# Patient Record
Sex: FEMALE | Race: BLACK OR AFRICAN AMERICAN | NOT HISPANIC OR LATINO | Employment: FULL TIME | URBAN - METROPOLITAN AREA
[De-identification: names, ages, dates, MRNs, and addresses within clinical notes are randomized per-mention and may not be internally consistent; named-entity substitution may affect disease eponyms.]

---

## 2020-01-27 ENCOUNTER — OFFICE VISIT (OUTPATIENT)
Dept: OBSTETRICS AND GYNECOLOGY | Facility: CLINIC | Age: 50
End: 2020-01-27
Payer: COMMERCIAL

## 2020-01-27 VITALS
DIASTOLIC BLOOD PRESSURE: 72 MMHG | HEIGHT: 63 IN | WEIGHT: 293 LBS | SYSTOLIC BLOOD PRESSURE: 124 MMHG | BODY MASS INDEX: 51.91 KG/M2

## 2020-01-27 DIAGNOSIS — Z12.4 SCREENING FOR CERVICAL CANCER: ICD-10-CM

## 2020-01-27 DIAGNOSIS — N95.1 PERIMENOPAUSE: ICD-10-CM

## 2020-01-27 DIAGNOSIS — Z01.419 ENCOUNTER FOR GYNECOLOGICAL EXAMINATION (GENERAL) (ROUTINE) WITHOUT ABNORMAL FINDINGS: Primary | ICD-10-CM

## 2020-01-27 DIAGNOSIS — Z12.31 SCREENING MAMMOGRAM, ENCOUNTER FOR: ICD-10-CM

## 2020-01-27 PROCEDURE — 87624 HPV HI-RISK TYP POOLED RSLT: CPT

## 2020-01-27 PROCEDURE — 99386 PREV VISIT NEW AGE 40-64: CPT | Mod: S$GLB,,, | Performed by: OBSTETRICS & GYNECOLOGY

## 2020-01-27 PROCEDURE — 88175 CYTOPATH C/V AUTO FLUID REDO: CPT

## 2020-01-27 PROCEDURE — 99386 PR PREVENTIVE VISIT,NEW,40-64: ICD-10-PCS | Mod: S$GLB,,, | Performed by: OBSTETRICS & GYNECOLOGY

## 2020-01-27 PROCEDURE — 99999 PR PBB SHADOW E&M-EST. PATIENT-LVL II: CPT | Mod: PBBFAC,,, | Performed by: OBSTETRICS & GYNECOLOGY

## 2020-01-27 PROCEDURE — 99999 PR PBB SHADOW E&M-EST. PATIENT-LVL II: ICD-10-PCS | Mod: PBBFAC,,, | Performed by: OBSTETRICS & GYNECOLOGY

## 2020-01-27 RX ORDER — CETIRIZINE HYDROCHLORIDE 10 MG/1
TABLET ORAL
COMMUNITY
End: 2021-04-09

## 2020-01-27 RX ORDER — ALBUTEROL SULFATE 90 UG/1
AEROSOL, METERED RESPIRATORY (INHALATION)
COMMUNITY
End: 2021-11-05

## 2020-01-27 RX ORDER — LOSARTAN POTASSIUM 100 MG/1
TABLET ORAL
COMMUNITY
Start: 2019-11-22 | End: 2021-04-09

## 2020-01-27 RX ORDER — FUROSEMIDE 20 MG/1
TABLET ORAL
COMMUNITY
Start: 2019-12-26 | End: 2022-10-18

## 2020-01-27 RX ORDER — ATENOLOL 50 MG/1
TABLET ORAL
COMMUNITY
End: 2022-04-08 | Stop reason: SDUPTHER

## 2020-01-27 RX ORDER — HYDROCHLOROTHIAZIDE 25 MG/1
TABLET ORAL
COMMUNITY
End: 2021-04-09

## 2020-01-27 RX ORDER — METFORMIN HYDROCHLORIDE 500 MG/1
TABLET ORAL
COMMUNITY
End: 2022-04-08 | Stop reason: SINTOL

## 2020-01-27 RX ORDER — AMLODIPINE BESYLATE 10 MG/1
TABLET ORAL
COMMUNITY
Start: 2020-01-13 | End: 2022-04-08 | Stop reason: SDUPTHER

## 2020-01-27 RX ORDER — MOMETASONE FUROATE 50 UG/1
SPRAY, METERED NASAL
COMMUNITY
End: 2020-10-08

## 2020-01-27 NOTE — PROGRESS NOTES
Subjective:       Patient ID: Shaneka Ahmadi is a 50 y.o. female.    Chief Complaint:  Well Woman      History of Present Illness  HPI  Annual Exam-Premenopausal  Patient presents for annual exam. The patient has no complaints today. The patient is sexually active--denies pelvic pain; . GYN screening history: last pap: was normal and patient does not recall when last pap was and last mammogram: approximate date  and was normal. The patient wears seatbelts: yes. The patient participates in regular exercise: yes. --walks 3x/wk--1 hr; Has the patient ever been transfused or tattooed?: no. The patient reports that there is not domestic violence in her life.    Menses q 3 months for past year; flow 7 days; tampon-reg/overnight pad; change q 3-4 hrs; no soak through pad--but can soak through tampon; denies dysmenorrherea--reports occas headache and lower back paisn;       Tolerable hot flushes, no problems sleeping      GYN & OB History  Patient's last menstrual period was 10/27/2019 (approximate).   Date of Last Pap: No result found    OB History    Para Term  AB Living   4 4 4     4   SAB TAB Ectopic Multiple Live Births           4      # Outcome Date GA Lbr Philippe/2nd Weight Sex Delivery Anes PTL Lv   4 Term      Vag-Spont   JARROD   3 Term      Vag-Spont   JARROD   2 Term      Vag-Spont   JARROD   1 Term      Vag-Spont   JARROD       Review of Systems  Review of Systems   Genitourinary: Positive for menstrual problem.   All other systems reviewed and are negative.          Objective:      Physical Exam:   Constitutional: She appears well-developed.     Eyes: Pupils are equal, round, and reactive to light. Conjunctivae and EOM are normal.    Neck: Normal range of motion. Neck supple.     Pulmonary/Chest: Effort normal. Right breast exhibits no mass, no nipple discharge, no skin change and no tenderness. Left breast exhibits no mass, no nipple discharge, no skin change and no tenderness. Breasts are symmetrical.         Abdominal: Soft.     Genitourinary: Rectum normal, vagina normal and uterus normal. Pelvic exam was performed with patient supine. Cervix is normal. Right adnexum displays no mass and no tenderness. Left adnexum displays no mass and no tenderness. No erythema, bleeding, rectocele, cystocele or unspecified prolapse of vaginal walls in the vagina. No vaginal discharge found. Labial bartholins normal.Additional cervical findings: pap smear done  Genitourinary Comments: Difficult to assess due to obese abdomen        Uterus Size: 6 cm   Musculoskeletal: Normal range of motion.       Neurological: She is alert.    Skin: Skin is warm.    Psychiatric: She has a normal mood and affect.           Assessment:     Encounter Diagnoses   Name Primary?    Encounter for gynecological examination (general) (routine) without abnormal findings Yes    Screening for cervical cancer     Perimenopause     Screening mammogram, encounter for                Plan:      Continue annual well woman exam.  Pap today; Reviewed updated recommendations for pap smears (every 3 years) in low risk patients.   Recommend annual pelvic exams.  Reviewed recommendations for annual CBE.  mammo ordered, continue yearly until age 74, plans to do at [facility]  Continue menstrual calendar, aware in menopause if no menses for 12 months  Accepts referral for colonoscopy

## 2020-01-31 LAB
HPV HR 12 DNA SPEC QL NAA+PROBE: POSITIVE
HPV16 AG SPEC QL: NEGATIVE
HPV18 DNA SPEC QL NAA+PROBE: NEGATIVE

## 2020-02-18 LAB
FINAL PATHOLOGIC DIAGNOSIS: NORMAL
Lab: NORMAL

## 2020-08-14 ENCOUNTER — TELEPHONE (OUTPATIENT)
Dept: OBSTETRICS AND GYNECOLOGY | Facility: CLINIC | Age: 50
End: 2020-08-14

## 2020-08-14 NOTE — TELEPHONE ENCOUNTER
----- Message from Edi Atkins sent at 8/14/2020  7:31 AM CDT -----  ..Type:  Patient Returning Call    Who Called: pt  Who Left Message for Patient:  Does the patient know what this is regarding?: results   Would the patient rather a call back or a response via [facility]?  Call back   Best Call Back Number [phone]  Additional Information:  pt is requesting a call from nurse to get test results

## 2020-09-23 ENCOUNTER — TELEPHONE (OUTPATIENT)
Dept: OBSTETRICS AND GYNECOLOGY | Facility: CLINIC | Age: 50
End: 2020-09-23

## 2020-09-23 NOTE — TELEPHONE ENCOUNTER
----- Message from Mayela Mcgill sent at 9/23/2020 11:58 AM CDT -----  Regarding: Schedule Appt  Contact: Patient  Patient had an appt for a procedure scheduled on 09/03/2020   Patient stated she was not aware of appt and would like a call back to reschedule     Patient an be reached at [phone]

## 2020-10-04 ENCOUNTER — PATIENT MESSAGE (OUTPATIENT)
Dept: OBSTETRICS AND GYNECOLOGY | Facility: CLINIC | Age: 50
End: 2020-10-04

## 2020-10-08 ENCOUNTER — PROCEDURE VISIT (OUTPATIENT)
Dept: OBSTETRICS AND GYNECOLOGY | Facility: CLINIC | Age: 50
End: 2020-10-08
Payer: MEDICAID

## 2020-10-08 VITALS
HEIGHT: 63 IN | WEIGHT: 293 LBS | BODY MASS INDEX: 51.91 KG/M2 | SYSTOLIC BLOOD PRESSURE: 130 MMHG | DIASTOLIC BLOOD PRESSURE: 84 MMHG

## 2020-10-08 DIAGNOSIS — B97.7 HPV IN FEMALE: Primary | ICD-10-CM

## 2020-10-08 LAB
B-HCG UR QL: NEGATIVE
CTP QC/QA: YES

## 2020-10-08 PROCEDURE — 57456 COLPOSCOPY: ICD-10-PCS | Mod: S$PBB,,, | Performed by: OBSTETRICS & GYNECOLOGY

## 2020-10-08 PROCEDURE — 88305 TISSUE EXAM BY PATHOLOGIST: CPT | Mod: 26,,, | Performed by: PATHOLOGY

## 2020-10-08 PROCEDURE — 57456 ENDOCERV CURETTAGE W/SCOPE: CPT | Mod: PBBFAC,PN | Performed by: OBSTETRICS & GYNECOLOGY

## 2020-10-08 PROCEDURE — 88305 TISSUE EXAM BY PATHOLOGIST: ICD-10-PCS | Mod: 26,,, | Performed by: PATHOLOGY

## 2020-10-08 PROCEDURE — 88305 TISSUE EXAM BY PATHOLOGIST: CPT | Performed by: PATHOLOGY

## 2020-10-08 RX ORDER — ATORVASTATIN CALCIUM 20 MG/1
TABLET, FILM COATED ORAL
COMMUNITY
Start: 2020-09-02 | End: 2021-11-05

## 2020-10-08 RX ORDER — LOSARTAN POTASSIUM AND HYDROCHLOROTHIAZIDE 25; 100 MG/1; MG/1
TABLET ORAL
COMMUNITY
Start: 2020-07-30 | End: 2022-04-08

## 2020-10-08 NOTE — PROCEDURES
Colposcopy    Date/Time: 10/8/2020 2:15 PM  Performed by: Catalina Arriaga MD  Authorized by: Catalina Arriaga MD     Consent Done?:  Yes (Written)  Timeout:Immediately prior to procedure a time out was called to verify the correct patient, procedure, equipment, support staff and site/side marked as required  Prep:Patient was prepped and draped in the usual sterile fashion    Colposcopy Site:  Cervix   Patient was prepped and draped in the normal sterile fashion.  Acrowhite Lesion: No    Atypical Vessels: No    Transformation Zone Adequate?: Yes    Biopsy?: No    ECC Performed?: Yes    LEEP Performed?: No     Patient tolerated the procedure well with no immediate complications.   Post-operative instructions were provided for the patient.   Patient was discharged and will follow up if any complications occur

## 2020-10-13 LAB
FINAL PATHOLOGIC DIAGNOSIS: NORMAL
GROSS: NORMAL

## 2021-03-08 ENCOUNTER — PATIENT MESSAGE (OUTPATIENT)
Dept: OBSTETRICS AND GYNECOLOGY | Facility: CLINIC | Age: 51
End: 2021-03-08

## 2021-04-09 ENCOUNTER — OFFICE VISIT (OUTPATIENT)
Dept: OBSTETRICS AND GYNECOLOGY | Facility: CLINIC | Age: 51
End: 2021-04-09
Payer: COMMERCIAL

## 2021-04-09 VITALS
WEIGHT: 293 LBS | DIASTOLIC BLOOD PRESSURE: 84 MMHG | SYSTOLIC BLOOD PRESSURE: 140 MMHG | HEIGHT: 63 IN | BODY MASS INDEX: 51.91 KG/M2

## 2021-04-09 DIAGNOSIS — Z12.4 SCREENING FOR CERVICAL CANCER: ICD-10-CM

## 2021-04-09 DIAGNOSIS — Z12.31 SCREENING MAMMOGRAM, ENCOUNTER FOR: ICD-10-CM

## 2021-04-09 DIAGNOSIS — Z01.419 ENCOUNTER FOR GYNECOLOGICAL EXAMINATION (GENERAL) (ROUTINE) WITHOUT ABNORMAL FINDINGS: Primary | ICD-10-CM

## 2021-04-09 PROCEDURE — 1126F PR PAIN SEVERITY QUANTIFIED, NO PAIN PRESENT: ICD-10-PCS | Mod: S$GLB,,, | Performed by: OBSTETRICS & GYNECOLOGY

## 2021-04-09 PROCEDURE — 88175 CYTOPATH C/V AUTO FLUID REDO: CPT | Performed by: OBSTETRICS & GYNECOLOGY

## 2021-04-09 PROCEDURE — 87624 HPV HI-RISK TYP POOLED RSLT: CPT | Performed by: OBSTETRICS & GYNECOLOGY

## 2021-04-09 PROCEDURE — 87625 HPV TYPES 16 & 18 ONLY: CPT | Mod: 59 | Performed by: OBSTETRICS & GYNECOLOGY

## 2021-04-09 PROCEDURE — 99999 PR PBB SHADOW E&M-EST. PATIENT-LVL III: ICD-10-PCS | Mod: PBBFAC,,, | Performed by: OBSTETRICS & GYNECOLOGY

## 2021-04-09 PROCEDURE — 1126F AMNT PAIN NOTED NONE PRSNT: CPT | Mod: S$GLB,,, | Performed by: OBSTETRICS & GYNECOLOGY

## 2021-04-09 PROCEDURE — 99396 PREV VISIT EST AGE 40-64: CPT | Mod: S$GLB,,, | Performed by: OBSTETRICS & GYNECOLOGY

## 2021-04-09 PROCEDURE — 99396 PR PREVENTIVE VISIT,EST,40-64: ICD-10-PCS | Mod: S$GLB,,, | Performed by: OBSTETRICS & GYNECOLOGY

## 2021-04-09 PROCEDURE — 3008F PR BODY MASS INDEX (BMI) DOCUMENTED: ICD-10-PCS | Mod: CPTII,S$GLB,, | Performed by: OBSTETRICS & GYNECOLOGY

## 2021-04-09 PROCEDURE — 3008F BODY MASS INDEX DOCD: CPT | Mod: CPTII,S$GLB,, | Performed by: OBSTETRICS & GYNECOLOGY

## 2021-04-09 PROCEDURE — 99999 PR PBB SHADOW E&M-EST. PATIENT-LVL III: CPT | Mod: PBBFAC,,, | Performed by: OBSTETRICS & GYNECOLOGY

## 2021-04-16 ENCOUNTER — PATIENT MESSAGE (OUTPATIENT)
Dept: OBSTETRICS AND GYNECOLOGY | Facility: CLINIC | Age: 51
End: 2021-04-16

## 2021-04-16 LAB
CLINICAL INFO: NORMAL
CYTO CVX: NORMAL
CYTOLOGIST CVX/VAG CYTO: NORMAL
CYTOLOGIST CVX/VAG CYTO: NORMAL
CYTOLOGY CMNT CVX/VAG CYTO-IMP: NORMAL
CYTOLOGY PAP THIN PREP EXPLANATION: NORMAL
DATE OF PREVIOUS PAP: NORMAL
DATE PREVIOUS BX: NO
HPV I/H RISK 4 DNA CVX QL NAA+PROBE: DETECTED
LMP START DATE: NORMAL
SPECIMEN SOURCE CVX/VAG CYTO: NORMAL
STAT OF ADQ CVX/VAG CYTO-IMP: NORMAL

## 2021-04-19 LAB
HPV16 DNA CVX QL PROBE+SIG AMP: NOT DETECTED
HPV18 DNA CVX QL PROBE+SIG AMP: DETECTED

## 2021-04-29 ENCOUNTER — PATIENT MESSAGE (OUTPATIENT)
Dept: RESEARCH | Facility: HOSPITAL | Age: 51
End: 2021-04-29

## 2021-11-05 ENCOUNTER — OFFICE VISIT (OUTPATIENT)
Dept: PODIATRY | Facility: CLINIC | Age: 51
End: 2021-11-05
Payer: COMMERCIAL

## 2021-11-05 VITALS — HEIGHT: 63 IN | BODY MASS INDEX: 51.91 KG/M2 | WEIGHT: 293 LBS

## 2021-11-05 DIAGNOSIS — B35.3 TINEA PEDIS OF BOTH FEET: Primary | ICD-10-CM

## 2021-11-05 PROCEDURE — 1160F PR REVIEW ALL MEDS BY PRESCRIBER/CLIN PHARMACIST DOCUMENTED: ICD-10-PCS | Mod: CPTII,S$GLB,, | Performed by: PODIATRIST

## 2021-11-05 PROCEDURE — 1159F PR MEDICATION LIST DOCUMENTED IN MEDICAL RECORD: ICD-10-PCS | Mod: CPTII,S$GLB,, | Performed by: PODIATRIST

## 2021-11-05 PROCEDURE — 99204 OFFICE O/P NEW MOD 45 MIN: CPT | Mod: S$GLB,,, | Performed by: PODIATRIST

## 2021-11-05 PROCEDURE — 3008F BODY MASS INDEX DOCD: CPT | Mod: CPTII,S$GLB,, | Performed by: PODIATRIST

## 2021-11-05 PROCEDURE — 1159F MED LIST DOCD IN RCRD: CPT | Mod: CPTII,S$GLB,, | Performed by: PODIATRIST

## 2021-11-05 PROCEDURE — 4010F ACE/ARB THERAPY RXD/TAKEN: CPT | Mod: CPTII,S$GLB,, | Performed by: PODIATRIST

## 2021-11-05 PROCEDURE — 99204 PR OFFICE/OUTPT VISIT, NEW, LEVL IV, 45-59 MIN: ICD-10-PCS | Mod: S$GLB,,, | Performed by: PODIATRIST

## 2021-11-05 PROCEDURE — 4010F PR ACE/ARB THEARPY RXD/TAKEN: ICD-10-PCS | Mod: CPTII,S$GLB,, | Performed by: PODIATRIST

## 2021-11-05 PROCEDURE — 99999 PR PBB SHADOW E&M-EST. PATIENT-LVL III: CPT | Mod: PBBFAC,,, | Performed by: PODIATRIST

## 2021-11-05 PROCEDURE — 99999 PR PBB SHADOW E&M-EST. PATIENT-LVL III: ICD-10-PCS | Mod: PBBFAC,,, | Performed by: PODIATRIST

## 2021-11-05 PROCEDURE — 3008F PR BODY MASS INDEX (BMI) DOCUMENTED: ICD-10-PCS | Mod: CPTII,S$GLB,, | Performed by: PODIATRIST

## 2021-11-05 PROCEDURE — 1160F RVW MEDS BY RX/DR IN RCRD: CPT | Mod: CPTII,S$GLB,, | Performed by: PODIATRIST

## 2021-11-05 RX ORDER — TERBINAFINE HYDROCHLORIDE 250 MG/1
250 TABLET ORAL DAILY
Qty: 30 TABLET | Refills: 0 | Status: SHIPPED | OUTPATIENT
Start: 2021-11-05 | End: 2021-12-05

## 2021-12-02 ENCOUNTER — OFFICE VISIT (OUTPATIENT)
Dept: OPHTHALMOLOGY | Facility: CLINIC | Age: 51
End: 2021-12-02
Payer: COMMERCIAL

## 2021-12-02 DIAGNOSIS — E11.9 DIABETES MELLITUS TYPE 2 WITHOUT RETINOPATHY: Primary | ICD-10-CM

## 2021-12-02 DIAGNOSIS — H52.7 REFRACTIVE ERRORS: ICD-10-CM

## 2021-12-02 DIAGNOSIS — E11.36 DIABETIC CATARACT: ICD-10-CM

## 2021-12-02 PROCEDURE — 92015 PR REFRACTION: ICD-10-PCS | Mod: S$GLB,,, | Performed by: OPTOMETRIST

## 2021-12-02 PROCEDURE — 99999 PR PBB SHADOW E&M-EST. PATIENT-LVL I: ICD-10-PCS | Mod: PBBFAC,,, | Performed by: OPTOMETRIST

## 2021-12-02 PROCEDURE — 4010F PR ACE/ARB THEARPY RXD/TAKEN: ICD-10-PCS | Mod: CPTII,S$GLB,, | Performed by: OPTOMETRIST

## 2021-12-02 PROCEDURE — 99999 PR PBB SHADOW E&M-EST. PATIENT-LVL I: CPT | Mod: PBBFAC,,, | Performed by: OPTOMETRIST

## 2021-12-02 PROCEDURE — 92004 COMPRE OPH EXAM NEW PT 1/>: CPT | Mod: S$GLB,,, | Performed by: OPTOMETRIST

## 2021-12-02 PROCEDURE — 92015 DETERMINE REFRACTIVE STATE: CPT | Mod: S$GLB,,, | Performed by: OPTOMETRIST

## 2021-12-02 PROCEDURE — 4010F ACE/ARB THERAPY RXD/TAKEN: CPT | Mod: CPTII,S$GLB,, | Performed by: OPTOMETRIST

## 2021-12-02 PROCEDURE — 92004 PR EYE EXAM, NEW PATIENT,COMPREHESV: ICD-10-PCS | Mod: S$GLB,,, | Performed by: OPTOMETRIST

## 2022-01-05 ENCOUNTER — LAB VISIT (OUTPATIENT)
Dept: LAB | Facility: HOSPITAL | Age: 52
End: 2022-01-05
Attending: STUDENT IN AN ORGANIZED HEALTH CARE EDUCATION/TRAINING PROGRAM
Payer: COMMERCIAL

## 2022-01-05 ENCOUNTER — OFFICE VISIT (OUTPATIENT)
Dept: OPHTHALMOLOGY | Facility: CLINIC | Age: 52
End: 2022-01-05
Payer: COMMERCIAL

## 2022-01-05 ENCOUNTER — DOCUMENTATION ONLY (OUTPATIENT)
Dept: OPHTHALMOLOGY | Facility: CLINIC | Age: 52
End: 2022-01-05

## 2022-01-05 DIAGNOSIS — H25.12 NUCLEAR SCLEROSIS OF LEFT EYE: ICD-10-CM

## 2022-01-05 DIAGNOSIS — H25.11 NUCLEAR SCLEROSIS OF RIGHT EYE: ICD-10-CM

## 2022-01-05 DIAGNOSIS — I10 ESSENTIAL HYPERTENSION: ICD-10-CM

## 2022-01-05 DIAGNOSIS — H25.11 NUCLEAR SCLEROSIS OF RIGHT EYE: Primary | ICD-10-CM

## 2022-01-05 PROCEDURE — 99999 PR PBB SHADOW E&M-EST. PATIENT-LVL II: ICD-10-PCS | Mod: PBBFAC,,, | Performed by: STUDENT IN AN ORGANIZED HEALTH CARE EDUCATION/TRAINING PROGRAM

## 2022-01-05 PROCEDURE — 1159F PR MEDICATION LIST DOCUMENTED IN MEDICAL RECORD: ICD-10-PCS | Mod: CPTII,S$GLB,, | Performed by: STUDENT IN AN ORGANIZED HEALTH CARE EDUCATION/TRAINING PROGRAM

## 2022-01-05 PROCEDURE — 92136 IOL MASTER - OD - RIGHT EYE: ICD-10-PCS | Mod: RT,S$GLB,, | Performed by: STUDENT IN AN ORGANIZED HEALTH CARE EDUCATION/TRAINING PROGRAM

## 2022-01-05 PROCEDURE — 92004 COMPRE OPH EXAM NEW PT 1/>: CPT | Mod: S$GLB,,, | Performed by: STUDENT IN AN ORGANIZED HEALTH CARE EDUCATION/TRAINING PROGRAM

## 2022-01-05 PROCEDURE — 1160F PR REVIEW ALL MEDS BY PRESCRIBER/CLIN PHARMACIST DOCUMENTED: ICD-10-PCS | Mod: CPTII,S$GLB,, | Performed by: STUDENT IN AN ORGANIZED HEALTH CARE EDUCATION/TRAINING PROGRAM

## 2022-01-05 PROCEDURE — 92025 CPTRIZED CORNEAL TOPOGRAPHY: CPT | Mod: S$GLB,,, | Performed by: STUDENT IN AN ORGANIZED HEALTH CARE EDUCATION/TRAINING PROGRAM

## 2022-01-05 PROCEDURE — 36415 COLL VENOUS BLD VENIPUNCTURE: CPT | Performed by: STUDENT IN AN ORGANIZED HEALTH CARE EDUCATION/TRAINING PROGRAM

## 2022-01-05 PROCEDURE — 1159F MED LIST DOCD IN RCRD: CPT | Mod: CPTII,S$GLB,, | Performed by: STUDENT IN AN ORGANIZED HEALTH CARE EDUCATION/TRAINING PROGRAM

## 2022-01-05 PROCEDURE — 92025 CORNEAL TOPOGRAPHY - OU - BOTH EYES: ICD-10-PCS | Mod: S$GLB,,, | Performed by: STUDENT IN AN ORGANIZED HEALTH CARE EDUCATION/TRAINING PROGRAM

## 2022-01-05 PROCEDURE — 92136 OPHTHALMIC BIOMETRY: CPT | Mod: RT,S$GLB,, | Performed by: STUDENT IN AN ORGANIZED HEALTH CARE EDUCATION/TRAINING PROGRAM

## 2022-01-05 PROCEDURE — 83036 HEMOGLOBIN GLYCOSYLATED A1C: CPT | Performed by: STUDENT IN AN ORGANIZED HEALTH CARE EDUCATION/TRAINING PROGRAM

## 2022-01-05 PROCEDURE — 1160F RVW MEDS BY RX/DR IN RCRD: CPT | Mod: CPTII,S$GLB,, | Performed by: STUDENT IN AN ORGANIZED HEALTH CARE EDUCATION/TRAINING PROGRAM

## 2022-01-05 PROCEDURE — 92004 PR EYE EXAM, NEW PATIENT,COMPREHESV: ICD-10-PCS | Mod: S$GLB,,, | Performed by: STUDENT IN AN ORGANIZED HEALTH CARE EDUCATION/TRAINING PROGRAM

## 2022-01-05 PROCEDURE — 99999 PR PBB SHADOW E&M-EST. PATIENT-LVL II: CPT | Mod: PBBFAC,,, | Performed by: STUDENT IN AN ORGANIZED HEALTH CARE EDUCATION/TRAINING PROGRAM

## 2022-01-05 RX ORDER — PREDNISOLONE ACETATE 10 MG/ML
1 SUSPENSION/ DROPS OPHTHALMIC 4 TIMES DAILY
Qty: 5 ML | Refills: 1 | Status: SHIPPED | OUTPATIENT
Start: 2022-01-05 | End: 2022-08-12

## 2022-01-05 NOTE — PROGRESS NOTES
Short Stay Record    Diagnosis: Nuclear Sclerotic Cataract right    CC: Blurry Vision     HPI:  Shaneka Ahmadi is a 52 y.o. female who presents for evaluation prior to ophthalmic surgery. No current complaints.     Past Medical History:   Diagnosis Date    Diabetes mellitus     Hypertension      Past Surgical History:   Procedure Laterality Date    TUBAL LIGATION      2007--postpartum tubal ligation     Social History     Tobacco Use    Smoking status: Never Smoker    Smokeless tobacco: Never Used   Substance Use Topics    Alcohol use: Never     Family History   Problem Relation Age of Onset    Heart failure Father      Review of patient's allergies indicates:  No Known Allergies      Current Outpatient Medications:     amLODIPine (NORVASC) 10 MG tablet, , Disp: , Rfl:     atenolol (TENORMIN) 50 MG tablet, atenolol 50 mg tablet, Disp: , Rfl:     furosemide (LASIX) 20 MG tablet, , Disp: , Rfl:     losartan-hydrochlorothiazide 100-25 mg (HYZAAR) 100-25 mg per tablet, , Disp: , Rfl:     metFORMIN (GLUCOPHAGE) 500 MG tablet, metformin 500 mg tablet, Disp: , Rfl:     prednisoLONE acetate (PRED FORTE) 1 % DrpS, Place 1 drop into the right eye 4 (four) times daily., Disp: 5 mL, Rfl: 1    Review of Systems:  10 Pt ROS negative except as stated in HPI    Physical Exam:  General Appearance:    A&Ox3, no distress, appears stated age   Head:    Normocephalic, without obvious abnormality, atraumatic   Eyes:    PERRL, EOM's intact   Back:     Symmetric, no curvature   Lungs:     respirations unlabored   Chest Wall:    No tenderness or deformity    Heart:  Abdomen:  Extremities:  Skin:    S1 and S2 present    Soft, non-tender    Extremities normal, atraumatic    Skin color, texture, turgor normal     Patient is stable for ophthalmic surgery under local and MAC.

## 2022-01-05 NOTE — PROGRESS NOTES
HPI     Patient in for cataract evaluation today.    C/o blurred vision , glare, trouble driving at night , difficulty reading,   and seeing the television over the past several MONTHS    Which eye is most affected? OD    Activities of daily living impacted: Computer     Do you have difficulty, even with glasses, with the following activities?   No    Reading small print such as labels on medicine bottles, a telephone book,   or food labels.   YES  Reading a newspaper or book?  YES  Seeing steps, stairs, or curbs  NO  Reading traffic signs, street signs, or store signs  NO  Doing fine handwork like sewing, knitting, crocheting or carpentry?  NO  Writing checks or filling out forms  NO  Playing games such as binJumpPost, Bday, card games or Conversation Media?  NO  Watching television?  NO  Driving at night?  NO       Last edited by Elizabeth Pza MD on 1/5/2022  3:19 PM. (History)            Assessment /Plan     For exam results, see Encounter Report.    Nuclear sclerosis of right eye  Visually Significant Cataract OD  Patient reports decreased vision consistent with the clinical amount of lenticular opacity, which reaches the level of visual significance and affects activities of daily living including reading and glare. Risks, benefits, and alternatives to cataract surgery were discussed.  Discussion of risks included possibility of infection as well as permanent vision loss.The pt expressed a desire to proceed with surgery with the potential for some reasonable degree of visual improvement. Recommended regular use of artificial tears and good lid hygiene to optimize surgical outcome.     Discussed IOL options and refractive outcomes for this patient.    Phaco right eye,   Topical  monofocal IOL. *Pt. is still considering Multifocal  Will aim for distance  Referral to [facility] for [facility]hthalmic [facility]  Prescriptions sent for preoperative medications  Durezol or Prednisolone Acetate  Explained that patient  may need glasses after surgery.    Dilation: good  Alpha Blockers: none      Nuclear sclerosis of left eye  -   Follow    Essential hypertension    *Patient has ? Of diabetes, on metformin, ordering A1C today before planning cataract surgery      Return to clinic for PCIOL OD

## 2022-01-06 LAB
ESTIMATED AVG GLUCOSE: 171 MG/DL (ref 68–131)
HBA1C MFR BLD: 7.6 % (ref 4–5.6)

## 2022-04-07 ENCOUNTER — OFFICE VISIT (OUTPATIENT)
Dept: OBSTETRICS AND GYNECOLOGY | Facility: CLINIC | Age: 52
End: 2022-04-07
Payer: COMMERCIAL

## 2022-04-07 ENCOUNTER — TELEPHONE (OUTPATIENT)
Dept: OBSTETRICS AND GYNECOLOGY | Facility: CLINIC | Age: 52
End: 2022-04-07
Payer: COMMERCIAL

## 2022-04-07 VITALS — BODY MASS INDEX: 51.91 KG/M2 | HEIGHT: 63 IN | WEIGHT: 293 LBS

## 2022-04-07 DIAGNOSIS — N89.8 VAGINAL ODOR: Primary | ICD-10-CM

## 2022-04-07 DIAGNOSIS — Z12.39 ENCOUNTER FOR SCREENING FOR MALIGNANT NEOPLASM OF BREAST, UNSPECIFIED SCREENING MODALITY: ICD-10-CM

## 2022-04-07 DIAGNOSIS — R30.0 DYSURIA: ICD-10-CM

## 2022-04-07 PROCEDURE — 4010F ACE/ARB THERAPY RXD/TAKEN: CPT | Mod: CPTII,S$GLB,, | Performed by: OBSTETRICS & GYNECOLOGY

## 2022-04-07 PROCEDURE — 87481 CANDIDA DNA AMP PROBE: CPT | Mod: 59 | Performed by: OBSTETRICS & GYNECOLOGY

## 2022-04-07 PROCEDURE — 3051F PR MOST RECENT HEMOGLOBIN A1C LEVEL 7.0 - < 8.0%: ICD-10-PCS | Mod: CPTII,S$GLB,, | Performed by: OBSTETRICS & GYNECOLOGY

## 2022-04-07 PROCEDURE — 99999 PR PBB SHADOW E&M-EST. PATIENT-LVL III: CPT | Mod: PBBFAC,,, | Performed by: OBSTETRICS & GYNECOLOGY

## 2022-04-07 PROCEDURE — 3008F BODY MASS INDEX DOCD: CPT | Mod: CPTII,S$GLB,, | Performed by: OBSTETRICS & GYNECOLOGY

## 2022-04-07 PROCEDURE — 99213 PR OFFICE/OUTPT VISIT, EST, LEVL III, 20-29 MIN: ICD-10-PCS | Mod: S$GLB,,, | Performed by: OBSTETRICS & GYNECOLOGY

## 2022-04-07 PROCEDURE — 99213 OFFICE O/P EST LOW 20 MIN: CPT | Mod: S$GLB,,, | Performed by: OBSTETRICS & GYNECOLOGY

## 2022-04-07 PROCEDURE — 1159F PR MEDICATION LIST DOCUMENTED IN MEDICAL RECORD: ICD-10-PCS | Mod: CPTII,S$GLB,, | Performed by: OBSTETRICS & GYNECOLOGY

## 2022-04-07 PROCEDURE — 87801 DETECT AGNT MULT DNA AMPLI: CPT | Performed by: OBSTETRICS & GYNECOLOGY

## 2022-04-07 PROCEDURE — 3051F HG A1C>EQUAL 7.0%<8.0%: CPT | Mod: CPTII,S$GLB,, | Performed by: OBSTETRICS & GYNECOLOGY

## 2022-04-07 PROCEDURE — 4010F PR ACE/ARB THEARPY RXD/TAKEN: ICD-10-PCS | Mod: CPTII,S$GLB,, | Performed by: OBSTETRICS & GYNECOLOGY

## 2022-04-07 PROCEDURE — 87086 URINE CULTURE/COLONY COUNT: CPT | Performed by: OBSTETRICS & GYNECOLOGY

## 2022-04-07 PROCEDURE — 99999 PR PBB SHADOW E&M-EST. PATIENT-LVL III: ICD-10-PCS | Mod: PBBFAC,,, | Performed by: OBSTETRICS & GYNECOLOGY

## 2022-04-07 PROCEDURE — 3008F PR BODY MASS INDEX (BMI) DOCUMENTED: ICD-10-PCS | Mod: CPTII,S$GLB,, | Performed by: OBSTETRICS & GYNECOLOGY

## 2022-04-07 PROCEDURE — 1159F MED LIST DOCD IN RCRD: CPT | Mod: CPTII,S$GLB,, | Performed by: OBSTETRICS & GYNECOLOGY

## 2022-04-07 RX ORDER — HYDROCHLOROTHIAZIDE 25 MG/1
25 TABLET ORAL DAILY
COMMUNITY
Start: 2022-01-17 | End: 2022-04-08

## 2022-04-07 RX ORDER — HYDROXYCHLOROQUINE SULFATE 200 MG/1
200 TABLET, FILM COATED ORAL 2 TIMES DAILY
COMMUNITY
Start: 2021-12-30 | End: 2022-04-08

## 2022-04-07 RX ORDER — METRONIDAZOLE 500 MG/1
500 TABLET ORAL EVERY 12 HOURS
Qty: 14 TABLET | Refills: 0 | Status: SHIPPED | OUTPATIENT
Start: 2022-04-07 | End: 2022-04-14

## 2022-04-07 RX ORDER — NAFTIFINE HYDROCHLORIDE 20 MG/G
CREAM TOPICAL
COMMUNITY
Start: 2022-03-31 | End: 2022-10-18

## 2022-04-07 RX ORDER — ACETAMINOPHEN 160 MG
TABLET,CHEWABLE ORAL
COMMUNITY
End: 2022-04-08

## 2022-04-07 RX ORDER — VALSARTAN AND HYDROCHLOROTHIAZIDE 80; 12.5 MG/1; MG/1
TABLET, FILM COATED ORAL
COMMUNITY
End: 2022-04-07

## 2022-04-07 NOTE — TELEPHONE ENCOUNTER
----- Message from Marilin Astorga sent at 4/7/2022  9:22 AM CDT -----  Regarding: rx  Contact: pt  Pt wants to know if rx was called in.[phone]

## 2022-04-07 NOTE — PROGRESS NOTES
Subjective:       Patient ID: Shaneka Ahmadi is a 52 y.o. female.    Chief Complaint:  Urinary Tract Infection      History of Present Illness  HPI  here for problem   Reports pain with urination and fishy vaginal odor for 1 month  Reports minimal discharge  Denies fever/chills  Has not used any otc products    GYN & OB History  Patient's last menstrual period was 2021 (approximate).   Date of Last Pap: 2020    OB History    Para Term  AB Living   4 3 3   1 3   SAB IAB Ectopic Multiple Live Births   1       3      # Outcome Date GA Lbr Philippe/2nd Weight Sex Delivery Anes PTL Lv   4 Term      Vag-Spont   JARROD   3 SAB            2 Term      Vag-Spont   JARROD   1 Term      Vag-Spont   JARROD       Review of Systems  Review of Systems   Genitourinary: Positive for dysuria and vaginal odor.   All other systems reviewed and are negative.          Objective:      Physical Exam:   Constitutional: She appears well-developed.     Eyes: Pupils are equal, round, and reactive to light. Conjunctivae and EOM are normal.      Pulmonary/Chest: Effort normal. Right breast exhibits no mass, no nipple discharge, no skin change, no tenderness and presence. Left breast exhibits no mass, no nipple discharge, no skin change, no tenderness and presence. Breasts are symmetrical.        Abdominal: Soft.     Genitourinary:    Vagina and uterus normal.      Pelvic exam was performed with patient supine.             Musculoskeletal: Normal range of motion.       Neurological: She is alert.    Skin: Skin is warm.    Psychiatric: She has a normal mood and affect.           Assessment:        1. Vaginal odor    2. Encounter for screening for malignant neoplasm of breast, unspecified screening modality    3. Dysuria               Plan:      Affirm today  ucx today;  rx sent for flagyl  mammo ordered, continue yearly until age 74  Return for ww exam

## 2022-04-08 ENCOUNTER — LAB VISIT (OUTPATIENT)
Dept: LAB | Facility: HOSPITAL | Age: 52
End: 2022-04-08
Attending: NURSE PRACTITIONER
Payer: COMMERCIAL

## 2022-04-08 ENCOUNTER — OFFICE VISIT (OUTPATIENT)
Dept: INTERNAL MEDICINE | Facility: CLINIC | Age: 52
End: 2022-04-08
Payer: COMMERCIAL

## 2022-04-08 VITALS
OXYGEN SATURATION: 97 % | WEIGHT: 293 LBS | HEART RATE: 61 BPM | TEMPERATURE: 98 F | SYSTOLIC BLOOD PRESSURE: 134 MMHG | RESPIRATION RATE: 18 BRPM | DIASTOLIC BLOOD PRESSURE: 80 MMHG | HEIGHT: 63 IN | BODY MASS INDEX: 51.91 KG/M2

## 2022-04-08 DIAGNOSIS — Z12.11 COLON CANCER SCREENING: ICD-10-CM

## 2022-04-08 DIAGNOSIS — I10 ESSENTIAL HYPERTENSION: ICD-10-CM

## 2022-04-08 DIAGNOSIS — E11.9 TYPE 2 DIABETES MELLITUS WITHOUT COMPLICATION, WITHOUT LONG-TERM CURRENT USE OF INSULIN: ICD-10-CM

## 2022-04-08 DIAGNOSIS — Z00.00 ROUTINE MEDICAL EXAM: Primary | ICD-10-CM

## 2022-04-08 LAB
ALBUMIN SERPL BCP-MCNC: 3.7 G/DL (ref 3.5–5.2)
ALP SERPL-CCNC: 102 U/L (ref 55–135)
ALT SERPL W/O P-5'-P-CCNC: 19 U/L (ref 10–44)
ANION GAP SERPL CALC-SCNC: 11 MMOL/L (ref 8–16)
AST SERPL-CCNC: 12 U/L (ref 10–40)
BACTERIAL VAGINOSIS DNA: NEGATIVE
BASOPHILS # BLD AUTO: 0.06 K/UL (ref 0–0.2)
BASOPHILS NFR BLD: 0.7 % (ref 0–1.9)
BILIRUB SERPL-MCNC: 0.4 MG/DL (ref 0.1–1)
BUN SERPL-MCNC: 13 MG/DL (ref 6–20)
CALCIUM SERPL-MCNC: 9.3 MG/DL (ref 8.7–10.5)
CANDIDA GLABRATA DNA: NEGATIVE
CANDIDA KRUSEI DNA: NEGATIVE
CANDIDA RRNA VAG QL PROBE: NEGATIVE
CHLORIDE SERPL-SCNC: 100 MMOL/L (ref 95–110)
CHOLEST SERPL-MCNC: 203 MG/DL (ref 120–199)
CHOLEST/HDLC SERPL: 4.1 {RATIO} (ref 2–5)
CO2 SERPL-SCNC: 30 MMOL/L (ref 23–29)
CREAT SERPL-MCNC: 0.8 MG/DL (ref 0.5–1.4)
DIFFERENTIAL METHOD: ABNORMAL
EOSINOPHIL # BLD AUTO: 0.4 K/UL (ref 0–0.5)
EOSINOPHIL NFR BLD: 4.5 % (ref 0–8)
ERYTHROCYTE [DISTWIDTH] IN BLOOD BY AUTOMATED COUNT: 13.2 % (ref 11.5–14.5)
EST. GFR  (AFRICAN AMERICAN): >60 ML/MIN/1.73 M^2
EST. GFR  (NON AFRICAN AMERICAN): >60 ML/MIN/1.73 M^2
ESTIMATED AVG GLUCOSE: 189 MG/DL (ref 68–131)
GLUCOSE SERPL-MCNC: 193 MG/DL (ref 70–110)
HBA1C MFR BLD: 8.2 % (ref 4–5.6)
HCT VFR BLD AUTO: 40.8 % (ref 37–48.5)
HDLC SERPL-MCNC: 49 MG/DL (ref 40–75)
HDLC SERPL: 24.1 % (ref 20–50)
HGB BLD-MCNC: 12.7 G/DL (ref 12–16)
IMM GRANULOCYTES # BLD AUTO: 0.03 K/UL (ref 0–0.04)
IMM GRANULOCYTES NFR BLD AUTO: 0.3 % (ref 0–0.5)
LDLC SERPL CALC-MCNC: 136.6 MG/DL (ref 63–159)
LYMPHOCYTES # BLD AUTO: 2 K/UL (ref 1–4.8)
LYMPHOCYTES NFR BLD: 23.4 % (ref 18–48)
MCH RBC QN AUTO: 25.8 PG (ref 27–31)
MCHC RBC AUTO-ENTMCNC: 31.1 G/DL (ref 32–36)
MCV RBC AUTO: 83 FL (ref 82–98)
MONOCYTES # BLD AUTO: 0.6 K/UL (ref 0.3–1)
MONOCYTES NFR BLD: 7.3 % (ref 4–15)
NEUTROPHILS # BLD AUTO: 5.5 K/UL (ref 1.8–7.7)
NEUTROPHILS NFR BLD: 63.8 % (ref 38–73)
NONHDLC SERPL-MCNC: 154 MG/DL
NRBC BLD-RTO: 0 /100 WBC
PLATELET # BLD AUTO: 198 K/UL (ref 150–450)
PMV BLD AUTO: 13.6 FL (ref 9.2–12.9)
POTASSIUM SERPL-SCNC: 3.7 MMOL/L (ref 3.5–5.1)
PROT SERPL-MCNC: 6.7 G/DL (ref 6–8.4)
RBC # BLD AUTO: 4.92 M/UL (ref 4–5.4)
SODIUM SERPL-SCNC: 141 MMOL/L (ref 136–145)
T VAGINALIS RRNA GENITAL QL PROBE: NEGATIVE
TRIGL SERPL-MCNC: 87 MG/DL (ref 30–150)
TSH SERPL DL<=0.005 MIU/L-ACNC: 1.74 UIU/ML (ref 0.4–4)
WBC # BLD AUTO: 8.63 K/UL (ref 3.9–12.7)

## 2022-04-08 PROCEDURE — 1159F PR MEDICATION LIST DOCUMENTED IN MEDICAL RECORD: ICD-10-PCS | Mod: CPTII,S$GLB,, | Performed by: NURSE PRACTITIONER

## 2022-04-08 PROCEDURE — 99203 OFFICE O/P NEW LOW 30 MIN: CPT | Mod: S$GLB,,, | Performed by: NURSE PRACTITIONER

## 2022-04-08 PROCEDURE — 3008F BODY MASS INDEX DOCD: CPT | Mod: CPTII,S$GLB,, | Performed by: NURSE PRACTITIONER

## 2022-04-08 PROCEDURE — 85025 COMPLETE CBC W/AUTO DIFF WBC: CPT | Performed by: NURSE PRACTITIONER

## 2022-04-08 PROCEDURE — 3079F PR MOST RECENT DIASTOLIC BLOOD PRESSURE 80-89 MM HG: ICD-10-PCS | Mod: CPTII,S$GLB,, | Performed by: NURSE PRACTITIONER

## 2022-04-08 PROCEDURE — 3008F PR BODY MASS INDEX (BMI) DOCUMENTED: ICD-10-PCS | Mod: CPTII,S$GLB,, | Performed by: NURSE PRACTITIONER

## 2022-04-08 PROCEDURE — 1160F PR REVIEW ALL MEDS BY PRESCRIBER/CLIN PHARMACIST DOCUMENTED: ICD-10-PCS | Mod: CPTII,S$GLB,, | Performed by: NURSE PRACTITIONER

## 2022-04-08 PROCEDURE — 4010F ACE/ARB THERAPY RXD/TAKEN: CPT | Mod: CPTII,S$GLB,, | Performed by: NURSE PRACTITIONER

## 2022-04-08 PROCEDURE — 3052F PR MOST RECENT HEMOGLOBIN A1C LEVEL 8.0 - < 9.0%: ICD-10-PCS | Mod: CPTII,S$GLB,, | Performed by: NURSE PRACTITIONER

## 2022-04-08 PROCEDURE — 99203 PR OFFICE/OUTPT VISIT, NEW, LEVL III, 30-44 MIN: ICD-10-PCS | Mod: S$GLB,,, | Performed by: NURSE PRACTITIONER

## 2022-04-08 PROCEDURE — 99999 PR PBB SHADOW E&M-EST. PATIENT-LVL V: CPT | Mod: PBBFAC,,, | Performed by: NURSE PRACTITIONER

## 2022-04-08 PROCEDURE — 3075F SYST BP GE 130 - 139MM HG: CPT | Mod: CPTII,S$GLB,, | Performed by: NURSE PRACTITIONER

## 2022-04-08 PROCEDURE — 3052F HG A1C>EQUAL 8.0%<EQUAL 9.0%: CPT | Mod: CPTII,S$GLB,, | Performed by: NURSE PRACTITIONER

## 2022-04-08 PROCEDURE — 99999 PR PBB SHADOW E&M-EST. PATIENT-LVL V: ICD-10-PCS | Mod: PBBFAC,,, | Performed by: NURSE PRACTITIONER

## 2022-04-08 PROCEDURE — 83036 HEMOGLOBIN GLYCOSYLATED A1C: CPT | Performed by: NURSE PRACTITIONER

## 2022-04-08 PROCEDURE — 36415 COLL VENOUS BLD VENIPUNCTURE: CPT | Mod: PO | Performed by: NURSE PRACTITIONER

## 2022-04-08 PROCEDURE — 1159F MED LIST DOCD IN RCRD: CPT | Mod: CPTII,S$GLB,, | Performed by: NURSE PRACTITIONER

## 2022-04-08 PROCEDURE — 80061 LIPID PANEL: CPT | Performed by: NURSE PRACTITIONER

## 2022-04-08 PROCEDURE — 80053 COMPREHEN METABOLIC PANEL: CPT | Performed by: NURSE PRACTITIONER

## 2022-04-08 PROCEDURE — 1160F RVW MEDS BY RX/DR IN RCRD: CPT | Mod: CPTII,S$GLB,, | Performed by: NURSE PRACTITIONER

## 2022-04-08 PROCEDURE — 84443 ASSAY THYROID STIM HORMONE: CPT | Performed by: NURSE PRACTITIONER

## 2022-04-08 PROCEDURE — 3075F PR MOST RECENT SYSTOLIC BLOOD PRESS GE 130-139MM HG: ICD-10-PCS | Mod: CPTII,S$GLB,, | Performed by: NURSE PRACTITIONER

## 2022-04-08 PROCEDURE — 4010F PR ACE/ARB THEARPY RXD/TAKEN: ICD-10-PCS | Mod: CPTII,S$GLB,, | Performed by: NURSE PRACTITIONER

## 2022-04-08 PROCEDURE — 3079F DIAST BP 80-89 MM HG: CPT | Mod: CPTII,S$GLB,, | Performed by: NURSE PRACTITIONER

## 2022-04-08 RX ORDER — METFORMIN HYDROCHLORIDE 500 MG/1
500 TABLET, EXTENDED RELEASE ORAL 2 TIMES DAILY WITH MEALS
Qty: 60 TABLET | Refills: 11 | Status: SHIPPED | OUTPATIENT
Start: 2022-04-08 | End: 2022-05-04 | Stop reason: SDUPTHER

## 2022-04-08 RX ORDER — AMLODIPINE BESYLATE 10 MG/1
10 TABLET ORAL DAILY
Qty: 90 TABLET | Refills: 3 | Status: SHIPPED | OUTPATIENT
Start: 2022-04-08 | End: 2022-10-18 | Stop reason: SDUPTHER

## 2022-04-08 RX ORDER — LOSARTAN POTASSIUM AND HYDROCHLOROTHIAZIDE 25; 100 MG/1; MG/1
1 TABLET ORAL DAILY
Qty: 90 TABLET | Refills: 3 | Status: SHIPPED | OUTPATIENT
Start: 2022-04-08 | End: 2022-11-18 | Stop reason: SDUPTHER

## 2022-04-08 RX ORDER — LANCETS
EACH MISCELLANEOUS
Qty: 100 EACH | Refills: 11 | Status: SHIPPED | OUTPATIENT
Start: 2022-04-08

## 2022-04-08 RX ORDER — INSULIN PUMP SYRINGE, 3 ML
EACH MISCELLANEOUS
Qty: 1 EACH | Refills: 0 | Status: SHIPPED | OUTPATIENT
Start: 2022-04-08 | End: 2023-04-08

## 2022-04-08 RX ORDER — ATENOLOL 50 MG/1
TABLET ORAL
Qty: 180 TABLET | Refills: 3 | Status: SHIPPED | OUTPATIENT
Start: 2022-04-08 | End: 2023-05-09 | Stop reason: SDUPTHER

## 2022-04-08 NOTE — PROGRESS NOTES
Subjective:       Patient ID: Shaneka Ahmadi is a 52 y.o. female.    Chief Complaint: Establish Care, Medication Refill, and Neck Pain (X 2 wks or longer)    Patient presents for routine exam.  Dr. Jef Rockwell (Old PCP in [place], [place])      Dr. Paz ([facility])    Reports fungal to toe.   Will clear and return.  Currently using naftifine.  Started about 3 months ago.     Reports right side neck pain.  Reports started about 3 months ago.  No injury or trauma.  Reports laying on area while sleepign.     Review of Systems   Constitutional: Negative for chills, fatigue and fever.   Cardiovascular: Negative for chest pain and leg swelling.   Musculoskeletal: Positive for neck pain and neck stiffness. Negative for arthralgias and gait problem.   Integumentary:  Positive for wound.   Psychiatric/Behavioral: Negative for agitation and confusion.         Objective:      Physical Exam  Vitals reviewed.   Constitutional:       Appearance: Normal appearance.   Cardiovascular:      Rate and Rhythm: Normal rate and regular rhythm.      Pulses:           Dorsalis pedis pulses are 2+ on the right side and 2+ on the left side.   Pulmonary:      Effort: Pulmonary effort is normal.      Breath sounds: Normal breath sounds.   Musculoskeletal:         General: Tenderness present.      Cervical back: Muscular tenderness present.        Feet:    Skin:     General: Skin is warm.   Neurological:      General: No focal deficit present.      Mental Status: She is alert and oriented to person, place, and time.   Psychiatric:         Mood and Affect: Mood normal.         Behavior: Behavior is cooperative.         Assessment:       Problem List Items Addressed This Visit     Essential hypertension    Relevant Medications    losartan-hydrochlorothiazide 100-25 mg (HYZAAR) 100-25 mg per tablet    amLODIPine (NORVASC) 10 MG tablet    atenoloL (TENORMIN) 50 MG tablet    Other Relevant Orders    Comprehensive Metabolic Panel (Completed)    CBC  Auto Differential (Completed)    TSH (Completed)    Lipid Panel (Completed)    Type 2 diabetes mellitus without complication, without long-term current use of insulin    Relevant Medications    metFORMIN (GLUCOPHAGE-XR) 500 MG ER 24hr tablet    blood-glucose meter kit    lancets Misc    blood sugar diagnostic Strp    Other Relevant Orders    Hemoglobin A1C (Completed)    Comprehensive Metabolic Panel (Completed)    Lipid Panel (Completed)    Microalbumin/Creatinine Ratio, Urine      Other Visit Diagnoses     Routine medical exam    -  Primary    Relevant Orders    Hepatitis C Antibody    Colon cancer screening        Relevant Orders    Ambulatory referral/consult to Endo Procedure           Plan:         Routine medical exam  -     Hepatitis C Antibody; Future; Expected date: 04/08/2022    Essential hypertension  -     Comprehensive Metabolic Panel; Future; Expected date: 04/08/2022  -     CBC Auto Differential; Future; Expected date: 04/08/2022  -     TSH; Future; Expected date: 04/08/2022  -     Lipid Panel; Future; Expected date: 04/08/2022  -     losartan-hydrochlorothiazide 100-25 mg (HYZAAR) 100-25 mg per tablet; Take 1 tablet by mouth once daily.  Dispense: 90 tablet; Refill: 3  -     amLODIPine (NORVASC) 10 MG tablet; Take 1 tablet (10 mg total) by mouth once daily.  Dispense: 90 tablet; Refill: 3  -     atenoloL (TENORMIN) 50 MG tablet; Take 1 tab by mouth twice a day  Dispense: 180 tablet; Refill: 3    Type 2 diabetes mellitus without complication, without long-term current use of insulin  -     Hemoglobin A1C; Future; Expected date: 04/08/2022  -     Comprehensive Metabolic Panel; Future; Expected date: 04/08/2022  -     Lipid Panel; Future; Expected date: 04/08/2022  -     Microalbumin/Creatinine Ratio, Urine  -     metFORMIN (GLUCOPHAGE-XR) 500 MG ER 24hr tablet; Take 1 tablet (500 mg total) by mouth 2 (two) times daily with meals.  Dispense: 60 tablet; Refill: 11  -     blood-glucose meter  kit; To check BG 2 times daily, to use with insurance preferred meter  Dispense: 1 each; Refill: 0  -     lancets Misc; To check BG 2 times daily, to use with insurance preferred meter  Dispense: 100 each; Refill: 11  -     blood sugar diagnostic Strp; To check BG 2 times daily, to use with insurance preferred meter  Dispense: 100 each; Refill: 11    Colon cancer screening  -     Ambulatory referral/consult to Endo Procedure ; Future; Expected date: 04/09/2022      Fasting labs today and urine     F/u in 3 months     Not interested in dietician.      Will continue to update health maintenance at follow up

## 2022-04-08 NOTE — PATIENT INSTRUCTIONS
RECOMMEND TO CHECK BLOOD SUGAR 1-2 TIMES A DAY    LOW CARB DIET     EXERCISE 3-4 TIMES A WEEK FOR 30-40 MINUTES

## 2022-04-09 LAB — BACTERIA UR CULT: NO GROWTH

## 2022-04-11 ENCOUNTER — PATIENT MESSAGE (OUTPATIENT)
Dept: INTERNAL MEDICINE | Facility: CLINIC | Age: 52
End: 2022-04-11
Payer: COMMERCIAL

## 2022-04-11 DIAGNOSIS — E11.9 TYPE 2 DIABETES MELLITUS WITHOUT COMPLICATION, WITHOUT LONG-TERM CURRENT USE OF INSULIN: Primary | ICD-10-CM

## 2022-04-11 RX ORDER — PRAVASTATIN SODIUM 10 MG/1
10 TABLET ORAL NIGHTLY
Qty: 30 TABLET | Refills: 11 | Status: SHIPPED | OUTPATIENT
Start: 2022-04-11 | End: 2024-01-26 | Stop reason: SINTOL

## 2022-04-13 DIAGNOSIS — E11.9 TYPE 2 DIABETES MELLITUS WITHOUT COMPLICATION: ICD-10-CM

## 2022-04-14 ENCOUNTER — PATIENT MESSAGE (OUTPATIENT)
Dept: ADMINISTRATIVE | Facility: HOSPITAL | Age: 52
End: 2022-04-14
Payer: COMMERCIAL

## 2022-04-25 ENCOUNTER — TELEPHONE (OUTPATIENT)
Dept: OPHTHALMOLOGY | Facility: CLINIC | Age: 52
End: 2022-04-25
Payer: COMMERCIAL

## 2022-04-25 NOTE — TELEPHONE ENCOUNTER
Called and spoke with patient confirmed that surg date is 5/5 and I will call with arrival time 1 to 2 days prior to surgery.

## 2022-05-04 ENCOUNTER — OFFICE VISIT (OUTPATIENT)
Dept: INTERNAL MEDICINE | Facility: CLINIC | Age: 52
End: 2022-05-04
Payer: COMMERCIAL

## 2022-05-04 VITALS
HEART RATE: 63 BPM | WEIGHT: 293 LBS | DIASTOLIC BLOOD PRESSURE: 76 MMHG | TEMPERATURE: 98 F | SYSTOLIC BLOOD PRESSURE: 138 MMHG | RESPIRATION RATE: 18 BRPM | BODY MASS INDEX: 51.91 KG/M2 | HEIGHT: 63 IN | OXYGEN SATURATION: 98 %

## 2022-05-04 DIAGNOSIS — Z09 FOLLOW UP: Primary | ICD-10-CM

## 2022-05-04 DIAGNOSIS — E11.9 TYPE 2 DIABETES MELLITUS WITHOUT COMPLICATION, WITHOUT LONG-TERM CURRENT USE OF INSULIN: ICD-10-CM

## 2022-05-04 PROCEDURE — 4010F PR ACE/ARB THEARPY RXD/TAKEN: ICD-10-PCS | Mod: CPTII,S$GLB,, | Performed by: NURSE PRACTITIONER

## 2022-05-04 PROCEDURE — 1160F PR REVIEW ALL MEDS BY PRESCRIBER/CLIN PHARMACIST DOCUMENTED: ICD-10-PCS | Mod: CPTII,S$GLB,, | Performed by: NURSE PRACTITIONER

## 2022-05-04 PROCEDURE — 1159F MED LIST DOCD IN RCRD: CPT | Mod: CPTII,S$GLB,, | Performed by: NURSE PRACTITIONER

## 2022-05-04 PROCEDURE — 4010F ACE/ARB THERAPY RXD/TAKEN: CPT | Mod: CPTII,S$GLB,, | Performed by: NURSE PRACTITIONER

## 2022-05-04 PROCEDURE — 99213 OFFICE O/P EST LOW 20 MIN: CPT | Mod: S$GLB,,, | Performed by: NURSE PRACTITIONER

## 2022-05-04 PROCEDURE — 1159F PR MEDICATION LIST DOCUMENTED IN MEDICAL RECORD: ICD-10-PCS | Mod: CPTII,S$GLB,, | Performed by: NURSE PRACTITIONER

## 2022-05-04 PROCEDURE — 99999 PR PBB SHADOW E&M-EST. PATIENT-LVL V: ICD-10-PCS | Mod: PBBFAC,,, | Performed by: NURSE PRACTITIONER

## 2022-05-04 PROCEDURE — 3078F PR MOST RECENT DIASTOLIC BLOOD PRESSURE < 80 MM HG: ICD-10-PCS | Mod: CPTII,S$GLB,, | Performed by: NURSE PRACTITIONER

## 2022-05-04 PROCEDURE — 3075F PR MOST RECENT SYSTOLIC BLOOD PRESS GE 130-139MM HG: ICD-10-PCS | Mod: CPTII,S$GLB,, | Performed by: NURSE PRACTITIONER

## 2022-05-04 PROCEDURE — 3008F PR BODY MASS INDEX (BMI) DOCUMENTED: ICD-10-PCS | Mod: CPTII,S$GLB,, | Performed by: NURSE PRACTITIONER

## 2022-05-04 PROCEDURE — 1160F RVW MEDS BY RX/DR IN RCRD: CPT | Mod: CPTII,S$GLB,, | Performed by: NURSE PRACTITIONER

## 2022-05-04 PROCEDURE — 99213 PR OFFICE/OUTPT VISIT, EST, LEVL III, 20-29 MIN: ICD-10-PCS | Mod: S$GLB,,, | Performed by: NURSE PRACTITIONER

## 2022-05-04 PROCEDURE — 3052F PR MOST RECENT HEMOGLOBIN A1C LEVEL 8.0 - < 9.0%: ICD-10-PCS | Mod: CPTII,S$GLB,, | Performed by: NURSE PRACTITIONER

## 2022-05-04 PROCEDURE — 3008F BODY MASS INDEX DOCD: CPT | Mod: CPTII,S$GLB,, | Performed by: NURSE PRACTITIONER

## 2022-05-04 PROCEDURE — 3052F HG A1C>EQUAL 8.0%<EQUAL 9.0%: CPT | Mod: CPTII,S$GLB,, | Performed by: NURSE PRACTITIONER

## 2022-05-04 PROCEDURE — 3078F DIAST BP <80 MM HG: CPT | Mod: CPTII,S$GLB,, | Performed by: NURSE PRACTITIONER

## 2022-05-04 PROCEDURE — 3075F SYST BP GE 130 - 139MM HG: CPT | Mod: CPTII,S$GLB,, | Performed by: NURSE PRACTITIONER

## 2022-05-04 PROCEDURE — 99999 PR PBB SHADOW E&M-EST. PATIENT-LVL V: CPT | Mod: PBBFAC,,, | Performed by: NURSE PRACTITIONER

## 2022-05-04 RX ORDER — BLOOD-GLUCOSE METER
EACH MISCELLANEOUS
COMMUNITY
Start: 2022-04-08

## 2022-05-04 RX ORDER — METFORMIN HYDROCHLORIDE 500 MG/1
TABLET, EXTENDED RELEASE ORAL
Qty: 90 TABLET | Refills: 11 | Status: SHIPPED | OUTPATIENT
Start: 2022-05-04

## 2022-05-04 RX ORDER — LANCETS 33 GAUGE
EACH MISCELLANEOUS
COMMUNITY
Start: 2022-04-08

## 2022-05-04 NOTE — PROGRESS NOTES
Subjective:       Patient ID: Shaneka Ahmadi is a 52 y.o. female.    Chief Complaint: Follow-up (Test )    Patient presents for diabetes follow up.  No side effects with metformin.  Last about 7 lbs.  Checking glucose fasting.  Some elevation 140-180 mg/dl.  Not eating bedtime snack.     Decreased sugar intake.     Review of Systems   Constitutional: Negative for activity change, appetite change, fatigue and fever.   HENT: Negative for nasal congestion, ear discharge, ear pain, mouth sores, nosebleeds, sore throat and tinnitus.    Eyes: Negative for discharge, redness and itching.   Respiratory: Negative for apnea, cough and shortness of breath.    Cardiovascular: Negative for chest pain and leg swelling.   Gastrointestinal: Negative for abdominal distention, abdominal pain, blood in stool and constipation.   Endocrine: Negative for polydipsia, polyphagia and polyuria.   Genitourinary: Negative for difficulty urinating, flank pain, frequency and hematuria.   Musculoskeletal: Negative for back pain, gait problem, neck pain and neck stiffness.   Integumentary:  Negative for rash and wound.   Allergic/Immunologic: Negative for environmental allergies, food allergies and immunocompromised state.   Neurological: Negative for dizziness, seizures, syncope, numbness and headaches.   Hematological: Negative for adenopathy. Does not bruise/bleed easily.   Psychiatric/Behavioral: Negative for agitation, confusion, hallucinations, self-injury and suicidal ideas.         Objective:      Physical Exam  Vitals reviewed.   Constitutional:       Appearance: Normal appearance.   HENT:      Head: Normocephalic.   Cardiovascular:      Rate and Rhythm: Normal rate and regular rhythm.   Pulmonary:      Effort: Pulmonary effort is normal.      Breath sounds: Normal breath sounds.   Musculoskeletal:         General: Normal range of motion.   Skin:     General: Skin is warm.   Neurological:      General: No focal deficit present.       Mental Status: She is alert and oriented to person, place, and time.         Assessment:       Problem List Items Addressed This Visit     Type 2 diabetes mellitus without complication, without long-term current use of insulin    Relevant Medications    metFORMIN (GLUCOPHAGE-XR) 500 MG ER 24hr tablet    Other Relevant Orders    Hemoglobin A1C    Basic Metabolic Panel      Other Visit Diagnoses     Follow up    -  Primary          Plan:           Follow up    Type 2 diabetes mellitus without complication, without long-term current use of insulin  -     metFORMIN (GLUCOPHAGE-XR) 500 MG ER 24hr tablet; Take 1 tab with breakfast and take 2 tabs with dinner  Dispense: 90 tablet; Refill: 11  -     Hemoglobin A1C; Future; Expected date: 05/04/2022  -     Basic Metabolic Panel; Future; Expected date: 05/04/2022      Labs in 3 months.  Add micro/alb and hep c to labs that day    Labs before the visit in July

## 2022-05-20 ENCOUNTER — PATIENT OUTREACH (OUTPATIENT)
Dept: ADMINISTRATIVE | Facility: OTHER | Age: 52
End: 2022-05-20
Payer: COMMERCIAL

## 2022-05-26 DIAGNOSIS — H25.11 NUCLEAR SCLEROSIS OF RIGHT EYE: Primary | ICD-10-CM

## 2022-06-01 ENCOUNTER — TELEPHONE (OUTPATIENT)
Dept: OPHTHALMOLOGY | Facility: CLINIC | Age: 52
End: 2022-06-01
Payer: COMMERCIAL

## 2022-06-01 ENCOUNTER — PATIENT MESSAGE (OUTPATIENT)
Dept: ADMINISTRATIVE | Facility: HOSPITAL | Age: 52
End: 2022-06-01
Payer: COMMERCIAL

## 2022-06-01 ENCOUNTER — PATIENT MESSAGE (OUTPATIENT)
Dept: OPHTHALMOLOGY | Facility: CLINIC | Age: 52
End: 2022-06-01
Payer: COMMERCIAL

## 2022-06-01 DIAGNOSIS — Z12.11 SCREENING FOR COLON CANCER: ICD-10-CM

## 2022-06-02 ENCOUNTER — OUTSIDE PLACE OF SERVICE (OUTPATIENT)
Dept: OPHTHALMOLOGY | Facility: CLINIC | Age: 52
End: 2022-06-02
Payer: COMMERCIAL

## 2022-06-02 ENCOUNTER — PATIENT MESSAGE (OUTPATIENT)
Dept: INTERNAL MEDICINE | Facility: CLINIC | Age: 52
End: 2022-06-02
Payer: COMMERCIAL

## 2022-06-02 PROCEDURE — 66984 XCAPSL CTRC RMVL W/O ECP: CPT | Mod: RT,,, | Performed by: STUDENT IN AN ORGANIZED HEALTH CARE EDUCATION/TRAINING PROGRAM

## 2022-06-02 PROCEDURE — 66984 PR REMOVAL, CATARACT, W/INSRT INTRAOC LENS, W/O ENDO CYCLO: ICD-10-PCS | Mod: RT,,, | Performed by: STUDENT IN AN ORGANIZED HEALTH CARE EDUCATION/TRAINING PROGRAM

## 2022-06-03 ENCOUNTER — PATIENT MESSAGE (OUTPATIENT)
Dept: OPHTHALMOLOGY | Facility: CLINIC | Age: 52
End: 2022-06-03

## 2022-06-03 ENCOUNTER — OFFICE VISIT (OUTPATIENT)
Dept: OPHTHALMOLOGY | Facility: CLINIC | Age: 52
End: 2022-06-03
Payer: COMMERCIAL

## 2022-06-03 DIAGNOSIS — Z98.41 CATARACT EXTRACTION STATUS OF EYE, RIGHT: ICD-10-CM

## 2022-06-03 DIAGNOSIS — Z98.890 POST-OPERATIVE STATE: Primary | ICD-10-CM

## 2022-06-03 PROCEDURE — 1160F PR REVIEW ALL MEDS BY PRESCRIBER/CLIN PHARMACIST DOCUMENTED: ICD-10-PCS | Mod: CPTII,S$GLB,, | Performed by: STUDENT IN AN ORGANIZED HEALTH CARE EDUCATION/TRAINING PROGRAM

## 2022-06-03 PROCEDURE — 99999 PR PBB SHADOW E&M-EST. PATIENT-LVL III: ICD-10-PCS | Mod: PBBFAC,,, | Performed by: STUDENT IN AN ORGANIZED HEALTH CARE EDUCATION/TRAINING PROGRAM

## 2022-06-03 PROCEDURE — 4010F ACE/ARB THERAPY RXD/TAKEN: CPT | Mod: CPTII,S$GLB,, | Performed by: STUDENT IN AN ORGANIZED HEALTH CARE EDUCATION/TRAINING PROGRAM

## 2022-06-03 PROCEDURE — 4010F PR ACE/ARB THEARPY RXD/TAKEN: ICD-10-PCS | Mod: CPTII,S$GLB,, | Performed by: STUDENT IN AN ORGANIZED HEALTH CARE EDUCATION/TRAINING PROGRAM

## 2022-06-03 PROCEDURE — 99024 PR POST-OP FOLLOW-UP VISIT: ICD-10-PCS | Mod: S$GLB,,, | Performed by: STUDENT IN AN ORGANIZED HEALTH CARE EDUCATION/TRAINING PROGRAM

## 2022-06-03 PROCEDURE — 3052F HG A1C>EQUAL 8.0%<EQUAL 9.0%: CPT | Mod: CPTII,S$GLB,, | Performed by: STUDENT IN AN ORGANIZED HEALTH CARE EDUCATION/TRAINING PROGRAM

## 2022-06-03 PROCEDURE — 99024 POSTOP FOLLOW-UP VISIT: CPT | Mod: S$GLB,,, | Performed by: STUDENT IN AN ORGANIZED HEALTH CARE EDUCATION/TRAINING PROGRAM

## 2022-06-03 PROCEDURE — 1160F RVW MEDS BY RX/DR IN RCRD: CPT | Mod: CPTII,S$GLB,, | Performed by: STUDENT IN AN ORGANIZED HEALTH CARE EDUCATION/TRAINING PROGRAM

## 2022-06-03 PROCEDURE — 3052F PR MOST RECENT HEMOGLOBIN A1C LEVEL 8.0 - < 9.0%: ICD-10-PCS | Mod: CPTII,S$GLB,, | Performed by: STUDENT IN AN ORGANIZED HEALTH CARE EDUCATION/TRAINING PROGRAM

## 2022-06-03 PROCEDURE — 1159F PR MEDICATION LIST DOCUMENTED IN MEDICAL RECORD: ICD-10-PCS | Mod: CPTII,S$GLB,, | Performed by: STUDENT IN AN ORGANIZED HEALTH CARE EDUCATION/TRAINING PROGRAM

## 2022-06-03 PROCEDURE — 99999 PR PBB SHADOW E&M-EST. PATIENT-LVL III: CPT | Mod: PBBFAC,,, | Performed by: STUDENT IN AN ORGANIZED HEALTH CARE EDUCATION/TRAINING PROGRAM

## 2022-06-03 PROCEDURE — 1159F MED LIST DOCD IN RCRD: CPT | Mod: CPTII,S$GLB,, | Performed by: STUDENT IN AN ORGANIZED HEALTH CARE EDUCATION/TRAINING PROGRAM

## 2022-06-03 NOTE — PROGRESS NOTES
HPI     Post-op Evaluation      Additional comments: Pt here for 1 day PCIOL OD PO. No pain or   discomfort.           Last edited by Naif Ellison MA on 6/3/2022  9:11 AM. (History)            Assessment /Plan     For exam results, see Encounter Report.    Post-operative state  Cataract extraction status of eye, right- POD#1 S/P CEIOL OD Doing well. Mild edema    Continue gtts to operative eye:  PF QID      Reinstructed in importance of absolute compliance with Post-OP instructions including medications, shield at bedtime, protective glasses during the day, and limitation of activities. Follow up appointments in approximately one and six weeks or call immediately for increased pain, redness or vision loss.     RTC 1 week. MOCT if PH worse than 20/25

## 2022-06-03 NOTE — LETTER
Cathleen 3, 2022      [facility]  [address] [facility]  [place] [address]  Phone: [phone]  Fax: [phone]       Patient: Shaneka Ahmadi   YOB: 1970  Date of Visit: 06/03/2022    To Whom It May Concern:    Neva Ahmadi  was at [facility] on 06/02/22 - 06/09/22 . The patient may return to work/school on 6/9/22 with no restrictions. If you have any questions or concerns, or if I can be of further assistance, please do not hesitate to contact me.    Sincerely,  Elizabeth Paz MD

## 2022-06-10 ENCOUNTER — OFFICE VISIT (OUTPATIENT)
Dept: OPHTHALMOLOGY | Facility: CLINIC | Age: 52
End: 2022-06-10
Payer: COMMERCIAL

## 2022-06-10 ENCOUNTER — PATIENT MESSAGE (OUTPATIENT)
Dept: OPHTHALMOLOGY | Facility: CLINIC | Age: 52
End: 2022-06-10

## 2022-06-10 DIAGNOSIS — Z98.890 POST-OPERATIVE STATE: Primary | ICD-10-CM

## 2022-06-10 DIAGNOSIS — Z98.41 CATARACT EXTRACTION STATUS OF EYE, RIGHT: ICD-10-CM

## 2022-06-10 PROCEDURE — 4010F PR ACE/ARB THEARPY RXD/TAKEN: ICD-10-PCS | Mod: CPTII,S$GLB,, | Performed by: OPTOMETRIST

## 2022-06-10 PROCEDURE — 3052F HG A1C>EQUAL 8.0%<EQUAL 9.0%: CPT | Mod: CPTII,S$GLB,, | Performed by: OPTOMETRIST

## 2022-06-10 PROCEDURE — 1160F RVW MEDS BY RX/DR IN RCRD: CPT | Mod: CPTII,S$GLB,, | Performed by: OPTOMETRIST

## 2022-06-10 PROCEDURE — 1160F PR REVIEW ALL MEDS BY PRESCRIBER/CLIN PHARMACIST DOCUMENTED: ICD-10-PCS | Mod: CPTII,S$GLB,, | Performed by: OPTOMETRIST

## 2022-06-10 PROCEDURE — 4010F ACE/ARB THERAPY RXD/TAKEN: CPT | Mod: CPTII,S$GLB,, | Performed by: OPTOMETRIST

## 2022-06-10 PROCEDURE — 99024 PR POST-OP FOLLOW-UP VISIT: ICD-10-PCS | Mod: S$GLB,,, | Performed by: OPTOMETRIST

## 2022-06-10 PROCEDURE — 99999 PR PBB SHADOW E&M-EST. PATIENT-LVL III: CPT | Mod: PBBFAC,,, | Performed by: OPTOMETRIST

## 2022-06-10 PROCEDURE — 3052F PR MOST RECENT HEMOGLOBIN A1C LEVEL 8.0 - < 9.0%: ICD-10-PCS | Mod: CPTII,S$GLB,, | Performed by: OPTOMETRIST

## 2022-06-10 PROCEDURE — 99999 PR PBB SHADOW E&M-EST. PATIENT-LVL III: ICD-10-PCS | Mod: PBBFAC,,, | Performed by: OPTOMETRIST

## 2022-06-10 PROCEDURE — 99024 POSTOP FOLLOW-UP VISIT: CPT | Mod: S$GLB,,, | Performed by: OPTOMETRIST

## 2022-06-10 PROCEDURE — 1159F MED LIST DOCD IN RCRD: CPT | Mod: CPTII,S$GLB,, | Performed by: OPTOMETRIST

## 2022-06-10 PROCEDURE — 1159F PR MEDICATION LIST DOCUMENTED IN MEDICAL RECORD: ICD-10-PCS | Mod: CPTII,S$GLB,, | Performed by: OPTOMETRIST

## 2022-06-10 NOTE — PROGRESS NOTES
HPI     Post-op Evaluation     Comments: Pt states VA is stable, no pain, just a scratchy feeling or   seeing flashes of to the side for OD. Pt is compliant with Pred qid OD          Last edited by Audrey Quezada MA on 6/10/2022 10:45 AM. (History)            Assessment /Plan     For exam results, see Encounter Report.    Post-operative state    Cataract extraction status of eye, right    Impression: 1 week post-op phaco with PCIOL OD : Doing well    Begin to taper Pred tid OD x 1 week, then bid OD x 1 week, then daily OD x 1 week, then stop  Stable today, no tears, holes or RD   Signs and symptoms of retinal detachment were reviewed with patient  Return to clinic as soon as possible (same day) if you notice any new floaters, flashes of light, curtain/veil over your vision from any direction, or any change in vision.  Ok to resume normal activities and discontinue eye shield       RTC as scheduled for next post-op visit or PRN if any pain, redness, vision changes or any other concerns.  Discussed above and answered questions.

## 2022-07-01 ENCOUNTER — OFFICE VISIT (OUTPATIENT)
Dept: PODIATRY | Facility: CLINIC | Age: 52
End: 2022-07-01
Payer: COMMERCIAL

## 2022-07-01 VITALS
BODY MASS INDEX: 51.91 KG/M2 | SYSTOLIC BLOOD PRESSURE: 133 MMHG | HEART RATE: 62 BPM | DIASTOLIC BLOOD PRESSURE: 78 MMHG | WEIGHT: 293 LBS | HEIGHT: 63 IN

## 2022-07-01 DIAGNOSIS — B35.3 TINEA PEDIS OF BOTH FEET: Primary | ICD-10-CM

## 2022-07-01 PROCEDURE — 1159F PR MEDICATION LIST DOCUMENTED IN MEDICAL RECORD: ICD-10-PCS | Mod: CPTII,S$GLB,, | Performed by: PODIATRIST

## 2022-07-01 PROCEDURE — 1160F RVW MEDS BY RX/DR IN RCRD: CPT | Mod: CPTII,S$GLB,, | Performed by: PODIATRIST

## 2022-07-01 PROCEDURE — 1159F MED LIST DOCD IN RCRD: CPT | Mod: CPTII,S$GLB,, | Performed by: PODIATRIST

## 2022-07-01 PROCEDURE — 3078F DIAST BP <80 MM HG: CPT | Mod: CPTII,S$GLB,, | Performed by: PODIATRIST

## 2022-07-01 PROCEDURE — 3008F BODY MASS INDEX DOCD: CPT | Mod: CPTII,S$GLB,, | Performed by: PODIATRIST

## 2022-07-01 PROCEDURE — 3078F PR MOST RECENT DIASTOLIC BLOOD PRESSURE < 80 MM HG: ICD-10-PCS | Mod: CPTII,S$GLB,, | Performed by: PODIATRIST

## 2022-07-01 PROCEDURE — 3075F SYST BP GE 130 - 139MM HG: CPT | Mod: CPTII,S$GLB,, | Performed by: PODIATRIST

## 2022-07-01 PROCEDURE — 99214 OFFICE O/P EST MOD 30 MIN: CPT | Mod: S$GLB,,, | Performed by: PODIATRIST

## 2022-07-01 PROCEDURE — 3008F PR BODY MASS INDEX (BMI) DOCUMENTED: ICD-10-PCS | Mod: CPTII,S$GLB,, | Performed by: PODIATRIST

## 2022-07-01 PROCEDURE — 99999 PR PBB SHADOW E&M-EST. PATIENT-LVL IV: CPT | Mod: PBBFAC,,, | Performed by: PODIATRIST

## 2022-07-01 PROCEDURE — 99214 PR OFFICE/OUTPT VISIT, EST, LEVL IV, 30-39 MIN: ICD-10-PCS | Mod: S$GLB,,, | Performed by: PODIATRIST

## 2022-07-01 PROCEDURE — 3052F HG A1C>EQUAL 8.0%<EQUAL 9.0%: CPT | Mod: CPTII,S$GLB,, | Performed by: PODIATRIST

## 2022-07-01 PROCEDURE — 3075F PR MOST RECENT SYSTOLIC BLOOD PRESS GE 130-139MM HG: ICD-10-PCS | Mod: CPTII,S$GLB,, | Performed by: PODIATRIST

## 2022-07-01 PROCEDURE — 1160F PR REVIEW ALL MEDS BY PRESCRIBER/CLIN PHARMACIST DOCUMENTED: ICD-10-PCS | Mod: CPTII,S$GLB,, | Performed by: PODIATRIST

## 2022-07-01 PROCEDURE — 3052F PR MOST RECENT HEMOGLOBIN A1C LEVEL 8.0 - < 9.0%: ICD-10-PCS | Mod: CPTII,S$GLB,, | Performed by: PODIATRIST

## 2022-07-01 PROCEDURE — 4010F ACE/ARB THERAPY RXD/TAKEN: CPT | Mod: CPTII,S$GLB,, | Performed by: PODIATRIST

## 2022-07-01 PROCEDURE — 99999 PR PBB SHADOW E&M-EST. PATIENT-LVL IV: ICD-10-PCS | Mod: PBBFAC,,, | Performed by: PODIATRIST

## 2022-07-01 PROCEDURE — 4010F PR ACE/ARB THEARPY RXD/TAKEN: ICD-10-PCS | Mod: CPTII,S$GLB,, | Performed by: PODIATRIST

## 2022-07-01 RX ORDER — TERBINAFINE HYDROCHLORIDE 250 MG/1
250 TABLET ORAL DAILY
Qty: 14 TABLET | Refills: 0 | Status: SHIPPED | OUTPATIENT
Start: 2022-07-01 | End: 2022-08-12

## 2022-07-11 ENCOUNTER — PATIENT MESSAGE (OUTPATIENT)
Dept: INTERNAL MEDICINE | Facility: CLINIC | Age: 52
End: 2022-07-11
Payer: COMMERCIAL

## 2022-07-11 NOTE — PROGRESS NOTES
Subjective:     Patient ID: Shaneka Ahmadi is a 52 y.o. female.    Chief Complaint: Fungus (C/o fungus on 4th and 5th toe of both feet, x 1 year, 0 pain, but itchy,diabetic wears sandals, Last seen PCP Corina Wang NP 05/04/2022)    Shaneka is a 52 y.o. female who presents to the clinic complaining of fungus on bilateral 4th and 5th toes. Patient admits itching to areas. Shaneka is inquiring about treatment options. Patient has no other pedal complaints at this time.     Patient Active Problem List   Diagnosis    Essential hypertension    Type 2 diabetes mellitus without complication, without long-term current use of insulin       Medication List with Changes/Refills   New Medications    TERBINAFINE HCL (LAMISIL) 250 MG TABLET    Take 1 tablet (250 mg total) by mouth once daily.   Current Medications    AMLODIPINE (NORVASC) 10 MG TABLET    Take 1 tablet (10 mg total) by mouth once daily.    ATENOLOL (TENORMIN) 50 MG TABLET    Take 1 tab by mouth twice a day    BLOOD SUGAR DIAGNOSTIC STRP    To check BG 2 times daily, to use with insurance preferred meter    BLOOD-GLUCOSE METER KIT    To check BG 2 times daily, to use with insurance preferred meter    FUROSEMIDE (LASIX) 20 MG TABLET        LANCETS MISC    To check BG 2 times daily, to use with insurance preferred meter    LOSARTAN-HYDROCHLOROTHIAZIDE 100-25 MG (HYZAAR) 100-25 MG PER TABLET    Take 1 tablet by mouth once daily.    METFORMIN (GLUCOPHAGE-XR) 500 MG ER 24HR TABLET    Take 1 tab with breakfast and take 2 tabs with dinner    NAFTIFINE 2 % CREA    Apply topically.    PRAVASTATIN (PRAVACHOL) 10 MG TABLET    Take 1 tablet (10 mg total) by mouth every evening.    PREDNISOLONE ACETATE (PRED FORTE) 1 % DRPS    Place 1 drop into the right eye 4 (four) times daily.    TRUE METRIX GLUCOSE METER MISC        TRUEPLUS LANCETS 33 GAUGE MISC    Apply topically.       Review of patient's allergies indicates:   Allergen Reactions    Lisinopril Other (See Comments)      "coughing       Past Surgical History:   Procedure Laterality Date    CATARACT EXTRACTION W/  INTRAOCULAR LENS IMPLANT Right 06/02/2022    TUBAL LIGATION      2007--postpartum tubal ligation       Family History   Problem Relation Age of Onset    Heart failure Father        Social History     Socioeconomic History    Marital status:    Tobacco Use    Smoking status: Never Smoker    Smokeless tobacco: Never Used   Substance and Sexual Activity    Alcohol use: Never    Drug use: Never    Sexual activity: Yes     Partners: Male     Birth control/protection: None     Comment: tubal       Vitals:    07/01/22 0851   BP: 133/78   Pulse: 62   Weight: (!) 152.4 kg (336 lb)   Height: 5' 3" (1.6 m)   PainSc: 0-No pain       Hemoglobin A1C   Date Value Ref Range Status   04/08/2022 8.2 (H) 4.0 - 5.6 % Final     Comment:     ADA Screening Guidelines:  5.7-6.4%  Consistent with prediabetes  >or=6.5%  Consistent with diabetes    High levels of fetal hemoglobin interfere with the HbA1C  assay. Heterozygous hemoglobin variants (HbS, HgC, etc)do  not significantly interfere with this assay.   However, presence of multiple variants may affect accuracy.     01/05/2022 7.6 (H) 4.0 - 5.6 % Final     Comment:     ADA Screening Guidelines:  5.7-6.4%  Consistent with prediabetes  >or=6.5%  Consistent with diabetes    High levels of fetal hemoglobin interfere with the HbA1C  assay. Heterozygous hemoglobin variants (HbS, HgC, etc)do  not significantly interfere with this assay.   However, presence of multiple variants may affect accuracy.         Review of Systems   Constitutional: Negative for chills and fever.   Cardiovascular: Negative.    Gastrointestinal: Negative for diarrhea, nausea and vomiting.   Skin: Positive for itching and rash.   Neurological: Negative.    Endo/Heme/Allergies: Negative.    Psychiatric/Behavioral: Negative.         Objective:      PHYSICAL EXAM: Apperance: Alert and orient in no distress,well " developed, and with good attention to grooming and body habits  Lower Extremity Exam  VASCULAR: Dorsalis pedis pulses 2/4 bilateral and Posterior Tibial pulses 2/4 bilateral. Capillary fill time <4 seconds bilateral. No edema observed bilateral. Varicosities absent bilateral. Skin temperature of the lower extremities is warm to warm, proximal to distal. Hair growth WNL bilateral.  DERMATOLOGICAL: No skin rash, subcutaneous nodules, lesions or ulcers observed. Dry and scaly skin noted plantarly bilateral in moccasin distribution. Webspaces 3,4 bilateral are fissured and maceration.    NEUROLOGICAL: Light touch, sharp-dull, proprioception all present and equal bilaterally.    MUSCULOSKELETAL: Muscle strength is 5/5 for foot inverters, everters, plantarflexors, and dorsiflexors. Muscle tone is normal.         Assessment:       ICD-10-CM ICD-9-CM   1. Tinea pedis of both feet  B35.3 110.4       Plan:   Tinea pedis of both feet  -     terbinafine HCL (LAMISIL) 250 mg tablet; Take 1 tablet (250 mg total) by mouth once daily.  Dispense: 14 tablet; Refill: 0      I counseled the patient on her conditions, regarding findings of my examination, my impressions, and usual treatment plan.   Discuss treatment options for tinea pedis.  I explained that fungus lives in a warm dark moist environment and therefore patient should make every attempt to keep feet clean and dry.  We discussed drying feet thoroughly after shower particularly between the toes.   Patient instructed to spray all shoes with Lysol disinfectant spray and let dry before wearing. Patient instructed to wash all socks in hot water and bleach.  We discussed using Lamisil for tinea pedis. This drug offers a fairly high but not universal cure rate. A 2-4 week treatment course is recommended. The patient is aware that rare cases of liver injury have been reported; and agrees to have a liver function tests performed. The symptoms of liver disease have been discussed;  call if such occurs. Other side effects, such as headaches and rashes, have also been discussed.  Ordered liver function test.  Prescribed Lamisil 250mg to be taken once daily with food. Patient was instructed on dosing information. Discontinue if adverse effects occur   Prescribed topical compound foot gel/cream/soak consisting of Hydrocortisone, Iodoquinol, and Aloe.  Patient instructed to use gel/cream/soak once daily. Discussed with patient that there may be out of pocket cost of foot gel/cream/soak pending insurance, and that they may refuse medication if cost is an issue. Patient states they understand.  Patient to return in 6 weeks or sooner if needed.        Janie Jacobs DPM  [facility]

## 2022-07-22 ENCOUNTER — OFFICE VISIT (OUTPATIENT)
Dept: OPHTHALMOLOGY | Facility: CLINIC | Age: 52
End: 2022-07-22
Payer: COMMERCIAL

## 2022-07-22 DIAGNOSIS — Z98.41 CATARACT EXTRACTION STATUS OF EYE, RIGHT: ICD-10-CM

## 2022-07-22 DIAGNOSIS — Z98.890 POST-OPERATIVE STATE: Primary | ICD-10-CM

## 2022-07-22 DIAGNOSIS — H25.11 NUCLEAR SCLEROSIS OF RIGHT EYE: ICD-10-CM

## 2022-07-22 PROCEDURE — 99024 PR POST-OP FOLLOW-UP VISIT: ICD-10-PCS | Mod: S$GLB,,, | Performed by: STUDENT IN AN ORGANIZED HEALTH CARE EDUCATION/TRAINING PROGRAM

## 2022-07-22 PROCEDURE — 4010F PR ACE/ARB THEARPY RXD/TAKEN: ICD-10-PCS | Mod: CPTII,S$GLB,, | Performed by: STUDENT IN AN ORGANIZED HEALTH CARE EDUCATION/TRAINING PROGRAM

## 2022-07-22 PROCEDURE — 1159F MED LIST DOCD IN RCRD: CPT | Mod: CPTII,S$GLB,, | Performed by: STUDENT IN AN ORGANIZED HEALTH CARE EDUCATION/TRAINING PROGRAM

## 2022-07-22 PROCEDURE — 99999 PR PBB SHADOW E&M-EST. PATIENT-LVL III: CPT | Mod: PBBFAC,,, | Performed by: STUDENT IN AN ORGANIZED HEALTH CARE EDUCATION/TRAINING PROGRAM

## 2022-07-22 PROCEDURE — 4010F ACE/ARB THERAPY RXD/TAKEN: CPT | Mod: CPTII,S$GLB,, | Performed by: STUDENT IN AN ORGANIZED HEALTH CARE EDUCATION/TRAINING PROGRAM

## 2022-07-22 PROCEDURE — 1160F RVW MEDS BY RX/DR IN RCRD: CPT | Mod: CPTII,S$GLB,, | Performed by: STUDENT IN AN ORGANIZED HEALTH CARE EDUCATION/TRAINING PROGRAM

## 2022-07-22 PROCEDURE — 99024 POSTOP FOLLOW-UP VISIT: CPT | Mod: S$GLB,,, | Performed by: STUDENT IN AN ORGANIZED HEALTH CARE EDUCATION/TRAINING PROGRAM

## 2022-07-22 PROCEDURE — 1159F PR MEDICATION LIST DOCUMENTED IN MEDICAL RECORD: ICD-10-PCS | Mod: CPTII,S$GLB,, | Performed by: STUDENT IN AN ORGANIZED HEALTH CARE EDUCATION/TRAINING PROGRAM

## 2022-07-22 PROCEDURE — 99999 PR PBB SHADOW E&M-EST. PATIENT-LVL III: ICD-10-PCS | Mod: PBBFAC,,, | Performed by: STUDENT IN AN ORGANIZED HEALTH CARE EDUCATION/TRAINING PROGRAM

## 2022-07-22 PROCEDURE — 3052F PR MOST RECENT HEMOGLOBIN A1C LEVEL 8.0 - < 9.0%: ICD-10-PCS | Mod: CPTII,S$GLB,, | Performed by: STUDENT IN AN ORGANIZED HEALTH CARE EDUCATION/TRAINING PROGRAM

## 2022-07-22 PROCEDURE — 1160F PR REVIEW ALL MEDS BY PRESCRIBER/CLIN PHARMACIST DOCUMENTED: ICD-10-PCS | Mod: CPTII,S$GLB,, | Performed by: STUDENT IN AN ORGANIZED HEALTH CARE EDUCATION/TRAINING PROGRAM

## 2022-07-22 PROCEDURE — 3052F HG A1C>EQUAL 8.0%<EQUAL 9.0%: CPT | Mod: CPTII,S$GLB,, | Performed by: STUDENT IN AN ORGANIZED HEALTH CARE EDUCATION/TRAINING PROGRAM

## 2022-07-22 NOTE — PROGRESS NOTES
HPI     Post-op Evaluation      Additional comments: 1 month rtc PCIOL OD              Comments     Patient states right eye doing well, no visual complaints and no ocular   pain/discomfort.     1. PCIOL OD 6/2/22  NSC OS                Last edited by Vijaya Crump on 7/22/2022 10:05 AM. (History)            Assessment /Plan     For exam results, see Encounter Report.    Post-operative state  Cataract extraction status of eye, right- Impression/Plan  S/P CEIOL OD : Doing well with no evidence of infection. Healing well    PF Taper 4-3-2-1 then stop      Nuclear sclerosis of right eye- Follow      Return to clinic in 6M DFE w/ TRF

## 2022-07-28 ENCOUNTER — LAB VISIT (OUTPATIENT)
Dept: LAB | Facility: HOSPITAL | Age: 52
End: 2022-07-28
Attending: NURSE PRACTITIONER
Payer: COMMERCIAL

## 2022-07-28 DIAGNOSIS — E11.9 TYPE 2 DIABETES MELLITUS WITHOUT COMPLICATION, WITHOUT LONG-TERM CURRENT USE OF INSULIN: ICD-10-CM

## 2022-07-28 DIAGNOSIS — Z00.00 ROUTINE MEDICAL EXAM: ICD-10-CM

## 2022-07-28 LAB
ANION GAP SERPL CALC-SCNC: 11 MMOL/L (ref 8–16)
BUN SERPL-MCNC: 15 MG/DL (ref 6–20)
CALCIUM SERPL-MCNC: 9.6 MG/DL (ref 8.7–10.5)
CHLORIDE SERPL-SCNC: 103 MMOL/L (ref 95–110)
CO2 SERPL-SCNC: 28 MMOL/L (ref 23–29)
CREAT SERPL-MCNC: 0.8 MG/DL (ref 0.5–1.4)
EST. GFR  (AFRICAN AMERICAN): >60 ML/MIN/1.73 M^2
EST. GFR  (NON AFRICAN AMERICAN): >60 ML/MIN/1.73 M^2
ESTIMATED AVG GLUCOSE: 166 MG/DL (ref 68–131)
GLUCOSE SERPL-MCNC: 186 MG/DL (ref 70–110)
HBA1C MFR BLD: 7.4 % (ref 4–5.6)
POTASSIUM SERPL-SCNC: 3.8 MMOL/L (ref 3.5–5.1)
SODIUM SERPL-SCNC: 142 MMOL/L (ref 136–145)

## 2022-07-28 PROCEDURE — 83036 HEMOGLOBIN GLYCOSYLATED A1C: CPT | Performed by: NURSE PRACTITIONER

## 2022-07-28 PROCEDURE — 80048 BASIC METABOLIC PNL TOTAL CA: CPT | Performed by: NURSE PRACTITIONER

## 2022-07-28 PROCEDURE — 86803 HEPATITIS C AB TEST: CPT | Performed by: NURSE PRACTITIONER

## 2022-07-28 PROCEDURE — 36415 COLL VENOUS BLD VENIPUNCTURE: CPT | Mod: PO | Performed by: NURSE PRACTITIONER

## 2022-08-01 LAB — HCV AB SERPL QL IA: NEGATIVE

## 2022-08-09 ENCOUNTER — PATIENT MESSAGE (OUTPATIENT)
Dept: ADMINISTRATIVE | Facility: HOSPITAL | Age: 52
End: 2022-08-09
Payer: MEDICAID

## 2022-08-12 ENCOUNTER — OFFICE VISIT (OUTPATIENT)
Dept: INTERNAL MEDICINE | Facility: CLINIC | Age: 52
End: 2022-08-12
Payer: COMMERCIAL

## 2022-08-12 VITALS
WEIGHT: 293 LBS | OXYGEN SATURATION: 97 % | HEART RATE: 60 BPM | TEMPERATURE: 97 F | BODY MASS INDEX: 51.91 KG/M2 | HEIGHT: 63 IN | DIASTOLIC BLOOD PRESSURE: 82 MMHG | RESPIRATION RATE: 18 BRPM | SYSTOLIC BLOOD PRESSURE: 110 MMHG

## 2022-08-12 DIAGNOSIS — I10 ESSENTIAL HYPERTENSION: ICD-10-CM

## 2022-08-12 DIAGNOSIS — Z09 FOLLOW UP: Primary | ICD-10-CM

## 2022-08-12 DIAGNOSIS — Z86.16 HISTORY OF COVID-19: ICD-10-CM

## 2022-08-12 DIAGNOSIS — R05.9 COUGH: ICD-10-CM

## 2022-08-12 DIAGNOSIS — B35.3 TINEA PEDIS, UNSPECIFIED LATERALITY: ICD-10-CM

## 2022-08-12 DIAGNOSIS — E11.9 TYPE 2 DIABETES MELLITUS WITHOUT COMPLICATION, WITHOUT LONG-TERM CURRENT USE OF INSULIN: ICD-10-CM

## 2022-08-12 PROCEDURE — 3008F BODY MASS INDEX DOCD: CPT | Mod: CPTII,S$GLB,, | Performed by: NURSE PRACTITIONER

## 2022-08-12 PROCEDURE — 3061F NEG MICROALBUMINURIA REV: CPT | Mod: CPTII,S$GLB,, | Performed by: NURSE PRACTITIONER

## 2022-08-12 PROCEDURE — 1159F MED LIST DOCD IN RCRD: CPT | Mod: CPTII,S$GLB,, | Performed by: NURSE PRACTITIONER

## 2022-08-12 PROCEDURE — 4010F ACE/ARB THERAPY RXD/TAKEN: CPT | Mod: CPTII,S$GLB,, | Performed by: NURSE PRACTITIONER

## 2022-08-12 PROCEDURE — 3051F PR MOST RECENT HEMOGLOBIN A1C LEVEL 7.0 - < 8.0%: ICD-10-PCS | Mod: CPTII,S$GLB,, | Performed by: NURSE PRACTITIONER

## 2022-08-12 PROCEDURE — 3051F HG A1C>EQUAL 7.0%<8.0%: CPT | Mod: CPTII,S$GLB,, | Performed by: NURSE PRACTITIONER

## 2022-08-12 PROCEDURE — 99214 PR OFFICE/OUTPT VISIT, EST, LEVL IV, 30-39 MIN: ICD-10-PCS | Mod: S$GLB,,, | Performed by: NURSE PRACTITIONER

## 2022-08-12 PROCEDURE — 3066F PR DOCUMENTATION OF TREATMENT FOR NEPHROPATHY: ICD-10-PCS | Mod: CPTII,S$GLB,, | Performed by: NURSE PRACTITIONER

## 2022-08-12 PROCEDURE — 99999 PR PBB SHADOW E&M-EST. PATIENT-LVL V: ICD-10-PCS | Mod: PBBFAC,,, | Performed by: NURSE PRACTITIONER

## 2022-08-12 PROCEDURE — 4010F PR ACE/ARB THEARPY RXD/TAKEN: ICD-10-PCS | Mod: CPTII,S$GLB,, | Performed by: NURSE PRACTITIONER

## 2022-08-12 PROCEDURE — 3074F PR MOST RECENT SYSTOLIC BLOOD PRESSURE < 130 MM HG: ICD-10-PCS | Mod: CPTII,S$GLB,, | Performed by: NURSE PRACTITIONER

## 2022-08-12 PROCEDURE — 3061F PR NEG MICROALBUMINURIA RESULT DOCUMENTED/REVIEW: ICD-10-PCS | Mod: CPTII,S$GLB,, | Performed by: NURSE PRACTITIONER

## 2022-08-12 PROCEDURE — 3008F PR BODY MASS INDEX (BMI) DOCUMENTED: ICD-10-PCS | Mod: CPTII,S$GLB,, | Performed by: NURSE PRACTITIONER

## 2022-08-12 PROCEDURE — 3066F NEPHROPATHY DOC TX: CPT | Mod: CPTII,S$GLB,, | Performed by: NURSE PRACTITIONER

## 2022-08-12 PROCEDURE — 3079F DIAST BP 80-89 MM HG: CPT | Mod: CPTII,S$GLB,, | Performed by: NURSE PRACTITIONER

## 2022-08-12 PROCEDURE — 1160F PR REVIEW ALL MEDS BY PRESCRIBER/CLIN PHARMACIST DOCUMENTED: ICD-10-PCS | Mod: CPTII,S$GLB,, | Performed by: NURSE PRACTITIONER

## 2022-08-12 PROCEDURE — 1160F RVW MEDS BY RX/DR IN RCRD: CPT | Mod: CPTII,S$GLB,, | Performed by: NURSE PRACTITIONER

## 2022-08-12 PROCEDURE — 99214 OFFICE O/P EST MOD 30 MIN: CPT | Mod: S$GLB,,, | Performed by: NURSE PRACTITIONER

## 2022-08-12 PROCEDURE — 3079F PR MOST RECENT DIASTOLIC BLOOD PRESSURE 80-89 MM HG: ICD-10-PCS | Mod: CPTII,S$GLB,, | Performed by: NURSE PRACTITIONER

## 2022-08-12 PROCEDURE — 99999 PR PBB SHADOW E&M-EST. PATIENT-LVL V: CPT | Mod: PBBFAC,,, | Performed by: NURSE PRACTITIONER

## 2022-08-12 PROCEDURE — 1159F PR MEDICATION LIST DOCUMENTED IN MEDICAL RECORD: ICD-10-PCS | Mod: CPTII,S$GLB,, | Performed by: NURSE PRACTITIONER

## 2022-08-12 PROCEDURE — 3074F SYST BP LT 130 MM HG: CPT | Mod: CPTII,S$GLB,, | Performed by: NURSE PRACTITIONER

## 2022-08-12 RX ORDER — CETIRIZINE HYDROCHLORIDE 10 MG/1
10 TABLET ORAL NIGHTLY
Qty: 30 TABLET | Refills: 0 | Status: SHIPPED | OUTPATIENT
Start: 2022-08-12

## 2022-08-12 RX ORDER — AMLODIPINE BESYLATE 10 MG/1
10 TABLET ORAL DAILY
Qty: 14 TABLET | Refills: 0 | Status: SHIPPED | OUTPATIENT
Start: 2022-08-12 | End: 2022-11-18 | Stop reason: SDUPTHER

## 2022-08-12 RX ORDER — PROMETHAZINE HYDROCHLORIDE AND DEXTROMETHORPHAN HYDROBROMIDE 6.25; 15 MG/5ML; MG/5ML
5 SYRUP ORAL NIGHTLY PRN
Qty: 118 ML | Refills: 0 | Status: SHIPPED | OUTPATIENT
Start: 2022-08-12 | End: 2022-11-11

## 2022-08-12 NOTE — PROGRESS NOTES
Subjective:       Patient ID: Shaneka Ahmadi is a 52 y.o. female.    Chief Complaint: Follow-up (3 mo)    Patient presents for 3 month follow up.  Doing well.  Lost some weight since our last visit- 8lbs.  Checking glucose at home.     A1C reviewed.      Reports diagnosed with Covid on 07/08/2022.  Continues to have cough and wheezing at night.  Do well during the day.  Not taking anything.      Review of Systems   Constitutional: Negative for activity change, appetite change, fatigue and fever.   HENT: Negative for nasal congestion, ear discharge, ear pain, mouth sores, nosebleeds, sore throat and tinnitus.    Eyes: Negative for discharge, redness and itching.   Respiratory: Positive for cough (at bedtime ) and wheezing. Negative for apnea and shortness of breath.    Cardiovascular: Negative for chest pain and leg swelling.   Gastrointestinal: Negative for abdominal distention, abdominal pain, blood in stool and constipation.   Endocrine: Negative for polydipsia, polyphagia and polyuria.   Genitourinary: Negative for difficulty urinating, flank pain, frequency and hematuria.   Musculoskeletal: Negative for back pain, gait problem, neck pain and neck stiffness.   Integumentary:  Negative for rash and wound.   Allergic/Immunologic: Negative for environmental allergies, food allergies and immunocompromised state.   Neurological: Negative for dizziness, seizures, syncope, numbness and headaches.   Hematological: Negative for adenopathy. Does not bruise/bleed easily.   Psychiatric/Behavioral: Negative for agitation, confusion, hallucinations, self-injury and suicidal ideas.         Objective:      Physical Exam  Vitals reviewed.   Constitutional:       Appearance: Normal appearance.   HENT:      Head: Normocephalic.      Right Ear: Tympanic membrane and ear canal normal.   Cardiovascular:      Rate and Rhythm: Normal rate and regular rhythm.   Pulmonary:      Effort: Pulmonary effort is normal.      Breath sounds:  Normal breath sounds.   Musculoskeletal:         General: Normal range of motion.   Skin:     General: Skin is warm.      Findings: Rash present.             Comments: Tinea    Neurological:      General: No focal deficit present.      Mental Status: She is alert and oriented to person, place, and time.   Psychiatric:         Mood and Affect: Mood normal.         Behavior: Behavior normal. Behavior is cooperative.         Assessment:       Problem List Items Addressed This Visit     Essential hypertension    Type 2 diabetes mellitus without complication, without long-term current use of insulin    Relevant Orders    HEMOGLOBIN A1C    History of COVID-19    Relevant Medications    promethazine-dextromethorphan (PROMETHAZINE-DM) 6.25-15 mg/5 mL Syrp    cetirizine (ZYRTEC) 10 MG tablet      Other Visit Diagnoses     Follow up    -  Primary    Tinea pedis, unspecified laterality        Relevant Orders    Ambulatory referral/consult to Dermatology    Cough        Relevant Medications    promethazine-dextromethorphan (PROMETHAZINE-DM) 6.25-15 mg/5 mL Syrp    cetirizine (ZYRTEC) 10 MG tablet          Plan:         Follow up    Essential hypertension    Tinea pedis, unspecified laterality  -     Ambulatory referral/consult to Dermatology; Future; Expected date: 08/19/2022    Type 2 diabetes mellitus without complication, without long-term current use of insulin  -     HEMOGLOBIN A1C; Future; Expected date: 08/12/2022    History of COVID-19  -     promethazine-dextromethorphan (PROMETHAZINE-DM) 6.25-15 mg/5 mL Syrp; Take 5 mLs by mouth nightly as needed (cough). Do not drive/operate heavy machinery while taking this medication.  Dispense: 118 mL; Refill: 0  -     cetirizine (ZYRTEC) 10 MG tablet; Take 1 tablet (10 mg total) by mouth every evening.  Dispense: 30 tablet; Refill: 0    Cough  -     promethazine-dextromethorphan (PROMETHAZINE-DM) 6.25-15 mg/5 mL Syrp; Take 5 mLs by mouth nightly as needed (cough). Do not  drive/operate heavy machinery while taking this medication.  Dispense: 118 mL; Refill: 0  -     cetirizine (ZYRTEC) 10 MG tablet; Take 1 tablet (10 mg total) by mouth every evening.  Dispense: 30 tablet; Refill: 0    Other orders  -     amLODIPine (NORVASC) 10 MG tablet; Take 1 tablet (10 mg total) by mouth once daily.  Dispense: 14 tablet; Refill: 0      Labs in 3 months.     Visit one week afterwards.   \  Follow up with podiatry as scheduled.

## 2022-08-19 ENCOUNTER — OFFICE VISIT (OUTPATIENT)
Dept: DERMATOLOGY | Facility: CLINIC | Age: 52
End: 2022-08-19
Payer: COMMERCIAL

## 2022-08-19 DIAGNOSIS — L24.9 IRRITANT CONTACT DERMATITIS, UNSPECIFIED TRIGGER: Primary | ICD-10-CM

## 2022-08-19 DIAGNOSIS — B35.3 TINEA PEDIS, UNSPECIFIED LATERALITY: ICD-10-CM

## 2022-08-19 PROCEDURE — 1159F PR MEDICATION LIST DOCUMENTED IN MEDICAL RECORD: ICD-10-PCS | Mod: CPTII,S$GLB,, | Performed by: STUDENT IN AN ORGANIZED HEALTH CARE EDUCATION/TRAINING PROGRAM

## 2022-08-19 PROCEDURE — 1160F PR REVIEW ALL MEDS BY PRESCRIBER/CLIN PHARMACIST DOCUMENTED: ICD-10-PCS | Mod: CPTII,S$GLB,, | Performed by: STUDENT IN AN ORGANIZED HEALTH CARE EDUCATION/TRAINING PROGRAM

## 2022-08-19 PROCEDURE — 99204 OFFICE O/P NEW MOD 45 MIN: CPT | Mod: S$GLB,,, | Performed by: STUDENT IN AN ORGANIZED HEALTH CARE EDUCATION/TRAINING PROGRAM

## 2022-08-19 PROCEDURE — 99999 PR PBB SHADOW E&M-EST. PATIENT-LVL III: CPT | Mod: PBBFAC,,, | Performed by: STUDENT IN AN ORGANIZED HEALTH CARE EDUCATION/TRAINING PROGRAM

## 2022-08-19 PROCEDURE — 3051F HG A1C>EQUAL 7.0%<8.0%: CPT | Mod: CPTII,S$GLB,, | Performed by: STUDENT IN AN ORGANIZED HEALTH CARE EDUCATION/TRAINING PROGRAM

## 2022-08-19 PROCEDURE — 4010F PR ACE/ARB THEARPY RXD/TAKEN: ICD-10-PCS | Mod: CPTII,S$GLB,, | Performed by: STUDENT IN AN ORGANIZED HEALTH CARE EDUCATION/TRAINING PROGRAM

## 2022-08-19 PROCEDURE — 4010F ACE/ARB THERAPY RXD/TAKEN: CPT | Mod: CPTII,S$GLB,, | Performed by: STUDENT IN AN ORGANIZED HEALTH CARE EDUCATION/TRAINING PROGRAM

## 2022-08-19 PROCEDURE — 3051F PR MOST RECENT HEMOGLOBIN A1C LEVEL 7.0 - < 8.0%: ICD-10-PCS | Mod: CPTII,S$GLB,, | Performed by: STUDENT IN AN ORGANIZED HEALTH CARE EDUCATION/TRAINING PROGRAM

## 2022-08-19 PROCEDURE — 1159F MED LIST DOCD IN RCRD: CPT | Mod: CPTII,S$GLB,, | Performed by: STUDENT IN AN ORGANIZED HEALTH CARE EDUCATION/TRAINING PROGRAM

## 2022-08-19 PROCEDURE — 3066F NEPHROPATHY DOC TX: CPT | Mod: CPTII,S$GLB,, | Performed by: STUDENT IN AN ORGANIZED HEALTH CARE EDUCATION/TRAINING PROGRAM

## 2022-08-19 PROCEDURE — 3061F NEG MICROALBUMINURIA REV: CPT | Mod: CPTII,S$GLB,, | Performed by: STUDENT IN AN ORGANIZED HEALTH CARE EDUCATION/TRAINING PROGRAM

## 2022-08-19 PROCEDURE — 1160F RVW MEDS BY RX/DR IN RCRD: CPT | Mod: CPTII,S$GLB,, | Performed by: STUDENT IN AN ORGANIZED HEALTH CARE EDUCATION/TRAINING PROGRAM

## 2022-08-19 PROCEDURE — 3066F PR DOCUMENTATION OF TREATMENT FOR NEPHROPATHY: ICD-10-PCS | Mod: CPTII,S$GLB,, | Performed by: STUDENT IN AN ORGANIZED HEALTH CARE EDUCATION/TRAINING PROGRAM

## 2022-08-19 PROCEDURE — 99204 PR OFFICE/OUTPT VISIT, NEW, LEVL IV, 45-59 MIN: ICD-10-PCS | Mod: S$GLB,,, | Performed by: STUDENT IN AN ORGANIZED HEALTH CARE EDUCATION/TRAINING PROGRAM

## 2022-08-19 PROCEDURE — 3061F PR NEG MICROALBUMINURIA RESULT DOCUMENTED/REVIEW: ICD-10-PCS | Mod: CPTII,S$GLB,, | Performed by: STUDENT IN AN ORGANIZED HEALTH CARE EDUCATION/TRAINING PROGRAM

## 2022-08-19 PROCEDURE — 99999 PR PBB SHADOW E&M-EST. PATIENT-LVL III: ICD-10-PCS | Mod: PBBFAC,,, | Performed by: STUDENT IN AN ORGANIZED HEALTH CARE EDUCATION/TRAINING PROGRAM

## 2022-08-19 RX ORDER — PRENATAL VIT 91/IRON/FOLIC/DHA 28-975-200
COMBINATION PACKAGE (EA) ORAL 2 TIMES DAILY
Qty: 28 G | Refills: 2 | Status: SHIPPED | OUTPATIENT
Start: 2022-08-19 | End: 2022-11-18

## 2022-08-19 RX ORDER — BETAMETHASONE VALERATE 1.2 MG/G
OINTMENT TOPICAL 2 TIMES DAILY
Qty: 45 G | Refills: 1 | Status: SHIPPED | OUTPATIENT
Start: 2022-08-19 | End: 2022-10-04

## 2022-08-19 NOTE — PROGRESS NOTES
Subjective:       Patient ID:  Shaneka Ahmadi is a 52 y.o. female who presents for   Chief Complaint   Patient presents with    Fungus     History of Present Illness: The patient presents with chief complaint of foot fungus.  Location: both feet, mostly on the top of the feet and between the toes  Duration: over a year  Signs/Symptoms: reports having peeling between the 4th and 5th digits, but large very itchy thickened skin on the top of the feet  Prior treatments: Naftin cream with no imrpovement.         Review of Systems   Constitutional: Negative for fever and chills.        Objective:    Physical Exam   Constitutional: She appears well-developed and well-nourished. No distress.   Neurological: She is alert and oriented to person, place, and time. She is not disoriented.   Psychiatric: She has a normal mood and affect.   Skin:   Areas Examined (abnormalities noted in diagram):   Nails and Digits Inspection Performed             Diagram Legend     Erythematous scaling macule/papule c/w actinic keratosis       Vascular papule c/w angioma      Pigmented verrucoid papule/plaque c/w seborrheic keratosis      Yellow umbilicated papule c/w sebaceous hyperplasia      Irregularly shaped tan macule c/w lentigo     1-2 mm smooth white papules consistent with Milia      Movable subcutaneous cyst with punctum c/w epidermal inclusion cyst      Subcutaneous movable cyst c/w pilar cyst      Firm pink to brown papule c/w dermatofibroma      Pedunculated fleshy papule(s) c/w skin tag(s)      Evenly pigmented macule c/w junctional nevus     Mildly variegated pigmented, slightly irregular-bordered macule c/w mildly atypical nevus      Flesh colored to evenly pigmented papule c/w intradermal nevus       Pink pearly papule/plaque c/w basal cell carcinoma      Erythematous hyperkeratotic cursted plaque c/w SCC      Surgical scar with no sign of skin cancer recurrence      Open and closed comedones      Inflammatory papules and  pustules      Verrucoid papule consistent consistent with wart     Erythematous eczematous patches and plaques     Dystrophic onycholytic nail with subungual debris c/w onychomycosis     Umbilicated papule    Erythematous-base heme-crusted tan verrucoid plaque consistent with inflamed seborrheic keratosis     Erythematous Silvery Scaling Plaque c/w Psoriasis     See annotation      Assessment / Plan:      Tinea pedis  Irritant contact dermatitis - area between the 4th and 5th digits with some maceration and peeling, however, large area of eczematous lichenified plaque. Will treat with both topical antifungals for the small area of tinea, and potent topical steroid for the larger area of eczematous changes on the dorsal foot.   -     terbinafine HCL (LAMISIL) 1 % cream; Apply topically 2 (two) times daily. Apply to in between toes  Dispense: 28 g; Refill: 2  -     betamethasone valerate 0.1% (VALISONE) 0.1 % Oint; Apply topically 2 (two) times daily. Apply to the top of the feet  Dispense: 45 g; Refill: 1  -     If no improvement at follow-up visit, consider biopsy.              Follow up in about 5 weeks (around 9/23/2022).

## 2022-08-31 ENCOUNTER — PATIENT OUTREACH (OUTPATIENT)
Dept: ADMINISTRATIVE | Facility: HOSPITAL | Age: 52
End: 2022-08-31
Payer: MEDICAID

## 2022-08-31 NOTE — PROGRESS NOTES
Working the Non-Complaint Mammogram Report: Called patient to discuss scheduling mammogram appointment Patient states she does have the order she just has to call the diagnostic center in [place] to schedule

## 2022-10-03 ENCOUNTER — PATIENT MESSAGE (OUTPATIENT)
Dept: ADMINISTRATIVE | Facility: HOSPITAL | Age: 52
End: 2022-10-03
Payer: MEDICAID

## 2022-10-18 ENCOUNTER — OFFICE VISIT (OUTPATIENT)
Dept: INTERNAL MEDICINE | Facility: CLINIC | Age: 52
End: 2022-10-18
Payer: COMMERCIAL

## 2022-10-18 VITALS
RESPIRATION RATE: 18 BRPM | HEART RATE: 68 BPM | OXYGEN SATURATION: 96 % | BODY MASS INDEX: 44.41 KG/M2 | WEIGHT: 293 LBS | TEMPERATURE: 98 F | SYSTOLIC BLOOD PRESSURE: 134 MMHG | HEIGHT: 68 IN | DIASTOLIC BLOOD PRESSURE: 82 MMHG

## 2022-10-18 DIAGNOSIS — R05.9 COUGH, UNSPECIFIED TYPE: ICD-10-CM

## 2022-10-18 DIAGNOSIS — J01.90 ACUTE SINUSITIS, RECURRENCE NOT SPECIFIED, UNSPECIFIED LOCATION: Primary | ICD-10-CM

## 2022-10-18 LAB
CTP QC/QA: YES
CTP QC/QA: YES
FLUAV AG NPH QL: NEGATIVE
FLUBV AG NPH QL: NEGATIVE
SARS-COV-2 RDRP RESP QL NAA+PROBE: NEGATIVE

## 2022-10-18 PROCEDURE — 87804 INFLUENZA ASSAY W/OPTIC: CPT | Mod: PBBFAC,PN | Performed by: NURSE PRACTITIONER

## 2022-10-18 PROCEDURE — 99999 PR PBB SHADOW E&M-EST. PATIENT-LVL V: CPT | Mod: PBBFAC,,, | Performed by: NURSE PRACTITIONER

## 2022-10-18 PROCEDURE — 1159F PR MEDICATION LIST DOCUMENTED IN MEDICAL RECORD: ICD-10-PCS | Mod: CPTII,S$GLB,, | Performed by: NURSE PRACTITIONER

## 2022-10-18 PROCEDURE — 3075F SYST BP GE 130 - 139MM HG: CPT | Mod: CPTII,S$GLB,, | Performed by: NURSE PRACTITIONER

## 2022-10-18 PROCEDURE — 3008F PR BODY MASS INDEX (BMI) DOCUMENTED: ICD-10-PCS | Mod: CPTII,S$GLB,, | Performed by: NURSE PRACTITIONER

## 2022-10-18 PROCEDURE — 3079F DIAST BP 80-89 MM HG: CPT | Mod: CPTII,S$GLB,, | Performed by: NURSE PRACTITIONER

## 2022-10-18 PROCEDURE — 3061F PR NEG MICROALBUMINURIA RESULT DOCUMENTED/REVIEW: ICD-10-PCS | Mod: CPTII,S$GLB,, | Performed by: NURSE PRACTITIONER

## 2022-10-18 PROCEDURE — 1160F RVW MEDS BY RX/DR IN RCRD: CPT | Mod: CPTII,S$GLB,, | Performed by: NURSE PRACTITIONER

## 2022-10-18 PROCEDURE — 3066F PR DOCUMENTATION OF TREATMENT FOR NEPHROPATHY: ICD-10-PCS | Mod: CPTII,S$GLB,, | Performed by: NURSE PRACTITIONER

## 2022-10-18 PROCEDURE — 1160F PR REVIEW ALL MEDS BY PRESCRIBER/CLIN PHARMACIST DOCUMENTED: ICD-10-PCS | Mod: CPTII,S$GLB,, | Performed by: NURSE PRACTITIONER

## 2022-10-18 PROCEDURE — 3051F HG A1C>EQUAL 7.0%<8.0%: CPT | Mod: CPTII,S$GLB,, | Performed by: NURSE PRACTITIONER

## 2022-10-18 PROCEDURE — 3066F NEPHROPATHY DOC TX: CPT | Mod: CPTII,S$GLB,, | Performed by: NURSE PRACTITIONER

## 2022-10-18 PROCEDURE — 99213 OFFICE O/P EST LOW 20 MIN: CPT | Mod: S$GLB,,, | Performed by: NURSE PRACTITIONER

## 2022-10-18 PROCEDURE — 3079F PR MOST RECENT DIASTOLIC BLOOD PRESSURE 80-89 MM HG: ICD-10-PCS | Mod: CPTII,S$GLB,, | Performed by: NURSE PRACTITIONER

## 2022-10-18 PROCEDURE — 99215 OFFICE O/P EST HI 40 MIN: CPT | Mod: PBBFAC,PN | Performed by: NURSE PRACTITIONER

## 2022-10-18 PROCEDURE — 3061F NEG MICROALBUMINURIA REV: CPT | Mod: CPTII,S$GLB,, | Performed by: NURSE PRACTITIONER

## 2022-10-18 PROCEDURE — 99999 PR PBB SHADOW E&M-EST. PATIENT-LVL V: ICD-10-PCS | Mod: PBBFAC,,, | Performed by: NURSE PRACTITIONER

## 2022-10-18 PROCEDURE — 4010F ACE/ARB THERAPY RXD/TAKEN: CPT | Mod: CPTII,S$GLB,, | Performed by: NURSE PRACTITIONER

## 2022-10-18 PROCEDURE — 3075F PR MOST RECENT SYSTOLIC BLOOD PRESS GE 130-139MM HG: ICD-10-PCS | Mod: CPTII,S$GLB,, | Performed by: NURSE PRACTITIONER

## 2022-10-18 PROCEDURE — 3008F BODY MASS INDEX DOCD: CPT | Mod: CPTII,S$GLB,, | Performed by: NURSE PRACTITIONER

## 2022-10-18 PROCEDURE — 1159F MED LIST DOCD IN RCRD: CPT | Mod: CPTII,S$GLB,, | Performed by: NURSE PRACTITIONER

## 2022-10-18 PROCEDURE — 4010F PR ACE/ARB THEARPY RXD/TAKEN: ICD-10-PCS | Mod: CPTII,S$GLB,, | Performed by: NURSE PRACTITIONER

## 2022-10-18 PROCEDURE — 87635 SARS-COV-2 COVID-19 AMP PRB: CPT | Mod: PBBFAC,PN | Performed by: NURSE PRACTITIONER

## 2022-10-18 PROCEDURE — 3051F PR MOST RECENT HEMOGLOBIN A1C LEVEL 7.0 - < 8.0%: ICD-10-PCS | Mod: CPTII,S$GLB,, | Performed by: NURSE PRACTITIONER

## 2022-10-18 PROCEDURE — 99213 PR OFFICE/OUTPT VISIT, EST, LEVL III, 20-29 MIN: ICD-10-PCS | Mod: S$GLB,,, | Performed by: NURSE PRACTITIONER

## 2022-10-18 RX ORDER — ALBUTEROL SULFATE 90 UG/1
1-2 AEROSOL, METERED RESPIRATORY (INHALATION) EVERY 6 HOURS PRN
Qty: 6.7 G | Refills: 0 | Status: SHIPPED | OUTPATIENT
Start: 2022-10-18

## 2022-10-18 RX ORDER — GUAIFENESIN 600 MG/1
600 TABLET, EXTENDED RELEASE ORAL 2 TIMES DAILY
Qty: 20 TABLET | Refills: 0 | Status: SHIPPED | OUTPATIENT
Start: 2022-10-18 | End: 2022-10-28

## 2022-10-18 RX ORDER — AZITHROMYCIN 250 MG/1
250 TABLET, FILM COATED ORAL DAILY
Qty: 6 TABLET | Refills: 0 | Status: SHIPPED | OUTPATIENT
Start: 2022-10-18 | End: 2022-10-23

## 2022-10-18 RX ORDER — BROMPHENIRAMINE MALEATE, PSEUDOEPHEDRINE HYDROCHLORIDE, AND DEXTROMETHORPHAN HYDROBROMIDE 2; 30; 10 MG/5ML; MG/5ML; MG/5ML
SYRUP ORAL
COMMUNITY
Start: 2022-10-14 | End: 2022-11-18

## 2022-10-18 NOTE — PROGRESS NOTES
CHIEF COMPLAINT/REASON FOR VISIT: nasal congestion, post nasal drip, sore throat, facial pressure    HISTORY OF PRESENT ILLNESS:    Shaneka Ahmadi.  52 y.o.  female complains of a sinus issue with nasal congestion, post nasal drip, sore throat, facial pressure, right ear discomfort  and productive cough onset 10 days ago. Patient admits tried Amoxicillin and prescription cough syrup from urgent care provider.      Reports stopping amoxicillin because it dries her mouth.     PAST MEDICAL HISTORY:   Past Medical History:   Diagnosis Date    Diabetes mellitus     Hypertension          PAST SURGICAL HISTORY:.  Past Surgical History:   Procedure Laterality Date    CATARACT EXTRACTION W/  INTRAOCULAR LENS IMPLANT Right 06/02/2022    TUBAL LIGATION      2007--postpartum tubal ligation         SOCIAL HISTORY:.  Social History     Socioeconomic History    Marital status:    Tobacco Use    Smoking status: Never    Smokeless tobacco: Never   Substance and Sexual Activity    Alcohol use: Never    Drug use: Never    Sexual activity: Yes     Partners: Male     Birth control/protection: None     Comment: tubal       ROS:  GENERAL: Reports no fever, chills.  SKIN: No rashes, itching or changes in color or texture of skin.  HEENT: Reports sinus pressure, nasal congestion, postnasal drip, sore throat, ear discomfort, rhinorrhea.  CHEST: Reports cough and sputum production.  Reports wheezing.  Denies shortness of breath.  CARDIOVASCULAR: Denies chest pain, PND, orthopnea or reduced exercise tolerance.  ABDOMEN: Appetite fine. No weight loss. .  MUSCULOSKELETAL: No joint stiffness, pain or swelling. Denies back pain.  NEUROLOGIC: Report headaches. No history of seizures, paralysis, alteration of gait or coordination.  PSYCHIATRIC: Denies mood swings, depression or suicidal thoughts.    /82 (BP Location: Left arm, Patient Position: Sitting, BP Method: Large (Manual))   Pulse 68   Temp 97.7 °F (36.5 °C) (Temporal)    "Resp 18   Ht 5' 8" (1.727 m)   Wt (!) 146.3 kg (322 lb 8.5 oz)   LMP 05/01/2021 (Approximate)   SpO2 96%   BMI 49.04 kg/m² .    PE:   APPEARANCE: Well nourished, well developed, in mild distress.   SKIN: Normal skin turgor, no lesions.  HEENT: Turbinates red and enlarge, sinus tenderness on palpation (right), erythematous pharynx, TMs poor light reflex bilaterally.  Slight bulging.   CHEST: Lungs clear to auscultation. no wheezing  CARDIOVASCULAR: Regular rate and rhythm.  MUSCULOSKELETAL: Motor: 5/5 strength major flexors/extensors.    PLAN:   Advise Hydrate and rest.      Practice good handwashing.  Mucinex for cough and chest congestion.  See PCP or go to ER if symptoms worsen or fail to improve with treatment.  Given work excuse        DIAGNOSIS:  Acute sinusitis, recurrence not specified, unspecified location  -     POCT COVID-19 Rapid Screening  -     POCT Influenza A/B  -     azithromycin (Z-GLADYS) 250 MG tablet; Take 1 tablet (250 mg total) by mouth once daily. Take 2 tablets by mouth on day 1, then one tablet daily on days 2-5. for 5 days  Dispense: 6 tablet; Refill: 0  -     albuterol (PROVENTIL/VENTOLIN HFA) 90 mcg/actuation inhaler; Inhale 1-2 puffs into the lungs every 6 (six) hours as needed for Wheezing (cough).  Dispense: 6.7 g; Refill: 0  -     guaiFENesin (MUCINEX) 600 mg 12 hr tablet; Take 1 tablet (600 mg total) by mouth 2 (two) times daily. for 10 days  Dispense: 20 tablet; Refill: 0    Cough, unspecified type  -     albuterol (PROVENTIL/VENTOLIN HFA) 90 mcg/actuation inhaler; Inhale 1-2 puffs into the lungs every 6 (six) hours as needed for Wheezing (cough).  Dispense: 6.7 g; Refill: 0      Instructed to take all medications as ordered.  Informed if no improvement or symptoms worsens to follow up  Informed to hydrate and rest.  Printed and review after visit summary with patient.  "

## 2022-10-18 NOTE — LETTER
October 18, 2022    Shaneka Ahmadi  [address] [place] [id number]             [facility]  Internal Medicine  [address] [place] [address]  [place] [address]  Phone: [phone]  Fax: [phone]   October 18, 2022     Patient: Shaneka Ahmadi   YOB: 1970   Date of Visit: 10/18/2022       To Whom it May Concern:    Shaneka Ahmadi was seen in my clinic on 10/18/2022. She may return to work on 10/19/2022.    Please excuse her from any work missed.    If you have any questions or concerns, please don't hesitate to call.    Sincerely,         Corina Wang NP

## 2022-11-07 ENCOUNTER — TELEPHONE (OUTPATIENT)
Dept: DERMATOLOGY | Facility: CLINIC | Age: 52
End: 2022-11-07
Payer: COMMERCIAL

## 2022-11-11 ENCOUNTER — HOSPITAL ENCOUNTER (OUTPATIENT)
Dept: RADIOLOGY | Facility: HOSPITAL | Age: 52
Discharge: HOME | End: 2022-11-11
Attending: NURSE PRACTITIONER
Payer: COMMERCIAL

## 2022-11-11 ENCOUNTER — TELEPHONE (OUTPATIENT)
Dept: INTERNAL MEDICINE | Facility: CLINIC | Age: 52
End: 2022-11-11

## 2022-11-11 ENCOUNTER — OFFICE VISIT (OUTPATIENT)
Dept: INTERNAL MEDICINE | Facility: CLINIC | Age: 52
End: 2022-11-11
Payer: COMMERCIAL

## 2022-11-11 DIAGNOSIS — J40 BRONCHITIS: ICD-10-CM

## 2022-11-11 DIAGNOSIS — J40 BRONCHITIS: Primary | ICD-10-CM

## 2022-11-11 PROCEDURE — 71046 X-RAY EXAM CHEST 2 VIEWS: CPT | Mod: TC,PN

## 2022-11-11 PROCEDURE — 99212 PR OFFICE/OUTPT VISIT, EST, LEVL II, 10-19 MIN: ICD-10-PCS | Mod: 95,,, | Performed by: NURSE PRACTITIONER

## 2022-11-11 PROCEDURE — 71046 XR CHEST PA AND LATERAL: ICD-10-PCS | Mod: 26,,, | Performed by: STUDENT IN AN ORGANIZED HEALTH CARE EDUCATION/TRAINING PROGRAM

## 2022-11-11 PROCEDURE — 1159F PR MEDICATION LIST DOCUMENTED IN MEDICAL RECORD: ICD-10-PCS | Mod: CPTII,95,, | Performed by: NURSE PRACTITIONER

## 2022-11-11 PROCEDURE — 1159F MED LIST DOCD IN RCRD: CPT | Mod: CPTII,95,, | Performed by: NURSE PRACTITIONER

## 2022-11-11 PROCEDURE — 3066F PR DOCUMENTATION OF TREATMENT FOR NEPHROPATHY: ICD-10-PCS | Mod: CPTII,95,, | Performed by: NURSE PRACTITIONER

## 2022-11-11 PROCEDURE — 3061F NEG MICROALBUMINURIA REV: CPT | Mod: CPTII,95,, | Performed by: NURSE PRACTITIONER

## 2022-11-11 PROCEDURE — 3061F PR NEG MICROALBUMINURIA RESULT DOCUMENTED/REVIEW: ICD-10-PCS | Mod: CPTII,95,, | Performed by: NURSE PRACTITIONER

## 2022-11-11 PROCEDURE — 99212 OFFICE O/P EST SF 10 MIN: CPT | Mod: 95,,, | Performed by: NURSE PRACTITIONER

## 2022-11-11 PROCEDURE — 4010F ACE/ARB THERAPY RXD/TAKEN: CPT | Mod: CPTII,95,, | Performed by: NURSE PRACTITIONER

## 2022-11-11 PROCEDURE — 3051F PR MOST RECENT HEMOGLOBIN A1C LEVEL 7.0 - < 8.0%: ICD-10-PCS | Mod: CPTII,95,, | Performed by: NURSE PRACTITIONER

## 2022-11-11 PROCEDURE — 71046 X-RAY EXAM CHEST 2 VIEWS: CPT | Mod: 26,,, | Performed by: STUDENT IN AN ORGANIZED HEALTH CARE EDUCATION/TRAINING PROGRAM

## 2022-11-11 PROCEDURE — 4010F PR ACE/ARB THEARPY RXD/TAKEN: ICD-10-PCS | Mod: CPTII,95,, | Performed by: NURSE PRACTITIONER

## 2022-11-11 PROCEDURE — 3051F HG A1C>EQUAL 7.0%<8.0%: CPT | Mod: CPTII,95,, | Performed by: NURSE PRACTITIONER

## 2022-11-11 PROCEDURE — 1160F PR REVIEW ALL MEDS BY PRESCRIBER/CLIN PHARMACIST DOCUMENTED: ICD-10-PCS | Mod: CPTII,95,, | Performed by: NURSE PRACTITIONER

## 2022-11-11 PROCEDURE — 3066F NEPHROPATHY DOC TX: CPT | Mod: CPTII,95,, | Performed by: NURSE PRACTITIONER

## 2022-11-11 PROCEDURE — 1160F RVW MEDS BY RX/DR IN RCRD: CPT | Mod: CPTII,95,, | Performed by: NURSE PRACTITIONER

## 2022-11-11 RX ORDER — PROMETHAZINE HYDROCHLORIDE AND DEXTROMETHORPHAN HYDROBROMIDE 6.25; 15 MG/5ML; MG/5ML
5 SYRUP ORAL
Qty: 118 ML | Refills: 0 | Status: SHIPPED | OUTPATIENT
Start: 2022-11-11 | End: 2022-11-18 | Stop reason: SDUPTHER

## 2022-11-11 RX ORDER — PREDNISONE 10 MG/1
10 TABLET ORAL 2 TIMES DAILY
Qty: 10 TABLET | Refills: 0 | Status: SHIPPED | OUTPATIENT
Start: 2022-11-11 | End: 2022-11-18

## 2022-11-11 NOTE — TELEPHONE ENCOUNTER
----- Message from Nell Christensen sent at 11/11/2022 10:00 AM CST -----  Patient is requesting a call back in regards to virtual visit..Please call back at [phone]..Thanks

## 2022-11-11 NOTE — TELEPHONE ENCOUNTER
----- Message from Candice Ruiz sent at 11/11/2022 12:04 PM CST -----  Contact: [phone]  Patient had a chest xray done and is requesting a nurse to call back to explain the results and about some medications that may need to be refilled. Please call her back at [phone]. Emilia/ANTONIA

## 2022-11-11 NOTE — PROGRESS NOTES
Established Patient - Audio Only Telehealth Visit     The patient location is: [place]  The chief complaint leading to consultation is: persistent cough   Visit type: Virtual visit with audio only (telephone)  Total time spent with patient: 10     The reason for the audio only service rather than synchronous audio and video virtual visit was related to technical difficulties or patient preference/necessity.     Each patient to whom I provide medical services by telemedicine is:  (1) informed of the relationship between the physician and patient and the respective role of any other health care provider with respect to management of the patient; and (2) notified that they may decline to receive medical services by telemedicine and may withdraw from such care at any time. Patient verbally consented to receive this service via voice-only telephone call.       HPI:   Patient presents with productive cough and wheezing.  Continues from her treatment with sinusitis in October.  Completed medications.  Described sputum to be thick and white.  Reports inhaler causes chest tightness.       Assessment and plan:      Bronchitis  -     predniSONE (DELTASONE) 10 MG tablet; Take 1 tablet (10 mg total) by mouth 2 (two) times daily.  Dispense: 10 tablet; Refill: 0  -     promethazine-dextromethorphan (PROMETHAZINE-DM) 6.25-15 mg/5 mL Syrp; Take 5 mLs by mouth every 4 to 6 hours as needed (cough). Do not drive/operate heavy machinery while taking this medication.  Dispense: 118 mL; Refill: 0  -     X-Ray Chest PA And Lateral; Future; Expected date: 11/11/2022              Recommend CXR.             This service was not originating from a related E/M service provided within the previous 7 days nor will  to an E/M service or procedure within the next 24 hours or my soonest available appointment.  Prevailing standard of care was able to be met in this audio-only visit.

## 2022-11-11 NOTE — TELEPHONE ENCOUNTER
I called the pt and informed as soon as the provider reviews her xray I will call with report. She verbalized understanding. //kah

## 2022-11-14 ENCOUNTER — PATIENT MESSAGE (OUTPATIENT)
Dept: INTERNAL MEDICINE | Facility: CLINIC | Age: 52
End: 2022-11-14
Payer: COMMERCIAL

## 2022-11-14 DIAGNOSIS — R91.1 SOLITARY PULMONARY NODULE: Primary | ICD-10-CM

## 2022-11-14 DIAGNOSIS — R91.8 MULTIPLE LUNG NODULES: ICD-10-CM

## 2022-11-14 RX ORDER — LEVOFLOXACIN 750 MG/1
750 TABLET ORAL DAILY
Qty: 5 TABLET | Refills: 0 | Status: SHIPPED | OUTPATIENT
Start: 2022-11-14 | End: 2022-12-05

## 2022-11-15 ENCOUNTER — PATIENT OUTREACH (OUTPATIENT)
Dept: ADMINISTRATIVE | Facility: HOSPITAL | Age: 52
End: 2022-11-15
Payer: COMMERCIAL

## 2022-11-18 ENCOUNTER — PATIENT MESSAGE (OUTPATIENT)
Dept: INTERNAL MEDICINE | Facility: CLINIC | Age: 52
End: 2022-11-18

## 2022-11-18 ENCOUNTER — LAB VISIT (OUTPATIENT)
Dept: LAB | Facility: HOSPITAL | Age: 52
End: 2022-11-18
Attending: NURSE PRACTITIONER
Payer: COMMERCIAL

## 2022-11-18 ENCOUNTER — OFFICE VISIT (OUTPATIENT)
Dept: INTERNAL MEDICINE | Facility: CLINIC | Age: 52
End: 2022-11-18
Payer: COMMERCIAL

## 2022-11-18 VITALS
RESPIRATION RATE: 18 BRPM | HEART RATE: 62 BPM | DIASTOLIC BLOOD PRESSURE: 82 MMHG | TEMPERATURE: 98 F | WEIGHT: 293 LBS | OXYGEN SATURATION: 97 % | SYSTOLIC BLOOD PRESSURE: 126 MMHG | HEIGHT: 68 IN | BODY MASS INDEX: 44.41 KG/M2

## 2022-11-18 DIAGNOSIS — E11.9 TYPE 2 DIABETES MELLITUS WITHOUT COMPLICATION, WITHOUT LONG-TERM CURRENT USE OF INSULIN: ICD-10-CM

## 2022-11-18 DIAGNOSIS — R91.8 MULTIPLE LUNG NODULES: ICD-10-CM

## 2022-11-18 DIAGNOSIS — J18.9 PNEUMONIA OF LEFT LOWER LOBE DUE TO INFECTIOUS ORGANISM: ICD-10-CM

## 2022-11-18 DIAGNOSIS — Z12.11 COLON CANCER SCREENING: ICD-10-CM

## 2022-11-18 DIAGNOSIS — Z00.00 ROUTINE MEDICAL EXAM: Primary | ICD-10-CM

## 2022-11-18 DIAGNOSIS — I10 ESSENTIAL HYPERTENSION: ICD-10-CM

## 2022-11-18 DIAGNOSIS — R91.1 SOLITARY PULMONARY NODULE: ICD-10-CM

## 2022-11-18 LAB
CREAT SERPL-MCNC: 0.8 MG/DL (ref 0.5–1.4)
EST. GFR  (NO RACE VARIABLE): >60 ML/MIN/1.73 M^2

## 2022-11-18 PROCEDURE — 36415 COLL VENOUS BLD VENIPUNCTURE: CPT | Mod: PO | Performed by: NURSE PRACTITIONER

## 2022-11-18 PROCEDURE — 3066F NEPHROPATHY DOC TX: CPT | Mod: CPTII,S$GLB,, | Performed by: NURSE PRACTITIONER

## 2022-11-18 PROCEDURE — 99214 PR OFFICE/OUTPT VISIT, EST, LEVL IV, 30-39 MIN: ICD-10-PCS | Mod: S$GLB,,, | Performed by: NURSE PRACTITIONER

## 2022-11-18 PROCEDURE — 1160F PR REVIEW ALL MEDS BY PRESCRIBER/CLIN PHARMACIST DOCUMENTED: ICD-10-PCS | Mod: CPTII,S$GLB,, | Performed by: NURSE PRACTITIONER

## 2022-11-18 PROCEDURE — 3074F PR MOST RECENT SYSTOLIC BLOOD PRESSURE < 130 MM HG: ICD-10-PCS | Mod: CPTII,S$GLB,, | Performed by: NURSE PRACTITIONER

## 2022-11-18 PROCEDURE — 82565 ASSAY OF CREATININE: CPT | Performed by: NURSE PRACTITIONER

## 2022-11-18 PROCEDURE — 4010F ACE/ARB THERAPY RXD/TAKEN: CPT | Mod: CPTII,S$GLB,, | Performed by: NURSE PRACTITIONER

## 2022-11-18 PROCEDURE — 3008F BODY MASS INDEX DOCD: CPT | Mod: CPTII,S$GLB,, | Performed by: NURSE PRACTITIONER

## 2022-11-18 PROCEDURE — 3079F PR MOST RECENT DIASTOLIC BLOOD PRESSURE 80-89 MM HG: ICD-10-PCS | Mod: CPTII,S$GLB,, | Performed by: NURSE PRACTITIONER

## 2022-11-18 PROCEDURE — 99214 OFFICE O/P EST MOD 30 MIN: CPT | Mod: S$GLB,,, | Performed by: NURSE PRACTITIONER

## 2022-11-18 PROCEDURE — 1159F PR MEDICATION LIST DOCUMENTED IN MEDICAL RECORD: ICD-10-PCS | Mod: CPTII,S$GLB,, | Performed by: NURSE PRACTITIONER

## 2022-11-18 PROCEDURE — 1159F MED LIST DOCD IN RCRD: CPT | Mod: CPTII,S$GLB,, | Performed by: NURSE PRACTITIONER

## 2022-11-18 PROCEDURE — 1160F RVW MEDS BY RX/DR IN RCRD: CPT | Mod: CPTII,S$GLB,, | Performed by: NURSE PRACTITIONER

## 2022-11-18 PROCEDURE — 3051F HG A1C>EQUAL 7.0%<8.0%: CPT | Mod: CPTII,S$GLB,, | Performed by: NURSE PRACTITIONER

## 2022-11-18 PROCEDURE — 3079F DIAST BP 80-89 MM HG: CPT | Mod: CPTII,S$GLB,, | Performed by: NURSE PRACTITIONER

## 2022-11-18 PROCEDURE — 3051F PR MOST RECENT HEMOGLOBIN A1C LEVEL 7.0 - < 8.0%: ICD-10-PCS | Mod: CPTII,S$GLB,, | Performed by: NURSE PRACTITIONER

## 2022-11-18 PROCEDURE — 4010F PR ACE/ARB THEARPY RXD/TAKEN: ICD-10-PCS | Mod: CPTII,S$GLB,, | Performed by: NURSE PRACTITIONER

## 2022-11-18 PROCEDURE — 3074F SYST BP LT 130 MM HG: CPT | Mod: CPTII,S$GLB,, | Performed by: NURSE PRACTITIONER

## 2022-11-18 PROCEDURE — 99215 OFFICE O/P EST HI 40 MIN: CPT | Mod: PBBFAC,PN | Performed by: NURSE PRACTITIONER

## 2022-11-18 PROCEDURE — 99999 PR PBB SHADOW E&M-EST. PATIENT-LVL V: CPT | Mod: PBBFAC,,, | Performed by: NURSE PRACTITIONER

## 2022-11-18 PROCEDURE — 99999 PR PBB SHADOW E&M-EST. PATIENT-LVL V: ICD-10-PCS | Mod: PBBFAC,,, | Performed by: NURSE PRACTITIONER

## 2022-11-18 PROCEDURE — 3061F NEG MICROALBUMINURIA REV: CPT | Mod: CPTII,S$GLB,, | Performed by: NURSE PRACTITIONER

## 2022-11-18 PROCEDURE — 3061F PR NEG MICROALBUMINURIA RESULT DOCUMENTED/REVIEW: ICD-10-PCS | Mod: CPTII,S$GLB,, | Performed by: NURSE PRACTITIONER

## 2022-11-18 PROCEDURE — 3008F PR BODY MASS INDEX (BMI) DOCUMENTED: ICD-10-PCS | Mod: CPTII,S$GLB,, | Performed by: NURSE PRACTITIONER

## 2022-11-18 PROCEDURE — 3066F PR DOCUMENTATION OF TREATMENT FOR NEPHROPATHY: ICD-10-PCS | Mod: CPTII,S$GLB,, | Performed by: NURSE PRACTITIONER

## 2022-11-18 RX ORDER — PROMETHAZINE HYDROCHLORIDE AND DEXTROMETHORPHAN HYDROBROMIDE 6.25; 15 MG/5ML; MG/5ML
5 SYRUP ORAL
Qty: 118 ML | Refills: 0 | Status: SHIPPED | OUTPATIENT
Start: 2022-11-18 | End: 2022-11-28

## 2022-11-18 RX ORDER — PROMETHAZINE HYDROCHLORIDE AND DEXTROMETHORPHAN HYDROBROMIDE 6.25; 15 MG/5ML; MG/5ML
5 SYRUP ORAL
Qty: 118 ML | Refills: 0 | Status: SHIPPED | OUTPATIENT
Start: 2022-11-18 | End: 2022-11-18 | Stop reason: SDUPTHER

## 2022-11-18 RX ORDER — AMLODIPINE BESYLATE 10 MG/1
10 TABLET ORAL DAILY
Qty: 90 TABLET | Refills: 3 | Status: SHIPPED | OUTPATIENT
Start: 2022-11-18 | End: 2022-11-18 | Stop reason: SDUPTHER

## 2022-11-18 RX ORDER — LOSARTAN POTASSIUM AND HYDROCHLOROTHIAZIDE 25; 100 MG/1; MG/1
1 TABLET ORAL DAILY
Qty: 90 TABLET | Refills: 3 | Status: SHIPPED | OUTPATIENT
Start: 2022-11-18 | End: 2024-01-26 | Stop reason: SDUPTHER

## 2022-11-18 RX ORDER — AMLODIPINE BESYLATE 10 MG/1
10 TABLET ORAL DAILY
Qty: 90 TABLET | Refills: 3 | Status: SHIPPED | OUTPATIENT
Start: 2022-11-18 | End: 2024-01-26 | Stop reason: SDUPTHER

## 2022-11-18 RX ORDER — FLUTICASONE PROPIONATE 50 MCG
2 SPRAY, SUSPENSION (ML) NASAL DAILY
Qty: 9.9 ML | Refills: 2 | Status: SHIPPED | OUTPATIENT
Start: 2022-11-18

## 2022-11-18 NOTE — LETTER
November 18, 2022    Shaneka Ahmadi  [address] [place] [address]             [facility]  Internal Medicine  [address] [place] [address]  [place] [address]  Phone: [phone]  Fax: [phone]   November 18, 2022     Patient: Shaneka Ahmadi   YOB: 1970   Date of Visit: 11/18/2022       To Whom it May Concern:    Shaneka Ahmadi was seen in my clinic on 11/18/2022. She may return to work on 11/28/2022.    Please excuse her from any work missed during this time     If you have any questions or concerns, please don't hesitate to call.    Sincerely,         Corina Wang NP

## 2022-11-18 NOTE — PROGRESS NOTES
Subjective:       Patient ID: Shaneka Ahmadi is a 52 y.o. female.    Chief Complaint: Follow-up (3 mo)    Patient presents for 3 month follow up.   Med History: Type II DM, Lung Nodules. HTN    Continues to have cough and some shortness of breath.  CXR noted possible pneumonia and lung nodules.  Taking Levaquin 750 mg.   Has follow up CT chest scheduled for next week.      Has labs scheduled for today.    Follow-up  Associated symptoms include coughing. Pertinent negatives include no abdominal pain, arthralgias, chills, fatigue or fever.   Review of Systems   Constitutional:  Negative for chills, fatigue and fever.   HENT:  Positive for postnasal drip and rhinorrhea.    Respiratory:  Positive for cough and shortness of breath.    Gastrointestinal:  Negative for abdominal pain, constipation and diarrhea.   Genitourinary:  Negative for frequency.   Musculoskeletal:  Negative for arthralgias and gait problem.   Psychiatric/Behavioral:  Negative for agitation and confusion.        Objective:      Physical Exam  Vitals reviewed.   Constitutional:       Appearance: Normal appearance.   HENT:      Head: Normocephalic.      Right Ear: Tympanic membrane normal.      Left Ear: Tympanic membrane normal.      Nose: Nose normal.      Mouth/Throat:      Mouth: Mucous membranes are moist.   Cardiovascular:      Rate and Rhythm: Normal rate and regular rhythm.   Pulmonary:      Effort: Pulmonary effort is normal.      Breath sounds: Normal breath sounds.   Musculoskeletal:         General: Normal range of motion.   Skin:     General: Skin is warm.   Neurological:      General: No focal deficit present.      Mental Status: She is alert and oriented to person, place, and time.   Psychiatric:         Mood and Affect: Mood normal.         Behavior: Behavior normal.       Assessment:       Problem List Items Addressed This Visit       Essential hypertension    Relevant Medications    losartan-hydrochlorothiazide 100-25 mg (HYZAAR)  100-25 mg per tablet    amLODIPine (NORVASC) 10 MG tablet    Type 2 diabetes mellitus without complication, without long-term current use of insulin    Multiple lung nodules     Other Visit Diagnoses       Routine medical exam    -  Primary    Pneumonia of left lower lobe due to infectious organism        Continue Levaquin.     Relevant Medications    promethazine-dextromethorphan (PROMETHAZINE-DM) 6.25-15 mg/5 mL Syrp    Colon cancer screening        Relevant Orders    Cologuard Screening (Multitarget Stool DNA)            Plan:         Routine medical exam    Pneumonia of left lower lobe due to infectious organism  Comments:  Continue Levaquin.   Orders:  -     promethazine-dextromethorphan (PROMETHAZINE-DM) 6.25-15 mg/5 mL Syrp; Take 5 mLs by mouth every 4 to 6 hours as needed (cough). Do not drive/operate heavy machinery while taking this medication.  Dispense: 118 mL; Refill: 0    Essential hypertension  Comments:  Stable.  Continue current treatment plan.   Orders:  -     Discontinue: amLODIPine (NORVASC) 10 MG tablet; Take 1 tablet (10 mg total) by mouth once daily.  Dispense: 90 tablet; Refill: 3  -     Discontinue: amLODIPine (NORVASC) 10 MG tablet; Take 1 tablet (10 mg total) by mouth once daily.  Dispense: 90 tablet; Refill: 3  -     losartan-hydrochlorothiazide 100-25 mg (HYZAAR) 100-25 mg per tablet; Take 1 tablet by mouth once daily.  Dispense: 90 tablet; Refill: 3  -     amLODIPine (NORVASC) 10 MG tablet; Take 1 tablet (10 mg total) by mouth once daily.  Dispense: 90 tablet; Refill: 3    Multiple lung nodules  Comments:  Has CT scheduled next week     Type 2 diabetes mellitus without complication, without long-term current use of insulin    Colon cancer screening  -     Cologuard Screening (Multitarget Stool DNA); Future; Expected date: 11/18/2022    Other orders  -     fluticasone propionate (FLONASE) 50 mcg/actuation nasal spray; 2 sprays (100 mcg total) by Each Nostril route once daily.  Dispense:  9.9 mL; Refill: 2         Excuse is in     Labs today     Medications refilled.

## 2022-11-23 ENCOUNTER — HOSPITAL ENCOUNTER (OUTPATIENT)
Dept: RADIOLOGY | Facility: HOSPITAL | Age: 52
Discharge: HOME | End: 2022-11-23
Attending: NURSE PRACTITIONER
Payer: COMMERCIAL

## 2022-11-23 DIAGNOSIS — R91.1 SOLITARY PULMONARY NODULE: ICD-10-CM

## 2022-11-23 DIAGNOSIS — R91.8 MULTIPLE LUNG NODULES: ICD-10-CM

## 2022-11-23 PROCEDURE — 25500020 PHARM REV CODE 255: Performed by: NURSE PRACTITIONER

## 2022-11-23 PROCEDURE — 71260 CT CHEST WITH CONTRAST: ICD-10-PCS | Mod: 26,,, | Performed by: RADIOLOGY

## 2022-11-23 PROCEDURE — 71260 CT THORAX DX C+: CPT | Mod: 26,,, | Performed by: RADIOLOGY

## 2022-11-23 PROCEDURE — 71260 CT THORAX DX C+: CPT | Mod: TC

## 2022-11-23 RX ADMIN — IOHEXOL 75 ML: 350 INJECTION, SOLUTION INTRAVENOUS at 01:11

## 2022-11-25 ENCOUNTER — LAB VISIT (OUTPATIENT)
Dept: LAB | Facility: HOSPITAL | Age: 52
End: 2022-11-25
Attending: INTERNAL MEDICINE
Payer: COMMERCIAL

## 2022-11-25 ENCOUNTER — OFFICE VISIT (OUTPATIENT)
Dept: HEMATOLOGY/ONCOLOGY | Facility: CLINIC | Age: 52
End: 2022-11-25
Payer: COMMERCIAL

## 2022-11-25 ENCOUNTER — OFFICE VISIT (OUTPATIENT)
Dept: INTERNAL MEDICINE | Facility: CLINIC | Age: 52
End: 2022-11-25
Payer: COMMERCIAL

## 2022-11-25 VITALS
WEIGHT: 293 LBS | RESPIRATION RATE: 20 BRPM | TEMPERATURE: 98 F | BODY MASS INDEX: 50.02 KG/M2 | SYSTOLIC BLOOD PRESSURE: 155 MMHG | OXYGEN SATURATION: 94 % | DIASTOLIC BLOOD PRESSURE: 79 MMHG | HEART RATE: 72 BPM | HEIGHT: 64 IN

## 2022-11-25 VITALS
TEMPERATURE: 98 F | WEIGHT: 293 LBS | HEIGHT: 68 IN | RESPIRATION RATE: 18 BRPM | HEART RATE: 87 BPM | OXYGEN SATURATION: 96 % | DIASTOLIC BLOOD PRESSURE: 82 MMHG | SYSTOLIC BLOOD PRESSURE: 132 MMHG | BODY MASS INDEX: 44.41 KG/M2

## 2022-11-25 DIAGNOSIS — R91.8 MULTIPLE LUNG NODULES: ICD-10-CM

## 2022-11-25 DIAGNOSIS — R91.8 MASS OF LEFT LUNG: ICD-10-CM

## 2022-11-25 DIAGNOSIS — R91.8 ABNORMAL CT SCAN OF LUNG: Primary | ICD-10-CM

## 2022-11-25 DIAGNOSIS — E66.01 MORBID OBESITY: ICD-10-CM

## 2022-11-25 LAB
ALBUMIN SERPL BCP-MCNC: 3.9 G/DL (ref 3.5–5.2)
ALP SERPL-CCNC: 142 U/L (ref 55–135)
ALT SERPL W/O P-5'-P-CCNC: 28 U/L (ref 10–44)
ANION GAP SERPL CALC-SCNC: 15 MMOL/L (ref 8–16)
AST SERPL-CCNC: 18 U/L (ref 10–40)
BASOPHILS # BLD AUTO: 0.07 K/UL (ref 0–0.2)
BASOPHILS NFR BLD: 0.6 % (ref 0–1.9)
BILIRUB SERPL-MCNC: 0.4 MG/DL (ref 0.1–1)
BUN SERPL-MCNC: 12 MG/DL (ref 6–20)
CALCIUM SERPL-MCNC: 9.9 MG/DL (ref 8.7–10.5)
CHLORIDE SERPL-SCNC: 100 MMOL/L (ref 95–110)
CO2 SERPL-SCNC: 27 MMOL/L (ref 23–29)
CREAT SERPL-MCNC: 0.8 MG/DL (ref 0.5–1.4)
DIFFERENTIAL METHOD: ABNORMAL
EOSINOPHIL # BLD AUTO: 0.2 K/UL (ref 0–0.5)
EOSINOPHIL NFR BLD: 1.5 % (ref 0–8)
ERYTHROCYTE [DISTWIDTH] IN BLOOD BY AUTOMATED COUNT: 13.3 % (ref 11.5–14.5)
EST. GFR  (NO RACE VARIABLE): >60 ML/MIN/1.73 M^2
GLUCOSE SERPL-MCNC: 158 MG/DL (ref 70–110)
HCT VFR BLD AUTO: 41.4 % (ref 37–48.5)
HGB BLD-MCNC: 13.7 G/DL (ref 12–16)
IMM GRANULOCYTES # BLD AUTO: 0.03 K/UL (ref 0–0.04)
IMM GRANULOCYTES NFR BLD AUTO: 0.2 % (ref 0–0.5)
LDH SERPL L TO P-CCNC: 223 U/L (ref 110–260)
LYMPHOCYTES # BLD AUTO: 2.4 K/UL (ref 1–4.8)
LYMPHOCYTES NFR BLD: 19.8 % (ref 18–48)
MCH RBC QN AUTO: 26.8 PG (ref 27–31)
MCHC RBC AUTO-ENTMCNC: 33.1 G/DL (ref 32–36)
MCV RBC AUTO: 81 FL (ref 82–98)
MONOCYTES # BLD AUTO: 1 K/UL (ref 0.3–1)
MONOCYTES NFR BLD: 8.2 % (ref 4–15)
NEUTROPHILS # BLD AUTO: 8.5 K/UL (ref 1.8–7.7)
NEUTROPHILS NFR BLD: 69.7 % (ref 38–73)
NRBC BLD-RTO: 0 /100 WBC
PLATELET # BLD AUTO: 248 K/UL (ref 150–450)
PMV BLD AUTO: 12.7 FL (ref 9.2–12.9)
POTASSIUM SERPL-SCNC: 3.6 MMOL/L (ref 3.5–5.1)
PROT SERPL-MCNC: 7.6 G/DL (ref 6–8.4)
RBC # BLD AUTO: 5.12 M/UL (ref 4–5.4)
SODIUM SERPL-SCNC: 142 MMOL/L (ref 136–145)
WBC # BLD AUTO: 12.26 K/UL (ref 3.9–12.7)

## 2022-11-25 PROCEDURE — 99213 PR OFFICE/OUTPT VISIT, EST, LEVL III, 20-29 MIN: ICD-10-PCS | Mod: S$GLB,,, | Performed by: NURSE PRACTITIONER

## 2022-11-25 PROCEDURE — 99204 OFFICE O/P NEW MOD 45 MIN: CPT | Mod: S$GLB,,, | Performed by: INTERNAL MEDICINE

## 2022-11-25 PROCEDURE — 3075F SYST BP GE 130 - 139MM HG: CPT | Mod: CPTII,S$GLB,, | Performed by: NURSE PRACTITIONER

## 2022-11-25 PROCEDURE — 3051F HG A1C>EQUAL 7.0%<8.0%: CPT | Mod: CPTII,S$GLB,, | Performed by: INTERNAL MEDICINE

## 2022-11-25 PROCEDURE — 1159F MED LIST DOCD IN RCRD: CPT | Mod: CPTII,S$GLB,, | Performed by: INTERNAL MEDICINE

## 2022-11-25 PROCEDURE — 3066F PR DOCUMENTATION OF TREATMENT FOR NEPHROPATHY: ICD-10-PCS | Mod: CPTII,S$GLB,, | Performed by: NURSE PRACTITIONER

## 2022-11-25 PROCEDURE — 3051F HG A1C>EQUAL 7.0%<8.0%: CPT | Mod: CPTII,S$GLB,, | Performed by: NURSE PRACTITIONER

## 2022-11-25 PROCEDURE — 1160F RVW MEDS BY RX/DR IN RCRD: CPT | Mod: CPTII,S$GLB,, | Performed by: INTERNAL MEDICINE

## 2022-11-25 PROCEDURE — 4010F ACE/ARB THERAPY RXD/TAKEN: CPT | Mod: CPTII,S$GLB,, | Performed by: NURSE PRACTITIONER

## 2022-11-25 PROCEDURE — 1159F PR MEDICATION LIST DOCUMENTED IN MEDICAL RECORD: ICD-10-PCS | Mod: CPTII,S$GLB,, | Performed by: INTERNAL MEDICINE

## 2022-11-25 PROCEDURE — 1159F PR MEDICATION LIST DOCUMENTED IN MEDICAL RECORD: ICD-10-PCS | Mod: CPTII,S$GLB,, | Performed by: NURSE PRACTITIONER

## 2022-11-25 PROCEDURE — 99999 PR PBB SHADOW E&M-EST. PATIENT-LVL V: CPT | Mod: PBBFAC,,, | Performed by: INTERNAL MEDICINE

## 2022-11-25 PROCEDURE — 3051F PR MOST RECENT HEMOGLOBIN A1C LEVEL 7.0 - < 8.0%: ICD-10-PCS | Mod: CPTII,S$GLB,, | Performed by: INTERNAL MEDICINE

## 2022-11-25 PROCEDURE — 1160F PR REVIEW ALL MEDS BY PRESCRIBER/CLIN PHARMACIST DOCUMENTED: ICD-10-PCS | Mod: CPTII,S$GLB,, | Performed by: INTERNAL MEDICINE

## 2022-11-25 PROCEDURE — 3078F DIAST BP <80 MM HG: CPT | Mod: CPTII,S$GLB,, | Performed by: INTERNAL MEDICINE

## 2022-11-25 PROCEDURE — 3079F DIAST BP 80-89 MM HG: CPT | Mod: CPTII,S$GLB,, | Performed by: NURSE PRACTITIONER

## 2022-11-25 PROCEDURE — 36415 COLL VENOUS BLD VENIPUNCTURE: CPT | Performed by: INTERNAL MEDICINE

## 2022-11-25 PROCEDURE — 3008F PR BODY MASS INDEX (BMI) DOCUMENTED: ICD-10-PCS | Mod: CPTII,S$GLB,, | Performed by: INTERNAL MEDICINE

## 2022-11-25 PROCEDURE — 99204 PR OFFICE/OUTPT VISIT, NEW, LEVL IV, 45-59 MIN: ICD-10-PCS | Mod: S$GLB,,, | Performed by: INTERNAL MEDICINE

## 2022-11-25 PROCEDURE — 99213 OFFICE O/P EST LOW 20 MIN: CPT | Mod: S$GLB,,, | Performed by: NURSE PRACTITIONER

## 2022-11-25 PROCEDURE — 3077F SYST BP >= 140 MM HG: CPT | Mod: CPTII,S$GLB,, | Performed by: INTERNAL MEDICINE

## 2022-11-25 PROCEDURE — 3061F PR NEG MICROALBUMINURIA RESULT DOCUMENTED/REVIEW: ICD-10-PCS | Mod: CPTII,S$GLB,, | Performed by: INTERNAL MEDICINE

## 2022-11-25 PROCEDURE — 3078F PR MOST RECENT DIASTOLIC BLOOD PRESSURE < 80 MM HG: ICD-10-PCS | Mod: CPTII,S$GLB,, | Performed by: INTERNAL MEDICINE

## 2022-11-25 PROCEDURE — 3051F PR MOST RECENT HEMOGLOBIN A1C LEVEL 7.0 - < 8.0%: ICD-10-PCS | Mod: CPTII,S$GLB,, | Performed by: NURSE PRACTITIONER

## 2022-11-25 PROCEDURE — 3008F BODY MASS INDEX DOCD: CPT | Mod: CPTII,S$GLB,, | Performed by: INTERNAL MEDICINE

## 2022-11-25 PROCEDURE — 3066F NEPHROPATHY DOC TX: CPT | Mod: CPTII,S$GLB,, | Performed by: INTERNAL MEDICINE

## 2022-11-25 PROCEDURE — 99999 PR PBB SHADOW E&M-EST. PATIENT-LVL V: ICD-10-PCS | Mod: PBBFAC,,, | Performed by: INTERNAL MEDICINE

## 2022-11-25 PROCEDURE — 4010F PR ACE/ARB THEARPY RXD/TAKEN: ICD-10-PCS | Mod: CPTII,S$GLB,, | Performed by: INTERNAL MEDICINE

## 2022-11-25 PROCEDURE — 1160F PR REVIEW ALL MEDS BY PRESCRIBER/CLIN PHARMACIST DOCUMENTED: ICD-10-PCS | Mod: CPTII,S$GLB,, | Performed by: NURSE PRACTITIONER

## 2022-11-25 PROCEDURE — 85025 COMPLETE CBC W/AUTO DIFF WBC: CPT | Performed by: INTERNAL MEDICINE

## 2022-11-25 PROCEDURE — 3077F PR MOST RECENT SYSTOLIC BLOOD PRESSURE >= 140 MM HG: ICD-10-PCS | Mod: CPTII,S$GLB,, | Performed by: INTERNAL MEDICINE

## 2022-11-25 PROCEDURE — 1159F MED LIST DOCD IN RCRD: CPT | Mod: CPTII,S$GLB,, | Performed by: NURSE PRACTITIONER

## 2022-11-25 PROCEDURE — 3066F PR DOCUMENTATION OF TREATMENT FOR NEPHROPATHY: ICD-10-PCS | Mod: CPTII,S$GLB,, | Performed by: INTERNAL MEDICINE

## 2022-11-25 PROCEDURE — 99999 PR PBB SHADOW E&M-EST. PATIENT-LVL V: ICD-10-PCS | Mod: PBBFAC,,, | Performed by: NURSE PRACTITIONER

## 2022-11-25 PROCEDURE — 3008F BODY MASS INDEX DOCD: CPT | Mod: CPTII,S$GLB,, | Performed by: NURSE PRACTITIONER

## 2022-11-25 PROCEDURE — 3061F NEG MICROALBUMINURIA REV: CPT | Mod: CPTII,S$GLB,, | Performed by: NURSE PRACTITIONER

## 2022-11-25 PROCEDURE — 3008F PR BODY MASS INDEX (BMI) DOCUMENTED: ICD-10-PCS | Mod: CPTII,S$GLB,, | Performed by: NURSE PRACTITIONER

## 2022-11-25 PROCEDURE — 3061F PR NEG MICROALBUMINURIA RESULT DOCUMENTED/REVIEW: ICD-10-PCS | Mod: CPTII,S$GLB,, | Performed by: NURSE PRACTITIONER

## 2022-11-25 PROCEDURE — 99999 PR PBB SHADOW E&M-EST. PATIENT-LVL V: CPT | Mod: PBBFAC,,, | Performed by: NURSE PRACTITIONER

## 2022-11-25 PROCEDURE — 1160F RVW MEDS BY RX/DR IN RCRD: CPT | Mod: CPTII,S$GLB,, | Performed by: NURSE PRACTITIONER

## 2022-11-25 PROCEDURE — 3061F NEG MICROALBUMINURIA REV: CPT | Mod: CPTII,S$GLB,, | Performed by: INTERNAL MEDICINE

## 2022-11-25 PROCEDURE — 3075F PR MOST RECENT SYSTOLIC BLOOD PRESS GE 130-139MM HG: ICD-10-PCS | Mod: CPTII,S$GLB,, | Performed by: NURSE PRACTITIONER

## 2022-11-25 PROCEDURE — 83615 LACTATE (LD) (LDH) ENZYME: CPT | Performed by: INTERNAL MEDICINE

## 2022-11-25 PROCEDURE — 80053 COMPREHEN METABOLIC PANEL: CPT | Performed by: INTERNAL MEDICINE

## 2022-11-25 PROCEDURE — 4010F ACE/ARB THERAPY RXD/TAKEN: CPT | Mod: CPTII,S$GLB,, | Performed by: INTERNAL MEDICINE

## 2022-11-25 PROCEDURE — 3066F NEPHROPATHY DOC TX: CPT | Mod: CPTII,S$GLB,, | Performed by: NURSE PRACTITIONER

## 2022-11-25 PROCEDURE — 3079F PR MOST RECENT DIASTOLIC BLOOD PRESSURE 80-89 MM HG: ICD-10-PCS | Mod: CPTII,S$GLB,, | Performed by: NURSE PRACTITIONER

## 2022-11-25 PROCEDURE — 4010F PR ACE/ARB THEARPY RXD/TAKEN: ICD-10-PCS | Mod: CPTII,S$GLB,, | Performed by: NURSE PRACTITIONER

## 2022-11-25 NOTE — PROGRESS NOTES
Subjective:       Patient ID: Shaneka Ahmadi is a 52 y.o. female.    Chief Complaint: Follow-up (CT results)    Patient presents for follow up after CT scan.  Continues to have any other SOB and productive cough.  No pain.      No history of smoking.   Reports  smokes.      Follow-up  Associated symptoms include coughing. Pertinent negatives include no abdominal pain, chills, fatigue or fever.   Review of Systems   Constitutional:  Negative for chills, fatigue and fever.   Respiratory:  Positive for cough and shortness of breath.    Gastrointestinal:  Negative for abdominal pain.   Psychiatric/Behavioral:  Negative for agitation and confusion.        Objective:      Physical Exam  Vitals reviewed.   Constitutional:       Appearance: Normal appearance.   HENT:      Right Ear: Tympanic membrane normal.      Left Ear: Tympanic membrane normal.      Nose: Nose normal.   Cardiovascular:      Rate and Rhythm: Normal rate and regular rhythm.   Pulmonary:      Effort: Pulmonary effort is normal.      Breath sounds: Normal breath sounds.   Skin:     General: Skin is warm.   Neurological:      General: No focal deficit present.      Mental Status: She is alert.   Psychiatric:         Mood and Affect: Mood normal. Affect is tearful.       Assessment:       Problem List Items Addressed This Visit       Multiple lung nodules    Relevant Orders    Ambulatory referral/consult to Oncology     Other Visit Diagnoses       Abnormal CT scan of lung    -  Primary    Will schedule oncology visit.     Relevant Orders    Ambulatory referral/consult to Oncology              Plan:           Abnormal CT scan of lung  Comments:  Will schedule oncology visit.   Orders:  -     Ambulatory referral/consult to Oncology; Future; Expected date: 12/02/2022    Multiple lung nodules  -     Ambulatory referral/consult to Oncology; Future; Expected date: 12/02/2022     Discussed CT report with patient.     Referred to oncology for further work  up and treatment.      Work excuse extended.

## 2022-11-25 NOTE — PROGRESS NOTES
Subjective:       Patient ID: Shaneka Ahmadi is a 52 y.o. female.    Chief Complaint: Results    HPI:  52-year-old nonsmoker but exposed to secondhand and 3rd hand tobacco from .  Patient is report cough since August of 2022 had COVID at that time patient had CT chest demonstrating marked abnormalities with mediastinal as well as left pulmonary mass.  Patient is referred to me for further diagnostic considerations ECOG status 1    Past Medical History:   Diagnosis Date    Diabetes mellitus     Hypertension      Family History   Problem Relation Age of Onset    Heart failure Father      Social History     Socioeconomic History    Marital status:    Tobacco Use    Smoking status: Never     Passive exposure: Never    Smokeless tobacco: Never   Substance and Sexual Activity    Alcohol use: Never    Drug use: Never    Sexual activity: Yes     Partners: Male     Birth control/protection: None     Comment: tubal     Past Surgical History:   Procedure Laterality Date    CATARACT EXTRACTION W/  INTRAOCULAR LENS IMPLANT Right 06/02/2022    TUBAL LIGATION      2007--postpartum tubal ligation       Labs:  Lab Results   Component Value Date    WBC 8.63 04/08/2022    HGB 12.7 04/08/2022    HCT 40.8 04/08/2022    MCV 83 04/08/2022     04/08/2022     BMP  Lab Results   Component Value Date     07/28/2022    K 3.8 07/28/2022     07/28/2022    CO2 28 07/28/2022    BUN 15 07/28/2022    CREATININE 0.8 11/18/2022    CALCIUM 9.6 07/28/2022    ANIONGAP 11 07/28/2022    ESTGFRAFRICA >60.0 07/28/2022    EGFRNONAA >60.0 07/28/2022     Lab Results   Component Value Date    ALT 19 04/08/2022    AST 12 04/08/2022    ALKPHOS 102 04/08/2022    BILITOT 0.4 04/08/2022       No results found for: IRON, TIBC, FERRITIN, SATURATEDIRO  No results found for: [id number]  No results found for: FOLATE  Lab Results   Component Value Date    TSH 1.742 04/08/2022         Review of Systems   Constitutional:   Negative for activity change, appetite change, chills, diaphoresis, fatigue, fever and unexpected weight change.   HENT:  Negative for congestion, dental problem, drooling, ear discharge, ear pain, facial swelling, hearing loss, mouth sores, nosebleeds, postnasal drip, rhinorrhea, sinus pressure, sneezing, sore throat, tinnitus, trouble swallowing and voice change.    Eyes:  Negative for photophobia, pain, discharge, redness, itching and visual disturbance.   Respiratory:  Positive for cough. Negative for choking, chest tightness, shortness of breath, wheezing and stridor.    Cardiovascular:  Negative for chest pain, palpitations and leg swelling.   Gastrointestinal:  Negative for abdominal distention, abdominal pain, anal bleeding, blood in stool, constipation, diarrhea, nausea, rectal pain and vomiting.   Endocrine: Negative for cold intolerance, heat intolerance, polydipsia, polyphagia and polyuria.   Genitourinary:  Negative for decreased urine volume, difficulty urinating, dyspareunia, dysuria, enuresis, flank pain, frequency, genital sores, hematuria, menstrual problem, pelvic pain, urgency, vaginal bleeding, vaginal discharge and vaginal pain.   Musculoskeletal:  Negative for arthralgias, back pain, gait problem, joint swelling, myalgias, neck pain and neck stiffness.   Skin:  Negative for color change, pallor and rash.   Allergic/Immunologic: Negative for environmental allergies, food allergies and immunocompromised state.   Neurological:  Negative for dizziness, tremors, seizures, syncope, facial asymmetry, speech difficulty, weakness, light-headedness, numbness and headaches.   Hematological:  Negative for adenopathy. Does not bruise/bleed easily.   Psychiatric/Behavioral:  Positive for dysphoric mood. Negative for agitation, behavioral problems, confusion, decreased concentration, hallucinations, self-injury, sleep disturbance and suicidal ideas. The patient is nervous/anxious. The patient is not  hyperactive.      Objective:      Physical Exam  Vitals reviewed.   Constitutional:       General: She is not in acute distress.     Appearance: She is well-developed. She is obese. She is not diaphoretic.   HENT:      Head: Normocephalic and atraumatic.      Right Ear: External ear normal.      Left Ear: External ear normal.      Nose: Nose normal.      Right Sinus: No maxillary sinus tenderness or frontal sinus tenderness.      Left Sinus: No maxillary sinus tenderness or frontal sinus tenderness.      Mouth/Throat:      Pharynx: No oropharyngeal exudate.   Eyes:      General: Lids are normal. No scleral icterus.        Right eye: No discharge.         Left eye: No discharge.      Conjunctiva/sclera: Conjunctivae normal.      Right eye: Right conjunctiva is not injected. No hemorrhage.     Left eye: Left conjunctiva is not injected. No hemorrhage.     Pupils: Pupils are equal, round, and reactive to light.   Neck:      Thyroid: No thyromegaly.      Vascular: No JVD.      Trachea: No tracheal deviation.   Cardiovascular:      Rate and Rhythm: Normal rate and regular rhythm.      Heart sounds: Normal heart sounds.   Pulmonary:      Effort: Pulmonary effort is normal. No respiratory distress.      Breath sounds: Normal breath sounds. No stridor.   Chest:      Chest wall: No tenderness.   Abdominal:      General: Bowel sounds are normal. There is no distension.      Palpations: Abdomen is soft. There is no hepatomegaly, splenomegaly or mass.      Tenderness: There is no abdominal tenderness. There is no rebound.   Musculoskeletal:         General: No tenderness. Normal range of motion.      Cervical back: Normal range of motion and neck supple.   Lymphadenopathy:      Cervical: No cervical adenopathy.      Upper Body:      Right upper body: No supraclavicular adenopathy.      Left upper body: No supraclavicular adenopathy.   Skin:     General: Skin is dry.      Findings: No erythema or rash.   Neurological:       Mental Status: She is alert and oriented to person, place, and time.      Cranial Nerves: No cranial nerve deficit.      Coordination: Coordination normal.   Psychiatric:         Behavior: Behavior normal.         Thought Content: Thought content normal.         Judgment: Judgment normal.           Assessment:      1. Abnormal CT scan of lung    2. Multiple lung nodules    3. Mass of left lung    4. Morbid obesity           Plan:     Reviewed imaging personally with patient.  At this time will proceed with PET scan baseline laboratory studies in CT-directed biopsy of lung and or pulmonary consultation will see patient back after PET scan completed for video visit will proceed with biopsy scheduling as well as Pulmonary consultation to see which is the most approach to a information malignancy.  I have told the patient I do not know she has cancer but I am suspicious this.  Nurse navigation will be notify to help coordinate.  I will see back after      Med Onc Chart Routing      Follow up with physician 1 week.   Follow up with DOLLY    Infusion scheduling note    Injection scheduling note    Labs CBC, CMP and LDH   Lab interval:     Imaging PET scan   See back with video visit after PET scan completed   Pharmacy appointment    Other referrals Additional referrals needed          Reese Mcfarlane Jr, MD FACP

## 2022-11-25 NOTE — LETTER
November 25, 2022    Shaneka hAmadi  [address] [place] [id number]             [facility]  Internal Medicine  [address] [place] [address]  [place] [id number]  Phone: [phone]  Fax: [phone]   November 25, 2022     Patient: Shaneka Ahmadi   YOB: 1970   Date of Visit: 11/25/2022       To Whom it May Concern:    Shaneka Ahmadi was seen in my clinic on 11/25/2022. She may return to work on 12/05/2022.  Please excused for time missed during this time.      Can return sooner if symptoms improve.     If you have any questions or concerns, please don't hesitate to call.    Sincerely,         Corina Wang NP

## 2022-11-28 ENCOUNTER — PATIENT MESSAGE (OUTPATIENT)
Dept: INTERNAL MEDICINE | Facility: CLINIC | Age: 52
End: 2022-11-28
Payer: COMMERCIAL

## 2022-11-28 ENCOUNTER — TELEPHONE (OUTPATIENT)
Dept: RADIOLOGY | Facility: HOSPITAL | Age: 52
End: 2022-11-28
Payer: COMMERCIAL

## 2022-11-28 NOTE — TELEPHONE ENCOUNTER
Interventional Radiology:    Spoke to pt and got her scheduled to 12/06 @ 8:30am for lung biopsy. Informed pt to be NPO after midnight, show up to [facility] on [address] at 7:30am, either have a ride with them or have a phone number for the person driving them home so that we can get in contact with them to keep them updated. Answered all questions that the pt had and pt verbalized understanding of all discussed.

## 2022-11-29 ENCOUNTER — PATIENT MESSAGE (OUTPATIENT)
Dept: INTERNAL MEDICINE | Facility: CLINIC | Age: 52
End: 2022-11-29
Payer: COMMERCIAL

## 2022-12-02 ENCOUNTER — PATIENT MESSAGE (OUTPATIENT)
Dept: HEMATOLOGY/ONCOLOGY | Facility: CLINIC | Age: 52
End: 2022-12-02
Payer: COMMERCIAL

## 2022-12-02 ENCOUNTER — HOSPITAL ENCOUNTER (OUTPATIENT)
Dept: RADIOLOGY | Facility: HOSPITAL | Age: 52
Discharge: HOME | End: 2022-12-02
Attending: INTERNAL MEDICINE
Payer: COMMERCIAL

## 2022-12-02 DIAGNOSIS — R91.8 MASS OF LEFT LUNG: ICD-10-CM

## 2022-12-02 PROCEDURE — 78815 PET IMAGE W/CT SKULL-THIGH: CPT | Mod: 26,PS,, | Performed by: RADIOLOGY

## 2022-12-02 PROCEDURE — 78815 NM PET CT ROUTINE: ICD-10-PCS | Mod: 26,PS,, | Performed by: RADIOLOGY

## 2022-12-02 PROCEDURE — 78815 PET IMAGE W/CT SKULL-THIGH: CPT | Mod: TC

## 2022-12-02 NOTE — TELEPHONE ENCOUNTER
Unfortunately the scan does show that the cancer appears to have spread we will be working very quickly to try to make a diagnosis as soon as possible my navigation team is available and will coordinate will review at next visit

## 2022-12-03 LAB — NONINV COLON CA DNA+OCC BLD SCRN STL QL: NEGATIVE

## 2022-12-05 ENCOUNTER — OFFICE VISIT (OUTPATIENT)
Dept: HEMATOLOGY/ONCOLOGY | Facility: CLINIC | Age: 52
End: 2022-12-05
Payer: COMMERCIAL

## 2022-12-05 ENCOUNTER — TELEPHONE (OUTPATIENT)
Dept: HEMATOLOGY/ONCOLOGY | Facility: CLINIC | Age: 52
End: 2022-12-05
Payer: COMMERCIAL

## 2022-12-05 ENCOUNTER — TELEPHONE (OUTPATIENT)
Dept: RADIOLOGY | Facility: HOSPITAL | Age: 52
End: 2022-12-05
Payer: COMMERCIAL

## 2022-12-05 ENCOUNTER — OFFICE VISIT (OUTPATIENT)
Dept: PULMONOLOGY | Facility: CLINIC | Age: 52
End: 2022-12-05
Payer: COMMERCIAL

## 2022-12-05 VITALS
DIASTOLIC BLOOD PRESSURE: 80 MMHG | SYSTOLIC BLOOD PRESSURE: 120 MMHG | HEART RATE: 75 BPM | WEIGHT: 293 LBS | BODY MASS INDEX: 50.02 KG/M2 | HEIGHT: 64 IN | RESPIRATION RATE: 18 BRPM | OXYGEN SATURATION: 97 %

## 2022-12-05 DIAGNOSIS — D68.9 COAGULATION DEFECT, UNSPECIFIED: Primary | ICD-10-CM

## 2022-12-05 DIAGNOSIS — C79.51 SECONDARY MALIGNANT NEOPLASM OF BONE: ICD-10-CM

## 2022-12-05 DIAGNOSIS — I10 ESSENTIAL HYPERTENSION: ICD-10-CM

## 2022-12-05 DIAGNOSIS — E11.9 TYPE 2 DIABETES MELLITUS WITHOUT COMPLICATION, WITHOUT LONG-TERM CURRENT USE OF INSULIN: ICD-10-CM

## 2022-12-05 DIAGNOSIS — R91.8 MULTIPLE LUNG NODULES: ICD-10-CM

## 2022-12-05 DIAGNOSIS — R91.8 MASS OF LEFT LUNG: Primary | ICD-10-CM

## 2022-12-05 DIAGNOSIS — R91.8 MASS OF LEFT LUNG: ICD-10-CM

## 2022-12-05 DIAGNOSIS — E66.01 MORBID OBESITY: ICD-10-CM

## 2022-12-05 PROCEDURE — 4010F ACE/ARB THERAPY RXD/TAKEN: CPT | Mod: CPTII,S$GLB,, | Performed by: INTERNAL MEDICINE

## 2022-12-05 PROCEDURE — 99999 PR PBB SHADOW E&M-EST. PATIENT-LVL IV: ICD-10-PCS | Mod: PBBFAC,,,

## 2022-12-05 PROCEDURE — 1160F PR REVIEW ALL MEDS BY PRESCRIBER/CLIN PHARMACIST DOCUMENTED: ICD-10-PCS | Mod: CPTII,95,, | Performed by: INTERNAL MEDICINE

## 2022-12-05 PROCEDURE — 3051F HG A1C>EQUAL 7.0%<8.0%: CPT | Mod: CPTII,95,, | Performed by: INTERNAL MEDICINE

## 2022-12-05 PROCEDURE — 3079F DIAST BP 80-89 MM HG: CPT | Mod: CPTII,S$GLB,, | Performed by: INTERNAL MEDICINE

## 2022-12-05 PROCEDURE — 3061F PR NEG MICROALBUMINURIA RESULT DOCUMENTED/REVIEW: ICD-10-PCS | Mod: CPTII,95,, | Performed by: INTERNAL MEDICINE

## 2022-12-05 PROCEDURE — 3061F NEG MICROALBUMINURIA REV: CPT | Mod: CPTII,S$GLB,, | Performed by: INTERNAL MEDICINE

## 2022-12-05 PROCEDURE — 3066F PR DOCUMENTATION OF TREATMENT FOR NEPHROPATHY: ICD-10-PCS | Mod: CPTII,95,, | Performed by: INTERNAL MEDICINE

## 2022-12-05 PROCEDURE — 1160F RVW MEDS BY RX/DR IN RCRD: CPT | Mod: CPTII,95,, | Performed by: INTERNAL MEDICINE

## 2022-12-05 PROCEDURE — 3051F PR MOST RECENT HEMOGLOBIN A1C LEVEL 7.0 - < 8.0%: ICD-10-PCS | Mod: CPTII,S$GLB,, | Performed by: INTERNAL MEDICINE

## 2022-12-05 PROCEDURE — 3079F PR MOST RECENT DIASTOLIC BLOOD PRESSURE 80-89 MM HG: ICD-10-PCS | Mod: CPTII,S$GLB,, | Performed by: INTERNAL MEDICINE

## 2022-12-05 PROCEDURE — 1159F MED LIST DOCD IN RCRD: CPT | Mod: CPTII,95,, | Performed by: INTERNAL MEDICINE

## 2022-12-05 PROCEDURE — 1159F MED LIST DOCD IN RCRD: CPT | Mod: CPTII,S$GLB,, | Performed by: INTERNAL MEDICINE

## 2022-12-05 PROCEDURE — 3008F BODY MASS INDEX DOCD: CPT | Mod: CPTII,S$GLB,, | Performed by: INTERNAL MEDICINE

## 2022-12-05 PROCEDURE — 99204 OFFICE O/P NEW MOD 45 MIN: CPT | Mod: S$GLB,,, | Performed by: INTERNAL MEDICINE

## 2022-12-05 PROCEDURE — 4010F PR ACE/ARB THEARPY RXD/TAKEN: ICD-10-PCS | Mod: CPTII,S$GLB,, | Performed by: INTERNAL MEDICINE

## 2022-12-05 PROCEDURE — 4010F PR ACE/ARB THEARPY RXD/TAKEN: ICD-10-PCS | Mod: CPTII,95,, | Performed by: INTERNAL MEDICINE

## 2022-12-05 PROCEDURE — 99204 PR OFFICE/OUTPT VISIT, NEW, LEVL IV, 45-59 MIN: ICD-10-PCS | Mod: S$GLB,,, | Performed by: INTERNAL MEDICINE

## 2022-12-05 PROCEDURE — 3051F PR MOST RECENT HEMOGLOBIN A1C LEVEL 7.0 - < 8.0%: ICD-10-PCS | Mod: CPTII,95,, | Performed by: INTERNAL MEDICINE

## 2022-12-05 PROCEDURE — 3061F PR NEG MICROALBUMINURIA RESULT DOCUMENTED/REVIEW: ICD-10-PCS | Mod: CPTII,S$GLB,, | Performed by: INTERNAL MEDICINE

## 2022-12-05 PROCEDURE — 99214 OFFICE O/P EST MOD 30 MIN: CPT | Mod: 95,,, | Performed by: INTERNAL MEDICINE

## 2022-12-05 PROCEDURE — 1159F PR MEDICATION LIST DOCUMENTED IN MEDICAL RECORD: ICD-10-PCS | Mod: CPTII,95,, | Performed by: INTERNAL MEDICINE

## 2022-12-05 PROCEDURE — 1160F PR REVIEW ALL MEDS BY PRESCRIBER/CLIN PHARMACIST DOCUMENTED: ICD-10-PCS | Mod: CPTII,S$GLB,, | Performed by: INTERNAL MEDICINE

## 2022-12-05 PROCEDURE — 3074F SYST BP LT 130 MM HG: CPT | Mod: CPTII,S$GLB,, | Performed by: INTERNAL MEDICINE

## 2022-12-05 PROCEDURE — 4010F ACE/ARB THERAPY RXD/TAKEN: CPT | Mod: CPTII,95,, | Performed by: INTERNAL MEDICINE

## 2022-12-05 PROCEDURE — 3074F PR MOST RECENT SYSTOLIC BLOOD PRESSURE < 130 MM HG: ICD-10-PCS | Mod: CPTII,S$GLB,, | Performed by: INTERNAL MEDICINE

## 2022-12-05 PROCEDURE — 99999 PR PBB SHADOW E&M-EST. PATIENT-LVL IV: CPT | Mod: PBBFAC,,,

## 2022-12-05 PROCEDURE — 1159F PR MEDICATION LIST DOCUMENTED IN MEDICAL RECORD: ICD-10-PCS | Mod: CPTII,S$GLB,, | Performed by: INTERNAL MEDICINE

## 2022-12-05 PROCEDURE — 3066F NEPHROPATHY DOC TX: CPT | Mod: CPTII,S$GLB,, | Performed by: INTERNAL MEDICINE

## 2022-12-05 PROCEDURE — 3061F NEG MICROALBUMINURIA REV: CPT | Mod: CPTII,95,, | Performed by: INTERNAL MEDICINE

## 2022-12-05 PROCEDURE — 3066F NEPHROPATHY DOC TX: CPT | Mod: CPTII,95,, | Performed by: INTERNAL MEDICINE

## 2022-12-05 PROCEDURE — 3008F PR BODY MASS INDEX (BMI) DOCUMENTED: ICD-10-PCS | Mod: CPTII,S$GLB,, | Performed by: INTERNAL MEDICINE

## 2022-12-05 PROCEDURE — 3066F PR DOCUMENTATION OF TREATMENT FOR NEPHROPATHY: ICD-10-PCS | Mod: CPTII,S$GLB,, | Performed by: INTERNAL MEDICINE

## 2022-12-05 PROCEDURE — 99214 PR OFFICE/OUTPT VISIT, EST, LEVL IV, 30-39 MIN: ICD-10-PCS | Mod: 95,,, | Performed by: INTERNAL MEDICINE

## 2022-12-05 PROCEDURE — 1160F RVW MEDS BY RX/DR IN RCRD: CPT | Mod: CPTII,S$GLB,, | Performed by: INTERNAL MEDICINE

## 2022-12-05 PROCEDURE — 3051F HG A1C>EQUAL 7.0%<8.0%: CPT | Mod: CPTII,S$GLB,, | Performed by: INTERNAL MEDICINE

## 2022-12-05 NOTE — PROGRESS NOTES
Subjective:       Patient ID: Shaneka Ahmadi is a 52 y.o. female.    Chief Complaint: Pulmonary Nodules    Pulmonary Nodules    Patient is a 52-year-old female with a history of hypertension, diabetes and hyperlipidemia who was evaluated recently by her primary care nurse practitioner on November 11th for a persistent cough that had been present for about 3 months.  A chest radiograph was obtained which was abnormal showing left upper lobe lung mass.  This was further characterized with a CT of the chest that showed left upper lobe lung mass as well as left hilar and mediastinal adenopathy and a small left pleural effusion.  Further evaluation with PET-CT showed significant avid uptake of the left upper lobe mass, ipsilateral hilar and mediastinal lymph nodes as well as numerous some areas suspicious for bony metastases in the sternum, ribs, spine and pelvis.  She was evaluated by Medical Oncology Dr. Mcfarlane and a CT-guided transthoracic needle biopsy was ordered and is planned for tomorrow morning at 8:30 a.m..  A Pulmonary consultation was placed at some point and she is here today for that evaluation.  She is a nonsmoker but has extensive secondhand smoke exposure from childhood from her parents and her .  She does not have his any history of other malignant process such as breast carcinoma.  She states that she has been up-to-date on her age-appropriate cancer screening such as Pap smears and mammograms.  Her main complaint is of a persistent cough.  She has tried albuterol and other antitussives without improvement.  She denies any chest pain, wheezing, hemoptysis or bony pain.    Past Medical History:   Diagnosis Date    Diabetes mellitus     Hypertension      Past Surgical History:   Procedure Laterality Date    CATARACT EXTRACTION W/  INTRAOCULAR LENS IMPLANT Right 06/02/2022    TUBAL LIGATION      2007--postpartum tubal ligation     Social History     Tobacco Use    Smoking status: Never      Passive exposure: Never    Smokeless tobacco: Never   Substance Use Topics    Alcohol use: Never    Drug use: Never     Family History   Problem Relation Age of Onset    Heart failure Father      Current Outpatient Medications   Medication Instructions    albuterol (PROVENTIL/VENTOLIN HFA) 90 mcg/actuation inhaler 1-2 puffs, Inhalation, Every 6 hours PRN    amLODIPine (NORVASC) 10 mg, Oral, Daily    atenoloL (TENORMIN) 50 MG tablet Take 1 tab by mouth twice a day    betamethasone valerate 0.1% (VALISONE) 0.1 % Oint APPLY TOPICALLY TO THE TOP OF THE FEET TWO TIMES A DAY    blood sugar diagnostic Strp To check BG 2 times daily, to use with insurance preferred meter    blood-glucose meter kit To check BG 2 times daily, to use with insurance preferred meter    cetirizine (ZYRTEC) 10 mg, Oral, Nightly    fluticasone propionate (FLONASE) 100 mcg, Each Nostril, Daily    lancets Misc To check BG 2 times daily, to use with insurance preferred meter    levoFLOXacin (LEVAQUIN) 750 mg, Oral, Daily    losartan-hydrochlorothiazide 100-25 mg (HYZAAR) 100-25 mg per tablet 1 tablet, Oral, Daily    metFORMIN (GLUCOPHAGE-XR) 500 MG ER 24hr tablet Take 1 tab with breakfast and take 2 tabs with dinner    pravastatin (PRAVACHOL) 10 mg, Oral, Nightly    TRUE METRIX GLUCOSE METER Misc No dose, route, or frequency recorded.    TRUEPLUS LANCETS 33 gauge Misc Topical (Top)      Review of Systems  as per history of present illness otherwise negative    Objective:      Physical Exam   Constitutional: She is oriented to person, place, and time.   Obese female, no distress   HENT:   Head: Normocephalic.   Mouth/Throat: Mallampati Score: III.   Neck: No JVD present.   Cardiovascular: Normal rate and regular rhythm.   No murmur heard.  Pulmonary/Chest: Normal expansion. She has no wheezes.   Abdominal: Soft. She exhibits no distension. There is no hepatosplenomegaly.   Musculoskeletal:         General: No edema.      Cervical back: Neck supple.    Neurological: She is alert and oriented to person, place, and time.   Psychiatric: She has a normal mood and affect.   Nursing note and vitals reviewed.        Assessment:       1. Mass of left lung            I personally reviewed PET-CT images and agree with the radiologist's interpretation as below    Impression:     1. FDG avid mass within the left upper lobe with associated mass/adenopathy in the AP window extending into the suprahilar region and single FDG avid lymph node in the prevascular region of the mediastinum.  Extensive osseous metastatic disease.    Plan:       This obviously is worrisome for metastatic malignant process.  Primary lung versus other primary tumor source.  CT-guided biopsy has been ordered and is planned for tomorrow.  Should this be nondiagnostic, bronchoscopic biopsy would be appropriate with endobronchial ultrasound needle biopsy.  Could also consider bone biopsy of 1 of the metastatic sites for diagnosis and to establish stage IV disease.  We will be available if needed for bronchoscopy, otherwise defer management to Medical Oncology based on pathologic results.  I will set up three-month follow-up just to keep track of her.

## 2022-12-05 NOTE — TELEPHONE ENCOUNTER
Called patient and confirmed radiology procedure appointment for 12/6/22 at 08:30. Instructed pt to be NPO after midnight tonight, take BP meds in morning with a few sips of water only, arrive to [facility] on [address] at 07:30, and have a ride home post procedure. Pt verbalized understanding and had all questions answered. Pt confirmed no blood thinners are being taken. Pt stated she took 400 mg of ibuprofen Saturday. Ok'd with Blanca THORPE.

## 2022-12-05 NOTE — H&P (VIEW-ONLY)
Subjective:       Patient ID: Shaneka Ahmadi is a 52 y.o. female.    Chief Complaint: Results, Pain, and Lung Cancer    HPI:  52-year-old female history of mass involving left lung with mediastinal involvement PET scan demonstrates findings suggestive of metastatic disease.  Patient returns for review by video visit    Past Medical History:   Diagnosis Date    Diabetes mellitus     Hypertension      Family History   Problem Relation Age of Onset    Heart failure Father      Social History     Socioeconomic History    Marital status:    Tobacco Use    Smoking status: Never     Passive exposure: Never    Smokeless tobacco: Never   Substance and Sexual Activity    Alcohol use: Never    Drug use: Never    Sexual activity: Yes     Partners: Male     Birth control/protection: None     Comment: tubal     Past Surgical History:   Procedure Laterality Date    CATARACT EXTRACTION W/  INTRAOCULAR LENS IMPLANT Right 06/02/2022    TUBAL LIGATION      2007--postpartum tubal ligation       Labs:  Lab Results   Component Value Date    WBC 12.26 11/25/2022    HGB 13.7 11/25/2022    HCT 41.4 11/25/2022    MCV 81 (L) 11/25/2022     11/25/2022     BMP  Lab Results   Component Value Date     11/25/2022    K 3.6 11/25/2022     11/25/2022    CO2 27 11/25/2022    BUN 12 11/25/2022    CREATININE 0.8 11/25/2022    CALCIUM 9.9 11/25/2022    ANIONGAP 15 11/25/2022    ESTGFRAFRICA >60.0 07/28/2022    EGFRNONAA >60.0 07/28/2022     Lab Results   Component Value Date    ALT 28 11/25/2022    AST 18 11/25/2022    ALKPHOS 142 (H) 11/25/2022    BILITOT 0.4 11/25/2022       No results found for: IRON, TIBC, FERRITIN, SATURATEDIRO  No results found for: [id number]  No results found for: FOLATE  Lab Results   Component Value Date    TSH 1.742 04/08/2022         Review of Systems   Constitutional:  Positive for activity change, appetite change and fatigue.   Neurological:  Positive for weakness.   Psychiatric/Behavioral:   Positive for dysphoric mood. The patient is nervous/anxious.      Objective:      Physical Exam  Constitutional:       Appearance: Normal appearance. She is obese.   Psychiatric:         Mood and Affect: Mood is anxious and depressed.           Assessment:      1. Mass of left lung    2. Secondary malignant neoplasm of bone    3. Essential hypertension    4. Multiple lung nodules    5. Type 2 diabetes mellitus without complication, without long-term current use of insulin    6. Morbid obesity           Plan:   The patient location is:  Home  The chief complaint leading to consultation is:  Lung mass    Visit type: audiovisual    Face to Face time with patient: 25 minutes of total time spent on the encounter, which includes face to face time and non-face to face time preparing to see the patient (eg, review of tests), Obtaining and/or reviewing separately obtained history, Documenting clinical information in the electronic or other health record, Independently interpreting results (not separately reported) and communicating results to the patient/family/caregiver, or Care coordination (not separately reported).         Each patient to whom he or she provides medical services by telemedicine is:  (1) informed of the relationship between the physician and patient and the respective role of any other health care provider with respect to management of the patient; and (2) notified that he or she may decline to receive medical services by telemedicine and may withdraw from such care at any time.    Notes:    Extensive conversation reviewed information reviewed images with patient by phone.  In video visit.  At this time highly suspicious of lung carcinoma with metastatic disease to bone with very little smoking history but extensive secondhand tobacco exposure.  Await results of pathology will ask navigation team to see if we could place a rush once biopsy is done since patient is so anxious and has widespread disease to  discuss treatment options and again evaluation        Reese Mcfarlane Jr, MD FACP

## 2022-12-05 NOTE — PROGRESS NOTES
Subjective:       Patient ID: Shaneka Ahmadi is a 52 y.o. female.    Chief Complaint: Results, Pain, and Lung Cancer    HPI:  52-year-old female history of mass involving left lung with mediastinal involvement PET scan demonstrates findings suggestive of metastatic disease.  Patient returns for review by video visit    Past Medical History:   Diagnosis Date    Diabetes mellitus     Hypertension      Family History   Problem Relation Age of Onset    Heart failure Father      Social History     Socioeconomic History    Marital status:    Tobacco Use    Smoking status: Never     Passive exposure: Never    Smokeless tobacco: Never   Substance and Sexual Activity    Alcohol use: Never    Drug use: Never    Sexual activity: Yes     Partners: Male     Birth control/protection: None     Comment: tubal     Past Surgical History:   Procedure Laterality Date    CATARACT EXTRACTION W/  INTRAOCULAR LENS IMPLANT Right 06/02/2022    TUBAL LIGATION      2007--postpartum tubal ligation       Labs:  Lab Results   Component Value Date    WBC 12.26 11/25/2022    HGB 13.7 11/25/2022    HCT 41.4 11/25/2022    MCV 81 (L) 11/25/2022     11/25/2022     BMP  Lab Results   Component Value Date     11/25/2022    K 3.6 11/25/2022     11/25/2022    CO2 27 11/25/2022    BUN 12 11/25/2022    CREATININE 0.8 11/25/2022    CALCIUM 9.9 11/25/2022    ANIONGAP 15 11/25/2022    ESTGFRAFRICA >60.0 07/28/2022    EGFRNONAA >60.0 07/28/2022     Lab Results   Component Value Date    ALT 28 11/25/2022    AST 18 11/25/2022    ALKPHOS 142 (H) 11/25/2022    BILITOT 0.4 11/25/2022       No results found for: IRON, TIBC, FERRITIN, SATURATEDIRO  No results found for: [id number]  No results found for: FOLATE  Lab Results   Component Value Date    TSH 1.742 04/08/2022         Review of Systems   Constitutional:  Positive for activity change, appetite change and fatigue.   Neurological:  Positive for weakness.   Psychiatric/Behavioral:   Positive for dysphoric mood. The patient is nervous/anxious.      Objective:      Physical Exam  Constitutional:       Appearance: Normal appearance. She is obese.   Psychiatric:         Mood and Affect: Mood is anxious and depressed.           Assessment:      1. Mass of left lung    2. Secondary malignant neoplasm of bone    3. Essential hypertension    4. Multiple lung nodules    5. Type 2 diabetes mellitus without complication, without long-term current use of insulin    6. Morbid obesity           Plan:   The patient location is:  Home  The chief complaint leading to consultation is:  Lung mass    Visit type: audiovisual    Face to Face time with patient: 25 minutes of total time spent on the encounter, which includes face to face time and non-face to face time preparing to see the patient (eg, review of tests), Obtaining and/or reviewing separately obtained history, Documenting clinical information in the electronic or other health record, Independently interpreting results (not separately reported) and communicating results to the patient/family/caregiver, or Care coordination (not separately reported).         Each patient to whom he or she provides medical services by telemedicine is:  (1) informed of the relationship between the physician and patient and the respective role of any other health care provider with respect to management of the patient; and (2) notified that he or she may decline to receive medical services by telemedicine and may withdraw from such care at any time.    Notes:    Extensive conversation reviewed information reviewed images with patient by phone.  In video visit.  At this time highly suspicious of lung carcinoma with metastatic disease to bone with very little smoking history but extensive secondhand tobacco exposure.  Await results of pathology will ask navigation team to see if we could place a rush once biopsy is done since patient is so anxious and has widespread disease to  discuss treatment options and again evaluation        Reese Mcfarlane Jr, MD FACP

## 2022-12-06 ENCOUNTER — HOSPITAL ENCOUNTER (OUTPATIENT)
Dept: RADIOLOGY | Facility: HOSPITAL | Age: 52
Discharge: HOME | End: 2022-12-06
Attending: INTERNAL MEDICINE
Payer: COMMERCIAL

## 2022-12-06 ENCOUNTER — HOSPITAL ENCOUNTER (OUTPATIENT)
Dept: RADIOLOGY | Facility: HOSPITAL | Age: 52
Discharge: HOME | End: 2022-12-06
Attending: PHYSICIAN ASSISTANT
Payer: COMMERCIAL

## 2022-12-06 VITALS
HEART RATE: 56 BPM | HEIGHT: 64 IN | DIASTOLIC BLOOD PRESSURE: 67 MMHG | BODY MASS INDEX: 50.02 KG/M2 | WEIGHT: 293 LBS | OXYGEN SATURATION: 97 % | SYSTOLIC BLOOD PRESSURE: 117 MMHG | RESPIRATION RATE: 18 BRPM

## 2022-12-06 DIAGNOSIS — R91.8 MASS OF LEFT LUNG: ICD-10-CM

## 2022-12-06 PROCEDURE — 27200939 CT BIOPSY LUNG W/ GUIDANCE

## 2022-12-06 PROCEDURE — 63600175 PHARM REV CODE 636 W HCPCS: Performed by: PHYSICIAN ASSISTANT

## 2022-12-06 PROCEDURE — 88305 TISSUE EXAM BY PATHOLOGIST: CPT | Performed by: PATHOLOGY

## 2022-12-06 PROCEDURE — 88341 PR IHC OR ICC EACH ADD'L SINGLE ANTIBODY  STAINPR: ICD-10-PCS | Mod: 26,,, | Performed by: PATHOLOGY

## 2022-12-06 PROCEDURE — 88360 TUMOR IMMUNOHISTOCHEM/MANUAL: CPT | Performed by: PATHOLOGY

## 2022-12-06 PROCEDURE — 88305 TISSUE EXAM BY PATHOLOGIST: CPT | Mod: 26,,, | Performed by: PATHOLOGY

## 2022-12-06 PROCEDURE — 88112 CYTOPATH CELL ENHANCE TECH: CPT | Performed by: PATHOLOGY

## 2022-12-06 PROCEDURE — 71045 X-RAY EXAM CHEST 1 VIEW: CPT | Mod: TC

## 2022-12-06 PROCEDURE — 88112 PR  CYTOPATH, CELL ENHANCE TECH: ICD-10-PCS | Mod: 26,,, | Performed by: PATHOLOGY

## 2022-12-06 PROCEDURE — 88341 IMHCHEM/IMCYTCHM EA ADD ANTB: CPT | Performed by: PATHOLOGY

## 2022-12-06 PROCEDURE — 88342 CHG IMMUNOCYTOCHEMISTRY: ICD-10-PCS | Mod: 26,,, | Performed by: PATHOLOGY

## 2022-12-06 PROCEDURE — 88305 TISSUE EXAM BY PATHOLOGIST: ICD-10-PCS | Mod: 26,,, | Performed by: PATHOLOGY

## 2022-12-06 PROCEDURE — 81445 SO NEO GSAP 5-50DNA/DNA&RNA: CPT | Performed by: PATHOLOGY

## 2022-12-06 PROCEDURE — 88342 IMHCHEM/IMCYTCHM 1ST ANTB: CPT | Mod: 26,,, | Performed by: PATHOLOGY

## 2022-12-06 PROCEDURE — 88342 IMHCHEM/IMCYTCHM 1ST ANTB: CPT | Performed by: PATHOLOGY

## 2022-12-06 PROCEDURE — 88341 IMHCHEM/IMCYTCHM EA ADD ANTB: CPT | Mod: 26,,, | Performed by: PATHOLOGY

## 2022-12-06 PROCEDURE — 88112 CYTOPATH CELL ENHANCE TECH: CPT | Mod: 26,,, | Performed by: PATHOLOGY

## 2022-12-06 PROCEDURE — A4550 SURGICAL TRAYS: HCPCS

## 2022-12-06 PROCEDURE — 88381 MICRODISSECTION MANUAL: CPT | Performed by: PATHOLOGY

## 2022-12-06 RX ORDER — FENTANYL CITRATE 50 UG/ML
INJECTION, SOLUTION INTRAMUSCULAR; INTRAVENOUS CODE/TRAUMA/SEDATION MEDICATION
Status: COMPLETED | OUTPATIENT
Start: 2022-12-06 | End: 2022-12-06

## 2022-12-06 RX ORDER — MIDAZOLAM HYDROCHLORIDE 1 MG/ML
INJECTION INTRAMUSCULAR; INTRAVENOUS CODE/TRAUMA/SEDATION MEDICATION
Status: COMPLETED | OUTPATIENT
Start: 2022-12-06 | End: 2022-12-06

## 2022-12-06 RX ADMIN — FENTANYL CITRATE 25 MCG: 50 INJECTION, SOLUTION INTRAMUSCULAR; INTRAVENOUS at 08:12

## 2022-12-06 RX ADMIN — MIDAZOLAM HYDROCHLORIDE 1 MG: 1 INJECTION INTRAMUSCULAR; INTRAVENOUS at 08:12

## 2022-12-06 NOTE — PLAN OF CARE
Pt ambulated from waiting room to pre-procedure area independently. Pt is Aox4. Pt denies pain and is resting comfortably.

## 2022-12-06 NOTE — DISCHARGE INSTRUCTIONS
Please return to ER if any of these symptoms occur:  Fever over 101 degrees,  Bleeding from the puncture site not controlled,  Pain not controlled with Aleve or Tylenol,    No driving for 24 hours after procedure due to sedation given during procedure.     Do not submerge in standing water for 2 days after biopsy but you may shower.    Resume home medications and diet    Biopsy results will be with Dr. Mcfarlane in 5-7 days, please follow up with him for results and any other questions or concerns that you may have.

## 2022-12-06 NOTE — INTERVAL H&P NOTE
The patient has been examined and the H&P has been reviewed:    I concur with the findings and no changes have occurred since H&P was written.    Plan for image guided left lung mass biopsy    There are no hospital problems to display for this patient.

## 2022-12-06 NOTE — PLAN OF CARE
Band aid to left lateral chest, lower axillary area, C/D/I with no bleeding/redness/swelling noted. VSS, NADN, and pt meets criteria for discharge. Discharge instructions given to and reviewed with pt, and pt verbalized understanding of all. Pt discharged to home, taken out via wheelchair and driven home by .

## 2022-12-07 ENCOUNTER — PATIENT MESSAGE (OUTPATIENT)
Dept: HEMATOLOGY/ONCOLOGY | Facility: CLINIC | Age: 52
End: 2022-12-07
Payer: COMMERCIAL

## 2022-12-07 NOTE — TELEPHONE ENCOUNTER
Unfortunately this shows that the biopsy in the lung is cancerous we are awaiting more genetic analysis of the tumor we will be back to discuss with you

## 2022-12-08 ENCOUNTER — TELEPHONE (OUTPATIENT)
Dept: HEMATOLOGY/ONCOLOGY | Facility: CLINIC | Age: 52
End: 2022-12-08
Payer: COMMERCIAL

## 2022-12-08 NOTE — TELEPHONE ENCOUNTER
SW intern called pt regarding distress score 6. Pt reports she does not have present needs. SW intern provided pt w/ SW dept phone # if pt has questions or concerns. SW intern will remain available.

## 2022-12-09 ENCOUNTER — TELEPHONE (OUTPATIENT)
Dept: HEMATOLOGY/ONCOLOGY | Facility: CLINIC | Age: 52
End: 2022-12-09

## 2022-12-09 ENCOUNTER — PATIENT MESSAGE (OUTPATIENT)
Dept: HEMATOLOGY/ONCOLOGY | Facility: CLINIC | Age: 52
End: 2022-12-09

## 2022-12-09 ENCOUNTER — OFFICE VISIT (OUTPATIENT)
Dept: HEMATOLOGY/ONCOLOGY | Facility: CLINIC | Age: 52
End: 2022-12-09
Payer: COMMERCIAL

## 2022-12-09 DIAGNOSIS — C34.32 MALIGNANT NEOPLASM OF LOWER LOBE OF LEFT LUNG: Primary | ICD-10-CM

## 2022-12-09 DIAGNOSIS — I10 ESSENTIAL HYPERTENSION: ICD-10-CM

## 2022-12-09 DIAGNOSIS — E11.9 TYPE 2 DIABETES MELLITUS WITHOUT COMPLICATION, WITHOUT LONG-TERM CURRENT USE OF INSULIN: ICD-10-CM

## 2022-12-09 DIAGNOSIS — E66.01 MORBID OBESITY: ICD-10-CM

## 2022-12-09 DIAGNOSIS — C79.51 SECONDARY MALIGNANT NEOPLASM OF BONE: ICD-10-CM

## 2022-12-09 DIAGNOSIS — R91.8 MULTIPLE LUNG NODULES: ICD-10-CM

## 2022-12-09 PROCEDURE — 4010F ACE/ARB THERAPY RXD/TAKEN: CPT | Mod: CPTII,95,, | Performed by: INTERNAL MEDICINE

## 2022-12-09 PROCEDURE — 3061F NEG MICROALBUMINURIA REV: CPT | Mod: CPTII,95,, | Performed by: INTERNAL MEDICINE

## 2022-12-09 PROCEDURE — 99214 PR OFFICE/OUTPT VISIT, EST, LEVL IV, 30-39 MIN: ICD-10-PCS | Mod: 95,,, | Performed by: INTERNAL MEDICINE

## 2022-12-09 PROCEDURE — 3061F PR NEG MICROALBUMINURIA RESULT DOCUMENTED/REVIEW: ICD-10-PCS | Mod: CPTII,95,, | Performed by: INTERNAL MEDICINE

## 2022-12-09 PROCEDURE — 1160F RVW MEDS BY RX/DR IN RCRD: CPT | Mod: CPTII,95,, | Performed by: INTERNAL MEDICINE

## 2022-12-09 PROCEDURE — 3051F PR MOST RECENT HEMOGLOBIN A1C LEVEL 7.0 - < 8.0%: ICD-10-PCS | Mod: CPTII,95,, | Performed by: INTERNAL MEDICINE

## 2022-12-09 PROCEDURE — 1160F PR REVIEW ALL MEDS BY PRESCRIBER/CLIN PHARMACIST DOCUMENTED: ICD-10-PCS | Mod: CPTII,95,, | Performed by: INTERNAL MEDICINE

## 2022-12-09 PROCEDURE — 3066F PR DOCUMENTATION OF TREATMENT FOR NEPHROPATHY: ICD-10-PCS | Mod: CPTII,95,, | Performed by: INTERNAL MEDICINE

## 2022-12-09 PROCEDURE — 99214 OFFICE O/P EST MOD 30 MIN: CPT | Mod: 95,,, | Performed by: INTERNAL MEDICINE

## 2022-12-09 PROCEDURE — 4010F PR ACE/ARB THEARPY RXD/TAKEN: ICD-10-PCS | Mod: CPTII,95,, | Performed by: INTERNAL MEDICINE

## 2022-12-09 PROCEDURE — 3051F HG A1C>EQUAL 7.0%<8.0%: CPT | Mod: CPTII,95,, | Performed by: INTERNAL MEDICINE

## 2022-12-09 PROCEDURE — 1159F MED LIST DOCD IN RCRD: CPT | Mod: CPTII,95,, | Performed by: INTERNAL MEDICINE

## 2022-12-09 PROCEDURE — 3066F NEPHROPATHY DOC TX: CPT | Mod: CPTII,95,, | Performed by: INTERNAL MEDICINE

## 2022-12-09 PROCEDURE — 1159F PR MEDICATION LIST DOCUMENTED IN MEDICAL RECORD: ICD-10-PCS | Mod: CPTII,95,, | Performed by: INTERNAL MEDICINE

## 2022-12-09 NOTE — PROGRESS NOTES
Subjective:       Patient ID: Shaneka Ahmadi is a 52 y.o. female.    Chief Complaint: Lung Cancer    HPI:  52-year-old female recently diagnosed with non-small cell lung carcinoma after needle biopsy PET scan demonstrates mediastinal involvement as well as metastatic disease in bone.  The patient presents back for further consideration.  With video visit    Past Medical History:   Diagnosis Date    Diabetes mellitus     Hypertension      Family History   Problem Relation Age of Onset    Heart failure Father      Social History     Socioeconomic History    Marital status:    Tobacco Use    Smoking status: Never     Passive exposure: Never    Smokeless tobacco: Never   Substance and Sexual Activity    Alcohol use: Never    Drug use: Never    Sexual activity: Yes     Partners: Male     Birth control/protection: None     Comment: tubal     Past Surgical History:   Procedure Laterality Date    CATARACT EXTRACTION W/  INTRAOCULAR LENS IMPLANT Right 06/02/2022    TUBAL LIGATION      2007--postpartum tubal ligation       Labs:  Lab Results   Component Value Date    WBC 12.26 11/25/2022    HGB 13.7 11/25/2022    HCT 41.4 11/25/2022    MCV 81 (L) 11/25/2022     11/25/2022     BMP  Lab Results   Component Value Date     11/25/2022    K 3.6 11/25/2022     11/25/2022    CO2 27 11/25/2022    BUN 12 11/25/2022    CREATININE 0.8 11/25/2022    CALCIUM 9.9 11/25/2022    ANIONGAP 15 11/25/2022    ESTGFRAFRICA >60.0 07/28/2022    EGFRNONAA >60.0 07/28/2022     Lab Results   Component Value Date    ALT 28 11/25/2022    AST 18 11/25/2022    ALKPHOS 142 (H) 11/25/2022    BILITOT 0.4 11/25/2022       No results found for: IRON, TIBC, FERRITIN, SATURATEDIRO  No results found for: [id number]  No results found for: FOLATE  Lab Results   Component Value Date    TSH 1.742 04/08/2022         Review of Systems   Psychiatric/Behavioral:  Positive for dysphoric mood. The patient is nervous/anxious.       Objective:      Physical Exam  Constitutional:       Appearance: She is obese. She is ill-appearing.   Psychiatric:         Mood and Affect: Mood is anxious and depressed.           Assessment:      1. Malignant neoplasm of lower lobe of left lung    2. Essential hypertension    3. Multiple lung nodules    4. Morbid obesity    5. Type 2 diabetes mellitus without complication, without long-term current use of insulin    6. Secondary malignant neoplasm of bone           Plan:     The patient location is:  Home  The chief complaint leading to consultation is:  Lung cancer    Visit type: audiovisual    Face to Face time with patient: 25 minutes of total time spent on the encounter, which includes face to face time and non-face to face time preparing to see the patient (eg, review of tests), Obtaining and/or reviewing separately obtained history, Documenting clinical information in the electronic or other health record, Independently interpreting results (not separately reported) and communicating results to the patient/family/caregiver, or Care coordination (not separately reported).         Each patient to whom he or she provides medical services by telemedicine is:  (1) informed of the relationship between the physician and patient and the respective role of any other health care provider with respect to management of the patient; and (2) notified that he or she may decline to receive medical services by telemedicine and may withdraw from such care at any time.    Notes:    Extensive conversation with the patient by phone demonstrates a non-small cell lung carcinoma on needle biopsy PET scan is consistent with metastatic disease.  Because she is a never smoker I recommended that we return in approximately 2 weeks for review material has been sent for next generation sequencing.  Article from up-to-date followed to her on lung cancer in never smokers high likelihood will have actionable mutation.  Discussed  implications answered questions MRI brain ordered.      Med Onc Chart Routing      Follow up with physician 2 weeks.   Follow up with DOLLY    Infusion scheduling note    Injection scheduling note    Labs    Imaging MRI      Pharmacy appointment    Other referrals       Reese Mcfarlane Jr, MD FACP

## 2022-12-09 NOTE — NURSING
1632pm: Outgoing call to pt regarding MRI brain order. Spoke with pt. Informed pt that 1st available is at [facility] location unless it will be 1st on 2023. Pt scheduled for MRI brain on 12/16 at 11 am at [facility]. Reminded pt she'll be scheduled for fup when NGS tumor testing results return. Pt verbalized understanding. Pt plan to report to appt as scheduled.

## 2022-12-12 ENCOUNTER — PATIENT MESSAGE (OUTPATIENT)
Dept: HEMATOLOGY/ONCOLOGY | Facility: CLINIC | Age: 52
End: 2022-12-12
Payer: COMMERCIAL

## 2022-12-12 NOTE — TELEPHONE ENCOUNTER
This shows that there was fluid in the chest it does not change our current plan and treatment course still awaiting the results of the next generation sequencing to better treat you with a specific agent

## 2022-12-15 ENCOUNTER — PATIENT MESSAGE (OUTPATIENT)
Dept: HEMATOLOGY/ONCOLOGY | Facility: HOSPITAL | Age: 52
End: 2022-12-15
Payer: COMMERCIAL

## 2022-12-15 NOTE — TELEPHONE ENCOUNTER
With the tumor being positive for PDL1 this does offer us a very good tumor marker to treat with we will review in follow-up additional testing is pending

## 2022-12-16 ENCOUNTER — HOSPITAL ENCOUNTER (OUTPATIENT)
Dept: RADIOLOGY | Facility: HOSPITAL | Age: 52
Discharge: HOME | End: 2022-12-16
Attending: INTERNAL MEDICINE
Payer: COMMERCIAL

## 2022-12-16 DIAGNOSIS — C34.32 MALIGNANT NEOPLASM OF LOWER LOBE OF LEFT LUNG: ICD-10-CM

## 2022-12-19 ENCOUNTER — PATIENT MESSAGE (OUTPATIENT)
Dept: HEMATOLOGY/ONCOLOGY | Facility: CLINIC | Age: 52
End: 2022-12-19
Payer: COMMERCIAL

## 2022-12-23 ENCOUNTER — PATIENT MESSAGE (OUTPATIENT)
Dept: HEMATOLOGY/ONCOLOGY | Facility: CLINIC | Age: 52
End: 2022-12-23
Payer: COMMERCIAL

## 2022-12-23 LAB
COMMENT: ABNORMAL
FINAL PATHOLOGIC DIAGNOSIS: ABNORMAL
Lab: ABNORMAL
SUPPLEMENTAL DIAGNOSIS: ABNORMAL

## 2022-12-27 ENCOUNTER — OFFICE VISIT (OUTPATIENT)
Dept: HEMATOLOGY/ONCOLOGY | Facility: CLINIC | Age: 52
End: 2022-12-27
Payer: COMMERCIAL

## 2022-12-27 ENCOUNTER — DOCUMENTATION ONLY (OUTPATIENT)
Dept: HEMATOLOGY/ONCOLOGY | Facility: CLINIC | Age: 52
End: 2022-12-27

## 2022-12-27 VITALS
OXYGEN SATURATION: 93 % | BODY MASS INDEX: 50.02 KG/M2 | DIASTOLIC BLOOD PRESSURE: 76 MMHG | HEIGHT: 64 IN | WEIGHT: 293 LBS | HEART RATE: 73 BPM | TEMPERATURE: 98 F | SYSTOLIC BLOOD PRESSURE: 143 MMHG

## 2022-12-27 DIAGNOSIS — E66.01 MORBID OBESITY: ICD-10-CM

## 2022-12-27 DIAGNOSIS — E11.9 TYPE 2 DIABETES MELLITUS WITHOUT COMPLICATION, WITHOUT LONG-TERM CURRENT USE OF INSULIN: ICD-10-CM

## 2022-12-27 DIAGNOSIS — Z86.16 HISTORY OF COVID-19: ICD-10-CM

## 2022-12-27 DIAGNOSIS — I10 ESSENTIAL HYPERTENSION: ICD-10-CM

## 2022-12-27 DIAGNOSIS — C79.51 SECONDARY MALIGNANT NEOPLASM OF BONE: ICD-10-CM

## 2022-12-27 DIAGNOSIS — C34.32 MALIGNANT NEOPLASM OF LOWER LOBE OF LEFT LUNG: Primary | ICD-10-CM

## 2022-12-27 PROCEDURE — 99215 PR OFFICE/OUTPT VISIT, EST, LEVL V, 40-54 MIN: ICD-10-PCS | Mod: S$GLB,,, | Performed by: INTERNAL MEDICINE

## 2022-12-27 PROCEDURE — 99999 PR PBB SHADOW E&M-EST. PATIENT-LVL V: ICD-10-PCS | Mod: PBBFAC,,, | Performed by: INTERNAL MEDICINE

## 2022-12-27 PROCEDURE — 1160F RVW MEDS BY RX/DR IN RCRD: CPT | Mod: CPTII,S$GLB,, | Performed by: INTERNAL MEDICINE

## 2022-12-27 PROCEDURE — 3008F BODY MASS INDEX DOCD: CPT | Mod: CPTII,S$GLB,, | Performed by: INTERNAL MEDICINE

## 2022-12-27 PROCEDURE — 3051F PR MOST RECENT HEMOGLOBIN A1C LEVEL 7.0 - < 8.0%: ICD-10-PCS | Mod: CPTII,S$GLB,, | Performed by: INTERNAL MEDICINE

## 2022-12-27 PROCEDURE — 3078F DIAST BP <80 MM HG: CPT | Mod: CPTII,S$GLB,, | Performed by: INTERNAL MEDICINE

## 2022-12-27 PROCEDURE — 3077F PR MOST RECENT SYSTOLIC BLOOD PRESSURE >= 140 MM HG: ICD-10-PCS | Mod: CPTII,S$GLB,, | Performed by: INTERNAL MEDICINE

## 2022-12-27 PROCEDURE — 3066F NEPHROPATHY DOC TX: CPT | Mod: CPTII,S$GLB,, | Performed by: INTERNAL MEDICINE

## 2022-12-27 PROCEDURE — 3066F PR DOCUMENTATION OF TREATMENT FOR NEPHROPATHY: ICD-10-PCS | Mod: CPTII,S$GLB,, | Performed by: INTERNAL MEDICINE

## 2022-12-27 PROCEDURE — 3008F PR BODY MASS INDEX (BMI) DOCUMENTED: ICD-10-PCS | Mod: CPTII,S$GLB,, | Performed by: INTERNAL MEDICINE

## 2022-12-27 PROCEDURE — 3051F HG A1C>EQUAL 7.0%<8.0%: CPT | Mod: CPTII,S$GLB,, | Performed by: INTERNAL MEDICINE

## 2022-12-27 PROCEDURE — 3077F SYST BP >= 140 MM HG: CPT | Mod: CPTII,S$GLB,, | Performed by: INTERNAL MEDICINE

## 2022-12-27 PROCEDURE — 1159F PR MEDICATION LIST DOCUMENTED IN MEDICAL RECORD: ICD-10-PCS | Mod: CPTII,S$GLB,, | Performed by: INTERNAL MEDICINE

## 2022-12-27 PROCEDURE — 4010F ACE/ARB THERAPY RXD/TAKEN: CPT | Mod: CPTII,S$GLB,, | Performed by: INTERNAL MEDICINE

## 2022-12-27 PROCEDURE — 3061F NEG MICROALBUMINURIA REV: CPT | Mod: CPTII,S$GLB,, | Performed by: INTERNAL MEDICINE

## 2022-12-27 PROCEDURE — 3061F PR NEG MICROALBUMINURIA RESULT DOCUMENTED/REVIEW: ICD-10-PCS | Mod: CPTII,S$GLB,, | Performed by: INTERNAL MEDICINE

## 2022-12-27 PROCEDURE — 1159F MED LIST DOCD IN RCRD: CPT | Mod: CPTII,S$GLB,, | Performed by: INTERNAL MEDICINE

## 2022-12-27 PROCEDURE — 4010F PR ACE/ARB THEARPY RXD/TAKEN: ICD-10-PCS | Mod: CPTII,S$GLB,, | Performed by: INTERNAL MEDICINE

## 2022-12-27 PROCEDURE — 3078F PR MOST RECENT DIASTOLIC BLOOD PRESSURE < 80 MM HG: ICD-10-PCS | Mod: CPTII,S$GLB,, | Performed by: INTERNAL MEDICINE

## 2022-12-27 PROCEDURE — 99999 PR PBB SHADOW E&M-EST. PATIENT-LVL V: CPT | Mod: PBBFAC,,, | Performed by: INTERNAL MEDICINE

## 2022-12-27 PROCEDURE — 1160F PR REVIEW ALL MEDS BY PRESCRIBER/CLIN PHARMACIST DOCUMENTED: ICD-10-PCS | Mod: CPTII,S$GLB,, | Performed by: INTERNAL MEDICINE

## 2022-12-27 PROCEDURE — 99215 OFFICE O/P EST HI 40 MIN: CPT | Mod: S$GLB,,, | Performed by: INTERNAL MEDICINE

## 2022-12-27 RX ORDER — SODIUM CHLORIDE 0.9 % (FLUSH) 0.9 %
10 SYRINGE (ML) INJECTION
Status: CANCELLED | OUTPATIENT
Start: 2022-12-27

## 2022-12-27 RX ORDER — HEPARIN 100 UNIT/ML
500 SYRINGE INTRAVENOUS
Status: CANCELLED | OUTPATIENT
Start: 2022-12-27

## 2022-12-27 NOTE — PLAN OF CARE
START OFF PATHWAY REGIMEN - Non-Small Cell Lung            Pembrolizumab (Keytruda)           Additional Orders: Serious immune-mediated adverse events can occur with   pembrolizumab. Please monitor your patient and refer to the linked   immune-mediated adverse reaction management materials for more information.    **Always confirm dose/schedule in your pharmacy ordering system**    Patient Characteristics:  Stage IV Metastatic, Nonsquamous, Molecular Analysis Completed, Molecular   Alteration Present and Targeted Therapy Exhausted OR EGFR Exon 20+ or KRAS G12C+   or HER2+ Present and No Prior Chemo/Immunotherapy OR No Alteration Present,   Initial Chemotherapy/Immunotherapy, PS = 0, 1, No Alteration Present, Did Not   Order Molecular Analysis/Quantity Not Sufficient for Molecular Analysis  Therapeutic Status: Stage IV Metastatic  Histology: Nonsquamous Cell  Broad Molecular Profiling Status: Molecular Analysis Completed  Molecular Analysis Results: No Alteration Present  ECOG Performance Status: 1  Chemotherapy/Immunotherapy Line of Therapy: Initial Chemotherapy/Immunotherapy  EGFR Exons 18-21 Mutation Testing Status: Quantity Not Sufficient  ALK Fusion/Rearrangement Testing Status: Quantity Not Sufficient  BRAF V600 Mutation Testing Status: Quantity Not Sufficient  KRAS G12C Mutation Testing Status: Quantity Not Sufficient  MET Exon 14 Mutation Testing Status: Quantity Not Sufficient  RET Fusion/Rearrangement Testing Status: Quantity Not Sufficient  HER2 Mutation Testing Status: Quantity Not Sufficient  NTRK Fusion/Rearrangement Testing Status: Quantity Not Sufficient  ROS1 Fusion/Rearrangement Testing Status: Quantity Not Sufficient  Intent of Therapy:  Non-Curative / Palliative Intent, Discussed with Patient

## 2022-12-27 NOTE — NURSING
1400pm: Dr. Mcfarlane requested NN in Exam room for [facility] B[facility] blood test. Met pt and gave pt my direct contact info. Informed pt that results may take 2- 4 wks to return, but I'll contact pt once they are back to schedule fup with Dr. Mcfarlane to review. Venipuncture to right AC and right hand. Blood specimen collected but only 1 tube. [employer] tracking #: [id number] [employer] confirmation pickup#: [id number] Pt verbalized understanding.

## 2022-12-27 NOTE — PROGRESS NOTES
ubjective:       Patient ID: Shaneka Ahmadi is a 52 y.o. female.    Chief Complaint: Results and Lung Cancer    HPI:  52-year-old female with metastatic non-small cell lung carcinoma PD L1 positive awaiting next generation sequencing.  To determine if other actionable mutations are available low smoking history.  Stage IV disease with metastatic disease in bone ECOG status 1    Past Medical History:   Diagnosis Date    Diabetes mellitus     Hypertension     Malignant neoplasm of lower lobe of left lung 12/9/2022    Secondary malignant neoplasm of bone 12/5/2022     Family History   Problem Relation Age of Onset    Heart failure Father      Social History     Socioeconomic History    Marital status:    Tobacco Use    Smoking status: Never     Passive exposure: Never    Smokeless tobacco: Never   Substance and Sexual Activity    Alcohol use: Never    Drug use: Never    Sexual activity: Yes     Partners: Male     Birth control/protection: None     Comment: tubal     Past Surgical History:   Procedure Laterality Date    CATARACT EXTRACTION W/  INTRAOCULAR LENS IMPLANT Right 06/02/2022    TUBAL LIGATION      2007--postpartum tubal ligation       Labs:  Lab Results   Component Value Date    WBC 12.26 11/25/2022    HGB 13.7 11/25/2022    HCT 41.4 11/25/2022    MCV 81 (L) 11/25/2022     11/25/2022     BMP  Lab Results   Component Value Date     11/25/2022    K 3.6 11/25/2022     11/25/2022    CO2 27 11/25/2022    BUN 12 11/25/2022    CREATININE 0.8 11/25/2022    CALCIUM 9.9 11/25/2022    ANIONGAP 15 11/25/2022    ESTGFRAFRICA >60.0 07/28/2022    EGFRNONAA >60.0 07/28/2022     Lab Results   Component Value Date    ALT 28 11/25/2022    AST 18 11/25/2022    ALKPHOS 142 (H) 11/25/2022    BILITOT 0.4 11/25/2022       No results found for: IRON, TIBC, FERRITIN, SATURATEDIRO  No results found for: LHPDQOGW25  No results found for: FOLATE  Lab Results   Component Value Date    TSH 1.742 04/08/2022          Review of Systems   Constitutional:  Positive for fatigue. Negative for activity change, appetite change, chills, diaphoresis, fever and unexpected weight change.   HENT:  Positive for dental problem. Negative for congestion, drooling, ear discharge, ear pain, facial swelling, hearing loss, mouth sores, nosebleeds, postnasal drip, rhinorrhea, sinus pressure, sneezing, sore throat, tinnitus, trouble swallowing and voice change.    Eyes:  Negative for photophobia, pain, discharge, redness, itching and visual disturbance.   Respiratory:  Negative for cough, choking, chest tightness, shortness of breath, wheezing and stridor.    Cardiovascular:  Negative for chest pain, palpitations and leg swelling.   Gastrointestinal:  Negative for abdominal distention, abdominal pain, anal bleeding, blood in stool, constipation, diarrhea, nausea, rectal pain and vomiting.   Endocrine: Negative for cold intolerance, heat intolerance, polydipsia, polyphagia and polyuria.   Genitourinary:  Negative for decreased urine volume, difficulty urinating, dyspareunia, dysuria, enuresis, flank pain, frequency, genital sores, hematuria, menstrual problem, pelvic pain, urgency, vaginal bleeding, vaginal discharge and vaginal pain.   Musculoskeletal:  Negative for arthralgias, back pain, gait problem, joint swelling, myalgias, neck pain and neck stiffness.   Skin:  Negative for color change, pallor and rash.   Allergic/Immunologic: Negative for environmental allergies, food allergies and immunocompromised state.   Neurological:  Positive for weakness. Negative for dizziness, tremors, seizures, syncope, facial asymmetry, speech difficulty, light-headedness, numbness and headaches.   Hematological:  Negative for adenopathy. Does not bruise/bleed easily.   Psychiatric/Behavioral:  Positive for dysphoric mood. Negative for agitation, behavioral problems, confusion, decreased concentration, hallucinations, self-injury, sleep disturbance and  suicidal ideas. The patient is not nervous/anxious and is not hyperactive.      Objective:      Physical Exam  Vitals reviewed.   Constitutional:       General: She is not in acute distress.     Appearance: She is well-developed. She is obese. She is not diaphoretic.   HENT:      Head: Normocephalic and atraumatic.      Right Ear: External ear normal.      Left Ear: External ear normal.      Nose: Nose normal.      Right Sinus: No maxillary sinus tenderness or frontal sinus tenderness.      Left Sinus: No maxillary sinus tenderness or frontal sinus tenderness.      Mouth/Throat:      Dentition: Abnormal dentition. Dental tenderness and dental caries present.      Pharynx: No oropharyngeal exudate.   Eyes:      General: Lids are normal. No scleral icterus.        Right eye: No discharge.         Left eye: No discharge.      Conjunctiva/sclera: Conjunctivae normal.      Right eye: Right conjunctiva is not injected. No hemorrhage.     Left eye: Left conjunctiva is not injected. No hemorrhage.     Pupils: Pupils are equal, round, and reactive to light.   Neck:      Thyroid: No thyromegaly.      Vascular: No JVD.      Trachea: No tracheal deviation.   Cardiovascular:      Rate and Rhythm: Normal rate.   Pulmonary:      Effort: Pulmonary effort is normal. No respiratory distress.      Breath sounds: No stridor.   Chest:      Chest wall: No tenderness.   Abdominal:      General: Bowel sounds are normal. There is no distension.      Palpations: Abdomen is soft. There is no hepatomegaly, splenomegaly or mass.      Tenderness: There is no abdominal tenderness. There is no rebound.   Musculoskeletal:         General: No tenderness. Normal range of motion.      Cervical back: Normal range of motion and neck supple.   Lymphadenopathy:      Cervical: No cervical adenopathy.      Upper Body:      Right upper body: No supraclavicular adenopathy.      Left upper body: No supraclavicular adenopathy.   Skin:     General: Skin is dry.       Findings: No erythema or rash.   Neurological:      Mental Status: She is alert and oriented to person, place, and time.      Cranial Nerves: No cranial nerve deficit.      Coordination: Coordination normal.   Psychiatric:         Behavior: Behavior normal.         Thought Content: Thought content normal.         Judgment: Judgment normal.           Assessment:      1. Malignant neoplasm of lower lobe of left lung    2. Secondary malignant neoplasm of bone    3. Essential hypertension    4. History of COVID-19    5. Morbid obesity    6. Type 2 diabetes mellitus without complication, without long-term current use of insulin           Plan:     Extensive conversation with patient at this point would recommend that patient have next generation sequencing completed PDL1 status positive at 70%.  I will place orders for treatment with Keytruda as a single agent.  Will most likely need to await the results of next generation sequencing to see if any other actionable mutation is positive.  She is scheduled for MRI brain she is been told this is a treatable but not curable malignancy referral made to palliative care.  Discussed implications answered questions.  With her nurse navigation team available RTC 2 weeks total time 40 minutes        Reese Mcfarlane Jr, MD FACP

## 2022-12-28 ENCOUNTER — HOSPITAL ENCOUNTER (OUTPATIENT)
Dept: RADIOLOGY | Facility: HOSPITAL | Age: 52
Discharge: HOME | End: 2022-12-28
Attending: INTERNAL MEDICINE
Payer: COMMERCIAL

## 2022-12-28 PROCEDURE — 70553 MRI BRAIN STEM W/O & W/DYE: CPT | Mod: TC,PN

## 2022-12-28 PROCEDURE — A9585 GADOBUTROL INJECTION: HCPCS | Mod: PN | Performed by: INTERNAL MEDICINE

## 2022-12-28 PROCEDURE — 25500020 PHARM REV CODE 255: Mod: PN | Performed by: INTERNAL MEDICINE

## 2022-12-28 RX ORDER — GADOBUTROL 604.72 MG/ML
10 INJECTION INTRAVENOUS
Status: COMPLETED | OUTPATIENT
Start: 2022-12-28 | End: 2022-12-28

## 2022-12-28 RX ADMIN — GADOBUTROL 10 ML: 604.72 INJECTION INTRAVENOUS at 02:12

## 2022-12-29 LAB
FINAL PATHOLOGIC DIAGNOSIS: NORMAL
GROSS: NORMAL
Lab: NORMAL
SUPPLEMENTAL DIAGNOSIS: NORMAL

## 2023-01-03 ENCOUNTER — TELEPHONE (OUTPATIENT)
Dept: OBSTETRICS AND GYNECOLOGY | Facility: CLINIC | Age: 53
End: 2023-01-03

## 2023-01-03 ENCOUNTER — PATIENT MESSAGE (OUTPATIENT)
Dept: OBSTETRICS AND GYNECOLOGY | Facility: CLINIC | Age: 53
End: 2023-01-03
Payer: COMMERCIAL

## 2023-01-03 ENCOUNTER — OFFICE VISIT (OUTPATIENT)
Dept: RADIATION ONCOLOGY | Facility: CLINIC | Age: 53
End: 2023-01-03
Payer: COMMERCIAL

## 2023-01-03 ENCOUNTER — HOSPITAL ENCOUNTER (OUTPATIENT)
Dept: RADIATION THERAPY | Facility: HOSPITAL | Age: 53
Discharge: HOME | End: 2023-01-03
Attending: RADIOLOGY
Payer: COMMERCIAL

## 2023-01-03 VITALS
DIASTOLIC BLOOD PRESSURE: 79 MMHG | BODY MASS INDEX: 50.02 KG/M2 | RESPIRATION RATE: 20 BRPM | WEIGHT: 293 LBS | HEART RATE: 66 BPM | OXYGEN SATURATION: 97 % | HEIGHT: 64 IN | SYSTOLIC BLOOD PRESSURE: 132 MMHG | TEMPERATURE: 97 F

## 2023-01-03 DIAGNOSIS — C34.32 MALIGNANT NEOPLASM OF LOWER LOBE OF LEFT LUNG: Primary | ICD-10-CM

## 2023-01-03 DIAGNOSIS — C79.51 SECONDARY MALIGNANT NEOPLASM OF BONE: ICD-10-CM

## 2023-01-03 LAB
DNA RANGE(S) EXAMINED NAR: NORMAL
GENE DIS ANL INTERP-IMP: POSITIVE
GENE DIS ASSESSED: NORMAL
MSI CA SPEC-IMP: NOT DETECTED
REASON FOR STUDY: NORMAL
TEMPUS LCA: NORMAL
TEMPUS PORTAL: NORMAL
TEMPUS THERAPY1: NORMAL
TEMPUS THERAPY2: NORMAL
TEMPUS THERAPY3: NORMAL
TEMPUS THERAPY4: NORMAL
TEMPUS THERAPY5: NORMAL
TEMPUS THERAPY6: NORMAL
TEMPUS THERAPYCOUNT: 6
TEMPUS TRIAL1: NORMAL
TEMPUS TRIAL2: NORMAL
TEMPUS TRIAL3: NORMAL
TEMPUS TRIALCOUNT: 3

## 2023-01-03 PROCEDURE — 3075F SYST BP GE 130 - 139MM HG: CPT | Mod: CPTII,S$GLB,, | Performed by: RADIOLOGY

## 2023-01-03 PROCEDURE — 3008F PR BODY MASS INDEX (BMI) DOCUMENTED: ICD-10-PCS | Mod: CPTII,S$GLB,, | Performed by: RADIOLOGY

## 2023-01-03 PROCEDURE — 3078F PR MOST RECENT DIASTOLIC BLOOD PRESSURE < 80 MM HG: ICD-10-PCS | Mod: CPTII,S$GLB,, | Performed by: RADIOLOGY

## 2023-01-03 PROCEDURE — 1159F PR MEDICATION LIST DOCUMENTED IN MEDICAL RECORD: ICD-10-PCS | Mod: CPTII,S$GLB,, | Performed by: RADIOLOGY

## 2023-01-03 PROCEDURE — 3075F PR MOST RECENT SYSTOLIC BLOOD PRESS GE 130-139MM HG: ICD-10-PCS | Mod: CPTII,S$GLB,, | Performed by: RADIOLOGY

## 2023-01-03 PROCEDURE — 99999 PR PBB SHADOW E&M-EST. PATIENT-LVL V: CPT | Mod: PBBFAC,,, | Performed by: RADIOLOGY

## 2023-01-03 PROCEDURE — 99999 PR PBB SHADOW E&M-EST. PATIENT-LVL V: ICD-10-PCS | Mod: PBBFAC,,, | Performed by: RADIOLOGY

## 2023-01-03 PROCEDURE — 3078F DIAST BP <80 MM HG: CPT | Mod: CPTII,S$GLB,, | Performed by: RADIOLOGY

## 2023-01-03 PROCEDURE — 99204 PR OFFICE/OUTPT VISIT, NEW, LEVL IV, 45-59 MIN: ICD-10-PCS | Mod: S$GLB,,, | Performed by: RADIOLOGY

## 2023-01-03 PROCEDURE — 3008F BODY MASS INDEX DOCD: CPT | Mod: CPTII,S$GLB,, | Performed by: RADIOLOGY

## 2023-01-03 PROCEDURE — 99215 OFFICE O/P EST HI 40 MIN: CPT | Mod: PBBFAC | Performed by: RADIOLOGY

## 2023-01-03 PROCEDURE — 1159F MED LIST DOCD IN RCRD: CPT | Mod: CPTII,S$GLB,, | Performed by: RADIOLOGY

## 2023-01-03 PROCEDURE — 99204 OFFICE O/P NEW MOD 45 MIN: CPT | Mod: S$GLB,,, | Performed by: RADIOLOGY

## 2023-01-03 NOTE — PROGRESS NOTES
[facility] [place]  RADIATION ONCOLOGY CONSULTATION    Name: Shaneka Ahmadi  : 1970      Patient Referred To Radiation Oncology By:  Dr. Reese Mcfarlane MD  [id number] [place] [address]    DIAGNOSIS: metastatic NSCLC, stage IV, EGFR mutated    HISTORY OF PRESENT ILLNESS:  Shaneka Ahmadi is a 53 y.o. female who presents for consultation for the above diagnosis.   Had Covid in July and symptoms started returning  Noted cough and SOB, CXR and CT chest showed L Lung mass  L lung biopsy positive for adenocarcinoma , EGFR mutated  Pleural fluid also positive for malignancy    PET showed avid STEPHAN mass, mediastinal nodes, bone mets in C1, T1, T11, L3, sacrum, L ischium, sternum  MRI brain negative for intracranial metastases     Today, she still has cough.  Denies chest pain, hemoptysis, shortness of breath or loss of appetite.     REVIEW OF SYSTEMS: (Positive findings bold, otherwise negative)   Constitutional: fever, fatigue, weight change  Eyes: blurred vision in the past 3 months, double vision   ENT: ear pain, new mouth lesions, jaw pain, difficulty swallowing, sore throat  Cardiovascular: chest pain on exertion, reflux, leg swelling  Respiratory: shortness of breath, dyspnea, cough, hemoptysis.   GI: abdominal pain, diarrhea, constipation, blood in stool, painful bowel movements  : painful or burning urination, blood in urine  Musculoskeletal: new bone or joint pains  Neurologic: headache, seizure, focal numbness or tingling, balance changes, speech changes  Lymph: new or enlarged lymph nodes  Psychiatric: depression, anxiety    PRIOR RADIATION HISTORY: none    PAST MEDICAL HISTORY:  Past Medical History:   Diagnosis Date    Diabetes mellitus     Hypertension     Malignant neoplasm of lower lobe of left lung 2022    Secondary malignant neoplasm of bone 2022       PAST SURGICAL HISTORY:  Past Surgical History:   Procedure Laterality Date    CATARACT  EXTRACTION W/  INTRAOCULAR LENS IMPLANT Right 06/02/2022    TUBAL LIGATION      2007--postpartum tubal ligation       ALLERGIES:   Review of patient's allergies indicates:   Allergen Reactions    Lisinopril Other (See Comments)     coughing    Amoxicillin        MEDICATIONS:    Current Outpatient Medications:     albuterol (PROVENTIL/VENTOLIN HFA) 90 mcg/actuation inhaler, Inhale 1-2 puffs into the lungs every 6 (six) hours as needed for Wheezing (cough)., Disp: 6.7 g, Rfl: 0    amLODIPine (NORVASC) 10 MG tablet, Take 1 tablet (10 mg total) by mouth once daily., Disp: 90 tablet, Rfl: 3    atenoloL (TENORMIN) 50 MG tablet, Take 1 tab by mouth twice a day, Disp: 180 tablet, Rfl: 3    betamethasone valerate 0.1% (VALISONE) 0.1 % Oint, APPLY TOPICALLY TO THE TOP OF THE FEET TWO TIMES A DAY, Disp: 45 g, Rfl: 1    blood sugar diagnostic Strp, To check BG 2 times daily, to use with insurance preferred meter, Disp: 100 each, Rfl: 11    blood-glucose meter kit, To check BG 2 times daily, to use with insurance preferred meter, Disp: 1 each, Rfl: 0    cetirizine (ZYRTEC) 10 MG tablet, Take 1 tablet (10 mg total) by mouth every evening., Disp: 30 tablet, Rfl: 0    fluticasone propionate (FLONASE) 50 mcg/actuation nasal spray, 2 sprays (100 mcg total) by Each Nostril route once daily., Disp: 9.9 mL, Rfl: 2    lancets Misc, To check BG 2 times daily, to use with insurance preferred meter, Disp: 100 each, Rfl: 11    losartan-hydrochlorothiazide 100-25 mg (HYZAAR) 100-25 mg per tablet, Take 1 tablet by mouth once daily., Disp: 90 tablet, Rfl: 3    metFORMIN (GLUCOPHAGE-XR) 500 MG ER 24hr tablet, Take 1 tab with breakfast and take 2 tabs with dinner, Disp: 90 tablet, Rfl: 11    pravastatin (PRAVACHOL) 10 MG tablet, Take 1 tablet (10 mg total) by mouth every evening., Disp: 30 tablet, Rfl: 11    TRUE METRIX GLUCOSE METER Misc, , Disp: , Rfl:     TRUEPLUS LANCETS 33 gauge Misc, Apply topically., Disp: , Rfl:     SOCIAL  "HISTORY:  Social History     Socioeconomic History    Marital status:    Tobacco Use    Smoking status: Never     Passive exposure: Never    Smokeless tobacco: Never   Substance and Sexual Activity    Alcohol use: Never    Drug use: Never    Sexual activity: Yes     Partners: Male     Birth control/protection: None     Comment: tubal     Lives in [place]    FAMILY HISTORY:  Family History   Problem Relation Age of Onset    Heart failure Father        PHYSICAL EXAMINATION:  Constitutional: well appearing, no acute distress, ECOG 0 - Fully Active  Vitals:    /79   Pulse 66   Temp 97.2 °F (36.2 °C)   Resp 20   Ht 5' 4" (1.626 m)   Wt (!) 138.4 kg (305 lb 3.2 oz)   LMP 05/01/2021 (Approximate)   SpO2 97%   BMI 52.39 kg/m²   Eyes: sclera anicteric, EOMI, pupils equal, round and reactive to light  ENT: oral cavity without lesions, moist mucous membranes  Neck: trachea midline, neck supple  Cardiovascular: regular rate, no edema of the upper or lower extremities, radial pulse 2+  Respiratory: unlabored effort, clear to auscultation, no wheezes  Abdomen: soft, non-tender, no rigidity, no masses, no hepatomegaly  Neuro: Cranial nerves III-XII intact, speech not slurred, gait non-ataxic, no dysdiadochokinesia, strength 5/5 upper and lower extremities  Spine: non-tender to percussion cervical, thoracic and lumbosacral spine    IMAGING AND LABORATORY FINDINGS: As per HPI; images reviewed personally.    ASSESSMENT: 53 y.o. female with metastatic NSCLC, stage IV, EGFR mutated    PLAN: Newly diagnosed metastatic lung cancer EGFR mutated  Pleural fluid also positive, radiation does not improve symptoms from malignant effusion  Systemic therapy will be her main treatment  Role of radiation is palliative to reduce pain in bony lesions or improve obstruction from any lung/mediastinal masses    After discussion and review of imaging, favor palliative radiation 8Gy/1fx to L ischium and L spine after discussion of " symptoms  Do not feel the chest needs radiation given no evidence of any obstruction    We discussed the techniques, toxicities and indications of radiation and I answered the patient's questions to their apparent satisfaction. Informed consent was obtained and CT simulation will be scheduled shortly.    I spent approximately 45 minutes reviewing the available records and evaluating the patient, out of which over 50% of the time was spent face to face with the patient in counseling and coordinating this patient's care.    Janna Rodriguez III, M.D.  Radiation Oncology  [facility] [address] [place] [address]  Ph: [phone]  [email or web address]

## 2023-01-03 NOTE — TELEPHONE ENCOUNTER
----- Message from Evelyn Esparza sent at 1/3/2023  2:13 PM CST -----  Contact: patient  Shaneka Ahmadi would like a call back at [phone], in regards to her appointment on 1/23/23 that was cancelled.

## 2023-01-04 ENCOUNTER — TELEPHONE (OUTPATIENT)
Dept: HEMATOLOGY/ONCOLOGY | Facility: CLINIC | Age: 53
End: 2023-01-04
Payer: COMMERCIAL

## 2023-01-04 NOTE — NURSING
0830am; Outgoing call to pt regarding [facility] NGS blood results. Spoke with pt. Pt informed that results are back and pt need to be scheduled with Dr. Keith to discuss results. Pt scheduled on 1/5 at 10 am at [place]. Pt given location instructions. Pt verbalized understanding. Pt plan to report to appt as scheduled.

## 2023-01-05 ENCOUNTER — OFFICE VISIT (OUTPATIENT)
Dept: HEMATOLOGY/ONCOLOGY | Facility: CLINIC | Age: 53
End: 2023-01-05
Payer: COMMERCIAL

## 2023-01-05 ENCOUNTER — PATIENT MESSAGE (OUTPATIENT)
Dept: HEMATOLOGY/ONCOLOGY | Facility: CLINIC | Age: 53
End: 2023-01-05

## 2023-01-05 ENCOUNTER — HOSPITAL ENCOUNTER (OUTPATIENT)
Dept: CARDIOLOGY | Facility: HOSPITAL | Age: 53
Discharge: HOME | End: 2023-01-05
Attending: INTERNAL MEDICINE
Payer: COMMERCIAL

## 2023-01-05 VITALS
WEIGHT: 293 LBS | BODY MASS INDEX: 50.02 KG/M2 | DIASTOLIC BLOOD PRESSURE: 79 MMHG | HEART RATE: 65 BPM | SYSTOLIC BLOOD PRESSURE: 149 MMHG | HEIGHT: 64 IN | OXYGEN SATURATION: 95 % | TEMPERATURE: 98 F

## 2023-01-05 DIAGNOSIS — C34.32 MALIGNANT NEOPLASM OF LOWER LOBE OF LEFT LUNG: ICD-10-CM

## 2023-01-05 DIAGNOSIS — I10 ESSENTIAL HYPERTENSION: ICD-10-CM

## 2023-01-05 DIAGNOSIS — E11.9 TYPE 2 DIABETES MELLITUS WITHOUT COMPLICATION, WITHOUT LONG-TERM CURRENT USE OF INSULIN: ICD-10-CM

## 2023-01-05 DIAGNOSIS — D84.821 IMMUNODEFICIENCY DUE TO CHEMOTHERAPY: ICD-10-CM

## 2023-01-05 DIAGNOSIS — C79.51 SECONDARY MALIGNANT NEOPLASM OF BONE: ICD-10-CM

## 2023-01-05 DIAGNOSIS — E66.01 MORBID OBESITY: ICD-10-CM

## 2023-01-05 DIAGNOSIS — C34.32 MALIGNANT NEOPLASM OF LOWER LOBE OF LEFT LUNG: Primary | ICD-10-CM

## 2023-01-05 DIAGNOSIS — T45.1X5A IMMUNODEFICIENCY DUE TO CHEMOTHERAPY: ICD-10-CM

## 2023-01-05 DIAGNOSIS — Z79.899 IMMUNODEFICIENCY DUE TO CHEMOTHERAPY: ICD-10-CM

## 2023-01-05 PROBLEM — Z79.69 IMMUNODEFICIENCY DUE TO CHEMOTHERAPY: Status: ACTIVE | Noted: 2023-01-05

## 2023-01-05 PROCEDURE — 99999 PR PBB SHADOW E&M-EST. PATIENT-LVL IV: ICD-10-PCS | Mod: PBBFAC,,, | Performed by: INTERNAL MEDICINE

## 2023-01-05 PROCEDURE — 99999 PR PBB SHADOW E&M-EST. PATIENT-LVL IV: CPT | Mod: PBBFAC,,, | Performed by: INTERNAL MEDICINE

## 2023-01-05 PROCEDURE — 3008F BODY MASS INDEX DOCD: CPT | Mod: CPTII,S$GLB,, | Performed by: INTERNAL MEDICINE

## 2023-01-05 PROCEDURE — 99214 OFFICE O/P EST MOD 30 MIN: CPT | Mod: PBBFAC | Performed by: INTERNAL MEDICINE

## 2023-01-05 PROCEDURE — 93010 EKG 12-LEAD: ICD-10-PCS | Mod: ,,, | Performed by: STUDENT IN AN ORGANIZED HEALTH CARE EDUCATION/TRAINING PROGRAM

## 2023-01-05 PROCEDURE — 1159F MED LIST DOCD IN RCRD: CPT | Mod: CPTII,S$GLB,, | Performed by: INTERNAL MEDICINE

## 2023-01-05 PROCEDURE — 99215 PR OFFICE/OUTPT VISIT, EST, LEVL V, 40-54 MIN: ICD-10-PCS | Mod: S$GLB,,, | Performed by: INTERNAL MEDICINE

## 2023-01-05 PROCEDURE — 3077F SYST BP >= 140 MM HG: CPT | Mod: CPTII,S$GLB,, | Performed by: INTERNAL MEDICINE

## 2023-01-05 PROCEDURE — 3077F PR MOST RECENT SYSTOLIC BLOOD PRESSURE >= 140 MM HG: ICD-10-PCS | Mod: CPTII,S$GLB,, | Performed by: INTERNAL MEDICINE

## 2023-01-05 PROCEDURE — 1160F PR REVIEW ALL MEDS BY PRESCRIBER/CLIN PHARMACIST DOCUMENTED: ICD-10-PCS | Mod: CPTII,S$GLB,, | Performed by: INTERNAL MEDICINE

## 2023-01-05 PROCEDURE — 93010 ELECTROCARDIOGRAM REPORT: CPT | Mod: ,,, | Performed by: STUDENT IN AN ORGANIZED HEALTH CARE EDUCATION/TRAINING PROGRAM

## 2023-01-05 PROCEDURE — 1160F RVW MEDS BY RX/DR IN RCRD: CPT | Mod: CPTII,S$GLB,, | Performed by: INTERNAL MEDICINE

## 2023-01-05 PROCEDURE — 1159F PR MEDICATION LIST DOCUMENTED IN MEDICAL RECORD: ICD-10-PCS | Mod: CPTII,S$GLB,, | Performed by: INTERNAL MEDICINE

## 2023-01-05 PROCEDURE — 99215 OFFICE O/P EST HI 40 MIN: CPT | Mod: S$GLB,,, | Performed by: INTERNAL MEDICINE

## 2023-01-05 PROCEDURE — 3078F PR MOST RECENT DIASTOLIC BLOOD PRESSURE < 80 MM HG: ICD-10-PCS | Mod: CPTII,S$GLB,, | Performed by: INTERNAL MEDICINE

## 2023-01-05 PROCEDURE — 3008F PR BODY MASS INDEX (BMI) DOCUMENTED: ICD-10-PCS | Mod: CPTII,S$GLB,, | Performed by: INTERNAL MEDICINE

## 2023-01-05 PROCEDURE — 3078F DIAST BP <80 MM HG: CPT | Mod: CPTII,S$GLB,, | Performed by: INTERNAL MEDICINE

## 2023-01-05 PROCEDURE — 93005 ELECTROCARDIOGRAM TRACING: CPT

## 2023-01-05 RX ORDER — HYDROCODONE BITARTRATE AND HOMATROPINE METHYLBROMIDE ORAL SOLUTION 5; 1.5 MG/5ML; MG/5ML
5 LIQUID ORAL EVERY 4 HOURS PRN
Qty: 473 ML | Refills: 0 | Status: SHIPPED | OUTPATIENT
Start: 2023-01-05 | End: 2023-02-14 | Stop reason: SDUPTHER

## 2023-01-05 RX ORDER — HYDROCODONE BITARTRATE AND HOMATROPINE METHYLBROMIDE ORAL SOLUTION 5; 1.5 MG/5ML; MG/5ML
5 LIQUID ORAL EVERY 4 HOURS PRN
Qty: 473 ML | Refills: 0 | Status: SHIPPED | OUTPATIENT
Start: 2023-01-05 | End: 2023-01-05 | Stop reason: SDUPTHER

## 2023-01-05 NOTE — PROGRESS NOTES
Subjective:       Patient ID: Shaneka Ahmadi is a 53 y.o. female.    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  Patient is here for discussion of treatment goals.  ECOG status 2    Past Medical History:   Diagnosis Date    Diabetes mellitus     Hypertension     Malignant neoplasm of lower lobe of left lung 12/9/2022    Secondary malignant neoplasm of bone 12/5/2022     Family History   Problem Relation Age of Onset    Heart failure Father      Social History     Socioeconomic History    Marital status:    Tobacco Use    Smoking status: Never     Passive exposure: Never    Smokeless tobacco: Never   Substance and Sexual Activity    Alcohol use: Never    Drug use: Never    Sexual activity: Yes     Partners: Male     Birth control/protection: None     Comment: tubal     Past Surgical History:   Procedure Laterality Date    CATARACT EXTRACTION W/  INTRAOCULAR LENS IMPLANT Right 06/02/2022    TUBAL LIGATION      2007--postpartum tubal ligation       Labs:  Lab Results   Component Value Date    WBC 12.26 11/25/2022    HGB 13.7 11/25/2022    HCT 41.4 11/25/2022    MCV 81 (L) 11/25/2022     11/25/2022     BMP  Lab Results   Component Value Date     11/25/2022    K 3.6 11/25/2022     11/25/2022    CO2 27 11/25/2022    BUN 12 11/25/2022    CREATININE 0.8 11/25/2022    CALCIUM 9.9 11/25/2022    ANIONGAP 15 11/25/2022    ESTGFRAFRICA >60.0 07/28/2022    EGFRNONAA >60.0 07/28/2022     Lab Results   Component Value Date    ALT 28 11/25/2022    AST 18 11/25/2022    ALKPHOS 142 (H) 11/25/2022    BILITOT 0.4 11/25/2022       No results found for: IRON, TIBC, FERRITIN, SATURATEDIRO  No results found for: [id number]  No results found for: FOLATE  Lab Results   Component Value Date    TSH 1.742 04/08/2022         Review of Systems    Constitutional:  Positive for fatigue. Negative for activity change, appetite change, chills, diaphoresis, fever and unexpected weight change.   HENT:  Negative for congestion, dental problem, drooling, ear discharge, ear pain, facial swelling, hearing loss, mouth sores, nosebleeds, postnasal drip, rhinorrhea, sinus pressure, sneezing, sore throat, tinnitus, trouble swallowing and voice change.    Eyes:  Negative for photophobia, pain, discharge, redness, itching and visual disturbance.   Respiratory:  Positive for cough. Negative for choking, chest tightness, shortness of breath, wheezing and stridor.    Cardiovascular:  Negative for chest pain, palpitations and leg swelling.   Gastrointestinal:  Negative for abdominal distention, abdominal pain, anal bleeding, blood in stool, constipation, diarrhea, nausea, rectal pain and vomiting.   Endocrine: Negative for cold intolerance, heat intolerance, polydipsia, polyphagia and polyuria.   Genitourinary:  Negative for decreased urine volume, difficulty urinating, dyspareunia, dysuria, enuresis, flank pain, frequency, genital sores, hematuria, menstrual problem, pelvic pain, urgency, vaginal bleeding, vaginal discharge and vaginal pain.   Musculoskeletal:  Negative for arthralgias, back pain, gait problem, joint swelling, myalgias, neck pain and neck stiffness.   Skin:  Negative for color change, pallor and rash.   Allergic/Immunologic: Negative for environmental allergies, food allergies and immunocompromised state.   Neurological:  Positive for weakness. Negative for dizziness, tremors, seizures, syncope, facial asymmetry, speech difficulty, light-headedness, numbness and headaches.   Hematological:  Negative for adenopathy. Does not bruise/bleed easily.   Psychiatric/Behavioral:  Positive for dysphoric mood. Negative for agitation, behavioral problems, confusion, decreased concentration, hallucinations, self-injury, sleep disturbance and suicidal ideas. The patient is  nervous/anxious. The patient is not hyperactive.      Objective:      Physical Exam  Vitals reviewed.   Constitutional:       General: She is not in acute distress.     Appearance: She is well-developed. She is obese. She is ill-appearing. She is not diaphoretic.   HENT:      Head: Normocephalic and atraumatic.      Right Ear: External ear normal.      Left Ear: External ear normal.      Nose: Nose normal.      Right Sinus: No maxillary sinus tenderness or frontal sinus tenderness.      Left Sinus: No maxillary sinus tenderness or frontal sinus tenderness.      Mouth/Throat:      Pharynx: No oropharyngeal exudate.   Eyes:      General: Lids are normal. No scleral icterus.        Right eye: No discharge.         Left eye: No discharge.      Conjunctiva/sclera: Conjunctivae normal.      Right eye: Right conjunctiva is not injected. No hemorrhage.     Left eye: Left conjunctiva is not injected. No hemorrhage.     Pupils: Pupils are equal, round, and reactive to light.   Neck:      Thyroid: No thyromegaly.      Vascular: No JVD.      Trachea: No tracheal deviation.   Cardiovascular:      Rate and Rhythm: Normal rate.   Pulmonary:      Effort: Pulmonary effort is normal. No respiratory distress.      Breath sounds: No stridor.   Chest:      Chest wall: No tenderness.   Abdominal:      General: Bowel sounds are normal. There is no distension.      Palpations: Abdomen is soft. There is no hepatomegaly, splenomegaly or mass.      Tenderness: There is no abdominal tenderness. There is no rebound.   Musculoskeletal:         General: No tenderness. Normal range of motion.      Cervical back: Normal range of motion and neck supple.   Lymphadenopathy:      Cervical: No cervical adenopathy.      Upper Body:      Right upper body: No supraclavicular adenopathy.      Left upper body: No supraclavicular adenopathy.   Skin:     General: Skin is dry.      Findings: No erythema or rash.   Neurological:      Mental Status: She is alert  and oriented to person, place, and time.      Cranial Nerves: No cranial nerve deficit.      Coordination: Coordination normal.   Psychiatric:         Behavior: Behavior normal.         Thought Content: Thought content normal.         Judgment: Judgment normal.           Assessment:      1. Malignant neoplasm of lower lobe of left lung    2. Secondary malignant neoplasm of bone    3. Essential hypertension    4. Immunodeficiency due to chemotherapy    5. Morbid obesity    6. 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  Can be seen back by [facility] to review.  Standing labs placed as well CBC CMP.  Discussed implications answered questions article from up-to-date followed to her for her review on information on medication as well as on nature of her disease status.Consented the patient to the treatment plan and the patient was educated on the planned duration of the treatment and schedule of the treatment administration.     Med Onc Chart Routing      Follow up with physician 4 weeks.   Follow up with DOLLY 2 weeks.   Infusion scheduling note    Injection scheduling note    Labs CBC and CMP   Lab interval:     Imaging   Patient will need EKG performed at baseline again in 2 weeks and again in 4 weeks prior   Pharmacy appointment    Other referrals           Reese Mcfarlane Jr, MD FACP

## 2023-01-06 ENCOUNTER — HOSPITAL ENCOUNTER (OUTPATIENT)
Dept: RADIOLOGY | Facility: HOSPITAL | Age: 53
Discharge: HOME | End: 2023-01-06
Attending: RADIOLOGY
Payer: MEDICAID

## 2023-01-06 ENCOUNTER — TELEPHONE (OUTPATIENT)
Dept: HEMATOLOGY/ONCOLOGY | Facility: CLINIC | Age: 53
End: 2023-01-06
Payer: COMMERCIAL

## 2023-01-06 ENCOUNTER — SPECIALTY PHARMACY (OUTPATIENT)
Dept: PHARMACY | Facility: CLINIC | Age: 53
End: 2023-01-06
Payer: COMMERCIAL

## 2023-01-06 ENCOUNTER — HOSPITAL ENCOUNTER (OUTPATIENT)
Dept: RADIATION THERAPY | Facility: HOSPITAL | Age: 53
Discharge: HOME | End: 2023-01-06
Attending: RADIOLOGY
Payer: COMMERCIAL

## 2023-01-06 DIAGNOSIS — C34.32 MALIGNANT NEOPLASM OF LOWER LOBE OF LEFT LUNG: Primary | ICD-10-CM

## 2023-01-06 PROCEDURE — 77014 HC CT GUIDANCE RADIATION THERAPY FLDS PLACEMENT: CPT | Mod: TC | Performed by: RADIOLOGY

## 2023-01-06 PROCEDURE — 77290 THER RAD SIMULAJ FIELD CPLX: CPT | Mod: TC | Performed by: RADIOLOGY

## 2023-01-06 PROCEDURE — 77263 PR  RADIATION THERAPY PLAN COMPLEX: ICD-10-PCS | Mod: ,,, | Performed by: RADIOLOGY

## 2023-01-06 PROCEDURE — 77290 THER RAD SIMULAJ FIELD CPLX: CPT | Mod: 26,,, | Performed by: RADIOLOGY

## 2023-01-06 PROCEDURE — 77263 THER RADIOLOGY TX PLNG CPLX: CPT | Mod: ,,, | Performed by: RADIOLOGY

## 2023-01-06 PROCEDURE — 77334 PR  RADN TREATMENT AID(S) COMPLX: ICD-10-PCS | Mod: 26,,, | Performed by: RADIOLOGY

## 2023-01-06 PROCEDURE — 77334 RADIATION TREATMENT AID(S): CPT | Mod: TC | Performed by: RADIOLOGY

## 2023-01-06 PROCEDURE — 77334 RADIATION TREATMENT AID(S): CPT | Mod: 26,,, | Performed by: RADIOLOGY

## 2023-01-06 PROCEDURE — 77290 PR  SET RADN THERAPY FIELD COMPLEX: ICD-10-PCS | Mod: 26,,, | Performed by: RADIOLOGY

## 2023-01-06 NOTE — TELEPHONE ENCOUNTER
Queenie, this is Kalen Oliva with [facility].  We are working on your prescription that your doctor has sent us. We will be working with your insurance to get this approved for you. We will be calling you along the way with updates on your medication.  If you have any questions, you can reach us at [phone].    Welcome call outcome: Patient/caregiver reached

## 2023-01-06 NOTE — TELEPHONE ENCOUNTER
New RX received for Tagrisso. Message sent to [facility] to confirm appropriateness based on pts EGFR mutations. Will continue to follow up.

## 2023-01-06 NOTE — TELEPHONE ENCOUNTER
Called to update patient concerning status of new medication. Let her know we are discussing with her provider the best option for her before proceeding. Pt confirmed understanding and has [facility] phone number for any questions.

## 2023-01-07 NOTE — PLAN OF CARE
DISCONTINUE ON PATHWAY REGIMEN - Non-Small Cell Lung    No Medical Intervention - Off Treatment.    REASON: Other Reason  PRIOR TREATMENT:     START OFF PATHWAY REGIMEN - Non-Small Cell Lung            Amivantamab-vmjw ([facility])       Amivantamab-vmjw ([facility])       Amivantamab-vmjw ([facility])       Amivantamab-vmjw ([facility])           Additional Orders: For patients weighing less than 80 kg, the   recommended dose is 1,050 mg; for patients weighing greater than or equal to 80   kg, the recommended dose is 1,400 mg. The initial dose is given as a split   infusion on days 1 and 2 of cycle 1 only. See PI for details.    **Always confirm dose/schedule in your pharmacy ordering system**    Patient Characteristics:  Stage IV Metastatic, Nonsquamous, Molecular Analysis Completed, Molecular   Alteration Present and Eligible for Molecular Targeted Therapy, Initial   Molecular Targeted Therapy, EGFR Mutation ? Exon 20 Insertion, No Prior   Platinum-Based Chemotherapy  Therapeutic Status: Stage IV Metastatic  Histology: Nonsquamous Cell  Broad Molecular Profiling Status: Molecular Analysis Completed  Molecular Analysis Results: Alteration Present and Eligible for Molecular   Targeted Therapy  Molecular Alteration Present: EGFR Mutation - Exon 20 Insertion  Molecular Targeted Line of Therapy: Initial Molecular Targeted Therapy  Intent of Therapy:  Non-Curative / Palliative Intent, Discussed with Patient

## 2023-01-07 NOTE — PLAN OF CARE
DISCONTINUE OFF PATHWAY REGIMEN - Non-Small Cell Lung            Pembrolizumab (Keytruda)           Additional Orders: Serious immune-mediated adverse events can occur with   pembrolizumab. Please monitor your patient and refer to the linked   immune-mediated adverse reaction management materials for more information.    **Always confirm dose/schedule in your pharmacy ordering system**    REASON: Other Reason  PRIOR TREATMENT:   TREATMENT RESPONSE: Unable to Evaluate    Non-Small Cell Lung - No Medical Intervention - Off Treatment.    Patient Characteristics:  Stage IV Metastatic, Nonsquamous, Molecular Analysis Completed, Molecular   Alteration Present and Targeted Therapy Exhausted OR EGFR Exon 20+ or KRAS G12C+   or HER2+ Present and No Prior Chemo/Immunotherapy OR No Alteration Present,   Initial Chemotherapy/Immunotherapy, PS = 0, 1, No Alteration Present, Did Not   Order Molecular Analysis/Quantity Not Sufficient for Molecular Analysis  Therapeutic Status: Stage IV Metastatic  Histology: Nonsquamous Cell  Broad Molecular Profiling Status: Molecular Analysis Completed  Molecular Analysis Results: No Alteration Present  ECOG Performance Status: 1  Chemotherapy/Immunotherapy Line of Therapy: Initial Chemotherapy/Immunotherapy  EGFR Exons 18-21 Mutation Testing Status: Quantity Not Sufficient  ALK Fusion/Rearrangement Testing Status: Quantity Not Sufficient  BRAF V600 Mutation Testing Status: Quantity Not Sufficient  KRAS G12C Mutation Testing Status: Quantity Not Sufficient  MET Exon 14 Mutation Testing Status: Quantity Not Sufficient  RET Fusion/Rearrangement Testing Status: Quantity Not Sufficient  HER2 Mutation Testing Status: Quantity Not Sufficient  NTRK Fusion/Rearrangement Testing Status: Quantity Not Sufficient  ROS1 Fusion/Rearrangement Testing Status: Quantity Not Sufficient   Patient brought to triage with mother. Per mother, patient abdominal pain and fever. Patient c\o n\v and headache. Mother states the symptoms began this afternoon. Patient tearful in triage.

## 2023-01-09 ENCOUNTER — LAB VISIT (OUTPATIENT)
Dept: LAB | Facility: HOSPITAL | Age: 53
End: 2023-01-09
Attending: NURSE PRACTITIONER
Payer: COMMERCIAL

## 2023-01-09 DIAGNOSIS — C34.32 MALIGNANT NEOPLASM OF LOWER LOBE OF LEFT LUNG: ICD-10-CM

## 2023-01-09 PROCEDURE — 77334 PR  RADN TREATMENT AID(S) COMPLX: ICD-10-PCS | Mod: 26,,, | Performed by: RADIOLOGY

## 2023-01-09 PROCEDURE — 77300 PR RADIATION THERAPY,DOSIMETRY PLAN: ICD-10-PCS | Mod: 26,,, | Performed by: RADIOLOGY

## 2023-01-09 PROCEDURE — 77295 PR 3D RADIOTHERAPY PLAN: ICD-10-PCS | Mod: 26,,, | Performed by: RADIOLOGY

## 2023-01-09 PROCEDURE — 77295 3-D RADIOTHERAPY PLAN: CPT | Mod: TC | Performed by: RADIOLOGY

## 2023-01-09 PROCEDURE — 77300 RADIATION THERAPY DOSE PLAN: CPT | Mod: TC | Performed by: RADIOLOGY

## 2023-01-09 PROCEDURE — 36415 COLL VENOUS BLD VENIPUNCTURE: CPT | Performed by: INTERNAL MEDICINE

## 2023-01-09 PROCEDURE — 77334 RADIATION TREATMENT AID(S): CPT | Mod: 26,,, | Performed by: RADIOLOGY

## 2023-01-09 PROCEDURE — 77295 3-D RADIOTHERAPY PLAN: CPT | Mod: 26,,, | Performed by: RADIOLOGY

## 2023-01-09 PROCEDURE — 77300 RADIATION THERAPY DOSE PLAN: CPT | Mod: 26,,, | Performed by: RADIOLOGY

## 2023-01-09 PROCEDURE — 77334 RADIATION TREATMENT AID(S): CPT | Mod: TC | Performed by: RADIOLOGY

## 2023-01-10 ENCOUNTER — DOCUMENTATION ONLY (OUTPATIENT)
Dept: RADIATION ONCOLOGY | Facility: CLINIC | Age: 53
End: 2023-01-10
Payer: COMMERCIAL

## 2023-01-10 ENCOUNTER — DOCUMENTATION ONLY (OUTPATIENT)
Dept: HEMATOLOGY/ONCOLOGY | Facility: CLINIC | Age: 53
End: 2023-01-10

## 2023-01-10 ENCOUNTER — OFFICE VISIT (OUTPATIENT)
Dept: HEMATOLOGY/ONCOLOGY | Facility: CLINIC | Age: 53
End: 2023-01-10
Payer: COMMERCIAL

## 2023-01-10 DIAGNOSIS — C34.32 MALIGNANT NEOPLASM OF LOWER LOBE OF LEFT LUNG: Primary | ICD-10-CM

## 2023-01-10 DIAGNOSIS — E66.01 MORBID OBESITY: ICD-10-CM

## 2023-01-10 DIAGNOSIS — Z79.899 IMMUNODEFICIENCY DUE TO CHEMOTHERAPY: ICD-10-CM

## 2023-01-10 DIAGNOSIS — T45.1X5A IMMUNODEFICIENCY DUE TO CHEMOTHERAPY: ICD-10-CM

## 2023-01-10 DIAGNOSIS — D84.821 IMMUNODEFICIENCY DUE TO CHEMOTHERAPY: ICD-10-CM

## 2023-01-10 DIAGNOSIS — C79.51 SECONDARY MALIGNANT NEOPLASM OF BONE: ICD-10-CM

## 2023-01-10 DIAGNOSIS — E11.9 TYPE 2 DIABETES MELLITUS WITHOUT COMPLICATION, WITHOUT LONG-TERM CURRENT USE OF INSULIN: ICD-10-CM

## 2023-01-10 PROCEDURE — 77387 PR GUIDANCE FOR RADIATION TREATMENT DELIVERY: ICD-10-PCS | Mod: 26,,, | Performed by: RADIOLOGY

## 2023-01-10 PROCEDURE — 77412 RADIATION TX DELIVERY LVL 3: CPT | Performed by: RADIOLOGY

## 2023-01-10 PROCEDURE — 77387 GUIDANCE FOR RADJ TX DLVR: CPT | Mod: TC | Performed by: RADIOLOGY

## 2023-01-10 PROCEDURE — 99214 OFFICE O/P EST MOD 30 MIN: CPT | Mod: 95,,, | Performed by: INTERNAL MEDICINE

## 2023-01-10 PROCEDURE — 77387 GUIDANCE FOR RADJ TX DLVR: CPT | Mod: 26,,, | Performed by: RADIOLOGY

## 2023-01-10 PROCEDURE — 99214 PR OFFICE/OUTPT VISIT, EST, LEVL IV, 30-39 MIN: ICD-10-PCS | Mod: 95,,, | Performed by: INTERNAL MEDICINE

## 2023-01-10 PROCEDURE — 77417 THER RADIOLOGY PORT IMAGE(S): CPT | Performed by: RADIOLOGY

## 2023-01-10 NOTE — PROGRESS NOTES
Subjective:       Patient ID: Shaneka Ahmadi is a 53 y.o. female.    Chief Complaint: Results and Lung Cancer    HPI:  53-year-old female nonsmoker patient recently diagnosed with stage IV metastatic non-small cell lung carcinoma with an Exon 20 mutation.  The patient is here for review of treatment options originally prescribed Targresso.  Recommendations from [facility] and pharmacy consideration of other medication more amenable to her specific mutation seen in video visit consultation ECOG status 1    Past Medical History:   Diagnosis Date    Diabetes mellitus     Hypertension     Malignant neoplasm of lower lobe of left lung 12/9/2022    Secondary malignant neoplasm of bone 12/5/2022     Family History   Problem Relation Age of Onset    Heart failure Father      Social History     Socioeconomic History    Marital status:    Tobacco Use    Smoking status: Never     Passive exposure: Never    Smokeless tobacco: Never   Substance and Sexual Activity    Alcohol use: Never    Drug use: Never    Sexual activity: Yes     Partners: Male     Birth control/protection: None     Comment: tubal     Past Surgical History:   Procedure Laterality Date    CATARACT EXTRACTION W/  INTRAOCULAR LENS IMPLANT Right 06/02/2022    TUBAL LIGATION      2007--postpartum tubal ligation       Labs:  Lab Results   Component Value Date    WBC 12.26 11/25/2022    HGB 13.7 11/25/2022    HCT 41.4 11/25/2022    MCV 81 (L) 11/25/2022     11/25/2022     BMP  Lab Results   Component Value Date     11/25/2022    K 3.6 11/25/2022     11/25/2022    CO2 27 11/25/2022    BUN 12 11/25/2022    CREATININE 0.8 11/25/2022    CALCIUM 9.9 11/25/2022    ANIONGAP 15 11/25/2022    ESTGFRAFRICA >60.0 07/28/2022    EGFRNONAA >60.0 07/28/2022     Lab Results   Component Value Date    ALT 28 11/25/2022    AST 18 11/25/2022    ALKPHOS 142 (H) 11/25/2022    BILITOT 0.4 11/25/2022       No results found for: IRON, TIBC, FERRITIN,  SATURATEDIRO  No results found for: [id number]  No results found for: FOLATE  Lab Results   Component Value Date    TSH 1.742 04/08/2022         Review of Systems   Constitutional:  Negative for activity change, appetite change, chills, diaphoresis, fatigue, fever and unexpected weight change.   HENT:  Negative for congestion, dental problem, drooling, ear discharge, ear pain, facial swelling, hearing loss, mouth sores, nosebleeds, postnasal drip, rhinorrhea, sinus pressure, sneezing, sore throat, tinnitus, trouble swallowing and voice change.    Eyes:  Negative for photophobia, pain, discharge, redness, itching and visual disturbance.   Respiratory:  Negative for cough, choking, chest tightness, shortness of breath, wheezing and stridor.    Cardiovascular:  Negative for chest pain, palpitations and leg swelling.   Gastrointestinal:  Negative for abdominal distention, abdominal pain, anal bleeding, blood in stool, constipation, diarrhea, nausea, rectal pain and vomiting.   Endocrine: Negative for cold intolerance, heat intolerance, polydipsia, polyphagia and polyuria.   Genitourinary:  Negative for decreased urine volume, difficulty urinating, dyspareunia, dysuria, enuresis, flank pain, frequency, genital sores, hematuria, menstrual problem, pelvic pain, urgency, vaginal bleeding, vaginal discharge and vaginal pain.   Musculoskeletal:  Negative for arthralgias, back pain, gait problem, joint swelling, myalgias, neck pain and neck stiffness.   Skin:  Negative for color change, pallor and rash.   Allergic/Immunologic: Negative for environmental allergies, food allergies and immunocompromised state.   Neurological:  Negative for dizziness, tremors, seizures, syncope, facial asymmetry, speech difficulty, weakness, light-headedness, numbness and headaches.   Hematological:  Negative for adenopathy. Does not bruise/bleed easily.   Psychiatric/Behavioral:  Positive for dysphoric mood. Negative for agitation, behavioral  problems, confusion, decreased concentration, hallucinations, self-injury, sleep disturbance and suicidal ideas. The patient is nervous/anxious. The patient is not hyperactive.      Objective:      Physical Exam  Constitutional:       Appearance: Normal appearance. She is obese.           Assessment:      1. Malignant neoplasm of lower lobe of left lung    2. Secondary malignant neoplasm of bone    3. Immunodeficiency due to chemotherapy    4. Morbid obesity    5. Type 2 diabetes mellitus without complication, without long-term current use of insulin           Plan:   The patient location is:  Home  The chief complaint leading to consultation is:  Lung cancer    Visit type: audiovisual    Face to Face time with patient: 25 minutes of total time spent on the encounter, which includes face to face time and non-face to face time preparing to see the patient (eg, review of tests), Obtaining and/or reviewing separately obtained history, Documenting clinical information in the electronic or other health record, Independently interpreting results (not separately reported) and communicating results to the patient/family/caregiver, or Care coordination (not separately reported).         Each patient to whom he or she provides medical services by telemedicine is:  (1) informed of the relationship between the physician and patient and the respective role of any other health care provider with respect to management of the patient; and (2) notified that he or she may decline to receive medical services by telemedicine and may withdraw from such care at any time.    Notes:    Extensive conversation with patient and extensive discussion has been entertain between pharmacy and [facility] program.  At this point I have placed recommendations for her to be treated with RYBREVANT because of her specific mutation.  We will await insurance approval as to see whether not they will pay for this or do we need to proceed with  primary cisplatin treatment prior.  Discussed implications answered questions with her.  Will be back in touch as soon as we have information from her insurance as to whether not they will pay for this in the primary setting        Reese Mcfarlane Jr, MD FACP

## 2023-01-10 NOTE — PROGRESS NOTES
SW spoke with pt to introduce self and assess for any needs/concerns that pt may have r/t start of new treatment. SW provided a new pt folder and explained contents. Pt stated that she has no food/nutritional, financial or transportation concerns at this time. Pt will call SW at number provided if future needs arise. SW will remain available. Monserrat Hoffmann LMSW

## 2023-01-10 NOTE — PROGRESS NOTES
Subjective:       Patient ID: Shaneka Ahmadi is a 53 y.o. female.    Chief Complaint: Results and Lung Cancer    HPI: ***    Past Medical History:   Diagnosis Date    Diabetes mellitus     Hypertension     Malignant neoplasm of lower lobe of left lung 12/9/2022    Secondary malignant neoplasm of bone 12/5/2022     Family History   Problem Relation Age of Onset    Heart failure Father      Social History     Socioeconomic History    Marital status:    Tobacco Use    Smoking status: Never     Passive exposure: Never    Smokeless tobacco: Never   Substance and Sexual Activity    Alcohol use: Never    Drug use: Never    Sexual activity: Yes     Partners: Male     Birth control/protection: None     Comment: tubal     Past Surgical History:   Procedure Laterality Date    CATARACT EXTRACTION W/  INTRAOCULAR LENS IMPLANT Right 06/02/2022    TUBAL LIGATION      2007--postpartum tubal ligation       Labs:  Lab Results   Component Value Date    WBC 12.26 11/25/2022    HGB 13.7 11/25/2022    HCT 41.4 11/25/2022    MCV 81 (L) 11/25/2022     11/25/2022     BMP  Lab Results   Component Value Date     11/25/2022    K 3.6 11/25/2022     11/25/2022    CO2 27 11/25/2022    BUN 12 11/25/2022    CREATININE 0.8 11/25/2022    CALCIUM 9.9 11/25/2022    ANIONGAP 15 11/25/2022    ESTGFRAFRICA >60.0 07/28/2022    EGFRNONAA >60.0 07/28/2022     Lab Results   Component Value Date    ALT 28 11/25/2022    AST 18 11/25/2022    ALKPHOS 142 (H) 11/25/2022    BILITOT 0.4 11/25/2022       No results found for: IRON, TIBC, FERRITIN, SATURATEDIRO  No results found for: [id number]  No results found for: FOLATE  Lab Results   Component Value Date    TSH 1.742 04/08/2022         Review of Systems    Objective:      Physical Exam        Assessment:      1. Malignant neoplasm of lower lobe of left lung    2. Secondary malignant neoplasm of bone    3. Immunodeficiency due to chemotherapy    4. Morbid obesity    5. Type 2 diabetes  mellitus without complication, without long-term current use of insulin           Plan:     ***        Reese Mcfarlane Jr, MD FACP

## 2023-01-10 NOTE — PLAN OF CARE
Day 1 of outpatient xrt to the spine. Verbal instructions were given to the patient. Skin care & side effects were reviewed. Contact info was provided. Patient verbalized understanding.

## 2023-01-11 ENCOUNTER — TELEPHONE (OUTPATIENT)
Dept: HEMATOLOGY/ONCOLOGY | Facility: CLINIC | Age: 53
End: 2023-01-11
Payer: COMMERCIAL

## 2023-01-11 DIAGNOSIS — C34.32 MALIGNANT NEOPLASM OF LOWER LOBE OF LEFT LUNG: Primary | ICD-10-CM

## 2023-01-11 PROCEDURE — 77336 RADIATION PHYSICS CONSULT: CPT | Performed by: RADIOLOGY

## 2023-01-11 RX ORDER — HEPARIN 100 UNIT/ML
500 SYRINGE INTRAVENOUS
Status: CANCELLED | OUTPATIENT
Start: 2023-02-08

## 2023-01-11 RX ORDER — ACETAMINOPHEN 325 MG/1
650 TABLET ORAL
Status: CANCELLED
Start: 2023-02-01

## 2023-01-11 RX ORDER — DIPHENHYDRAMINE HYDROCHLORIDE 50 MG/ML
25 INJECTION INTRAMUSCULAR; INTRAVENOUS
Status: CANCELLED
Start: 2023-01-25

## 2023-01-11 RX ORDER — DIPHENHYDRAMINE HYDROCHLORIDE 50 MG/ML
50 INJECTION INTRAMUSCULAR; INTRAVENOUS ONCE AS NEEDED
Status: CANCELLED | OUTPATIENT
Start: 2023-01-18

## 2023-01-11 RX ORDER — ACETAMINOPHEN 325 MG/1
650 TABLET ORAL
Status: CANCELLED
Start: 2023-01-19

## 2023-01-11 RX ORDER — CYANOCOBALAMIN 1000 UG/ML
1000 INJECTION, SOLUTION INTRAMUSCULAR; SUBCUTANEOUS
Status: CANCELLED | OUTPATIENT
Start: 2023-01-19

## 2023-01-11 RX ORDER — EPINEPHRINE 0.3 MG/.3ML
0.3 INJECTION SUBCUTANEOUS ONCE AS NEEDED
Status: CANCELLED | OUTPATIENT
Start: 2023-01-19

## 2023-01-11 RX ORDER — SODIUM CHLORIDE 0.9 % (FLUSH) 0.9 %
10 SYRINGE (ML) INJECTION
Status: CANCELLED | OUTPATIENT
Start: 2023-02-01

## 2023-01-11 RX ORDER — ACETAMINOPHEN 325 MG/1
650 TABLET ORAL
Status: CANCELLED
Start: 2023-01-25

## 2023-01-11 RX ORDER — DIPHENHYDRAMINE HYDROCHLORIDE 50 MG/ML
50 INJECTION INTRAMUSCULAR; INTRAVENOUS ONCE AS NEEDED
Status: CANCELLED | OUTPATIENT
Start: 2023-02-08

## 2023-01-11 RX ORDER — HEPARIN 100 UNIT/ML
500 SYRINGE INTRAVENOUS
Status: CANCELLED | OUTPATIENT
Start: 2023-01-19

## 2023-01-11 RX ORDER — DEXAMETHASONE 4 MG/1
4 TABLET ORAL SEE ADMIN INSTRUCTIONS
Qty: 24 TABLET | Refills: 5 | Status: SHIPPED | OUTPATIENT
Start: 2023-01-11

## 2023-01-11 RX ORDER — DIPHENHYDRAMINE HYDROCHLORIDE 50 MG/ML
50 INJECTION INTRAMUSCULAR; INTRAVENOUS ONCE AS NEEDED
Status: CANCELLED | OUTPATIENT
Start: 2023-01-25

## 2023-01-11 RX ORDER — HEPARIN 100 UNIT/ML
500 SYRINGE INTRAVENOUS
Status: CANCELLED | OUTPATIENT
Start: 2023-01-25

## 2023-01-11 RX ORDER — HEPARIN 100 UNIT/ML
500 SYRINGE INTRAVENOUS
Status: CANCELLED | OUTPATIENT
Start: 2023-02-01

## 2023-01-11 RX ORDER — EPINEPHRINE 0.3 MG/.3ML
0.3 INJECTION SUBCUTANEOUS ONCE AS NEEDED
Status: CANCELLED | OUTPATIENT
Start: 2023-01-18

## 2023-01-11 RX ORDER — SODIUM CHLORIDE 0.9 % (FLUSH) 0.9 %
10 SYRINGE (ML) INJECTION
Status: CANCELLED | OUTPATIENT
Start: 2023-01-19

## 2023-01-11 RX ORDER — EPINEPHRINE 0.3 MG/.3ML
0.3 INJECTION SUBCUTANEOUS ONCE AS NEEDED
Status: CANCELLED | OUTPATIENT
Start: 2023-02-01

## 2023-01-11 RX ORDER — ONDANSETRON 8 MG/1
8 TABLET, ORALLY DISINTEGRATING ORAL EVERY 8 HOURS PRN
Qty: 90 TABLET | Refills: 5 | Status: SHIPPED | OUTPATIENT
Start: 2023-01-11

## 2023-01-11 RX ORDER — EPINEPHRINE 0.3 MG/.3ML
0.3 INJECTION SUBCUTANEOUS ONCE AS NEEDED
Status: CANCELLED | OUTPATIENT
Start: 2023-01-25

## 2023-01-11 RX ORDER — DEXAMETHASONE 4 MG/1
8 TABLET ORAL DAILY
Qty: 8 TABLET | Refills: 10 | Status: SHIPPED | OUTPATIENT
Start: 2023-01-18

## 2023-01-11 RX ORDER — HEPARIN 100 UNIT/ML
500 SYRINGE INTRAVENOUS
Status: CANCELLED | OUTPATIENT
Start: 2023-01-18

## 2023-01-11 RX ORDER — EPINEPHRINE 0.3 MG/.3ML
0.3 INJECTION SUBCUTANEOUS ONCE AS NEEDED
Status: CANCELLED | OUTPATIENT
Start: 2023-02-08

## 2023-01-11 RX ORDER — SODIUM CHLORIDE 0.9 % (FLUSH) 0.9 %
10 SYRINGE (ML) INJECTION
Status: CANCELLED | OUTPATIENT
Start: 2023-01-18

## 2023-01-11 RX ORDER — FOLIC ACID 1 MG/1
1 TABLET ORAL DAILY
Qty: 30 TABLET | Refills: 11 | Status: SHIPPED | OUTPATIENT
Start: 2023-01-11 | End: 2024-02-09 | Stop reason: SDUPTHER

## 2023-01-11 RX ORDER — DIPHENHYDRAMINE HYDROCHLORIDE 50 MG/ML
50 INJECTION INTRAMUSCULAR; INTRAVENOUS ONCE AS NEEDED
Status: CANCELLED | OUTPATIENT
Start: 2023-01-19

## 2023-01-11 RX ORDER — DIPHENHYDRAMINE HYDROCHLORIDE 50 MG/ML
50 INJECTION INTRAMUSCULAR; INTRAVENOUS ONCE AS NEEDED
Status: CANCELLED | OUTPATIENT
Start: 2023-02-01

## 2023-01-11 RX ORDER — ONDANSETRON 8 MG/1
8 TABLET, ORALLY DISINTEGRATING ORAL EVERY 8 HOURS PRN
Qty: 60 TABLET | Refills: 11 | Status: SHIPPED | OUTPATIENT
Start: 2023-01-17

## 2023-01-11 RX ORDER — DIPHENHYDRAMINE HYDROCHLORIDE 50 MG/ML
25 INJECTION INTRAMUSCULAR; INTRAVENOUS
Status: CANCELLED
Start: 2023-02-08

## 2023-01-11 RX ORDER — ACETAMINOPHEN 325 MG/1
650 TABLET ORAL
Status: CANCELLED
Start: 2023-02-08

## 2023-01-11 RX ORDER — DIPHENHYDRAMINE HYDROCHLORIDE 50 MG/ML
25 INJECTION INTRAMUSCULAR; INTRAVENOUS
Status: CANCELLED
Start: 2023-01-19

## 2023-01-11 RX ORDER — ACETAMINOPHEN 325 MG/1
650 TABLET ORAL
Status: CANCELLED
Start: 2023-01-18

## 2023-01-11 RX ORDER — SODIUM CHLORIDE 0.9 % (FLUSH) 0.9 %
10 SYRINGE (ML) INJECTION
Status: CANCELLED | OUTPATIENT
Start: 2023-02-08

## 2023-01-11 RX ORDER — DIPHENHYDRAMINE HYDROCHLORIDE 50 MG/ML
25 INJECTION INTRAMUSCULAR; INTRAVENOUS
Status: CANCELLED
Start: 2023-02-01

## 2023-01-11 RX ORDER — DIPHENHYDRAMINE HYDROCHLORIDE 50 MG/ML
25 INJECTION INTRAMUSCULAR; INTRAVENOUS
Status: CANCELLED
Start: 2023-01-18

## 2023-01-11 RX ORDER — SODIUM CHLORIDE 0.9 % (FLUSH) 0.9 %
10 SYRINGE (ML) INJECTION
Status: CANCELLED | OUTPATIENT
Start: 2023-01-25

## 2023-01-11 NOTE — PROGRESS NOTES
Subjective:       Patient ID: Shaneka Ahmadi is a 53 y.o. female.    Chief Complaint: No chief complaint on file.    HPI: ***    Past Medical History:   Diagnosis Date    Diabetes mellitus     Hypertension     Malignant neoplasm of lower lobe of left lung 12/9/2022    Secondary malignant neoplasm of bone 12/5/2022     Family History   Problem Relation Age of Onset    Heart failure Father      Social History     Socioeconomic History    Marital status:    Tobacco Use    Smoking status: Never     Passive exposure: Never    Smokeless tobacco: Never   Substance and Sexual Activity    Alcohol use: Never    Drug use: Never    Sexual activity: Yes     Partners: Male     Birth control/protection: None     Comment: tubal     Past Surgical History:   Procedure Laterality Date    CATARACT EXTRACTION W/  INTRAOCULAR LENS IMPLANT Right 06/02/2022    TUBAL LIGATION      2007--postpartum tubal ligation       Labs:  Lab Results   Component Value Date    WBC 12.26 11/25/2022    HGB 13.7 11/25/2022    HCT 41.4 11/25/2022    MCV 81 (L) 11/25/2022     11/25/2022     BMP  Lab Results   Component Value Date     11/25/2022    K 3.6 11/25/2022     11/25/2022    CO2 27 11/25/2022    BUN 12 11/25/2022    CREATININE 0.8 11/25/2022    CALCIUM 9.9 11/25/2022    ANIONGAP 15 11/25/2022    ESTGFRAFRICA >60.0 07/28/2022    EGFRNONAA >60.0 07/28/2022     Lab Results   Component Value Date    ALT 28 11/25/2022    AST 18 11/25/2022    ALKPHOS 142 (H) 11/25/2022    BILITOT 0.4 11/25/2022       No results found for: IRON, TIBC, FERRITIN, SATURATEDIRO  No results found for: [id number]  No results found for: FOLATE  Lab Results   Component Value Date    TSH 1.742 04/08/2022         Review of Systems    Objective:      Physical Exam        Assessment:      No diagnosis found.       Plan:     ***        Reese Mcfarlane Jr, MD FACP

## 2023-01-11 NOTE — NURSING
1348pm: Outgoing call to pt regarding Rybrevant approval rejected. Spoke with pt. Pt aware that Rybrevant would be submitted for approval if approved would move forward with set-up. If rejected, will fup with Dr. Mcfarlane to discuss next drugs of choice. Pt scheduled on 1/19 at 820 am at [facility] with Dr. Mcfarlane to discuss chemo drugs Carboplatin Alimta for 1st line treatment. Pt verbalized understanding. Pt plan to report to appt as scheduled.

## 2023-01-19 ENCOUNTER — OFFICE VISIT (OUTPATIENT)
Dept: HEMATOLOGY/ONCOLOGY | Facility: CLINIC | Age: 53
End: 2023-01-19
Payer: COMMERCIAL

## 2023-01-19 ENCOUNTER — INFUSION (OUTPATIENT)
Dept: INFUSION THERAPY | Facility: HOSPITAL | Age: 53
End: 2023-01-19
Attending: INTERNAL MEDICINE
Payer: COMMERCIAL

## 2023-01-19 VITALS
HEART RATE: 64 BPM | BODY MASS INDEX: 50.02 KG/M2 | DIASTOLIC BLOOD PRESSURE: 73 MMHG | WEIGHT: 293 LBS | HEIGHT: 64 IN | TEMPERATURE: 98 F | SYSTOLIC BLOOD PRESSURE: 119 MMHG | OXYGEN SATURATION: 95 %

## 2023-01-19 DIAGNOSIS — C79.51 SECONDARY MALIGNANT NEOPLASM OF BONE: ICD-10-CM

## 2023-01-19 DIAGNOSIS — E66.01 MORBID OBESITY: ICD-10-CM

## 2023-01-19 DIAGNOSIS — E11.9 TYPE 2 DIABETES MELLITUS WITHOUT COMPLICATION, WITHOUT LONG-TERM CURRENT USE OF INSULIN: ICD-10-CM

## 2023-01-19 DIAGNOSIS — Z79.899 IMMUNODEFICIENCY DUE TO CHEMOTHERAPY: ICD-10-CM

## 2023-01-19 DIAGNOSIS — D84.821 IMMUNODEFICIENCY DUE TO CHEMOTHERAPY: ICD-10-CM

## 2023-01-19 DIAGNOSIS — T45.1X5A IMMUNODEFICIENCY DUE TO CHEMOTHERAPY: ICD-10-CM

## 2023-01-19 DIAGNOSIS — C34.32 MALIGNANT NEOPLASM OF LOWER LOBE OF LEFT LUNG: Primary | ICD-10-CM

## 2023-01-19 DIAGNOSIS — I10 ESSENTIAL HYPERTENSION: ICD-10-CM

## 2023-01-19 PROCEDURE — 99215 OFFICE O/P EST HI 40 MIN: CPT | Mod: S$GLB,,, | Performed by: INTERNAL MEDICINE

## 2023-01-19 PROCEDURE — 99999 PR PBB SHADOW E&M-EST. PATIENT-LVL V: CPT | Mod: PBBFAC,,, | Performed by: INTERNAL MEDICINE

## 2023-01-19 PROCEDURE — 99215 PR OFFICE/OUTPT VISIT, EST, LEVL V, 40-54 MIN: ICD-10-PCS | Mod: S$GLB,,, | Performed by: INTERNAL MEDICINE

## 2023-01-19 PROCEDURE — 99215 OFFICE O/P EST HI 40 MIN: CPT | Mod: PBBFAC | Performed by: INTERNAL MEDICINE

## 2023-01-19 PROCEDURE — 1160F RVW MEDS BY RX/DR IN RCRD: CPT | Mod: CPTII,S$GLB,, | Performed by: INTERNAL MEDICINE

## 2023-01-19 PROCEDURE — 1159F MED LIST DOCD IN RCRD: CPT | Mod: CPTII,S$GLB,, | Performed by: INTERNAL MEDICINE

## 2023-01-19 PROCEDURE — 3078F PR MOST RECENT DIASTOLIC BLOOD PRESSURE < 80 MM HG: ICD-10-PCS | Mod: CPTII,S$GLB,, | Performed by: INTERNAL MEDICINE

## 2023-01-19 PROCEDURE — 1160F PR REVIEW ALL MEDS BY PRESCRIBER/CLIN PHARMACIST DOCUMENTED: ICD-10-PCS | Mod: CPTII,S$GLB,, | Performed by: INTERNAL MEDICINE

## 2023-01-19 PROCEDURE — 3074F PR MOST RECENT SYSTOLIC BLOOD PRESSURE < 130 MM HG: ICD-10-PCS | Mod: CPTII,S$GLB,, | Performed by: INTERNAL MEDICINE

## 2023-01-19 PROCEDURE — 3074F SYST BP LT 130 MM HG: CPT | Mod: CPTII,S$GLB,, | Performed by: INTERNAL MEDICINE

## 2023-01-19 PROCEDURE — 3008F PR BODY MASS INDEX (BMI) DOCUMENTED: ICD-10-PCS | Mod: CPTII,S$GLB,, | Performed by: INTERNAL MEDICINE

## 2023-01-19 PROCEDURE — 3078F DIAST BP <80 MM HG: CPT | Mod: CPTII,S$GLB,, | Performed by: INTERNAL MEDICINE

## 2023-01-19 PROCEDURE — 99999 PR PBB SHADOW E&M-EST. PATIENT-LVL V: ICD-10-PCS | Mod: PBBFAC,,, | Performed by: INTERNAL MEDICINE

## 2023-01-19 PROCEDURE — 3008F BODY MASS INDEX DOCD: CPT | Mod: CPTII,S$GLB,, | Performed by: INTERNAL MEDICINE

## 2023-01-19 PROCEDURE — 96372 THER/PROPH/DIAG INJ SC/IM: CPT

## 2023-01-19 PROCEDURE — 1159F PR MEDICATION LIST DOCUMENTED IN MEDICAL RECORD: ICD-10-PCS | Mod: CPTII,S$GLB,, | Performed by: INTERNAL MEDICINE

## 2023-01-19 PROCEDURE — 63600175 PHARM REV CODE 636 W HCPCS: Performed by: INTERNAL MEDICINE

## 2023-01-19 RX ORDER — CYANOCOBALAMIN 1000 UG/ML
1000 INJECTION, SOLUTION INTRAMUSCULAR; SUBCUTANEOUS
Start: 2023-02-01

## 2023-01-19 RX ORDER — SODIUM CHLORIDE 0.9 % (FLUSH) 0.9 %
10 SYRINGE (ML) INJECTION
OUTPATIENT
Start: 2023-02-01

## 2023-01-19 RX ORDER — SODIUM CHLORIDE 0.9 % (FLUSH) 0.9 %
10 SYRINGE (ML) INJECTION
Status: CANCELLED | OUTPATIENT
Start: 2023-02-01

## 2023-01-19 RX ORDER — HEPARIN 100 UNIT/ML
500 SYRINGE INTRAVENOUS
OUTPATIENT
Start: 2023-02-01

## 2023-01-19 RX ORDER — CYANOCOBALAMIN 1000 UG/ML
1000 INJECTION, SOLUTION INTRAMUSCULAR; SUBCUTANEOUS
Status: DISCONTINUED | OUTPATIENT
Start: 2023-01-19 | End: 2023-01-19 | Stop reason: HOSPADM

## 2023-01-19 RX ORDER — CYANOCOBALAMIN 1000 UG/ML
1000 INJECTION, SOLUTION INTRAMUSCULAR; SUBCUTANEOUS
Status: CANCELLED
Start: 2023-02-01

## 2023-01-19 RX ORDER — HEPARIN 100 UNIT/ML
500 SYRINGE INTRAVENOUS
Status: CANCELLED | OUTPATIENT
Start: 2023-02-01

## 2023-01-19 RX ADMIN — CYANOCOBALAMIN 1000 MCG: 1000 INJECTION INTRAMUSCULAR; SUBCUTANEOUS at 11:01

## 2023-01-19 NOTE — H&P (VIEW-ONLY)
Subjective:       Patient ID: Shaneka Ahmadi is a 53 y.o. female.    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  Patient is not a candidate for TARGRISSO treatment.  Patient has been presented at [facility] conference and is here for initiation systemic chemotherapy prior to targeted treatment availability based off of insurance denial ECOG status 1    Past Medical History:   Diagnosis Date    Diabetes mellitus     Hypertension     Malignant neoplasm of lower lobe of left lung 12/9/2022    Secondary malignant neoplasm of bone 12/5/2022     Family History   Problem Relation Age of Onset    Heart failure Father      Social History     Socioeconomic History    Marital status:    Tobacco Use    Smoking status: Never     Passive exposure: Never    Smokeless tobacco: Never   Substance and Sexual Activity    Alcohol use: Never    Drug use: Never    Sexual activity: Yes     Partners: Male     Birth control/protection: None     Comment: tubal     Past Surgical History:   Procedure Laterality Date    CATARACT EXTRACTION W/  INTRAOCULAR LENS IMPLANT Right 06/02/2022    TUBAL LIGATION      2007--postpartum tubal ligation       Labs:  Lab Results   Component Value Date    WBC 12.26 11/25/2022    HGB 13.7 11/25/2022    HCT 41.4 11/25/2022    MCV 81 (L) 11/25/2022     11/25/2022     BMP  Lab Results   Component Value Date     11/25/2022    K 3.6 11/25/2022     11/25/2022    CO2 27 11/25/2022    BUN 12 11/25/2022    CREATININE 0.8 11/25/2022    CALCIUM 9.9 11/25/2022    ANIONGAP 15 11/25/2022    ESTGFRAFRICA >60.0 07/28/2022    EGFRNONAA >60.0 07/28/2022     Lab Results   Component Value Date    ALT 28 11/25/2022    AST 18 11/25/2022    ALKPHOS 142 (H) 11/25/2022    BILITOT 0.4 11/25/2022        No results found for: IRON, TIBC, FERRITIN, SATURATEDIRO  No results found for: [id number]  No results found for: FOLATE  Lab Results   Component Value Date    TSH 1.742 04/08/2022         Review of Systems   Constitutional:  Positive for activity change, appetite change and fatigue. Negative for chills, diaphoresis, fever and unexpected weight change.   HENT:  Negative for congestion, dental problem, drooling, ear discharge, ear pain, facial swelling, hearing loss, mouth sores, nosebleeds, postnasal drip, rhinorrhea, sinus pressure, sneezing, sore throat, tinnitus, trouble swallowing and voice change.    Eyes:  Negative for photophobia, pain, discharge, redness, itching and visual disturbance.   Respiratory:  Negative for cough, choking, chest tightness, shortness of breath, wheezing and stridor.    Cardiovascular:  Negative for chest pain, palpitations and leg swelling.   Gastrointestinal:  Negative for abdominal distention, abdominal pain, anal bleeding, blood in stool, constipation, diarrhea, nausea, rectal pain and vomiting.   Endocrine: Negative for cold intolerance, heat intolerance, polydipsia, polyphagia and polyuria.   Genitourinary:  Negative for decreased urine volume, difficulty urinating, dyspareunia, dysuria, enuresis, flank pain, frequency, genital sores, hematuria, menstrual problem, pelvic pain, urgency, vaginal bleeding, vaginal discharge and vaginal pain.   Musculoskeletal:  Positive for arthralgias. Negative for back pain, gait problem, joint swelling, myalgias, neck pain and neck stiffness.   Skin:  Negative for color change, pallor and rash.   Allergic/Immunologic: Negative for environmental allergies, food allergies and immunocompromised state.   Neurological:  Positive for weakness. Negative for dizziness, tremors, seizures, syncope, facial asymmetry, speech difficulty, light-headedness, numbness and headaches.   Hematological:  Negative for adenopathy. Does not bruise/bleed easily.    Psychiatric/Behavioral:  Positive for dysphoric mood. Negative for agitation, behavioral problems, confusion, decreased concentration, hallucinations, self-injury, sleep disturbance and suicidal ideas. The patient is nervous/anxious. The patient is not hyperactive.      Objective:      Physical Exam  Vitals reviewed.   Constitutional:       General: She is not in acute distress.     Appearance: She is well-developed. She is obese. She is not diaphoretic.   HENT:      Head: Normocephalic and atraumatic.      Right Ear: External ear normal.      Left Ear: External ear normal.      Nose: Nose normal.      Right Sinus: No maxillary sinus tenderness or frontal sinus tenderness.      Left Sinus: No maxillary sinus tenderness or frontal sinus tenderness.      Mouth/Throat:      Pharynx: No oropharyngeal exudate.   Eyes:      General: Lids are normal. No scleral icterus.        Right eye: No discharge.         Left eye: No discharge.      Conjunctiva/sclera: Conjunctivae normal.      Right eye: Right conjunctiva is not injected. No hemorrhage.     Left eye: Left conjunctiva is not injected. No hemorrhage.     Pupils: Pupils are equal, round, and reactive to light.   Neck:      Thyroid: No thyromegaly.      Vascular: No JVD.      Trachea: No tracheal deviation.   Cardiovascular:      Rate and Rhythm: Normal rate.   Pulmonary:      Effort: Pulmonary effort is normal. No respiratory distress.      Breath sounds: No stridor.   Chest:      Chest wall: No tenderness.   Abdominal:      General: Bowel sounds are normal. There is no distension.      Palpations: Abdomen is soft. There is no hepatomegaly, splenomegaly or mass.      Tenderness: There is no abdominal tenderness. There is no rebound.   Musculoskeletal:         General: No tenderness. Normal range of motion.      Cervical back: Normal range of motion and neck supple.   Lymphadenopathy:      Cervical: No cervical adenopathy.      Upper Body:      Right upper body: No  supraclavicular adenopathy.      Left upper body: No supraclavicular adenopathy.   Skin:     General: Skin is dry.      Findings: No erythema or rash.   Neurological:      Mental Status: She is alert and oriented to person, place, and time.      Cranial Nerves: No cranial nerve deficit.      Motor: Weakness present.      Coordination: Coordination abnormal.      Gait: Gait abnormal.   Psychiatric:         Behavior: Behavior normal.         Thought Content: Thought content normal.         Judgment: Judgment normal.           Assessment:      1. Malignant neoplasm of lower lobe of left lung    2. Immunodeficiency due to chemotherapy    3. Essential hypertension    4. Secondary malignant neoplasm of bone    5. Morbid obesity    6. Type 2 diabetes mellitus without complication, without long-term current use of insulin           Plan:     Extensive conversation with patient.  At this time I have presented the location patient at [facility] conference reviewed information patient is not eligible for oral agent at present time in intravenous agent for targeted mutation is not approved by her insurance until she has failed a platinum-based regimen orders have been written for carboplatin Alimta B12 injection today consents have been signed.Consented the patient to the treatment plan and the patient was educated on the planned duration of the treatment and schedule of the treatment administration.    questions and nurse navigation team aware.  Will initiate systemic therapy.  I have asked the patient I have asked the patient to be seen by her dentist for I have asked the patient to be seen by her dentist for clearance so that we can begin treatment with Zometa    Med Onc Chart Routing      Follow up with physician 3 weeks and 2 weeks.   Follow up with DOLLY    Infusion scheduling note    Injection scheduling note Patient to begin treatment with carboplatin Alimta   Labs    Imaging    Pharmacy appointment    Other  referrals       Reese Mcfarlane Jr, MD FACP

## 2023-01-19 NOTE — PROGRESS NOTES
Subjective:       Patient ID: Shaneka Ahmadi is a 53 y.o. female.    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  Patient is not a candidate for TARGRISSO treatment.  Patient has been presented at [facility] conference and is here for initiation systemic chemotherapy prior to targeted treatment availability based off of insurance denial ECOG status 1    Past Medical History:   Diagnosis Date    Diabetes mellitus     Hypertension     Malignant neoplasm of lower lobe of left lung 12/9/2022    Secondary malignant neoplasm of bone 12/5/2022     Family History   Problem Relation Age of Onset    Heart failure Father      Social History     Socioeconomic History    Marital status:    Tobacco Use    Smoking status: Never     Passive exposure: Never    Smokeless tobacco: Never   Substance and Sexual Activity    Alcohol use: Never    Drug use: Never    Sexual activity: Yes     Partners: Male     Birth control/protection: None     Comment: tubal     Past Surgical History:   Procedure Laterality Date    CATARACT EXTRACTION W/  INTRAOCULAR LENS IMPLANT Right 06/02/2022    TUBAL LIGATION      2007--postpartum tubal ligation       Labs:  Lab Results   Component Value Date    WBC 12.26 11/25/2022    HGB 13.7 11/25/2022    HCT 41.4 11/25/2022    MCV 81 (L) 11/25/2022     11/25/2022     BMP  Lab Results   Component Value Date     11/25/2022    K 3.6 11/25/2022     11/25/2022    CO2 27 11/25/2022    BUN 12 11/25/2022    CREATININE 0.8 11/25/2022    CALCIUM 9.9 11/25/2022    ANIONGAP 15 11/25/2022    ESTGFRAFRICA >60.0 07/28/2022    EGFRNONAA >60.0 07/28/2022     Lab Results   Component Value Date    ALT 28 11/25/2022    AST 18 11/25/2022    ALKPHOS 142 (H) 11/25/2022    BILITOT 0.4 11/25/2022        No results found for: IRON, TIBC, FERRITIN, SATURATEDIRO  No results found for: [id number]  No results found for: FOLATE  Lab Results   Component Value Date    TSH 1.742 04/08/2022         Review of Systems   Constitutional:  Positive for activity change, appetite change and fatigue. Negative for chills, diaphoresis, fever and unexpected weight change.   HENT:  Negative for congestion, dental problem, drooling, ear discharge, ear pain, facial swelling, hearing loss, mouth sores, nosebleeds, postnasal drip, rhinorrhea, sinus pressure, sneezing, sore throat, tinnitus, trouble swallowing and voice change.    Eyes:  Negative for photophobia, pain, discharge, redness, itching and visual disturbance.   Respiratory:  Negative for cough, choking, chest tightness, shortness of breath, wheezing and stridor.    Cardiovascular:  Negative for chest pain, palpitations and leg swelling.   Gastrointestinal:  Negative for abdominal distention, abdominal pain, anal bleeding, blood in stool, constipation, diarrhea, nausea, rectal pain and vomiting.   Endocrine: Negative for cold intolerance, heat intolerance, polydipsia, polyphagia and polyuria.   Genitourinary:  Negative for decreased urine volume, difficulty urinating, dyspareunia, dysuria, enuresis, flank pain, frequency, genital sores, hematuria, menstrual problem, pelvic pain, urgency, vaginal bleeding, vaginal discharge and vaginal pain.   Musculoskeletal:  Positive for arthralgias. Negative for back pain, gait problem, joint swelling, myalgias, neck pain and neck stiffness.   Skin:  Negative for color change, pallor and rash.   Allergic/Immunologic: Negative for environmental allergies, food allergies and immunocompromised state.   Neurological:  Positive for weakness. Negative for dizziness, tremors, seizures, syncope, facial asymmetry, speech difficulty, light-headedness, numbness and headaches.   Hematological:  Negative for adenopathy. Does not bruise/bleed easily.    Psychiatric/Behavioral:  Positive for dysphoric mood. Negative for agitation, behavioral problems, confusion, decreased concentration, hallucinations, self-injury, sleep disturbance and suicidal ideas. The patient is nervous/anxious. The patient is not hyperactive.      Objective:      Physical Exam  Vitals reviewed.   Constitutional:       General: She is not in acute distress.     Appearance: She is well-developed. She is obese. She is not diaphoretic.   HENT:      Head: Normocephalic and atraumatic.      Right Ear: External ear normal.      Left Ear: External ear normal.      Nose: Nose normal.      Right Sinus: No maxillary sinus tenderness or frontal sinus tenderness.      Left Sinus: No maxillary sinus tenderness or frontal sinus tenderness.      Mouth/Throat:      Pharynx: No oropharyngeal exudate.   Eyes:      General: Lids are normal. No scleral icterus.        Right eye: No discharge.         Left eye: No discharge.      Conjunctiva/sclera: Conjunctivae normal.      Right eye: Right conjunctiva is not injected. No hemorrhage.     Left eye: Left conjunctiva is not injected. No hemorrhage.     Pupils: Pupils are equal, round, and reactive to light.   Neck:      Thyroid: No thyromegaly.      Vascular: No JVD.      Trachea: No tracheal deviation.   Cardiovascular:      Rate and Rhythm: Normal rate.   Pulmonary:      Effort: Pulmonary effort is normal. No respiratory distress.      Breath sounds: No stridor.   Chest:      Chest wall: No tenderness.   Abdominal:      General: Bowel sounds are normal. There is no distension.      Palpations: Abdomen is soft. There is no hepatomegaly, splenomegaly or mass.      Tenderness: There is no abdominal tenderness. There is no rebound.   Musculoskeletal:         General: No tenderness. Normal range of motion.      Cervical back: Normal range of motion and neck supple.   Lymphadenopathy:      Cervical: No cervical adenopathy.      Upper Body:      Right upper body: No  supraclavicular adenopathy.      Left upper body: No supraclavicular adenopathy.   Skin:     General: Skin is dry.      Findings: No erythema or rash.   Neurological:      Mental Status: She is alert and oriented to person, place, and time.      Cranial Nerves: No cranial nerve deficit.      Motor: Weakness present.      Coordination: Coordination abnormal.      Gait: Gait abnormal.   Psychiatric:         Behavior: Behavior normal.         Thought Content: Thought content normal.         Judgment: Judgment normal.           Assessment:      1. Malignant neoplasm of lower lobe of left lung    2. Immunodeficiency due to chemotherapy    3. Essential hypertension    4. Secondary malignant neoplasm of bone    5. Morbid obesity    6. Type 2 diabetes mellitus without complication, without long-term current use of insulin           Plan:     Extensive conversation with patient.  At this time I have presented the location patient at [facility] conference reviewed information patient is not eligible for oral agent at present time in intravenous agent for targeted mutation is not approved by her insurance until she has failed a platinum-based regimen orders have been written for carboplatin Alimta B12 injection today consents have been signed.Consented the patient to the treatment plan and the patient was educated on the planned duration of the treatment and schedule of the treatment administration.    questions and nurse navigation team aware.  Will initiate systemic therapy.  I have asked the patient I have asked the patient to be seen by her dentist for I have asked the patient to be seen by her dentist for clearance so that we can begin treatment with Zometa    Med Onc Chart Routing      Follow up with physician 3 weeks and 2 weeks.   Follow up with DOLLY    Infusion scheduling note    Injection scheduling note Patient to begin treatment with carboplatin Alimta   Labs    Imaging    Pharmacy appointment    Other  referrals       Reese Mcfarlane Jr, MD FACP

## 2023-01-20 ENCOUNTER — TELEPHONE (OUTPATIENT)
Dept: HEMATOLOGY/ONCOLOGY | Facility: CLINIC | Age: 53
End: 2023-01-20
Payer: COMMERCIAL

## 2023-01-20 ENCOUNTER — PATIENT MESSAGE (OUTPATIENT)
Dept: HEMATOLOGY/ONCOLOGY | Facility: CLINIC | Age: 53
End: 2023-01-20
Payer: COMMERCIAL

## 2023-01-20 ENCOUNTER — HOSPITAL ENCOUNTER (OUTPATIENT)
Dept: CARDIOLOGY | Facility: HOSPITAL | Age: 53
Discharge: HOME | End: 2023-01-20
Attending: INTERNAL MEDICINE
Payer: COMMERCIAL

## 2023-01-20 DIAGNOSIS — C34.32 MALIGNANT NEOPLASM OF LOWER LOBE OF LEFT LUNG: ICD-10-CM

## 2023-01-20 PROCEDURE — 93010 ELECTROCARDIOGRAM REPORT: CPT | Mod: ,,, | Performed by: INTERNAL MEDICINE

## 2023-01-20 PROCEDURE — 93005 ELECTROCARDIOGRAM TRACING: CPT

## 2023-01-20 PROCEDURE — 93010 EKG 12-LEAD: ICD-10-PCS | Mod: ,,, | Performed by: INTERNAL MEDICINE

## 2023-01-20 NOTE — NURSING
1141am: Outgoing call to pt regarding new chemo set-up. Spoke with pt. Pt received b12 injection yesterday and labs today. Pt scheduled for chemo education on  at 1 pm at [place] and for chemo infusion on  at 1 pm at . Pt informed that port requested via IR. Nurse ritesh should call pt to schedule prior to starting chemo on  if can't will have to do 1st tx without port. Pt verbalized understanding. Pt plan to report to appts as scheduled. SW and FC notified of pt chemo start date.   Oncology Navigation   Intake  Cancer Type: Other  Initial Nurse Navigator Contact: 23  Date Worked: 23  Multiple appointments: Yes  Start of Treatment: 23     Treatment  Current Status: Active       Medical Oncologist: Dr. Reese Mcfarlane  Consult Date: 22  Chemotherapy: Planned  Chemotherapy Regimen: Carboplatin Alimta       Procedures: PET scan; Biopsy  Biopsy Schedule Date: 22 (lung)  MRI Schedule Date: 22 (brain)  PET Scan Schedule Date: 22  Other Schedule Date: 23 (EKG)    General Referrals: Social Work  Social Work: notified via in-basket msg  Social Work Referral Date: 23          Support Systems: Spouse/significant other     Acuity  Stage: 2  Systemic Treatment - predicted or initiated: Chemotherapy Regimen with Multiple drugs (+1)  Treatment Tolerability: Has not started treatment yet/treatment fully completed and side effects resolved  ECO  Comorbidities in Medical History: 1   Needed: 0  Support: 0  Transportation: 0  History of noncompliance/frequent no shows and cancellations: 0  Verbalizes the need for more education: 1  Navigation Acuity: 6     Follow Up  No follow-ups on file.

## 2023-01-23 ENCOUNTER — TELEPHONE (OUTPATIENT)
Dept: RADIOLOGY | Facility: HOSPITAL | Age: 53
End: 2023-01-23
Payer: COMMERCIAL

## 2023-01-24 ENCOUNTER — TELEPHONE (OUTPATIENT)
Dept: HEMATOLOGY/ONCOLOGY | Facility: CLINIC | Age: 53
End: 2023-01-24
Payer: COMMERCIAL

## 2023-01-24 NOTE — NURSING
1311pm: Incoming call from pt regarding needing to switch chemo education appt time due to port placement. Spoke with pt. Pt rescheduled chemo education to  at 830 am at [facility] instead of 1 pm. Pt verbalized understanding. Pt plan to report to appt as scheduled.   Oncology Navigation   Intake  Cancer Type: Other  Initial Nurse Navigator Contact: 23  Date Worked: 23  Multiple appointments: Yes  Start of Treatment: 23     Treatment  Current Status: Active       Medical Oncologist: Dr. Reese Mcfarlane  Consult Date: 22  Chemotherapy: Planned  Chemotherapy Regimen: Carboplatin Alimta       Procedures: PET scan; Biopsy  Biopsy Schedule Date: 22 (lung)  MRI Schedule Date: 22 (brain)  PET Scan Schedule Date: 22  Other Schedule Date: 23 (EKG)    General Referrals: Social Work  Social Work: notified via in-basket msg  Social Work Referral Date: 23          Support Systems: Spouse/significant other     Acuity  Stage: 2  Systemic Treatment - predicted or initiated: Chemotherapy Regimen with Multiple drugs (+1)  Treatment Tolerability: Has not started treatment yet/treatment fully completed and side effects resolved  ECO  Comorbidities in Medical History: 1   Needed: 0  Support: 0  Transportation: 0  History of noncompliance/frequent no shows and cancellations: 0  Verbalizes the need for more education: 1  Navigation Acuity: 6     Follow Up  No follow-ups on file.

## 2023-01-26 ENCOUNTER — HOSPITAL ENCOUNTER (OUTPATIENT)
Dept: RADIOLOGY | Facility: HOSPITAL | Age: 53
Discharge: HOME | End: 2023-01-26
Attending: INTERNAL MEDICINE
Payer: COMMERCIAL

## 2023-01-26 ENCOUNTER — TELEPHONE (OUTPATIENT)
Dept: HEMATOLOGY/ONCOLOGY | Facility: CLINIC | Age: 53
End: 2023-01-26
Payer: COMMERCIAL

## 2023-01-26 ENCOUNTER — CLINICAL SUPPORT (OUTPATIENT)
Dept: HEMATOLOGY/ONCOLOGY | Facility: CLINIC | Age: 53
End: 2023-01-26
Payer: COMMERCIAL

## 2023-01-26 ENCOUNTER — HOSPITAL ENCOUNTER (OUTPATIENT)
Facility: HOSPITAL | Age: 53
Discharge: HOME | End: 2023-01-26
Attending: RADIOLOGY | Admitting: RADIOLOGY
Payer: COMMERCIAL

## 2023-01-26 DIAGNOSIS — C34.32 MALIGNANT NEOPLASM OF LOWER LOBE OF LEFT LUNG: ICD-10-CM

## 2023-01-26 PROCEDURE — 77001 FLUOROGUIDE FOR VEIN DEVICE: CPT | Mod: 26,,, | Performed by: RADIOLOGY

## 2023-01-26 PROCEDURE — 76937 US GUIDE VASCULAR ACCESS: CPT | Mod: TC | Performed by: RADIOLOGY

## 2023-01-26 PROCEDURE — 36561 INSERT TUNNELED CV CATH: CPT | Mod: RT | Performed by: RADIOLOGY

## 2023-01-26 PROCEDURE — 99152 MOD SED SAME PHYS/QHP 5/>YRS: CPT | Mod: ,,, | Performed by: RADIOLOGY

## 2023-01-26 PROCEDURE — 25000003 PHARM REV CODE 250: Performed by: RADIOLOGY

## 2023-01-26 PROCEDURE — 76937 US GUIDE VASCULAR ACCESS: CPT | Mod: 26,,, | Performed by: RADIOLOGY

## 2023-01-26 PROCEDURE — 36561 INSERT TUNNELED CV CATH: CPT | Mod: RT,,, | Performed by: RADIOLOGY

## 2023-01-26 PROCEDURE — 99152 PR MOD CONSCIOUS SEDATION, SAME PHYS, 5+ YRS, FIRST 15 MIN: ICD-10-PCS | Mod: ,,, | Performed by: RADIOLOGY

## 2023-01-26 PROCEDURE — 99152 MOD SED SAME PHYS/QHP 5/>YRS: CPT | Performed by: RADIOLOGY

## 2023-01-26 PROCEDURE — 76937 PR  US GUIDE, VASCULAR ACCESS: ICD-10-PCS | Mod: 26,,, | Performed by: RADIOLOGY

## 2023-01-26 PROCEDURE — 36561 IR TUNNELED CATH PLACEMENT WITH PORT: ICD-10-PCS | Mod: RT,,, | Performed by: RADIOLOGY

## 2023-01-26 PROCEDURE — 77001 CHG FLUOROGUIDE CNTRL VEN ACCESS,PLACE,REPLACE,REMOVE: ICD-10-PCS | Mod: 26,,, | Performed by: RADIOLOGY

## 2023-01-26 PROCEDURE — C1894 INTRO/SHEATH, NON-LASER: HCPCS | Performed by: RADIOLOGY

## 2023-01-26 PROCEDURE — C1788 PORT, INDWELLING, IMP: HCPCS | Performed by: RADIOLOGY

## 2023-01-26 PROCEDURE — 63600175 PHARM REV CODE 636 W HCPCS: Performed by: RADIOLOGY

## 2023-01-26 PROCEDURE — 77001 FLUOROGUIDE FOR VEIN DEVICE: CPT | Mod: TC | Performed by: RADIOLOGY

## 2023-01-26 PROCEDURE — 99153 MOD SED SAME PHYS/QHP EA: CPT | Performed by: RADIOLOGY

## 2023-01-26 DEVICE — POWERPORT CLEARVUE IMPLANTABLE PORT WITH ATTACHABLE 8F POLYURETHANE OPEN-ENDED SINGLE-LUMEN VENOUS CATHETER INTERMEDIATE KIT
Type: IMPLANTABLE DEVICE | Site: CHEST | Status: FUNCTIONAL
Brand: POWERPORT CLEARVUE

## 2023-01-26 RX ORDER — MIDAZOLAM HYDROCHLORIDE 1 MG/ML
INJECTION, SOLUTION INTRAMUSCULAR; INTRAVENOUS
Status: DISCONTINUED | OUTPATIENT
Start: 2023-01-26 | End: 2023-01-26 | Stop reason: HOSPADM

## 2023-01-26 RX ORDER — HEPARIN SODIUM 1000 [USP'U]/ML
INJECTION, SOLUTION INTRAVENOUS; SUBCUTANEOUS
Status: DISCONTINUED | OUTPATIENT
Start: 2023-01-26 | End: 2023-01-26 | Stop reason: HOSPADM

## 2023-01-26 RX ORDER — VANCOMYCIN HYDROCHLORIDE 1 G/20ML
INJECTION, POWDER, LYOPHILIZED, FOR SOLUTION INTRAVENOUS
Status: DISCONTINUED | OUTPATIENT
Start: 2023-01-26 | End: 2023-01-26 | Stop reason: HOSPADM

## 2023-01-26 RX ORDER — LIDOCAINE HYDROCHLORIDE AND EPINEPHRINE 10; 10 MG/ML; UG/ML
INJECTION, SOLUTION INFILTRATION; PERINEURAL
Status: DISCONTINUED | OUTPATIENT
Start: 2023-01-26 | End: 2023-01-26 | Stop reason: HOSPADM

## 2023-01-26 RX ORDER — FENTANYL CITRATE 50 UG/ML
INJECTION, SOLUTION INTRAMUSCULAR; INTRAVENOUS
Status: DISCONTINUED | OUTPATIENT
Start: 2023-01-26 | End: 2023-01-26 | Stop reason: HOSPADM

## 2023-01-26 NOTE — NURSING
1621pm: Outgoing call to pt regarding chemo auth now pending due to initial submission to auth was incorrect. Auth nurse, Gordo Irwin, resubmitted auth on  and still pending. Gordo will check in the morning and let me know by 10 am whether or not pt is approved and I'll contact pt directly. Pt verbalized understanding. Pt will be awaiting my call tomorrow. Infusion charge RN, Sybil, made aware as well.  Oncology Navigation   Intake  Cancer Type: Other  Initial Nurse Navigator Contact: 23  Date Worked: 23  Multiple appointments: Yes  Start of Treatment: 23     Treatment  Current Status: Active       Medical Oncologist: Dr. Reese Mcfarlane  Consult Date: 22  Chemotherapy: Planned  Chemotherapy Regimen: Carboplatin Alimta       Procedures: PET scan; Biopsy  Biopsy Schedule Date: 22 (lung)  MRI Schedule Date: 22 (brain)  PET Scan Schedule Date: 22  Other Schedule Date: 23 (EKG)    General Referrals: Social Work  Social Work: notified via in-basket msg  Social Work Referral Date: 23          Support Systems: Spouse/significant other     Acuity  Stage: 2  Systemic Treatment - predicted or initiated: Chemotherapy Regimen with Multiple drugs (+1)  Treatment Tolerability: Has not started treatment yet/treatment fully completed and side effects resolved  ECO  Comorbidities in Medical History: 1   Needed: 0  Support: 0  Transportation: 0  History of noncompliance/frequent no shows and cancellations: 0  Verbalizes the need for more education: 1  Navigation Acuity: 6     Follow Up  No follow-ups on file.

## 2023-01-26 NOTE — NURSING
Pt being discharged home in stable condition. IV removed, catheter intact, pt tolerated well. R chest dressing CDI at time of discharge. PT able to ambulate, eat and drink without complication at time of discharge. VSS. Discharge instructions given to pt, pt verbalized understanding.

## 2023-01-26 NOTE — OP NOTE
Brief Op Note:  Port placement.     : Marlon Vergara MD    Technique: Right chest wall port placed using US and fluoro guidance.    Complications: none.    EBL <10 mL.    Patient OK for discharge.

## 2023-01-27 ENCOUNTER — INFUSION (OUTPATIENT)
Dept: INFUSION THERAPY | Facility: HOSPITAL | Age: 53
End: 2023-01-27
Attending: INTERNAL MEDICINE
Payer: COMMERCIAL

## 2023-01-27 ENCOUNTER — DOCUMENTATION ONLY (OUTPATIENT)
Dept: HEMATOLOGY/ONCOLOGY | Facility: CLINIC | Age: 53
End: 2023-01-27
Payer: COMMERCIAL

## 2023-01-27 ENCOUNTER — TELEPHONE (OUTPATIENT)
Dept: HEMATOLOGY/ONCOLOGY | Facility: CLINIC | Age: 53
End: 2023-01-27
Payer: COMMERCIAL

## 2023-01-27 VITALS
OXYGEN SATURATION: 96 % | TEMPERATURE: 98 F | SYSTOLIC BLOOD PRESSURE: 153 MMHG | HEART RATE: 76 BPM | DIASTOLIC BLOOD PRESSURE: 83 MMHG | RESPIRATION RATE: 18 BRPM

## 2023-01-27 DIAGNOSIS — C34.32 MALIGNANT NEOPLASM OF LOWER LOBE OF LEFT LUNG: Primary | ICD-10-CM

## 2023-01-27 PROCEDURE — 96367 TX/PROPH/DG ADDL SEQ IV INF: CPT

## 2023-01-27 PROCEDURE — 63600175 PHARM REV CODE 636 W HCPCS: Performed by: INTERNAL MEDICINE

## 2023-01-27 PROCEDURE — 96411 CHEMO IV PUSH ADDL DRUG: CPT

## 2023-01-27 PROCEDURE — 25000003 PHARM REV CODE 250: Performed by: INTERNAL MEDICINE

## 2023-01-27 PROCEDURE — 96413 CHEMO IV INFUSION 1 HR: CPT

## 2023-01-27 PROCEDURE — 96375 TX/PRO/DX INJ NEW DRUG ADDON: CPT

## 2023-01-27 RX ORDER — CYANOCOBALAMIN 1000 UG/ML
1000 INJECTION, SOLUTION INTRAMUSCULAR; SUBCUTANEOUS
Status: DISCONTINUED | OUTPATIENT
Start: 2023-01-27 | End: 2023-01-27 | Stop reason: HOSPADM

## 2023-01-27 RX ORDER — HEPARIN 100 UNIT/ML
500 SYRINGE INTRAVENOUS
Status: DISCONTINUED | OUTPATIENT
Start: 2023-01-27 | End: 2023-01-27 | Stop reason: HOSPADM

## 2023-01-27 RX ADMIN — SODIUM CHLORIDE 1200 MG: 9 INJECTION, SOLUTION INTRAVENOUS at 02:01

## 2023-01-27 RX ADMIN — APREPITANT 130 MG: 130 INJECTION, EMULSION INTRAVENOUS at 01:01

## 2023-01-27 RX ADMIN — CARBOPLATIN 750 MG: 10 INJECTION, SOLUTION INTRAVENOUS at 02:01

## 2023-01-27 RX ADMIN — HEPARIN 500 UNITS: 100 SYRINGE at 03:01

## 2023-01-27 RX ADMIN — DEXAMETHASONE SODIUM PHOSPHATE 0.25 MG: 4 INJECTION, SOLUTION INTRA-ARTICULAR; INTRALESIONAL; INTRAMUSCULAR; INTRAVENOUS; SOFT TISSUE at 01:01

## 2023-01-27 NOTE — NURSING
"1113am: Outgoing call to pt regarding chemo auth. Spoke with pt. Informed pt that chemo has been approved. Pt will report to appt as scheduled today. Pt stated,"I didn't take my dexamethasone yesterday." I informed pt I'll inform Dr. Mcfarlane so that we can possible give you steroids today in infusion. Talked to Dr. Mcfarlane in person. Dr. Mcfarlane stated to add 20 mg of Dexamethsone to plan for today. Pt verbalized understanding. Pt plan to report to appt as scheduled.   Oncology Navigation   Intake  Cancer Type: Other  Initial Nurse Navigator Contact: 23  Date Worked: 23  Multiple appointments: Yes  Start of Treatment: 23     Treatment  Current Status: Active       Medical Oncologist: Dr. Reese Mcfarlane  Consult Date: 22  Chemotherapy: Planned  Chemotherapy Regimen: Carboplatin Alimta       Procedures: PET scan; Biopsy  Biopsy Schedule Date: 22 (lung)  MRI Schedule Date: 22 (brain)  PET Scan Schedule Date: 22  Other Schedule Date: 23 (EKG)    General Referrals: Social Work  Social Work: notified via in-basket msg  Social Work Referral Date: 23          Support Systems: Spouse/significant other     Acuity  Stage: 2  Systemic Treatment - predicted or initiated: Chemotherapy Regimen with Multiple drugs (+1)  Treatment Tolerability: Has not started treatment yet/treatment fully completed and side effects resolved  ECO  Comorbidities in Medical History: 1   Needed: 0  Support: 0  Transportation: 0  History of noncompliance/frequent no shows and cancellations: 0  Verbalizes the need for more education: 1  Navigation Acuity: 6     Follow Up  No follow-ups on file.       "

## 2023-01-27 NOTE — NURSING
"1430pm: Visited pt today while in chemo infusion receiving 1st Carboplatin Alimta tx at chairside with  present. Pt doing well. Pt stated, "Everything has been going good so far." Pt admitted to being a little anxious and nervous rightfully so. Pt did ask about boost informed pt to e-mail me cancer service form and I'll have SW to fax it on Monday. Gave pt warnings about blood sugar as well due to being on steroids now. Pt encouraged to contact me directly should any issues arise. Pt informed that I'll visit pt during 2nd chemo tx. Pt verbalized understanding.   Oncology Navigation   Intake  Cancer Type: Other  Initial Nurse Navigator Contact: 23  Date Worked: 23  Multiple appointments: Yes  Start of Treatment: 23     Treatment  Current Status: Active       Medical Oncologist: Dr. Reese Mcfarlane  Consult Date: 22  Chemotherapy: Planned  Chemotherapy Regimen: Carboplatin Alimta       Procedures: PET scan; Biopsy  Biopsy Schedule Date: 22 (lung)  MRI Schedule Date: 22 (brain)  PET Scan Schedule Date: 22  Other Schedule Date: 23 (EKG)    General Referrals: Social Work  Social Work: notified via in-basket msg  Social Work Referral Date: 23          Support Systems: Spouse/significant other     Acuity  Stage: 2  Systemic Treatment - predicted or initiated: Chemotherapy Regimen with Multiple drugs (+1)  Treatment Tolerability: Has not started treatment yet/treatment fully completed and side effects resolved  ECO  Comorbidities in Medical History: 1   Needed: 0  Support: 0  Transportation: 0  History of noncompliance/frequent no shows and cancellations: 0  Verbalizes the need for more education: 1  Navigation Acuity: 6     Follow Up  No follow-ups on file.       "

## 2023-01-27 NOTE — PROGRESS NOTES
Met with patient and   in person____) to discuss upcoming systemic therapy initiation. Discussed patient diagnosis including staging information. Also discussed that therapy regimen prescribed involves the following drugs: _Carboplatin, Alimta, and Zometa_, and timeline of therapy administration. Went over what to expect on first day of treatment, including what to bring with you, visitor policy, and infusion suite guidelines. Also discussed supportive and shared services available to patient, including social work, financial counseling, oncology nutrition, and oncology psychology.      Covered with patient and  potential side effects and symptom management. Reviewed supportive medications that will be given before, during, and after treatment and provided written instructions for all. Also stressed that other side effects are possible outside of those listed. Spent additional time on signs of infection, infection prevention, and proper nutrition/hydration.    Education provided to patient and  via [facility] specific drug information and chemotherapy education binder___). Also provided contact information for clinic and information related to [facility] communication. Discussed communication process for after-hours needs and some common scenarios in which patient and  should call provider for guidance vs. immediately report to the emergency room.     Finally, patient and  were given my contact information. Encouraged patient and  to call with any questions, concerns, or needs. Patient and  verbalized understanding of all above information.

## 2023-01-27 NOTE — PLAN OF CARE
Problem: Adult Inpatient Plan of Care  Goal: Plan of Care Review  Outcome: Ongoing, Progressing  Flowsheets (Taken 1/27/2023 1536)  Plan of Care Reviewed With: patient  Goal: Optimal Comfort and Wellbeing  Outcome: Ongoing, Progressing  Intervention: Provide Person-Centered Care  Flowsheets (Taken 1/27/2023 1536)  Trust Relationship/Rapport:   care explained   choices provided   questions encouraged   reassurance provided   emotional support provided   thoughts/feelings acknowledged   empathic listening provided   questions answered     Problem: Neutropenia (Chemotherapy Effects)  Goal: Absence of Infection  Outcome: Ongoing, Progressing  Intervention: Prevent Infection and Maximize Resistance  Flowsheets (Taken 1/27/2023 1536)  Infection Prevention:   equipment surfaces disinfected   hand hygiene promoted   personal protective equipment utilized   rest/sleep promoted

## 2023-01-30 ENCOUNTER — PATIENT MESSAGE (OUTPATIENT)
Dept: HEMATOLOGY/ONCOLOGY | Facility: CLINIC | Age: 53
End: 2023-01-30
Payer: COMMERCIAL

## 2023-01-31 ENCOUNTER — OFFICE VISIT (OUTPATIENT)
Dept: HEMATOLOGY/ONCOLOGY | Facility: CLINIC | Age: 53
End: 2023-01-31
Payer: COMMERCIAL

## 2023-01-31 ENCOUNTER — TELEPHONE (OUTPATIENT)
Dept: HEMATOLOGY/ONCOLOGY | Facility: CLINIC | Age: 53
End: 2023-01-31

## 2023-01-31 VITALS
HEART RATE: 74 BPM | HEIGHT: 64 IN | TEMPERATURE: 99 F | RESPIRATION RATE: 18 BRPM | BODY MASS INDEX: 50.02 KG/M2 | SYSTOLIC BLOOD PRESSURE: 128 MMHG | WEIGHT: 293 LBS | DIASTOLIC BLOOD PRESSURE: 70 MMHG

## 2023-01-31 VITALS
SYSTOLIC BLOOD PRESSURE: 113 MMHG | OXYGEN SATURATION: 97 % | DIASTOLIC BLOOD PRESSURE: 63 MMHG | HEART RATE: 67 BPM | TEMPERATURE: 97 F | WEIGHT: 293 LBS | BODY MASS INDEX: 50.02 KG/M2 | HEIGHT: 64 IN

## 2023-01-31 DIAGNOSIS — C34.32 MALIGNANT NEOPLASM OF LOWER LOBE OF LEFT LUNG: Primary | ICD-10-CM

## 2023-01-31 PROCEDURE — 3078F PR MOST RECENT DIASTOLIC BLOOD PRESSURE < 80 MM HG: ICD-10-PCS | Mod: CPTII,S$GLB,, | Performed by: NURSE PRACTITIONER

## 2023-01-31 PROCEDURE — 99999 PR PBB SHADOW E&M-EST. PATIENT-LVL V: CPT | Mod: PBBFAC,,, | Performed by: NURSE PRACTITIONER

## 2023-01-31 PROCEDURE — 3008F BODY MASS INDEX DOCD: CPT | Mod: CPTII,S$GLB,, | Performed by: NURSE PRACTITIONER

## 2023-01-31 PROCEDURE — 99212 PR OFFICE/OUTPT VISIT, EST, LEVL II, 10-19 MIN: ICD-10-PCS | Mod: S$GLB,,, | Performed by: NURSE PRACTITIONER

## 2023-01-31 PROCEDURE — 1160F RVW MEDS BY RX/DR IN RCRD: CPT | Mod: CPTII,S$GLB,, | Performed by: NURSE PRACTITIONER

## 2023-01-31 PROCEDURE — 3074F PR MOST RECENT SYSTOLIC BLOOD PRESSURE < 130 MM HG: ICD-10-PCS | Mod: CPTII,S$GLB,, | Performed by: NURSE PRACTITIONER

## 2023-01-31 PROCEDURE — 99999 PR PBB SHADOW E&M-EST. PATIENT-LVL V: ICD-10-PCS | Mod: PBBFAC,,, | Performed by: NURSE PRACTITIONER

## 2023-01-31 PROCEDURE — 3008F PR BODY MASS INDEX (BMI) DOCUMENTED: ICD-10-PCS | Mod: CPTII,S$GLB,, | Performed by: NURSE PRACTITIONER

## 2023-01-31 PROCEDURE — 99212 OFFICE O/P EST SF 10 MIN: CPT | Mod: S$GLB,,, | Performed by: NURSE PRACTITIONER

## 2023-01-31 PROCEDURE — 1159F MED LIST DOCD IN RCRD: CPT | Mod: CPTII,S$GLB,, | Performed by: NURSE PRACTITIONER

## 2023-01-31 PROCEDURE — 3078F DIAST BP <80 MM HG: CPT | Mod: CPTII,S$GLB,, | Performed by: NURSE PRACTITIONER

## 2023-01-31 PROCEDURE — 1159F PR MEDICATION LIST DOCUMENTED IN MEDICAL RECORD: ICD-10-PCS | Mod: CPTII,S$GLB,, | Performed by: NURSE PRACTITIONER

## 2023-01-31 PROCEDURE — 3074F SYST BP LT 130 MM HG: CPT | Mod: CPTII,S$GLB,, | Performed by: NURSE PRACTITIONER

## 2023-01-31 PROCEDURE — 1160F PR REVIEW ALL MEDS BY PRESCRIBER/CLIN PHARMACIST DOCUMENTED: ICD-10-PCS | Mod: CPTII,S$GLB,, | Performed by: NURSE PRACTITIONER

## 2023-01-31 RX ORDER — MUPIROCIN 20 MG/G
OINTMENT TOPICAL 3 TIMES DAILY
Qty: 30 G | Refills: 0 | Status: SHIPPED | OUTPATIENT
Start: 2023-01-31 | End: 2023-03-03

## 2023-01-31 NOTE — ASSESSMENT & PLAN NOTE
Right upper chest abrasion consistent with probably adhesive allergy. No S&S infection    -prophylactic Bactroban TID  -Keep FRANCHESKA  -S&S infection reviewed  -Keep f/u appointment as previously scheduled

## 2023-01-31 NOTE — TELEPHONE ENCOUNTER
Swer was consulted to assist with pt. referral to Cancer Services as well as [employer]. Pt. interested in wig and boost glucose assistance. Swer obtained provider signature and faxed referral to Cancer Services today. Swer emailed pt. ([email or web address])  [employer] wig voucher as well as instructions for ordering and link to [employer] online catalog.   3:31 pm - Swer called pt. to provide update on faxed Cancer Services referral. Pt. verbalized understanding to call as early as tm morning to schedule wig boutique appt. There were no other needs. Swer will remain available.

## 2023-01-31 NOTE — PROGRESS NOTES
Subjective:       Patient ID: Shaneka Ahmadi is a 53 y.o. female.    Chief Complaint: skin complaint. Possible allergic reaction to adhesive     HPI: 53 y.o female with metastatic non small cell lung cancer presenting today for skin wound near mediport site. Reports skin pulled off when adhesive dressing was removed. Had some itching initially now resolved. She notes clear to yellow drainage.     Per my assessment no obvious S&S infection noted.   Social History     Socioeconomic History    Marital status:    Tobacco Use    Smoking status: Never     Passive exposure: Never    Smokeless tobacco: Never   Substance and Sexual Activity    Alcohol use: Never    Drug use: Never    Sexual activity: Yes     Partners: Male     Birth control/protection: None     Comment: tubal       Past Medical History:   Diagnosis Date    Diabetes mellitus     Hypertension     Malignant neoplasm of lower lobe of left lung 12/9/2022    Secondary malignant neoplasm of bone 12/5/2022       Family History   Problem Relation Age of Onset    Heart failure Father        Past Surgical History:   Procedure Laterality Date    CATARACT EXTRACTION W/  INTRAOCULAR LENS IMPLANT Right 06/02/2022    FLUOROSCOPY N/A 1/26/2023    Procedure: FLUOROSCOPY/mediport placement;  Surgeon: Marlon Leone MD;  Location: [facility] CATH LAB;  Service: General;  Laterality: N/A;    TUBAL LIGATION      2007--postpartum tubal ligation       Review of Systems   Skin:  Positive for color change, rash and wound.       Medication List with Changes/Refills   New Medications    MUPIROCIN (BACTROBAN) 2 % OINTMENT    Apply topically 3 (three) times daily.   Current Medications    ALBUTEROL (PROVENTIL/VENTOLIN HFA) 90 MCG/ACTUATION INHALER    Inhale 1-2 puffs into the lungs every 6 (six) hours as needed for Wheezing (cough).    AMLODIPINE (NORVASC) 10 MG TABLET    Take 1 tablet (10 mg total) by mouth once daily.    ATENOLOL (TENORMIN) 50 MG TABLET    Take 1 tab by  mouth twice a day    BETAMETHASONE VALERATE 0.1% (VALISONE) 0.1 % OINT    APPLY TOPICALLY TO THE TOP OF THE FEET TWO TIMES A DAY    BLOOD SUGAR DIAGNOSTIC STRP    To check BG 2 times daily, to use with insurance preferred meter    BLOOD-GLUCOSE METER KIT    To check BG 2 times daily, to use with insurance preferred meter    CETIRIZINE (ZYRTEC) 10 MG TABLET    Take 1 tablet (10 mg total) by mouth every evening.    DEXAMETHASONE (DECADRON) 4 MG TAB    Take 2 tablets (8 mg total) by mouth once daily. Take as directed on days 2, 3, and 16,17 of your chemotherapy cycle starting with cycle 2 forward. Do not take with cycle 1.    DEXAMETHASONE (DECADRON) 4 MG TAB    Take 1 tablet (4 mg total) by mouth As instructed. Take 8 mg the day prior to chemotherapy, and then 8 mg after chemo on days 2, 3, 4    FLUTICASONE PROPIONATE (FLONASE) 50 MCG/ACTUATION NASAL SPRAY    2 sprays (100 mcg total) by Each Nostril route once daily.    FOLIC ACID (FOLVITE) 1 MG TABLET    Take 1 tablet (1 mg total) by mouth once daily.    HYDROCODONE-HOMATROPINE 5-1.5 MG/5 ML (HYCODAN) 5-1.5 MG/5 ML SYRP    Take 5 mL by mouth every 4 (four) hours as needed.    LANCETS MISC    To check BG 2 times daily, to use with insurance preferred meter    LOSARTAN-HYDROCHLOROTHIAZIDE 100-25 MG (HYZAAR) 100-25 MG PER TABLET    Take 1 tablet by mouth once daily.    METFORMIN (GLUCOPHAGE-XR) 500 MG ER 24HR TABLET    Take 1 tab with breakfast and take 2 tabs with dinner    ONDANSETRON (ZOFRAN-ODT) 8 MG TBDL    Take 1 tablet (8 mg total) by mouth every 8 (eight) hours as needed. Starting with cycle 1 of chemotherapy    ONDANSETRON (ZOFRAN-ODT) 8 MG TBDL    Take 1 tablet (8 mg total) by mouth every 8 (eight) hours as needed (nausea/vomitting).    OSIMERTINIB (TAGRISSO) 80 MG TAB    Take 80 mg by mouth once daily.    PRAVASTATIN (PRAVACHOL) 10 MG TABLET    Take 1 tablet (10 mg total) by mouth every evening.    TRUE METRIX GLUCOSE METER MISC        TRUEPLUS LANCETS 33  GAUGE MISC    Apply topically.     Objective:     Vitals:    01/31/23 1351   BP: 113/63   Pulse: 67   Temp: 97.1 °F (36.2 °C)       Physical Exam  Pulmonary:      Effort: Pulmonary effort is normal. No respiratory distress.   Chest:      Comments: Right chest wall ecchymosis, erythema noted. Skin abrasion noted. No drainage noted.        Assessment:     Problem List Items Addressed This Visit          Oncology    Malignant neoplasm of lower lobe of left lung - Primary     Right upper chest abrasion consistent with probably adhesive allergy. No S&S infection    -prophylactic Bactroban TID  -Keep FRANCHESKA  -S&S infection reviewed  -Keep f/u appointment as previously scheduled          Relevant Medications    mupirocin (BACTROBAN) 2 % ointment         Plan:     Malignant neoplasm of lower lobe of left lung  -     mupirocin (BACTROBAN) 2 % ointment; Apply topically 3 (three) times daily.  Dispense: 30 g; Refill: 0            MILAGROS Toribio

## 2023-02-01 ENCOUNTER — TELEPHONE (OUTPATIENT)
Dept: HEMATOLOGY/ONCOLOGY | Facility: CLINIC | Age: 53
End: 2023-02-01
Payer: COMMERCIAL

## 2023-02-01 NOTE — NURSING
1553pm: Outgoing call to pt regarding adjustment to incorrect appts scheduled. Explained to pt what appts were scheduled incorrectly. Appts adjusted. Pt informed to refer to my [facility] portal for correct appts. Pt verbalized understanding.   Oncology Navigation   Intake  Cancer Type: Other  Initial Nurse Navigator Contact: 23  Date Worked: 23  Multiple appointments: Yes  Start of Treatment: 23     Treatment  Current Status: Active       Medical Oncologist: Dr. Reese Mcfarlane  Consult Date: 22  Chemotherapy: Planned  Chemotherapy Regimen: Carboplatin Alimta       Procedures: PET scan; Biopsy  Biopsy Schedule Date: 22 (lung)  MRI Schedule Date: 22 (brain)  PET Scan Schedule Date: 22  Other Schedule Date: 23 (EKG)    General Referrals: Social Work  Social Work: notified via in-basket msg  Social Work Referral Date: 23          Support Systems: Spouse/significant other     Acuity  Stage: 2  Systemic Treatment - predicted or initiated: Chemotherapy Regimen with Multiple drugs (+1)  Treatment Tolerability: Has not started treatment yet/treatment fully completed and side effects resolved  ECO  Comorbidities in Medical History: 1   Needed: 0  Support: 0  Transportation: 0  History of noncompliance/frequent no shows and cancellations: 0  Verbalizes the need for more education: 1  Navigation Acuity: 6     Follow Up  No follow-ups on file.

## 2023-02-01 NOTE — DISCHARGE SUMMARY
IR Tunneled Cath placement        OUTCOME: Patient tolerated treatment/procedure well without complication and is now ready for discharge.     DISPOSITION: Home or Self Care     FINAL DIAGNOSIS:  malignancy     FOLLOWUP: With oncology     DISCHARGE INSTRUCTIONS:  No discharge procedures on file.       Clinical Reference Documents Added to Patient Instructions            Document     [facility] DISCHARGE INSTRUCTIONS (ENGLISH)     PROCEDURAL SEDATION, ADULT ED (ENGLISH)                TIME SPENT ON DISCHARGE: 15 minutes

## 2023-02-02 ENCOUNTER — DOCUMENTATION ONLY (OUTPATIENT)
Dept: PALLIATIVE MEDICINE | Facility: CLINIC | Age: 53
End: 2023-02-02
Payer: COMMERCIAL

## 2023-02-02 NOTE — PROGRESS NOTES
Nurse reached out to pt to confirmed her palliative apt 2-21-23 voice message was left with date/time/location.

## 2023-02-03 ENCOUNTER — HOSPITAL ENCOUNTER (OUTPATIENT)
Dept: CARDIOLOGY | Facility: HOSPITAL | Age: 53
Discharge: HOME | End: 2023-02-03
Attending: INTERNAL MEDICINE
Payer: COMMERCIAL

## 2023-02-03 DIAGNOSIS — K12.31 MUCOSITIS DUE TO ANTINEOPLASTIC THERAPY: Primary | ICD-10-CM

## 2023-02-03 DIAGNOSIS — C34.32 MALIGNANT NEOPLASM OF LOWER LOBE OF LEFT LUNG: ICD-10-CM

## 2023-02-03 PROCEDURE — 93010 EKG 12-LEAD: ICD-10-PCS | Mod: ,,, | Performed by: INTERNAL MEDICINE

## 2023-02-03 PROCEDURE — 93010 ELECTROCARDIOGRAM REPORT: CPT | Mod: ,,, | Performed by: INTERNAL MEDICINE

## 2023-02-03 PROCEDURE — 93005 ELECTROCARDIOGRAM TRACING: CPT

## 2023-02-08 ENCOUNTER — PATIENT MESSAGE (OUTPATIENT)
Dept: HEMATOLOGY/ONCOLOGY | Facility: CLINIC | Age: 53
End: 2023-02-08
Payer: COMMERCIAL

## 2023-02-09 ENCOUNTER — OFFICE VISIT (OUTPATIENT)
Dept: HEMATOLOGY/ONCOLOGY | Facility: CLINIC | Age: 53
End: 2023-02-09
Payer: COMMERCIAL

## 2023-02-09 ENCOUNTER — LAB VISIT (OUTPATIENT)
Dept: LAB | Facility: HOSPITAL | Age: 53
End: 2023-02-09
Attending: INTERNAL MEDICINE
Payer: COMMERCIAL

## 2023-02-09 VITALS
SYSTOLIC BLOOD PRESSURE: 121 MMHG | HEART RATE: 84 BPM | WEIGHT: 293 LBS | TEMPERATURE: 98 F | RESPIRATION RATE: 20 BRPM | OXYGEN SATURATION: 98 % | HEIGHT: 64 IN | BODY MASS INDEX: 50.02 KG/M2 | DIASTOLIC BLOOD PRESSURE: 76 MMHG

## 2023-02-09 DIAGNOSIS — C34.32 MALIGNANT NEOPLASM OF LOWER LOBE OF LEFT LUNG: Primary | ICD-10-CM

## 2023-02-09 DIAGNOSIS — R91.8 MASS OF LEFT LUNG: ICD-10-CM

## 2023-02-09 DIAGNOSIS — C79.51 SECONDARY MALIGNANT NEOPLASM OF BONE: ICD-10-CM

## 2023-02-09 LAB
ALBUMIN SERPL BCP-MCNC: 3.9 G/DL (ref 3.5–5.2)
ALP SERPL-CCNC: 147 U/L (ref 55–135)
ALT SERPL W/O P-5'-P-CCNC: 64 U/L (ref 10–44)
ANION GAP SERPL CALC-SCNC: 17 MMOL/L (ref 8–16)
AST SERPL-CCNC: 34 U/L (ref 10–40)
BASOPHILS # BLD AUTO: 0.03 K/UL (ref 0–0.2)
BASOPHILS NFR BLD: 0.7 % (ref 0–1.9)
BILIRUB SERPL-MCNC: 0.3 MG/DL (ref 0.1–1)
BUN SERPL-MCNC: 15 MG/DL (ref 6–20)
CALCIUM SERPL-MCNC: 9.2 MG/DL (ref 8.7–10.5)
CHLORIDE SERPL-SCNC: 101 MMOL/L (ref 95–110)
CO2 SERPL-SCNC: 23 MMOL/L (ref 23–29)
CREAT SERPL-MCNC: 1 MG/DL (ref 0.5–1.4)
DIFFERENTIAL METHOD: ABNORMAL
EOSINOPHIL # BLD AUTO: 0.2 K/UL (ref 0–0.5)
EOSINOPHIL NFR BLD: 5.1 % (ref 0–8)
ERYTHROCYTE [DISTWIDTH] IN BLOOD BY AUTOMATED COUNT: 12.4 % (ref 11.5–14.5)
EST. GFR  (NO RACE VARIABLE): >60 ML/MIN/1.73 M^2
GLUCOSE SERPL-MCNC: 158 MG/DL (ref 70–110)
HCT VFR BLD AUTO: 34.3 % (ref 37–48.5)
HGB BLD-MCNC: 11.7 G/DL (ref 12–16)
IMM GRANULOCYTES # BLD AUTO: 0.01 K/UL (ref 0–0.04)
IMM GRANULOCYTES NFR BLD AUTO: 0.2 % (ref 0–0.5)
LYMPHOCYTES # BLD AUTO: 1.7 K/UL (ref 1–4.8)
LYMPHOCYTES NFR BLD: 36.3 % (ref 18–48)
MCH RBC QN AUTO: 27.1 PG (ref 27–31)
MCHC RBC AUTO-ENTMCNC: 34.1 G/DL (ref 32–36)
MCV RBC AUTO: 80 FL (ref 82–98)
MONOCYTES # BLD AUTO: 0.4 K/UL (ref 0.3–1)
MONOCYTES NFR BLD: 7.9 % (ref 4–15)
NEUTROPHILS # BLD AUTO: 2.3 K/UL (ref 1.8–7.7)
NEUTROPHILS NFR BLD: 49.8 % (ref 38–73)
NRBC BLD-RTO: 0 /100 WBC
PLATELET # BLD AUTO: 98 K/UL (ref 150–450)
PMV BLD AUTO: 11.1 FL (ref 9.2–12.9)
POTASSIUM SERPL-SCNC: 3.5 MMOL/L (ref 3.5–5.1)
PROT SERPL-MCNC: 6.9 G/DL (ref 6–8.4)
RBC # BLD AUTO: 4.31 M/UL (ref 4–5.4)
SODIUM SERPL-SCNC: 141 MMOL/L (ref 136–145)
WBC # BLD AUTO: 4.54 K/UL (ref 3.9–12.7)

## 2023-02-09 PROCEDURE — 99215 PR OFFICE/OUTPT VISIT, EST, LEVL V, 40-54 MIN: ICD-10-PCS | Mod: S$GLB,,, | Performed by: NURSE PRACTITIONER

## 2023-02-09 PROCEDURE — 85025 COMPLETE CBC W/AUTO DIFF WBC: CPT | Performed by: INTERNAL MEDICINE

## 2023-02-09 PROCEDURE — 3074F SYST BP LT 130 MM HG: CPT | Mod: CPTII,S$GLB,, | Performed by: NURSE PRACTITIONER

## 2023-02-09 PROCEDURE — 99999 PR PBB SHADOW E&M-EST. PATIENT-LVL V: ICD-10-PCS | Mod: PBBFAC,,, | Performed by: NURSE PRACTITIONER

## 2023-02-09 PROCEDURE — 1160F RVW MEDS BY RX/DR IN RCRD: CPT | Mod: CPTII,S$GLB,, | Performed by: NURSE PRACTITIONER

## 2023-02-09 PROCEDURE — 1160F PR REVIEW ALL MEDS BY PRESCRIBER/CLIN PHARMACIST DOCUMENTED: ICD-10-PCS | Mod: CPTII,S$GLB,, | Performed by: NURSE PRACTITIONER

## 2023-02-09 PROCEDURE — 1159F PR MEDICATION LIST DOCUMENTED IN MEDICAL RECORD: ICD-10-PCS | Mod: CPTII,S$GLB,, | Performed by: NURSE PRACTITIONER

## 2023-02-09 PROCEDURE — 3078F PR MOST RECENT DIASTOLIC BLOOD PRESSURE < 80 MM HG: ICD-10-PCS | Mod: CPTII,S$GLB,, | Performed by: NURSE PRACTITIONER

## 2023-02-09 PROCEDURE — 3074F PR MOST RECENT SYSTOLIC BLOOD PRESSURE < 130 MM HG: ICD-10-PCS | Mod: CPTII,S$GLB,, | Performed by: NURSE PRACTITIONER

## 2023-02-09 PROCEDURE — 99999 PR PBB SHADOW E&M-EST. PATIENT-LVL V: CPT | Mod: PBBFAC,,, | Performed by: NURSE PRACTITIONER

## 2023-02-09 PROCEDURE — 3008F PR BODY MASS INDEX (BMI) DOCUMENTED: ICD-10-PCS | Mod: CPTII,S$GLB,, | Performed by: NURSE PRACTITIONER

## 2023-02-09 PROCEDURE — 80053 COMPREHEN METABOLIC PANEL: CPT | Performed by: INTERNAL MEDICINE

## 2023-02-09 PROCEDURE — 3008F BODY MASS INDEX DOCD: CPT | Mod: CPTII,S$GLB,, | Performed by: NURSE PRACTITIONER

## 2023-02-09 PROCEDURE — 3078F DIAST BP <80 MM HG: CPT | Mod: CPTII,S$GLB,, | Performed by: NURSE PRACTITIONER

## 2023-02-09 PROCEDURE — 1159F MED LIST DOCD IN RCRD: CPT | Mod: CPTII,S$GLB,, | Performed by: NURSE PRACTITIONER

## 2023-02-09 PROCEDURE — 99215 OFFICE O/P EST HI 40 MIN: CPT | Mod: S$GLB,,, | Performed by: NURSE PRACTITIONER

## 2023-02-09 PROCEDURE — 36415 COLL VENOUS BLD VENIPUNCTURE: CPT | Performed by: INTERNAL MEDICINE

## 2023-02-09 NOTE — PROGRESS NOTES
Subjective:       Patient ID: Shaneka Ahmadi is a 53 y.o. female.    Chief Complaint:   1. Malignant neoplasm of lower lobe of left lung  Stage IV (cT2, cN2, cM1)      2. Secondary malignant neoplasm of bone          Current Treatment:  OP NSCLC PEMETREXED + CARBOPLATIN (AUC) Q3W started 1/27/2023    Treatment History:    HPI: This is a 53 year old woman with medical history significant for diabetes and HTN who is seen in Hem/Onc for lung cancer. She was initially seen in 11/2022 with complaints of cough since 8/2022. She had COVID at the time; CT chest demonstrated marked abnormalities with mediastinal as well as left pulmonary mass. She is a nonsmoker but is exposed to secondhand and 3rd hand tobacco from her .     PET/CT revealed an FDG avid mass within the left upper lobe with associated mass/adenopathy in the AP window extending into the suprahilar region and single FDG avid lymph node in the prevascular region of the mediastinum; and extensive osseous metastatic disease. MRI revealed no definitive evidence of mets to brain; probable skeletal metastatic lesion left C1 lateral mass. Lung biopsy done 12/29/2022 showed lung adenocarcinoma. Pleural fluid sent for cytology; positive for adenocarcinoma. 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     Due to her mutation, it was recommended to start her on Rybrevant. She was not a candidate for Tagrisso. After presentation to [facility], it was determined she was not eligible for oral agent. She was started on Carbo/Alimta every 3 weeks which she started on 1/27/2023.     Her primary Hematologist/Oncologist is Dr. Mcfarlane.    Interval History: Patient presents for follow up on Carbo/Alimta; She is scheduled to receive C2D1 next week. She presents today with her  and complains of significant fatigue, being cold, and dizziness as well as SOB with physical activity. She also reports getting overheated when  "physically active. Discussed her mild anemia due to chemotherapy and the potential for chemotherapy to cause fatigue even when it does not cause anemia. Explained that her counts do not warrant blood transfusion. She states she takes an MVI. Advised her to rest when she feels tired and sit down when she feels dizzy or overheated. Also discussed possibly taking days off after her chemotherapy as she is still working.     Reviewed labs with patient:   CBC:   Recent Labs   Lab 02/09/23  1233   WBC 4.54   RBC 4.31   Hemoglobin 11.7 L   Hematocrit 34.3 L   Platelets 98 L   MCV 80 L   MCH 27.1   MCHC 34.1     CMP:  Recent Labs   Lab 02/09/23  1233   Glucose 158 H   Calcium 9.2   Albumin 3.9   Total Protein 6.9   Sodium 141   Potassium 3.5   CO2 23   Chloride 101   BUN 15   Creatinine 1.0   Alkaline Phosphatase 147 H   ALT 64 H   AST 34   Total Bilirubin 0.3     Social History     Socioeconomic History    Marital status:    Tobacco Use    Smoking status: Never     Passive exposure: Never    Smokeless tobacco: Never   Substance and Sexual Activity    Alcohol use: Never    Drug use: Never    Sexual activity: Yes     Partners: Male     Birth control/protection: None     Comment: tubal     Past Medical History:   Diagnosis Date    Diabetes mellitus     Hypertension     Malignant neoplasm of lower lobe of left lung 12/9/2022    Secondary malignant neoplasm of bone 12/5/2022     Family History   Problem Relation Age of Onset    Heart failure Father      Past Surgical History:   Procedure Laterality Date    CATARACT EXTRACTION W/  INTRAOCULAR LENS IMPLANT Right 06/02/2022    FLUOROSCOPY N/A 1/26/2023    Procedure: FLUOROSCOPY/mediport placement;  Surgeon: Marlon Leone MD;  Location: [facility] CATH LAB;  Service: General;  Laterality: N/A;    TUBAL LIGATION      2007--postpartum tubal ligation     Review of Systems   Constitutional:  Positive for fatigue ("exhausted"). Negative for appetite change.   HENT:  " Negative for mouth sores, rhinorrhea and sore throat.    Eyes: Negative.    Respiratory:  Positive for shortness of breath (and hot with physical activity).    Cardiovascular: Negative.    Gastrointestinal:  Negative for constipation, diarrhea, nausea and vomiting.   Endocrine: Positive for cold intolerance.   Genitourinary: Negative.    Musculoskeletal: Negative.    Integumentary:  Negative.   Allergic/Immunologic: Negative.    Neurological:  Positive for dizziness (wiht position changes). Negative for weakness, light-headedness, numbness and headaches.   Hematological: Negative.    Psychiatric/Behavioral: Negative.         Medication List with Changes/Refills   Current Medications    (MAGIC MOUTHWASH) 1:1:1 DIPHENHYDRAMINE(BENADRYL) 12.5MG/5ML LIQ, ALUMINUM & MAGNESIUM HYDROXIDE-SIMETHICONE (MAALOX), LIDOCAINE VISCOUS 2%    Swish and spit 10 mLs every 4 (four) hours as needed (mouth&/or throat pain). for mouth sores    ALBUTEROL (PROVENTIL/VENTOLIN HFA) 90 MCG/ACTUATION INHALER    Inhale 1-2 puffs into the lungs every 6 (six) hours as needed for Wheezing (cough).    AMLODIPINE (NORVASC) 10 MG TABLET    Take 1 tablet (10 mg total) by mouth once daily.    ATENOLOL (TENORMIN) 50 MG TABLET    Take 1 tab by mouth twice a day    BETAMETHASONE VALERATE 0.1% (VALISONE) 0.1 % OINT    APPLY TOPICALLY TO THE TOP OF THE FEET TWO TIMES A DAY    BLOOD SUGAR DIAGNOSTIC STRP    To check BG 2 times daily, to use with insurance preferred meter    BLOOD-GLUCOSE METER KIT    To check BG 2 times daily, to use with insurance preferred meter    CETIRIZINE (ZYRTEC) 10 MG TABLET    Take 1 tablet (10 mg total) by mouth every evening.    DEXAMETHASONE (DECADRON) 4 MG TAB    Take 2 tablets (8 mg total) by mouth once daily. Take as directed on days 2, 3, and 16,17 of your chemotherapy cycle starting with cycle 2 forward. Do not take with cycle 1.    DEXAMETHASONE (DECADRON) 4 MG TAB    Take 1 tablet (4 mg total) by mouth As instructed. Take 8  mg the day prior to chemotherapy, and then 8 mg after chemo on days 2, 3, 4    FLUTICASONE PROPIONATE (FLONASE) 50 MCG/ACTUATION NASAL SPRAY    2 sprays (100 mcg total) by Each Nostril route once daily.    FOLIC ACID (FOLVITE) 1 MG TABLET    Take 1 tablet (1 mg total) by mouth once daily.    HYDROCODONE-HOMATROPINE 5-1.5 MG/5 ML (HYCODAN) 5-1.5 MG/5 ML SYRP    Take 5 mL by mouth every 4 (four) hours as needed.    LANCETS MISC    To check BG 2 times daily, to use with insurance preferred meter    LOSARTAN-HYDROCHLOROTHIAZIDE 100-25 MG (HYZAAR) 100-25 MG PER TABLET    Take 1 tablet by mouth once daily.    METFORMIN (GLUCOPHAGE-XR) 500 MG ER 24HR TABLET    Take 1 tab with breakfast and take 2 tabs with dinner    MUPIROCIN (BACTROBAN) 2 % OINTMENT    Apply topically 3 (three) times daily.    ONDANSETRON (ZOFRAN-ODT) 8 MG TBDL    Take 1 tablet (8 mg total) by mouth every 8 (eight) hours as needed. Starting with cycle 1 of chemotherapy    ONDANSETRON (ZOFRAN-ODT) 8 MG TBDL    Take 1 tablet (8 mg total) by mouth every 8 (eight) hours as needed (nausea/vomitting).    OSIMERTINIB (TAGRISSO) 80 MG TAB    Take 80 mg by mouth once daily.    PRAVASTATIN (PRAVACHOL) 10 MG TABLET    Take 1 tablet (10 mg total) by mouth every evening.    TRUE METRIX GLUCOSE METER MISC        TRUEPLUS LANCETS 33 GAUGE MISC    Apply topically.     Objective:     Vitals:    02/09/23 1305   BP: 121/76   Pulse: 84   Resp: 20   Temp: 97.9 °F (36.6 °C)     Physical Exam  Vitals reviewed.   Constitutional:       Appearance: Normal appearance. She is obese.   HENT:      Head: Normocephalic.      Mouth/Throat:      Comments: Wearing mask    Eyes:      Extraocular Movements: Extraocular movements intact.      Pupils: Pupils are equal, round, and reactive to light.   Cardiovascular:      Rate and Rhythm: Normal rate and regular rhythm.      Heart sounds: Normal heart sounds.   Pulmonary:      Effort: Pulmonary effort is normal.      Breath sounds: Normal  breath sounds.   Abdominal:      General: Bowel sounds are normal.      Palpations: Abdomen is soft.      Comments: rounded     Genitourinary:     Comments: deferred    Musculoskeletal:         General: Normal range of motion.      Cervical back: Normal range of motion and neck supple.   Skin:     General: Skin is warm and dry.      Comments: Right CW healing abrasion to skin in the shape of tegaderm dressing indicating allergy to adhesive   Neurological:      Mental Status: She is alert and oriented to person, place, and time.   Psychiatric:         Behavior: Behavior normal.         Thought Content: Thought content normal.        (1) Restricted in physically strenuous activity, ambulatory and able to do work of light nature  Assessment:     Problem List Items Addressed This Visit          Oncology    Secondary malignant neoplasm of bone    Malignant neoplasm of lower lobe of left lung - Primary     Plan:     Malignant neoplasm of lower lobe of left lung    Secondary malignant neoplasm of bone    Labs reviewed; mild anemia present.   For treatment, use regular tegaderm dressing for mediport.   Follow up in 1 week Dr. Mcfarlane with CBC and Comprehensive Metabolic Panel prior to C2D1.    Route Chart for Scheduling    Med Onc Chart Routing      Follow up with physician 1 week. Dr. Mcfarlane (already scheduled)   Follow up with DOLLY    Infusion scheduling note    Injection scheduling note C2D1 Carbo/Alimta (already scheduled)   Labs CBC and CMP   Lab interval:  1 week (already scheduled)   Imaging None      Pharmacy appointment No pharmacy appointment needed      Other referrals No additional referrals needed         I will review assessment/plan with collaborating physician.      MARCIO Pena

## 2023-02-13 ENCOUNTER — PATIENT OUTREACH (OUTPATIENT)
Dept: ADMINISTRATIVE | Facility: HOSPITAL | Age: 53
End: 2023-02-13
Payer: COMMERCIAL

## 2023-02-14 ENCOUNTER — TELEPHONE (OUTPATIENT)
Dept: RADIATION ONCOLOGY | Facility: CLINIC | Age: 53
End: 2023-02-14
Payer: COMMERCIAL

## 2023-02-14 NOTE — TELEPHONE ENCOUNTER
Made a 1 month f/u call, patient said she is feeling good, she denies having any pain. She said the day she had her treatment she vomited once but has had no other issues & doing well, just getting through chemo. Informed her if she needs us for anything to just let us know, she verbalized understanding.   ----- Message from Kaylynn Maria RN sent at 1/10/2023  1:29 PM CST -----  Regarding: Give patient a f/u call in 1 month

## 2023-02-16 ENCOUNTER — OFFICE VISIT (OUTPATIENT)
Dept: HEMATOLOGY/ONCOLOGY | Facility: CLINIC | Age: 53
End: 2023-02-16
Payer: COMMERCIAL

## 2023-02-16 ENCOUNTER — LAB VISIT (OUTPATIENT)
Dept: LAB | Facility: HOSPITAL | Age: 53
End: 2023-02-16
Attending: INTERNAL MEDICINE
Payer: COMMERCIAL

## 2023-02-16 VITALS
DIASTOLIC BLOOD PRESSURE: 69 MMHG | WEIGHT: 293 LBS | SYSTOLIC BLOOD PRESSURE: 124 MMHG | BODY MASS INDEX: 50.02 KG/M2 | HEIGHT: 64 IN | TEMPERATURE: 98 F | OXYGEN SATURATION: 96 % | HEART RATE: 77 BPM

## 2023-02-16 DIAGNOSIS — T45.1X5A IMMUNODEFICIENCY DUE TO CHEMOTHERAPY: ICD-10-CM

## 2023-02-16 DIAGNOSIS — E11.9 TYPE 2 DIABETES MELLITUS WITHOUT COMPLICATION, WITHOUT LONG-TERM CURRENT USE OF INSULIN: ICD-10-CM

## 2023-02-16 DIAGNOSIS — E66.01 MORBID OBESITY: ICD-10-CM

## 2023-02-16 DIAGNOSIS — I10 ESSENTIAL HYPERTENSION: ICD-10-CM

## 2023-02-16 DIAGNOSIS — Z79.899 IMMUNODEFICIENCY DUE TO CHEMOTHERAPY: ICD-10-CM

## 2023-02-16 DIAGNOSIS — R91.8 MASS OF LEFT LUNG: ICD-10-CM

## 2023-02-16 DIAGNOSIS — C34.32 MALIGNANT NEOPLASM OF LOWER LOBE OF LEFT LUNG: Primary | ICD-10-CM

## 2023-02-16 DIAGNOSIS — D84.821 IMMUNODEFICIENCY DUE TO CHEMOTHERAPY: ICD-10-CM

## 2023-02-16 DIAGNOSIS — C79.51 SECONDARY MALIGNANT NEOPLASM OF BONE: ICD-10-CM

## 2023-02-16 LAB
ALBUMIN SERPL BCP-MCNC: 3.8 G/DL (ref 3.5–5.2)
ALP SERPL-CCNC: 148 U/L (ref 55–135)
ALT SERPL W/O P-5'-P-CCNC: 83 U/L (ref 10–44)
ANION GAP SERPL CALC-SCNC: 15 MMOL/L (ref 8–16)
ANISOCYTOSIS BLD QL SMEAR: SLIGHT
AST SERPL-CCNC: 44 U/L (ref 10–40)
BASOPHILS NFR BLD: 0 % (ref 0–1.9)
BILIRUB SERPL-MCNC: 0.2 MG/DL (ref 0.1–1)
BUN SERPL-MCNC: 13 MG/DL (ref 6–20)
CALCIUM SERPL-MCNC: 9.5 MG/DL (ref 8.7–10.5)
CHLORIDE SERPL-SCNC: 98 MMOL/L (ref 95–110)
CO2 SERPL-SCNC: 28 MMOL/L (ref 23–29)
CREAT SERPL-MCNC: 0.9 MG/DL (ref 0.5–1.4)
DIFFERENTIAL METHOD: ABNORMAL
EOSINOPHIL NFR BLD: 0 % (ref 0–8)
ERYTHROCYTE [DISTWIDTH] IN BLOOD BY AUTOMATED COUNT: 13.9 % (ref 11.5–14.5)
EST. GFR  (NO RACE VARIABLE): >60 ML/MIN/1.73 M^2
ESTIMATED AVG GLUCOSE: 157 MG/DL (ref 68–131)
GLUCOSE SERPL-MCNC: 182 MG/DL (ref 70–110)
HBA1C MFR BLD: 7.1 % (ref 4–5.6)
HCT VFR BLD AUTO: 34.4 % (ref 37–48.5)
HGB BLD-MCNC: 11.5 G/DL (ref 12–16)
IMM GRANULOCYTES # BLD AUTO: ABNORMAL K/UL (ref 0–0.04)
IMM GRANULOCYTES NFR BLD AUTO: ABNORMAL % (ref 0–0.5)
LYMPHOCYTES NFR BLD: 36 % (ref 18–48)
MCH RBC QN AUTO: 27.3 PG (ref 27–31)
MCHC RBC AUTO-ENTMCNC: 33.4 G/DL (ref 32–36)
MCV RBC AUTO: 82 FL (ref 82–98)
METAMYELOCYTES NFR BLD MANUAL: 3 %
MONOCYTES NFR BLD: 12 % (ref 4–15)
MYELOCYTES NFR BLD MANUAL: 3 %
NEUTROPHILS NFR BLD: 43 % (ref 38–73)
NEUTS BAND NFR BLD MANUAL: 2 %
NRBC BLD-RTO: 0 /100 WBC
PLATELET # BLD AUTO: 442 K/UL (ref 150–450)
PLATELET BLD QL SMEAR: ABNORMAL
PMV BLD AUTO: 9.7 FL (ref 9.2–12.9)
POIKILOCYTOSIS BLD QL SMEAR: SLIGHT
POLYCHROMASIA BLD QL SMEAR: ABNORMAL
POTASSIUM SERPL-SCNC: 3.9 MMOL/L (ref 3.5–5.1)
PROMYELOCYTES NFR BLD MANUAL: 1 %
PROT SERPL-MCNC: 6.7 G/DL (ref 6–8.4)
RBC # BLD AUTO: 4.22 M/UL (ref 4–5.4)
SODIUM SERPL-SCNC: 141 MMOL/L (ref 136–145)
WBC # BLD AUTO: 7.44 K/UL (ref 3.9–12.7)

## 2023-02-16 PROCEDURE — 3078F DIAST BP <80 MM HG: CPT | Mod: CPTII,S$GLB,, | Performed by: INTERNAL MEDICINE

## 2023-02-16 PROCEDURE — 85007 BL SMEAR W/DIFF WBC COUNT: CPT | Performed by: INTERNAL MEDICINE

## 2023-02-16 PROCEDURE — 3074F PR MOST RECENT SYSTOLIC BLOOD PRESSURE < 130 MM HG: ICD-10-PCS | Mod: CPTII,S$GLB,, | Performed by: INTERNAL MEDICINE

## 2023-02-16 PROCEDURE — 3078F PR MOST RECENT DIASTOLIC BLOOD PRESSURE < 80 MM HG: ICD-10-PCS | Mod: CPTII,S$GLB,, | Performed by: INTERNAL MEDICINE

## 2023-02-16 PROCEDURE — 83036 HEMOGLOBIN GLYCOSYLATED A1C: CPT | Performed by: NURSE PRACTITIONER

## 2023-02-16 PROCEDURE — 1160F PR REVIEW ALL MEDS BY PRESCRIBER/CLIN PHARMACIST DOCUMENTED: ICD-10-PCS | Mod: CPTII,S$GLB,, | Performed by: INTERNAL MEDICINE

## 2023-02-16 PROCEDURE — 80053 COMPREHEN METABOLIC PANEL: CPT | Performed by: INTERNAL MEDICINE

## 2023-02-16 PROCEDURE — 99999 PR PBB SHADOW E&M-EST. PATIENT-LVL V: ICD-10-PCS | Mod: PBBFAC,,, | Performed by: INTERNAL MEDICINE

## 2023-02-16 PROCEDURE — 1159F MED LIST DOCD IN RCRD: CPT | Mod: CPTII,S$GLB,, | Performed by: INTERNAL MEDICINE

## 2023-02-16 PROCEDURE — 85027 COMPLETE CBC AUTOMATED: CPT | Performed by: INTERNAL MEDICINE

## 2023-02-16 PROCEDURE — 99999 PR PBB SHADOW E&M-EST. PATIENT-LVL V: CPT | Mod: PBBFAC,,, | Performed by: INTERNAL MEDICINE

## 2023-02-16 PROCEDURE — 1160F RVW MEDS BY RX/DR IN RCRD: CPT | Mod: CPTII,S$GLB,, | Performed by: INTERNAL MEDICINE

## 2023-02-16 PROCEDURE — 99215 OFFICE O/P EST HI 40 MIN: CPT | Mod: 25,S$GLB,, | Performed by: INTERNAL MEDICINE

## 2023-02-16 PROCEDURE — 3008F PR BODY MASS INDEX (BMI) DOCUMENTED: ICD-10-PCS | Mod: CPTII,S$GLB,, | Performed by: INTERNAL MEDICINE

## 2023-02-16 PROCEDURE — 99215 PR OFFICE/OUTPT VISIT, EST, LEVL V, 40-54 MIN: ICD-10-PCS | Mod: 25,S$GLB,, | Performed by: INTERNAL MEDICINE

## 2023-02-16 PROCEDURE — 1159F PR MEDICATION LIST DOCUMENTED IN MEDICAL RECORD: ICD-10-PCS | Mod: CPTII,S$GLB,, | Performed by: INTERNAL MEDICINE

## 2023-02-16 PROCEDURE — 36415 COLL VENOUS BLD VENIPUNCTURE: CPT | Performed by: INTERNAL MEDICINE

## 2023-02-16 PROCEDURE — 3074F SYST BP LT 130 MM HG: CPT | Mod: CPTII,S$GLB,, | Performed by: INTERNAL MEDICINE

## 2023-02-16 PROCEDURE — 3008F BODY MASS INDEX DOCD: CPT | Mod: CPTII,S$GLB,, | Performed by: INTERNAL MEDICINE

## 2023-02-16 RX ORDER — HEPARIN 100 UNIT/ML
500 SYRINGE INTRAVENOUS
Status: CANCELLED | OUTPATIENT
Start: 2023-02-17

## 2023-02-16 RX ORDER — SODIUM CHLORIDE 0.9 % (FLUSH) 0.9 %
10 SYRINGE (ML) INJECTION
Status: CANCELLED | OUTPATIENT
Start: 2023-02-17

## 2023-02-16 NOTE — PROGRESS NOTES
Subjective:       Patient ID: Shaneka Ahmadi is a 53 y.o. female.    Chief Complaint: Results, Chemotherapy, and Breast Cancer    HPI:  53-year-old female history of metastatic non-small cell lung carcinoma presents for cycle 2 day 1 of carboplatin and Alimta patient tolerated 1st cycle well did have some allergic reaction to take over MediPort site patient denies nausea vomiting fevers chills night sweats ECOG status 1    Past Medical History:   Diagnosis Date    Diabetes mellitus     Hypertension     Malignant neoplasm of lower lobe of left lung 12/9/2022    Secondary malignant neoplasm of bone 12/5/2022     Family History   Problem Relation Age of Onset    Heart failure Father      Social History     Socioeconomic History    Marital status:    Tobacco Use    Smoking status: Never     Passive exposure: Never    Smokeless tobacco: Never   Substance and Sexual Activity    Alcohol use: Never    Drug use: Never    Sexual activity: Yes     Partners: Male     Birth control/protection: None     Comment: tubal     Past Surgical History:   Procedure Laterality Date    CATARACT EXTRACTION W/  INTRAOCULAR LENS IMPLANT Right 06/02/2022    FLUOROSCOPY N/A 1/26/2023    Procedure: FLUOROSCOPY/mediport placement;  Surgeon: Marlon Leone MD;  Location: [facility] CATH LAB;  Service: General;  Laterality: N/A;    TUBAL LIGATION      2007--postpartum tubal ligation       Labs:  Lab Results   Component Value Date    WBC 7.44 02/16/2023    HGB 11.5 (L) 02/16/2023    HCT 34.4 (L) 02/16/2023    MCV 82 02/16/2023     02/16/2023     BMP  Lab Results   Component Value Date     02/16/2023    K 3.9 02/16/2023    CL 98 02/16/2023    CO2 28 02/16/2023    BUN 13 02/16/2023    CREATININE 0.9 02/16/2023    CALCIUM 9.5 02/16/2023    ANIONGAP 15 02/16/2023    ESTGFRAFRICA >60.0 07/28/2022    EGFRNONAA >60.0 07/28/2022     Lab Results   Component Value Date    ALT 83 (H) 02/16/2023    AST 44 (H) 02/16/2023    ALKPHOS 148  (H) 02/16/2023    BILITOT 0.2 02/16/2023       No results found for: IRON, TIBC, FERRITIN, SATURATEDIRO  No results found for: VRIADOAN99  No results found for: FOLATE  Lab Results   Component Value Date    TSH 1.742 04/08/2022         Review of Systems   Constitutional:  Negative for activity change, appetite change, chills, diaphoresis, fatigue, fever and unexpected weight change.   HENT:  Negative for congestion, dental problem, drooling, ear discharge, ear pain, facial swelling, hearing loss, mouth sores, nosebleeds, postnasal drip, rhinorrhea, sinus pressure, sneezing, sore throat, tinnitus, trouble swallowing and voice change.    Eyes:  Negative for photophobia, pain, discharge, redness, itching and visual disturbance.   Respiratory:  Negative for cough, choking, chest tightness, shortness of breath, wheezing and stridor.    Cardiovascular:  Negative for chest pain, palpitations and leg swelling.   Gastrointestinal:  Negative for abdominal distention, abdominal pain, anal bleeding, blood in stool, constipation, diarrhea, nausea, rectal pain and vomiting.   Endocrine: Negative for cold intolerance, heat intolerance, polydipsia, polyphagia and polyuria.   Genitourinary:  Negative for decreased urine volume, difficulty urinating, dyspareunia, dysuria, enuresis, flank pain, frequency, genital sores, hematuria, menstrual problem, pelvic pain, urgency, vaginal bleeding, vaginal discharge and vaginal pain.   Musculoskeletal:  Negative for arthralgias, back pain, gait problem, joint swelling, myalgias, neck pain and neck stiffness.   Skin:  Negative for color change, pallor and rash.   Allergic/Immunologic: Negative for environmental allergies, food allergies and immunocompromised state.   Neurological:  Negative for dizziness, tremors, seizures, syncope, facial asymmetry, speech difficulty, weakness, light-headedness, numbness and headaches.   Hematological:  Negative for adenopathy. Does not bruise/bleed easily.    Psychiatric/Behavioral:  Negative for agitation, behavioral problems, confusion, decreased concentration, dysphoric mood, hallucinations, self-injury, sleep disturbance and suicidal ideas. The patient is not nervous/anxious and is not hyperactive.      Objective:      Physical Exam  Vitals reviewed.   Constitutional:       General: She is not in acute distress.     Appearance: She is well-developed. She is not diaphoretic.   HENT:      Head: Normocephalic and atraumatic.      Right Ear: External ear normal.      Left Ear: External ear normal.      Nose: Nose normal.      Right Sinus: No maxillary sinus tenderness or frontal sinus tenderness.      Left Sinus: No maxillary sinus tenderness or frontal sinus tenderness.      Mouth/Throat:      Pharynx: No oropharyngeal exudate.   Eyes:      General: Lids are normal. No scleral icterus.        Right eye: No discharge.         Left eye: No discharge.      Conjunctiva/sclera: Conjunctivae normal.      Right eye: Right conjunctiva is not injected. No hemorrhage.     Left eye: Left conjunctiva is not injected. No hemorrhage.     Pupils: Pupils are equal, round, and reactive to light.   Neck:      Thyroid: No thyromegaly.      Vascular: No JVD.      Trachea: No tracheal deviation.   Cardiovascular:      Rate and Rhythm: Normal rate and regular rhythm.      Heart sounds: Normal heart sounds.   Pulmonary:      Effort: Pulmonary effort is normal. No respiratory distress.      Breath sounds: Normal breath sounds. No stridor.   Chest:      Chest wall: No tenderness.          Comments: Skin reaction secondary to tape  Abdominal:      General: Bowel sounds are normal. There is no distension.      Palpations: Abdomen is soft. There is no hepatomegaly, splenomegaly or mass.      Tenderness: There is no abdominal tenderness. There is no rebound.   Musculoskeletal:         General: No tenderness. Normal range of motion.      Cervical back: Normal range of motion and neck supple.    Lymphadenopathy:      Cervical: No cervical adenopathy.      Upper Body:      Right upper body: No supraclavicular adenopathy.      Left upper body: No supraclavicular adenopathy.   Skin:     General: Skin is dry.      Findings: No erythema or rash.   Neurological:      Mental Status: She is alert and oriented to person, place, and time.      Cranial Nerves: No cranial nerve deficit.      Coordination: Coordination normal.   Psychiatric:         Behavior: Behavior normal.         Thought Content: Thought content normal.         Judgment: Judgment normal.           Assessment:      1. Malignant neoplasm of lower lobe of left lung    2. Immunodeficiency due to chemotherapy    3. Essential hypertension    4. Secondary malignant neoplasm of bone    5. Morbid obesity    6. Type 2 diabetes mellitus without complication, without long-term current use of insulin           Plan:     Extensive conversation reviewed information with patient proceed with cycle 2 day 1 of therapy.  Low labeled patient is allergic to take.  In addition patient will return in 3 weeks for cycle 3 day 1 can be seen by APAP.  I will see after for cycle 4.  The patient will then be reimage at the end of 4 cycles for response to therapy.  Patient's appears to be tolerating 1st cycle well    Med Onc Chart Routing      Follow up with physician 6 weeks. I will see for cycle 4 day 1   Follow up with DOLLY 3 weeks. APAP can see for cycle 3 day 1   Infusion scheduling note    Injection scheduling note Carboplatin Alimta   Labs CBC and CMP   Lab interval:     Imaging    Pharmacy appointment    Other referrals           Reese Mcfarlane Jr, MD FACP

## 2023-02-17 ENCOUNTER — INFUSION (OUTPATIENT)
Dept: INFUSION THERAPY | Facility: HOSPITAL | Age: 53
End: 2023-02-17
Attending: INTERNAL MEDICINE
Payer: COMMERCIAL

## 2023-02-17 ENCOUNTER — DOCUMENTATION ONLY (OUTPATIENT)
Dept: HEMATOLOGY/ONCOLOGY | Facility: CLINIC | Age: 53
End: 2023-02-17
Payer: COMMERCIAL

## 2023-02-17 VITALS
OXYGEN SATURATION: 99 % | RESPIRATION RATE: 18 BRPM | WEIGHT: 293 LBS | TEMPERATURE: 97 F | BODY MASS INDEX: 50.02 KG/M2 | DIASTOLIC BLOOD PRESSURE: 65 MMHG | HEART RATE: 69 BPM | HEIGHT: 64 IN | SYSTOLIC BLOOD PRESSURE: 108 MMHG

## 2023-02-17 DIAGNOSIS — C34.32 MALIGNANT NEOPLASM OF LOWER LOBE OF LEFT LUNG: Primary | ICD-10-CM

## 2023-02-17 PROCEDURE — 96375 TX/PRO/DX INJ NEW DRUG ADDON: CPT

## 2023-02-17 PROCEDURE — 96367 TX/PROPH/DG ADDL SEQ IV INF: CPT

## 2023-02-17 PROCEDURE — 63600175 PHARM REV CODE 636 W HCPCS: Mod: JG | Performed by: INTERNAL MEDICINE

## 2023-02-17 PROCEDURE — 96413 CHEMO IV INFUSION 1 HR: CPT

## 2023-02-17 PROCEDURE — 96411 CHEMO IV PUSH ADDL DRUG: CPT

## 2023-02-17 PROCEDURE — 25000003 PHARM REV CODE 250: Performed by: INTERNAL MEDICINE

## 2023-02-17 RX ORDER — HEPARIN 100 UNIT/ML
500 SYRINGE INTRAVENOUS
Status: DISCONTINUED | OUTPATIENT
Start: 2023-02-17 | End: 2023-02-17 | Stop reason: HOSPADM

## 2023-02-17 RX ADMIN — CARBOPLATIN 750 MG: 10 INJECTION, SOLUTION INTRAVENOUS at 01:02

## 2023-02-17 RX ADMIN — SODIUM CHLORIDE 1200 MG: 9 INJECTION, SOLUTION INTRAVENOUS at 12:02

## 2023-02-17 RX ADMIN — HEPARIN 500 UNITS: 100 SYRINGE at 01:02

## 2023-02-17 RX ADMIN — DEXAMETHASONE SODIUM PHOSPHATE 0.25 MG: 4 INJECTION, SOLUTION INTRA-ARTICULAR; INTRALESIONAL; INTRAMUSCULAR; INTRAVENOUS; SOFT TISSUE at 11:02

## 2023-02-17 RX ADMIN — APREPITANT 130 MG: 130 INJECTION, EMULSION INTRAVENOUS at 11:02

## 2023-02-17 RX ADMIN — SODIUM CHLORIDE: 9 INJECTION, SOLUTION INTRAVENOUS at 11:02

## 2023-02-17 NOTE — PLAN OF CARE
Problem: Adult Inpatient Plan of Care  Goal: Plan of Care Review  Outcome: Ongoing, Progressing  Flowsheets (Taken 2/17/2023 1202)  Plan of Care Reviewed With: patient  Goal: Patient-Specific Goal (Individualized)  Outcome: Ongoing, Progressing  Flowsheets (Taken 2/17/2023 1202)  Anxieties, Fears or Concerns: none  Individualized Care Needs: feet up, blanket, ginger ale  Goal: Optimal Comfort and Wellbeing  Outcome: Ongoing, Progressing  Intervention: Provide Person-Centered Care  Flowsheets (Taken 2/17/2023 1202)  Trust Relationship/Rapport:   care explained   questions encouraged   choices provided   reassurance provided   emotional support provided   empathic listening provided   thoughts/feelings acknowledged   questions answered     Problem: Fall Injury Risk  Goal: Absence of Fall and Fall-Related Injury  Outcome: Ongoing, Progressing  Intervention: Identify and Manage Contributors  Flowsheets (Taken 2/17/2023 1202)  Self-Care Promotion: BADL personal routines maintained  Medication Review/Management: medications reviewed  Intervention: Promote Injury-Free Environment  Flowsheets (Taken 2/17/2023 1202)  Safety Promotion/Fall Prevention:   in recliner, wheels locked   nonskid shoes/socks when out of bed   room near unit station   family to remain at bedside

## 2023-02-17 NOTE — NURSING
"1146am: Visited pt today while in chemo infusion receiving 2nd infusion at chairside with  present. Pt stated,"I feel fine." Pt updated me on how on D5 after 1st tx she didn't fel so well but she hydrated with liquid IV and body armour and felt better. Pt encouraged to continue hydration to help decrease dehydration, weakness, and fatigue feeling. Pt had no other concerns, complaints, questions, and/or needs noted at this time. Pt informed that I'll contact or visit pt for 1 mth acuity onc [place] check. Pt encouraged to contact me directly should any issues arise prior to then. Pt verbalized understanding.   Oncology Navigation   Intake  Cancer Type: Other  Initial Nurse Navigator Contact: 23  Date Worked: 23  Multiple appointments: Yes  Start of Treatment: 23     Treatment  Current Status: Active       Medical Oncologist: Dr. Reese Mcfarlane  Consult Date: 22  Chemotherapy: Planned  Chemotherapy Regimen: Carboplatin Alimta       Procedures: PET scan; Biopsy  Biopsy Schedule Date: 22 (lung)  MRI Schedule Date: 22 (brain)  PET Scan Schedule Date: 22  Other Schedule Date: 23 (EKG)    General Referrals: Social Work  Social Work: notified via in-basket msg  Social Work Referral Date: 23          Support Systems: Spouse/significant other     Acuity  Stage: 2  Systemic Treatment - predicted or initiated: Chemotherapy Regimen with Multiple drugs (+1)  Treatment Tolerability: Has not started treatment yet/treatment fully completed and side effects resolved  ECO  Comorbidities in Medical History: 1   Needed: 0  Support: 0  Transportation: 0  History of noncompliance/frequent no shows and cancellations: 0  Verbalizes the need for more education: 1  Navigation Acuity: 6     Follow Up  No follow-ups on file.       "

## 2023-02-17 NOTE — DISCHARGE INSTRUCTIONS
THANKS FOR ALLOWING ME TO CARE FOR YOU TODAY!!! ~Nikki          THANKS FOR CHOOSING [facility]!!!      [facility]  [id number] [facility]  [id number] [place] [address] Drive  [phone] phone     [phone] fax  Hours of Operation: Monday- Friday 8:00am- 5:00pm  After hours phone  [phone]  Hematology / Oncology Physicians on call      SHAINA Zimmer Dr., Dr., NP Sydney Prescott, BRISA Monteiro, MARCIO Tucker    Please call with any concerns regarding your appointment today.

## 2023-02-19 ENCOUNTER — PATIENT MESSAGE (OUTPATIENT)
Dept: ADMINISTRATIVE | Facility: OTHER | Age: 53
End: 2023-02-19
Payer: COMMERCIAL

## 2023-02-20 ENCOUNTER — PATIENT MESSAGE (OUTPATIENT)
Dept: ADMINISTRATIVE | Facility: OTHER | Age: 53
End: 2023-02-20
Payer: COMMERCIAL

## 2023-03-09 ENCOUNTER — OFFICE VISIT (OUTPATIENT)
Dept: HEMATOLOGY/ONCOLOGY | Facility: CLINIC | Age: 53
End: 2023-03-09
Payer: COMMERCIAL

## 2023-03-09 ENCOUNTER — LAB VISIT (OUTPATIENT)
Dept: LAB | Facility: HOSPITAL | Age: 53
End: 2023-03-09
Attending: INTERNAL MEDICINE
Payer: COMMERCIAL

## 2023-03-09 ENCOUNTER — PATIENT MESSAGE (OUTPATIENT)
Dept: HEMATOLOGY/ONCOLOGY | Facility: CLINIC | Age: 53
End: 2023-03-09

## 2023-03-09 VITALS
OXYGEN SATURATION: 98 % | WEIGHT: 293 LBS | BODY MASS INDEX: 48.82 KG/M2 | DIASTOLIC BLOOD PRESSURE: 60 MMHG | HEART RATE: 66 BPM | TEMPERATURE: 97 F | SYSTOLIC BLOOD PRESSURE: 109 MMHG | HEIGHT: 65 IN

## 2023-03-09 DIAGNOSIS — C34.32 MALIGNANT NEOPLASM OF LOWER LOBE OF LEFT LUNG: ICD-10-CM

## 2023-03-09 DIAGNOSIS — D50.9 MICROCYTIC ANEMIA: Primary | ICD-10-CM

## 2023-03-09 DIAGNOSIS — C79.51 SECONDARY MALIGNANT NEOPLASM OF BONE: ICD-10-CM

## 2023-03-09 DIAGNOSIS — R91.8 MASS OF LEFT LUNG: ICD-10-CM

## 2023-03-09 DIAGNOSIS — D50.9 MICROCYTIC ANEMIA: ICD-10-CM

## 2023-03-09 LAB
ALBUMIN SERPL BCP-MCNC: 3.6 G/DL (ref 3.5–5.2)
ALP SERPL-CCNC: 110 U/L (ref 55–135)
ALT SERPL W/O P-5'-P-CCNC: 51 U/L (ref 10–44)
ANION GAP SERPL CALC-SCNC: 13 MMOL/L (ref 8–16)
AST SERPL-CCNC: 29 U/L (ref 10–40)
BASOPHILS # BLD AUTO: 0.04 K/UL (ref 0–0.2)
BASOPHILS NFR BLD: 0.7 % (ref 0–1.9)
BILIRUB SERPL-MCNC: 0.3 MG/DL (ref 0.1–1)
BUN SERPL-MCNC: 14 MG/DL (ref 6–20)
CALCIUM SERPL-MCNC: 8.7 MG/DL (ref 8.7–10.5)
CHLORIDE SERPL-SCNC: 102 MMOL/L (ref 95–110)
CO2 SERPL-SCNC: 26 MMOL/L (ref 23–29)
CREAT SERPL-MCNC: 1.1 MG/DL (ref 0.5–1.4)
DIFFERENTIAL METHOD: ABNORMAL
EOSINOPHIL # BLD AUTO: 0.1 K/UL (ref 0–0.5)
EOSINOPHIL NFR BLD: 1.2 % (ref 0–8)
ERYTHROCYTE [DISTWIDTH] IN BLOOD BY AUTOMATED COUNT: 15.5 % (ref 11.5–14.5)
EST. GFR  (NO RACE VARIABLE): 60 ML/MIN/1.73 M^2
GLUCOSE SERPL-MCNC: 211 MG/DL (ref 70–110)
HCT VFR BLD AUTO: 32.7 % (ref 37–48.5)
HGB BLD-MCNC: 10.8 G/DL (ref 12–16)
IMM GRANULOCYTES # BLD AUTO: 0.27 K/UL (ref 0–0.04)
IMM GRANULOCYTES NFR BLD AUTO: 4.7 % (ref 0–0.5)
LYMPHOCYTES # BLD AUTO: 2.2 K/UL (ref 1–4.8)
LYMPHOCYTES NFR BLD: 38.7 % (ref 18–48)
MCH RBC QN AUTO: 26.7 PG (ref 27–31)
MCHC RBC AUTO-ENTMCNC: 33 G/DL (ref 32–36)
MCV RBC AUTO: 81 FL (ref 82–98)
MONOCYTES # BLD AUTO: 0.6 K/UL (ref 0.3–1)
MONOCYTES NFR BLD: 11 % (ref 4–15)
NEUTROPHILS # BLD AUTO: 2.5 K/UL (ref 1.8–7.7)
NEUTROPHILS NFR BLD: 43.7 % (ref 38–73)
NRBC BLD-RTO: 0 /100 WBC
PLATELET # BLD AUTO: 329 K/UL (ref 150–450)
PMV BLD AUTO: 10.4 FL (ref 9.2–12.9)
POTASSIUM SERPL-SCNC: 3.5 MMOL/L (ref 3.5–5.1)
PROT SERPL-MCNC: 6.6 G/DL (ref 6–8.4)
RBC # BLD AUTO: 4.04 M/UL (ref 4–5.4)
SODIUM SERPL-SCNC: 141 MMOL/L (ref 136–145)
WBC # BLD AUTO: 5.71 K/UL (ref 3.9–12.7)

## 2023-03-09 PROCEDURE — 3078F PR MOST RECENT DIASTOLIC BLOOD PRESSURE < 80 MM HG: ICD-10-PCS | Mod: CPTII,S$GLB,, | Performed by: NURSE PRACTITIONER

## 2023-03-09 PROCEDURE — 3074F SYST BP LT 130 MM HG: CPT | Mod: CPTII,S$GLB,, | Performed by: NURSE PRACTITIONER

## 2023-03-09 PROCEDURE — 3051F PR MOST RECENT HEMOGLOBIN A1C LEVEL 7.0 - < 8.0%: ICD-10-PCS | Mod: CPTII,S$GLB,, | Performed by: NURSE PRACTITIONER

## 2023-03-09 PROCEDURE — 1159F MED LIST DOCD IN RCRD: CPT | Mod: CPTII,S$GLB,, | Performed by: NURSE PRACTITIONER

## 2023-03-09 PROCEDURE — 3008F BODY MASS INDEX DOCD: CPT | Mod: CPTII,S$GLB,, | Performed by: NURSE PRACTITIONER

## 2023-03-09 PROCEDURE — 1160F PR REVIEW ALL MEDS BY PRESCRIBER/CLIN PHARMACIST DOCUMENTED: ICD-10-PCS | Mod: CPTII,S$GLB,, | Performed by: NURSE PRACTITIONER

## 2023-03-09 PROCEDURE — 36415 COLL VENOUS BLD VENIPUNCTURE: CPT | Performed by: NURSE PRACTITIONER

## 2023-03-09 PROCEDURE — 36415 COLL VENOUS BLD VENIPUNCTURE: CPT | Performed by: INTERNAL MEDICINE

## 2023-03-09 PROCEDURE — 1160F RVW MEDS BY RX/DR IN RCRD: CPT | Mod: CPTII,S$GLB,, | Performed by: NURSE PRACTITIONER

## 2023-03-09 PROCEDURE — 99215 OFFICE O/P EST HI 40 MIN: CPT | Mod: 25,S$GLB,, | Performed by: NURSE PRACTITIONER

## 2023-03-09 PROCEDURE — 80053 COMPREHEN METABOLIC PANEL: CPT | Performed by: INTERNAL MEDICINE

## 2023-03-09 PROCEDURE — 3051F HG A1C>EQUAL 7.0%<8.0%: CPT | Mod: CPTII,S$GLB,, | Performed by: NURSE PRACTITIONER

## 2023-03-09 PROCEDURE — 85025 COMPLETE CBC W/AUTO DIFF WBC: CPT | Performed by: INTERNAL MEDICINE

## 2023-03-09 PROCEDURE — 3078F DIAST BP <80 MM HG: CPT | Mod: CPTII,S$GLB,, | Performed by: NURSE PRACTITIONER

## 2023-03-09 PROCEDURE — 3008F PR BODY MASS INDEX (BMI) DOCUMENTED: ICD-10-PCS | Mod: CPTII,S$GLB,, | Performed by: NURSE PRACTITIONER

## 2023-03-09 PROCEDURE — 99215 PR OFFICE/OUTPT VISIT, EST, LEVL V, 40-54 MIN: ICD-10-PCS | Mod: 25,S$GLB,, | Performed by: NURSE PRACTITIONER

## 2023-03-09 PROCEDURE — 84466 ASSAY OF TRANSFERRIN: CPT | Performed by: NURSE PRACTITIONER

## 2023-03-09 PROCEDURE — 99999 PR PBB SHADOW E&M-EST. PATIENT-LVL V: ICD-10-PCS | Mod: PBBFAC,,, | Performed by: NURSE PRACTITIONER

## 2023-03-09 PROCEDURE — 1159F PR MEDICATION LIST DOCUMENTED IN MEDICAL RECORD: ICD-10-PCS | Mod: CPTII,S$GLB,, | Performed by: NURSE PRACTITIONER

## 2023-03-09 PROCEDURE — 82728 ASSAY OF FERRITIN: CPT | Performed by: NURSE PRACTITIONER

## 2023-03-09 PROCEDURE — 99999 PR PBB SHADOW E&M-EST. PATIENT-LVL V: CPT | Mod: PBBFAC,,, | Performed by: NURSE PRACTITIONER

## 2023-03-09 PROCEDURE — 3074F PR MOST RECENT SYSTOLIC BLOOD PRESSURE < 130 MM HG: ICD-10-PCS | Mod: CPTII,S$GLB,, | Performed by: NURSE PRACTITIONER

## 2023-03-09 RX ORDER — HEPARIN 100 UNIT/ML
500 SYRINGE INTRAVENOUS
Status: CANCELLED | OUTPATIENT
Start: 2023-03-10

## 2023-03-09 RX ORDER — SODIUM CHLORIDE 0.9 % (FLUSH) 0.9 %
10 SYRINGE (ML) INJECTION
Status: CANCELLED | OUTPATIENT
Start: 2023-03-10

## 2023-03-09 RX ORDER — CALCIUM CARBONATE 500(1250)
1 TABLET ORAL DAILY
Qty: 90 TABLET | Refills: 3 | Status: SHIPPED | OUTPATIENT
Start: 2023-03-09 | End: 2024-03-08

## 2023-03-09 NOTE — PROGRESS NOTES
Subjective:       Patient ID: Shaneka Ahmadi is a 53 y.o. female.    Chief Complaint: Review labs. Consideration of chemotherapy     HPI: 53 y.o female with metastatic non small cell lung cancer to bone currently being treated with Carboplatin/Alimta Q 3 weeks.     Patient presenting today for lab review and consideration of cycle 3 Carboplatin/Alimta. She reports tolerating chemotherapy well overall. She does note fatigue few days following chemotherapy.     Social History     Socioeconomic History    Marital status:    Tobacco Use    Smoking status: Never     Passive exposure: Never    Smokeless tobacco: Never   Substance and Sexual Activity    Alcohol use: Never    Drug use: Never    Sexual activity: Yes     Partners: Male     Birth control/protection: None     Comment: tubal       Past Medical History:   Diagnosis Date    Diabetes mellitus     Hypertension     Malignant neoplasm of lower lobe of left lung 12/9/2022    Secondary malignant neoplasm of bone 12/5/2022       Family History   Problem Relation Age of Onset    Heart failure Father        Past Surgical History:   Procedure Laterality Date    CATARACT EXTRACTION W/  INTRAOCULAR LENS IMPLANT Right 06/02/2022    FLUOROSCOPY N/A 1/26/2023    Procedure: FLUOROSCOPY/mediport placement;  Surgeon: Marlon Leone MD;  Location: [facility] CATH LAB;  Service: General;  Laterality: N/A;    TUBAL LIGATION      2007--postpartum tubal ligation       Review of Systems   Constitutional:  Negative for activity change, appetite change, chills, fatigue, fever and unexpected weight change.   HENT:  Negative for congestion, mouth sores, nosebleeds, sore throat, trouble swallowing and voice change.    Eyes:  Negative for visual disturbance.   Respiratory:  Negative for cough, chest tightness, shortness of breath and wheezing.    Cardiovascular:  Negative for chest pain and leg swelling.   Gastrointestinal:  Negative for abdominal distention, abdominal pain, blood in stool,  constipation, diarrhea, nausea and vomiting.   Genitourinary:  Negative for difficulty urinating, dysuria and hematuria.   Musculoskeletal:  Negative for arthralgias, back pain and myalgias.   Skin:  Negative for color change, pallor, rash and wound.   Neurological:  Negative for dizziness, syncope, weakness and headaches.   Hematological:  Negative for adenopathy. Does not bruise/bleed easily.   Psychiatric/Behavioral:  The patient is nervous/anxious.        Medication List with Changes/Refills   New Medications    CALCIUM CARBONATE (OS-BRUNILDA) 500 MG CALCIUM (1,250 MG) TABLET    Take 1 tablet (500 mg total) by mouth once daily.   Current Medications    ALBUTEROL (PROVENTIL/VENTOLIN HFA) 90 MCG/ACTUATION INHALER    Inhale 1-2 puffs into the lungs every 6 (six) hours as needed for Wheezing (cough).    AMLODIPINE (NORVASC) 10 MG TABLET    Take 1 tablet (10 mg total) by mouth once daily.    ATENOLOL (TENORMIN) 50 MG TABLET    Take 1 tab by mouth twice a day    BETAMETHASONE VALERATE 0.1% (VALISONE) 0.1 % OINT    APPLY TOPICALLY TO THE TOP OF THE FEET TWO TIMES A DAY    BLOOD SUGAR DIAGNOSTIC STRP    To check BG 2 times daily, to use with insurance preferred meter    BLOOD-GLUCOSE METER KIT    To check BG 2 times daily, to use with insurance preferred meter    CETIRIZINE (ZYRTEC) 10 MG TABLET    Take 1 tablet (10 mg total) by mouth every evening.    DEXAMETHASONE (DECADRON) 4 MG TAB    Take 2 tablets (8 mg total) by mouth once daily. Take as directed on days 2, 3, and 16,17 of your chemotherapy cycle starting with cycle 2 forward. Do not take with cycle 1.    DEXAMETHASONE (DECADRON) 4 MG TAB    Take 1 tablet (4 mg total) by mouth As instructed. Take 8 mg the day prior to chemotherapy, and then 8 mg after chemo on days 2, 3, 4    FLUTICASONE PROPIONATE (FLONASE) 50 MCG/ACTUATION NASAL SPRAY    2 sprays (100 mcg total) by Each Nostril route once daily.    FOLIC ACID (FOLVITE) 1 MG TABLET    Take 1 tablet (1 mg total) by  mouth once daily.    HYDROCODONE-HOMATROPINE 5-1.5 MG/5 ML (HYCODAN) 5-1.5 MG/5 ML SYRP    Take 5 mL by mouth every 4 (four) hours as needed.    LANCETS MISC    To check BG 2 times daily, to use with insurance preferred meter    LOSARTAN-HYDROCHLOROTHIAZIDE 100-25 MG (HYZAAR) 100-25 MG PER TABLET    Take 1 tablet by mouth once daily.    METFORMIN (GLUCOPHAGE-XR) 500 MG ER 24HR TABLET    Take 1 tab with breakfast and take 2 tabs with dinner    MUPIROCIN (BACTROBAN) 2 % OINTMENT    APPLY TO AFFECTED AREA(S) THREE TIMES A DAY    ONDANSETRON (ZOFRAN-ODT) 8 MG TBDL    Take 1 tablet (8 mg total) by mouth every 8 (eight) hours as needed. Starting with cycle 1 of chemotherapy    ONDANSETRON (ZOFRAN-ODT) 8 MG TBDL    Take 1 tablet (8 mg total) by mouth every 8 (eight) hours as needed (nausea/vomitting).    OSIMERTINIB (TAGRISSO) 80 MG TAB    Take 80 mg by mouth once daily.    PRAVASTATIN (PRAVACHOL) 10 MG TABLET    Take 1 tablet (10 mg total) by mouth every evening.    TRUE METRIX GLUCOSE METER MISC        TRUEPLUS LANCETS 33 GAUGE MISC    Apply topically.     Objective:     Vitals:    03/09/23 0941   BP: 109/60   Pulse: 66   Temp: 97.1 °F (36.2 °C)     Lab Results   Component Value Date    WBC 5.71 03/09/2023    HGB 10.8 (L) 03/09/2023    HCT 32.7 (L) 03/09/2023    MCV 81 (L) 03/09/2023     03/09/2023       BMP  Lab Results   Component Value Date     03/09/2023    K 3.5 03/09/2023     03/09/2023    CO2 26 03/09/2023    BUN 14 03/09/2023    CREATININE 1.1 03/09/2023    CALCIUM 8.7 03/09/2023    ANIONGAP 13 03/09/2023    EGFRNORACEVR 60 03/09/2023     Lab Results   Component Value Date    ALT 51 (H) 03/09/2023    AST 29 03/09/2023    ALKPHOS 110 03/09/2023    BILITOT 0.3 03/09/2023         Physical Exam  Vitals reviewed.   Constitutional:       Appearance: She is well-developed.   HENT:      Head: Normocephalic.      Right Ear: External ear normal.      Left Ear: External ear normal.   Eyes:      General:  Lids are normal. No scleral icterus.        Right eye: No discharge.         Left eye: No discharge.      Conjunctiva/sclera: Conjunctivae normal.   Neck:      Thyroid: No thyroid mass.   Cardiovascular:      Rate and Rhythm: Normal rate and regular rhythm.      Heart sounds: Normal heart sounds.   Pulmonary:      Effort: Pulmonary effort is normal. No respiratory distress.      Breath sounds: Normal breath sounds. No wheezing or rales.   Chest:      Comments: Right chest wall ecchymosis, erythema noted. Skin abrasion noted. No drainage noted.   Abdominal:      General: There is no distension.   Genitourinary:     Comments: deferred  Musculoskeletal:         General: Normal range of motion.      Cervical back: Normal range of motion.   Skin:     General: Skin is warm and dry.   Neurological:      Mental Status: She is alert and oriented to person, place, and time.   Psychiatric:         Speech: Speech normal.         Behavior: Behavior normal. Behavior is cooperative.         Thought Content: Thought content normal.                Plan:     Microcytic anemia  -     Iron and TIBC; Future; Expected date: 03/09/2023  -     Ferritin; Future; Expected date: 03/09/2023    Malignant neoplasm of lower lobe of left lung  -     calcium carbonate (OS-BRUNILDA) 500 mg calcium (1,250 mg) tablet; Take 1 tablet (500 mg total) by mouth once daily.  Dispense: 90 tablet; Refill: 3    Secondary malignant neoplasm of bone            Med Onc Chart Routing      Follow up with physician . Already scheduled   Follow up with DOLLY    Infusion scheduling note    Injection scheduling note possible Zometa in am versus 3 weeks pending dental clearance   Labs    Imaging    Pharmacy appointment    Other referrals         MILAGROS Toribio

## 2023-03-09 NOTE — ASSESSMENT & PLAN NOTE
Planned to initiate Zometa    -obtain dental clearance prior to initiation  -Start calcium supplementation daily. Continue Vitamin D supplement  -If clearance obtained prior to chemo in am will add Zometa. Otherwise will plan to add to chemo appt in 3 weeks

## 2023-03-09 NOTE — ASSESSMENT & PLAN NOTE
Laboratory review stable to proceed with cycle 3 Carboplatin/Alimta as previously scheduled in am    F/u 6 weeks as previously scheduled with Dr. Mcfarlane for cycle 4 Carboplatin/Alimta with labs prior

## 2023-03-09 NOTE — PROGRESS NOTES
Subjective:       Patient ID: Shaneka Ahmadi is a 53 y.o. female.    Chief Complaint: Review labs. 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 Soon to start Zometa pending dental clearance    Today she presents for follow up lab review and consideration of previously planned cycle 3 chemo in am. She reports tolerating treatments well overall but does note increased fatigue few days following treatments.     Social History     Socioeconomic History    Marital status:    Tobacco Use    Smoking status: Never     Passive exposure: Never    Smokeless tobacco: Never   Substance and Sexual Activity    Alcohol use: Never    Drug use: Never    Sexual activity: Yes     Partners: Male     Birth control/protection: None     Comment: tubal       Past Medical History:   Diagnosis Date    Diabetes mellitus     Hypertension     Malignant neoplasm of lower lobe of left lung 12/9/2022    Secondary malignant neoplasm of bone 12/5/2022       Family History   Problem Relation Age of Onset    Heart failure Father        Past Surgical History:   Procedure Laterality Date    CATARACT EXTRACTION W/  INTRAOCULAR LENS IMPLANT Right 06/02/2022    FLUOROSCOPY N/A 1/26/2023    Procedure: FLUOROSCOPY/mediport placement;  Surgeon: Marlon Leone MD;  Location: [facility] CATH LAB;  Service: General;  Laterality: N/A;    TUBAL LIGATION      2007--postpartum tubal ligation       Review of Systems   Constitutional:  Negative for appetite change, chills, fatigue, fever and unexpected weight change.   HENT:  Negative for congestion, mouth sores, nosebleeds, sore throat, trouble swallowing and voice change.    Eyes:  Negative for photophobia and visual disturbance.   Respiratory:  Negative for cough, chest tightness, shortness of breath and wheezing.    Cardiovascular:  Negative for chest pain and leg swelling.   Gastrointestinal:  Negative for  abdominal distention, abdominal pain, blood in stool, constipation, diarrhea, nausea and vomiting.   Genitourinary:  Negative for difficulty urinating, dysuria and hematuria.   Musculoskeletal:  Negative for arthralgias, back pain and myalgias.   Skin:  Negative for pallor, rash and wound.   Neurological:  Negative for dizziness, syncope, weakness and headaches.   Hematological:  Negative for adenopathy. Does not bruise/bleed easily.   Psychiatric/Behavioral:  The patient is nervous/anxious.        Medication List with Changes/Refills   New Medications    CALCIUM CARBONATE (OS-BRUNILDA) 500 MG CALCIUM (1,250 MG) TABLET    Take 1 tablet (500 mg total) by mouth once daily.   Current Medications    ALBUTEROL (PROVENTIL/VENTOLIN HFA) 90 MCG/ACTUATION INHALER    Inhale 1-2 puffs into the lungs every 6 (six) hours as needed for Wheezing (cough).    AMLODIPINE (NORVASC) 10 MG TABLET    Take 1 tablet (10 mg total) by mouth once daily.    ATENOLOL (TENORMIN) 50 MG TABLET    Take 1 tab by mouth twice a day    BETAMETHASONE VALERATE 0.1% (VALISONE) 0.1 % OINT    APPLY TOPICALLY TO THE TOP OF THE FEET TWO TIMES A DAY    BLOOD SUGAR DIAGNOSTIC STRP    To check BG 2 times daily, to use with insurance preferred meter    BLOOD-GLUCOSE METER KIT    To check BG 2 times daily, to use with insurance preferred meter    CETIRIZINE (ZYRTEC) 10 MG TABLET    Take 1 tablet (10 mg total) by mouth every evening.    DEXAMETHASONE (DECADRON) 4 MG TAB    Take 2 tablets (8 mg total) by mouth once daily. Take as directed on days 2, 3, and 16,17 of your chemotherapy cycle starting with cycle 2 forward. Do not take with cycle 1.    DEXAMETHASONE (DECADRON) 4 MG TAB    Take 1 tablet (4 mg total) by mouth As instructed. Take 8 mg the day prior to chemotherapy, and then 8 mg after chemo on days 2, 3, 4    FLUTICASONE PROPIONATE (FLONASE) 50 MCG/ACTUATION NASAL SPRAY    2 sprays (100 mcg total) by Each Nostril route once daily.    FOLIC ACID (FOLVITE) 1 MG  TABLET    Take 1 tablet (1 mg total) by mouth once daily.    HYDROCODONE-HOMATROPINE 5-1.5 MG/5 ML (HYCODAN) 5-1.5 MG/5 ML SYRP    Take 5 mL by mouth every 4 (four) hours as needed.    LANCETS MISC    To check BG 2 times daily, to use with insurance preferred meter    LOSARTAN-HYDROCHLOROTHIAZIDE 100-25 MG (HYZAAR) 100-25 MG PER TABLET    Take 1 tablet by mouth once daily.    METFORMIN (GLUCOPHAGE-XR) 500 MG ER 24HR TABLET    Take 1 tab with breakfast and take 2 tabs with dinner    MUPIROCIN (BACTROBAN) 2 % OINTMENT    APPLY TO AFFECTED AREA(S) THREE TIMES A DAY    ONDANSETRON (ZOFRAN-ODT) 8 MG TBDL    Take 1 tablet (8 mg total) by mouth every 8 (eight) hours as needed. Starting with cycle 1 of chemotherapy    ONDANSETRON (ZOFRAN-ODT) 8 MG TBDL    Take 1 tablet (8 mg total) by mouth every 8 (eight) hours as needed (nausea/vomitting).    OSIMERTINIB (TAGRISSO) 80 MG TAB    Take 80 mg by mouth once daily.    PRAVASTATIN (PRAVACHOL) 10 MG TABLET    Take 1 tablet (10 mg total) by mouth every evening.    TRUE METRIX GLUCOSE METER MISC        TRUEPLUS LANCETS 33 GAUGE MISC    Apply topically.     Objective:     Vitals:    03/09/23 0941   BP: 109/60   Pulse: 66   Temp: 97.1 °F (36.2 °C)     Lab Results   Component Value Date    WBC 5.71 03/09/2023    HGB 10.8 (L) 03/09/2023    HCT 32.7 (L) 03/09/2023    MCV 81 (L) 03/09/2023     03/09/2023       BMP  Lab Results   Component Value Date     03/09/2023    K 3.5 03/09/2023     03/09/2023    CO2 26 03/09/2023    BUN 14 03/09/2023    CREATININE 1.1 03/09/2023    CALCIUM 8.7 03/09/2023    ANIONGAP 13 03/09/2023    EGFRNORACEVR 60 03/09/2023     Lab Results   Component Value Date    ALT 51 (H) 03/09/2023    AST 29 03/09/2023    ALKPHOS 110 03/09/2023    BILITOT 0.3 03/09/2023         Physical Exam  Vitals reviewed.   Constitutional:       Appearance: She is well-developed.   HENT:      Head: Normocephalic.      Right Ear: External ear normal.      Left Ear:  External ear normal.      Nose: Nose normal.   Eyes:      General: Lids are normal. No scleral icterus.        Right eye: No discharge.         Left eye: No discharge.      Conjunctiva/sclera: Conjunctivae normal.   Neck:      Thyroid: No thyroid mass.   Cardiovascular:      Rate and Rhythm: Normal rate and regular rhythm.      Heart sounds: Normal heart sounds.   Pulmonary:      Effort: Pulmonary effort is normal. No respiratory distress.      Breath sounds: Normal breath sounds. No wheezing or rales.   Abdominal:      General: Bowel sounds are normal. There is no distension.      Palpations: Abdomen is soft.      Tenderness: There is no abdominal tenderness.   Genitourinary:     Comments: deferred  Musculoskeletal:         General: Normal range of motion.      Cervical back: Normal range of motion.   Skin:     General: Skin is warm and dry.   Neurological:      Mental Status: She is alert and oriented to person, place, and time.   Psychiatric:         Speech: Speech normal.         Behavior: Behavior normal. Behavior is cooperative.         Thought Content: Thought content normal.        Assessment:     Problem List Items Addressed This Visit          Oncology    Secondary malignant neoplasm of bone     Planned to initiate Zometa    -obtain dental clearance prior to initiation  -Start calcium supplementation daily. Continue Vitamin D supplement  -If clearance obtained prior to chemo in am will add Zometa. Otherwise will plan to add to chemo appt in 3 weeks         Malignant neoplasm of lower lobe of left lung     Laboratory review stable to proceed with cycle 3 Carboplatin/Alimta as previously scheduled in am    F/u 6 weeks as previously scheduled with Dr. Mcfarlane for cycle 4 Carboplatin/Alimta with labs prior         Relevant Medications    calcium carbonate (OS-BRUNILDA) 500 mg calcium (1,250 mg) tablet     Other Visit Diagnoses       Microcytic anemia    -  Primary    Relevant Orders    Iron and TIBC    Ferritin               Plan:     Microcytic anemia  -     Iron and TIBC; Future; Expected date: 03/09/2023  -     Ferritin; Future; Expected date: 03/09/2023    Malignant neoplasm of lower lobe of left lung  -     calcium carbonate (OS-BRUNIDLA) 500 mg calcium (1,250 mg) tablet; Take 1 tablet (500 mg total) by mouth once daily.  Dispense: 90 tablet; Refill: 3    Secondary malignant neoplasm of bone          Med Onc Chart Routing      Follow up with physician . Already scheduled   Follow up with DO[facility]    Infusion scheduling note    Injection scheduling note zometa in am if dental clearance. If not plan for zometa in 3 weeks pending dental clearance   Labs    Imaging    Pharmacy appointment    Other referrals           MILAGROS Toribio

## 2023-03-10 ENCOUNTER — INFUSION (OUTPATIENT)
Dept: INFUSION THERAPY | Facility: HOSPITAL | Age: 53
End: 2023-03-10
Attending: RADIOLOGY
Payer: COMMERCIAL

## 2023-03-10 VITALS
HEART RATE: 68 BPM | DIASTOLIC BLOOD PRESSURE: 59 MMHG | OXYGEN SATURATION: 99 % | TEMPERATURE: 98 F | WEIGHT: 293 LBS | BODY MASS INDEX: 48.82 KG/M2 | RESPIRATION RATE: 18 BRPM | SYSTOLIC BLOOD PRESSURE: 123 MMHG | HEIGHT: 65 IN

## 2023-03-10 DIAGNOSIS — C34.32 MALIGNANT NEOPLASM OF LOWER LOBE OF LEFT LUNG: Primary | ICD-10-CM

## 2023-03-10 LAB
FERRITIN SERPL-MCNC: 646 NG/ML (ref 20–300)
IRON SERPL-MCNC: 71 UG/DL (ref 30–160)
SATURATED IRON: 24 % (ref 20–50)
TOTAL IRON BINDING CAPACITY: 297 UG/DL (ref 250–450)
TRANSFERRIN SERPL-MCNC: 201 MG/DL (ref 200–375)

## 2023-03-10 PROCEDURE — 96417 CHEMO IV INFUS EACH ADDL SEQ: CPT

## 2023-03-10 PROCEDURE — 96365 THER/PROPH/DIAG IV INF INIT: CPT

## 2023-03-10 PROCEDURE — 96413 CHEMO IV INFUSION 1 HR: CPT

## 2023-03-10 PROCEDURE — 63600175 PHARM REV CODE 636 W HCPCS: Mod: JZ | Performed by: INTERNAL MEDICINE

## 2023-03-10 PROCEDURE — 96411 CHEMO IV PUSH ADDL DRUG: CPT

## 2023-03-10 PROCEDURE — 96367 TX/PROPH/DG ADDL SEQ IV INF: CPT

## 2023-03-10 PROCEDURE — 96375 TX/PRO/DX INJ NEW DRUG ADDON: CPT

## 2023-03-10 PROCEDURE — 25000003 PHARM REV CODE 250: Performed by: INTERNAL MEDICINE

## 2023-03-10 RX ORDER — HEPARIN 100 UNIT/ML
500 SYRINGE INTRAVENOUS
Status: DISCONTINUED | OUTPATIENT
Start: 2023-03-10 | End: 2023-03-10 | Stop reason: HOSPADM

## 2023-03-10 RX ORDER — SODIUM CHLORIDE 0.9 % (FLUSH) 0.9 %
10 SYRINGE (ML) INJECTION
Status: DISCONTINUED | OUTPATIENT
Start: 2023-03-10 | End: 2023-03-10 | Stop reason: HOSPADM

## 2023-03-10 RX ADMIN — APREPITANT 130 MG: 130 INJECTION, EMULSION INTRAVENOUS at 12:03

## 2023-03-10 RX ADMIN — DEXAMETHASONE SODIUM PHOSPHATE 0.25 MG: 4 INJECTION, SOLUTION INTRA-ARTICULAR; INTRALESIONAL; INTRAMUSCULAR; INTRAVENOUS; SOFT TISSUE at 11:03

## 2023-03-10 RX ADMIN — HEPARIN 500 UNITS: 100 SYRINGE at 01:03

## 2023-03-10 RX ADMIN — CARBOPLATIN 750 MG: 10 INJECTION, SOLUTION INTRAVENOUS at 12:03

## 2023-03-10 RX ADMIN — SODIUM CHLORIDE 1200 MG: 9 INJECTION, SOLUTION INTRAVENOUS at 12:03

## 2023-03-10 RX ADMIN — SODIUM CHLORIDE: 9 INJECTION, SOLUTION INTRAVENOUS at 11:03

## 2023-03-10 NOTE — PLAN OF CARE
Discussed plan of care with pt. Addressed any and ongoing concerns. Pt denies   Problem: Adult Inpatient Plan of Care  Goal: Plan of Care Review  Outcome: Ongoing, Progressing  Goal: Patient-Specific Goal (Individualized)  Outcome: Ongoing, Progressing  Flowsheets (Taken 3/10/2023 1450)  Anxieties, Fears or Concerns: None expressed  Individualized Care Needs: Feet elevated in recliner, gauze and paper tape only to secure osuna needle , allergic to adhesive  Goal: Absence of Hospital-Acquired Illness or Injury  Outcome: Ongoing, Progressing  Intervention: Prevent Infection  Flowsheets (Taken 3/10/2023 1450)  Infection Prevention:   equipment surfaces disinfected   hand hygiene promoted   personal protective equipment utilized  Goal: Optimal Comfort and Wellbeing  Outcome: Ongoing, Progressing  Intervention: Provide Person-Centered Care  Flowsheets (Taken 3/10/2023 1450)  Trust Relationship/Rapport:   care explained   choices provided   emotional support provided   empathic listening provided   questions answered   questions encouraged   reassurance provided   thoughts/feelings acknowledged

## 2023-03-10 NOTE — DISCHARGE INSTRUCTIONS
THANKS FOR ALLOWING ME TO CARE FOR YOU TODAY!!!           THANKS FOR CHOOSING [facility]!!!      [facility]  [address] [address]  [phone] phone     [phone] fax  Hours of Operation: Monday- Friday 8:00am- 5:00pm  After hours phone  [phone]  Hematology / Oncology Physicians on call      SHAINA Zimmer Dr., Dr., NP Sydney Prescott, BRISA Monteiro, MARCIO Tucker    Please call with any concerns regarding your appointment today.

## 2023-03-13 ENCOUNTER — PATIENT MESSAGE (OUTPATIENT)
Dept: HEMATOLOGY/ONCOLOGY | Facility: CLINIC | Age: 53
End: 2023-03-13
Payer: COMMERCIAL

## 2023-03-15 ENCOUNTER — TELEPHONE (OUTPATIENT)
Dept: PULMONOLOGY | Facility: CLINIC | Age: 53
End: 2023-03-15
Payer: COMMERCIAL

## 2023-03-22 ENCOUNTER — PATIENT MESSAGE (OUTPATIENT)
Dept: HEMATOLOGY/ONCOLOGY | Facility: CLINIC | Age: 53
End: 2023-03-22
Payer: COMMERCIAL

## 2023-03-22 ENCOUNTER — OFFICE VISIT (OUTPATIENT)
Dept: PULMONOLOGY | Facility: CLINIC | Age: 53
End: 2023-03-22
Payer: COMMERCIAL

## 2023-03-22 ENCOUNTER — PATIENT MESSAGE (OUTPATIENT)
Dept: ADMINISTRATIVE | Facility: OTHER | Age: 53
End: 2023-03-22
Payer: COMMERCIAL

## 2023-03-22 VITALS
SYSTOLIC BLOOD PRESSURE: 126 MMHG | BODY MASS INDEX: 48.82 KG/M2 | HEIGHT: 65 IN | WEIGHT: 293 LBS | OXYGEN SATURATION: 98 % | DIASTOLIC BLOOD PRESSURE: 84 MMHG | RESPIRATION RATE: 20 BRPM | HEART RATE: 68 BPM

## 2023-03-22 DIAGNOSIS — C34.32 MALIGNANT NEOPLASM OF LOWER LOBE OF LEFT LUNG: Primary | ICD-10-CM

## 2023-03-22 PROCEDURE — 1160F RVW MEDS BY RX/DR IN RCRD: CPT | Mod: CPTII,S$GLB,, | Performed by: INTERNAL MEDICINE

## 2023-03-22 PROCEDURE — 3079F PR MOST RECENT DIASTOLIC BLOOD PRESSURE 80-89 MM HG: ICD-10-PCS | Mod: CPTII,S$GLB,, | Performed by: INTERNAL MEDICINE

## 2023-03-22 PROCEDURE — 1159F MED LIST DOCD IN RCRD: CPT | Mod: CPTII,S$GLB,, | Performed by: INTERNAL MEDICINE

## 2023-03-22 PROCEDURE — 99999 PR PBB SHADOW E&M-EST. PATIENT-LVL V: ICD-10-PCS | Mod: PBBFAC,,, | Performed by: INTERNAL MEDICINE

## 2023-03-22 PROCEDURE — 3074F PR MOST RECENT SYSTOLIC BLOOD PRESSURE < 130 MM HG: ICD-10-PCS | Mod: CPTII,S$GLB,, | Performed by: INTERNAL MEDICINE

## 2023-03-22 PROCEDURE — 3079F DIAST BP 80-89 MM HG: CPT | Mod: CPTII,S$GLB,, | Performed by: INTERNAL MEDICINE

## 2023-03-22 PROCEDURE — 1159F PR MEDICATION LIST DOCUMENTED IN MEDICAL RECORD: ICD-10-PCS | Mod: CPTII,S$GLB,, | Performed by: INTERNAL MEDICINE

## 2023-03-22 PROCEDURE — 3008F BODY MASS INDEX DOCD: CPT | Mod: CPTII,S$GLB,, | Performed by: INTERNAL MEDICINE

## 2023-03-22 PROCEDURE — 1160F PR REVIEW ALL MEDS BY PRESCRIBER/CLIN PHARMACIST DOCUMENTED: ICD-10-PCS | Mod: CPTII,S$GLB,, | Performed by: INTERNAL MEDICINE

## 2023-03-22 PROCEDURE — 3051F HG A1C>EQUAL 7.0%<8.0%: CPT | Mod: CPTII,S$GLB,, | Performed by: INTERNAL MEDICINE

## 2023-03-22 PROCEDURE — 3074F SYST BP LT 130 MM HG: CPT | Mod: CPTII,S$GLB,, | Performed by: INTERNAL MEDICINE

## 2023-03-22 PROCEDURE — 99999 PR PBB SHADOW E&M-EST. PATIENT-LVL V: CPT | Mod: PBBFAC,,, | Performed by: INTERNAL MEDICINE

## 2023-03-22 PROCEDURE — 3051F PR MOST RECENT HEMOGLOBIN A1C LEVEL 7.0 - < 8.0%: ICD-10-PCS | Mod: CPTII,S$GLB,, | Performed by: INTERNAL MEDICINE

## 2023-03-22 PROCEDURE — 3008F PR BODY MASS INDEX (BMI) DOCUMENTED: ICD-10-PCS | Mod: CPTII,S$GLB,, | Performed by: INTERNAL MEDICINE

## 2023-03-22 PROCEDURE — 99213 OFFICE O/P EST LOW 20 MIN: CPT | Mod: S$GLB,,, | Performed by: INTERNAL MEDICINE

## 2023-03-22 PROCEDURE — 99213 PR OFFICE/OUTPT VISIT, EST, LEVL III, 20-29 MIN: ICD-10-PCS | Mod: S$GLB,,, | Performed by: INTERNAL MEDICINE

## 2023-03-22 NOTE — PROGRESS NOTES
Subjective:       Patient ID: Shaneka Ahmadi is a 53 y.o. female.    Chief Complaint: Lung Cancer    Lung Cancer       52-year-old female with a history of hypertension, diabetes and hyperlipidemia who was evaluated recently by her primary care nurse practitioner on November 11th for a persistent cough that had been present for about 3 months.  A chest radiograph was obtained which was abnormal showing left upper lobe lung mass.  This was further characterized with a CT of the chest that showed left upper lobe lung mass as well as left hilar and mediastinal adenopathy and a small left pleural effusion.  Further evaluation with PET-CT showed significant avid uptake of the left upper lobe mass, ipsilateral hilar and mediastinal lymph nodes as well as numerous some areas suspicious for bony metastases in the sternum, ribs, spine and pelvis.  She was evaluated by Medical Oncology Dr. Mcfarlane and a CT-guided transthoracic needle biopsy was done + for non small cell CA.     Currently about to start cycle 4 of carboplatin/alimta. Tolerating well thus far. No repeat imaging as of yet. Continues to follow up with oncology. No respiratory complaints. Specifically denies dyspnea, cough, fever, chills, chest pain and sputum    Review of Systems  as per HPI otherwise negative    Objective:      Physical Exam   Constitutional: She is oriented to person, place, and time. She appears well-developed. No distress.   HENT:   Head: Normocephalic.   Neck: No JVD present.   Cardiovascular: Normal rate and regular rhythm.   Pulmonary/Chest: Normal expansion, symmetric chest wall expansion, effort normal and breath sounds normal.   Abdominal: Soft. There is no hepatosplenomegaly.   Musculoskeletal:         General: No edema.      Cervical back: Neck supple.   Neurological: She is alert and oriented to person, place, and time.   Psychiatric: She has a normal mood and affect.   Nursing note and vitals reviewed.        Assessment:       1.  Malignant neoplasm of lower lobe of left lung          Plan:       Overall tolerating treatment well.  No current pulmonary complaints.  We will follow-up treatment-related imaging as ordered by Oncology.  Otherwise I will see her back as needed

## 2023-03-29 ENCOUNTER — PATIENT MESSAGE (OUTPATIENT)
Dept: ADMINISTRATIVE | Facility: OTHER | Age: 53
End: 2023-03-29
Payer: COMMERCIAL

## 2023-03-29 DIAGNOSIS — C79.51 SECONDARY MALIGNANT NEOPLASM OF BONE: Primary | ICD-10-CM

## 2023-03-29 NOTE — TELEPHONE ENCOUNTER
"Done.  Also called pt to let her know to "accept" thru portal.  She did so while she was at the [facility].    "

## 2023-03-30 ENCOUNTER — OFFICE VISIT (OUTPATIENT)
Dept: HEMATOLOGY/ONCOLOGY | Facility: CLINIC | Age: 53
End: 2023-03-30
Payer: COMMERCIAL

## 2023-03-30 ENCOUNTER — LAB VISIT (OUTPATIENT)
Dept: LAB | Facility: HOSPITAL | Age: 53
End: 2023-03-30
Attending: INTERNAL MEDICINE
Payer: COMMERCIAL

## 2023-03-30 ENCOUNTER — OFFICE VISIT (OUTPATIENT)
Dept: OPHTHALMOLOGY | Facility: CLINIC | Age: 53
End: 2023-03-30
Payer: COMMERCIAL

## 2023-03-30 VITALS
HEIGHT: 64 IN | WEIGHT: 293 LBS | SYSTOLIC BLOOD PRESSURE: 116 MMHG | HEART RATE: 70 BPM | DIASTOLIC BLOOD PRESSURE: 57 MMHG | TEMPERATURE: 98 F | BODY MASS INDEX: 50.02 KG/M2 | OXYGEN SATURATION: 99 %

## 2023-03-30 DIAGNOSIS — T45.1X5A IMMUNODEFICIENCY DUE TO CHEMOTHERAPY: ICD-10-CM

## 2023-03-30 DIAGNOSIS — C34.32 MALIGNANT NEOPLASM OF LOWER LOBE OF LEFT LUNG: Primary | ICD-10-CM

## 2023-03-30 DIAGNOSIS — Z79.899 IMMUNODEFICIENCY DUE TO CHEMOTHERAPY: ICD-10-CM

## 2023-03-30 DIAGNOSIS — H10.011 ACUTE FOLLICULAR CONJUNCTIVITIS OF RIGHT EYE: Primary | ICD-10-CM

## 2023-03-30 DIAGNOSIS — R91.8 MASS OF LEFT LUNG: ICD-10-CM

## 2023-03-30 DIAGNOSIS — C79.51 SECONDARY MALIGNANT NEOPLASM OF BONE: ICD-10-CM

## 2023-03-30 DIAGNOSIS — D84.821 IMMUNODEFICIENCY DUE TO CHEMOTHERAPY: ICD-10-CM

## 2023-03-30 LAB
ALBUMIN SERPL BCP-MCNC: 3.8 G/DL (ref 3.5–5.2)
ALP SERPL-CCNC: 93 U/L (ref 55–135)
ALT SERPL W/O P-5'-P-CCNC: 51 U/L (ref 10–44)
ANION GAP SERPL CALC-SCNC: 14 MMOL/L (ref 8–16)
ANISOCYTOSIS BLD QL SMEAR: SLIGHT
AST SERPL-CCNC: 35 U/L (ref 10–40)
BASOPHILS # BLD AUTO: 0.04 K/UL (ref 0–0.2)
BASOPHILS NFR BLD: 0.8 % (ref 0–1.9)
BILIRUB SERPL-MCNC: 0.2 MG/DL (ref 0.1–1)
BUN SERPL-MCNC: 14 MG/DL (ref 6–20)
CALCIUM SERPL-MCNC: 9.1 MG/DL (ref 8.7–10.5)
CHLORIDE SERPL-SCNC: 101 MMOL/L (ref 95–110)
CO2 SERPL-SCNC: 24 MMOL/L (ref 23–29)
CREAT SERPL-MCNC: 0.9 MG/DL (ref 0.5–1.4)
DACRYOCYTES BLD QL SMEAR: ABNORMAL
DIFFERENTIAL METHOD: ABNORMAL
EOSINOPHIL # BLD AUTO: 0.1 K/UL (ref 0–0.5)
EOSINOPHIL NFR BLD: 1.2 % (ref 0–8)
ERYTHROCYTE [DISTWIDTH] IN BLOOD BY AUTOMATED COUNT: 18 % (ref 11.5–14.5)
EST. GFR  (NO RACE VARIABLE): >60 ML/MIN/1.73 M^2
GLUCOSE SERPL-MCNC: 174 MG/DL (ref 70–110)
HCT VFR BLD AUTO: 31.5 % (ref 37–48.5)
HGB BLD-MCNC: 10.5 G/DL (ref 12–16)
IMM GRANULOCYTES # BLD AUTO: 0.09 K/UL (ref 0–0.04)
IMM GRANULOCYTES NFR BLD AUTO: 1.7 % (ref 0–0.5)
LYMPHOCYTES # BLD AUTO: 2.5 K/UL (ref 1–4.8)
LYMPHOCYTES NFR BLD: 48.8 % (ref 18–48)
MCH RBC QN AUTO: 28.1 PG (ref 27–31)
MCHC RBC AUTO-ENTMCNC: 33.3 G/DL (ref 32–36)
MCV RBC AUTO: 84 FL (ref 82–98)
MONOCYTES # BLD AUTO: 0.7 K/UL (ref 0.3–1)
MONOCYTES NFR BLD: 13 % (ref 4–15)
NEUTROPHILS # BLD AUTO: 1.8 K/UL (ref 1.8–7.7)
NEUTROPHILS NFR BLD: 34.5 % (ref 38–73)
NRBC BLD-RTO: 1 /100 WBC
OVALOCYTES BLD QL SMEAR: ABNORMAL
PLATELET # BLD AUTO: 323 K/UL (ref 150–450)
PMV BLD AUTO: 10.2 FL (ref 9.2–12.9)
POIKILOCYTOSIS BLD QL SMEAR: SLIGHT
POLYCHROMASIA BLD QL SMEAR: ABNORMAL
POTASSIUM SERPL-SCNC: 3.3 MMOL/L (ref 3.5–5.1)
PROT SERPL-MCNC: 7.4 G/DL (ref 6–8.4)
RBC # BLD AUTO: 3.74 M/UL (ref 4–5.4)
SODIUM SERPL-SCNC: 139 MMOL/L (ref 136–145)
WBC # BLD AUTO: 5.16 K/UL (ref 3.9–12.7)

## 2023-03-30 PROCEDURE — 85025 COMPLETE CBC W/AUTO DIFF WBC: CPT | Performed by: INTERNAL MEDICINE

## 2023-03-30 PROCEDURE — 99215 PR OFFICE/OUTPT VISIT, EST, LEVL V, 40-54 MIN: ICD-10-PCS | Mod: S$GLB,,,

## 2023-03-30 PROCEDURE — 99999 PR PBB SHADOW E&M-EST. PATIENT-LVL V: ICD-10-PCS | Mod: PBBFAC,,,

## 2023-03-30 PROCEDURE — 1160F PR REVIEW ALL MEDS BY PRESCRIBER/CLIN PHARMACIST DOCUMENTED: ICD-10-PCS | Mod: CPTII,S$GLB,, | Performed by: OPTOMETRIST

## 2023-03-30 PROCEDURE — 99999 PR PBB SHADOW E&M-EST. PATIENT-LVL III: CPT | Mod: PBBFAC,,, | Performed by: OPTOMETRIST

## 2023-03-30 PROCEDURE — 3051F HG A1C>EQUAL 7.0%<8.0%: CPT | Mod: CPTII,S$GLB,,

## 2023-03-30 PROCEDURE — 1160F RVW MEDS BY RX/DR IN RCRD: CPT | Mod: CPTII,S$GLB,, | Performed by: OPTOMETRIST

## 2023-03-30 PROCEDURE — 99999 PR PBB SHADOW E&M-EST. PATIENT-LVL V: CPT | Mod: PBBFAC,,,

## 2023-03-30 PROCEDURE — 3051F PR MOST RECENT HEMOGLOBIN A1C LEVEL 7.0 - < 8.0%: ICD-10-PCS | Mod: CPTII,S$GLB,, | Performed by: OPTOMETRIST

## 2023-03-30 PROCEDURE — 99213 PR OFFICE/OUTPT VISIT, EST, LEVL III, 20-29 MIN: ICD-10-PCS | Mod: S$GLB,,, | Performed by: OPTOMETRIST

## 2023-03-30 PROCEDURE — 36415 COLL VENOUS BLD VENIPUNCTURE: CPT | Performed by: INTERNAL MEDICINE

## 2023-03-30 PROCEDURE — 3008F PR BODY MASS INDEX (BMI) DOCUMENTED: ICD-10-PCS | Mod: CPTII,S$GLB,,

## 2023-03-30 PROCEDURE — 99999 PR PBB SHADOW E&M-EST. PATIENT-LVL III: ICD-10-PCS | Mod: PBBFAC,,, | Performed by: OPTOMETRIST

## 2023-03-30 PROCEDURE — 1159F MED LIST DOCD IN RCRD: CPT | Mod: CPTII,S$GLB,, | Performed by: OPTOMETRIST

## 2023-03-30 PROCEDURE — 1159F PR MEDICATION LIST DOCUMENTED IN MEDICAL RECORD: ICD-10-PCS | Mod: CPTII,S$GLB,, | Performed by: OPTOMETRIST

## 2023-03-30 PROCEDURE — 3008F BODY MASS INDEX DOCD: CPT | Mod: CPTII,S$GLB,,

## 2023-03-30 PROCEDURE — 3051F PR MOST RECENT HEMOGLOBIN A1C LEVEL 7.0 - < 8.0%: ICD-10-PCS | Mod: CPTII,S$GLB,,

## 2023-03-30 PROCEDURE — 99215 OFFICE O/P EST HI 40 MIN: CPT | Mod: S$GLB,,,

## 2023-03-30 PROCEDURE — 80053 COMPREHEN METABOLIC PANEL: CPT | Performed by: INTERNAL MEDICINE

## 2023-03-30 PROCEDURE — 3074F SYST BP LT 130 MM HG: CPT | Mod: CPTII,S$GLB,,

## 2023-03-30 PROCEDURE — 3051F HG A1C>EQUAL 7.0%<8.0%: CPT | Mod: CPTII,S$GLB,, | Performed by: OPTOMETRIST

## 2023-03-30 PROCEDURE — 99213 OFFICE O/P EST LOW 20 MIN: CPT | Mod: S$GLB,,, | Performed by: OPTOMETRIST

## 2023-03-30 PROCEDURE — 3078F PR MOST RECENT DIASTOLIC BLOOD PRESSURE < 80 MM HG: ICD-10-PCS | Mod: CPTII,S$GLB,,

## 2023-03-30 PROCEDURE — 3078F DIAST BP <80 MM HG: CPT | Mod: CPTII,S$GLB,,

## 2023-03-30 PROCEDURE — 3074F PR MOST RECENT SYSTOLIC BLOOD PRESSURE < 130 MM HG: ICD-10-PCS | Mod: CPTII,S$GLB,,

## 2023-03-30 RX ORDER — CYANOCOBALAMIN 1000 UG/ML
1000 INJECTION, SOLUTION INTRAMUSCULAR; SUBCUTANEOUS
Status: CANCELLED | OUTPATIENT
Start: 2023-03-31

## 2023-03-30 RX ORDER — SODIUM CHLORIDE 0.9 % (FLUSH) 0.9 %
10 SYRINGE (ML) INJECTION
Status: CANCELLED | OUTPATIENT
Start: 2023-03-31

## 2023-03-30 RX ORDER — NEOMYCIN SULFATE, POLYMYXIN B SULFATE AND DEXAMETHASONE 3.5; 10000; 1 MG/ML; [USP'U]/ML; MG/ML
1 SUSPENSION/ DROPS OPHTHALMIC 4 TIMES DAILY
Qty: 5 ML | Refills: 0 | Status: SHIPPED | OUTPATIENT
Start: 2023-03-30 | End: 2023-04-06

## 2023-03-30 RX ORDER — HEPARIN 100 UNIT/ML
500 SYRINGE INTRAVENOUS
Status: CANCELLED | OUTPATIENT
Start: 2023-03-31

## 2023-03-30 NOTE — PROGRESS NOTES
Subjective:       Patient ID: Shaneka Ahmadi is a 53 y.o. female.    Chief Complaint: Chemotherapy and Lung Cancer    Primary Oncologist/Hematologist: Dr. Mcfarlane    HPI: Ms. Ahmadi is a 53 year old female who is following up for her metastatic non-small cell lung carcinoma. She is here for cycle 4 day 1 of alimta + carboplatin  q 3 weeks. Plan for 4 cycles then reimage.   Cancer Hx: Noted cough and SOB, CXR and CT chest showed L Lung mass. L lung biopsy positive for adenocarcinoma , EGFR mutated. Pleural fluid also positive for malignancy. PET showed avid STEPHAN mass, mediastinal nodes, bone mets in C1, T1, T11, L3, sacrum, L ischium, sternum. MRI brain negative for intracranial metastases     Today: Patient states she is doing well. She states she has good appetite and is staying hydrated. Se denies any fevers, illnesses, fatigue. She is seeing opthal today for possible conjunctivitis. She is taking vitamin D. She has not started calcium thus far. She has not had her first zometa dose yet. She recently saw dentist and was told she has a few fillings and 1 tooth that needs to be pulled.     Social History     Socioeconomic History    Marital status:    Tobacco Use    Smoking status: Never     Passive exposure: Never    Smokeless tobacco: Never   Substance and Sexual Activity    Alcohol use: Never    Drug use: Never    Sexual activity: Yes     Partners: Male     Birth control/protection: None     Comment: tubal       Past Medical History:   Diagnosis Date    Diabetes mellitus     Hypertension     Malignant neoplasm of lower lobe of left lung 12/9/2022    Secondary malignant neoplasm of bone 12/5/2022       Family History   Problem Relation Age of Onset    Heart failure Father        Past Surgical History:   Procedure Laterality Date    CATARACT EXTRACTION W/  INTRAOCULAR LENS IMPLANT Right 06/02/2022    FLUOROSCOPY N/A 1/26/2023    Procedure: FLUOROSCOPY/mediport placement;  Surgeon: Marlon Leone MD;  Location:  [facility] CATH LAB;  Service: General;  Laterality: N/A;    TUBAL LIGATION      2007--postpartum tubal ligation       Review of Systems   Constitutional:  Negative for activity change, appetite change, chills, diaphoresis, fatigue, fever and unexpected weight change.   HENT:  Positive for dental problem. Negative for congestion, nosebleeds and rhinorrhea.    Eyes:  Positive for discharge, redness and itching. Negative for visual disturbance.   Respiratory:  Negative for cough and shortness of breath.    Cardiovascular:  Negative for chest pain and leg swelling.   Gastrointestinal:  Negative for abdominal pain, anal bleeding, blood in stool, constipation, diarrhea, nausea and vomiting.   Genitourinary:  Negative for hematuria.   Musculoskeletal:  Negative for arthralgias and myalgias.   Skin:  Negative for color change and pallor.   Allergic/Immunologic: Positive for immunocompromised state.   Neurological:  Negative for dizziness, weakness, light-headedness, numbness and headaches.       Medication List with Changes/Refills   Current Medications    ALBUTEROL (PROVENTIL/VENTOLIN HFA) 90 MCG/ACTUATION INHALER    Inhale 1-2 puffs into the lungs every 6 (six) hours as needed for Wheezing (cough).    AMLODIPINE (NORVASC) 10 MG TABLET    Take 1 tablet (10 mg total) by mouth once daily.    ATENOLOL (TENORMIN) 50 MG TABLET    Take 1 tab by mouth twice a day    BETAMETHASONE VALERATE 0.1% (VALISONE) 0.1 % OINT    APPLY TOPICALLY TO THE TOP OF THE FEET TWO TIMES A DAY    BLOOD SUGAR DIAGNOSTIC STRP    To check BG 2 times daily, to use with insurance preferred meter    BLOOD-GLUCOSE METER KIT    To check BG 2 times daily, to use with insurance preferred meter    CALCIUM CARBONATE (OS-BRUNILDA) 500 MG CALCIUM (1,250 MG) TABLET    Take 1 tablet (500 mg total) by mouth once daily.    CETIRIZINE (ZYRTEC) 10 MG TABLET    Take 1 tablet (10 mg total) by mouth every evening.    DEXAMETHASONE (DECADRON) 4 MG TAB    Take 2 tablets (8 mg total) by  mouth once daily. Take as directed on days 2, 3, and 16,17 of your chemotherapy cycle starting with cycle 2 forward. Do not take with cycle 1.    DEXAMETHASONE (DECADRON) 4 MG TAB    Take 1 tablet (4 mg total) by mouth As instructed. Take 8 mg the day prior to chemotherapy, and then 8 mg after chemo on days 2, 3, 4    FLUTICASONE PROPIONATE (FLONASE) 50 MCG/ACTUATION NASAL SPRAY    2 sprays (100 mcg total) by Each Nostril route once daily.    FOLIC ACID (FOLVITE) 1 MG TABLET    Take 1 tablet (1 mg total) by mouth once daily.    HYDROCODONE-HOMATROPINE 5-1.5 MG/5 ML (HYCODAN) 5-1.5 MG/5 ML SYRP    Take 5 mL by mouth every 4 (four) hours as needed.    LANCETS MISC    To check BG 2 times daily, to use with insurance preferred meter    LOSARTAN-HYDROCHLOROTHIAZIDE 100-25 MG (HYZAAR) 100-25 MG PER TABLET    Take 1 tablet by mouth once daily.    METFORMIN (GLUCOPHAGE-XR) 500 MG ER 24HR TABLET    Take 1 tab with breakfast and take 2 tabs with dinner    MUPIROCIN (BACTROBAN) 2 % OINTMENT    APPLY TO AFFECTED AREA(S) THREE TIMES A DAY    ONDANSETRON (ZOFRAN-ODT) 8 MG TBDL    Take 1 tablet (8 mg total) by mouth every 8 (eight) hours as needed. Starting with cycle 1 of chemotherapy    ONDANSETRON (ZOFRAN-ODT) 8 MG TBDL    Take 1 tablet (8 mg total) by mouth every 8 (eight) hours as needed (nausea/vomitting).    OSIMERTINIB (TAGRISSO) 80 MG TAB    Take 80 mg by mouth once daily.    PRAVASTATIN (PRAVACHOL) 10 MG TABLET    Take 1 tablet (10 mg total) by mouth every evening.    TRUE METRIX GLUCOSE METER MISC        TRUEPLUS LANCETS 33 GAUGE MISC    Apply topically.     Objective:     Vitals:    03/30/23 1400   BP: (!) 116/57   Pulse: 70   Temp: 97.8 °F (36.6 °C)       Physical Exam  Vitals reviewed.   Constitutional:       General: She is not in acute distress.     Appearance: She is not ill-appearing, toxic-appearing or diaphoretic.   HENT:      Head: Normocephalic and atraumatic.   Eyes:      Conjunctiva/sclera: Conjunctivae  normal.   Cardiovascular:      Rate and Rhythm: Normal rate.   Pulmonary:      Effort: Pulmonary effort is normal.   Abdominal:      General: Bowel sounds are normal.   Musculoskeletal:      Right lower leg: No edema.      Left lower leg: No edema.   Skin:     General: Skin is warm.      Coloration: Skin is not jaundiced or pale.      Findings: No bruising, erythema, lesion or rash.   Neurological:      Mental Status: She is alert.      Motor: No weakness.      Gait: Gait normal.   Psychiatric:         Mood and Affect: Mood normal.         Behavior: Behavior normal.         Thought Content: Thought content normal.          Labs/Results:  Lab Results   Component Value Date    WBC 5.16 03/30/2023    RBC 3.74 (L) 03/30/2023    HGB 10.5 (L) 03/30/2023    HCT 31.5 (L) 03/30/2023    MCV 84 03/30/2023    MCH 28.1 03/30/2023    MCHC 33.3 03/30/2023    RDW 18.0 (H) 03/30/2023     03/30/2023    MPV 10.2 03/30/2023    GRAN 1.8 03/30/2023    GRAN 34.5 (L) 03/30/2023    LYMPH 2.5 03/30/2023    LYMPH 48.8 (H) 03/30/2023    MONO 0.7 03/30/2023    MONO 13.0 03/30/2023    EOS 0.1 03/30/2023    BASO 0.04 03/30/2023    EOSINOPHIL 1.2 03/30/2023    BASOPHIL 0.8 03/30/2023     CMP  Sodium   Date Value Ref Range Status   03/30/2023 139 136 - 145 mmol/L Final     Potassium   Date Value Ref Range Status   03/30/2023 3.3 (L) 3.5 - 5.1 mmol/L Final     Chloride   Date Value Ref Range Status   03/30/2023 101 95 - 110 mmol/L Final     CO2   Date Value Ref Range Status   03/30/2023 24 23 - 29 mmol/L Final     Glucose   Date Value Ref Range Status   03/30/2023 174 (H) 70 - 110 mg/dL Final     BUN   Date Value Ref Range Status   03/30/2023 14 6 - 20 mg/dL Final     Creatinine   Date Value Ref Range Status   03/30/2023 0.9 0.5 - 1.4 mg/dL Final     Calcium   Date Value Ref Range Status   03/30/2023 9.1 8.7 - 10.5 mg/dL Final     Total Protein   Date Value Ref Range Status   03/30/2023 7.4 6.0 - 8.4 g/dL Final     Albumin   Date Value Ref  Range Status   03/30/2023 3.8 3.5 - 5.2 g/dL Final     Total Bilirubin   Date Value Ref Range Status   03/30/2023 0.2 0.1 - 1.0 mg/dL Final     Comment:     For infants and newborns, interpretation of results should be based  on gestational age, weight and in agreement with clinical  observations.    Premature Infant recommended reference ranges:  Up to 24 hours.............<8.0 mg/dL  Up to 48 hours............<12.0 mg/dL  3-5 days..................<15.0 mg/dL  6-29 days.................<15.0 mg/dL       Alkaline Phosphatase   Date Value Ref Range Status   03/30/2023 93 55 - 135 U/L Final     AST   Date Value Ref Range Status   03/30/2023 35 10 - 40 U/L Final     ALT   Date Value Ref Range Status   03/30/2023 51 (H) 10 - 44 U/L Final     Anion Gap   Date Value Ref Range Status   03/30/2023 14 8 - 16 mmol/L Final     eGFR   Date Value Ref Range Status   03/30/2023 >60 >60 mL/min/1.73 m^2 Final     Pet scan 12/2/22  Impression:  1. FDG avid mass within the left upper lobe with associated mass/adenopathy in the AP window extending into the suprahilar region and single FDG avid lymph node in the prevascular region of the mediastinum.  Extensive osseous metastatic disease    Assessment:     Problem List Items Addressed This Visit          Immunology/Multi System    Immunodeficiency due to chemotherapy       Oncology    Secondary malignant neoplasm of bone    Relevant Orders    NM PET CT Routine FDG    Malignant neoplasm of lower lobe of left lung - Primary    Relevant Orders    NM PET CT Routine FDG     Plan:     Malignant neoplasm of lower lobe of left lung, Secondary malignant neoplasm of bone, Immunodeficiency due to chemotherapy  --continue with alimta + carboplatin q 3 weeks  --continue with cycle 4 day 1  --will reimage after this cycle  --ANC:1.8, plts:323, crcl: 98.8ml/min, WBC: 5.16  --BP:116/57  --dexamethasone  --continue with folic acid daily  --continue with vitamin b12 injection q 3 cycles  --continue with  calcium and vitamin D supplement  --has not had cycle 1 day 1 of zometa q 4 weeks. Dental clearance needed first. Patient need 1 tooth pulled and a couple of fillings.     Follow-Up: Chemo tomorrow. 2-3 weeks with cbc cmp ldh and reimage prior then primary oncologist-standing orders in    Debi Trujillo PA-C  Hematology Oncology    Route Chart for Scheduling    Med Onc Chart Routing      Follow up with physician . 2 weeks with cbc cmp ldh prior and reimage prior   Follow up with DOLLY    Infusion scheduling note chemo tomorrow.   Injection scheduling note    Labs    Imaging PET scan   in 2 weeks prior to appt with primary oncologist   Pharmacy appointment    Other referrals           Treatment Plan Information   OP NSCLC PEMETREXED + CARBOPLATIN (AUC) Q3W   Reese Mcfarlane MD   Upcoming Treatment Dates - OP NSCLC PEMETREXED + CARBOPLATIN (AUC) Q3W    3/31/2023       Chemotherapy       PEMEtrexed disodium (ALIMTA) 1,250 mg in sodium chloride 0.9% SolP 100 mL chemo infusion       CARBOplatin (PARAPLATIN) 750 mg in sodium chloride 0.9% 325 mL chemo infusion       Supportive Care       cyanocobalamin injection 1,000 mcg       Antiemetics       aprepitant (CINVANTI) injection 130 mg       palonosetron (ALOXI) 0.25 mg with Dexamethasone (DECADRON) 12 mg in NS 50 mL IVPB    Supportive Plan Information  OP ZOLEDRONIC ACID (ZOMETA) Q4W   Reese Mcfarlane MD   Upcoming Treatment Dates - OP ZOLEDRONIC ACID (ZOMETA) Q4W    12/27/2022       Medications       zoledronic acid (ZOMETA) 4 mg in sodium chloride 0.9% 100 mL IVPB  1/24/2023       Medications       zoledronic acid (ZOMETA) 4 mg in sodium chloride 0.9% 100 mL IVPB  2/21/2023       Medications       zoledronic acid (ZOMETA) 4 mg in sodium chloride 0.9% 100 mL IVPB  3/21/2023       Medications       zoledronic acid (ZOMETA) 4 mg in sodium chloride 0.9% 100 mL IVPB    Therapy Plan Information  Flushes  sodium chloride 0.9% 250 mL flush bag  Intravenous, Every visit  sodium  chloride 0.9% flush 10 mL  10 mL, Intravenous, Every visit  heparin, porcine (PF) 100 unit/mL injection flush 500 Units  500 Units, Intravenous, Every visit  alteplase injection 2 mg  2 mg, Intra-Catheter, Every visit  5. Medications  cyanocobalamin injection 1,000 mcg  1,000 mcg, Intramuscular, Day 1 of every 1 month

## 2023-03-30 NOTE — PROGRESS NOTES
HPI     Eye Problem            Comments: Pain Scale:  0  Onset:   early last week  OD, OS, OU:   OD  Discharge:   yes  A.M. Matting:  sticky in AM   Itch:   no  Redness:   no  Photophobia:   no  Foreign body sensation:   no  Deep pain:   no  Previous occurrence:   no  Drops:   no  Pt states OD is swollen and dripping no pain or rednesses         Last edited by Audrey Quezada MA on 3/30/2023  2:28 PM.            Assessment /Plan     For exam results, see Encounter Report.    Acute follicular conjunctivitis of right eye      suspect allergy etiology  cool compresses PRN for comfort  Start Maxitrol qid OD for 7 days   Recommended antihistamine gtt qd  Lastacaft coupon given to patient     RTC PRN if symptoms worsen or not resolved x 1 week  Discussed above and all questions were answered.

## 2023-03-31 ENCOUNTER — INFUSION (OUTPATIENT)
Dept: INFUSION THERAPY | Facility: HOSPITAL | Age: 53
End: 2023-03-31
Attending: RADIOLOGY
Payer: COMMERCIAL

## 2023-03-31 VITALS
BODY MASS INDEX: 50.02 KG/M2 | HEIGHT: 64 IN | OXYGEN SATURATION: 100 % | DIASTOLIC BLOOD PRESSURE: 63 MMHG | HEART RATE: 62 BPM | TEMPERATURE: 97 F | WEIGHT: 293 LBS | SYSTOLIC BLOOD PRESSURE: 141 MMHG | RESPIRATION RATE: 18 BRPM

## 2023-03-31 DIAGNOSIS — C34.32 MALIGNANT NEOPLASM OF LOWER LOBE OF LEFT LUNG: ICD-10-CM

## 2023-03-31 DIAGNOSIS — C79.51 SECONDARY MALIGNANT NEOPLASM OF BONE: Primary | ICD-10-CM

## 2023-03-31 PROCEDURE — 96375 TX/PRO/DX INJ NEW DRUG ADDON: CPT

## 2023-03-31 PROCEDURE — 96413 CHEMO IV INFUSION 1 HR: CPT

## 2023-03-31 PROCEDURE — 96372 THER/PROPH/DIAG INJ SC/IM: CPT

## 2023-03-31 PROCEDURE — 96411 CHEMO IV PUSH ADDL DRUG: CPT

## 2023-03-31 PROCEDURE — 63600175 PHARM REV CODE 636 W HCPCS: Mod: JZ,JG

## 2023-03-31 PROCEDURE — 63600175 PHARM REV CODE 636 W HCPCS: Performed by: INTERNAL MEDICINE

## 2023-03-31 PROCEDURE — 25000003 PHARM REV CODE 250

## 2023-03-31 PROCEDURE — 96367 TX/PROPH/DG ADDL SEQ IV INF: CPT

## 2023-03-31 RX ORDER — ZOLEDRONIC ACID 0.04 MG/ML
4 INJECTION, SOLUTION INTRAVENOUS
Status: DISCONTINUED | OUTPATIENT
Start: 2023-03-31 | End: 2023-03-31 | Stop reason: HOSPADM

## 2023-03-31 RX ORDER — SODIUM CHLORIDE 0.9 % (FLUSH) 0.9 %
10 SYRINGE (ML) INJECTION
Status: DISCONTINUED | OUTPATIENT
Start: 2023-03-31 | End: 2023-03-31 | Stop reason: HOSPADM

## 2023-03-31 RX ORDER — HEPARIN 100 UNIT/ML
500 SYRINGE INTRAVENOUS
Status: DISCONTINUED | OUTPATIENT
Start: 2023-03-31 | End: 2023-03-31 | Stop reason: HOSPADM

## 2023-03-31 RX ORDER — CYANOCOBALAMIN 1000 UG/ML
1000 INJECTION, SOLUTION INTRAMUSCULAR; SUBCUTANEOUS
Status: COMPLETED | OUTPATIENT
Start: 2023-03-31 | End: 2023-03-31

## 2023-03-31 RX ADMIN — HEPARIN 500 UNITS: 100 SYRINGE at 01:03

## 2023-03-31 RX ADMIN — APREPITANT 130 MG: 130 INJECTION, EMULSION INTRAVENOUS at 12:03

## 2023-03-31 RX ADMIN — CYANOCOBALAMIN 1000 MCG: 1000 INJECTION, SOLUTION INTRAMUSCULAR at 12:03

## 2023-03-31 RX ADMIN — DEXAMETHASONE SODIUM PHOSPHATE 0.25 MG: 4 INJECTION, SOLUTION INTRA-ARTICULAR; INTRALESIONAL; INTRAMUSCULAR; INTRAVENOUS; SOFT TISSUE at 11:03

## 2023-03-31 RX ADMIN — SODIUM CHLORIDE 1200 MG: 9 INJECTION, SOLUTION INTRAVENOUS at 12:03

## 2023-03-31 RX ADMIN — CARBOPLATIN 750 MG: 10 INJECTION, SOLUTION INTRAVENOUS at 01:03

## 2023-03-31 RX ADMIN — SODIUM CHLORIDE: 9 INJECTION, SOLUTION INTRAVENOUS at 11:03

## 2023-04-02 ENCOUNTER — PATIENT MESSAGE (OUTPATIENT)
Dept: ADMINISTRATIVE | Facility: OTHER | Age: 53
End: 2023-04-02
Payer: COMMERCIAL

## 2023-04-04 ENCOUNTER — DOCUMENTATION ONLY (OUTPATIENT)
Dept: INFUSION THERAPY | Facility: HOSPITAL | Age: 53
End: 2023-04-04
Payer: COMMERCIAL

## 2023-04-04 NOTE — PROGRESS NOTES
Care Companion Intervention    Reason for intervention: Other weight change  Comment: see below    Intervention: Other intervention (comment)  Comment: Alert rec'd from weekend of wt decrease.  Incorrect numbers recorded:  correct below  3/30 office visit  137kg  3/31  136.1 kg   (late reading 86.3)  4/1  136.0   (repeat reading 85.4)  4/2  136.6  Will continue to monitor and see if pt's scale needs to be checked.

## 2023-04-05 ENCOUNTER — PATIENT MESSAGE (OUTPATIENT)
Dept: HEMATOLOGY/ONCOLOGY | Facility: CLINIC | Age: 53
End: 2023-04-05
Payer: COMMERCIAL

## 2023-04-06 ENCOUNTER — TELEPHONE (OUTPATIENT)
Dept: HEMATOLOGY/ONCOLOGY | Facility: CLINIC | Age: 53
End: 2023-04-06
Payer: COMMERCIAL

## 2023-04-06 DIAGNOSIS — Z79.899 IMMUNODEFICIENCY DUE TO CHEMOTHERAPY: ICD-10-CM

## 2023-04-06 DIAGNOSIS — D84.821 IMMUNODEFICIENCY DUE TO CHEMOTHERAPY: ICD-10-CM

## 2023-04-06 DIAGNOSIS — C79.51 SECONDARY MALIGNANT NEOPLASM OF BONE: ICD-10-CM

## 2023-04-06 DIAGNOSIS — C34.32 MALIGNANT NEOPLASM OF LOWER LOBE OF LEFT LUNG: Primary | ICD-10-CM

## 2023-04-06 DIAGNOSIS — T45.1X5A IMMUNODEFICIENCY DUE TO CHEMOTHERAPY: ICD-10-CM

## 2023-04-06 NOTE — TELEPHONE ENCOUNTER
I spoke with he Dentist office Dr Iram Collazo, informing them that, is ok for them to go ahead and treat the pt for what they found out during the pt assessment, before we start our own treatment over here, verbalized understanding.

## 2023-04-11 ENCOUNTER — HOSPITAL ENCOUNTER (OUTPATIENT)
Dept: RADIOLOGY | Facility: HOSPITAL | Age: 53
Discharge: HOME | End: 2023-04-11
Payer: COMMERCIAL

## 2023-04-11 DIAGNOSIS — D84.821 IMMUNODEFICIENCY DUE TO CHEMOTHERAPY: ICD-10-CM

## 2023-04-11 DIAGNOSIS — C34.32 MALIGNANT NEOPLASM OF LOWER LOBE OF LEFT LUNG: ICD-10-CM

## 2023-04-11 DIAGNOSIS — T45.1X5A IMMUNODEFICIENCY DUE TO CHEMOTHERAPY: ICD-10-CM

## 2023-04-11 DIAGNOSIS — Z79.899 IMMUNODEFICIENCY DUE TO CHEMOTHERAPY: ICD-10-CM

## 2023-04-11 DIAGNOSIS — C79.51 SECONDARY MALIGNANT NEOPLASM OF BONE: ICD-10-CM

## 2023-04-11 PROCEDURE — 74177 CT ABD & PELVIS W/CONTRAST: CPT | Mod: 26,,, | Performed by: RADIOLOGY

## 2023-04-11 PROCEDURE — 74177 CT ABD & PELVIS W/CONTRAST: CPT | Mod: TC,PO

## 2023-04-11 PROCEDURE — 71260 CT THORAX DX C+: CPT | Mod: TC,PO

## 2023-04-11 PROCEDURE — 71260 CT CHEST ABDOMEN PELVIS WITH CONTRAST (XPD): ICD-10-PCS | Mod: 26,,, | Performed by: RADIOLOGY

## 2023-04-11 PROCEDURE — 25500020 PHARM REV CODE 255: Mod: PO

## 2023-04-11 PROCEDURE — A9698 NON-RAD CONTRAST MATERIALNOC: HCPCS | Mod: PO

## 2023-04-11 PROCEDURE — 74177 CT CHEST ABDOMEN PELVIS WITH CONTRAST (XPD): ICD-10-PCS | Mod: 26,,, | Performed by: RADIOLOGY

## 2023-04-11 PROCEDURE — 71260 CT THORAX DX C+: CPT | Mod: 26,,, | Performed by: RADIOLOGY

## 2023-04-11 RX ADMIN — IOHEXOL 500 ML: 9 SOLUTION ORAL at 08:04

## 2023-04-11 RX ADMIN — IOHEXOL 100 ML: 350 INJECTION, SOLUTION INTRAVENOUS at 08:04

## 2023-04-12 DIAGNOSIS — E11.9 TYPE 2 DIABETES MELLITUS WITHOUT COMPLICATION: ICD-10-CM

## 2023-04-13 ENCOUNTER — OFFICE VISIT (OUTPATIENT)
Dept: HEMATOLOGY/ONCOLOGY | Facility: CLINIC | Age: 53
End: 2023-04-13
Payer: COMMERCIAL

## 2023-04-13 ENCOUNTER — LAB VISIT (OUTPATIENT)
Dept: LAB | Facility: HOSPITAL | Age: 53
End: 2023-04-13
Attending: INTERNAL MEDICINE
Payer: COMMERCIAL

## 2023-04-13 VITALS
HEIGHT: 64 IN | OXYGEN SATURATION: 96 % | TEMPERATURE: 97 F | DIASTOLIC BLOOD PRESSURE: 75 MMHG | SYSTOLIC BLOOD PRESSURE: 123 MMHG | WEIGHT: 293 LBS | HEART RATE: 77 BPM | BODY MASS INDEX: 50.02 KG/M2

## 2023-04-13 DIAGNOSIS — D84.821 IMMUNODEFICIENCY DUE TO CHEMOTHERAPY: ICD-10-CM

## 2023-04-13 DIAGNOSIS — Z79.899 IMMUNODEFICIENCY DUE TO CHEMOTHERAPY: ICD-10-CM

## 2023-04-13 DIAGNOSIS — R91.8 MASS OF LEFT LUNG: ICD-10-CM

## 2023-04-13 DIAGNOSIS — E66.01 MORBID OBESITY: ICD-10-CM

## 2023-04-13 DIAGNOSIS — E11.9 TYPE 2 DIABETES MELLITUS WITHOUT COMPLICATION, WITHOUT LONG-TERM CURRENT USE OF INSULIN: ICD-10-CM

## 2023-04-13 DIAGNOSIS — T45.1X5A IMMUNODEFICIENCY DUE TO CHEMOTHERAPY: ICD-10-CM

## 2023-04-13 DIAGNOSIS — C79.51 SECONDARY MALIGNANT NEOPLASM OF BONE: ICD-10-CM

## 2023-04-13 DIAGNOSIS — I10 ESSENTIAL HYPERTENSION: ICD-10-CM

## 2023-04-13 DIAGNOSIS — C34.32 MALIGNANT NEOPLASM OF LOWER LOBE OF LEFT LUNG: Primary | ICD-10-CM

## 2023-04-13 LAB
ALBUMIN SERPL BCP-MCNC: 3.8 G/DL (ref 3.5–5.2)
ALP SERPL-CCNC: 95 U/L (ref 55–135)
ALT SERPL W/O P-5'-P-CCNC: 22 U/L (ref 10–44)
ANION GAP SERPL CALC-SCNC: 15 MMOL/L (ref 8–16)
ANISOCYTOSIS BLD QL SMEAR: SLIGHT
AST SERPL-CCNC: 16 U/L (ref 10–40)
BASOPHILS # BLD AUTO: 0.02 K/UL (ref 0–0.2)
BASOPHILS NFR BLD: 0.3 % (ref 0–1.9)
BILIRUB SERPL-MCNC: 0.3 MG/DL (ref 0.1–1)
BUN SERPL-MCNC: 17 MG/DL (ref 6–20)
CALCIUM SERPL-MCNC: 9.4 MG/DL (ref 8.7–10.5)
CHLORIDE SERPL-SCNC: 100 MMOL/L (ref 95–110)
CO2 SERPL-SCNC: 27 MMOL/L (ref 23–29)
CREAT SERPL-MCNC: 1 MG/DL (ref 0.5–1.4)
DIFFERENTIAL METHOD: ABNORMAL
EOSINOPHIL # BLD AUTO: 0 K/UL (ref 0–0.5)
EOSINOPHIL NFR BLD: 0.2 % (ref 0–8)
ERYTHROCYTE [DISTWIDTH] IN BLOOD BY AUTOMATED COUNT: 17.2 % (ref 11.5–14.5)
EST. GFR  (NO RACE VARIABLE): >60 ML/MIN/1.73 M^2
GLUCOSE SERPL-MCNC: 161 MG/DL (ref 70–110)
HCT VFR BLD AUTO: 28.1 % (ref 37–48.5)
HGB BLD-MCNC: 9.4 G/DL (ref 12–16)
IMM GRANULOCYTES # BLD AUTO: 0.01 K/UL (ref 0–0.04)
IMM GRANULOCYTES NFR BLD AUTO: 0.2 % (ref 0–0.5)
LYMPHOCYTES # BLD AUTO: 2.3 K/UL (ref 1–4.8)
LYMPHOCYTES NFR BLD: 38.7 % (ref 18–48)
MCH RBC QN AUTO: 28.6 PG (ref 27–31)
MCHC RBC AUTO-ENTMCNC: 33.5 G/DL (ref 32–36)
MCV RBC AUTO: 85 FL (ref 82–98)
MONOCYTES # BLD AUTO: 0.5 K/UL (ref 0.3–1)
MONOCYTES NFR BLD: 7.6 % (ref 4–15)
NEUTROPHILS # BLD AUTO: 3.2 K/UL (ref 1.8–7.7)
NEUTROPHILS NFR BLD: 53 % (ref 38–73)
NRBC BLD-RTO: 0 /100 WBC
OVALOCYTES BLD QL SMEAR: ABNORMAL
PLATELET # BLD AUTO: 79 K/UL (ref 150–450)
PMV BLD AUTO: 10.6 FL (ref 9.2–12.9)
POIKILOCYTOSIS BLD QL SMEAR: SLIGHT
POLYCHROMASIA BLD QL SMEAR: ABNORMAL
POTASSIUM SERPL-SCNC: 3.8 MMOL/L (ref 3.5–5.1)
PROT SERPL-MCNC: 7.2 G/DL (ref 6–8.4)
RBC # BLD AUTO: 3.29 M/UL (ref 4–5.4)
SODIUM SERPL-SCNC: 142 MMOL/L (ref 136–145)
WBC # BLD AUTO: 6.02 K/UL (ref 3.9–12.7)

## 2023-04-13 PROCEDURE — 36415 COLL VENOUS BLD VENIPUNCTURE: CPT | Performed by: INTERNAL MEDICINE

## 2023-04-13 PROCEDURE — 1159F PR MEDICATION LIST DOCUMENTED IN MEDICAL RECORD: ICD-10-PCS | Mod: CPTII,S$GLB,, | Performed by: INTERNAL MEDICINE

## 2023-04-13 PROCEDURE — 99999 PR PBB SHADOW E&M-EST. PATIENT-LVL V: ICD-10-PCS | Mod: PBBFAC,,, | Performed by: INTERNAL MEDICINE

## 2023-04-13 PROCEDURE — 3074F SYST BP LT 130 MM HG: CPT | Mod: CPTII,S$GLB,, | Performed by: INTERNAL MEDICINE

## 2023-04-13 PROCEDURE — 1159F MED LIST DOCD IN RCRD: CPT | Mod: CPTII,S$GLB,, | Performed by: INTERNAL MEDICINE

## 2023-04-13 PROCEDURE — 99215 OFFICE O/P EST HI 40 MIN: CPT | Mod: S$GLB,,, | Performed by: INTERNAL MEDICINE

## 2023-04-13 PROCEDURE — 3078F PR MOST RECENT DIASTOLIC BLOOD PRESSURE < 80 MM HG: ICD-10-PCS | Mod: CPTII,S$GLB,, | Performed by: INTERNAL MEDICINE

## 2023-04-13 PROCEDURE — 3008F BODY MASS INDEX DOCD: CPT | Mod: CPTII,S$GLB,, | Performed by: INTERNAL MEDICINE

## 2023-04-13 PROCEDURE — 99999 PR PBB SHADOW E&M-EST. PATIENT-LVL V: CPT | Mod: PBBFAC,,, | Performed by: INTERNAL MEDICINE

## 2023-04-13 PROCEDURE — 3051F HG A1C>EQUAL 7.0%<8.0%: CPT | Mod: CPTII,S$GLB,, | Performed by: INTERNAL MEDICINE

## 2023-04-13 PROCEDURE — 3051F PR MOST RECENT HEMOGLOBIN A1C LEVEL 7.0 - < 8.0%: ICD-10-PCS | Mod: CPTII,S$GLB,, | Performed by: INTERNAL MEDICINE

## 2023-04-13 PROCEDURE — 3078F DIAST BP <80 MM HG: CPT | Mod: CPTII,S$GLB,, | Performed by: INTERNAL MEDICINE

## 2023-04-13 PROCEDURE — 99215 PR OFFICE/OUTPT VISIT, EST, LEVL V, 40-54 MIN: ICD-10-PCS | Mod: S$GLB,,, | Performed by: INTERNAL MEDICINE

## 2023-04-13 PROCEDURE — 3008F PR BODY MASS INDEX (BMI) DOCUMENTED: ICD-10-PCS | Mod: CPTII,S$GLB,, | Performed by: INTERNAL MEDICINE

## 2023-04-13 PROCEDURE — 3074F PR MOST RECENT SYSTOLIC BLOOD PRESSURE < 130 MM HG: ICD-10-PCS | Mod: CPTII,S$GLB,, | Performed by: INTERNAL MEDICINE

## 2023-04-13 PROCEDURE — 85025 COMPLETE CBC W/AUTO DIFF WBC: CPT | Performed by: INTERNAL MEDICINE

## 2023-04-13 PROCEDURE — 80053 COMPREHEN METABOLIC PANEL: CPT | Performed by: INTERNAL MEDICINE

## 2023-04-13 NOTE — PROGRESS NOTES
Subjective:       Patient ID: Shaneka Ahmadi is a 53 y.o. female.    Chief Complaint: Results, Chemotherapy, and Lung Cancer    HPI:  53-year-old female history of metastatic non-small cell lung carcinoma Exon 20 mutation.  Patient was treated with 4 cycles of carboplatin Alimta with what appears to be progressive disease seen on CT scan bone only find findings.  ECOG status 1    Past Medical History:   Diagnosis Date    Diabetes mellitus     Hypertension     Malignant neoplasm of lower lobe of left lung 12/9/2022    Secondary malignant neoplasm of bone 12/5/2022     Family History   Problem Relation Age of Onset    Heart failure Father      Social History     Socioeconomic History    Marital status:    Tobacco Use    Smoking status: Never     Passive exposure: Never    Smokeless tobacco: Never   Substance and Sexual Activity    Alcohol use: Never    Drug use: Never    Sexual activity: Yes     Partners: Male     Birth control/protection: None     Comment: tubal     Past Surgical History:   Procedure Laterality Date    CATARACT EXTRACTION W/  INTRAOCULAR LENS IMPLANT Right 06/02/2022    FLUOROSCOPY N/A 1/26/2023    Procedure: FLUOROSCOPY/mediport placement;  Surgeon: Marlon Leone MD;  Location: [facility] CATH LAB;  Service: General;  Laterality: N/A;    TUBAL LIGATION      2007--postpartum tubal ligation       Labs:  Lab Results   Component Value Date    WBC 6.02 04/13/2023    HGB 9.4 (L) 04/13/2023    HCT 28.1 (L) 04/13/2023    MCV 85 04/13/2023    PLT 79 (L) 04/13/2023     BMP  Lab Results   Component Value Date     04/13/2023    K 3.8 04/13/2023     04/13/2023    CO2 27 04/13/2023    BUN 17 04/13/2023    CREATININE 1.0 04/13/2023    CALCIUM 9.4 04/13/2023    ANIONGAP 15 04/13/2023    ESTGFRAFRICA >60.0 07/28/2022    EGFRNONAA >60.0 07/28/2022     Lab Results   Component Value Date    ALT 22 04/13/2023    AST 16 04/13/2023    ALKPHOS 95 04/13/2023    BILITOT 0.3 04/13/2023       Lab Results    Component Value Date    IRON 71 03/09/2023    TIBC 297 03/09/2023    FERRITIN 646 (H) 03/09/2023     No results found for: [id number]  No results found for: FOLATE  Lab Results   Component Value Date    TSH 1.742 04/08/2022         Review of Systems   Constitutional:  Positive for fatigue. Negative for activity change, appetite change, chills, diaphoresis, fever and unexpected weight change.   HENT:  Negative for congestion, dental problem, drooling, ear discharge, ear pain, facial swelling, hearing loss, mouth sores, nosebleeds, postnasal drip, rhinorrhea, sinus pressure, sneezing, sore throat, tinnitus, trouble swallowing and voice change.    Eyes:  Negative for photophobia, pain, discharge, redness, itching and visual disturbance.   Respiratory:  Negative for cough, choking, chest tightness, shortness of breath, wheezing and stridor.    Cardiovascular:  Negative for chest pain, palpitations and leg swelling.   Gastrointestinal:  Negative for abdominal distention, abdominal pain, anal bleeding, blood in stool, constipation, diarrhea, nausea, rectal pain and vomiting.   Endocrine: Negative for cold intolerance, heat intolerance, polydipsia, polyphagia and polyuria.   Genitourinary:  Negative for decreased urine volume, difficulty urinating, dyspareunia, dysuria, enuresis, flank pain, frequency, genital sores, hematuria, menstrual problem, pelvic pain, urgency, vaginal bleeding, vaginal discharge and vaginal pain.   Musculoskeletal:  Positive for arthralgias, gait problem and myalgias. Negative for back pain, joint swelling, neck pain and neck stiffness.   Skin:  Negative for color change, pallor and rash.   Allergic/Immunologic: Negative for environmental allergies, food allergies and immunocompromised state.   Neurological:  Positive for weakness. Negative for dizziness, tremors, seizures, syncope, facial asymmetry, speech difficulty, light-headedness, numbness and headaches.   Hematological:  Negative for  adenopathy. Does not bruise/bleed easily.   Psychiatric/Behavioral:  Positive for dysphoric mood. Negative for agitation, behavioral problems, confusion, decreased concentration, hallucinations, self-injury, sleep disturbance and suicidal ideas. The patient is nervous/anxious. The patient is not hyperactive.      Objective:      Physical Exam  Vitals reviewed.   Constitutional:       General: She is not in acute distress.     Appearance: She is well-developed. She is obese. She is ill-appearing. She is not diaphoretic.   HENT:      Head: Normocephalic and atraumatic.      Right Ear: External ear normal.      Left Ear: External ear normal.      Nose: Nose normal.      Right Sinus: No maxillary sinus tenderness or frontal sinus tenderness.      Left Sinus: No maxillary sinus tenderness or frontal sinus tenderness.      Mouth/Throat:      Pharynx: No oropharyngeal exudate.   Eyes:      General: Lids are normal. No scleral icterus.        Right eye: No discharge.         Left eye: No discharge.      Conjunctiva/sclera: Conjunctivae normal.      Right eye: Right conjunctiva is not injected. No hemorrhage.     Left eye: Left conjunctiva is not injected. No hemorrhage.     Pupils: Pupils are equal, round, and reactive to light.   Neck:      Thyroid: No thyromegaly.      Vascular: No JVD.      Trachea: No tracheal deviation.   Cardiovascular:      Rate and Rhythm: Normal rate.   Pulmonary:      Effort: Pulmonary effort is normal. No respiratory distress.      Breath sounds: No stridor.   Chest:      Chest wall: No tenderness.   Abdominal:      General: Bowel sounds are normal. There is no distension.      Palpations: Abdomen is soft. There is no hepatomegaly, splenomegaly or mass.      Tenderness: There is no abdominal tenderness. There is no rebound.   Musculoskeletal:         General: No tenderness. Normal range of motion.      Cervical back: Normal range of motion and neck supple.   Lymphadenopathy:      Cervical: No  cervical adenopathy.      Upper Body:      Right upper body: No supraclavicular adenopathy.      Left upper body: No supraclavicular adenopathy.   Skin:     General: Skin is dry.      Findings: No erythema or rash.   Neurological:      Mental Status: She is alert and oriented to person, place, and time.      Cranial Nerves: No cranial nerve deficit.      Motor: Weakness present.      Coordination: Coordination normal.      Gait: Gait abnormal.   Psychiatric:         Behavior: Behavior normal.         Thought Content: Thought content normal.         Judgment: Judgment normal.           Assessment:      1. Malignant neoplasm of lower lobe of left lung    2. Immunodeficiency due to chemotherapy    3. Essential hypertension    4. Secondary malignant neoplasm of bone    5. Morbid obesity    6. Type 2 diabetes mellitus without complication, without long-term current use of insulin           Med Onc Chart Routing      Follow up with physician 2 weeks. Nurse navigation to coordinate   Follow up with DOLLY    Infusion scheduling note    Injection scheduling note OP NSCLC AMIVANTAMAB-VMJW ([facility]) Q   Labs    Imaging PET scan      Pharmacy appointment    Other referrals            Plan:     Extensive conversation with patient at this particular point concern over progression noted on CT I would like to have a PET scan done since the findings in other areas of her body would not necessarily progressive I would like to make sure that this is indeed progression.  And have ask that a PET scan be done in addition I have asked nurse navigation to coordinate this.  Consent has been signed for new medication.Consented the patient to the treatment plan and the patient was educated on the planned duration of the treatment and schedule of the treatment administration.  Will ask clinical pharmacist to arrange to place treatment plan in for OP NSCLC AMIVANTAMAB-VMJW ([facility]) Q reviewed images personally with her referred also again  for advanced care planning Advance Care Planning             Reese Mcfarlane Jr, MD FACP

## 2023-04-14 ENCOUNTER — PATIENT MESSAGE (OUTPATIENT)
Dept: HEMATOLOGY/ONCOLOGY | Facility: CLINIC | Age: 53
End: 2023-04-14
Payer: COMMERCIAL

## 2023-04-18 ENCOUNTER — TELEPHONE (OUTPATIENT)
Dept: HEMATOLOGY/ONCOLOGY | Facility: CLINIC | Age: 53
End: 2023-04-18
Payer: COMMERCIAL

## 2023-04-18 ENCOUNTER — PATIENT MESSAGE (OUTPATIENT)
Dept: HEMATOLOGY/ONCOLOGY | Facility: CLINIC | Age: 53
End: 2023-04-18
Payer: COMMERCIAL

## 2023-04-18 NOTE — NURSING
1103am: Outgoing call to pt regarding new drug immunotherapy set-up. Spoke with pt. Pt aware of regimen change. Pt scheduled for labs on  at 930 am at , for provider visit w/Jase NP on  at 1030 am at , for chemo day 1 on  at 11 am at , and for chemo day 2 on  at 11 am at [facility]. Pt will be given drug info handout on day of treatment. Pt verbalized understanding. Pt plan to report to appts as scheduled. SW and FC notified via msg of pt new drug start date.    Oncology Navigation   Intake  Cancer Type: Other  Initial Nurse Navigator Contact: 23  Date Worked: 23  Multiple appointments: Yes  Start of Treatment: 23     Treatment  Current Status: Active       Medical Oncologist: Dr. Reese Mcfarlane  Consult Date: 22  Chemotherapy: Planned  Chemotherapy Regimen: Carboplatin Alimta  Immunotherapy: Planned  Immunotherapy Name: [facility]  Start Date: 23       Procedures: PET scan; Biopsy  Biopsy Schedule Date: 22 (lung)  MRI Schedule Date: 22 (brain)  PET Scan Schedule Date: 22  Other Schedule Date: 23 (EKG)    General Referrals: Social Work  Social Work: notified via in-basket msg  Social Work Referral Date: 23          Support Systems: Spouse/significant other     Acuity  Stage: 2  Systemic Treatment - predicted or initiated: Immunotherapy (+2)  Treatment Tolerability: Has not started treatment yet/treatment fully completed and side effects resolved  ECO  Comorbidities in Medical History: 1  Hospitalization Within the Past Month: 0   Needed: 0  Support: 0  Transportation: 0  History of noncompliance/frequent no shows and cancellations: 0  Verbalizes the need for more education: 1  Navigation Acuity: 6     Follow Up  No follow-ups on file.

## 2023-04-20 ENCOUNTER — OFFICE VISIT (OUTPATIENT)
Dept: PALLIATIVE MEDICINE | Facility: CLINIC | Age: 53
End: 2023-04-20
Payer: COMMERCIAL

## 2023-04-20 VITALS
TEMPERATURE: 98 F | DIASTOLIC BLOOD PRESSURE: 77 MMHG | WEIGHT: 293 LBS | SYSTOLIC BLOOD PRESSURE: 138 MMHG | OXYGEN SATURATION: 98 % | BODY MASS INDEX: 52.6 KG/M2 | RESPIRATION RATE: 16 BRPM | HEART RATE: 68 BPM

## 2023-04-20 DIAGNOSIS — Z51.5 PALLIATIVE CARE ENCOUNTER: Primary | ICD-10-CM

## 2023-04-20 DIAGNOSIS — C34.32 MALIGNANT NEOPLASM OF LOWER LOBE OF LEFT LUNG: ICD-10-CM

## 2023-04-20 PROCEDURE — 1160F PR REVIEW ALL MEDS BY PRESCRIBER/CLIN PHARMACIST DOCUMENTED: ICD-10-PCS | Mod: CPTII,S$GLB,,

## 2023-04-20 PROCEDURE — 3078F PR MOST RECENT DIASTOLIC BLOOD PRESSURE < 80 MM HG: ICD-10-PCS | Mod: CPTII,S$GLB,,

## 2023-04-20 PROCEDURE — 99205 PR OFFICE/OUTPT VISIT, NEW, LEVL V, 60-74 MIN: ICD-10-PCS | Mod: S$GLB,,,

## 2023-04-20 PROCEDURE — 99205 OFFICE O/P NEW HI 60 MIN: CPT | Mod: S$GLB,,,

## 2023-04-20 PROCEDURE — 1159F PR MEDICATION LIST DOCUMENTED IN MEDICAL RECORD: ICD-10-PCS | Mod: CPTII,S$GLB,,

## 2023-04-20 PROCEDURE — 3075F PR MOST RECENT SYSTOLIC BLOOD PRESS GE 130-139MM HG: ICD-10-PCS | Mod: CPTII,S$GLB,,

## 2023-04-20 PROCEDURE — 3078F DIAST BP <80 MM HG: CPT | Mod: CPTII,S$GLB,,

## 2023-04-20 PROCEDURE — 99999 PR PBB SHADOW E&M-EST. PATIENT-LVL V: ICD-10-PCS | Mod: PBBFAC,,,

## 2023-04-20 PROCEDURE — 3051F HG A1C>EQUAL 7.0%<8.0%: CPT | Mod: CPTII,S$GLB,,

## 2023-04-20 PROCEDURE — 1160F RVW MEDS BY RX/DR IN RCRD: CPT | Mod: CPTII,S$GLB,,

## 2023-04-20 PROCEDURE — 99999 PR PBB SHADOW E&M-EST. PATIENT-LVL V: CPT | Mod: PBBFAC,,,

## 2023-04-20 PROCEDURE — 99497 ADVNCD CARE PLAN 30 MIN: CPT | Mod: S$GLB,,,

## 2023-04-20 PROCEDURE — 3075F SYST BP GE 130 - 139MM HG: CPT | Mod: CPTII,S$GLB,,

## 2023-04-20 PROCEDURE — 1159F MED LIST DOCD IN RCRD: CPT | Mod: CPTII,S$GLB,,

## 2023-04-20 PROCEDURE — 3051F PR MOST RECENT HEMOGLOBIN A1C LEVEL 7.0 - < 8.0%: ICD-10-PCS | Mod: CPTII,S$GLB,,

## 2023-04-20 PROCEDURE — 3008F BODY MASS INDEX DOCD: CPT | Mod: CPTII,S$GLB,,

## 2023-04-20 PROCEDURE — 99497 PR ADVNCD CARE PLAN 30 MIN: ICD-10-PCS | Mod: S$GLB,,,

## 2023-04-20 PROCEDURE — 3008F PR BODY MASS INDEX (BMI) DOCUMENTED: ICD-10-PCS | Mod: CPTII,S$GLB,,

## 2023-04-20 NOTE — PROGRESS NOTES
"Palliative Medicine  Consult  Consult Requested By: Dr. Reese Mcfarlane  Reason for Consult: Advance Care Planning/Goals of Care Discussion      SUBJECTIVE:     History of Present Illness:  Ms. Ahmadi is a 53 y.o. year old female presenting with metastatic lung cancer with bone and possible liver involvement. She is followed by Dr. Mcfarlane. She is currently receiving---, with plans to start Rybrevant next week Thursday after her CT scan on April 13, 2023 showed bone and possible liver metastasis.    The palliative care team was consulted to assist with advance care planning and goals of care discussion in the setting of metastatic lung cancer with progressive disease.   Patient arrived in clinic alone. She was tearful and appeared anxious. Vitals signs stable on room air. I explained the role palliative care often plays in supporting patients with serious illness and their families with regard to symptom management, advance care planning, and goals of care discussion.   She reported mild intermittent, self-limiting dyspnea and anxiety due to her prognosis. She denied chest discomfort, nausea, vomiting, palpitations, and dizziness. She stated she has been tolerating chemotherapy well, and wants to progress with further treatment despite her CT scan showing progressive disease.   Pt confirmed that she is aware of her CT scan, and she is disappointed in the results because she thought that her current regimen would "and make it better". She stated she is aware that her treatment plan would be palliative and not curative, but she was hopeful that it could be curative.   We discussed advance care planning and goals of care. Pt expressed feeling overwhelmed, but she took the Advance Directive and [place] forms to review with her family. She stated that her  Michael Ahmadi [phone] would be her primary SDM, and her daughter would be second choice. She stated that she will complete the Advance Directive forms and " return it to the clinic next week. Due to her anxiety, I offered to facilitate a family meeting with her and her family regarding prognosis decision. Pt stated she will inform me if she needs me to facilitate a meeting.   We will plan to follow-up with patient in four weeks in-person or  virtually if she does not require symptom management.       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     Past Surgical History:   Procedure Laterality Date    CATARACT EXTRACTION W/  INTRAOCULAR LENS IMPLANT Right 06/02/2022    FLUOROSCOPY N/A 1/26/2023    Procedure: FLUOROSCOPY/mediport placement;  Surgeon: Marlon Leone MD;  Location: [facility] CATH LAB;  Service: General;  Laterality: N/A;    TUBAL LIGATION      2007--postpartum tubal ligation     Family History   Problem Relation Age of Onset    Heart failure Father      Review of patient's allergies indicates:   Allergen Reactions    Lisinopril Other (See Comments)     coughing    Amoxicillin        Medications:    Current Outpatient Medications:     albuterol (PROVENTIL/VENTOLIN HFA) 90 mcg/actuation inhaler, Inhale 1-2 puffs into the lungs every 6 (six) hours as needed for Wheezing (cough)., Disp: 6.7 g, Rfl: 0    amLODIPine (NORVASC) 10 MG tablet, Take 1 tablet (10 mg total) by mouth once daily., Disp: 90 tablet, Rfl: 3    atenoloL (TENORMIN) 50 MG tablet, Take 1 tab by mouth twice a day, Disp: 180 tablet, Rfl: 3    betamethasone valerate 0.1% (VALISONE) 0.1 % Oint, APPLY TOPICALLY TO THE TOP OF THE FEET TWO TIMES A DAY, Disp: 45 g, Rfl: 1    blood sugar diagnostic Strp, To check BG 2 times daily, to use with insurance preferred meter, Disp: 100 each, Rfl: 11    calcium carbonate (OS-BRUNILDA) 500 mg calcium (1,250 mg) tablet, Take 1 tablet (500 mg total) by mouth once daily., Disp: 90 tablet, Rfl:  3    cetirizine (ZYRTEC) 10 MG tablet, Take 1 tablet (10 mg total) by mouth every evening., Disp: 30 tablet, Rfl: 0    dexAMETHasone (DECADRON) 4 MG Tab, Take 2 tablets (8 mg total) by mouth once daily. Take as directed on days 2, 3, and 16,17 of your chemotherapy cycle starting with cycle 2 forward. Do not take with cycle 1., Disp: 8 tablet, Rfl: 10    dexAMETHasone (DECADRON) 4 MG Tab, Take 1 tablet (4 mg total) by mouth As instructed. Take 8 mg the day prior to chemotherapy, and then 8 mg after chemo on days 2, 3, 4, Disp: 24 tablet, Rfl: 5    fluticasone propionate (FLONASE) 50 mcg/actuation nasal spray, 2 sprays (100 mcg total) by Each Nostril route once daily., Disp: 9.9 mL, Rfl: 2    folic acid (FOLVITE) 1 MG tablet, Take 1 tablet (1 mg total) by mouth once daily., Disp: 30 tablet, Rfl: 11    hydrocodone-homatropine 5-1.5 mg/5 ml (HYCODAN) 5-1.5 mg/5 mL Syrp, Take 5 mL by mouth every 4 (four) hours as needed., Disp: 473 mL, Rfl: 0    lancets Misc, To check BG 2 times daily, to use with insurance preferred meter, Disp: 100 each, Rfl: 11    losartan-hydrochlorothiazide 100-25 mg (HYZAAR) 100-25 mg per tablet, Take 1 tablet by mouth once daily., Disp: 90 tablet, Rfl: 3    metFORMIN (GLUCOPHAGE-XR) 500 MG ER 24hr tablet, Take 1 tab with breakfast and take 2 tabs with dinner, Disp: 90 tablet, Rfl: 11    mupirocin (BACTROBAN) 2 % ointment, APPLY TO AFFECTED AREA(S) THREE TIMES A DAY, Disp: 22 g, Rfl: 0    ondansetron (ZOFRAN-ODT) 8 MG TbDL, Take 1 tablet (8 mg total) by mouth every 8 (eight) hours as needed. Starting with cycle 1 of chemotherapy, Disp: 60 tablet, Rfl: 11    ondansetron (ZOFRAN-ODT) 8 MG TbDL, Take 1 tablet (8 mg total) by mouth every 8 (eight) hours as needed (nausea/vomitting)., Disp: 90 tablet, Rfl: 5    osimertinib (TAGRISSO) 80 mg Tab, Take 80 mg by mouth once daily., Disp: 30 tablet, Rfl: 11    TRUE METRIX GLUCOSE METER Misc, , Disp: , Rfl:     TRUEPLUS LANCETS 33 gauge Misc, Apply topically.,  Disp: , Rfl:     pravastatin (PRAVACHOL) 10 MG tablet, Take 1 tablet (10 mg total) by mouth every evening., Disp: 30 tablet, Rfl: 11    OBJECTIVE:     ROS:  Review of Systems   Constitutional: Negative.    HENT: Negative.     Eyes: Negative.    Respiratory:  Positive for shortness of breath (Intermittent, self-limiting). Negative for wheezing and stridor.    Cardiovascular:  Negative for chest pain, palpitations and leg swelling.   Gastrointestinal: Negative.    Genitourinary: Negative.    Musculoskeletal: Negative.    Skin: Negative.    Neurological: Negative.    Psychiatric/Behavioral:  The patient is nervous/anxious (Pt stated she has been more anxious following the results of her recent CT scan).      Review of Symptoms      Symptom Assessment (ESAS 0-10 Scale)  Pain:  0  Dyspnea:  2  Anxiety:  8  Nausea:  0  Depression:  0  Anorexia:  0  Fatigue:  0  Insomnia:  0  Restlessness:  0  Agitation:  0     CAM / Delirium:  Negative  Constipation:  Negative  Diarrhea:  Negative    Anxiety:  Is nervous/anxious (Pt stated she has been more anxious following the results of her recent CT scan)    Bowel Management Plan (BMP):  No      Modified Sheldon Scale:  0    Performance Status:  100    ECOG Performance Status stGstrstastdstest:st st1st Functional Assessment Scale (FAST):  1    Karnofsky Performance Scale:  100%    Living Arrangements:  Lives with spouse    Psychosocial/Cultural:   See Palliative Psychosocial Note: Yes  Pt lives with her  (Michael Ahmadi- [phone]) and two adult children. She works at a doctor's office. She has not completed an advance directive yet, but stated that her  will be her first choice, and her daughter is her second choice.   **Primary  to Follow**  Palliative Care  Consult: No    Advance Care Planning   Advance Directives:   Living Will: No    [place]POST: No    Do Not Resuscitate Status: No    Medical Power of : No      Decision Making:  Patient answered  questions  Goals of Care: The patient endorses that what is most important right now is to focus on remaining as independent as possible, symptom/pain control, extending life as long as possible, even it it means sacrificing quality, and curative/life-prolongation (regardless of treatment burdens)    Accordingly, we have decided that the best plan to meet the patient's goals includes continuing with treatment.           Physical Exam:  Vitals: Temp: 97.8 °F (36.6 °C) (04/20/23 0852)  Pulse: 68 (04/20/23 0852)  Resp: 16 (04/20/23 0852)  BP: 138/77 (04/20/23 0852)  SpO2: 98 % (04/20/23 0852)  Physical Exam  Vitals and nursing note reviewed.   Constitutional:       General: She is in acute distress (Pt was tearful.).      Appearance: Normal appearance. She is obese. She is not ill-appearing.   HENT:      Head: Normocephalic and atraumatic.      Nose:      Comments: Pt wearing mask.   Eyes:      General: No scleral icterus.     Conjunctiva/sclera: Conjunctivae normal.      Pupils: Pupils are equal, round, and reactive to light.   Cardiovascular:      Rate and Rhythm: Normal rate and regular rhythm.      Pulses: Normal pulses.      Heart sounds: Murmur (Grade II diastolic murmur best heard on RSB 2ndICS consistent with aortic regurgitation.) heard.   Pulmonary:      Effort: Pulmonary effort is normal.      Breath sounds: Examination of the left-lower field reveals decreased breath sounds. Decreased breath sounds present.   Abdominal:      General: Bowel sounds are normal. There is no distension.      Palpations: Abdomen is soft.      Tenderness: There is no abdominal tenderness.      Comments: Rounded due to obesity   Musculoskeletal:         General: No swelling or tenderness. Normal range of motion.      Cervical back: Normal range of motion and neck supple.   Skin:     General: Skin is warm and dry.      Capillary Refill: Capillary refill takes less than 2 seconds.      Coloration: Skin is not jaundiced.       Findings: Bruising (Healed bruise to right chest port site) present. No rash.   Neurological:      General: No focal deficit present.      Mental Status: She is alert and oriented to person, place, and time.      Sensory: No sensory deficit.      Motor: No weakness.      Gait: Gait normal.   Psychiatric:         Attention and Perception: Attention normal.         Mood and Affect: Mood is anxious (Pt was tearful due to discussion of cancer prognosis).         Speech: Speech normal.         Behavior: Behavior is cooperative.         Thought Content: Thought content normal. Thought content does not include suicidal ideation.         Cognition and Memory: Cognition normal.         Judgment: Judgment normal.       Labs and radiology data reviewed    ASSESSMENT/PLAN:   Metastatic Lung Cancer  Followed by Dr. Mcfarlane. Pt aware that treatment is only palliative, not curative  Pt is currently tolerating treatment, with no complications  Pt stated she wants to progress with treatment next week Thursday  Palliative care team will follow-up as needed   2.   Encounter for Palliative Care  A. Code status: Full code. Pt stated she will discuss her prognosis with her family and decide on DNR Vs. Full code  B. HCPOA: Not yet completed. Pt stated she wants her , Michael Ahmadi [phone], to be her first choice and her daughter to be second choice.  C. GOC: Pt stated she wants to pursue further treatment for now.   -See HPI for further details        Follow up: Follow-up in four weeks regarding ACP/GOC discussion. We can schedule virtual visits provided pt remains symptom-free.       50 minutes total visit  30 minutes of total time spent on the encounter, which includes face to face time and non-face to face time preparing to see the patient (eg, review of tests), Obtaining and/or reviewing separately obtained history, Documenting clinical information in the electronic or other health record, Independently interpreting results  (not separately reported) and communicating results to the patient/family/caregiver, or Care coordination (not separately reported).    20 minutes spent in discussing ACP    Signature: VICKI REECE NP

## 2023-04-20 NOTE — PATIENT INSTRUCTIONS
Bring your advance directive to the  when you can  We can discuss the [place]POST some more: Do not rescusicate or Full code  We can facilitate a family meeting with your  and your children   We can discuss more about hospice once you know your treatment plans.   We can plan for a virtual visit  if you are not having any symptoms

## 2023-04-26 ENCOUNTER — TELEPHONE (OUTPATIENT)
Dept: HEMATOLOGY/ONCOLOGY | Facility: CLINIC | Age: 53
End: 2023-04-26
Payer: COMMERCIAL

## 2023-04-26 DIAGNOSIS — C34.32 MALIGNANT NEOPLASM OF LOWER LOBE OF LEFT LUNG: Primary | ICD-10-CM

## 2023-04-26 DIAGNOSIS — C79.51 SECONDARY MALIGNANT NEOPLASM OF BONE: ICD-10-CM

## 2023-04-26 NOTE — NURSING
0907am: Incoming call from pt regarding my chart alerts about change of appts. Spoke with pt. Informed pt that I was going to call pt once everything was switched but she beat me to the punch. Explained to pt that times have been changed for treatment tomorrow to accommodate possible reactions and obtaining peripheral IV access. Pt rescheduled for labs on  at 7 am at [facility], for provider visit w/[facility] on  at 730 am at [facility], for chemo D1 on  at 8 am at [facility], and for chemo day 2 on  at 9 am at [facility]. Pt verbalized understanding. Pt plan to report to appts as scheduled.    Oncology Navigation   Intake  Cancer Type: Other  Initial Nurse Navigator Contact: 23  Date Worked: 23  Multiple appointments: Yes  Start of Treatment: 23     Treatment  Current Status: Active       Medical Oncologist: Dr. Reese Mcfarlane  Consult Date: 22  Chemotherapy: Planned  Chemotherapy Regimen: Carboplatin Alimta  Immunotherapy: Planned  Immunotherapy Name: Rybrevant  Start Date: 23       Procedures: PET scan; Biopsy  Biopsy Schedule Date: 22 (lung)  MRI Schedule Date: 22 (brain)  PET Scan Schedule Date: 22  Other Schedule Date: 23 (EKG)    General Referrals: Social Work  Social Work: notified via in-basket msg  Social Work Referral Date: 23          Support Systems: Spouse/significant other     Acuity  Stage: 2  Systemic Treatment - predicted or initiated: Immunotherapy (+2)  Treatment Tolerability: Has not started treatment yet/treatment fully completed and side effects resolved  ECO  Comorbidities in Medical History: 1  Hospitalization Within the Past Month: 0   Needed: 0  Support: 0  Transportation: 0  History of noncompliance/frequent no shows and cancellations: 0  Verbalizes the need for more education: 1  Navigation Acuity: 6     Follow Up  No follow-ups on file.

## 2023-04-27 ENCOUNTER — OFFICE VISIT (OUTPATIENT)
Dept: HEMATOLOGY/ONCOLOGY | Facility: CLINIC | Age: 53
End: 2023-04-27
Payer: COMMERCIAL

## 2023-04-27 ENCOUNTER — DOCUMENTATION ONLY (OUTPATIENT)
Dept: HEMATOLOGY/ONCOLOGY | Facility: CLINIC | Age: 53
End: 2023-04-27

## 2023-04-27 ENCOUNTER — INFUSION (OUTPATIENT)
Dept: INFUSION THERAPY | Facility: HOSPITAL | Age: 53
End: 2023-04-27
Attending: RADIOLOGY
Payer: COMMERCIAL

## 2023-04-27 ENCOUNTER — DOCUMENTATION ONLY (OUTPATIENT)
Dept: NUTRITION | Facility: CLINIC | Age: 53
End: 2023-04-27
Payer: COMMERCIAL

## 2023-04-27 ENCOUNTER — LAB VISIT (OUTPATIENT)
Dept: LAB | Facility: HOSPITAL | Age: 53
End: 2023-04-27
Attending: INTERNAL MEDICINE
Payer: COMMERCIAL

## 2023-04-27 VITALS
SYSTOLIC BLOOD PRESSURE: 129 MMHG | TEMPERATURE: 99 F | DIASTOLIC BLOOD PRESSURE: 73 MMHG | OXYGEN SATURATION: 97 % | WEIGHT: 293 LBS | HEART RATE: 74 BPM | HEIGHT: 64 IN | BODY MASS INDEX: 50.02 KG/M2

## 2023-04-27 VITALS
WEIGHT: 293 LBS | TEMPERATURE: 98 F | SYSTOLIC BLOOD PRESSURE: 124 MMHG | BODY MASS INDEX: 50.02 KG/M2 | RESPIRATION RATE: 16 BRPM | DIASTOLIC BLOOD PRESSURE: 65 MMHG | OXYGEN SATURATION: 96 % | HEIGHT: 64 IN | HEART RATE: 82 BPM

## 2023-04-27 DIAGNOSIS — C34.32 MALIGNANT NEOPLASM OF LOWER LOBE OF LEFT LUNG: ICD-10-CM

## 2023-04-27 DIAGNOSIS — R91.8 MASS OF LEFT LUNG: ICD-10-CM

## 2023-04-27 DIAGNOSIS — C79.51 SECONDARY MALIGNANT NEOPLASM OF BONE: ICD-10-CM

## 2023-04-27 DIAGNOSIS — T45.1X5A IMMUNODEFICIENCY DUE TO CHEMOTHERAPY: Primary | ICD-10-CM

## 2023-04-27 DIAGNOSIS — D84.821 IMMUNODEFICIENCY DUE TO CHEMOTHERAPY: Primary | ICD-10-CM

## 2023-04-27 DIAGNOSIS — C34.32 MALIGNANT NEOPLASM OF LOWER LOBE OF LEFT LUNG: Primary | ICD-10-CM

## 2023-04-27 DIAGNOSIS — Z79.899 IMMUNODEFICIENCY DUE TO CHEMOTHERAPY: Primary | ICD-10-CM

## 2023-04-27 LAB
ALBUMIN SERPL BCP-MCNC: 3.6 G/DL (ref 3.5–5.2)
ALP SERPL-CCNC: 90 U/L (ref 55–135)
ALT SERPL W/O P-5'-P-CCNC: 42 U/L (ref 10–44)
ANION GAP SERPL CALC-SCNC: 13 MMOL/L (ref 8–16)
AST SERPL-CCNC: 36 U/L (ref 10–40)
BASOPHILS # BLD AUTO: 0.06 K/UL (ref 0–0.2)
BASOPHILS NFR BLD: 0.7 % (ref 0–1.9)
BILIRUB SERPL-MCNC: 0.2 MG/DL (ref 0.1–1)
BUN SERPL-MCNC: 13 MG/DL (ref 6–20)
CALCIUM SERPL-MCNC: 8.8 MG/DL (ref 8.7–10.5)
CHLORIDE SERPL-SCNC: 101 MMOL/L (ref 95–110)
CO2 SERPL-SCNC: 28 MMOL/L (ref 23–29)
CREAT SERPL-MCNC: 1 MG/DL (ref 0.5–1.4)
DIFFERENTIAL METHOD: ABNORMAL
EOSINOPHIL # BLD AUTO: 0.1 K/UL (ref 0–0.5)
EOSINOPHIL NFR BLD: 1.2 % (ref 0–8)
ERYTHROCYTE [DISTWIDTH] IN BLOOD BY AUTOMATED COUNT: 18.8 % (ref 11.5–14.5)
EST. GFR  (NO RACE VARIABLE): >60 ML/MIN/1.73 M^2
GLUCOSE SERPL-MCNC: 181 MG/DL (ref 70–110)
HCG INTACT+B SERPL-ACNC: <1.2 MIU/ML
HCT VFR BLD AUTO: 30.5 % (ref 37–48.5)
HGB BLD-MCNC: 9.8 G/DL (ref 12–16)
IMM GRANULOCYTES # BLD AUTO: 0.16 K/UL (ref 0–0.04)
IMM GRANULOCYTES NFR BLD AUTO: 1.9 % (ref 0–0.5)
LDH SERPL L TO P-CCNC: 308 U/L (ref 110–260)
LYMPHOCYTES # BLD AUTO: 2 K/UL (ref 1–4.8)
LYMPHOCYTES NFR BLD: 23.5 % (ref 18–48)
MCH RBC QN AUTO: 28.3 PG (ref 27–31)
MCHC RBC AUTO-ENTMCNC: 32.1 G/DL (ref 32–36)
MCV RBC AUTO: 88 FL (ref 82–98)
MONOCYTES # BLD AUTO: 0.9 K/UL (ref 0.3–1)
MONOCYTES NFR BLD: 10.8 % (ref 4–15)
NEUTROPHILS # BLD AUTO: 5.4 K/UL (ref 1.8–7.7)
NEUTROPHILS NFR BLD: 61.9 % (ref 38–73)
NRBC BLD-RTO: 1 /100 WBC
PLATELET # BLD AUTO: 264 K/UL (ref 150–450)
PMV BLD AUTO: 10.8 FL (ref 9.2–12.9)
POTASSIUM SERPL-SCNC: 3.5 MMOL/L (ref 3.5–5.1)
PROT SERPL-MCNC: 6.5 G/DL (ref 6–8.4)
RBC # BLD AUTO: 3.46 M/UL (ref 4–5.4)
SODIUM SERPL-SCNC: 142 MMOL/L (ref 136–145)
WBC # BLD AUTO: 8.63 K/UL (ref 3.9–12.7)

## 2023-04-27 PROCEDURE — 80053 COMPREHEN METABOLIC PANEL: CPT | Performed by: INTERNAL MEDICINE

## 2023-04-27 PROCEDURE — 85025 COMPLETE CBC W/AUTO DIFF WBC: CPT | Performed by: INTERNAL MEDICINE

## 2023-04-27 PROCEDURE — 99999 PR PBB SHADOW E&M-EST. PATIENT-LVL V: ICD-10-PCS | Mod: PBBFAC,,,

## 2023-04-27 PROCEDURE — 25000003 PHARM REV CODE 250: Performed by: INTERNAL MEDICINE

## 2023-04-27 PROCEDURE — 3051F PR MOST RECENT HEMOGLOBIN A1C LEVEL 7.0 - < 8.0%: ICD-10-PCS | Mod: CPTII,S$GLB,,

## 2023-04-27 PROCEDURE — 3078F PR MOST RECENT DIASTOLIC BLOOD PRESSURE < 80 MM HG: ICD-10-PCS | Mod: CPTII,S$GLB,,

## 2023-04-27 PROCEDURE — 3008F PR BODY MASS INDEX (BMI) DOCUMENTED: ICD-10-PCS | Mod: CPTII,S$GLB,,

## 2023-04-27 PROCEDURE — 3074F SYST BP LT 130 MM HG: CPT | Mod: CPTII,S$GLB,,

## 2023-04-27 PROCEDURE — 84702 CHORIONIC GONADOTROPIN TEST: CPT

## 2023-04-27 PROCEDURE — 99215 PR OFFICE/OUTPT VISIT, EST, LEVL V, 40-54 MIN: ICD-10-PCS | Mod: S$GLB,,,

## 2023-04-27 PROCEDURE — 63600175 PHARM REV CODE 636 W HCPCS

## 2023-04-27 PROCEDURE — 99999 PR PBB SHADOW E&M-EST. PATIENT-LVL V: CPT | Mod: PBBFAC,,,

## 2023-04-27 PROCEDURE — 96375 TX/PRO/DX INJ NEW DRUG ADDON: CPT

## 2023-04-27 PROCEDURE — 96415 CHEMO IV INFUSION ADDL HR: CPT

## 2023-04-27 PROCEDURE — 36415 COLL VENOUS BLD VENIPUNCTURE: CPT | Performed by: INTERNAL MEDICINE

## 2023-04-27 PROCEDURE — 3051F HG A1C>EQUAL 7.0%<8.0%: CPT | Mod: CPTII,S$GLB,,

## 2023-04-27 PROCEDURE — 96413 CHEMO IV INFUSION 1 HR: CPT

## 2023-04-27 PROCEDURE — 3078F DIAST BP <80 MM HG: CPT | Mod: CPTII,S$GLB,,

## 2023-04-27 PROCEDURE — 3008F BODY MASS INDEX DOCD: CPT | Mod: CPTII,S$GLB,,

## 2023-04-27 PROCEDURE — 83615 LACTATE (LD) (LDH) ENZYME: CPT | Performed by: INTERNAL MEDICINE

## 2023-04-27 PROCEDURE — 99215 OFFICE O/P EST HI 40 MIN: CPT | Mod: S$GLB,,,

## 2023-04-27 PROCEDURE — 3074F PR MOST RECENT SYSTOLIC BLOOD PRESSURE < 130 MM HG: ICD-10-PCS | Mod: CPTII,S$GLB,,

## 2023-04-27 PROCEDURE — 96367 TX/PROPH/DG ADDL SEQ IV INF: CPT

## 2023-04-27 PROCEDURE — 63600175 PHARM REV CODE 636 W HCPCS: Mod: JZ,TB | Performed by: INTERNAL MEDICINE

## 2023-04-27 RX ORDER — DIPHENHYDRAMINE HYDROCHLORIDE 50 MG/ML
50 INJECTION INTRAMUSCULAR; INTRAVENOUS ONCE AS NEEDED
Status: CANCELLED | OUTPATIENT
Start: 2023-05-11

## 2023-04-27 RX ORDER — HEPARIN 100 UNIT/ML
500 SYRINGE INTRAVENOUS
Status: CANCELLED | OUTPATIENT
Start: 2023-05-04

## 2023-04-27 RX ORDER — ACETAMINOPHEN 325 MG/1
650 TABLET ORAL
Status: CANCELLED
Start: 2023-05-18

## 2023-04-27 RX ORDER — DIPHENHYDRAMINE HYDROCHLORIDE 50 MG/ML
25 INJECTION INTRAMUSCULAR; INTRAVENOUS
Status: CANCELLED
Start: 2023-05-11

## 2023-04-27 RX ORDER — ONDANSETRON 2 MG/ML
8 INJECTION INTRAMUSCULAR; INTRAVENOUS
Status: COMPLETED | OUTPATIENT
Start: 2023-04-27 | End: 2023-04-27

## 2023-04-27 RX ORDER — ACETAMINOPHEN 325 MG/1
650 TABLET ORAL
Status: COMPLETED | OUTPATIENT
Start: 2023-04-27 | End: 2023-04-27

## 2023-04-27 RX ORDER — HEPARIN 100 UNIT/ML
500 SYRINGE INTRAVENOUS
Status: CANCELLED | OUTPATIENT
Start: 2023-04-28

## 2023-04-27 RX ORDER — DIPHENHYDRAMINE HYDROCHLORIDE 50 MG/ML
50 INJECTION INTRAMUSCULAR; INTRAVENOUS ONCE AS NEEDED
Status: CANCELLED | OUTPATIENT
Start: 2023-04-27

## 2023-04-27 RX ORDER — ACETAMINOPHEN 325 MG/1
650 TABLET ORAL
Status: CANCELLED
Start: 2023-04-27

## 2023-04-27 RX ORDER — SODIUM CHLORIDE 0.9 % (FLUSH) 0.9 %
10 SYRINGE (ML) INJECTION
Status: CANCELLED | OUTPATIENT
Start: 2023-04-28

## 2023-04-27 RX ORDER — SODIUM CHLORIDE 0.9 % (FLUSH) 0.9 %
10 SYRINGE (ML) INJECTION
Status: CANCELLED | OUTPATIENT
Start: 2023-05-04

## 2023-04-27 RX ORDER — DIPHENHYDRAMINE HYDROCHLORIDE 50 MG/ML
25 INJECTION INTRAMUSCULAR; INTRAVENOUS
Status: CANCELLED
Start: 2023-04-27

## 2023-04-27 RX ORDER — ACETAMINOPHEN 325 MG/1
650 TABLET ORAL
Status: CANCELLED
Start: 2023-04-28

## 2023-04-27 RX ORDER — EPINEPHRINE 0.3 MG/.3ML
0.3 INJECTION SUBCUTANEOUS ONCE AS NEEDED
Status: CANCELLED | OUTPATIENT
Start: 2023-05-18

## 2023-04-27 RX ORDER — EPINEPHRINE 0.3 MG/.3ML
0.3 INJECTION SUBCUTANEOUS ONCE AS NEEDED
Status: CANCELLED | OUTPATIENT
Start: 2023-04-27

## 2023-04-27 RX ORDER — SODIUM CHLORIDE 0.9 % (FLUSH) 0.9 %
10 SYRINGE (ML) INJECTION
Status: CANCELLED | OUTPATIENT
Start: 2023-05-18

## 2023-04-27 RX ORDER — DIPHENHYDRAMINE HYDROCHLORIDE 50 MG/ML
25 INJECTION INTRAMUSCULAR; INTRAVENOUS
Status: CANCELLED
Start: 2023-04-28

## 2023-04-27 RX ORDER — DIPHENHYDRAMINE HYDROCHLORIDE 50 MG/ML
50 INJECTION INTRAMUSCULAR; INTRAVENOUS ONCE AS NEEDED
Status: CANCELLED | OUTPATIENT
Start: 2023-04-28

## 2023-04-27 RX ORDER — DIPHENHYDRAMINE HYDROCHLORIDE 50 MG/ML
50 INJECTION INTRAMUSCULAR; INTRAVENOUS ONCE AS NEEDED
Status: CANCELLED | OUTPATIENT
Start: 2023-05-04

## 2023-04-27 RX ORDER — EPINEPHRINE 0.3 MG/.3ML
0.3 INJECTION SUBCUTANEOUS ONCE AS NEEDED
Status: CANCELLED | OUTPATIENT
Start: 2023-05-11

## 2023-04-27 RX ORDER — SODIUM CHLORIDE 0.9 % (FLUSH) 0.9 %
10 SYRINGE (ML) INJECTION
Status: CANCELLED | OUTPATIENT
Start: 2023-05-11

## 2023-04-27 RX ORDER — EPINEPHRINE 0.3 MG/.3ML
0.3 INJECTION SUBCUTANEOUS ONCE AS NEEDED
Status: CANCELLED | OUTPATIENT
Start: 2023-04-28

## 2023-04-27 RX ORDER — HEPARIN 100 UNIT/ML
500 SYRINGE INTRAVENOUS
Status: CANCELLED | OUTPATIENT
Start: 2023-05-11

## 2023-04-27 RX ORDER — DIPHENHYDRAMINE HYDROCHLORIDE 50 MG/ML
25 INJECTION INTRAMUSCULAR; INTRAVENOUS
Status: CANCELLED
Start: 2023-05-18

## 2023-04-27 RX ORDER — HEPARIN 100 UNIT/ML
500 SYRINGE INTRAVENOUS
Status: CANCELLED | OUTPATIENT
Start: 2023-05-18

## 2023-04-27 RX ORDER — ACETAMINOPHEN 325 MG/1
650 TABLET ORAL
Status: CANCELLED
Start: 2023-05-04

## 2023-04-27 RX ORDER — DIPHENHYDRAMINE HYDROCHLORIDE 50 MG/ML
50 INJECTION INTRAMUSCULAR; INTRAVENOUS ONCE AS NEEDED
Status: CANCELLED | OUTPATIENT
Start: 2023-05-18

## 2023-04-27 RX ORDER — EPINEPHRINE 0.3 MG/.3ML
0.3 INJECTION SUBCUTANEOUS ONCE AS NEEDED
Status: CANCELLED | OUTPATIENT
Start: 2023-05-04

## 2023-04-27 RX ORDER — SODIUM CHLORIDE 0.9 % (FLUSH) 0.9 %
10 SYRINGE (ML) INJECTION
Status: CANCELLED | OUTPATIENT
Start: 2023-04-27

## 2023-04-27 RX ORDER — HEPARIN 100 UNIT/ML
500 SYRINGE INTRAVENOUS
Status: CANCELLED | OUTPATIENT
Start: 2023-04-27

## 2023-04-27 RX ORDER — DIPHENHYDRAMINE HYDROCHLORIDE 50 MG/ML
25 INJECTION INTRAMUSCULAR; INTRAVENOUS
Status: CANCELLED
Start: 2023-05-04

## 2023-04-27 RX ORDER — DIPHENHYDRAMINE HYDROCHLORIDE 50 MG/ML
25 INJECTION INTRAMUSCULAR; INTRAVENOUS
Status: COMPLETED | OUTPATIENT
Start: 2023-04-27 | End: 2023-04-27

## 2023-04-27 RX ORDER — ONDANSETRON 2 MG/ML
8 INJECTION INTRAMUSCULAR; INTRAVENOUS
Status: CANCELLED | OUTPATIENT
Start: 2023-04-27

## 2023-04-27 RX ORDER — ACETAMINOPHEN 325 MG/1
650 TABLET ORAL
Status: CANCELLED
Start: 2023-05-11

## 2023-04-27 RX ADMIN — DEXAMETHASONE SODIUM PHOSPHATE 0.25 MG: 4 INJECTION, SOLUTION INTRA-ARTICULAR; INTRALESIONAL; INTRAMUSCULAR; INTRAVENOUS; SOFT TISSUE at 08:04

## 2023-04-27 RX ADMIN — DIPHENHYDRAMINE HYDROCHLORIDE 25 MG: 50 INJECTION, SOLUTION INTRAMUSCULAR; INTRAVENOUS at 09:04

## 2023-04-27 RX ADMIN — ACETAMINOPHEN 650 MG: 325 TABLET ORAL at 08:04

## 2023-04-27 RX ADMIN — ONDANSETRON 8 MG: 2 INJECTION INTRAMUSCULAR; INTRAVENOUS at 10:04

## 2023-04-27 RX ADMIN — AMIVANTAMAB 350 MG: 350 INJECTION INTRAVENOUS at 09:04

## 2023-04-27 NOTE — PLAN OF CARE
Problem: Adult Inpatient Plan of Care  Goal: Plan of Care Review  Outcome: Ongoing, Progressing  Flowsheets (Taken 4/27/2023 0906)  Plan of Care Reviewed With: patient  Goal: Patient-Specific Goal (Individualized)  Outcome: Ongoing, Progressing  Flowsheets (Taken 4/27/2023 0906)  Anxieties, Fears or Concerns: Denies  Individualized Care Needs: Reclined in chair with feet elevated, warm blanket, and sprite.  Watching TV.  Goal: Optimal Comfort and Wellbeing  Outcome: Ongoing, Progressing  Intervention: Monitor Pain and Promote Comfort  Flowsheets (Taken 4/27/2023 0906)  Pain Management Interventions:   warm blanket provided   relaxation techniques promoted   quiet environment facilitated  Intervention: Provide Person-Centered Care  Flowsheets (Taken 4/27/2023 0906)  Trust Relationship/Rapport:   care explained   questions answered   questions encouraged   choices provided   emotional support provided   reassurance provided   thoughts/feelings acknowledged   empathic listening provided     Problem: Fall Injury Risk  Goal: Absence of Fall and Fall-Related Injury  Outcome: Ongoing, Progressing  Intervention: Identify and Manage Contributors  Flowsheets (Taken 4/27/2023 0906)  Self-Care Promotion: independence encouraged  Medication Review/Management: medications reviewed  Intervention: Promote Injury-Free Environment  Flowsheets (Taken 4/27/2023 0906)  Safety Promotion/Fall Prevention:   nonskid shoes/socks when out of bed   medications reviewed   in recliner, wheels locked     Problem: Infection  Goal: Absence of Infection Signs and Symptoms  Outcome: Ongoing, Progressing  Intervention: Prevent or Manage Infection  Flowsheets (Taken 4/27/2023 0906)  Infection Management: aseptic technique maintained  Isolation Precautions: contact

## 2023-04-27 NOTE — NURSING
0945am: Visited pt today while in chemo infusion receiving 1st Rybrevant infusion at chairside alone. Pt  had stepped out for a while. Spoke with pt. Pt stated she feels pretty good. Pt has still been able to work and do her normal activities. Reviewed Rybrevant, its side effects, and gave drug info handout to pt. Informed pt to contact me directly to report any side effects that she may experience. Informed pt that she should receive next appts for treatment today at end of treatment. Pt encouraged to contact me directly should any needs arise. Pt verbalized understanding.   Oncology Navigation   Intake  Cancer Type: Other  Initial Nurse Navigator Contact: 23  Date Worked: 23  Multiple appointments: Yes  Start of Treatment: 23     Treatment  Current Status: Active       Medical Oncologist: Dr. Reese Mcfarlane  Consult Date: 22  Chemotherapy: Planned  Chemotherapy Regimen: Carboplatin Alimta  Immunotherapy: Planned  Immunotherapy Name: Rybrevant  Start Date: 23       Procedures: PET scan; Biopsy  Biopsy Schedule Date: 22 (lung)  MRI Schedule Date: 22 (brain)  PET Scan Schedule Date: 22  Other Schedule Date: 23 (EKG)    General Referrals: Social Work  Social Work: notified via in-basket msg  Social Work Referral Date: 23          Support Systems: Spouse/significant other     Acuity  Stage: 2  Systemic Treatment - predicted or initiated: Immunotherapy (+2)  Treatment Tolerability: Has not started treatment yet/treatment fully completed and side effects resolved  ECO  Comorbidities in Medical History: 1  Hospitalization Within the Past Month: 0   Needed: 0  Support: 0  Transportation: 0  History of noncompliance/frequent no shows and cancellations: 0  Verbalizes the need for more education: 1  Navigation Acuity: 6     Follow Up  No follow-ups on file.

## 2023-04-27 NOTE — NURSING
1037 - C/O nausea and vomited small amount.  No other s/s of a reaction noted.  VSS.  Infusion stopped.  Pha notified.  Zofran IVP ordered.    1048 - Nausea resolved with help of crackers. Administered Zofran per order.    1100 - Restarted Rybrevant at 25ml/hr x 30 minutes.  No distress noted at this time.

## 2023-04-27 NOTE — NURSING
1130 - Doing well.  No complaints voiced.  Increased the rate to 50 ml/hr.  Will recheck in 30 minutes and increase to 75 ml/hr, if pt continues to do well.    1200 - VSS.  No complaints at this time.  Increased the rate to 75 ml/hr.  Will continue to monitor closely.

## 2023-04-27 NOTE — DISCHARGE INSTRUCTIONS
WAYS TO HELP PREVENT INFECTION        WASH YOUR HANDS OFTEN DURING THE DAY, ESPECIALLY BEFORE YOU EAT, AFTER USING THE BATHROOM, AND AFTER TOUCHING ANIMALS    STAY AWAY FROM PEOPLE WHO HAVE ILLNESSES YOU CAN CATCH; SUCH AS COLDS, FLU, CHICKEN POX    TRY TO AVOID CROWDS    STAY AWAY FROM CHILDREN WHO RECENTLY HAVE RECEIVED LIVE VIRUS VACCINES    MAINTAIN GOOD MOUTH CARE    DO NOT SQUEEZE OR SCRATCH PIMPLES    CLEAN CUTS & SCRAPES RIGHT AWAY AND DAILY UNTIL HEALED WITH WARM WATER, SOAP & AN ANTISEPTIC    AVOID CONTACT WITH LITTER BOXES, BIRD CAGES, & FISH TANKS    AVOID STANDING WATER, IE., BIRD BATHS, FLOWER POTS/VASES, OR HUMIDIFIERS    WEAR GLOVES WHEN GARDENING OR CLEANING UP AFTER OTHERS, ESPECIALLY BABIES & SMALL CHILDREN    DO NOT EAT RAW FISH, SEAFOOD, MEAT, OR EGGS  FALL PREVENTION   Falls often occur due to slipping, tripping or losing your balance. Here are ways to reduce your risk of falling again.   Was there anything that caused your fall that can be fixed, removed or replaced?   Make your home safe by keeping walkways clear of objects you may trip over.   Use non-slip pads under rugs.   Do not walk in poorly lit areas.   Do not stand on chairs or wobbly ladders.   Use caution when reaching overhead or looking upward. This position can cause a loss of balance.   Be sure your shoes fit properly, have non-slip bottoms and are in good condition.   Be cautious when going up and down stairs, curbs, and when walking on uneven sidewalks.   If your balance is poor, consider using a cane or walker.   If your fall was related to alcohol use, stop or limit alcohol intake.   If your fall was related to use of sleeping medicines, talk to your doctor about this. You may need to reduce your dosage at bedtime if you awaken during the night to go to the bathroom.   To reduce the need for nighttime bathroom trips:   Avoid drinking fluids for several hours before going to bed   Empty your bladder before going to bed    Men can keep a urinal at the bedside   © 4982-9361 Stephanie Thurston, [address] [facility], [place] [address]. All rights reserved. This information is not intended as a substitute for professional medical care. Always follow your healthcare professional's instructions.  Thank you for letting me take care of YOU!!    MAURILIO Browne          [facility]  [address] [address]  [phone] phone     [phone] fax  Hours of Operation: Monday- Friday 8:00am- 5:00pm  After hours phone  [phone]  Hematology / Oncology Physicians on call:    Dr. Denys Jerome        Nurse Practitioners:    Nanci Hawley, BRISA Caicedo, TATIANNA Islas, BRISA Kwon, BRISA Blackmon, BRISA      Please don't hesitate to call if you have any concerns.

## 2023-04-27 NOTE — PROGRESS NOTES
SW met with pt and spouse at chairside. Pt is not new to SW as SW met with pt at start of radiation treatments. However, SW wanted to follow up with pt at start of chemo treatments to assess for any current needs. Pt stated that she has no immediate needs at this time, but still has the new pt folder and SW business card, and will call SW if needs arise. No other needs expressed. SW will remain available.

## 2023-04-27 NOTE — PROGRESS NOTES
Introduced myself to new oncology patient and/or family member(s) in infusion. Gave my business card with dietitian contact information along with guidance on when to contact me about an appointment, for concerns like significant loss of appetite and weight loss.   Signature: Karyn Ortega, MPH, RD, LDN

## 2023-04-27 NOTE — PROGRESS NOTES
Subjective:     Patient ID:Mateusz Ahmadi is a 53 y.o. female.?? MR#: [id number]   ?   PRIMARY ONCOLOGIST: Dr. Mcfarlane   ?   CHIEF COMPLAINT: Lab review/assessment C1D1 [facility]  ?   ONCOLOGIC DIAGNOSIS: Stage IV (cT2, cN2, cM1), Malignant neoplasm of lower lobe of left lung   ?   CURRENT TREATMENT: OP NSCLC AMIVANTAMAB-VMJW ([facility]) Q4W     PAST TREATMENT: PEMETREXED + CARBOPLATIN (AUC) Q3W      HPI Mrs. Ahmadi is a pleasan 53-renee-old female with metastatic non-small cell lung carcinoma Exon 20 mutation who presents today for lab review and assessment prior to C1D1 [facility]. 11/2022 pt seen with PCP with c/o persistent coughing and wheezing. CXR at that time revealed pulmonary nodules, subsequent CT chest found L Lung mass. L lung biopsy positive for adenocarcinoma , EGFR mutated. Pleural fluid also positive for malignancy. PET showed avid STEPHAN mass, mediastinal nodes, bone mets in C1, T1, T11, L3, sacrum, L ischium, sternum. MRI brain negative for intracranial metastases. Initiated on carbo/ alimta 01/27/23--re imaging after 4 cycles found progressive disease.     Interval History: Pt states she is feeling well and voices no c/o today.       Oncology History   Malignant neoplasm of lower lobe of left lung   12/9/2022 Initial Diagnosis    Malignant neoplasm of lower lobe of left lung       12/9/2022 Cancer Staged    Staging form: Lung, AJCC 8th Edition  - Clinical stage from 12/9/2022: Stage IV (cT2, cN2, cM1)       12/27/2022 - 12/27/2022 Chemotherapy    Treatment Summary   Plan Name: OP PEMBROLIZUMAB 400MG Q6W  Treatment Goal: Control  Status: Inactive  Start Date:   End Date:   Provider: Reese Mcfarlane MD  Chemotherapy: [No matching medication found in this treatment plan]        Genetic Testing    Patient has genetic testing done for  TEmpus peripheral blood.                                              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      1/18/2023 - 1/18/2023 Chemotherapy    Treatment Summary   Plan Name: OP NSCLC AMIVANTAMAB-VMJW ([facility]) Q4W  Treatment Goal: Palliative  Status: Inactive  Start Date:   End Date:   Provider: Reese Mcfarlane MD  Chemotherapy: amivantamab-vmjw ([facility]) 350 mg in sodium chloride 0.9% SolP 250 mL chemo infusion, 350 mg (100 % of original dose 350 mg), Intravenous, Clinic/HOD 1 time, 0 of 13 cycles  Dose modification: 350 mg (original dose 350 mg, Cycle 1), 1,050 mg (original dose 1,050 mg, Cycle 1), 1,400 mg (original dose 1,400 mg, Cycle 1)       1/27/2023 - 3/31/2023 Chemotherapy    Treatment Summary   Plan Name: OP NSCLC PEMETREXED + CARBOPLATIN (AUC) Q3W  Treatment Goal: Control  Status: Inactive  Start Date: 1/27/2023  End Date: 3/31/2023  Provider: Reese Mcfarlane MD  Chemotherapy: CARBOplatin (PARAPLATIN) 750 mg in sodium chloride 0.9% 335 mL chemo infusion, 750 mg (100 % of original dose 750 mg), Intravenous, Clinic/HOD 1 time, 4 of 4 cycles  Dose modification:   (original dose 750 mg, Cycle 1, Reason: MD Discretion)  Administration: 750 mg (1/27/2023), 750 mg (2/17/2023), 750 mg (3/31/2023), 750 mg (3/10/2023)  PEMEtrexed disodium (ALIMTA) 1,200 mg in sodium chloride 0.9% SolP 100 mL chemo infusion, 1,250 mg, Intravenous, Clinic/HOD 1 time, 4 of 4 cycles  Administration: 1,200 mg (1/27/2023), 1,200 mg (2/17/2023), 1,200 mg (3/31/2023), 1,200 mg (3/10/2023)       4/27/2023 -  Chemotherapy    Treatment Summary   Plan Name: OP NSCLC AMIVANTAMAB-VMJW ([facility]) Q4W  Treatment Goal: Palliative  Status: Active  Start Date: 4/27/2023 (Planned)  End Date: 6/6/2024 (Planned)  Provider: Reese Mcafrlane MD  Chemotherapy: amivantamab-vmjw (RYBREVANT) 350 mg in sodium chloride 0.9% SolP 250 mL chemo infusion, 350 mg (original dose ), Intravenous, Clinic/HOD 1 time, 0 of 15 cycles  Dose modification: 350 mg (Cycle 1), 1,050 mg (Cycle 1), 1,400 mg (Cycle 1)          Social History     Socioeconomic History     Marital status:    Tobacco Use    Smoking status: Never     Passive exposure: Never    Smokeless tobacco: Never   Substance and Sexual Activity    Alcohol use: Never    Drug use: Never    Sexual activity: Yes     Partners: Male     Birth control/protection: None     Comment: tubal      Family History   Problem Relation Age of Onset    Heart failure Father       Past Surgical History:   Procedure Laterality Date    CATARACT EXTRACTION W/  INTRAOCULAR LENS IMPLANT Right 06/02/2022    FLUOROSCOPY N/A 1/26/2023    Procedure: FLUOROSCOPY/mediport placement;  Surgeon: Marlon Leone MD;  Location: [facility] CATH LAB;  Service: General;  Laterality: N/A;    TUBAL LIGATION      2007--postpartum tubal ligation        Review of Systems   Constitutional:  Negative for activity change, chills, fatigue and fever.   HENT: Negative.     Eyes: Negative.    Respiratory: Negative.     Cardiovascular: Negative.    Gastrointestinal: Negative.    Endocrine: Positive for cold intolerance.   Musculoskeletal:  Negative for arthralgias and myalgias.   Skin:  Negative for rash.   Neurological:  Negative for dizziness, weakness and light-headedness.   Psychiatric/Behavioral:  The patient is not nervous/anxious.      ?   A comprehensive 14-point review of systems was reviewed with patient and was negative other than as specified above.   ?     Objective:      Physical Exam  Constitutional:       Appearance: She is obese.   HENT:      Head: Normocephalic.      Right Ear: External ear normal.      Left Ear: External ear normal.      Nose: Nose normal.      Mouth/Throat:      Mouth: Mucous membranes are moist.      Pharynx: Oropharynx is clear.   Eyes:      Conjunctiva/sclera: Conjunctivae normal.   Cardiovascular:      Rate and Rhythm: Normal rate.   Pulmonary:      Effort: Pulmonary effort is normal.      Breath sounds: Examination of the right-lower field reveals decreased breath sounds. Examination of the left-lower field reveals decreased  breath sounds. Decreased breath sounds present.   Musculoskeletal:         General: Normal range of motion.   Skin:     General: Skin is warm and dry.   Neurological:      Mental Status: She is alert.   Psychiatric:         Mood and Affect: Mood normal.         Behavior: Behavior normal.         Thought Content: Thought content normal.         Judgment: Judgment normal.         ?   Vitals:    04/27/23 0738   BP: 129/73   Pulse: 74   Temp: 98.5 °F (36.9 °C)      ?       ?   Laboratory:  ?   Lab Visit on 04/27/2023   Component Date Value Ref Range Status    Sodium 04/27/2023 142  136 - 145 mmol/L Final    Potassium 04/27/2023 3.5  3.5 - 5.1 mmol/L Final    Chloride 04/27/2023 101  95 - 110 mmol/L Final    CO2 04/27/2023 28  23 - 29 mmol/L Final    Glucose 04/27/2023 181 (H)  70 - 110 mg/dL Final    BUN 04/27/2023 13  6 - 20 mg/dL Final    Creatinine 04/27/2023 1.0  0.5 - 1.4 mg/dL Final    Calcium 04/27/2023 8.8  8.7 - 10.5 mg/dL Final    Total Protein 04/27/2023 6.5  6.0 - 8.4 g/dL Final    Albumin 04/27/2023 3.6  3.5 - 5.2 g/dL Final    Total Bilirubin 04/27/2023 0.2  0.1 - 1.0 mg/dL Final    Alkaline Phosphatase 04/27/2023 90  55 - 135 U/L Final    AST 04/27/2023 36  10 - 40 U/L Final    ALT 04/27/2023 42  10 - 44 U/L Final    Anion Gap 04/27/2023 13  8 - 16 mmol/L Final    eGFR 04/27/2023 >60  >60 mL/min/1.73 m^2 Final    WBC 04/27/2023 8.63  3.90 - 12.70 K/uL Final    RBC 04/27/2023 3.46 (L)  4.00 - 5.40 M/uL Final    Hemoglobin 04/27/2023 9.8 (L)  12.0 - 16.0 g/dL Final    Hematocrit 04/27/2023 30.5 (L)  37.0 - 48.5 % Final    MCV 04/27/2023 88  82 - 98 fL Final    MCH 04/27/2023 28.3  27.0 - 31.0 pg Final    MCHC 04/27/2023 32.1  32.0 - 36.0 g/dL Final    RDW 04/27/2023 18.8 (H)  11.5 - 14.5 % Final    Platelets 04/27/2023 264  150 - 450 K/uL Final    MPV 04/27/2023 10.8  9.2 - 12.9 fL Final    Immature Granulocytes 04/27/2023 1.9 (H)  0.0 - 0.5 % Final    Gran # (ANC) 04/27/2023 5.4  1.8 - 7.7 K/uL Final     Immature Grans (Abs) 04/27/2023 0.16 (H)  0.00 - 0.04 K/uL Final    Lymph # 04/27/2023 2.0  1.0 - 4.8 K/uL Final    Mono # 04/27/2023 0.9  0.3 - 1.0 K/uL Final    Eos # 04/27/2023 0.1  0.0 - 0.5 K/uL Final    Baso # 04/27/2023 0.06  0.00 - 0.20 K/uL Final    nRBC 04/27/2023 1 (A)  0 /100 WBC Final    Gran % 04/27/2023 61.9  38.0 - 73.0 % Final    Lymph % 04/27/2023 23.5  18.0 - 48.0 % Final    Mono % 04/27/2023 10.8  4.0 - 15.0 % Final    Eosinophil % 04/27/2023 1.2  0.0 - 8.0 % Final    Basophil % 04/27/2023 0.7  0.0 - 1.9 % Final    Differential Method 04/27/2023 Automated   Final    LD 04/27/2023 308 (H)  110 - 260 U/L Final    HCG Quant 04/27/2023 <1.2  See Text mIU/mL Final      ?   Imaging:    No results found for this or any previous visit (from the past 2160 hour(s)).     Results for orders placed or performed during the hospital encounter of 04/11/23 (from the past 2160 hour(s))   CT Chest Abdomen Pelvis With Contrast (xpd)    Narrative    EXAM:  CT CHEST ABDOMEN PELVIS WITH CONTRAST (XPD), Multiplanar reconstructions    CLINICAL INDICATION: Metastatic non-small cell lung carcinoma    TECHNIQUE: Axial images through the chest, abdomen and pelvis were obtained with the use of IV contrast.  Sagittal and coronal reconstructions are provided for review. Oral contrast was  utilized.    FINDINGS: Comparisons are made to a chest CT scan from 11/23/2022.    CHEST: Stable left upper lobe scarring.  The pulmonary opacity within the lateral left upper lobe currently measures 2.2 x 1.0 cm on image 142 of series 6, a decrease from 3.0 x 1.4 cm.  Stable elevation of the left hemidiaphragm with chronic scarring or atelectasis in the adjacent regular and left lower lobe.  Interval resolution of left pleural effusion.  Stable middle lobe scarring.  Stable calcified granuloma in the right lower lobe.  The lungs are otherwise free of new pulmonary opacities.    Stable calcified mediastinal lymph nodes. There are no hilar or  mediastinal masses or abnormal lymph nodes by size criteria.  The airways are patent.  The thyroid gland is unchanged in appearance with stable 9 mm right thyroid gland nodule.  The esophagus is normal.  A chest port is in place.    ABDOMEN:  There is a nodular density along the posterior portions of the right hepatic lobe measuring 1.1 cm on image 82 of series 4, not seen on the comparison study upper (this area was not included on the comparison study).  Stable 2.4 cm low-density left adrenal nodule.  Stable fat-containing lesion within the superior left kidney measuring 1.4 cm.  Stable low-attenuation focus within the lateral left hepatic lobe measuring 1.1 cm on image 50 series 4.  The liver, spleen and gallbladder otherwise appear normal.  The pancreas is unremarkable.  Kidneys and adrenal glands are otherwise normal. The biliary tree is normal.    Negative for adenopathy, ascites or inflammatory changes noted within the abdomen or pelvis.     Negative for vascular abnormalities.  The portal vein is patent.    The bowel appears normal. Normal appendix.  Negative for free air.  Moderate stool in the rectum.    PELVIS: The urinary bladder is unremarkable.     The female pelvic organs are normal.  Numerous pelvic phleboliths noted.    Small fat filled umbilical hernia.  Abdominal wall is otherwise intact.    Interval increase in number and size of numerous sclerotic lesions throughout the lower cervical and thoracic spine, left humerus and the inferior sternum.  There are sclerotic lesions within the L3 vertebral body, bilateral sacrum, bilateral pelvis and proximal femurs.  The largest area of sclerosis is within the posterior left acetabulum measuring 7.1 cm in length.  Negative for significant spinal canal stenosis.    Negative for groin adenopathy.        Impression    1.  Detrimental change.  Increase in number and size of numerous sclerotic lesions throughout the cervical and thoracic spine, left humerus and  sternum.  There are also multiple large sclerotic lesions throughout the lumbar spine, pelvis and proximal femurs measuring up to 7.1 cm in size.  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  2.   The lateral left upper lobe mass is decreased in size in the interim, currently measuring 2.2 x 1.0 cm.  Negative for new pulmonary masses.  3.  Exophytic nodule along the posterior right hepatic lobe measuring 1.1 cm.  This was not imaged previously.  Etiology uncertain.  Metastasis is not excluded.  4.  Negative for acute process throughout the chest, abdomen or pelvis.  5.  Interval placement of a right-sided chest port.  6.  Numerous stable and nonemergent findings as noted above.      All CT scans at [this location] are performed using dose modulation techniques as appropriate to a performed exam including the following: automated exposure control; adjustment of the mA and/or kV according to patient size (this includes techniques or standardized protocols for targeted exams where dose is matched to indication / reason for exam; i.e. extremities or head); use of iterative reconstruction technique.    Finalized on: 4/11/2023 12:18 PM By:  Gene Escamilla MD  [facility]# [id number]      2023-04-11 12:20:54.518    [facility]        ?   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   Increase in number and size of numerous sclerotic lesions throughout the cervical and thoracic spine, left humerus and sternum.  There are also multiple large sclerotic lesions throughout the lumbar spine, pelvis and proximal femurs measuring up to 7.1 cm in size.  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  2.   The lateral left upper lobe mass is decreased in size in the interim, currently measuring 2.2 x 1.0 cm.  Negative for new pulmonary masses.  3.  Exophytic nodule along the posterior right hepatic lobe measuring 1.1 cm.  This was not imaged previously.  Etiology uncertain.  Metastasis is not excluded.    R[facility]vant C1D1 04/27/23    Labs reviewed, no concerning cytopenias  -Case discussed with primary oncologist  -Ok to proceed with C1D1 [facility] today--consent complete  -Discussed in detail with pt and spouse potential side effects/IRR  -All questions and concerns addressed to pt's satisfaction   -Plan for C1D2 tomorrow 04/28    F/u in one week with cbc, cmp and Dr. Mcfarlane for C1D8 [facility]             Relevant Orders    CBC Auto Differential    Comprehensive Metabolic Panel          Med Onc Chart Routing      Follow up with physician 1 week. with Dr. Mcfarlane   Follow up with DOLLY    Infusion scheduling note [facility] C1D8   Injection scheduling note    Labs CBC and CMP   Scheduling: Labs same day as infusion  Preferred lab:  Lab interval:     Imaging    Pharmacy appointment    Other referrals              REBEL CastilloP-AMADOU  Hematology/Oncology

## 2023-04-27 NOTE — ASSESSMENT & PLAN NOTE
Cancer Staging   Malignant neoplasm of lower lobe of left lung  Staging form: Lung, AJCC 8th Edition  - Clinical stage from 12/9/2022: Stage IV (cT2, cN2, cM1) - Signed by Reese Mcfarlane MD on 12/9/2022    Completed 4 Cycles of Carbo/Alimta 03/31/2023    Repeat CT CAP 04/11/2023:   Detrimental change.  Increase in number and size of numerous sclerotic lesions throughout the cervical and thoracic spine, left humerus and sternum.  There are also multiple large sclerotic lesions throughout the lumbar spine, pelvis and proximal femurs measuring up to 7.1 cm in size.  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  2.   The lateral left upper lobe mass is decreased in size in the interim, currently measuring 2.2 x 1.0 cm.  Negative for new pulmonary masses.  3.  Exophytic nodule along the posterior right hepatic lobe measuring 1.1 cm.  This was not imaged previously.  Etiology uncertain.  Metastasis is not excluded.    R[facility]vant C1D1 04/27/23    Labs reviewed, no concerning cytopenias  -Case discussed with primary oncologist  -Ok to proceed with C1D1 [facility] today--consent complete  -Discussed in detail with pt and spouse potential side effects/IRR  -All questions and concerns addressed to pt's satisfaction   -Plan for C1D2 tomorrow 04/28    F/u in one week with cbc, cmp and Dr. Mcfarlane for C1D8 [facility]

## 2023-04-28 ENCOUNTER — PATIENT MESSAGE (OUTPATIENT)
Dept: INFUSION THERAPY | Facility: HOSPITAL | Age: 53
End: 2023-04-28
Payer: COMMERCIAL

## 2023-04-28 ENCOUNTER — INFUSION (OUTPATIENT)
Dept: INFUSION THERAPY | Facility: HOSPITAL | Age: 53
End: 2023-04-28
Attending: RADIOLOGY
Payer: COMMERCIAL

## 2023-04-28 ENCOUNTER — TELEPHONE (OUTPATIENT)
Dept: INFUSION THERAPY | Facility: HOSPITAL | Age: 53
End: 2023-04-28
Payer: COMMERCIAL

## 2023-04-28 VITALS
HEIGHT: 64 IN | RESPIRATION RATE: 16 BRPM | WEIGHT: 293 LBS | HEART RATE: 68 BPM | BODY MASS INDEX: 50.02 KG/M2 | TEMPERATURE: 97 F | SYSTOLIC BLOOD PRESSURE: 98 MMHG | OXYGEN SATURATION: 98 % | DIASTOLIC BLOOD PRESSURE: 59 MMHG

## 2023-04-28 DIAGNOSIS — C34.32 MALIGNANT NEOPLASM OF LOWER LOBE OF LEFT LUNG: Primary | ICD-10-CM

## 2023-04-28 PROCEDURE — 25000003 PHARM REV CODE 250: Performed by: INTERNAL MEDICINE

## 2023-04-28 PROCEDURE — 96367 TX/PROPH/DG ADDL SEQ IV INF: CPT

## 2023-04-28 PROCEDURE — 63600175 PHARM REV CODE 636 W HCPCS: Performed by: INTERNAL MEDICINE

## 2023-04-28 PROCEDURE — 96413 CHEMO IV INFUSION 1 HR: CPT

## 2023-04-28 PROCEDURE — 96375 TX/PRO/DX INJ NEW DRUG ADDON: CPT

## 2023-04-28 PROCEDURE — 96415 CHEMO IV INFUSION ADDL HR: CPT

## 2023-04-28 RX ORDER — EPINEPHRINE 0.3 MG/.3ML
0.3 INJECTION SUBCUTANEOUS ONCE AS NEEDED
Status: DISCONTINUED | OUTPATIENT
Start: 2023-04-28 | End: 2023-04-28 | Stop reason: HOSPADM

## 2023-04-28 RX ORDER — DIPHENHYDRAMINE HYDROCHLORIDE 50 MG/ML
50 INJECTION INTRAMUSCULAR; INTRAVENOUS ONCE AS NEEDED
Status: DISCONTINUED | OUTPATIENT
Start: 2023-04-28 | End: 2023-04-28 | Stop reason: HOSPADM

## 2023-04-28 RX ORDER — ACETAMINOPHEN 325 MG/1
650 TABLET ORAL
Status: COMPLETED | OUTPATIENT
Start: 2023-04-28 | End: 2023-04-28

## 2023-04-28 RX ORDER — DIPHENHYDRAMINE HYDROCHLORIDE 50 MG/ML
25 INJECTION INTRAMUSCULAR; INTRAVENOUS
Status: COMPLETED | OUTPATIENT
Start: 2023-04-28 | End: 2023-04-28

## 2023-04-28 RX ADMIN — DEXAMETHASONE SODIUM PHOSPHATE 10 MG: 4 INJECTION, SOLUTION INTRA-ARTICULAR; INTRALESIONAL; INTRAMUSCULAR; INTRAVENOUS; SOFT TISSUE at 09:04

## 2023-04-28 RX ADMIN — ACETAMINOPHEN 650 MG: 325 TABLET ORAL at 08:04

## 2023-04-28 RX ADMIN — AMIVANTAMAB 1050 MG: 350 INJECTION INTRAVENOUS at 10:04

## 2023-04-28 RX ADMIN — DIPHENHYDRAMINE HYDROCHLORIDE 25 MG: 50 INJECTION, SOLUTION INTRAMUSCULAR; INTRAVENOUS at 08:04

## 2023-04-28 RX ADMIN — SODIUM CHLORIDE: 9 INJECTION, SOLUTION INTRAVENOUS at 09:04

## 2023-04-28 NOTE — TELEPHONE ENCOUNTER
Spoke with patient and clarified upcoming infusion, lab and rivera appt for 5/4/23. Patient acknowledged. Call ended well.

## 2023-04-28 NOTE — NURSING
Was able to draw back roughly 4cc from IV at completion of infusion. Didn't flush per protocol. Pt tolerated infusion today without any reactions. RTC 5/4/23

## 2023-04-28 NOTE — DISCHARGE INSTRUCTIONS
.[facility]  [address] [address]  [address]  [phone] phone     [phone] fax  Hours of Operation: Monday- Friday 8:00am- 5:00pm  After hours phone  [phone]  Hematology / Oncology Physicians on call    Dr. Denys Ramsey      Nurse Practitioners:    Nanci Hawley, BRISA Blackmon, BRISA Caicedo, BRISA Monteiro, BRISA Kwon, BRISA Trujillo, [place]      Please don't hesitate to call if you have any concerns.      FALL PREVENTION   Falls often occur due to slipping, tripping or losing your balance. Here are ways to reduce your risk of falling again.   Was there anything that caused your fall that can be fixed, removed or replaced?   Make your home safe by keeping walkways clear of objects you may trip over.   Use non-slip pads under rugs.   Do not walk in poorly lit areas.   Do not stand on chairs or wobbly ladders.   Use caution when reaching overhead or looking upward. This position can cause a loss of balance.   Be sure your shoes fit properly, have non-slip bottoms and are in good condition.   Be cautious when going up and down stairs, curbs, and when walking on uneven sidewalks.   If your balance is poor, consider using a cane or walker.   If your fall was related to alcohol use, stop or limit alcohol intake.   If your fall was related to use of sleeping medicines, talk to your doctor about this. You may need to reduce your dosage at bedtime if you awaken during the night to go to the bathroom.   To reduce the need for nighttime bathroom trips:   Avoid drinking fluids for several hours before going to bed   Empty your bladder before going to bed   Men can keep a urinal at the bedside   © 6532-1616 [employer], [address] [place] [address], [place] [address]. All rights reserved. This information is not intended as a substitute for professional medical care. Always follow your healthcare  professional's instructions.    WAYS TO HELP PREVENT INFECTION        WASH YOUR HANDS OFTEN DURING THE DAY, ESPECIALLY BEFORE YOU EAT, AFTER USING THE BATHROOM, AND AFTER TOUCHING ANIMALS    STAY AWAY FROM PEOPLE WHO HAVE ILLNESSES YOU CAN CATCH; SUCH AS COLDS, FLU, CHICKEN POX    TRY TO AVOID CROWDS    STAY AWAY FROM CHILDREN WHO RECENTLY HAVE RECEIVED LIVE VIRUS VACCINES    MAINTAIN GOOD MOUTH CARE    DO NOT SQUEEZE OR SCRATCH PIMPLES    CLEAN CUTS & SCRAPES RIGHT AWAY AND DAILY UNTIL HEALED WITH WARM WATER, SOAP & AN ANTISEPTIC    AVOID CONTACT WITH LITTER BOXES, BIRD CAGES, & FISH TANKS    AVOID STANDING WATER, IE., BIRD BATHS, FLOWER POTS/VASES, OR HUMIDIFIERS    WEAR GLOVES WHEN GARDENING OR CLEANING UP AFTER OTHERS, ESPECIALLY BABIES & SMALL CHILDREN    DO NOT EAT RAW FISH, SEAFOOD, MEAT, OR EGGS

## 2023-04-28 NOTE — PLAN OF CARE
Patient tolerated infusion well today without nausea. RTC 5/4/23      Problem: Adult Inpatient Plan of Care  Goal: Plan of Care Review  Outcome: Ongoing, Progressing  Flowsheets (Taken 4/28/2023 1538)  Plan of Care Reviewed With: patient  Goal: Patient-Specific Goal (Individualized)  Outcome: Ongoing, Progressing  Flowsheets (Taken 4/28/2023 1538)  Anxieties, Fears or Concerns: No concerns today. Stated she took her own zofran to prevent nausea this time.  Individualized Care Needs: Reclined feet in chair, warm blankets provided, water and ice given.  at chairside.  Goal: Optimal Comfort and Wellbeing  Outcome: Ongoing, Progressing  Intervention: Monitor Pain and Promote Comfort  Flowsheets (Taken 4/28/2023 1538)  Pain Management Interventions: warm blanket provided  Intervention: Provide Person-Centered Care  Flowsheets (Taken 4/28/2023 1538)  Trust Relationship/Rapport:   care explained   reassurance provided   choices provided   thoughts/feelings acknowledged   emotional support provided   empathic listening provided   questions answered   questions encouraged     Problem: Fall Injury Risk  Goal: Absence of Fall and Fall-Related Injury  Outcome: Ongoing, Progressing  Intervention: Identify and Manage Contributors  Flowsheets (Taken 4/28/2023 1538)  Self-Care Promotion: BADL personal objects within reach  Medication Review/Management:   medications reviewed   infusion initiated  Intervention: Promote Injury-Free Environment  Flowsheets (Taken 4/28/2023 1538)  Safety Promotion/Fall Prevention:   in recliner, wheels locked   lighting adjusted   medications reviewed   instructed to call staff for mobility     Problem: Nausea and Vomiting (Chemotherapy Effects)  Goal: Fluid and Electrolyte Balance  Outcome: Ongoing, Progressing  Intervention: Prevent and Manage Nausea and Vomiting  Flowsheets (Taken 4/28/2023 1538)  Environmental Support: calm environment promoted

## 2023-04-30 ENCOUNTER — PATIENT MESSAGE (OUTPATIENT)
Dept: HEMATOLOGY/ONCOLOGY | Facility: CLINIC | Age: 53
End: 2023-04-30
Payer: COMMERCIAL

## 2023-05-01 DIAGNOSIS — C34.32 MALIGNANT NEOPLASM OF LOWER LOBE OF LEFT LUNG: Primary | ICD-10-CM

## 2023-05-01 RX ORDER — HYDROCODONE BITARTRATE AND ACETAMINOPHEN 7.5; 325 MG/1; MG/1
1 TABLET ORAL EVERY 4 HOURS PRN
Qty: 40 TABLET | Refills: 0 | Status: SHIPPED | OUTPATIENT
Start: 2023-05-01

## 2023-05-02 ENCOUNTER — HOSPITAL ENCOUNTER (OUTPATIENT)
Dept: RADIOLOGY | Facility: HOSPITAL | Age: 53
Discharge: HOME | End: 2023-05-02
Attending: INTERNAL MEDICINE
Payer: COMMERCIAL

## 2023-05-02 DIAGNOSIS — C34.32 MALIGNANT NEOPLASM OF LOWER LOBE OF LEFT LUNG: ICD-10-CM

## 2023-05-02 PROCEDURE — 70553 MRI BRAIN STEM W/O & W/DYE: CPT | Mod: 26,,, | Performed by: STUDENT IN AN ORGANIZED HEALTH CARE EDUCATION/TRAINING PROGRAM

## 2023-05-02 PROCEDURE — A9585 GADOBUTROL INJECTION: HCPCS | Mod: PN | Performed by: INTERNAL MEDICINE

## 2023-05-02 PROCEDURE — 70553 MRI BRAIN STEM W/O & W/DYE: CPT | Mod: TC,PN

## 2023-05-02 PROCEDURE — 70553 MRI BRAIN W WO CONTRAST: ICD-10-PCS | Mod: 26,,, | Performed by: STUDENT IN AN ORGANIZED HEALTH CARE EDUCATION/TRAINING PROGRAM

## 2023-05-02 PROCEDURE — 25500020 PHARM REV CODE 255: Mod: PN | Performed by: INTERNAL MEDICINE

## 2023-05-02 RX ORDER — GADOBUTROL 604.72 MG/ML
10 INJECTION INTRAVENOUS
Status: COMPLETED | OUTPATIENT
Start: 2023-05-02 | End: 2023-05-02

## 2023-05-02 RX ADMIN — GADOBUTROL 10 ML: 604.72 INJECTION INTRAVENOUS at 01:05

## 2023-05-04 ENCOUNTER — INFUSION (OUTPATIENT)
Dept: INFUSION THERAPY | Facility: HOSPITAL | Age: 53
End: 2023-05-04
Attending: RADIOLOGY
Payer: COMMERCIAL

## 2023-05-04 ENCOUNTER — DOCUMENTATION ONLY (OUTPATIENT)
Dept: HEMATOLOGY/ONCOLOGY | Facility: CLINIC | Age: 53
End: 2023-05-04

## 2023-05-04 ENCOUNTER — LAB VISIT (OUTPATIENT)
Dept: LAB | Facility: HOSPITAL | Age: 53
End: 2023-05-04
Attending: INTERNAL MEDICINE
Payer: COMMERCIAL

## 2023-05-04 ENCOUNTER — OFFICE VISIT (OUTPATIENT)
Dept: HEMATOLOGY/ONCOLOGY | Facility: CLINIC | Age: 53
End: 2023-05-04
Payer: COMMERCIAL

## 2023-05-04 VITALS
HEART RATE: 75 BPM | TEMPERATURE: 97 F | BODY MASS INDEX: 50.02 KG/M2 | SYSTOLIC BLOOD PRESSURE: 158 MMHG | RESPIRATION RATE: 18 BRPM | DIASTOLIC BLOOD PRESSURE: 81 MMHG | OXYGEN SATURATION: 98 % | WEIGHT: 293 LBS | HEIGHT: 64 IN

## 2023-05-04 VITALS
OXYGEN SATURATION: 98 % | RESPIRATION RATE: 18 BRPM | TEMPERATURE: 98 F | DIASTOLIC BLOOD PRESSURE: 76 MMHG | BODY MASS INDEX: 50.02 KG/M2 | HEIGHT: 64 IN | SYSTOLIC BLOOD PRESSURE: 119 MMHG | WEIGHT: 293 LBS | HEART RATE: 71 BPM

## 2023-05-04 DIAGNOSIS — E66.01 MORBID OBESITY: ICD-10-CM

## 2023-05-04 DIAGNOSIS — C34.32 MALIGNANT NEOPLASM OF LOWER LOBE OF LEFT LUNG: Primary | ICD-10-CM

## 2023-05-04 DIAGNOSIS — Z79.899 IMMUNODEFICIENCY DUE TO CHEMOTHERAPY: ICD-10-CM

## 2023-05-04 DIAGNOSIS — C79.51 SECONDARY MALIGNANT NEOPLASM OF BONE: ICD-10-CM

## 2023-05-04 DIAGNOSIS — D84.821 IMMUNODEFICIENCY DUE TO CHEMOTHERAPY: ICD-10-CM

## 2023-05-04 DIAGNOSIS — I10 ESSENTIAL HYPERTENSION: ICD-10-CM

## 2023-05-04 DIAGNOSIS — E11.9 TYPE 2 DIABETES MELLITUS WITHOUT COMPLICATION, WITHOUT LONG-TERM CURRENT USE OF INSULIN: ICD-10-CM

## 2023-05-04 DIAGNOSIS — T45.1X5A IMMUNODEFICIENCY DUE TO CHEMOTHERAPY: ICD-10-CM

## 2023-05-04 DIAGNOSIS — C34.32 MALIGNANT NEOPLASM OF LOWER LOBE OF LEFT LUNG: ICD-10-CM

## 2023-05-04 LAB
ALBUMIN SERPL BCP-MCNC: 3.5 G/DL (ref 3.5–5.2)
ALP SERPL-CCNC: 100 U/L (ref 55–135)
ALT SERPL W/O P-5'-P-CCNC: 21 U/L (ref 10–44)
ANION GAP SERPL CALC-SCNC: 16 MMOL/L (ref 8–16)
AST SERPL-CCNC: 15 U/L (ref 10–40)
BASOPHILS # BLD AUTO: 0.06 K/UL (ref 0–0.2)
BASOPHILS NFR BLD: 0.6 % (ref 0–1.9)
BILIRUB SERPL-MCNC: 0.2 MG/DL (ref 0.1–1)
BUN SERPL-MCNC: 19 MG/DL (ref 6–20)
CALCIUM SERPL-MCNC: 9.2 MG/DL (ref 8.7–10.5)
CHLORIDE SERPL-SCNC: 102 MMOL/L (ref 95–110)
CO2 SERPL-SCNC: 24 MMOL/L (ref 23–29)
CREAT SERPL-MCNC: 0.9 MG/DL (ref 0.5–1.4)
DIFFERENTIAL METHOD: ABNORMAL
EOSINOPHIL # BLD AUTO: 0.1 K/UL (ref 0–0.5)
EOSINOPHIL NFR BLD: 0.9 % (ref 0–8)
ERYTHROCYTE [DISTWIDTH] IN BLOOD BY AUTOMATED COUNT: 18.3 % (ref 11.5–14.5)
EST. GFR  (NO RACE VARIABLE): >60 ML/MIN/1.73 M^2
GLUCOSE SERPL-MCNC: 164 MG/DL (ref 70–110)
HCT VFR BLD AUTO: 32.2 % (ref 37–48.5)
HGB BLD-MCNC: 10.7 G/DL (ref 12–16)
IMM GRANULOCYTES # BLD AUTO: 0.13 K/UL (ref 0–0.04)
IMM GRANULOCYTES NFR BLD AUTO: 1.4 % (ref 0–0.5)
LYMPHOCYTES # BLD AUTO: 2.4 K/UL (ref 1–4.8)
LYMPHOCYTES NFR BLD: 25.5 % (ref 18–48)
MCH RBC QN AUTO: 28.9 PG (ref 27–31)
MCHC RBC AUTO-ENTMCNC: 33.2 G/DL (ref 32–36)
MCV RBC AUTO: 87 FL (ref 82–98)
MONOCYTES # BLD AUTO: 0.7 K/UL (ref 0.3–1)
MONOCYTES NFR BLD: 7.9 % (ref 4–15)
NEUTROPHILS # BLD AUTO: 5.9 K/UL (ref 1.8–7.7)
NEUTROPHILS NFR BLD: 63.7 % (ref 38–73)
NRBC BLD-RTO: 0 /100 WBC
PLATELET # BLD AUTO: 166 K/UL (ref 150–450)
PMV BLD AUTO: 11.6 FL (ref 9.2–12.9)
POTASSIUM SERPL-SCNC: 3.6 MMOL/L (ref 3.5–5.1)
PROT SERPL-MCNC: 6.4 G/DL (ref 6–8.4)
RBC # BLD AUTO: 3.7 M/UL (ref 4–5.4)
SODIUM SERPL-SCNC: 142 MMOL/L (ref 136–145)
WBC # BLD AUTO: 9.26 K/UL (ref 3.9–12.7)

## 2023-05-04 PROCEDURE — 80053 COMPREHEN METABOLIC PANEL: CPT

## 2023-05-04 PROCEDURE — 3079F DIAST BP 80-89 MM HG: CPT | Mod: CPTII,S$GLB,, | Performed by: INTERNAL MEDICINE

## 2023-05-04 PROCEDURE — 3008F BODY MASS INDEX DOCD: CPT | Mod: CPTII,S$GLB,, | Performed by: INTERNAL MEDICINE

## 2023-05-04 PROCEDURE — 99999 PR PBB SHADOW E&M-EST. PATIENT-LVL V: CPT | Mod: PBBFAC,,, | Performed by: INTERNAL MEDICINE

## 2023-05-04 PROCEDURE — 1160F RVW MEDS BY RX/DR IN RCRD: CPT | Mod: CPTII,S$GLB,, | Performed by: INTERNAL MEDICINE

## 2023-05-04 PROCEDURE — 99215 PR OFFICE/OUTPT VISIT, EST, LEVL V, 40-54 MIN: ICD-10-PCS | Mod: 25,S$GLB,, | Performed by: INTERNAL MEDICINE

## 2023-05-04 PROCEDURE — 96375 TX/PRO/DX INJ NEW DRUG ADDON: CPT

## 2023-05-04 PROCEDURE — 25000003 PHARM REV CODE 250: Performed by: INTERNAL MEDICINE

## 2023-05-04 PROCEDURE — 3077F SYST BP >= 140 MM HG: CPT | Mod: CPTII,S$GLB,, | Performed by: INTERNAL MEDICINE

## 2023-05-04 PROCEDURE — 3051F PR MOST RECENT HEMOGLOBIN A1C LEVEL 7.0 - < 8.0%: ICD-10-PCS | Mod: CPTII,S$GLB,, | Performed by: INTERNAL MEDICINE

## 2023-05-04 PROCEDURE — 1159F PR MEDICATION LIST DOCUMENTED IN MEDICAL RECORD: ICD-10-PCS | Mod: CPTII,S$GLB,, | Performed by: INTERNAL MEDICINE

## 2023-05-04 PROCEDURE — 3077F PR MOST RECENT SYSTOLIC BLOOD PRESSURE >= 140 MM HG: ICD-10-PCS | Mod: CPTII,S$GLB,, | Performed by: INTERNAL MEDICINE

## 2023-05-04 PROCEDURE — 1159F MED LIST DOCD IN RCRD: CPT | Mod: CPTII,S$GLB,, | Performed by: INTERNAL MEDICINE

## 2023-05-04 PROCEDURE — 96415 CHEMO IV INFUSION ADDL HR: CPT

## 2023-05-04 PROCEDURE — 36415 COLL VENOUS BLD VENIPUNCTURE: CPT

## 2023-05-04 PROCEDURE — 99999 PR PBB SHADOW E&M-EST. PATIENT-LVL V: ICD-10-PCS | Mod: PBBFAC,,, | Performed by: INTERNAL MEDICINE

## 2023-05-04 PROCEDURE — 96413 CHEMO IV INFUSION 1 HR: CPT

## 2023-05-04 PROCEDURE — 96367 TX/PROPH/DG ADDL SEQ IV INF: CPT

## 2023-05-04 PROCEDURE — 3051F HG A1C>EQUAL 7.0%<8.0%: CPT | Mod: CPTII,S$GLB,, | Performed by: INTERNAL MEDICINE

## 2023-05-04 PROCEDURE — 1160F PR REVIEW ALL MEDS BY PRESCRIBER/CLIN PHARMACIST DOCUMENTED: ICD-10-PCS | Mod: CPTII,S$GLB,, | Performed by: INTERNAL MEDICINE

## 2023-05-04 PROCEDURE — 63600175 PHARM REV CODE 636 W HCPCS: Performed by: INTERNAL MEDICINE

## 2023-05-04 PROCEDURE — 85025 COMPLETE CBC W/AUTO DIFF WBC: CPT

## 2023-05-04 PROCEDURE — 99215 OFFICE O/P EST HI 40 MIN: CPT | Mod: 25,S$GLB,, | Performed by: INTERNAL MEDICINE

## 2023-05-04 PROCEDURE — 3079F PR MOST RECENT DIASTOLIC BLOOD PRESSURE 80-89 MM HG: ICD-10-PCS | Mod: CPTII,S$GLB,, | Performed by: INTERNAL MEDICINE

## 2023-05-04 PROCEDURE — 3008F PR BODY MASS INDEX (BMI) DOCUMENTED: ICD-10-PCS | Mod: CPTII,S$GLB,, | Performed by: INTERNAL MEDICINE

## 2023-05-04 RX ORDER — ACETAMINOPHEN 325 MG/1
650 TABLET ORAL
Status: COMPLETED | OUTPATIENT
Start: 2023-05-04 | End: 2023-05-04

## 2023-05-04 RX ORDER — ONDANSETRON 2 MG/ML
8 INJECTION INTRAMUSCULAR; INTRAVENOUS
Status: COMPLETED | OUTPATIENT
Start: 2023-05-04 | End: 2023-05-04

## 2023-05-04 RX ORDER — ONDANSETRON 2 MG/ML
8 INJECTION INTRAMUSCULAR; INTRAVENOUS
Status: CANCELLED | OUTPATIENT
Start: 2023-05-11

## 2023-05-04 RX ORDER — ONDANSETRON 2 MG/ML
8 INJECTION INTRAMUSCULAR; INTRAVENOUS
Status: CANCELLED | OUTPATIENT
Start: 2023-05-04

## 2023-05-04 RX ORDER — DIPHENHYDRAMINE HYDROCHLORIDE 50 MG/ML
25 INJECTION INTRAMUSCULAR; INTRAVENOUS
Status: COMPLETED | OUTPATIENT
Start: 2023-05-04 | End: 2023-05-04

## 2023-05-04 RX ORDER — ONDANSETRON 2 MG/ML
8 INJECTION INTRAMUSCULAR; INTRAVENOUS
Status: CANCELLED | OUTPATIENT
Start: 2023-05-18

## 2023-05-04 RX ORDER — HEPARIN 100 UNIT/ML
500 SYRINGE INTRAVENOUS
Status: DISCONTINUED | OUTPATIENT
Start: 2023-05-04 | End: 2023-05-04 | Stop reason: HOSPADM

## 2023-05-04 RX ADMIN — DIPHENHYDRAMINE HYDROCHLORIDE 25 MG: 50 INJECTION, SOLUTION INTRAMUSCULAR; INTRAVENOUS at 10:05

## 2023-05-04 RX ADMIN — HEPARIN 500 UNITS: 100 SYRINGE at 03:05

## 2023-05-04 RX ADMIN — ACETAMINOPHEN 650 MG: 325 TABLET ORAL at 10:05

## 2023-05-04 RX ADMIN — DEXAMETHASONE SODIUM PHOSPHATE 0.25 MG: 4 INJECTION, SOLUTION INTRA-ARTICULAR; INTRALESIONAL; INTRAMUSCULAR; INTRAVENOUS; SOFT TISSUE at 11:05

## 2023-05-04 RX ADMIN — SODIUM CHLORIDE: 9 INJECTION, SOLUTION INTRAVENOUS at 11:05

## 2023-05-04 RX ADMIN — ONDANSETRON 8 MG: 2 INJECTION, SOLUTION INTRAMUSCULAR; INTRAVENOUS at 10:05

## 2023-05-04 RX ADMIN — AMIVANTAMAB 1400 MG: 350 INJECTION INTRAVENOUS at 11:05

## 2023-05-04 NOTE — DISCHARGE INSTRUCTIONS
..[facility]  [address] [facility] [address]  [address] [address]  [phone] phone     [phone] fax  Hours of Operation: Monday- Friday 8:00am- 5:00pm  After hours phone  [phone]  Hematology / Oncology Physicians on call    Dr. Denys Cohn      Nurse Practitioners:    Nanci Hawley, BRISA Blackmon, BRISA Caicedo, BRISA Monteiro, BRISA Kwon, BRISA Trujillo, [place]      Please don't hesitate to call if you have any concerns.    .FALL PREVENTION   Falls often occur due to slipping, tripping or losing your balance. Here are ways to reduce your risk of falling again.   Was there anything that caused your fall that can be fixed, removed or replaced?   Make your home safe by keeping walkways clear of objects you may trip over.   Use non-slip pads under rugs.   Do not walk in poorly lit areas.   Do not stand on chairs or wobbly ladders.   Use caution when reaching overhead or looking upward. This position can cause a loss of balance.   Be sure your shoes fit properly, have non-slip bottoms and are in good condition.   Be cautious when going up and down stairs, curbs, and when walking on uneven sidewalks.   If your balance is poor, consider using a cane or walker.   If your fall was related to alcohol use, stop or limit alcohol intake.   If your fall was related to use of sleeping medicines, talk to your doctor about this. You may need to reduce your dosage at bedtime if you awaken during the night to go to the bathroom.   To reduce the need for nighttime bathroom trips:   Avoid drinking fluids for several hours before going to bed   Empty your bladder before going to bed   Men can keep a urinal at the bedside   © 0513-7417 Stephanie Thurston, [address] [place] [address], [place] [address]. All rights reserved. This information is not intended as a substitute for professional medical care. Always follow your healthcare  professional's instructions.  .WAYS TO HELP PREVENT INFECTION        WASH YOUR HANDS OFTEN DURING THE DAY, ESPECIALLY BEFORE YOU EAT, AFTER USING THE BATHROOM, AND AFTER TOUCHING ANIMALS    STAY AWAY FROM PEOPLE WHO HAVE ILLNESSES YOU CAN CATCH; SUCH AS COLDS, FLU, CHICKEN POX    TRY TO AVOID CROWDS    STAY AWAY FROM CHILDREN WHO RECENTLY HAVE RECEIVED LIVE VIRUS VACCINES    MAINTAIN GOOD MOUTH CARE    DO NOT SQUEEZE OR SCRATCH PIMPLES    CLEAN CUTS & SCRAPES RIGHT AWAY AND DAILY UNTIL HEALED WITH WARM WATER, SOAP & AN ANTISEPTIC    AVOID CONTACT WITH LITTER BOXES, BIRD CAGES, & FISH TANKS    AVOID STANDING WATER, IE., BIRD BATHS, FLOWER POTS/VASES, OR HUMIDIFIERS    WEAR GLOVES WHEN GARDENING OR CLEANING UP AFTER OTHERS, ESPECIALLY BABIES & SMALL CHILDREN    DO NOT EAT RAW FISH, SEAFOOD, MEAT, OR EGGS

## 2023-05-04 NOTE — PROGRESS NOTES
Subjective:       Patient ID: Shaneka Ahmadi is a 53 y.o. female.    Chief Complaint: Results, Chemotherapy, and Lung Cancer    HPI:  53-year-old female presents for cycle 1 day 8 of OP NSCLC AMIVANTAMAB-VMJW (Rybrevant) Q4W patient tolerated 1st cycle well without major complications patient presents for next treatment ECOG status 1 recent MRI brain done to review      Past Medical History:   Diagnosis Date    Diabetes mellitus     Hypertension     Malignant neoplasm of lower lobe of left lung 12/9/2022    Secondary malignant neoplasm of bone 12/5/2022     Family History   Problem Relation Age of Onset    Heart failure Father      Social History     Socioeconomic History    Marital status:    Tobacco Use    Smoking status: Never     Passive exposure: Never    Smokeless tobacco: Never   Substance and Sexual Activity    Alcohol use: Never    Drug use: Never    Sexual activity: Yes     Partners: Male     Birth control/protection: None     Comment: tubal     Past Surgical History:   Procedure Laterality Date    CATARACT EXTRACTION W/  INTRAOCULAR LENS IMPLANT Right 06/02/2022    FLUOROSCOPY N/A 1/26/2023    Procedure: FLUOROSCOPY/mediport placement;  Surgeon: Marlon Leone MD;  Location: [facility] CATH LAB;  Service: General;  Laterality: N/A;    TUBAL LIGATION      2007--postpartum tubal ligation       Labs:  Lab Results   Component Value Date    WBC 9.26 05/04/2023    HGB 10.7 (L) 05/04/2023    HCT 32.2 (L) 05/04/2023    MCV 87 05/04/2023     05/04/2023     BMP  Lab Results   Component Value Date     05/04/2023    K 3.6 05/04/2023     05/04/2023    CO2 24 05/04/2023    BUN 19 05/04/2023    CREATININE 0.9 05/04/2023    CALCIUM 9.2 05/04/2023    ANIONGAP 16 05/04/2023    ESTGFRAFRICA >60.0 07/28/2022    EGFRNONAA >60.0 07/28/2022     Lab Results   Component Value Date    ALT 21 05/04/2023    AST 15 05/04/2023    ALKPHOS 100 05/04/2023    BILITOT 0.2 05/04/2023       Lab Results   Component Value  Date    IRON 71 03/09/2023    TIBC 297 03/09/2023    FERRITIN 646 (H) 03/09/2023     No results found for: [id number]  No results found for: FOLATE  Lab Results   Component Value Date    TSH 1.742 04/08/2022         Review of Systems   Constitutional:  Negative for activity change, appetite change, chills, diaphoresis, fatigue, fever and unexpected weight change.   HENT:  Negative for congestion, dental problem, drooling, ear discharge, ear pain, facial swelling, hearing loss, mouth sores, nosebleeds, postnasal drip, rhinorrhea, sinus pressure, sneezing, sore throat, tinnitus, trouble swallowing and voice change.    Eyes:  Negative for photophobia, pain, discharge, redness, itching and visual disturbance.   Respiratory:  Negative for cough, choking, chest tightness, shortness of breath, wheezing and stridor.    Cardiovascular:  Negative for chest pain, palpitations and leg swelling.   Gastrointestinal:  Negative for abdominal distention, abdominal pain, anal bleeding, blood in stool, constipation, diarrhea, nausea, rectal pain and vomiting.   Endocrine: Negative for cold intolerance, heat intolerance, polydipsia, polyphagia and polyuria.   Genitourinary:  Negative for decreased urine volume, difficulty urinating, dyspareunia, dysuria, enuresis, flank pain, frequency, genital sores, hematuria, menstrual problem, pelvic pain, urgency, vaginal bleeding, vaginal discharge and vaginal pain.   Musculoskeletal:  Negative for arthralgias, back pain, gait problem, joint swelling, myalgias, neck pain and neck stiffness.   Skin:  Negative for color change, pallor and rash.   Allergic/Immunologic: Negative for environmental allergies, food allergies and immunocompromised state.   Neurological:  Positive for headaches. Negative for dizziness, tremors, seizures, syncope, facial asymmetry, speech difficulty, weakness, light-headedness and numbness.   Hematological:  Negative for adenopathy. Does not bruise/bleed easily.    Psychiatric/Behavioral:  Negative for agitation, behavioral problems, confusion, decreased concentration, dysphoric mood, hallucinations, self-injury, sleep disturbance and suicidal ideas. The patient is not nervous/anxious and is not hyperactive.      Objective:      Physical Exam  Vitals reviewed.   Constitutional:       General: She is not in acute distress.     Appearance: She is well-developed. She is not diaphoretic.   HENT:      Head: Normocephalic and atraumatic.      Right Ear: External ear normal.      Left Ear: External ear normal.      Nose: Nose normal.      Right Sinus: No maxillary sinus tenderness or frontal sinus tenderness.      Left Sinus: No maxillary sinus tenderness or frontal sinus tenderness.      Mouth/Throat:      Pharynx: No oropharyngeal exudate.   Eyes:      General: Lids are normal. No scleral icterus.        Right eye: No discharge.         Left eye: No discharge.      Conjunctiva/sclera: Conjunctivae normal.      Right eye: Right conjunctiva is not injected. No hemorrhage.     Left eye: Left conjunctiva is not injected. No hemorrhage.     Pupils: Pupils are equal, round, and reactive to light.   Neck:      Thyroid: No thyromegaly.      Vascular: No JVD.      Trachea: No tracheal deviation.   Cardiovascular:      Rate and Rhythm: Normal rate.   Pulmonary:      Effort: Pulmonary effort is normal. No respiratory distress.      Breath sounds: No stridor.   Chest:      Chest wall: No tenderness.   Abdominal:      General: Bowel sounds are normal. There is no distension.      Palpations: Abdomen is soft. There is no hepatomegaly, splenomegaly or mass.      Tenderness: There is no abdominal tenderness. There is no rebound.   Musculoskeletal:         General: No tenderness. Normal range of motion.      Cervical back: Normal range of motion and neck supple.   Lymphadenopathy:      Cervical: No cervical adenopathy.      Upper Body:      Right upper body: No supraclavicular adenopathy.       Left upper body: No supraclavicular adenopathy.   Skin:     General: Skin is dry.      Findings: No erythema or rash.   Neurological:      Mental Status: She is alert and oriented to person, place, and time.      Cranial Nerves: No cranial nerve deficit.      Motor: Weakness present.      Coordination: Coordination normal.   Psychiatric:         Behavior: Behavior normal.         Thought Content: Thought content normal.         Judgment: Judgment normal.           Assessment:      1. Malignant neoplasm of lower lobe of left lung    2. Immunodeficiency due to chemotherapy    3. Essential hypertension    4. Secondary malignant neoplasm of bone    5. Morbid obesity    6. Type 2 diabetes mellitus without complication, without long-term current use of insulin           Med Onc Chart Routing      Follow up with physician 1 week. RTC 1 week CBC CMP for cycle 1day 15   Follow up with DOLLY    Infusion scheduling note    Injection scheduling note RTC 1 week cycle 1 day 15   Labs CBC and CMP   Scheduling:  Preferred lab:  Lab interval:     Imaging    Pharmacy appointment    Other referrals            Plan:     Patient is tolerating therapy well proceed with treatment today can use MediPort orders have been written reviewed and patient is return in 1 week.  Discussed implications of answered questions.  At this time will continue current plan course of treatment action.  Reviewed MRI findings no convincing evidence of intracranial disease will repeat if needed in future        Reese Mcfarlane Jr, MD FACP

## 2023-05-04 NOTE — PLAN OF CARE
Problem: Adult Inpatient Plan of Care  Goal: Plan of Care Review  Outcome: Ongoing, Progressing  Flowsheets (Taken 5/4/2023 1114)  Plan of Care Reviewed With:   patient   family  Goal: Patient-Specific Goal (Individualized)  Outcome: Ongoing, Progressing  Flowsheets (Taken 5/4/2023 1114)  Anxieties, Fears or Concerns: No concerns today, pt thankful we can now use her Port for infusion  Individualized Care Needs: Pt reclined, 2 warm blankets/cup of ice for the drink she brought  Goal: Optimal Comfort and Wellbeing  Outcome: Ongoing, Progressing     Problem: Fall Injury Risk  Goal: Absence of Fall and Fall-Related Injury  Outcome: Ongoing, Progressing     Problem: Nausea and Vomiting (Chemotherapy Effects)  Goal: Fluid and Electrolyte Balance  Outcome: Ongoing, Progressing

## 2023-05-04 NOTE — NURSING
1145am: visited pt today at chairside while in chemo infusion receiving C1D8 Rybrevant. Pt  present. Pt states she feels pretty god. Only issues she had after last treatment was a headache/lightheadedness. Dr. Mcfarlane prescribed Norco for pt and she said that worked fine although she only took a half of a pill. Pt had no other concerns, complaints, needs, and/or questions noted at this time. Pt informed that I'll fup with pt in 1 mth. Pt encouraged to contact me directly should anything arise prior. Pt verbalized understanding.   Oncology Navigation   Intake  Cancer Type: Other  Initial Nurse Navigator Contact: 23  Date Worked: 23  Multiple appointments: Yes  Start of Treatment: 23     Treatment  Current Status: Active       Medical Oncologist: Dr. Reese Mcfarlane  Consult Date: 22  Chemotherapy: Planned  Chemotherapy Regimen: Carboplatin Alimta  Immunotherapy: Planned  Immunotherapy Name: Rybrevant  Start Date: 23       Procedures: PET scan; Biopsy  Biopsy Schedule Date: 22 (lung)  MRI Schedule Date: 22 (brain)  PET Scan Schedule Date: 22  Other Schedule Date: 23 (EKG)    General Referrals: Social Work  Social Work: notified via in-basket msg  Social Work Referral Date: 23          Support Systems: Spouse/significant other     Acuity  Stage: 2  Systemic Treatment - predicted or initiated: Immunotherapy (+2)  Treatment Tolerability: Has not started treatment yet/treatment fully completed and side effects resolved  ECO  Comorbidities in Medical History: 1  Hospitalization Within the Past Month: 0   Needed: 0  Support: 0  Transportation: 0  History of noncompliance/frequent no shows and cancellations: 0  Verbalizes the need for more education: 1  Navigation Acuity: 6     Follow Up  No follow-ups on file.

## 2023-05-11 ENCOUNTER — INFUSION (OUTPATIENT)
Dept: INFUSION THERAPY | Facility: HOSPITAL | Age: 53
End: 2023-05-11
Attending: RADIOLOGY
Payer: COMMERCIAL

## 2023-05-11 ENCOUNTER — LAB VISIT (OUTPATIENT)
Dept: LAB | Facility: HOSPITAL | Age: 53
End: 2023-05-11
Attending: INTERNAL MEDICINE
Payer: COMMERCIAL

## 2023-05-11 ENCOUNTER — OFFICE VISIT (OUTPATIENT)
Dept: HEMATOLOGY/ONCOLOGY | Facility: CLINIC | Age: 53
End: 2023-05-11
Payer: COMMERCIAL

## 2023-05-11 VITALS
SYSTOLIC BLOOD PRESSURE: 114 MMHG | HEIGHT: 64 IN | BODY MASS INDEX: 50.02 KG/M2 | WEIGHT: 293 LBS | HEART RATE: 71 BPM | OXYGEN SATURATION: 97 % | TEMPERATURE: 97 F | RESPIRATION RATE: 18 BRPM | DIASTOLIC BLOOD PRESSURE: 68 MMHG

## 2023-05-11 DIAGNOSIS — I10 ESSENTIAL HYPERTENSION: ICD-10-CM

## 2023-05-11 DIAGNOSIS — C34.32 MALIGNANT NEOPLASM OF LOWER LOBE OF LEFT LUNG: Primary | ICD-10-CM

## 2023-05-11 DIAGNOSIS — R91.8 MASS OF LEFT LUNG: ICD-10-CM

## 2023-05-11 DIAGNOSIS — C79.51 SECONDARY MALIGNANT NEOPLASM OF BONE: ICD-10-CM

## 2023-05-11 DIAGNOSIS — T45.1X5A IMMUNODEFICIENCY DUE TO CHEMOTHERAPY: ICD-10-CM

## 2023-05-11 DIAGNOSIS — E66.01 MORBID OBESITY: ICD-10-CM

## 2023-05-11 DIAGNOSIS — D84.821 IMMUNODEFICIENCY DUE TO CHEMOTHERAPY: ICD-10-CM

## 2023-05-11 DIAGNOSIS — Z79.899 IMMUNODEFICIENCY DUE TO CHEMOTHERAPY: ICD-10-CM

## 2023-05-11 LAB
ALBUMIN SERPL BCP-MCNC: 3.2 G/DL (ref 3.5–5.2)
ALP SERPL-CCNC: 96 U/L (ref 55–135)
ALT SERPL W/O P-5'-P-CCNC: 21 U/L (ref 10–44)
ANION GAP SERPL CALC-SCNC: 12 MMOL/L (ref 8–16)
AST SERPL-CCNC: 18 U/L (ref 10–40)
BASOPHILS # BLD AUTO: 0.05 K/UL (ref 0–0.2)
BASOPHILS NFR BLD: 0.5 % (ref 0–1.9)
BILIRUB SERPL-MCNC: 0.4 MG/DL (ref 0.1–1)
BUN SERPL-MCNC: 16 MG/DL (ref 6–20)
CALCIUM SERPL-MCNC: 9.2 MG/DL (ref 8.7–10.5)
CHLORIDE SERPL-SCNC: 99 MMOL/L (ref 95–110)
CO2 SERPL-SCNC: 27 MMOL/L (ref 23–29)
CREAT SERPL-MCNC: 0.9 MG/DL (ref 0.5–1.4)
DIFFERENTIAL METHOD: ABNORMAL
EOSINOPHIL # BLD AUTO: 0.4 K/UL (ref 0–0.5)
EOSINOPHIL NFR BLD: 3.3 % (ref 0–8)
ERYTHROCYTE [DISTWIDTH] IN BLOOD BY AUTOMATED COUNT: 17.2 % (ref 11.5–14.5)
EST. GFR  (NO RACE VARIABLE): >60 ML/MIN/1.73 M^2
GLUCOSE SERPL-MCNC: 172 MG/DL (ref 70–110)
HCT VFR BLD AUTO: 31.2 % (ref 37–48.5)
HGB BLD-MCNC: 10.3 G/DL (ref 12–16)
IMM GRANULOCYTES # BLD AUTO: 0.08 K/UL (ref 0–0.04)
IMM GRANULOCYTES NFR BLD AUTO: 0.8 % (ref 0–0.5)
LYMPHOCYTES # BLD AUTO: 1.9 K/UL (ref 1–4.8)
LYMPHOCYTES NFR BLD: 18.5 % (ref 18–48)
MCH RBC QN AUTO: 29.2 PG (ref 27–31)
MCHC RBC AUTO-ENTMCNC: 33 G/DL (ref 32–36)
MCV RBC AUTO: 88 FL (ref 82–98)
MONOCYTES # BLD AUTO: 0.6 K/UL (ref 0.3–1)
MONOCYTES NFR BLD: 5.6 % (ref 4–15)
NEUTROPHILS # BLD AUTO: 7.5 K/UL (ref 1.8–7.7)
NEUTROPHILS NFR BLD: 71.3 % (ref 38–73)
NRBC BLD-RTO: 0 /100 WBC
PLATELET # BLD AUTO: 176 K/UL (ref 150–450)
PMV BLD AUTO: 11.1 FL (ref 9.2–12.9)
POTASSIUM SERPL-SCNC: 3.3 MMOL/L (ref 3.5–5.1)
PROT SERPL-MCNC: 6.4 G/DL (ref 6–8.4)
RBC # BLD AUTO: 3.53 M/UL (ref 4–5.4)
SODIUM SERPL-SCNC: 138 MMOL/L (ref 136–145)
WBC # BLD AUTO: 10.5 K/UL (ref 3.9–12.7)

## 2023-05-11 PROCEDURE — 1160F RVW MEDS BY RX/DR IN RCRD: CPT | Mod: CPTII,95,, | Performed by: INTERNAL MEDICINE

## 2023-05-11 PROCEDURE — 25000003 PHARM REV CODE 250: Performed by: INTERNAL MEDICINE

## 2023-05-11 PROCEDURE — 96415 CHEMO IV INFUSION ADDL HR: CPT

## 2023-05-11 PROCEDURE — 85025 COMPLETE CBC W/AUTO DIFF WBC: CPT | Performed by: INTERNAL MEDICINE

## 2023-05-11 PROCEDURE — 63600175 PHARM REV CODE 636 W HCPCS: Performed by: INTERNAL MEDICINE

## 2023-05-11 PROCEDURE — 3051F HG A1C>EQUAL 7.0%<8.0%: CPT | Mod: CPTII,95,, | Performed by: INTERNAL MEDICINE

## 2023-05-11 PROCEDURE — 3051F PR MOST RECENT HEMOGLOBIN A1C LEVEL 7.0 - < 8.0%: ICD-10-PCS | Mod: CPTII,95,, | Performed by: INTERNAL MEDICINE

## 2023-05-11 PROCEDURE — 96367 TX/PROPH/DG ADDL SEQ IV INF: CPT

## 2023-05-11 PROCEDURE — 1160F PR REVIEW ALL MEDS BY PRESCRIBER/CLIN PHARMACIST DOCUMENTED: ICD-10-PCS | Mod: CPTII,95,, | Performed by: INTERNAL MEDICINE

## 2023-05-11 PROCEDURE — 99214 OFFICE O/P EST MOD 30 MIN: CPT | Mod: 25,95,, | Performed by: INTERNAL MEDICINE

## 2023-05-11 PROCEDURE — 96413 CHEMO IV INFUSION 1 HR: CPT

## 2023-05-11 PROCEDURE — 1159F MED LIST DOCD IN RCRD: CPT | Mod: CPTII,95,, | Performed by: INTERNAL MEDICINE

## 2023-05-11 PROCEDURE — 36415 COLL VENOUS BLD VENIPUNCTURE: CPT | Performed by: INTERNAL MEDICINE

## 2023-05-11 PROCEDURE — 1159F PR MEDICATION LIST DOCUMENTED IN MEDICAL RECORD: ICD-10-PCS | Mod: CPTII,95,, | Performed by: INTERNAL MEDICINE

## 2023-05-11 PROCEDURE — 80053 COMPREHEN METABOLIC PANEL: CPT | Performed by: INTERNAL MEDICINE

## 2023-05-11 PROCEDURE — 99214 PR OFFICE/OUTPT VISIT, EST, LEVL IV, 30-39 MIN: ICD-10-PCS | Mod: 25,95,, | Performed by: INTERNAL MEDICINE

## 2023-05-11 PROCEDURE — 96375 TX/PRO/DX INJ NEW DRUG ADDON: CPT

## 2023-05-11 RX ORDER — ACETAMINOPHEN 325 MG/1
650 TABLET ORAL
Status: COMPLETED | OUTPATIENT
Start: 2023-05-11 | End: 2023-05-11

## 2023-05-11 RX ORDER — ONDANSETRON 2 MG/ML
8 INJECTION INTRAMUSCULAR; INTRAVENOUS
Status: COMPLETED | OUTPATIENT
Start: 2023-05-11 | End: 2023-05-11

## 2023-05-11 RX ORDER — DIPHENHYDRAMINE HYDROCHLORIDE 50 MG/ML
25 INJECTION INTRAMUSCULAR; INTRAVENOUS
Status: COMPLETED | OUTPATIENT
Start: 2023-05-11 | End: 2023-05-11

## 2023-05-11 RX ORDER — HEPARIN 100 UNIT/ML
500 SYRINGE INTRAVENOUS
Status: DISCONTINUED | OUTPATIENT
Start: 2023-05-11 | End: 2023-05-11 | Stop reason: HOSPADM

## 2023-05-11 RX ORDER — SODIUM CHLORIDE 0.9 % (FLUSH) 0.9 %
10 SYRINGE (ML) INJECTION
Status: DISCONTINUED | OUTPATIENT
Start: 2023-05-11 | End: 2023-05-11 | Stop reason: HOSPADM

## 2023-05-11 RX ADMIN — DEXAMETHASONE SODIUM PHOSPHATE 0.25 MG: 4 INJECTION, SOLUTION INTRA-ARTICULAR; INTRALESIONAL; INTRAMUSCULAR; INTRAVENOUS; SOFT TISSUE at 09:05

## 2023-05-11 RX ADMIN — HEPARIN 500 UNITS: 100 SYRINGE at 12:05

## 2023-05-11 RX ADMIN — AMIVANTAMAB 1400 MG: 350 INJECTION INTRAVENOUS at 10:05

## 2023-05-11 RX ADMIN — ONDANSETRON 8 MG: 2 INJECTION, SOLUTION INTRAMUSCULAR; INTRAVENOUS at 09:05

## 2023-05-11 RX ADMIN — DIPHENHYDRAMINE HYDROCHLORIDE 25 MG: 50 INJECTION, SOLUTION INTRAMUSCULAR; INTRAVENOUS at 09:05

## 2023-05-11 RX ADMIN — ACETAMINOPHEN 650 MG: 325 TABLET ORAL at 09:05

## 2023-05-11 NOTE — PLAN OF CARE
Problem: Adult Inpatient Plan of Care  Goal: Plan of Care Review  Outcome: Ongoing, Progressing  Goal: Patient-Specific Goal (Individualized)  Outcome: Ongoing, Progressing  Goal: Optimal Comfort and Wellbeing  Outcome: Ongoing, Progressing     Problem: Fall Injury Risk  Goal: Absence of Fall and Fall-Related Injury  Outcome: Ongoing, Progressing     Problem: Nausea and Vomiting (Chemotherapy Effects)  Goal: Fluid and Electrolyte Balance  Outcome: Ongoing, Progressing

## 2023-05-11 NOTE — PROGRESS NOTES
Subjective:       Patient ID: Shaneka Ahmadi is a 53 y.o. female.    Chief Complaint: Results, Chemotherapy, and Lung Cancer    HPI:  53-year-old female presents for cycle 1 day 15 of OP NSCLC AMIVANTAMAB-VMJW (Rybrevant) Q4W patient is tolerating therapy well no nausea vomiting fevers chills night sweats ECOG status 1 seen in video visit consultation      Past Medical History:   Diagnosis Date    Diabetes mellitus     Hypertension     Malignant neoplasm of lower lobe of left lung 12/9/2022    Secondary malignant neoplasm of bone 12/5/2022     Family History   Problem Relation Age of Onset    Heart failure Father      Social History     Socioeconomic History    Marital status:    Tobacco Use    Smoking status: Never     Passive exposure: Never    Smokeless tobacco: Never   Substance and Sexual Activity    Alcohol use: Never    Drug use: Never    Sexual activity: Yes     Partners: Male     Birth control/protection: None     Comment: tubal     Past Surgical History:   Procedure Laterality Date    CATARACT EXTRACTION W/  INTRAOCULAR LENS IMPLANT Right 06/02/2022    FLUOROSCOPY N/A 1/26/2023    Procedure: FLUOROSCOPY/mediport placement;  Surgeon: Marlon Leone MD;  Location: [facility] CATH LAB;  Service: General;  Laterality: N/A;    TUBAL LIGATION      2007--postpartum tubal ligation       Labs:  Lab Results   Component Value Date    WBC 10.50 05/11/2023    HGB 10.3 (L) 05/11/2023    HCT 31.2 (L) 05/11/2023    MCV 88 05/11/2023     05/11/2023     BMP  Lab Results   Component Value Date     05/04/2023    K 3.6 05/04/2023     05/04/2023    CO2 24 05/04/2023    BUN 19 05/04/2023    CREATININE 0.9 05/04/2023    CALCIUM 9.2 05/04/2023    ANIONGAP 16 05/04/2023    ESTGFRAFRICA >60.0 07/28/2022    EGFRNONAA >60.0 07/28/2022     Lab Results   Component Value Date    ALT 21 05/04/2023    AST 15 05/04/2023    ALKPHOS 100 05/04/2023    BILITOT 0.2 05/04/2023       Lab Results   Component Value Date    IRON 71  03/09/2023    TIBC 297 03/09/2023    FERRITIN 646 (H) 03/09/2023     No results found for: [id number]  No results found for: FOLATE  Lab Results   Component Value Date    TSH 1.742 04/08/2022         Review of Systems   Constitutional:  Negative for activity change, appetite change, chills, diaphoresis, fatigue, fever and unexpected weight change.   HENT:  Negative for congestion, dental problem, drooling, ear discharge, ear pain, facial swelling, hearing loss, mouth sores, nosebleeds, postnasal drip, rhinorrhea, sinus pressure, sneezing, sore throat, tinnitus, trouble swallowing and voice change.    Eyes:  Negative for photophobia, pain, discharge, redness, itching and visual disturbance.   Respiratory:  Negative for cough, choking, chest tightness, shortness of breath, wheezing and stridor.    Cardiovascular:  Negative for chest pain, palpitations and leg swelling.   Gastrointestinal:  Negative for abdominal distention, abdominal pain, anal bleeding, blood in stool, constipation, diarrhea, nausea, rectal pain and vomiting.   Endocrine: Negative for cold intolerance, heat intolerance, polydipsia, polyphagia and polyuria.   Genitourinary:  Negative for decreased urine volume, difficulty urinating, dyspareunia, dysuria, enuresis, flank pain, frequency, genital sores, hematuria, menstrual problem, pelvic pain, urgency, vaginal bleeding, vaginal discharge and vaginal pain.   Musculoskeletal:  Negative for arthralgias, back pain, gait problem, joint swelling, myalgias, neck pain and neck stiffness.   Skin:  Negative for color change, pallor and rash.   Allergic/Immunologic: Negative for environmental allergies, food allergies and immunocompromised state.   Neurological:  Negative for dizziness, tremors, seizures, syncope, facial asymmetry, speech difficulty, weakness, light-headedness, numbness and headaches.   Hematological:  Negative for adenopathy. Does not bruise/bleed easily.   Psychiatric/Behavioral:  Negative for  agitation, behavioral problems, confusion, decreased concentration, dysphoric mood, hallucinations, self-injury, sleep disturbance and suicidal ideas. The patient is not nervous/anxious and is not hyperactive.      Objective:      Physical Exam  Constitutional:       Appearance: She is obese. She is ill-appearing.           Assessment:      1. Malignant neoplasm of lower lobe of left lung    2. Immunodeficiency due to chemotherapy    3. Essential hypertension    4. Secondary malignant neoplasm of bone    5. Morbid obesity           Med Onc Chart Routing      Follow up with physician . I will see if beginning of cycle 2 day 1 with standing labs CBC CMP   Follow up with DOLLY . Patient can be seen by APAP for day 22 with standing labs CBC CMP   Infusion scheduling note    Injection scheduling note OP NSCLC AMIVANTAMAB-VMJW (Rybrevant) Q4W   Labs CBC and CMP   Scheduling:  Preferred lab:  Lab interval:     Imaging    Pharmacy appointment    Other referrals            Plan:     The patient location is:  Cancer Center opposite side of [facility]  The chief complaint leading to consultation is:  Lung cancer    Visit type: audiovisual    Face to Face time with patient: 25 minutes of total time spent on the encounter, which includes face to face time and non-face to face time preparing to see the patient (eg, review of tests), Obtaining and/or reviewing separately obtained history, Documenting clinical information in the electronic or other health record, Independently interpreting results (not separately reported) and communicating results to the patient/family/caregiver, or Care coordination (not separately reported).         Each patient to whom he or she provides medical services by telemedicine is:  (1) informed of the relationship between the physician and patient and the respective role of any other health care provider with respect to management of the patient; and (2) notified that he or she may decline to receive medical  services by telemedicine and may withdraw from such care at any time.    Notes:   Will proceed with treatment as outlined tolerating therapy well can we can be seen by APAP for day 22 of treatment and I will see if beginning of cycle 2 day 1.  Orders written reviewed discussed implications and answered questions with patient      Reese Mcfarlane Jr, MD FACP

## 2023-05-11 NOTE — DISCHARGE INSTRUCTIONS
[place] [facility]  [address] [facility]  [address]  [phone] phone     [phone] fax  Hours of Operation: Monday- Friday 8:00am- 5:00pm  After hours phone  [phone]  Hematology / Oncology Physicians on call      SHAINA Lee Dr., NP Sydney Prescott, BRISA Enriquez FNP    Please call with any concerns regarding your appointment today.

## 2023-05-15 NOTE — PROGRESS NOTES
Subjective:       Patient ID: Shaneka Ahmadi is a 53 y.o. female.    Chief Complaint: Lung Cancer and Chemotherapy    Primary Oncologist/Hematologist: Dr. Mcfarlane    HPI: Ms. Ahmadi is a 53 year old female who is following up for her metastatic non-small cell lung carcinoma. She is here for cycle 1 day 22 of amivantamab (rybrevant). She gets this days 1,8,15 and 22 on 28 day cycle. She is s/p 4 cycles of alimta + carboplatin  q 3 weeks, re imaging showed progression.    Cancer Hx: Noted cough and SOB, CXR and CT chest showed L Lung mass. L lung biopsy positive for adenocarcinoma , EGFR mutated. Pleural fluid also positive for malignancy. PET showed avid STEPHAN mass, mediastinal nodes, bone mets in C1, T1, T11, L3, sacrum, L ischium, sternum. MRI brain negative for intracranial metastases. Initiated on carbo/ alimta 01/27/23--re imaging after 4 cycles found progressive disease.     Today:Patient state she has been feeling good. She has a good appetite and hs been staying hydrated. She denies any fatigue, fevers, illnesses, n/v/d, new pain. She has some constipation, taking stool softeners with relief. She is no taking calcium but she continues to take vitamin D. She was supposed to see dentist recently but couldn't make it due to chemo. She is seeing them tomorrow.     Social History     Socioeconomic History    Marital status:    Tobacco Use    Smoking status: Never     Passive exposure: Never    Smokeless tobacco: Never   Substance and Sexual Activity    Alcohol use: Never    Drug use: Never    Sexual activity: Yes     Partners: Male     Birth control/protection: None     Comment: tubal       Past Medical History:   Diagnosis Date    Diabetes mellitus     Hypertension     Malignant neoplasm of lower lobe of left lung 12/9/2022    Secondary malignant neoplasm of bone 12/5/2022       Family History   Problem Relation Age of Onset    Heart failure Father        Past Surgical History:   Procedure Laterality Date     CATARACT EXTRACTION W/  INTRAOCULAR LENS IMPLANT Right 06/02/2022    FLUOROSCOPY N/A 1/26/2023    Procedure: FLUOROSCOPY/mediport placement;  Surgeon: Marlon Leone MD;  Location: [facility] CATH LAB;  Service: General;  Laterality: N/A;    TUBAL LIGATION      2007--postpartum tubal ligation       Review of Systems   Constitutional:  Negative for activity change, appetite change, chills, diaphoresis, fatigue, fever and unexpected weight change.   HENT:  Positive for dental problem. Negative for congestion, nosebleeds and rhinorrhea.    Eyes:  Negative for visual disturbance.   Respiratory:  Negative for cough and shortness of breath.    Cardiovascular:  Negative for chest pain and leg swelling.   Gastrointestinal:  Negative for abdominal pain, anal bleeding, blood in stool, constipation, diarrhea, nausea and vomiting.   Genitourinary:  Negative for hematuria.   Musculoskeletal:  Negative for myalgias.   Skin:  Negative for color change and pallor.   Allergic/Immunologic: Positive for immunocompromised state.   Neurological:  Negative for dizziness, weakness, light-headedness, numbness and headaches.       Medication List with Changes/Refills   Current Medications    ALBUTEROL (PROVENTIL/VENTOLIN HFA) 90 MCG/ACTUATION INHALER    Inhale 1-2 puffs into the lungs every 6 (six) hours as needed for Wheezing (cough).    AMLODIPINE (NORVASC) 10 MG TABLET    Take 1 tablet (10 mg total) by mouth once daily.    ATENOLOL (TENORMIN) 50 MG TABLET    Take 1 tab by mouth twice a day    BETAMETHASONE VALERATE 0.1% (VALISONE) 0.1 % OINT    APPLY TOPICALLY TO THE TOP OF THE FEET TWO TIMES A DAY    BLOOD SUGAR DIAGNOSTIC STRP    To check BG 2 times daily, to use with insurance preferred meter    CALCIUM CARBONATE (OS-BRUNILDA) 500 MG CALCIUM (1,250 MG) TABLET    Take 1 tablet (500 mg total) by mouth once daily.    CETIRIZINE (ZYRTEC) 10 MG TABLET    Take 1 tablet (10 mg total) by mouth every evening.    DEXAMETHASONE (DECADRON) 4 MG TAB    Take  2 tablets (8 mg total) by mouth once daily. Take as directed on days 2, 3, and 16,17 of your chemotherapy cycle starting with cycle 2 forward. Do not take with cycle 1.    DEXAMETHASONE (DECADRON) 4 MG TAB    Take 1 tablet (4 mg total) by mouth As instructed. Take 8 mg the day prior to chemotherapy, and then 8 mg after chemo on days 2, 3, 4    FLUTICASONE PROPIONATE (FLONASE) 50 MCG/ACTUATION NASAL SPRAY    2 sprays (100 mcg total) by Each Nostril route once daily.    FOLIC ACID (FOLVITE) 1 MG TABLET    Take 1 tablet (1 mg total) by mouth once daily.    HYDROCODONE-ACETAMINOPHEN (NORCO) 7.5-325 MG PER TABLET    Take 1 tablet by mouth every 4 (four) hours as needed for Pain.    HYDROCODONE-HOMATROPINE 5-1.5 MG/5 ML (HYCODAN) 5-1.5 MG/5 ML SYRP    Take 5 mL by mouth every 4 (four) hours as needed.    LANCETS MISC    To check BG 2 times daily, to use with insurance preferred meter    LOSARTAN-HYDROCHLOROTHIAZIDE 100-25 MG (HYZAAR) 100-25 MG PER TABLET    Take 1 tablet by mouth once daily.    METFORMIN (GLUCOPHAGE-XR) 500 MG ER 24HR TABLET    Take 1 tab with breakfast and take 2 tabs with dinner    MUPIROCIN (BACTROBAN) 2 % OINTMENT    APPLY TO AFFECTED AREA(S) THREE TIMES A DAY    ONDANSETRON (ZOFRAN-ODT) 8 MG TBDL    Take 1 tablet (8 mg total) by mouth every 8 (eight) hours as needed. Starting with cycle 1 of chemotherapy    ONDANSETRON (ZOFRAN-ODT) 8 MG TBDL    Take 1 tablet (8 mg total) by mouth every 8 (eight) hours as needed (nausea/vomitting).    OSIMERTINIB (TAGRISSO) 80 MG TAB    Take 80 mg by mouth once daily.    PRAVASTATIN (PRAVACHOL) 10 MG TABLET    Take 1 tablet (10 mg total) by mouth every evening.    TRUE METRIX GLUCOSE METER MISC        TRUEPLUS LANCETS 33 GAUGE MISC    Apply topically.     Objective:     Vitals:    05/18/23 0731   BP: 122/67   Pulse: 66   Temp: 97.3 °F (36.3 °C)         Physical Exam  Vitals reviewed.   Constitutional:       General: She is not in acute distress.     Appearance: She is  not ill-appearing, toxic-appearing or diaphoretic.   HENT:      Head: Normocephalic and atraumatic.   Eyes:      Conjunctiva/sclera: Conjunctivae normal.   Cardiovascular:      Rate and Rhythm: Normal rate.   Pulmonary:      Effort: Pulmonary effort is normal.   Abdominal:      General: Bowel sounds are normal.   Musculoskeletal:      Right lower leg: No edema.      Left lower leg: No edema.   Skin:     General: Skin is warm.      Coloration: Skin is not jaundiced or pale.      Findings: No bruising, erythema, lesion or rash.   Neurological:      Mental Status: She is alert.      Motor: No weakness.      Gait: Gait normal.   Psychiatric:         Mood and Affect: Mood normal.         Behavior: Behavior normal.         Thought Content: Thought content normal.          Labs/Results:    Lab Results   Component Value Date    WBC 8.58 05/18/2023    RBC 3.45 (L) 05/18/2023    HGB 10.1 (L) 05/18/2023    HCT 31.0 (L) 05/18/2023    MCV 90 05/18/2023    MCH 29.3 05/18/2023    MCHC 32.6 05/18/2023    RDW 16.0 (H) 05/18/2023     05/18/2023    MPV 10.8 05/18/2023    GRAN 7.5 05/11/2023    GRAN 71.3 05/11/2023    LYMPH 1.9 05/11/2023    LYMPH 18.5 05/11/2023    MONO 0.6 05/11/2023    MONO 5.6 05/11/2023    EOS 0.4 05/11/2023    BASO 0.05 05/11/2023    EOSINOPHIL 3.3 05/11/2023    BASOPHIL 0.5 05/11/2023     CMP  Sodium   Date Value Ref Range Status   05/18/2023 141 136 - 145 mmol/L Final     Potassium   Date Value Ref Range Status   05/18/2023 3.7 3.5 - 5.1 mmol/L Final     Chloride   Date Value Ref Range Status   05/18/2023 100 95 - 110 mmol/L Final     CO2   Date Value Ref Range Status   05/18/2023 30 (H) 23 - 29 mmol/L Final     Glucose   Date Value Ref Range Status   05/18/2023 186 (H) 70 - 110 mg/dL Final     BUN   Date Value Ref Range Status   05/18/2023 14 6 - 20 mg/dL Final     Creatinine   Date Value Ref Range Status   05/18/2023 0.9 0.5 - 1.4 mg/dL Final     Calcium   Date Value Ref Range Status   05/18/2023 9.0  8.7 - 10.5 mg/dL Final     Total Protein   Date Value Ref Range Status   05/18/2023 5.9 (L) 6.0 - 8.4 g/dL Final     Albumin   Date Value Ref Range Status   05/18/2023 3.3 (L) 3.5 - 5.2 g/dL Final     Total Bilirubin   Date Value Ref Range Status   05/18/2023 0.3 0.1 - 1.0 mg/dL Final     Comment:     For infants and newborns, interpretation of results should be based  on gestational age, weight and in agreement with clinical  observations.    Premature Infant recommended reference ranges:  Up to 24 hours.............<8.0 mg/dL  Up to 48 hours............<12.0 mg/dL  3-5 days..................<15.0 mg/dL  6-29 days.................<15.0 mg/dL       Alkaline Phosphatase   Date Value Ref Range Status   05/18/2023 100 55 - 135 U/L Final     AST   Date Value Ref Range Status   05/18/2023 13 10 - 40 U/L Final     ALT   Date Value Ref Range Status   05/18/2023 18 10 - 44 U/L Final     Anion Gap   Date Value Ref Range Status   05/18/2023 11 8 - 16 mmol/L Final     eGFR   Date Value Ref Range Status   05/18/2023 >60 >60 mL/min/1.73 m^2 Final     Ct c/a/p 4/11/23  Impression:  1.  Detrimental change.  Increase in number and size of numerous sclerotic lesions throughout the cervical and thoracic spine, left humerus and sternum.  There are also multiple large sclerotic lesions throughout the lumbar spine, pelvis and proximal femurs measuring up to 7.1 cm in size.  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  2.   The lateral left upper lobe mass is decreased in size in the interim, currently measuring 2.2 x 1.0 cm.  Negative for new pulmonary masses.  3.  Exophytic nodule along the posterior right hepatic lobe measuring 1.1 cm.  This was not imaged previously.  Etiology uncertain.  Metastasis is not excluded.  4.  Negative for acute process throughout the chest, abdomen or pelvis.  5.  Interval placement of a right-sided chest port.  6.  Numerous stable and nonemergent findings as noted above.    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 Initiated on carbo/ alimta 01/27/23--re imaging after 4 cycles found progressive disease.  --MRI brain negative  --continue with cycle 1 day 22 of amivantamab (rybrevant)  --ANC:, plts:[id number]   --continue with vitamin D supplement. [place] calcium unitl start zometa.   --has not had cycle 1 day 1 of zometa q 4 weeks. Dental clearance needed first. Patient need 1 tooth pulled and a couple of fillings. Supposed to be seeing them tomorrow.    Follow-Up: 1 week with cbc cmp prior for cycle 2 day 1 with primary oncologist-standing terrance Trujillo PA-C  Hematology Oncology    Route Chart for Scheduling    Med Onc Chart Routing      Follow up with physician 1 week. with cbc cmp prior for next cycle   Follow up with DOLLY    Infusion scheduling note 1 week with cbc cmp prior for cycle 2   Injection scheduling note    Labs CBC and CMP   Scheduling:  Preferred lab:  Lab interval:  standing orders in- pt likes AM appts   Imaging    Pharmacy appointment    Other referrals           Treatment Plan Information   OP NSCLC AMIVANTAMAB-VMJW (Rybrevant) Q4W   Reese Mcfarlane MD   Upcoming Treatment Dates - OP NSCLC AMIVANTAMAB-VMJW (Rybrevant) Q4W    5/18/2023       Pre-Medications       acetaminophen tablet 650 mg       diphenhydrAMINE injection 25 mg       Antiemetics       palonosetron (ALOXI) 0.25 mg with Dexamethasone (DECADRON) 12 mg in NS 50 mL IVPB       ondansetron injection 8 mg       Immunotherapy       amivantamab-vmjw (RYBREVANT) 1,400 mg in sodium chloride 0.9% SolP 250 mL chemo  infusion  5/25/2023       Pre-Medications       acetaminophen tablet 650 mg       diphenhydrAMINE injection 25 mg       Antiemetics       palonosetron (ALOXI) 0.25 mg with Dexamethasone (DECADRON) 12 mg in NS 50 mL IVPB       ondansetron injection 8 mg       Immunotherapy       amivantamab-vmjw (RYBREVANT) 1,400 mg in sodium chloride 0.9% SolP 250 mL chemo infusion  6/8/2023       Pre-Medications       acetaminophen tablet 650 mg       diphenhydrAMINE injection 25 mg       Antiemetics       palonosetron (ALOXI) 0.25 mg with Dexamethasone (DECADRON) 12 mg in NS 50 mL IVPB       ondansetron injection 8 mg       Immunotherapy       amivantamab-vmjw (RYBREVANT) 1,400 mg in sodium chloride 0.9% SolP 250 mL chemo infusion  6/22/2023       Pre-Medications       acetaminophen tablet 650 mg       diphenhydrAMINE injection 25 mg       Antiemetics       palonosetron (ALOXI) 0.25 mg with Dexamethasone (DECADRON) 12 mg in NS 50 mL IVPB       ondansetron injection 8 mg       Immunotherapy       amivantamab-vmjw (RYBREVANT) 1,400 mg in sodium chloride 0.9% SolP 250 mL chemo infusion    Supportive Plan Information  OP ZOLEDRONIC ACID (ZOMETA) Q4W   Reese Mcfarlane MD   Upcoming Treatment Dates - OP ZOLEDRONIC ACID (ZOMETA) Q4W    4/1/2023       Medications       zoledronic acid (ZOMETA) 4 mg in sodium chloride 0.9% 100 mL IVPB  4/29/2023       Medications       zoledronic acid (ZOMETA) 4 mg in sodium chloride 0.9% 100 mL IVPB  5/27/2023       Medications       zoledronic acid (ZOMETA) 4 mg in sodium chloride 0.9% 100 mL IVPB  6/24/2023       Medications       zoledronic acid (ZOMETA) 4 mg in sodium chloride 0.9% 100 mL IVPB    Therapy Plan Information  Flushes  sodium chloride 0.9% 250 mL flush bag  Intravenous, Every visit  sodium chloride 0.9% flush 10 mL  10 mL, Intravenous, Every visit  heparin, porcine (PF) 100 unit/mL injection flush 500 Units  500 Units, Intravenous, Every visit  alteplase injection 2 mg  2 mg,  Intra-Catheter, Every visit  5. Medications  cyanocobalamin injection 1,000 mcg  1,000 mcg, Intramuscular, Day 1 of every 1 month

## 2023-05-18 ENCOUNTER — INFUSION (OUTPATIENT)
Dept: INFUSION THERAPY | Facility: HOSPITAL | Age: 53
End: 2023-05-18
Attending: RADIOLOGY
Payer: COMMERCIAL

## 2023-05-18 ENCOUNTER — LAB VISIT (OUTPATIENT)
Dept: LAB | Facility: HOSPITAL | Age: 53
End: 2023-05-18
Attending: INTERNAL MEDICINE
Payer: COMMERCIAL

## 2023-05-18 ENCOUNTER — OFFICE VISIT (OUTPATIENT)
Dept: HEMATOLOGY/ONCOLOGY | Facility: CLINIC | Age: 53
End: 2023-05-18
Payer: COMMERCIAL

## 2023-05-18 VITALS
OXYGEN SATURATION: 96 % | RESPIRATION RATE: 16 BRPM | TEMPERATURE: 97 F | HEART RATE: 67 BPM | DIASTOLIC BLOOD PRESSURE: 75 MMHG | SYSTOLIC BLOOD PRESSURE: 120 MMHG

## 2023-05-18 VITALS
BODY MASS INDEX: 50.02 KG/M2 | OXYGEN SATURATION: 96 % | HEART RATE: 66 BPM | DIASTOLIC BLOOD PRESSURE: 67 MMHG | TEMPERATURE: 97 F | HEIGHT: 64 IN | WEIGHT: 293 LBS | SYSTOLIC BLOOD PRESSURE: 122 MMHG

## 2023-05-18 DIAGNOSIS — T45.1X5A IMMUNODEFICIENCY DUE TO CHEMOTHERAPY: Primary | ICD-10-CM

## 2023-05-18 DIAGNOSIS — C34.32 MALIGNANT NEOPLASM OF LOWER LOBE OF LEFT LUNG: Primary | ICD-10-CM

## 2023-05-18 DIAGNOSIS — C79.51 SECONDARY MALIGNANT NEOPLASM OF BONE: ICD-10-CM

## 2023-05-18 DIAGNOSIS — Z79.899 IMMUNODEFICIENCY DUE TO CHEMOTHERAPY: Primary | ICD-10-CM

## 2023-05-18 DIAGNOSIS — C34.32 MALIGNANT NEOPLASM OF LOWER LOBE OF LEFT LUNG: ICD-10-CM

## 2023-05-18 DIAGNOSIS — D84.821 IMMUNODEFICIENCY DUE TO CHEMOTHERAPY: Primary | ICD-10-CM

## 2023-05-18 LAB
ALBUMIN SERPL BCP-MCNC: 3.3 G/DL (ref 3.5–5.2)
ALP SERPL-CCNC: 100 U/L (ref 55–135)
ALT SERPL W/O P-5'-P-CCNC: 18 U/L (ref 10–44)
ANION GAP SERPL CALC-SCNC: 11 MMOL/L (ref 8–16)
ANISOCYTOSIS BLD QL SMEAR: SLIGHT
AST SERPL-CCNC: 13 U/L (ref 10–40)
BASOPHILS # BLD AUTO: 0.05 K/UL (ref 0–0.2)
BASOPHILS NFR BLD: 0.6 % (ref 0–1.9)
BILIRUB SERPL-MCNC: 0.3 MG/DL (ref 0.1–1)
BUN SERPL-MCNC: 14 MG/DL (ref 6–20)
CALCIUM SERPL-MCNC: 9 MG/DL (ref 8.7–10.5)
CHLORIDE SERPL-SCNC: 100 MMOL/L (ref 95–110)
CO2 SERPL-SCNC: 30 MMOL/L (ref 23–29)
CREAT SERPL-MCNC: 0.9 MG/DL (ref 0.5–1.4)
DACRYOCYTES BLD QL SMEAR: ABNORMAL
DIFFERENTIAL METHOD: ABNORMAL
EOSINOPHIL # BLD AUTO: 0.4 K/UL (ref 0–0.5)
EOSINOPHIL NFR BLD: 4.9 % (ref 0–8)
ERYTHROCYTE [DISTWIDTH] IN BLOOD BY AUTOMATED COUNT: 16 % (ref 11.5–14.5)
EST. GFR  (NO RACE VARIABLE): >60 ML/MIN/1.73 M^2
GLUCOSE SERPL-MCNC: 186 MG/DL (ref 70–110)
HCT VFR BLD AUTO: 31 % (ref 37–48.5)
HGB BLD-MCNC: 10.1 G/DL (ref 12–16)
IMM GRANULOCYTES # BLD AUTO: 0.04 K/UL (ref 0–0.04)
IMM GRANULOCYTES NFR BLD AUTO: 0.5 % (ref 0–0.5)
LYMPHOCYTES # BLD AUTO: 2.1 K/UL (ref 1–4.8)
LYMPHOCYTES NFR BLD: 24 % (ref 18–48)
MCH RBC QN AUTO: 29.3 PG (ref 27–31)
MCHC RBC AUTO-ENTMCNC: 32.6 G/DL (ref 32–36)
MCV RBC AUTO: 90 FL (ref 82–98)
MONOCYTES # BLD AUTO: 0.5 K/UL (ref 0.3–1)
MONOCYTES NFR BLD: 6.2 % (ref 4–15)
NEUTROPHILS # BLD AUTO: 5.5 K/UL (ref 1.8–7.7)
NEUTROPHILS NFR BLD: 63.8 % (ref 38–73)
NRBC BLD-RTO: 0 /100 WBC
PLATELET # BLD AUTO: 252 K/UL (ref 150–450)
PLATELET BLD QL SMEAR: ABNORMAL
PMV BLD AUTO: 10.8 FL (ref 9.2–12.9)
POTASSIUM SERPL-SCNC: 3.7 MMOL/L (ref 3.5–5.1)
PROT SERPL-MCNC: 5.9 G/DL (ref 6–8.4)
RBC # BLD AUTO: 3.45 M/UL (ref 4–5.4)
SODIUM SERPL-SCNC: 141 MMOL/L (ref 136–145)
WBC # BLD AUTO: 8.58 K/UL (ref 3.9–12.7)

## 2023-05-18 PROCEDURE — 36415 COLL VENOUS BLD VENIPUNCTURE: CPT | Performed by: INTERNAL MEDICINE

## 2023-05-18 PROCEDURE — 25000003 PHARM REV CODE 250: Performed by: INTERNAL MEDICINE

## 2023-05-18 PROCEDURE — 96415 CHEMO IV INFUSION ADDL HR: CPT

## 2023-05-18 PROCEDURE — 3078F PR MOST RECENT DIASTOLIC BLOOD PRESSURE < 80 MM HG: ICD-10-PCS | Mod: CPTII,S$GLB,,

## 2023-05-18 PROCEDURE — 3078F DIAST BP <80 MM HG: CPT | Mod: CPTII,S$GLB,,

## 2023-05-18 PROCEDURE — 3051F HG A1C>EQUAL 7.0%<8.0%: CPT | Mod: CPTII,S$GLB,,

## 2023-05-18 PROCEDURE — 3008F BODY MASS INDEX DOCD: CPT | Mod: CPTII,S$GLB,,

## 2023-05-18 PROCEDURE — 3074F PR MOST RECENT SYSTOLIC BLOOD PRESSURE < 130 MM HG: ICD-10-PCS | Mod: CPTII,S$GLB,,

## 2023-05-18 PROCEDURE — 3074F SYST BP LT 130 MM HG: CPT | Mod: CPTII,S$GLB,,

## 2023-05-18 PROCEDURE — 3008F PR BODY MASS INDEX (BMI) DOCUMENTED: ICD-10-PCS | Mod: CPTII,S$GLB,,

## 2023-05-18 PROCEDURE — 1159F MED LIST DOCD IN RCRD: CPT | Mod: CPTII,S$GLB,,

## 2023-05-18 PROCEDURE — 63600175 PHARM REV CODE 636 W HCPCS: Performed by: INTERNAL MEDICINE

## 2023-05-18 PROCEDURE — 85025 COMPLETE CBC W/AUTO DIFF WBC: CPT | Performed by: INTERNAL MEDICINE

## 2023-05-18 PROCEDURE — 96413 CHEMO IV INFUSION 1 HR: CPT

## 2023-05-18 PROCEDURE — 99999 PR PBB SHADOW E&M-EST. PATIENT-LVL V: CPT | Mod: PBBFAC,,,

## 2023-05-18 PROCEDURE — 99215 PR OFFICE/OUTPT VISIT, EST, LEVL V, 40-54 MIN: ICD-10-PCS | Mod: S$GLB,,,

## 2023-05-18 PROCEDURE — 80053 COMPREHEN METABOLIC PANEL: CPT | Performed by: INTERNAL MEDICINE

## 2023-05-18 PROCEDURE — 96367 TX/PROPH/DG ADDL SEQ IV INF: CPT

## 2023-05-18 PROCEDURE — 99215 OFFICE O/P EST HI 40 MIN: CPT | Mod: S$GLB,,,

## 2023-05-18 PROCEDURE — A4216 STERILE WATER/SALINE, 10 ML: HCPCS | Performed by: INTERNAL MEDICINE

## 2023-05-18 PROCEDURE — 3051F PR MOST RECENT HEMOGLOBIN A1C LEVEL 7.0 - < 8.0%: ICD-10-PCS | Mod: CPTII,S$GLB,,

## 2023-05-18 PROCEDURE — 96375 TX/PRO/DX INJ NEW DRUG ADDON: CPT

## 2023-05-18 PROCEDURE — 1159F PR MEDICATION LIST DOCUMENTED IN MEDICAL RECORD: ICD-10-PCS | Mod: CPTII,S$GLB,,

## 2023-05-18 PROCEDURE — 99999 PR PBB SHADOW E&M-EST. PATIENT-LVL V: ICD-10-PCS | Mod: PBBFAC,,,

## 2023-05-18 RX ORDER — DIPHENHYDRAMINE HYDROCHLORIDE 50 MG/ML
25 INJECTION INTRAMUSCULAR; INTRAVENOUS
Status: COMPLETED | OUTPATIENT
Start: 2023-05-18 | End: 2023-05-18

## 2023-05-18 RX ORDER — SODIUM CHLORIDE 0.9 % (FLUSH) 0.9 %
10 SYRINGE (ML) INJECTION
Status: DISCONTINUED | OUTPATIENT
Start: 2023-05-18 | End: 2023-05-18 | Stop reason: HOSPADM

## 2023-05-18 RX ORDER — ACETAMINOPHEN 325 MG/1
650 TABLET ORAL
Status: COMPLETED | OUTPATIENT
Start: 2023-05-18 | End: 2023-05-18

## 2023-05-18 RX ORDER — ONDANSETRON 2 MG/ML
8 INJECTION INTRAMUSCULAR; INTRAVENOUS
Status: COMPLETED | OUTPATIENT
Start: 2023-05-18 | End: 2023-05-18

## 2023-05-18 RX ORDER — HEPARIN 100 UNIT/ML
500 SYRINGE INTRAVENOUS
Status: DISCONTINUED | OUTPATIENT
Start: 2023-05-18 | End: 2023-05-18 | Stop reason: HOSPADM

## 2023-05-18 RX ADMIN — Medication 10 ML: at 11:05

## 2023-05-18 RX ADMIN — SODIUM CHLORIDE: 9 INJECTION, SOLUTION INTRAVENOUS at 08:05

## 2023-05-18 RX ADMIN — ONDANSETRON 8 MG: 2 INJECTION, SOLUTION INTRAMUSCULAR; INTRAVENOUS at 08:05

## 2023-05-18 RX ADMIN — AMIVANTAMAB 1400 MG: 350 INJECTION INTRAVENOUS at 09:05

## 2023-05-18 RX ADMIN — ACETAMINOPHEN 650 MG: 325 TABLET ORAL at 08:05

## 2023-05-18 RX ADMIN — DEXAMETHASONE SODIUM PHOSPHATE 0.25 MG: 4 INJECTION, SOLUTION INTRA-ARTICULAR; INTRALESIONAL; INTRAMUSCULAR; INTRAVENOUS; SOFT TISSUE at 09:05

## 2023-05-18 RX ADMIN — DIPHENHYDRAMINE HYDROCHLORIDE 25 MG: 50 INJECTION INTRAMUSCULAR; INTRAVENOUS at 08:05

## 2023-05-18 RX ADMIN — HEPARIN 500 UNITS: 100 SYRINGE at 11:05

## 2023-05-25 ENCOUNTER — LAB VISIT (OUTPATIENT)
Dept: LAB | Facility: HOSPITAL | Age: 53
End: 2023-05-25
Attending: INTERNAL MEDICINE
Payer: COMMERCIAL

## 2023-05-25 ENCOUNTER — OFFICE VISIT (OUTPATIENT)
Dept: HEMATOLOGY/ONCOLOGY | Facility: CLINIC | Age: 53
End: 2023-05-25
Payer: COMMERCIAL

## 2023-05-25 ENCOUNTER — INFUSION (OUTPATIENT)
Dept: INFUSION THERAPY | Facility: HOSPITAL | Age: 53
End: 2023-05-25
Attending: RADIOLOGY
Payer: COMMERCIAL

## 2023-05-25 VITALS
OXYGEN SATURATION: 96 % | WEIGHT: 293 LBS | RESPIRATION RATE: 16 BRPM | HEART RATE: 65 BPM | HEIGHT: 64 IN | DIASTOLIC BLOOD PRESSURE: 56 MMHG | SYSTOLIC BLOOD PRESSURE: 101 MMHG | BODY MASS INDEX: 50.02 KG/M2 | TEMPERATURE: 97 F

## 2023-05-25 DIAGNOSIS — Z79.899 IMMUNODEFICIENCY DUE TO CHEMOTHERAPY: ICD-10-CM

## 2023-05-25 DIAGNOSIS — E11.9 TYPE 2 DIABETES MELLITUS WITHOUT COMPLICATION, WITHOUT LONG-TERM CURRENT USE OF INSULIN: ICD-10-CM

## 2023-05-25 DIAGNOSIS — C34.32 MALIGNANT NEOPLASM OF LOWER LOBE OF LEFT LUNG: ICD-10-CM

## 2023-05-25 DIAGNOSIS — T45.1X5A IMMUNODEFICIENCY DUE TO CHEMOTHERAPY: ICD-10-CM

## 2023-05-25 DIAGNOSIS — I10 ESSENTIAL HYPERTENSION: ICD-10-CM

## 2023-05-25 DIAGNOSIS — D84.821 IMMUNODEFICIENCY DUE TO CHEMOTHERAPY: ICD-10-CM

## 2023-05-25 DIAGNOSIS — C79.51 SECONDARY MALIGNANT NEOPLASM OF BONE: ICD-10-CM

## 2023-05-25 DIAGNOSIS — C34.32 MALIGNANT NEOPLASM OF LOWER LOBE OF LEFT LUNG: Primary | ICD-10-CM

## 2023-05-25 DIAGNOSIS — E66.01 MORBID OBESITY: ICD-10-CM

## 2023-05-25 LAB
ALBUMIN SERPL BCP-MCNC: 3 G/DL (ref 3.5–5.2)
ALP SERPL-CCNC: 91 U/L (ref 55–135)
ALT SERPL W/O P-5'-P-CCNC: 18 U/L (ref 10–44)
ANION GAP SERPL CALC-SCNC: 13 MMOL/L (ref 8–16)
AST SERPL-CCNC: 15 U/L (ref 10–40)
BASOPHILS # BLD AUTO: 0.03 K/UL (ref 0–0.2)
BASOPHILS NFR BLD: 0.4 % (ref 0–1.9)
BILIRUB SERPL-MCNC: 0.4 MG/DL (ref 0.1–1)
BUN SERPL-MCNC: 13 MG/DL (ref 6–20)
CALCIUM SERPL-MCNC: 8.5 MG/DL (ref 8.7–10.5)
CHLORIDE SERPL-SCNC: 101 MMOL/L (ref 95–110)
CO2 SERPL-SCNC: 28 MMOL/L (ref 23–29)
CREAT SERPL-MCNC: 0.9 MG/DL (ref 0.5–1.4)
DIFFERENTIAL METHOD: ABNORMAL
EOSINOPHIL # BLD AUTO: 0.4 K/UL (ref 0–0.5)
EOSINOPHIL NFR BLD: 5 % (ref 0–8)
ERYTHROCYTE [DISTWIDTH] IN BLOOD BY AUTOMATED COUNT: 14.4 % (ref 11.5–14.5)
EST. GFR  (NO RACE VARIABLE): >60 ML/MIN/1.73 M^2
GLUCOSE SERPL-MCNC: 216 MG/DL (ref 70–110)
HCT VFR BLD AUTO: 31.8 % (ref 37–48.5)
HGB BLD-MCNC: 10.4 G/DL (ref 12–16)
IMM GRANULOCYTES # BLD AUTO: 0.03 K/UL (ref 0–0.04)
IMM GRANULOCYTES NFR BLD AUTO: 0.4 % (ref 0–0.5)
LYMPHOCYTES # BLD AUTO: 1.9 K/UL (ref 1–4.8)
LYMPHOCYTES NFR BLD: 26.5 % (ref 18–48)
MCH RBC QN AUTO: 29.3 PG (ref 27–31)
MCHC RBC AUTO-ENTMCNC: 32.7 G/DL (ref 32–36)
MCV RBC AUTO: 90 FL (ref 82–98)
MONOCYTES # BLD AUTO: 0.4 K/UL (ref 0.3–1)
MONOCYTES NFR BLD: 5.9 % (ref 4–15)
NEUTROPHILS # BLD AUTO: 4.5 K/UL (ref 1.8–7.7)
NEUTROPHILS NFR BLD: 61.8 % (ref 38–73)
NRBC BLD-RTO: 0 /100 WBC
PLATELET # BLD AUTO: 198 K/UL (ref 150–450)
PMV BLD AUTO: 11 FL (ref 9.2–12.9)
POTASSIUM SERPL-SCNC: 3.3 MMOL/L (ref 3.5–5.1)
PROT SERPL-MCNC: 5.9 G/DL (ref 6–8.4)
RBC # BLD AUTO: 3.55 M/UL (ref 4–5.4)
SODIUM SERPL-SCNC: 142 MMOL/L (ref 136–145)
WBC # BLD AUTO: 7.24 K/UL (ref 3.9–12.7)

## 2023-05-25 PROCEDURE — A4216 STERILE WATER/SALINE, 10 ML: HCPCS | Performed by: INTERNAL MEDICINE

## 2023-05-25 PROCEDURE — 1160F PR REVIEW ALL MEDS BY PRESCRIBER/CLIN PHARMACIST DOCUMENTED: ICD-10-PCS | Mod: CPTII,95,, | Performed by: INTERNAL MEDICINE

## 2023-05-25 PROCEDURE — 96375 TX/PRO/DX INJ NEW DRUG ADDON: CPT

## 2023-05-25 PROCEDURE — 99214 PR OFFICE/OUTPT VISIT, EST, LEVL IV, 30-39 MIN: ICD-10-PCS | Mod: 25,95,, | Performed by: INTERNAL MEDICINE

## 2023-05-25 PROCEDURE — 1159F PR MEDICATION LIST DOCUMENTED IN MEDICAL RECORD: ICD-10-PCS | Mod: CPTII,95,, | Performed by: INTERNAL MEDICINE

## 2023-05-25 PROCEDURE — 1159F MED LIST DOCD IN RCRD: CPT | Mod: CPTII,95,, | Performed by: INTERNAL MEDICINE

## 2023-05-25 PROCEDURE — 96367 TX/PROPH/DG ADDL SEQ IV INF: CPT

## 2023-05-25 PROCEDURE — 36415 COLL VENOUS BLD VENIPUNCTURE: CPT | Performed by: INTERNAL MEDICINE

## 2023-05-25 PROCEDURE — 3051F PR MOST RECENT HEMOGLOBIN A1C LEVEL 7.0 - < 8.0%: ICD-10-PCS | Mod: CPTII,95,, | Performed by: INTERNAL MEDICINE

## 2023-05-25 PROCEDURE — 1160F RVW MEDS BY RX/DR IN RCRD: CPT | Mod: CPTII,95,, | Performed by: INTERNAL MEDICINE

## 2023-05-25 PROCEDURE — 80053 COMPREHEN METABOLIC PANEL: CPT | Performed by: INTERNAL MEDICINE

## 2023-05-25 PROCEDURE — 99214 OFFICE O/P EST MOD 30 MIN: CPT | Mod: 25,95,, | Performed by: INTERNAL MEDICINE

## 2023-05-25 PROCEDURE — 96415 CHEMO IV INFUSION ADDL HR: CPT

## 2023-05-25 PROCEDURE — 3051F HG A1C>EQUAL 7.0%<8.0%: CPT | Mod: CPTII,95,, | Performed by: INTERNAL MEDICINE

## 2023-05-25 PROCEDURE — 63600175 PHARM REV CODE 636 W HCPCS: Mod: JZ,TB | Performed by: INTERNAL MEDICINE

## 2023-05-25 PROCEDURE — 85025 COMPLETE CBC W/AUTO DIFF WBC: CPT | Performed by: INTERNAL MEDICINE

## 2023-05-25 PROCEDURE — 96413 CHEMO IV INFUSION 1 HR: CPT

## 2023-05-25 PROCEDURE — 25000003 PHARM REV CODE 250: Performed by: INTERNAL MEDICINE

## 2023-05-25 RX ORDER — DIPHENHYDRAMINE HYDROCHLORIDE 50 MG/ML
50 INJECTION INTRAMUSCULAR; INTRAVENOUS ONCE AS NEEDED
Status: CANCELLED | OUTPATIENT
Start: 2023-06-08

## 2023-05-25 RX ORDER — DIPHENHYDRAMINE HYDROCHLORIDE 50 MG/ML
50 INJECTION INTRAMUSCULAR; INTRAVENOUS ONCE AS NEEDED
Status: CANCELLED | OUTPATIENT
Start: 2023-05-25

## 2023-05-25 RX ORDER — ACETAMINOPHEN 325 MG/1
650 TABLET ORAL
Status: CANCELLED
Start: 2023-05-25

## 2023-05-25 RX ORDER — EPINEPHRINE 0.3 MG/.3ML
0.3 INJECTION SUBCUTANEOUS ONCE AS NEEDED
Status: CANCELLED | OUTPATIENT
Start: 2023-05-25

## 2023-05-25 RX ORDER — ONDANSETRON 2 MG/ML
8 INJECTION INTRAMUSCULAR; INTRAVENOUS
Status: COMPLETED | OUTPATIENT
Start: 2023-05-25 | End: 2023-05-25

## 2023-05-25 RX ORDER — DIPHENHYDRAMINE HYDROCHLORIDE 50 MG/ML
25 INJECTION INTRAMUSCULAR; INTRAVENOUS
Status: CANCELLED
Start: 2023-05-25

## 2023-05-25 RX ORDER — EPINEPHRINE 0.3 MG/.3ML
0.3 INJECTION SUBCUTANEOUS ONCE AS NEEDED
Status: CANCELLED | OUTPATIENT
Start: 2023-06-08

## 2023-05-25 RX ORDER — ONDANSETRON 2 MG/ML
8 INJECTION INTRAMUSCULAR; INTRAVENOUS
Status: CANCELLED | OUTPATIENT
Start: 2023-05-25

## 2023-05-25 RX ORDER — HEPARIN 100 UNIT/ML
500 SYRINGE INTRAVENOUS
Status: CANCELLED | OUTPATIENT
Start: 2023-06-08

## 2023-05-25 RX ORDER — SODIUM CHLORIDE 0.9 % (FLUSH) 0.9 %
10 SYRINGE (ML) INJECTION
Status: CANCELLED | OUTPATIENT
Start: 2023-05-25

## 2023-05-25 RX ORDER — SODIUM CHLORIDE 0.9 % (FLUSH) 0.9 %
10 SYRINGE (ML) INJECTION
Status: DISCONTINUED | OUTPATIENT
Start: 2023-05-25 | End: 2023-05-25 | Stop reason: HOSPADM

## 2023-05-25 RX ORDER — DIPHENHYDRAMINE HYDROCHLORIDE 50 MG/ML
25 INJECTION INTRAMUSCULAR; INTRAVENOUS
Status: CANCELLED
Start: 2023-06-08

## 2023-05-25 RX ORDER — ACETAMINOPHEN 325 MG/1
650 TABLET ORAL
Status: CANCELLED
Start: 2023-06-08

## 2023-05-25 RX ORDER — ONDANSETRON 2 MG/ML
8 INJECTION INTRAMUSCULAR; INTRAVENOUS
Status: CANCELLED | OUTPATIENT
Start: 2023-06-08

## 2023-05-25 RX ORDER — HEPARIN 100 UNIT/ML
500 SYRINGE INTRAVENOUS
Status: CANCELLED | OUTPATIENT
Start: 2023-05-25

## 2023-05-25 RX ORDER — ACETAMINOPHEN 325 MG/1
650 TABLET ORAL
Status: COMPLETED | OUTPATIENT
Start: 2023-05-25 | End: 2023-05-25

## 2023-05-25 RX ORDER — HEPARIN 100 UNIT/ML
500 SYRINGE INTRAVENOUS
Status: DISCONTINUED | OUTPATIENT
Start: 2023-05-25 | End: 2023-05-25 | Stop reason: HOSPADM

## 2023-05-25 RX ORDER — DIPHENHYDRAMINE HYDROCHLORIDE 50 MG/ML
25 INJECTION INTRAMUSCULAR; INTRAVENOUS
Status: COMPLETED | OUTPATIENT
Start: 2023-05-25 | End: 2023-05-25

## 2023-05-25 RX ORDER — SODIUM CHLORIDE 0.9 % (FLUSH) 0.9 %
10 SYRINGE (ML) INJECTION
Status: CANCELLED | OUTPATIENT
Start: 2023-06-08

## 2023-05-25 RX ADMIN — ONDANSETRON 8 MG: 2 INJECTION, SOLUTION INTRAMUSCULAR; INTRAVENOUS at 10:05

## 2023-05-25 RX ADMIN — HEPARIN 500 UNITS: 100 SYRINGE at 01:05

## 2023-05-25 RX ADMIN — SODIUM CHLORIDE: 9 INJECTION, SOLUTION INTRAVENOUS at 10:05

## 2023-05-25 RX ADMIN — ACETAMINOPHEN 650 MG: 325 TABLET ORAL at 10:05

## 2023-05-25 RX ADMIN — AMIVANTAMAB 1400 MG: 350 INJECTION INTRAVENOUS at 11:05

## 2023-05-25 RX ADMIN — DIPHENHYDRAMINE HYDROCHLORIDE 25 MG: 50 INJECTION, SOLUTION INTRAMUSCULAR; INTRAVENOUS at 10:05

## 2023-05-25 RX ADMIN — Medication 10 ML: at 01:05

## 2023-05-25 RX ADMIN — DEXAMETHASONE SODIUM PHOSPHATE 0.25 MG: 4 INJECTION, SOLUTION INTRA-ARTICULAR; INTRALESIONAL; INTRAMUSCULAR; INTRAVENOUS; SOFT TISSUE at 10:05

## 2023-05-25 NOTE — PROGRESS NOTES
Subjective:       Patient ID: Shaneka Ahmadi is a 53 y.o. female.    Chief Complaint: Results, Chemotherapy, and Lung Cancer    HPI:  53-year-old female history of metastatic non-small cell lung carcinoma patient is here for cycle 2 day 1 OP NSCLC AMIVANTAMAB-VMJW (Rybrevant) Q4W patient is tolerating therapy well no nausea vomiting fevers chills night sweats grade 1 rash on forehead      Past Medical History:   Diagnosis Date    Diabetes mellitus     Hypertension     Malignant neoplasm of lower lobe of left lung 12/9/2022    Secondary malignant neoplasm of bone 12/5/2022     Family History   Problem Relation Age of Onset    Heart failure Father      Social History     Socioeconomic History    Marital status:    Tobacco Use    Smoking status: Never     Passive exposure: Never    Smokeless tobacco: Never   Substance and Sexual Activity    Alcohol use: Never    Drug use: Never    Sexual activity: Yes     Partners: Male     Birth control/protection: None     Comment: tubal     Past Surgical History:   Procedure Laterality Date    CATARACT EXTRACTION W/  INTRAOCULAR LENS IMPLANT Right 06/02/2022    FLUOROSCOPY N/A 1/26/2023    Procedure: FLUOROSCOPY/mediport placement;  Surgeon: Marlon Leone MD;  Location: [facility] CATH LAB;  Service: General;  Laterality: N/A;    TUBAL LIGATION      2007--postpartum tubal ligation       Labs:  Lab Results   Component Value Date    WBC 7.24 05/25/2023    HGB 10.4 (L) 05/25/2023    HCT 31.8 (L) 05/25/2023    MCV 90 05/25/2023     05/25/2023     BMP  Lab Results   Component Value Date     05/25/2023    K 3.3 (L) 05/25/2023     05/25/2023    CO2 28 05/25/2023    BUN 13 05/25/2023    CREATININE 0.9 05/25/2023    CALCIUM 8.5 (L) 05/25/2023    ANIONGAP 13 05/25/2023    ESTGFRAFRICA >60.0 07/28/2022    EGFRNONAA >60.0 07/28/2022     Lab Results   Component Value Date    ALT 18 05/25/2023    AST 15 05/25/2023    ALKPHOS 91 05/25/2023    BILITOT 0.4 05/25/2023       Lab  Results   Component Value Date    IRON 71 03/09/2023    TIBC 297 03/09/2023    FERRITIN 646 (H) 03/09/2023     No results found for: [id number]  No results found for: FOLATE  Lab Results   Component Value Date    TSH 1.742 04/08/2022         Review of Systems   Constitutional:  Negative for activity change, appetite change, chills, diaphoresis, fatigue, fever and unexpected weight change.   HENT:  Negative for congestion, dental problem, drooling, ear discharge, ear pain, facial swelling, hearing loss, mouth sores, nosebleeds, postnasal drip, rhinorrhea, sinus pressure, sneezing, sore throat, tinnitus, trouble swallowing and voice change.    Eyes:  Negative for photophobia, pain, discharge, redness, itching and visual disturbance.   Respiratory:  Negative for cough, choking, chest tightness, shortness of breath, wheezing and stridor.    Cardiovascular:  Negative for chest pain, palpitations and leg swelling.   Gastrointestinal:  Negative for abdominal distention, abdominal pain, anal bleeding, blood in stool, constipation, diarrhea, nausea, rectal pain and vomiting.   Endocrine: Negative for cold intolerance, heat intolerance, polydipsia, polyphagia and polyuria.   Genitourinary:  Negative for decreased urine volume, difficulty urinating, dyspareunia, dysuria, enuresis, flank pain, frequency, genital sores, hematuria, menstrual problem, pelvic pain, urgency, vaginal bleeding, vaginal discharge and vaginal pain.   Musculoskeletal:  Negative for arthralgias, back pain, gait problem, joint swelling, myalgias, neck pain and neck stiffness.   Skin:  Positive for rash. Negative for color change and pallor.   Allergic/Immunologic: Negative for environmental allergies, food allergies and immunocompromised state.   Neurological:  Negative for dizziness, tremors, seizures, syncope, facial asymmetry, speech difficulty, weakness, light-headedness, numbness and headaches.   Hematological:  Negative for adenopathy. Does not  bruise/bleed easily.   Psychiatric/Behavioral:  Negative for agitation, behavioral problems, confusion, decreased concentration, dysphoric mood, hallucinations, self-injury, sleep disturbance and suicidal ideas. The patient is not nervous/anxious and is not hyperactive.      Objective:      Physical Exam  Constitutional:       Appearance: Normal appearance. She is obese.   Skin:     Findings: Rash present.           Assessment:      1. Malignant neoplasm of lower lobe of left lung    2. Immunodeficiency due to chemotherapy    3. Essential hypertension    4. Secondary malignant neoplasm of bone    5. Morbid obesity    6. Type 2 diabetes mellitus without complication, without long-term current use of insulin           Med Onc Chart Routing      Follow up with physician 4 weeks. Return in 1 month to see me with CBC CMP cycle 3 day 1   Follow up with DOLLY 2 weeks. APAP can see cycle 2 day 15 CBC CMP   Infusion scheduling note    Injection scheduling note OP NSCLC AMIVANTAMAB-VMJW (Rybrevant) Q4W   Labs    Imaging    Pharmacy appointment    Other referrals            Plan:   The patient location is:  [facility]  The chief complaint leading to consultation is:  Lung cancer    Visit type: audiovisual    Face to Face time with patient: 25 minutes of total time spent on the encounter, which includes face to face time and non-face to face time preparing to see the patient (eg, review of tests), Obtaining and/or reviewing separately obtained history, Documenting clinical information in the electronic or other health record, Independently interpreting results (not separately reported) and communicating results to the patient/family/caregiver, or Care coordination (not separately reported).         Each patient to whom he or she provides medical services by telemedicine is:  (1) informed of the relationship between the physician and patient and the respective role of any other health care provider with respect to management of  the patient; and (2) notified that he or she may decline to receive medical services by telemedicine and may withdraw from such care at any time.    Notes:   The patient is tolerating therapy well no major issues.  At this time will proceed with dosing ofOP NSCLC AMIVANTAMAB-VMJW (Rybrevant) Q4W orders written reviewed to be continued with treatment in day 15 with APAP.  I will see prior to cycle 3 will reimage at the end of 3 cycles of therapy for response.  Discussed implications of answered questions with patient grade 1 findings on forehead with topical steroid to be given          Reese Mcfarlane Jr, MD FACP

## 2023-05-25 NOTE — DISCHARGE INSTRUCTIONS
.[facility]  [id number] [facility]  [address]  [phone] phone     [phone] fax  Hours of Operation: Monday- Friday 8:00am- 5:00pm  After hours phone  [phone]  Hematology / Oncology Physicians on call    Dr. Denys Cohn      Nurse Practitioners:    Nanci Hawley, NP  Lorie Blackmon, BRISA Caicedo, BRISA Monteiro, BRISA Kwon, BRISA Trujillo, PA      Please don't hesitate to call if you have any concerns.     .WAYS TO HELP PREVENT INFECTION        WASH YOUR HANDS OFTEN DURING THE DAY, ESPECIALLY BEFORE YOU EAT, AFTER USING THE BATHROOM, AND AFTER TOUCHING ANIMALS    STAY AWAY FROM PEOPLE WHO HAVE ILLNESSES YOU CAN CATCH; SUCH AS COLDS, FLU, CHICKEN POX    TRY TO AVOID CROWDS    STAY AWAY FROM CHILDREN WHO RECENTLY HAVE RECEIVED LIVE VIRUS VACCINES    MAINTAIN GOOD MOUTH CARE    DO NOT SQUEEZE OR SCRATCH PIMPLES    CLEAN CUTS & SCRAPES RIGHT AWAY AND DAILY UNTIL HEALED WITH WARM WATER, SOAP & AN ANTISEPTIC    AVOID CONTACT WITH LITTER BOXES, BIRD CAGES, & FISH TANKS    AVOID STANDING WATER, IE., BIRD BATHS, FLOWER POTS/VASES, OR HUMIDIFIERS    WEAR GLOVES WHEN GARDENING OR CLEANING UP AFTER OTHERS, ESPECIALLY BABIES & SMALL CHILDREN    DO NOT EAT RAW FISH, SEAFOOD, MEAT, OR EGGS     .FALL PREVENTION   Falls often occur due to slipping, tripping or losing your balance. Here are ways to reduce your risk of falling again.   Was there anything that caused your fall that can be fixed, removed or replaced?   Make your home safe by keeping walkways clear of objects you may trip over.   Use non-slip pads under rugs.   Do not walk in poorly lit areas.   Do not stand on chairs or wobbly ladders.   Use caution when reaching overhead or looking upward. This position can cause a loss of balance.   Be sure your shoes fit properly, have non-slip bottoms and are in good condition.   Be cautious when going  up and down stairs, curbs, and when walking on uneven sidewalks.   If your balance is poor, consider using a cane or walker.   If your fall was related to alcohol use, stop or limit alcohol intake.   If your fall was related to use of sleeping medicines, talk to your doctor about this. You may need to reduce your dosage at bedtime if you awaken during the night to go to the bathroom.   To reduce the need for nighttime bathroom trips:   Avoid drinking fluids for several hours before going to bed   Empty your bladder before going to bed   Men can keep a urinal at the bedside   © 3824-4147 [facility], [address] [place] [address], [place] [address]. All rights reserved. This information is not intended as a substitute for professional medical care. Always follow your healthcare professional's instructions.

## 2023-05-25 NOTE — PLAN OF CARE
Patient reports no complaints at this time. Tolerated treatment well with no adverse reactions. Patient to return in two weeks for f/u and next treatment.

## 2023-06-01 ENCOUNTER — TELEPHONE (OUTPATIENT)
Dept: HEMATOLOGY/ONCOLOGY | Facility: CLINIC | Age: 53
End: 2023-06-01
Payer: COMMERCIAL

## 2023-06-01 NOTE — TELEPHONE ENCOUNTER
----- Message from Catherine Singh sent at 6/1/2023  7:15 AM CDT -----  Contact: Shaneka Munroe needs a call back at [phone], Regards to getting an appointment work in for cough and wheezing.    Thanks  Td

## 2023-06-02 ENCOUNTER — OFFICE VISIT (OUTPATIENT)
Dept: HEMATOLOGY/ONCOLOGY | Facility: CLINIC | Age: 53
End: 2023-06-02
Payer: COMMERCIAL

## 2023-06-02 ENCOUNTER — HOSPITAL ENCOUNTER (OUTPATIENT)
Dept: RADIOLOGY | Facility: HOSPITAL | Age: 53
Discharge: HOME | End: 2023-06-02
Attending: NURSE PRACTITIONER
Payer: COMMERCIAL

## 2023-06-02 ENCOUNTER — TELEPHONE (OUTPATIENT)
Dept: HEMATOLOGY/ONCOLOGY | Facility: CLINIC | Age: 53
End: 2023-06-02
Payer: COMMERCIAL

## 2023-06-02 VITALS
HEART RATE: 62 BPM | OXYGEN SATURATION: 98 % | TEMPERATURE: 98 F | BODY MASS INDEX: 50.02 KG/M2 | SYSTOLIC BLOOD PRESSURE: 115 MMHG | HEIGHT: 64 IN | DIASTOLIC BLOOD PRESSURE: 61 MMHG | WEIGHT: 293 LBS

## 2023-06-02 DIAGNOSIS — R05.9 COUGH, UNSPECIFIED TYPE: Primary | ICD-10-CM

## 2023-06-02 DIAGNOSIS — R06.02 SHORTNESS OF BREATH: ICD-10-CM

## 2023-06-02 DIAGNOSIS — C79.51 SECONDARY MALIGNANT NEOPLASM OF BONE: ICD-10-CM

## 2023-06-02 DIAGNOSIS — D84.821 IMMUNODEFICIENCY DUE TO CHEMOTHERAPY: ICD-10-CM

## 2023-06-02 DIAGNOSIS — C34.32 MALIGNANT NEOPLASM OF LOWER LOBE OF LEFT LUNG: ICD-10-CM

## 2023-06-02 DIAGNOSIS — E11.9 TYPE 2 DIABETES MELLITUS WITHOUT COMPLICATION, WITHOUT LONG-TERM CURRENT USE OF INSULIN: ICD-10-CM

## 2023-06-02 DIAGNOSIS — Z79.899 IMMUNODEFICIENCY DUE TO CHEMOTHERAPY: ICD-10-CM

## 2023-06-02 DIAGNOSIS — T45.1X5A IMMUNODEFICIENCY DUE TO CHEMOTHERAPY: ICD-10-CM

## 2023-06-02 DIAGNOSIS — E66.01 MORBID OBESITY: ICD-10-CM

## 2023-06-02 DIAGNOSIS — C34.32 MALIGNANT NEOPLASM OF LOWER LOBE OF LEFT LUNG: Primary | ICD-10-CM

## 2023-06-02 DIAGNOSIS — R05.9 COUGH, UNSPECIFIED TYPE: ICD-10-CM

## 2023-06-02 LAB
INFLUENZA A, MOLECULAR: NEGATIVE
INFLUENZA B, MOLECULAR: NEGATIVE
SARS-COV-2 RDRP RESP QL NAA+PROBE: NEGATIVE
SPECIMEN SOURCE: NORMAL

## 2023-06-02 PROCEDURE — 99999 PR PBB SHADOW E&M-EST. PATIENT-LVL V: CPT | Mod: PBBFAC,,, | Performed by: NURSE PRACTITIONER

## 2023-06-02 PROCEDURE — 3051F HG A1C>EQUAL 7.0%<8.0%: CPT | Mod: CPTII,S$GLB,, | Performed by: NURSE PRACTITIONER

## 2023-06-02 PROCEDURE — 87502 INFLUENZA DNA AMP PROBE: CPT | Performed by: NURSE PRACTITIONER

## 2023-06-02 PROCEDURE — 3008F BODY MASS INDEX DOCD: CPT | Mod: CPTII,S$GLB,, | Performed by: NURSE PRACTITIONER

## 2023-06-02 PROCEDURE — 71046 XR CHEST PA AND LATERAL: ICD-10-PCS | Mod: 26,,, | Performed by: RADIOLOGY

## 2023-06-02 PROCEDURE — 3074F SYST BP LT 130 MM HG: CPT | Mod: CPTII,S$GLB,, | Performed by: NURSE PRACTITIONER

## 2023-06-02 PROCEDURE — 1160F RVW MEDS BY RX/DR IN RCRD: CPT | Mod: CPTII,S$GLB,, | Performed by: NURSE PRACTITIONER

## 2023-06-02 PROCEDURE — 99214 OFFICE O/P EST MOD 30 MIN: CPT | Mod: S$GLB,,, | Performed by: NURSE PRACTITIONER

## 2023-06-02 PROCEDURE — 99999 PR PBB SHADOW E&M-EST. PATIENT-LVL V: ICD-10-PCS | Mod: PBBFAC,,, | Performed by: NURSE PRACTITIONER

## 2023-06-02 PROCEDURE — 1160F PR REVIEW ALL MEDS BY PRESCRIBER/CLIN PHARMACIST DOCUMENTED: ICD-10-PCS | Mod: CPTII,S$GLB,, | Performed by: NURSE PRACTITIONER

## 2023-06-02 PROCEDURE — 3074F PR MOST RECENT SYSTOLIC BLOOD PRESSURE < 130 MM HG: ICD-10-PCS | Mod: CPTII,S$GLB,, | Performed by: NURSE PRACTITIONER

## 2023-06-02 PROCEDURE — U0002 COVID-19 LAB TEST NON-CDC: HCPCS | Performed by: NURSE PRACTITIONER

## 2023-06-02 PROCEDURE — 1159F MED LIST DOCD IN RCRD: CPT | Mod: CPTII,S$GLB,, | Performed by: NURSE PRACTITIONER

## 2023-06-02 PROCEDURE — 71046 X-RAY EXAM CHEST 2 VIEWS: CPT | Mod: TC

## 2023-06-02 PROCEDURE — 3078F PR MOST RECENT DIASTOLIC BLOOD PRESSURE < 80 MM HG: ICD-10-PCS | Mod: CPTII,S$GLB,, | Performed by: NURSE PRACTITIONER

## 2023-06-02 PROCEDURE — 99214 PR OFFICE/OUTPT VISIT, EST, LEVL IV, 30-39 MIN: ICD-10-PCS | Mod: S$GLB,,, | Performed by: NURSE PRACTITIONER

## 2023-06-02 PROCEDURE — 3051F PR MOST RECENT HEMOGLOBIN A1C LEVEL 7.0 - < 8.0%: ICD-10-PCS | Mod: CPTII,S$GLB,, | Performed by: NURSE PRACTITIONER

## 2023-06-02 PROCEDURE — 3008F PR BODY MASS INDEX (BMI) DOCUMENTED: ICD-10-PCS | Mod: CPTII,S$GLB,, | Performed by: NURSE PRACTITIONER

## 2023-06-02 PROCEDURE — 1159F PR MEDICATION LIST DOCUMENTED IN MEDICAL RECORD: ICD-10-PCS | Mod: CPTII,S$GLB,, | Performed by: NURSE PRACTITIONER

## 2023-06-02 PROCEDURE — 71046 X-RAY EXAM CHEST 2 VIEWS: CPT | Mod: 26,,, | Performed by: RADIOLOGY

## 2023-06-02 PROCEDURE — 3078F DIAST BP <80 MM HG: CPT | Mod: CPTII,S$GLB,, | Performed by: NURSE PRACTITIONER

## 2023-06-02 NOTE — TELEPHONE ENCOUNTER
Pt was contacted and informed about her CT Scan chest scheduled for 06/06/23 @ 9:30 am, pt was advised NPO 2 hrs prior to the test and she verbalized understanding

## 2023-06-02 NOTE — ASSESSMENT & PLAN NOTE
Patient has continued on Rybrevant    C/o noting increasing cough x 5 days. Occasional nightly wheezing. Intermittent productive with clear to white sputum    CXR similar to most recent CT scan. Labs unremarkable. Will arrange CT chest with contrast. Further recommendations pending CT chest. Encouraged f/u with Pulmonology given h/o bronchitis

## 2023-06-02 NOTE — PROGRESS NOTES
Subjective:       Patient ID: Shaneka Ahmadi is a 53 y.o. female.    Chief Complaint: Review labs. 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 Planned to start Zometa pending dental clearance. Currently being treated with Rybrevant Q 4 weeks.    Today she presents with c/o cough x 1 week. She denies any CP, SOB, dizziness, fever. Reports occasional nightly wheezing.       Social History     Socioeconomic History    Marital status:    Tobacco Use    Smoking status: Never     Passive exposure: Never    Smokeless tobacco: Never   Substance and Sexual Activity    Alcohol use: Never    Drug use: Never    Sexual activity: Yes     Partners: Male     Birth control/protection: None     Comment: tubal       Past Medical History:   Diagnosis Date    Diabetes mellitus     Hypertension     Malignant neoplasm of lower lobe of left lung 12/9/2022    Secondary malignant neoplasm of bone 12/5/2022       Family History   Problem Relation Age of Onset    Heart failure Father        Past Surgical History:   Procedure Laterality Date    CATARACT EXTRACTION W/  INTRAOCULAR LENS IMPLANT Right 06/02/2022    FLUOROSCOPY N/A 1/26/2023    Procedure: FLUOROSCOPY/mediport placement;  Surgeon: Marlon Leone MD;  Location: [facility] CATH LAB;  Service: General;  Laterality: N/A;    TUBAL LIGATION      2007--postpartum tubal ligation       Review of Systems   Constitutional:  Negative for activity change, appetite change, chills, fatigue, fever and unexpected weight change.   HENT:  Negative for congestion, mouth sores, nosebleeds, sore throat, trouble swallowing and voice change.    Eyes:  Negative for visual disturbance.   Respiratory:  Positive for shortness of breath. Negative for cough, chest tightness and wheezing.    Cardiovascular:  Negative for chest pain, palpitations and leg swelling.   Gastrointestinal:  Negative for abdominal  distention, abdominal pain, blood in stool, constipation, diarrhea, nausea and vomiting.   Genitourinary:  Negative for difficulty urinating, dysuria and hematuria.   Musculoskeletal:  Negative for arthralgias, back pain and myalgias.   Skin:  Negative for pallor, rash and wound.   Neurological:  Negative for dizziness, syncope, weakness and headaches.   Hematological:  Negative for adenopathy. Does not bruise/bleed easily.   Psychiatric/Behavioral:  The patient is nervous/anxious.        Medication List with Changes/Refills   Current Medications    ALBUTEROL (PROVENTIL/VENTOLIN HFA) 90 MCG/ACTUATION INHALER    Inhale 1-2 puffs into the lungs every 6 (six) hours as needed for Wheezing (cough).    AMLODIPINE (NORVASC) 10 MG TABLET    Take 1 tablet (10 mg total) by mouth once daily.    ATENOLOL (TENORMIN) 50 MG TABLET    Take 1 tab by mouth twice a day    BETAMETHASONE VALERATE 0.1% (VALISONE) 0.1 % OINT    APPLY TOPICALLY TO THE TOP OF THE FEET TWO TIMES A DAY    BLOOD SUGAR DIAGNOSTIC STRP    To check BG 2 times daily, to use with insurance preferred meter    CALCIUM CARBONATE (OS-BRUNILDA) 500 MG CALCIUM (1,250 MG) TABLET    Take 1 tablet (500 mg total) by mouth once daily.    CETIRIZINE (ZYRTEC) 10 MG TABLET    Take 1 tablet (10 mg total) by mouth every evening.    DEXAMETHASONE (DECADRON) 4 MG TAB    Take 2 tablets (8 mg total) by mouth once daily. Take as directed on days 2, 3, and 16,17 of your chemotherapy cycle starting with cycle 2 forward. Do not take with cycle 1.    DEXAMETHASONE (DECADRON) 4 MG TAB    Take 1 tablet (4 mg total) by mouth As instructed. Take 8 mg the day prior to chemotherapy, and then 8 mg after chemo on days 2, 3, 4    FLUTICASONE PROPIONATE (FLONASE) 50 MCG/ACTUATION NASAL SPRAY    2 sprays (100 mcg total) by Each Nostril route once daily.    FOLIC ACID (FOLVITE) 1 MG TABLET    Take 1 tablet (1 mg total) by mouth once daily.    HYDROCODONE-ACETAMINOPHEN (NORCO) 7.5-325 MG PER TABLET    Take  1 tablet by mouth every 4 (four) hours as needed for Pain.    HYDROCODONE-HOMATROPINE 5-1.5 MG/5 ML (HYCODAN) 5-1.5 MG/5 ML SYRP    Take 5 mL by mouth every 4 (four) hours as needed.    LANCETS MISC    To check BG 2 times daily, to use with insurance preferred meter    LOSARTAN-HYDROCHLOROTHIAZIDE 100-25 MG (HYZAAR) 100-25 MG PER TABLET    Take 1 tablet by mouth once daily.    METFORMIN (GLUCOPHAGE-XR) 500 MG ER 24HR TABLET    Take 1 tab with breakfast and take 2 tabs with dinner    MUPIROCIN (BACTROBAN) 2 % OINTMENT    APPLY TO AFFECTED AREA(S) THREE TIMES A DAY    ONDANSETRON (ZOFRAN-ODT) 8 MG TBDL    Take 1 tablet (8 mg total) by mouth every 8 (eight) hours as needed. Starting with cycle 1 of chemotherapy    ONDANSETRON (ZOFRAN-ODT) 8 MG TBDL    Take 1 tablet (8 mg total) by mouth every 8 (eight) hours as needed (nausea/vomitting).    OSIMERTINIB (TAGRISSO) 80 MG TAB    Take 80 mg by mouth once daily.    PRAVASTATIN (PRAVACHOL) 10 MG TABLET    Take 1 tablet (10 mg total) by mouth every evening.    TRUE METRIX GLUCOSE METER MISC        TRUEPLUS LANCETS 33 GAUGE MISC    Apply topically.     Objective:     Vitals:    06/02/23 1123   BP: 115/61   Pulse: 62   Temp: 98.2 °F (36.8 °C)     Lab Results   Component Value Date    WBC 5.82 06/02/2023    HGB 10.7 (L) 06/02/2023    HCT 32.3 (L) 06/02/2023    MCV 88 06/02/2023     06/02/2023       BMP  Lab Results   Component Value Date     06/02/2023    K 3.7 06/02/2023     06/02/2023    CO2 27 06/02/2023    BUN 13 06/02/2023    CREATININE 0.8 06/02/2023    CALCIUM 8.6 (L) 06/02/2023    ANIONGAP 12 06/02/2023    EGFRNORACEVR >60 06/02/2023     Lab Results   Component Value Date    ALT 16 06/02/2023    AST 15 06/02/2023    ALKPHOS 87 06/02/2023    BILITOT 0.3 06/02/2023         Physical Exam  Vitals reviewed.   Constitutional:       Appearance: She is well-developed.   HENT:      Head: Normocephalic.      Right Ear: External ear normal.      Left Ear:  External ear normal.      Nose: Nose normal.   Eyes:      General: Lids are normal. No scleral icterus.        Right eye: No discharge.         Left eye: No discharge.      Conjunctiva/sclera: Conjunctivae normal.   Neck:      Thyroid: No thyroid mass.   Cardiovascular:      Rate and Rhythm: Normal rate and regular rhythm.      Heart sounds: Normal heart sounds.   Pulmonary:      Effort: Pulmonary effort is normal. No respiratory distress.      Breath sounds: Normal breath sounds. No wheezing or rales.   Genitourinary:     Comments: deferred  Musculoskeletal:         General: Normal range of motion.      Cervical back: Normal range of motion.   Skin:     General: Skin is warm and dry.   Neurological:      Mental Status: She is alert and oriented to person, place, and time.   Psychiatric:         Speech: Speech normal.         Behavior: Behavior normal. Behavior is cooperative.         Thought Content: Thought content normal.        Assessment:     Problem List Items Addressed This Visit          Immunology/Multi System    Immunodeficiency due to chemotherapy     Infection precautions            Oncology    Secondary malignant neoplasm of bone     Planned to start Zometa pending dental clearance          Malignant neoplasm of lower lobe of left lung     Patient has continued on Rybrevant    C/o noting increasing cough x 5 days. Occasional nightly wheezing. Intermittent productive with clear to white sputum    CXR similar to most recent CT scan. Labs unremarkable. Will arrange CT chest with contrast. Further recommendations pending CT chest. Encouraged f/u with Pulmonology given h/o bronchitis          Relevant Orders    CT Chest With Contrast       Endocrine    Type 2 diabetes mellitus without complication, without long-term current use of insulin     Management per PCP         Morbid obesity     Encourage healthy nutrition and portion control. Encourage daily exercise          Other Visit Diagnoses       Cough,  unspecified type    -  Primary    Relevant Orders    COVID-19 Rapid Screening (Completed)    Influenza A & B by Molecular (Completed)    POCT Influenza A/B Molecular    Influenza A & B by Molecular    CT Chest With Contrast              Plan:     Cough, unspecified type  -     COVID-19 Rapid Screening; Standing  -     Influenza A & B by Molecular  -     POCT Influenza A/B Molecular  -     Influenza A & B by Molecular  -     CT Chest With Contrast; Future; Expected date: 06/02/2023    Malignant neoplasm of lower lobe of left lung  -     CT Chest With Contrast; Future; Expected date: 06/02/2023    Immunodeficiency due to chemotherapy    Type 2 diabetes mellitus without complication, without long-term current use of insulin    Morbid obesity    Secondary malignant neoplasm of bone        Med Onc Route Chart for Scheduling    MILAGROS Toribio

## 2023-06-05 NOTE — PROGRESS NOTES
"Subjective:       Patient ID: Shaneka Ahmadi is a 53 y.o. female.    Chief Complaint: Lung Cancer    Primary Oncologist/Hematologist: Dr. Mcfarlane    HPI: Ms. Ahmadi is a pleasant 53 year old female who is following up for her metastatic non-small cell lung carcinoma. She is here for cycle 2 day 15 of amivantamab (rybrevant). She gets this day 1 and day 15 on a 28 day cycle. She is s/p 4 cycles of alimta + carboplatin  q 3 weeks, re imaging showed progression.    Cancer Hx: Noted cough and SOB, CXR and CT chest showed L Lung mass. L lung biopsy positive for adenocarcinoma , EGFR mutated. Pleural fluid also positive for malignancy. PET showed avid STEPHAN mass, mediastinal nodes, bone mets in C1, T1, T11, L3, sacrum, L ischium, sternum. MRI brain negative for intracranial metastases. Initiated on carbo/ alimta 01/27/23--re imaging after 4 cycles found progressive disease.     Today:  She has some constipation, taking stool softeners with relief. She is not taking calcium but she continues to take vitamin D. She states her appetite is good and she has been staying hydrated. She denies any fevers, illnesses, new pain, n/v/d. She states the "rash" on her face seems to be acne, cortisone not working. 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 She states her cough is much better. She has not seen dentist first due to expenses.     Social History     Socioeconomic History    Marital status:    Tobacco Use    Smoking status: Never     Passive exposure: Never    Smokeless tobacco: Never   Substance and Sexual Activity    Alcohol use: Never    Drug use: Never    Sexual activity: Yes     Partners: Male     Birth control/protection: None     Comment: tubal       Past Medical History:   Diagnosis Date    Diabetes mellitus     Hypertension     Malignant neoplasm of lower lobe of left lung 12/9/2022    Secondary malignant neoplasm of bone 12/5/2022       Family History   Problem " Relation Age of Onset    Heart failure Father        Past Surgical History:   Procedure Laterality Date    CATARACT EXTRACTION W/  INTRAOCULAR LENS IMPLANT Right 06/02/2022    FLUOROSCOPY N/A 1/26/2023    Procedure: FLUOROSCOPY/mediport placement;  Surgeon: Marlon Leone MD;  Location: [facility] CATH LAB;  Service: General;  Laterality: N/A;    TUBAL LIGATION      2007--postpartum tubal ligation       Review of Systems   Constitutional:  Positive for fatigue. Negative for activity change, appetite change, chills, diaphoresis, fever and unexpected weight change.   HENT:  Positive for dental problem. Negative for congestion, nosebleeds and rhinorrhea.    Eyes:  Negative for visual disturbance.   Respiratory:  Negative for cough and shortness of breath.    Cardiovascular:  Negative for chest pain and leg swelling.   Gastrointestinal:  Negative for abdominal pain, anal bleeding, blood in stool, constipation, diarrhea, nausea and vomiting.   Genitourinary:  Negative for hematuria.   Musculoskeletal:  Negative for myalgias.   Skin:  Negative for color change and pallor.        Acne   Allergic/Immunologic: Positive for immunocompromised state.   Neurological:  Negative for dizziness, weakness, light-headedness, numbness and headaches.       Medication List with Changes/Refills   Current Medications    ALBUTEROL (PROVENTIL/VENTOLIN HFA) 90 MCG/ACTUATION INHALER    Inhale 1-2 puffs into the lungs every 6 (six) hours as needed for Wheezing (cough).    AMLODIPINE (NORVASC) 10 MG TABLET    Take 1 tablet (10 mg total) by mouth once daily.    ATENOLOL (TENORMIN) 50 MG TABLET    Take 1 tab by mouth twice a day    BETAMETHASONE VALERATE 0.1% (VALISONE) 0.1 % OINT    APPLY TOPICALLY TO THE TOP OF THE FEET TWO TIMES A DAY    BLOOD SUGAR DIAGNOSTIC STRP    To check BG 2 times daily, to use with insurance preferred meter    CALCIUM CARBONATE (OS-BRUNILDA) 500 MG CALCIUM (1,250 MG) TABLET    Take 1 tablet (500 mg total) by mouth once daily.     CETIRIZINE (ZYRTEC) 10 MG TABLET    Take 1 tablet (10 mg total) by mouth every evening.    DEXAMETHASONE (DECADRON) 4 MG TAB    Take 2 tablets (8 mg total) by mouth once daily. Take as directed on days 2, 3, and 16,17 of your chemotherapy cycle starting with cycle 2 forward. Do not take with cycle 1.    DEXAMETHASONE (DECADRON) 4 MG TAB    Take 1 tablet (4 mg total) by mouth As instructed. Take 8 mg the day prior to chemotherapy, and then 8 mg after chemo on days 2, 3, 4    FLUTICASONE PROPIONATE (FLONASE) 50 MCG/ACTUATION NASAL SPRAY    2 sprays (100 mcg total) by Each Nostril route once daily.    FOLIC ACID (FOLVITE) 1 MG TABLET    Take 1 tablet (1 mg total) by mouth once daily.    HYDROCODONE-ACETAMINOPHEN (NORCO) 7.5-325 MG PER TABLET    Take 1 tablet by mouth every 4 (four) hours as needed for Pain.    HYDROCODONE-HOMATROPINE 5-1.5 MG/5 ML (HYCODAN) 5-1.5 MG/5 ML SYRP    Take 5 mL by mouth every 4 (four) hours as needed.    LANCETS MISC    To check BG 2 times daily, to use with insurance preferred meter    LOSARTAN-HYDROCHLOROTHIAZIDE 100-25 MG (HYZAAR) 100-25 MG PER TABLET    Take 1 tablet by mouth once daily.    METFORMIN (GLUCOPHAGE-XR) 500 MG ER 24HR TABLET    Take 1 tab with breakfast and take 2 tabs with dinner    MUPIROCIN (BACTROBAN) 2 % OINTMENT    APPLY TO AFFECTED AREA(S) THREE TIMES A DAY    ONDANSETRON (ZOFRAN-ODT) 8 MG TBDL    Take 1 tablet (8 mg total) by mouth every 8 (eight) hours as needed. Starting with cycle 1 of chemotherapy    ONDANSETRON (ZOFRAN-ODT) 8 MG TBDL    Take 1 tablet (8 mg total) by mouth every 8 (eight) hours as needed (nausea/vomitting).    OSIMERTINIB (TAGRISSO) 80 MG TAB    Take 80 mg by mouth once daily.    PRAVASTATIN (PRAVACHOL) 10 MG TABLET    Take 1 tablet (10 mg total) by mouth every evening.    TRUE METRIX GLUCOSE METER MISC        TRUEPLUS LANCETS 33 GAUGE MISC    Apply topically.     Objective:     Vitals:    06/08/23 0819   BP: 110/70   Pulse: 63   Temp: 97.2 °F  (36.2 °C)       Physical Exam  Vitals reviewed.   Constitutional:       General: She is not in acute distress.     Appearance: She is not ill-appearing, toxic-appearing or diaphoretic.   HENT:      Head: Normocephalic and atraumatic.   Cardiovascular:      Rate and Rhythm: Normal rate.   Pulmonary:      Effort: Pulmonary effort is normal.   Musculoskeletal:      Right lower leg: No edema.      Left lower leg: No edema.   Skin:     General: Skin is warm.      Coloration: Skin is not jaundiced or pale.      Findings: No bruising, erythema, lesion or rash.   Neurological:      Mental Status: She is alert.      Motor: No weakness.      Gait: Gait normal.   Psychiatric:         Mood and Affect: Mood normal.         Behavior: Behavior normal.         Thought Content: Thought content normal.          Labs/Results:  Lab Results   Component Value Date    WBC 7.25 06/08/2023    RBC 3.70 (L) 06/08/2023    HGB 10.8 (L) 06/08/2023    HCT 33.7 (L) 06/08/2023    MCV 91 06/08/2023    MCH 29.2 06/08/2023    MCHC 32.0 06/08/2023    RDW 12.9 06/08/2023     06/08/2023    MPV 11.0 06/08/2023    GRAN 4.7 06/08/2023    GRAN 64.6 06/08/2023    LYMPH 1.8 06/08/2023    LYMPH 25.0 06/08/2023    MONO 0.4 06/08/2023    MONO 6.1 06/08/2023    EOS 0.3 06/08/2023    BASO 0.03 06/08/2023    EOSINOPHIL 3.6 06/08/2023    BASOPHIL 0.4 06/08/2023     CMP  Sodium   Date Value Ref Range Status   06/08/2023 139 136 - 145 mmol/L Final     Potassium   Date Value Ref Range Status   06/08/2023 3.4 (L) 3.5 - 5.1 mmol/L Final     Chloride   Date Value Ref Range Status   06/08/2023 100 95 - 110 mmol/L Final     CO2   Date Value Ref Range Status   06/08/2023 28 23 - 29 mmol/L Final     Glucose   Date Value Ref Range Status   06/08/2023 239 (H) 70 - 110 mg/dL Final     BUN   Date Value Ref Range Status   06/08/2023 18 6 - 20 mg/dL Final     Creatinine   Date Value Ref Range Status   06/08/2023 1.0 0.5 - 1.4 mg/dL Final     Calcium   Date Value Ref Range  Status   06/08/2023 8.4 (L) 8.7 - 10.5 mg/dL Final     Total Protein   Date Value Ref Range Status   06/08/2023 6.2 6.0 - 8.4 g/dL Final     Albumin   Date Value Ref Range Status   06/08/2023 2.9 (L) 3.5 - 5.2 g/dL Final     Total Bilirubin   Date Value Ref Range Status   06/08/2023 0.3 0.1 - 1.0 mg/dL Final     Comment:     For infants and newborns, interpretation of results should be based  on gestational age, weight and in agreement with clinical  observations.    Premature Infant recommended reference ranges:  Up to 24 hours.............<8.0 mg/dL  Up to 48 hours............<12.0 mg/dL  3-5 days..................<15.0 mg/dL  6-29 days.................<15.0 mg/dL       Alkaline Phosphatase   Date Value Ref Range Status   06/08/2023 96 55 - 135 U/L Final     AST   Date Value Ref Range Status   06/08/2023 13 10 - 40 U/L Final     ALT   Date Value Ref Range Status   06/08/2023 14 10 - 44 U/L Final     Anion Gap   Date Value Ref Range Status   06/08/2023 11 8 - 16 mmol/L Final     eGFR   Date Value Ref Range Status   06/08/2023 >60 >60 mL/min/1.73 m^2 Final       Ct c/a/p 4/11/23  Impression:  1.  Detrimental change.  Increase in number and size of numerous sclerotic lesions throughout the cervical and thoracic spine, left humerus and sternum.  There are also multiple large sclerotic lesions throughout the lumbar spine, pelvis and proximal femurs measuring up to 7.1 cm in size.  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  2.   The lateral left upper lobe mass is decreased in size in the interim, currently measuring 2.2 x 1.0 cm.  Negative for new pulmonary masses.  3.  Exophytic nodule along the posterior right hepatic lobe measuring 1.1 cm.  This was not imaged previously.  Etiology uncertain.  Metastasis is not excluded.  4.  Negative for acute process throughout the chest, abdomen or pelvis.  5.  Interval placement of a right-sided chest port.  6.  Numerous stable and nonemergent findings as noted  above.    Assessment:     Problem List Items Addressed This Visit          Immunology/Multi System    Immunodeficiency due to chemotherapy - Primary       Oncology    Secondary malignant neoplasm of bone    Malignant neoplasm of lower lobe of left lung     Plan:     Malignant neoplasm of lower lobe of left lung, Secondary malignant neoplasm of bone, Immunodeficiency due to chemotherapy  --Exon 20 mutation  --s/p 4 cycles of alimta + carboplatin q 3 weeks. Initiated on carbo/ alimta 01/27/23--re imaging after 4 cycles found progressive disease.  --getting treated on days 1 and day 15 on a 28 day cycle.   --Will reimage after cycle 3  --MRI brain negative  --continue with cycle 2 day 15 of amivantamab (rybrevant)  --ANC:4.7, plts: 235  --continue with vitamin D supplement. Hold on calcium unitl start zometa.   --has not had cycle 1 day 1 of zometa q 4 weeks. Dental clearance needed first. Patient need 1 tooth pulled and a couple of fillings. She is trying to find dentist but hard due to expenses.  --topical steroid for rash on forehead not working. Washing face with dove soap  --potassium supplement- 1x/day    Cough  --follow up with pulmonology given hx of bronchitis  --CT chest 6/6/23- overall no detrimental change of chest    Follow-Up: 2 weeks with cbc cmp prior for cycle 3 day 1 with primary oncologist-standing orders in    Debi Trujillo PA-C  Hematology Oncology    Route Chart for Scheduling    Med Onc Chart Routing      Follow up with physician 2 weeks. with cbccmp prior for cycle3 day 1   Follow up with DOLLY    Infusion scheduling note   2 weeks for cycle 3 day 1   Injection scheduling note    Labs CBC and CMP   Scheduling:  Preferred lab:  Lab interval:  standing orders in   Imaging    Pharmacy appointment    Other referrals            Treatment Plan Information   OP NSCLC AMIVANTAMAB-VMJW (Rybrevant) Q4W   Reese Mcfarlane MD   Upcoming Treatment Dates - OP NSCLC AMIVANTAMAB-VMJW (Rybrevant)  Q4W    6/8/2023       Pre-Medications       acetaminophen tablet 650 mg       diphenhydrAMINE injection 25 mg       Antiemetics       palonosetron (ALOXI) 0.25 mg with Dexamethasone (DECADRON) 12 mg in NS 50 mL IVPB       ondansetron injection 8 mg       Immunotherapy       amivantamab-vmjw (RYBREVANT) 1,400 mg in sodium chloride 0.9% SolP 250 mL chemo infusion  6/22/2023       Pre-Medications       acetaminophen tablet 650 mg       diphenhydrAMINE injection 25 mg       Antiemetics       palonosetron (ALOXI) 0.25 mg with Dexamethasone (DECADRON) 12 mg in NS 50 mL IVPB       ondansetron injection 8 mg       Immunotherapy       amivantamab-vmjw (RYBREVANT) 1,400 mg in sodium chloride 0.9% SolP 250 mL chemo infusion  7/6/2023       Pre-Medications       acetaminophen tablet 650 mg       diphenhydrAMINE injection 25 mg       Antiemetics       palonosetron (ALOXI) 0.25 mg with Dexamethasone (DECADRON) 12 mg in NS 50 mL IVPB       ondansetron injection 8 mg       Immunotherapy       amivantamab-vmjw (RYBREVANT) 1,400 mg in sodium chloride 0.9% SolP 250 mL chemo infusion  7/20/2023       Pre-Medications       acetaminophen tablet 650 mg       diphenhydrAMINE injection 25 mg       Antiemetics       palonosetron (ALOXI) 0.25 mg with Dexamethasone (DECADRON) 12 mg in NS 50 mL IVPB       ondansetron injection 8 mg       Immunotherapy       amivantamab-vmjw (RYBREVANT) 1,400 mg in sodium chloride 0.9% SolP 250 mL chemo infusion    Supportive Plan Information  OP ZOLEDRONIC ACID (ZOMETA) Q4W   Reese Mcfarlane MD   Upcoming Treatment Dates - OP ZOLEDRONIC ACID (ZOMETA) Q4W    4/1/2023       Medications       zoledronic acid (ZOMETA) 4 mg in sodium chloride 0.9% 100 mL IVPB  4/29/2023       Medications       zoledronic acid (ZOMETA) 4 mg in sodium chloride 0.9% 100 mL IVPB  5/27/2023       Medications       zoledronic acid (ZOMETA) 4 mg in sodium chloride 0.9% 100 mL IVPB  6/24/2023       Medications       zoledronic acid  (ZOMETA) 4 mg in sodium chloride 0.9% 100 mL IVPB    Therapy Plan Information  Flushes  sodium chloride 0.9% 250 mL flush bag  Intravenous, Every visit  sodium chloride 0.9% flush 10 mL  10 mL, Intravenous, Every visit  heparin, porcine (PF) 100 unit/mL injection flush 500 Units  500 Units, Intravenous, Every visit  alteplase injection 2 mg  2 mg, Intra-Catheter, Every visit  5. Medications  cyanocobalamin injection 1,000 mcg  1,000 mcg, Intramuscular, Day 1 of every 1 month

## 2023-06-06 ENCOUNTER — PATIENT MESSAGE (OUTPATIENT)
Dept: PULMONOLOGY | Facility: CLINIC | Age: 53
End: 2023-06-06
Payer: COMMERCIAL

## 2023-06-06 ENCOUNTER — TELEPHONE (OUTPATIENT)
Dept: PULMONOLOGY | Facility: CLINIC | Age: 53
End: 2023-06-06
Payer: COMMERCIAL

## 2023-06-06 ENCOUNTER — HOSPITAL ENCOUNTER (OUTPATIENT)
Dept: RADIOLOGY | Facility: HOSPITAL | Age: 53
Discharge: HOME | End: 2023-06-06
Attending: NURSE PRACTITIONER
Payer: COMMERCIAL

## 2023-06-06 ENCOUNTER — PATIENT OUTREACH (OUTPATIENT)
Dept: ADMINISTRATIVE | Facility: HOSPITAL | Age: 53
End: 2023-06-06
Payer: COMMERCIAL

## 2023-06-06 DIAGNOSIS — C34.32 MALIGNANT NEOPLASM OF LOWER LOBE OF LEFT LUNG: ICD-10-CM

## 2023-06-06 DIAGNOSIS — R05.9 COUGH, UNSPECIFIED TYPE: ICD-10-CM

## 2023-06-06 PROCEDURE — 71260 CT THORAX DX C+: CPT | Mod: 26,,, | Performed by: RADIOLOGY

## 2023-06-06 PROCEDURE — 71260 CT THORAX DX C+: CPT | Mod: TC

## 2023-06-06 PROCEDURE — 25500020 PHARM REV CODE 255: Performed by: NURSE PRACTITIONER

## 2023-06-06 PROCEDURE — 71260 CT CHEST WITH CONTRAST: ICD-10-PCS | Mod: 26,,, | Performed by: RADIOLOGY

## 2023-06-06 RX ADMIN — IOHEXOL 75 ML: 350 INJECTION, SOLUTION INTRAVENOUS at 09:06

## 2023-06-06 NOTE — PROGRESS NOTES
DM lab report: LVM for patient as we need to see if she can come in fasting for labs so we can add the lipid panel on.

## 2023-06-06 NOTE — TELEPHONE ENCOUNTER
Spoke with pt and made an appt and confirmed   Sarecycline Counseling: Patient advised regarding possible photosensitivity and discoloration of the teeth, skin, lips, tongue and gums.  Patient instructed to avoid sunlight, if possible.  When exposed to sunlight, patients should wear protective clothing, sunglasses, and sunscreen.  The patient was instructed to call the office immediately if the following severe adverse effects occur:  hearing changes, easy bruising/bleeding, severe headache, or vision changes.  The patient verbalized understanding of the proper use and possible adverse effects of sarecycline.  All of the patient's questions and concerns were addressed.

## 2023-06-08 ENCOUNTER — DOCUMENTATION ONLY (OUTPATIENT)
Dept: HEMATOLOGY/ONCOLOGY | Facility: CLINIC | Age: 53
End: 2023-06-08

## 2023-06-08 ENCOUNTER — OFFICE VISIT (OUTPATIENT)
Dept: HEMATOLOGY/ONCOLOGY | Facility: CLINIC | Age: 53
End: 2023-06-08
Payer: COMMERCIAL

## 2023-06-08 ENCOUNTER — INFUSION (OUTPATIENT)
Dept: INFUSION THERAPY | Facility: HOSPITAL | Age: 53
End: 2023-06-08
Attending: RADIOLOGY
Payer: COMMERCIAL

## 2023-06-08 ENCOUNTER — LAB VISIT (OUTPATIENT)
Dept: LAB | Facility: HOSPITAL | Age: 53
End: 2023-06-08
Attending: INTERNAL MEDICINE
Payer: COMMERCIAL

## 2023-06-08 VITALS
BODY MASS INDEX: 50.02 KG/M2 | DIASTOLIC BLOOD PRESSURE: 51 MMHG | SYSTOLIC BLOOD PRESSURE: 93 MMHG | TEMPERATURE: 97 F | HEIGHT: 64 IN | HEART RATE: 62 BPM | OXYGEN SATURATION: 99 % | RESPIRATION RATE: 18 BRPM | WEIGHT: 293 LBS

## 2023-06-08 VITALS
HEART RATE: 63 BPM | SYSTOLIC BLOOD PRESSURE: 110 MMHG | BODY MASS INDEX: 50.02 KG/M2 | WEIGHT: 293 LBS | TEMPERATURE: 97 F | DIASTOLIC BLOOD PRESSURE: 70 MMHG | HEIGHT: 64 IN

## 2023-06-08 DIAGNOSIS — C34.32 MALIGNANT NEOPLASM OF LOWER LOBE OF LEFT LUNG: ICD-10-CM

## 2023-06-08 DIAGNOSIS — C79.51 SECONDARY MALIGNANT NEOPLASM OF BONE: ICD-10-CM

## 2023-06-08 DIAGNOSIS — D84.821 IMMUNODEFICIENCY DUE TO CHEMOTHERAPY: Primary | ICD-10-CM

## 2023-06-08 DIAGNOSIS — E87.6 HYPOKALEMIA: ICD-10-CM

## 2023-06-08 DIAGNOSIS — C34.32 MALIGNANT NEOPLASM OF LOWER LOBE OF LEFT LUNG: Primary | ICD-10-CM

## 2023-06-08 DIAGNOSIS — Z79.899 IMMUNODEFICIENCY DUE TO CHEMOTHERAPY: Primary | ICD-10-CM

## 2023-06-08 DIAGNOSIS — T45.1X5A IMMUNODEFICIENCY DUE TO CHEMOTHERAPY: Primary | ICD-10-CM

## 2023-06-08 LAB
ALBUMIN SERPL BCP-MCNC: 2.9 G/DL (ref 3.5–5.2)
ALP SERPL-CCNC: 96 U/L (ref 55–135)
ALT SERPL W/O P-5'-P-CCNC: 14 U/L (ref 10–44)
ANION GAP SERPL CALC-SCNC: 11 MMOL/L (ref 8–16)
AST SERPL-CCNC: 13 U/L (ref 10–40)
BASOPHILS # BLD AUTO: 0.03 K/UL (ref 0–0.2)
BASOPHILS NFR BLD: 0.4 % (ref 0–1.9)
BILIRUB SERPL-MCNC: 0.3 MG/DL (ref 0.1–1)
BUN SERPL-MCNC: 18 MG/DL (ref 6–20)
CALCIUM SERPL-MCNC: 8.4 MG/DL (ref 8.7–10.5)
CHLORIDE SERPL-SCNC: 100 MMOL/L (ref 95–110)
CO2 SERPL-SCNC: 28 MMOL/L (ref 23–29)
CREAT SERPL-MCNC: 1 MG/DL (ref 0.5–1.4)
DACRYOCYTES BLD QL SMEAR: ABNORMAL
DIFFERENTIAL METHOD: ABNORMAL
EOSINOPHIL # BLD AUTO: 0.3 K/UL (ref 0–0.5)
EOSINOPHIL NFR BLD: 3.6 % (ref 0–8)
ERYTHROCYTE [DISTWIDTH] IN BLOOD BY AUTOMATED COUNT: 12.9 % (ref 11.5–14.5)
EST. GFR  (NO RACE VARIABLE): >60 ML/MIN/1.73 M^2
GLUCOSE SERPL-MCNC: 239 MG/DL (ref 70–110)
HCT VFR BLD AUTO: 33.7 % (ref 37–48.5)
HGB BLD-MCNC: 10.8 G/DL (ref 12–16)
IMM GRANULOCYTES # BLD AUTO: 0.02 K/UL (ref 0–0.04)
IMM GRANULOCYTES NFR BLD AUTO: 0.3 % (ref 0–0.5)
LYMPHOCYTES # BLD AUTO: 1.8 K/UL (ref 1–4.8)
LYMPHOCYTES NFR BLD: 25 % (ref 18–48)
MCH RBC QN AUTO: 29.2 PG (ref 27–31)
MCHC RBC AUTO-ENTMCNC: 32 G/DL (ref 32–36)
MCV RBC AUTO: 91 FL (ref 82–98)
MONOCYTES # BLD AUTO: 0.4 K/UL (ref 0.3–1)
MONOCYTES NFR BLD: 6.1 % (ref 4–15)
NEUTROPHILS # BLD AUTO: 4.7 K/UL (ref 1.8–7.7)
NEUTROPHILS NFR BLD: 64.6 % (ref 38–73)
NRBC BLD-RTO: 0 /100 WBC
PLATELET # BLD AUTO: 235 K/UL (ref 150–450)
PLATELET BLD QL SMEAR: ABNORMAL
PMV BLD AUTO: 11 FL (ref 9.2–12.9)
POIKILOCYTOSIS BLD QL SMEAR: SLIGHT
POTASSIUM SERPL-SCNC: 3.4 MMOL/L (ref 3.5–5.1)
PROT SERPL-MCNC: 6.2 G/DL (ref 6–8.4)
RBC # BLD AUTO: 3.7 M/UL (ref 4–5.4)
SODIUM SERPL-SCNC: 139 MMOL/L (ref 136–145)
STOMATOCYTES BLD QL SMEAR: PRESENT
WBC # BLD AUTO: 7.25 K/UL (ref 3.9–12.7)

## 2023-06-08 PROCEDURE — 3008F BODY MASS INDEX DOCD: CPT | Mod: CPTII,S$GLB,,

## 2023-06-08 PROCEDURE — 96415 CHEMO IV INFUSION ADDL HR: CPT

## 2023-06-08 PROCEDURE — 99215 OFFICE O/P EST HI 40 MIN: CPT | Mod: S$GLB,,,

## 2023-06-08 PROCEDURE — 63600175 PHARM REV CODE 636 W HCPCS: Mod: JZ,TB | Performed by: INTERNAL MEDICINE

## 2023-06-08 PROCEDURE — 3074F SYST BP LT 130 MM HG: CPT | Mod: CPTII,S$GLB,,

## 2023-06-08 PROCEDURE — 3078F PR MOST RECENT DIASTOLIC BLOOD PRESSURE < 80 MM HG: ICD-10-PCS | Mod: CPTII,S$GLB,,

## 2023-06-08 PROCEDURE — 85025 COMPLETE CBC W/AUTO DIFF WBC: CPT | Performed by: INTERNAL MEDICINE

## 2023-06-08 PROCEDURE — 99999 PR PBB SHADOW E&M-EST. PATIENT-LVL III: CPT | Mod: PBBFAC,,,

## 2023-06-08 PROCEDURE — 3051F PR MOST RECENT HEMOGLOBIN A1C LEVEL 7.0 - < 8.0%: ICD-10-PCS | Mod: CPTII,S$GLB,,

## 2023-06-08 PROCEDURE — 3051F HG A1C>EQUAL 7.0%<8.0%: CPT | Mod: CPTII,S$GLB,,

## 2023-06-08 PROCEDURE — 3008F PR BODY MASS INDEX (BMI) DOCUMENTED: ICD-10-PCS | Mod: CPTII,S$GLB,,

## 2023-06-08 PROCEDURE — 99215 PR OFFICE/OUTPT VISIT, EST, LEVL V, 40-54 MIN: ICD-10-PCS | Mod: S$GLB,,,

## 2023-06-08 PROCEDURE — 80053 COMPREHEN METABOLIC PANEL: CPT | Performed by: INTERNAL MEDICINE

## 2023-06-08 PROCEDURE — 3074F PR MOST RECENT SYSTOLIC BLOOD PRESSURE < 130 MM HG: ICD-10-PCS | Mod: CPTII,S$GLB,,

## 2023-06-08 PROCEDURE — 96375 TX/PRO/DX INJ NEW DRUG ADDON: CPT

## 2023-06-08 PROCEDURE — 96367 TX/PROPH/DG ADDL SEQ IV INF: CPT

## 2023-06-08 PROCEDURE — 99999 PR PBB SHADOW E&M-EST. PATIENT-LVL III: ICD-10-PCS | Mod: PBBFAC,,,

## 2023-06-08 PROCEDURE — 25000003 PHARM REV CODE 250: Performed by: INTERNAL MEDICINE

## 2023-06-08 PROCEDURE — 36415 COLL VENOUS BLD VENIPUNCTURE: CPT | Performed by: INTERNAL MEDICINE

## 2023-06-08 PROCEDURE — 96413 CHEMO IV INFUSION 1 HR: CPT

## 2023-06-08 PROCEDURE — 3078F DIAST BP <80 MM HG: CPT | Mod: CPTII,S$GLB,,

## 2023-06-08 RX ORDER — DIPHENHYDRAMINE HYDROCHLORIDE 50 MG/ML
25 INJECTION INTRAMUSCULAR; INTRAVENOUS
Status: COMPLETED | OUTPATIENT
Start: 2023-06-08 | End: 2023-06-08

## 2023-06-08 RX ORDER — ACETAMINOPHEN 325 MG/1
650 TABLET ORAL
Status: COMPLETED | OUTPATIENT
Start: 2023-06-08 | End: 2023-06-08

## 2023-06-08 RX ORDER — ONDANSETRON 2 MG/ML
8 INJECTION INTRAMUSCULAR; INTRAVENOUS
Status: COMPLETED | OUTPATIENT
Start: 2023-06-08 | End: 2023-06-08

## 2023-06-08 RX ORDER — HEPARIN 100 UNIT/ML
500 SYRINGE INTRAVENOUS
Status: DISCONTINUED | OUTPATIENT
Start: 2023-06-08 | End: 2023-06-08 | Stop reason: HOSPADM

## 2023-06-08 RX ORDER — POTASSIUM CHLORIDE 20 MEQ/1
20 TABLET, EXTENDED RELEASE ORAL DAILY
Qty: 30 TABLET | Refills: 6 | Status: SHIPPED | OUTPATIENT
Start: 2023-06-08 | End: 2023-06-22 | Stop reason: SDUPTHER

## 2023-06-08 RX ADMIN — ACETAMINOPHEN 650 MG: 325 TABLET ORAL at 09:06

## 2023-06-08 RX ADMIN — DEXAMETHASONE SODIUM PHOSPHATE 0.25 MG: 4 INJECTION, SOLUTION INTRA-ARTICULAR; INTRALESIONAL; INTRAMUSCULAR; INTRAVENOUS; SOFT TISSUE at 09:06

## 2023-06-08 RX ADMIN — ONDANSETRON 8 MG: 2 INJECTION, SOLUTION INTRAMUSCULAR; INTRAVENOUS at 09:06

## 2023-06-08 RX ADMIN — SODIUM CHLORIDE: 9 INJECTION, SOLUTION INTRAVENOUS at 09:06

## 2023-06-08 RX ADMIN — DIPHENHYDRAMINE HYDROCHLORIDE 25 MG: 50 INJECTION, SOLUTION INTRAMUSCULAR; INTRAVENOUS at 09:06

## 2023-06-08 RX ADMIN — AMIVANTAMAB 1400 MG: 350 INJECTION INTRAVENOUS at 09:06

## 2023-06-08 RX ADMIN — HEPARIN 500 UNITS: 100 SYRINGE at 11:06

## 2023-06-08 NOTE — DISCHARGE INSTRUCTIONS
THANKS FOR ALLOWING ME TO CARE FOR YOU TODAY!!! ~Nikki          THANKS FOR CHOOSING [facility]!!!      [facility]  [address] [facility]  [id number] [place] [address] Drive  [phone] phone     [phone] fax  Hours of Operation: Monday- Friday 8:00am- 5:00pm  After hours phone  [phone]  Hematology / Oncology Physicians on call      SHAINA Zimmer Dr., Dr., NP Sydney Prescott, BRISA Monteiro, MARCIO Tucker    Please call with any concerns regarding your appointment today.

## 2023-06-08 NOTE — PLAN OF CARE
Problem: Adult Inpatient Plan of Care  Goal: Plan of Care Review  Outcome: Ongoing, Progressing  Flowsheets (Taken 6/8/2023 0924)  Plan of Care Reviewed With: patient  Goal: Patient-Specific Goal (Individualized)  Outcome: Ongoing, Progressing  Flowsheets (Taken 6/8/2023 0924)  Anxieties, Fears or Concerns: none  Individualized Care Needs: feet up, 2 blankets, pillow  Goal: Optimal Comfort and Wellbeing  Outcome: Ongoing, Progressing  Intervention: Provide Person-Centered Care  Flowsheets (Taken 6/8/2023 0924)  Trust Relationship/Rapport:   care explained   questions encouraged   choices provided   reassurance provided   emotional support provided   thoughts/feelings acknowledged   empathic listening provided   questions answered     Problem: Fall Injury Risk  Goal: Absence of Fall and Fall-Related Injury  Outcome: Ongoing, Progressing  Intervention: Identify and Manage Contributors  Flowsheets (Taken 6/8/2023 0924)  Self-Care Promotion: BADL personal routines maintained  Medication Review/Management: medications reviewed  Intervention: Promote Injury-Free Environment  Flowsheets (Taken 6/8/2023 0924)  Safety Promotion/Fall Prevention:   in recliner, wheels locked   nonskid shoes/socks when out of bed   room near unit station   family to remain at bedside   medications reviewed

## 2023-06-08 NOTE — PROGRESS NOTES
SW consulted to speak with pt re: dental needs. SW met with pt and spouse at chairside. Pt stated that she has a dental policy but very limited coverage. Pt gave consent for SW to submit referral to \[facility]\"" oral and maxillofacial surgery clinic. SW submitted online referral and faxed medical records to [facility] [phone], p. [phone]. SW will follow up in a few days to confirm that referral was received if pt has been scheduled for consultation. SW will remain available.

## 2023-06-12 ENCOUNTER — TELEPHONE (OUTPATIENT)
Dept: HEMATOLOGY/ONCOLOGY | Facility: CLINIC | Age: 53
End: 2023-06-12
Payer: COMMERCIAL

## 2023-06-12 NOTE — TELEPHONE ENCOUNTER
LETICIA confirmed that [facility] Oral and Maxillofacial Surgery clinic [phone] received pt referral and will call pt for scheduling. SW attempted to reach pt to inform of the above, but no answer. SW will remain available.

## 2023-06-15 ENCOUNTER — OFFICE VISIT (OUTPATIENT)
Dept: PALLIATIVE MEDICINE | Facility: CLINIC | Age: 53
End: 2023-06-15
Payer: COMMERCIAL

## 2023-06-15 DIAGNOSIS — Z51.5 PALLIATIVE CARE ENCOUNTER: ICD-10-CM

## 2023-06-15 DIAGNOSIS — G47.01 INSOMNIA DUE TO MEDICAL CONDITION: ICD-10-CM

## 2023-06-15 DIAGNOSIS — C34.32 MALIGNANT NEOPLASM OF LOWER LOBE OF LEFT LUNG: Primary | ICD-10-CM

## 2023-06-15 PROCEDURE — 99213 OFFICE O/P EST LOW 20 MIN: CPT | Mod: 95,,,

## 2023-06-15 PROCEDURE — 99213 PR OFFICE/OUTPT VISIT, EST, LEVL III, 20-29 MIN: ICD-10-PCS | Mod: 95,,,

## 2023-06-15 PROCEDURE — 3051F PR MOST RECENT HEMOGLOBIN A1C LEVEL 7.0 - < 8.0%: ICD-10-PCS | Mod: CPTII,95,,

## 2023-06-15 PROCEDURE — 3051F HG A1C>EQUAL 7.0%<8.0%: CPT | Mod: CPTII,95,,

## 2023-06-15 NOTE — PATIENT INSTRUCTIONS
Trial Melatonin 1mg for sleeping   Stop caffeine intake after noon  Limit fluid intake in the middle in the night, so you don't have to wake up to use bathroom.    We have noted you want to be DNR. We can complete the form and have a doctor sign it when you come to the clinic  Please bring your power of  form with you. We will review it and upload it to your online chart when you come to the clinic.   We can see you virtually if no new symptoms.

## 2023-06-16 ENCOUNTER — TELEPHONE (OUTPATIENT)
Dept: HEMATOLOGY/ONCOLOGY | Facility: CLINIC | Age: 53
End: 2023-06-16
Payer: COMMERCIAL

## 2023-06-16 NOTE — TELEPHONE ENCOUNTER
LETICIA attempted to reach pt re: [facility] dental appt. SW called [facility] oral and maxillofacial surgery clinic [place] at [phone]-per Daisha, clinic attempted to reach pt yesterday re: appt but no answer but will attempt to reach pt again today for scheduling. SW will follow up next week. SW will remain available.    10:49 am- Pt called to inform SW that she has an appt 7.10.23 at [facility] oral and maxillofacial surgery [place] and could have been seen 7.3.23, but will be out of town. Pt will call [facility] back to see if she can be placed on a wait list for sooner appt if someone cancels. No other needs expressed. LETICIA will remain available.

## 2023-06-21 NOTE — PROGRESS NOTES
Palliative Medicine  Follow-up  Consult Requested By: Dr. Mcfarlane  Reason for Consult: Advance Care Planning/Goals of Care Discussion      SUBJECTIVE:     History of Present Illness:  Ms. Ahmadi is a 53 y.o. year old female presenting with metastatic lung cancer with bone and possible liver involvement. She is followed by Dr. Mcfarlane. She is currently receiving Rybrevant following CT scan on April 13, 2023 showed bone and possible liver metastasis.  The palliative care team was consulted to assist with advance care planning and goals of care discussion in the setting of metastatic lung cancer with progressive disease.     Chief Complaint: ACP/GOC follow-up    6/22/2023: Pt returned to [facility] for lab draw. Came to PM clinc to drop off HCPOA and LaPOST: Pt stated she wants to be Full Code, not DNR. She stated she has discussed this with her family, and they are prepared to decide whether or not she should remain on life support following a resuscitation attempt. HCPOA form to be uploaded on Epic.     06/15/2023:    The patient location is: [place]  The chief complaint leading to consultation is:  Follow-up     Visit type: audiovisual    20 minutes of total time spent on the encounter, which includes face to face time and non-face to face time preparing to see the patient (eg, review of tests), Obtaining and/or reviewing separately obtained history, Documenting clinical information in the electronic or other health record, Independently interpreting results (not separately reported) and communicating results to the patient/family/caregiver, or Care coordination (not separately reported).      Each patient to whom he or she provides medical services by telemedicine is:  (1) informed of the relationship between the physician and patient and the respective role of any other health care provider with respect to management of the patient; and (2) notified that he or she may decline to receive medical services by  "telemedicine and may withdraw from such care at any time.  Notes:     Pt seen on virtual visit today. Appears to be alone in her car. No acute distress. She reported insomnia, and agreed to trial Melatonin 1mg QHS PRN  for sleep. Otherwise, pt stated she has been doing well and tolerating her current treatment regimen. She expressed relief that her CT scanon 06/06 has shown stable disease.   Pt stated she has completed her ACP document and will bring it to the PM clinic on Thursday, 6/22 following her appointment with Dr. Mcfarlane. She also stated she wants to be DNR and wishes to complete LaPOST reflecting DNR on 6/22. Pt stated she will continue cancer treatment, and she has discussed her GOC and Code status with her family.       Past Visits  4/20/2023: Patient arrived in clinic alone. She was tearful and appeared anxious. Vitals signs stable on room air. I explained the role palliative care often plays in supporting patients with serious illness and their families with regard to symptom management, advance care planning, and goals of care discussion.   She reported mild intermittent, self-limiting dyspnea and anxiety due to her prognosis. She denied chest discomfort, nausea, vomiting, palpitations, and dizziness. She stated she has been tolerating chemotherapy well, and wants to progress with further treatment despite her CT scan showing progressive disease.   Pt confirmed that she is aware of her CT scan, and she is disappointed in the results because she thought that her current regimen would "and make it better". She stated she is aware that her treatment plan would be palliative and not curative, but she was hopeful that it could be curative.   We discussed advance care planning and goals of care. Pt expressed feeling overwhelmed, but she took the Advance Directive and LaPOST forms to review with her family. She stated that her  Michael Ahmadi [phone] would be her primary SDM, and her daughter would " be second choice. She stated that she will complete the Advance Directive forms and return it to the clinic next week. Due to her anxiety, I offered to facilitate a family meeting with her and her family regarding prognosis decision. Pt stated she will inform me if she needs me to facilitate a meeting.   We will plan to follow-up with patient in four weeks in-person or  virtually if she does not require symptom management.       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     Past Surgical History:   Procedure Laterality Date    CATARACT EXTRACTION W/  INTRAOCULAR LENS IMPLANT Right 06/02/2022    FLUOROSCOPY N/A 1/26/2023    Procedure: FLUOROSCOPY/mediport placement;  Surgeon: Marlon Leone MD;  Location: [facility] CATH LAB;  Service: General;  Laterality: N/A;    TUBAL LIGATION      2007--postpartum tubal ligation     Family History   Problem Relation Age of Onset    Heart failure Father      Review of patient's allergies indicates:   Allergen Reactions    Lisinopril Other (See Comments)     coughing    Amoxicillin        Medications:    Current Outpatient Medications:     albuterol (PROVENTIL/VENTOLIN HFA) 90 mcg/actuation inhaler, Inhale 1-2 puffs into the lungs every 6 (six) hours as needed for Wheezing (cough)., Disp: 6.7 g, Rfl: 0    amLODIPine (NORVASC) 10 MG tablet, Take 1 tablet (10 mg total) by mouth once daily., Disp: 90 tablet, Rfl: 3    atenoloL (TENORMIN) 50 MG tablet, Take 1 tab by mouth twice a day, Disp: 180 tablet, Rfl: 3    betamethasone valerate 0.1% (VALISONE) 0.1 % Oint, APPLY TOPICALLY TO THE TOP OF THE FEET TWO TIMES A DAY, Disp: 45 g, Rfl: 1    blood sugar diagnostic Strp, To check BG 2 times daily, to use with insurance preferred meter, Disp: 100 each, Rfl: 11    calcium carbonate (OS-BRUNILDA) 500 mg calcium (1,250 mg)  tablet, Take 1 tablet (500 mg total) by mouth once daily., Disp: 90 tablet, Rfl: 3    cetirizine (ZYRTEC) 10 MG tablet, Take 1 tablet (10 mg total) by mouth every evening., Disp: 30 tablet, Rfl: 0    dexAMETHasone (DECADRON) 4 MG Tab, Take 2 tablets (8 mg total) by mouth once daily. Take as directed on days 2, 3, and 16,17 of your chemotherapy cycle starting with cycle 2 forward. Do not take with cycle 1., Disp: 8 tablet, Rfl: 10    dexAMETHasone (DECADRON) 4 MG Tab, Take 1 tablet (4 mg total) by mouth As instructed. Take 8 mg the day prior to chemotherapy, and then 8 mg after chemo on days 2, 3, 4, Disp: 24 tablet, Rfl: 5    fluticasone propionate (FLONASE) 50 mcg/actuation nasal spray, 2 sprays (100 mcg total) by Each Nostril route once daily., Disp: 9.9 mL, Rfl: 2    folic acid (FOLVITE) 1 MG tablet, Take 1 tablet (1 mg total) by mouth once daily., Disp: 30 tablet, Rfl: 11    HYDROcodone-acetaminophen (NORCO) 7.5-325 mg per tablet, Take 1 tablet by mouth every 4 (four) hours as needed for Pain., Disp: 40 tablet, Rfl: 0    hydrocodone-homatropine 5-1.5 mg/5 ml (HYCODAN) 5-1.5 mg/5 mL Syrp, Take 5 mL by mouth every 4 (four) hours as needed., Disp: 473 mL, Rfl: 0    lancets Misc, To check BG 2 times daily, to use with insurance preferred meter, Disp: 100 each, Rfl: 11    losartan-hydrochlorothiazide 100-25 mg (HYZAAR) 100-25 mg per tablet, Take 1 tablet by mouth once daily., Disp: 90 tablet, Rfl: 3    metFORMIN (GLUCOPHAGE-XR) 500 MG ER 24hr tablet, Take 1 tab with breakfast and take 2 tabs with dinner, Disp: 90 tablet, Rfl: 11    mupirocin (BACTROBAN) 2 % ointment, APPLY TO AFFECTED AREA(S) THREE TIMES A DAY, Disp: 22 g, Rfl: 0    ondansetron (ZOFRAN-ODT) 8 MG TbDL, Take 1 tablet (8 mg total) by mouth every 8 (eight) hours as needed. Starting with cycle 1 of chemotherapy, Disp: 60 tablet, Rfl: 11    ondansetron (ZOFRAN-ODT) 8 MG TbDL, Take 1 tablet (8 mg total) by mouth every 8 (eight) hours as needed  (nausea/vomitting)., Disp: 90 tablet, Rfl: 5    osimertinib (TAGRISSO) 80 mg Tab, Take 80 mg by mouth once daily., Disp: 30 tablet, Rfl: 11    potassium chloride SA (K-DUR,KLOR-CON) 20 MEQ tablet, Take 1 tablet (20 mEq total) by mouth once daily., Disp: 30 tablet, Rfl: 6    pravastatin (PRAVACHOL) 10 MG tablet, Take 1 tablet (10 mg total) by mouth every evening., Disp: 30 tablet, Rfl: 11    TRUE METRIX GLUCOSE METER Misc, , Disp: , Rfl:     TRUEPLUS LANCETS 33 gauge Misc, Apply topically., Disp: , Rfl:     OBJECTIVE:     ROS:  Review of Systems   Constitutional: Negative.  Negative for activity change, appetite change, fatigue, fever and unexpected weight change.   HENT: Negative.     Eyes: Negative.    Respiratory:  Negative for cough, chest tightness, shortness of breath, wheezing and stridor.    Cardiovascular:  Negative for chest pain, palpitations and leg swelling.   Gastrointestinal: Negative.  Negative for abdominal distention, abdominal pain, constipation, diarrhea and nausea.   Genitourinary: Negative.    Musculoskeletal: Negative.  Negative for arthralgias, back pain, joint swelling and myalgias.   Skin:  Negative for color change, pallor, rash and wound.   Neurological: Negative.  Negative for dizziness, tremors, speech difficulty, weakness, numbness and headaches.   Psychiatric/Behavioral:  Positive for sleep disturbance. Negative for agitation, behavioral problems, confusion, dysphoric mood and suicidal ideas. The patient is not nervous/anxious.      Review of Symptoms      Symptom Assessment (ESAS 0-10 Scale)  Pain:  0  Dyspnea:  0  Anxiety:  0  Nausea:  0  Depression:  0  Anorexia:  0  Fatigue:  0  Insomnia:  5  Restlessness:  0  Agitation:  0     CAM / Delirium:  Negative  Constipation:  Negative  Diarrhea:  Negative    Anxiety:  Is not nervous/anxious  Constipation:  No constipation    Bowel Management Plan (BMP):  No      Modified Sheldon Scale:  0    Performance Status:  100    ECOG Performance  Status stGstrstastdstest:st st1st Functional Assessment Scale (FAST):  1    Karnofsky Performance Scale:  100%    Living Arrangements:  Lives with spouse    Psychosocial/Cultural:   See Palliative Psychosocial Note: Yes  Pt lives with her  (Michael Ahmadi- [phone]) and two adult children. She works at a doctor's office. She has not completed an advance directive yet, but stated that her  will be her first choice, and her daughter is her second choice.   **Primary  to Follow**  Palliative Care  Consult: No    Advance Care Planning   Advance Directives:   Living Will: No    LaPOST: No    Do Not Resuscitate Status: Yes (Verbal decleration.)    Medical Power of : No      Decision Making:  Patient answered questions  Goals of Care: What is most important right now is to focus on remaining as independent as possible, symptom/pain control, extending life as long as possible, even it it means sacrificing quality, curative/life-prolongation (regardless of treatment burdens). Accordingly, we have decided that the best plan to meet the patient's goals includes continuing with treatment.          Physical Exam:  Vitals:    Physical Exam  Vitals reviewed: Limited due to virtual visit.   Constitutional:       General: She is not in acute distress.     Appearance: Normal appearance. She is normal weight. She is not ill-appearing.   Eyes:      General: No scleral icterus.        Right eye: No discharge.         Left eye: No discharge.      Conjunctiva/sclera: Conjunctivae normal.   Pulmonary:      Effort: Pulmonary effort is normal.   Skin:     Comments: Facial Acne   Neurological:      Mental Status: She is alert.   Psychiatric:         Mood and Affect: Mood normal.         Behavior: Behavior normal.         Thought Content: Thought content normal.         Judgment: Judgment normal.       Labs and radiology data reviewed    ASSESSMENT/PLAN:   Metastatic Lung Cancer  Followed by Dr. Mcfarlane. Pt  aware that treatment is only palliative, not curative  Pt is currently tolerating Rybrevant, with no complications  Palliative care team will follow-up as needed   Insomnia  Likely exacerbated by cancer diagnosis  Discussed avoiding caffeine intake after noon and limiting fluid intake at night to minimize waking up to urinate.  Trial OTC Melatonin 1mg QHS PRN for sleep  3.   Encounter for Palliative Care  A. Code status: Full code. Pt stated she does not wish to be DNR, and her family will decide if she should remain on life support.   B. HCPOA: Form brought to PM clinic. To be uploaded on Epic.   C. GOC: Continue cancer treatment, symptom management, maintain independence and functional status.   -See HPI for further details        Follow up: PRN. We can schedule virtual visits provided pt remains symptom-free.     20 minutes of total time spent on the encounter, which includes face to face time and non-face to face time preparing to see the patient (eg, review of tests), Obtaining and/or reviewing separately obtained history, Documenting clinical information in the electronic or other health record, Independently interpreting results (not separately reported) and communicating results to the patient/family/caregiver, or Care coordination (not separately reported).      Signature: VICKI REECE NP

## 2023-06-22 ENCOUNTER — LAB VISIT (OUTPATIENT)
Dept: LAB | Facility: HOSPITAL | Age: 53
End: 2023-06-22
Attending: INTERNAL MEDICINE
Payer: COMMERCIAL

## 2023-06-22 ENCOUNTER — DOCUMENTATION ONLY (OUTPATIENT)
Dept: PALLIATIVE MEDICINE | Facility: CLINIC | Age: 53
End: 2023-06-22
Payer: COMMERCIAL

## 2023-06-22 ENCOUNTER — OFFICE VISIT (OUTPATIENT)
Dept: HEMATOLOGY/ONCOLOGY | Facility: CLINIC | Age: 53
End: 2023-06-22
Payer: COMMERCIAL

## 2023-06-22 ENCOUNTER — DOCUMENTATION ONLY (OUTPATIENT)
Dept: HEMATOLOGY/ONCOLOGY | Facility: CLINIC | Age: 53
End: 2023-06-22
Payer: COMMERCIAL

## 2023-06-22 ENCOUNTER — INFUSION (OUTPATIENT)
Dept: INFUSION THERAPY | Facility: HOSPITAL | Age: 53
End: 2023-06-22
Attending: RADIOLOGY
Payer: COMMERCIAL

## 2023-06-22 VITALS
RESPIRATION RATE: 16 BRPM | HEIGHT: 64 IN | OXYGEN SATURATION: 95 % | BODY MASS INDEX: 50.02 KG/M2 | WEIGHT: 293 LBS | TEMPERATURE: 97 F | DIASTOLIC BLOOD PRESSURE: 58 MMHG | SYSTOLIC BLOOD PRESSURE: 95 MMHG | HEART RATE: 56 BPM

## 2023-06-22 DIAGNOSIS — C34.32 MALIGNANT NEOPLASM OF LOWER LOBE OF LEFT LUNG: ICD-10-CM

## 2023-06-22 DIAGNOSIS — D84.821 IMMUNODEFICIENCY DUE TO CHEMOTHERAPY: ICD-10-CM

## 2023-06-22 DIAGNOSIS — L70.8 ACNEIFORM RASH: ICD-10-CM

## 2023-06-22 DIAGNOSIS — E87.6 HYPOKALEMIA: Primary | ICD-10-CM

## 2023-06-22 DIAGNOSIS — Z79.899 IMMUNODEFICIENCY DUE TO CHEMOTHERAPY: ICD-10-CM

## 2023-06-22 DIAGNOSIS — C34.32 MALIGNANT NEOPLASM OF LOWER LOBE OF LEFT LUNG: Primary | ICD-10-CM

## 2023-06-22 DIAGNOSIS — C79.51 SECONDARY MALIGNANT NEOPLASM OF BONE: ICD-10-CM

## 2023-06-22 DIAGNOSIS — T45.1X5A IMMUNODEFICIENCY DUE TO CHEMOTHERAPY: ICD-10-CM

## 2023-06-22 LAB
ALBUMIN SERPL BCP-MCNC: 2.9 G/DL (ref 3.5–5.2)
ALP SERPL-CCNC: 96 U/L (ref 55–135)
ALT SERPL W/O P-5'-P-CCNC: 16 U/L (ref 10–44)
ANION GAP SERPL CALC-SCNC: 13 MMOL/L (ref 8–16)
AST SERPL-CCNC: 14 U/L (ref 10–40)
BASOPHILS # BLD AUTO: 0.04 K/UL (ref 0–0.2)
BASOPHILS NFR BLD: 0.6 % (ref 0–1.9)
BILIRUB SERPL-MCNC: 0.3 MG/DL (ref 0.1–1)
BUN SERPL-MCNC: 19 MG/DL (ref 6–20)
CALCIUM SERPL-MCNC: 8.7 MG/DL (ref 8.7–10.5)
CHLORIDE SERPL-SCNC: 99 MMOL/L (ref 95–110)
CO2 SERPL-SCNC: 27 MMOL/L (ref 23–29)
CREAT SERPL-MCNC: 1 MG/DL (ref 0.5–1.4)
DIFFERENTIAL METHOD: ABNORMAL
EOSINOPHIL # BLD AUTO: 0.2 K/UL (ref 0–0.5)
EOSINOPHIL NFR BLD: 3.4 % (ref 0–8)
ERYTHROCYTE [DISTWIDTH] IN BLOOD BY AUTOMATED COUNT: 12.1 % (ref 11.5–14.5)
EST. GFR  (NO RACE VARIABLE): >60 ML/MIN/1.73 M^2
GLUCOSE SERPL-MCNC: 181 MG/DL (ref 70–110)
HCT VFR BLD AUTO: 34.8 % (ref 37–48.5)
HGB BLD-MCNC: 11.4 G/DL (ref 12–16)
IMM GRANULOCYTES # BLD AUTO: 0.01 K/UL (ref 0–0.04)
IMM GRANULOCYTES NFR BLD AUTO: 0.1 % (ref 0–0.5)
LYMPHOCYTES # BLD AUTO: 1.9 K/UL (ref 1–4.8)
LYMPHOCYTES NFR BLD: 27.8 % (ref 18–48)
MCH RBC QN AUTO: 28.9 PG (ref 27–31)
MCHC RBC AUTO-ENTMCNC: 32.8 G/DL (ref 32–36)
MCV RBC AUTO: 88 FL (ref 82–98)
MONOCYTES # BLD AUTO: 0.4 K/UL (ref 0.3–1)
MONOCYTES NFR BLD: 6 % (ref 4–15)
NEUTROPHILS # BLD AUTO: 4.3 K/UL (ref 1.8–7.7)
NEUTROPHILS NFR BLD: 62.1 % (ref 38–73)
NRBC BLD-RTO: 0 /100 WBC
PLATELET # BLD AUTO: 177 K/UL (ref 150–450)
PMV BLD AUTO: 12.2 FL (ref 9.2–12.9)
POTASSIUM SERPL-SCNC: 3.1 MMOL/L (ref 3.5–5.1)
PROT SERPL-MCNC: 6.3 G/DL (ref 6–8.4)
RBC # BLD AUTO: 3.94 M/UL (ref 4–5.4)
SODIUM SERPL-SCNC: 139 MMOL/L (ref 136–145)
WBC # BLD AUTO: 6.84 K/UL (ref 3.9–12.7)

## 2023-06-22 PROCEDURE — 85025 COMPLETE CBC W/AUTO DIFF WBC: CPT | Performed by: INTERNAL MEDICINE

## 2023-06-22 PROCEDURE — 80053 COMPREHEN METABOLIC PANEL: CPT | Performed by: INTERNAL MEDICINE

## 2023-06-22 PROCEDURE — 3051F HG A1C>EQUAL 7.0%<8.0%: CPT | Mod: CPTII,95,,

## 2023-06-22 PROCEDURE — 36415 COLL VENOUS BLD VENIPUNCTURE: CPT | Performed by: INTERNAL MEDICINE

## 2023-06-22 PROCEDURE — 25000003 PHARM REV CODE 250

## 2023-06-22 PROCEDURE — 96415 CHEMO IV INFUSION ADDL HR: CPT

## 2023-06-22 PROCEDURE — 99215 PR OFFICE/OUTPT VISIT, EST, LEVL V, 40-54 MIN: ICD-10-PCS | Mod: 95,,,

## 2023-06-22 PROCEDURE — 1159F PR MEDICATION LIST DOCUMENTED IN MEDICAL RECORD: ICD-10-PCS | Mod: CPTII,95,,

## 2023-06-22 PROCEDURE — 96413 CHEMO IV INFUSION 1 HR: CPT

## 2023-06-22 PROCEDURE — 96367 TX/PROPH/DG ADDL SEQ IV INF: CPT

## 2023-06-22 PROCEDURE — 1160F PR REVIEW ALL MEDS BY PRESCRIBER/CLIN PHARMACIST DOCUMENTED: ICD-10-PCS | Mod: CPTII,95,,

## 2023-06-22 PROCEDURE — 1159F MED LIST DOCD IN RCRD: CPT | Mod: CPTII,95,,

## 2023-06-22 PROCEDURE — 96417 CHEMO IV INFUS EACH ADDL SEQ: CPT

## 2023-06-22 PROCEDURE — 63600175 PHARM REV CODE 636 W HCPCS

## 2023-06-22 PROCEDURE — 1160F RVW MEDS BY RX/DR IN RCRD: CPT | Mod: CPTII,95,,

## 2023-06-22 PROCEDURE — 96375 TX/PRO/DX INJ NEW DRUG ADDON: CPT

## 2023-06-22 PROCEDURE — A4216 STERILE WATER/SALINE, 10 ML: HCPCS

## 2023-06-22 PROCEDURE — 3051F PR MOST RECENT HEMOGLOBIN A1C LEVEL 7.0 - < 8.0%: ICD-10-PCS | Mod: CPTII,95,,

## 2023-06-22 PROCEDURE — 99215 OFFICE O/P EST HI 40 MIN: CPT | Mod: 95,,,

## 2023-06-22 RX ORDER — EPINEPHRINE 0.3 MG/.3ML
0.3 INJECTION SUBCUTANEOUS ONCE AS NEEDED
Status: CANCELLED | OUTPATIENT
Start: 2023-06-22

## 2023-06-22 RX ORDER — DIPHENHYDRAMINE HYDROCHLORIDE 50 MG/ML
25 INJECTION INTRAMUSCULAR; INTRAVENOUS
Status: CANCELLED
Start: 2023-06-22

## 2023-06-22 RX ORDER — SODIUM CHLORIDE 0.9 % (FLUSH) 0.9 %
10 SYRINGE (ML) INJECTION
Status: CANCELLED | OUTPATIENT
Start: 2023-06-22

## 2023-06-22 RX ORDER — DIPHENHYDRAMINE HYDROCHLORIDE 50 MG/ML
50 INJECTION INTRAMUSCULAR; INTRAVENOUS ONCE AS NEEDED
Status: CANCELLED | OUTPATIENT
Start: 2023-07-06

## 2023-06-22 RX ORDER — ONDANSETRON 2 MG/ML
8 INJECTION INTRAMUSCULAR; INTRAVENOUS
Status: CANCELLED | OUTPATIENT
Start: 2023-06-22

## 2023-06-22 RX ORDER — ACETAMINOPHEN 325 MG/1
650 TABLET ORAL
Status: CANCELLED
Start: 2023-06-22

## 2023-06-22 RX ORDER — ONDANSETRON 2 MG/ML
8 INJECTION INTRAMUSCULAR; INTRAVENOUS
Status: DISCONTINUED | OUTPATIENT
Start: 2023-06-22 | End: 2023-06-22

## 2023-06-22 RX ORDER — SODIUM CHLORIDE 0.9 % (FLUSH) 0.9 %
10 SYRINGE (ML) INJECTION
Status: CANCELLED | OUTPATIENT
Start: 2023-07-06

## 2023-06-22 RX ORDER — DIPHENHYDRAMINE HYDROCHLORIDE 50 MG/ML
25 INJECTION INTRAMUSCULAR; INTRAVENOUS
Status: CANCELLED
Start: 2023-07-06

## 2023-06-22 RX ORDER — DIPHENHYDRAMINE HYDROCHLORIDE 50 MG/ML
25 INJECTION INTRAMUSCULAR; INTRAVENOUS
Status: COMPLETED | OUTPATIENT
Start: 2023-06-22 | End: 2023-06-22

## 2023-06-22 RX ORDER — DIPHENHYDRAMINE HYDROCHLORIDE 50 MG/ML
50 INJECTION INTRAMUSCULAR; INTRAVENOUS ONCE AS NEEDED
Status: CANCELLED | OUTPATIENT
Start: 2023-06-22

## 2023-06-22 RX ORDER — HEPARIN 100 UNIT/ML
500 SYRINGE INTRAVENOUS
Status: DISCONTINUED | OUTPATIENT
Start: 2023-06-22 | End: 2023-06-22 | Stop reason: HOSPADM

## 2023-06-22 RX ORDER — EPINEPHRINE 0.3 MG/.3ML
0.3 INJECTION SUBCUTANEOUS ONCE AS NEEDED
Status: CANCELLED | OUTPATIENT
Start: 2023-07-06

## 2023-06-22 RX ORDER — ACETAMINOPHEN 325 MG/1
650 TABLET ORAL
Status: COMPLETED | OUTPATIENT
Start: 2023-06-22 | End: 2023-06-22

## 2023-06-22 RX ORDER — ONDANSETRON 2 MG/ML
8 INJECTION INTRAMUSCULAR; INTRAVENOUS
Status: CANCELLED | OUTPATIENT
Start: 2023-07-06

## 2023-06-22 RX ORDER — POTASSIUM CHLORIDE 750 MG/1
20 TABLET, EXTENDED RELEASE ORAL 2 TIMES DAILY
Qty: 12 TABLET | Refills: 0 | Status: SHIPPED | OUTPATIENT
Start: 2023-06-22 | End: 2023-07-20

## 2023-06-22 RX ORDER — ACETAMINOPHEN 325 MG/1
650 TABLET ORAL
Status: CANCELLED
Start: 2023-07-06

## 2023-06-22 RX ORDER — HEPARIN 100 UNIT/ML
500 SYRINGE INTRAVENOUS
Status: CANCELLED | OUTPATIENT
Start: 2023-07-06

## 2023-06-22 RX ORDER — SODIUM CHLORIDE 0.9 % (FLUSH) 0.9 %
10 SYRINGE (ML) INJECTION
Status: DISCONTINUED | OUTPATIENT
Start: 2023-06-22 | End: 2023-06-22 | Stop reason: HOSPADM

## 2023-06-22 RX ORDER — HEPARIN 100 UNIT/ML
500 SYRINGE INTRAVENOUS
Status: CANCELLED | OUTPATIENT
Start: 2023-06-22

## 2023-06-22 RX ADMIN — SODIUM CHLORIDE: 9 INJECTION, SOLUTION INTRAVENOUS at 10:06

## 2023-06-22 RX ADMIN — HEPARIN 500 UNITS: 100 SYRINGE at 12:06

## 2023-06-22 RX ADMIN — Medication 10 ML: at 12:06

## 2023-06-22 RX ADMIN — DEXAMETHASONE SODIUM PHOSPHATE 0.25 MG: 4 INJECTION, SOLUTION INTRA-ARTICULAR; INTRALESIONAL; INTRAMUSCULAR; INTRAVENOUS; SOFT TISSUE at 10:06

## 2023-06-22 RX ADMIN — DIPHENHYDRAMINE HYDROCHLORIDE 25 MG: 50 INJECTION INTRAMUSCULAR; INTRAVENOUS at 10:06

## 2023-06-22 RX ADMIN — AMIVANTAMAB 1400 MG: 350 INJECTION INTRAVENOUS at 10:06

## 2023-06-22 RX ADMIN — ACETAMINOPHEN 650 MG: 325 TABLET ORAL at 10:06

## 2023-06-22 NOTE — ASSESSMENT & PLAN NOTE
Cancer Staging   Malignant neoplasm of lower lobe of left lung  Staging form: Lung, AJCC 8th Edition  - Clinical stage from 12/9/2022: Stage IV (cT2, cN2, cM1) - Signed by Reese Mcfarlane MD on 12/9/2022    Completed 4 Cycles of Carbo/Alimta 03/31/2023    Repeat CT CAP 04/11/2023:   Detrimental change.  Increase in number and size of numerous sclerotic lesions throughout the cervical and thoracic spine, left humerus and sternum.  There are also multiple large sclerotic lesions throughout the lumbar spine, pelvis and proximal femurs measuring up to 7.1 cm in size.  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  2.   The lateral left upper lobe mass is decreased in size in the interim, currently measuring 2.2 x 1.0 cm.  Negative for new pulmonary masses.  3.  Exophytic nodule along the posterior right hepatic lobe measuring 1.1 cm.  This was not imaged previously.  Etiology uncertain.  Metastasis is not excluded.    Rybrevant C1D1 04/27/23      Labs reviewed, no concerning cytopenias  -Ok to proceed with C3D1 Rybrevant today      F/u in two weeks with cbc, cmp and Dr. Mcfarlane for C3D15 Rybrevant

## 2023-06-22 NOTE — PROGRESS NOTES
Nurse called pt to discuss her health care power of  form signed by family and will need to be corrected, pt didn't answer the phone, unable to leave voice message.

## 2023-06-22 NOTE — PROGRESS NOTES
SW met with pt and spouse at chairside to inform that [facility] oral and maxillofacial clinic called and stated that they can assist with extractions but not fillings and cleaning. Pt has appt 7.10.23. at [facility] and stated that she will follow up with [facility] re: cleaning and fillings because she has been there before. SW will also research additional grants that may also assist financially and follow up with pt. SW will remain available.     DAUGHTER

## 2023-06-22 NOTE — PROGRESS NOTES
Subjective:     Patient ID:Mateusz Ahmadi is a 53 y.o. female.?? MR#: [id number]   ?   PRIMARY ONCOLOGIST: Dr. Mcfarlane   ?   CHIEF COMPLAINT: Lab review/assessment C1D1 Rybrevant  ?   ONCOLOGIC DIAGNOSIS: Stage IV (cT2, cN2, cM1), Malignant neoplasm of lower lobe of left lung   ?   CURRENT TREATMENT: OP NSCLC AMIVANTAMAB-VMJW (Rybrevant) Q4W     PAST TREATMENT: PEMETREXED + CARBOPLATIN (AUC) Q3W    The patient location is: [facility]  The chief complaint leading to consultation is: chemo    Visit type: audiovisual    Face to Face time with patient: 15  30 minutes of total time spent on the encounter, which includes face to face time and non-face to face time preparing to see the patient (eg, review of tests), Obtaining and/or reviewing separately obtained history, Documenting clinical information in the electronic or other health record, Independently interpreting results (not separately reported) and communicating results to the patient/family/caregiver, or Care coordination (not separately reported).         Each patient to whom he or she provides medical services by telemedicine is:  (1) informed of the relationship between the physician and patient and the respective role of any other health care provider with respect to management of the patient; and (2) notified that he or she may decline to receive medical services by telemedicine and may withdraw from such care at any time.    Notes:    HPI Mrs. Ahmadi is a pleasan 53-renee-old female with metastatic non-small cell lung carcinoma Exon 20 mutation who presents today for lab review and assessment prior to C1D1 Rybrevant. 11/2022 pt seen with PCP with c/o persistent coughing and wheezing. CXR at that time revealed pulmonary nodules, subsequent CT chest found L Lung mass. L lung biopsy positive for adenocarcinoma , EGFR mutated. Pleural fluid also positive for malignancy. PET showed avid STEPHAN mass, mediastinal nodes, bone mets in C1, T1, T11, L3, sacrum, L ischium, sternum.  MRI brain negative for intracranial metastases. Initiated on carbo/ alimta 01/27/23--re imaging after 4 cycles found progressive disease. Pt initiated on Rybrevant 04/27/23.    Interval History: Pt states she is feeling well and voices no c/o today. Denies n/v/d/c, fever, chills, sob, cp, cough, abnormal bleeding. She notes acneform rash to face which is not improving with OTC acne tx products advised pt this is likely related to chemo, however, will refer to derm for management. Denies difficulty with appetite or maintaining hydration.       Oncology History   Malignant neoplasm of lower lobe of left lung   12/9/2022 Initial Diagnosis    Malignant neoplasm of lower lobe of left lung       12/9/2022 Cancer Staged    Staging form: Lung, AJCC 8th Edition  - Clinical stage from 12/9/2022: Stage IV (cT2, cN2, cM1)       12/27/2022 - 12/27/2022 Chemotherapy    Treatment Summary   Plan Name: OP PEMBROLIZUMAB 400MG Q6W  Treatment Goal: Control  Status: Inactive  Start Date:   End Date:   Provider: Reese Mcfarlane MD  Chemotherapy: [No matching medication found in this treatment plan]        Genetic Testing    Patient has genetic testing done for  TEmpus peripheral blood.                                              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     1/18/2023 - 1/18/2023 Chemotherapy    Treatment Summary   Plan Name: OP NSCLC AMIVANTAMAB-VMJW (Rybrevant) Q4W  Treatment Goal: Palliative  Status: Inactive  Start Date:   End Date:   Provider: Reese Mcfarlane MD  Chemotherapy: amivantamab-vmjw (RYBREVANT) 350 mg in sodium chloride 0.9% SolP 250 mL chemo infusion, 350 mg (100 % of original dose 350 mg), Intravenous, Clinic/HOD 1 time, 0 of 13 cycles  Dose modification: 350 mg (original dose 350 mg, Cycle 1), 1,050 mg (original dose 1,050 mg, Cycle 1), 1,400 mg (original dose 1,400 mg, Cycle 1)       1/27/2023 - 3/31/2023 Chemotherapy    Treatment Summary   Plan  Name: OP NSCLC PEMETREXED + CARBOPLATIN (AUC) Q3W  Treatment Goal: Control  Status: Inactive  Start Date: 1/27/2023  End Date: 3/31/2023  Provider: Reese Mcfarlane MD  Chemotherapy: CARBOplatin (PARAPLATIN) 750 mg in sodium chloride 0.9% 335 mL chemo infusion, 750 mg (100 % of original dose 750 mg), Intravenous, Clinic/HOD 1 time, 4 of 4 cycles  Dose modification:   (original dose 750 mg, Cycle 1, Reason: MD Discretion)  Administration: 750 mg (1/27/2023), 750 mg (2/17/2023), 750 mg (3/31/2023), 750 mg (3/10/2023)  PEMEtrexed disodium (ALIMTA) 1,200 mg in sodium chloride 0.9% SolP 100 mL chemo infusion, 1,250 mg, Intravenous, Clinic/HOD 1 time, 4 of 4 cycles  Administration: 1,200 mg (1/27/2023), 1,200 mg (2/17/2023), 1,200 mg (3/31/2023), 1,200 mg (3/10/2023)       4/27/2023 -  Chemotherapy    Treatment Summary   Plan Name: OP NSCLC AMIVANTAMAB-VMJW (Rybrevant) Q4W  Treatment Goal: Palliative  Status: Active  Start Date: 4/27/2023 (Planned)  End Date: 6/6/2024 (Planned)  Provider: Reese Mcfarlane MD  Chemotherapy: amivantamab-vmjw (RYBREVANT) 350 mg in sodium chloride 0.9% SolP 250 mL chemo infusion, 350 mg (original dose ), Intravenous, Clinic/HOD 1 time, 0 of 15 cycles  Dose modification: 350 mg (Cycle 1), 1,050 mg (Cycle 1), 1,400 mg (Cycle 1)          Social History     Socioeconomic History    Marital status:    Tobacco Use    Smoking status: Never     Passive exposure: Never    Smokeless tobacco: Never   Substance and Sexual Activity    Alcohol use: Never    Drug use: Never    Sexual activity: Yes     Partners: Male     Birth control/protection: None     Comment: tubal      Family History   Problem Relation Age of Onset    Heart failure Father       Past Surgical History:   Procedure Laterality Date    CATARACT EXTRACTION W/  INTRAOCULAR LENS IMPLANT Right 06/02/2022    FLUOROSCOPY N/A 1/26/2023    Procedure: FLUOROSCOPY/mediport placement;  Surgeon: Marlon Leone MD;  Location: [facility] CATH LAB;   Service: General;  Laterality: N/A;    TUBAL LIGATION      2007--postpartum tubal ligation        Review of Systems   Constitutional:  Negative for activity change, chills, fatigue and fever.   HENT: Negative.     Eyes: Negative.    Respiratory: Negative.     Cardiovascular: Negative.    Gastrointestinal: Negative.    Endocrine: Positive for cold intolerance.   Musculoskeletal:  Negative for arthralgias and myalgias.   Skin:  Negative for rash.   Neurological:  Negative for dizziness, weakness and light-headedness.   Psychiatric/Behavioral:  The patient is not nervous/anxious.      ?   A comprehensive 14-point review of systems was reviewed with patient and was negative other than as specified above.   ?     Objective:      Physical Exam  Constitutional:       Appearance: Normal appearance.   HENT:      Head: Normocephalic.   Neurological:      Mental Status: She is alert.   Psychiatric:         Mood and Affect: Mood normal.         Behavior: Behavior normal.         Thought Content: Thought content normal.         Judgment: Judgment normal.         ?   There were no vitals filed for this visit.     ?       ?   Laboratory:  ?   Lab Visit on 06/22/2023   Component Date Value Ref Range Status    Sodium 06/22/2023 139  136 - 145 mmol/L Final    Potassium 06/22/2023 3.1 (L)  3.5 - 5.1 mmol/L Final    Chloride 06/22/2023 99  95 - 110 mmol/L Final    CO2 06/22/2023 27  23 - 29 mmol/L Final    Glucose 06/22/2023 181 (H)  70 - 110 mg/dL Final    BUN 06/22/2023 19  6 - 20 mg/dL Final    Creatinine 06/22/2023 1.0  0.5 - 1.4 mg/dL Final    Calcium 06/22/2023 8.7  8.7 - 10.5 mg/dL Final    Total Protein 06/22/2023 6.3  6.0 - 8.4 g/dL Final    Albumin 06/22/2023 2.9 (L)  3.5 - 5.2 g/dL Final    Total Bilirubin 06/22/2023 0.3  0.1 - 1.0 mg/dL Final    Alkaline Phosphatase 06/22/2023 96  55 - 135 U/L Final    AST 06/22/2023 14  10 - 40 U/L Final    ALT 06/22/2023 16  10 - 44 U/L Final    eGFR 06/22/2023 >60  >60 mL/min/1.73 m^2  Final    Anion Gap 06/22/2023 13  8 - 16 mmol/L Final    WBC 06/22/2023 6.84  3.90 - 12.70 K/uL Final    RBC 06/22/2023 3.94 (L)  4.00 - 5.40 M/uL Final    Hemoglobin 06/22/2023 11.4 (L)  12.0 - 16.0 g/dL Final    Hematocrit 06/22/2023 34.8 (L)  37.0 - 48.5 % Final    MCV 06/22/2023 88  82 - 98 fL Final    MCH 06/22/2023 28.9  27.0 - 31.0 pg Final    MCHC 06/22/2023 32.8  32.0 - 36.0 g/dL Final    RDW 06/22/2023 12.1  11.5 - 14.5 % Final    Platelets 06/22/2023 177  150 - 450 K/uL Final    MPV 06/22/2023 12.2  9.2 - 12.9 fL Final    Immature Granulocytes 06/22/2023 0.1  0.0 - 0.5 % Final    Gran # (ANC) 06/22/2023 4.3  1.8 - 7.7 K/uL Final    Immature Grans (Abs) 06/22/2023 0.01  0.00 - 0.04 K/uL Final    Lymph # 06/22/2023 1.9  1.0 - 4.8 K/uL Final    Mono # 06/22/2023 0.4  0.3 - 1.0 K/uL Final    Eos # 06/22/2023 0.2  0.0 - 0.5 K/uL Final    Baso # 06/22/2023 0.04  0.00 - 0.20 K/uL Final    nRBC 06/22/2023 0  0 /100 WBC Final    Gran % 06/22/2023 62.1  38.0 - 73.0 % Final    Lymph % 06/22/2023 27.8  18.0 - 48.0 % Final    Mono % 06/22/2023 6.0  4.0 - 15.0 % Final    Eosinophil % 06/22/2023 3.4  0.0 - 8.0 % Final    Basophil % 06/22/2023 0.6  0.0 - 1.9 % Final    Differential Method 06/22/2023 Automated   Final      ?   Imaging:    No results found for this or any previous visit (from the past 2160 hour(s)).     Results for orders placed or performed during the hospital encounter of 06/06/23 (from the past 2160 hour(s))   CT Chest With Contrast    Narrative    EXAMINATION:  CT CHEST WITH CONTRAST    CLINICAL HISTORY:  h/o lung cancer new cough;Cough, unspecified    TECHNIQUE:  Low dose axial images, sagittal and coronal reformations were obtained from the thoracic inlet to the lung bases following the IV administration of 75 mL of Omnipaque 350.    COMPARISON:  CT 04/11/2023; prior chest radiographs    FINDINGS:  Right thyroid lobe nodule unchanged.  Thoracic aorta and great vessels stable.  Heart similar  appearance with left-sided chamber enlargement.  Small pericardial effusion similar.  No pathologically enlarged hilar, mediastinal or axillary lymph nodes.    Stable elevation of left hemidiaphragm with adjacent atelectasis.  Left upper lobe has not significantly change with presumed post treatment opacities.  Tiny sub 4 mm right lung pulmonary nodules stable with larger calcified right lower lobe nodule stable as well.    Visualized upper abdominal structures are unchanged.  See 04/11/2023 CT report.  No acute osseous abnormality with similar multiple sclerotic foci.      Impression    Overall no detrimental change of the chest when compared to 04/11/2023.  Findings as above.      Electronically signed by: Rahul Samaniego MD  Date:    06/06/2023  Time:    09:51   Results for orders placed or performed during the hospital encounter of 04/11/23 (from the past 2160 hour(s))   CT Chest Abdomen Pelvis With Contrast (xpd)    Narrative    EXAM:  CT CHEST ABDOMEN PELVIS WITH CONTRAST (XPD), Multiplanar reconstructions    CLINICAL INDICATION: Metastatic non-small cell lung carcinoma    TECHNIQUE: Axial images through the chest, abdomen and pelvis were obtained with the use of IV contrast.  Sagittal and coronal reconstructions are provided for review. Oral contrast was  utilized.    FINDINGS: Comparisons are made to a chest CT scan from 11/23/2022.    CHEST: Stable left upper lobe scarring.  The pulmonary opacity within the lateral left upper lobe currently measures 2.2 x 1.0 cm on image 142 of series 6, a decrease from 3.0 x 1.4 cm.  Stable elevation of the left hemidiaphragm with chronic scarring or atelectasis in the adjacent regular and left lower lobe.  Interval resolution of left pleural effusion.  Stable middle lobe scarring.  Stable calcified granuloma in the right lower lobe.  The lungs are otherwise free of new pulmonary opacities.    Stable calcified mediastinal lymph nodes. There are no hilar or mediastinal  masses or abnormal lymph nodes by size criteria.  The airways are patent.  The thyroid gland is unchanged in appearance with stable 9 mm right thyroid gland nodule.  The esophagus is normal.  A chest port is in place.    ABDOMEN:  There is a nodular density along the posterior portions of the right hepatic lobe measuring 1.1 cm on image 82 of series 4, not seen on the comparison study upper (this area was not included on the comparison study).  Stable 2.4 cm low-density left adrenal nodule.  Stable fat-containing lesion within the superior left kidney measuring 1.4 cm.  Stable low-attenuation focus within the lateral left hepatic lobe measuring 1.1 cm on image 50 series 4.  The liver, spleen and gallbladder otherwise appear normal.  The pancreas is unremarkable.  Kidneys and adrenal glands are otherwise normal. The biliary tree is normal.    Negative for adenopathy, ascites or inflammatory changes noted within the abdomen or pelvis.     Negative for vascular abnormalities.  The portal vein is patent.    The bowel appears normal. Normal appendix.  Negative for free air.  Moderate stool in the rectum.    PELVIS: The urinary bladder is unremarkable.     The female pelvic organs are normal.  Numerous pelvic phleboliths noted.    Small fat filled umbilical hernia.  Abdominal wall is otherwise intact.    Interval increase in number and size of numerous sclerotic lesions throughout the lower cervical and thoracic spine, left humerus and the inferior sternum.  There are sclerotic lesions within the L3 vertebral body, bilateral sacrum, bilateral pelvis and proximal femurs.  The largest area of sclerosis is within the posterior left acetabulum measuring 7.1 cm in length.  Negative for significant spinal canal stenosis.    Negative for groin adenopathy.        Impression    1.  Detrimental change.  Increase in number and size of numerous sclerotic lesions throughout the cervical and thoracic spine, left humerus and sternum.   There are also multiple large sclerotic lesions throughout the lumbar spine, pelvis and proximal femurs measuring up to 7.1 cm in size.  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  2.   The lateral left upper lobe mass is decreased in size in the interim, currently measuring 2.2 x 1.0 cm.  Negative for new pulmonary masses.  3.  Exophytic nodule along the posterior right hepatic lobe measuring 1.1 cm.  This was not imaged previously.  Etiology uncertain.  Metastasis is not excluded.  4.  Negative for acute process throughout the chest, abdomen or pelvis.  5.  Interval placement of a right-sided chest port.  6.  Numerous stable and nonemergent findings as noted above.      All CT scans at [this location] are performed using dose modulation techniques as appropriate to a performed exam including the following: automated exposure control; adjustment of the mA and/or kV according to patient size (this includes techniques or standardized protocols for targeted exams where dose is matched to indication / reason for exam; i.e. extremities or head); use of iterative reconstruction technique.    Finalized on: 4/11/2023 12:18 PM By:  Gene Escamilla MD  [facility]# [id number]      2023-04-11 12:20:[id number]    [facility]        ?   Assessment/Plan:     Problem List Items Addressed This Visit          Renal/    Hypokalemia - Primary     Lab Results   Component Value Date    K 3.1 (L) 06/22/2023   K down from prior  Rx sent in for 20meq K bid x 3 days with repeat K on 06/26             Relevant Medications    potassium chloride SA (KLOR-CON M10) 10 MEQ tablet    Other Relevant Orders    POTASSIUM    CBC Auto Differential    Comprehensive Metabolic Panel       Immunology/Multi System    Immunodeficiency due to chemotherapy    Relevant Orders    CBC Auto Differential    Comprehensive Metabolic Panel       Oncology    Secondary malignant neoplasm of bone     Plan to initiate Zometa pending dental clearance  Pt states she has appt scheduled  07/10/23 with [facility] dent[facility]          Relevant Orders    CBC Auto Differential    Comprehensive Metabolic Panel    Malignant neoplasm of lower lobe of left lung      Cancer Staging   Malignant neoplasm of lower lobe of left lung  Staging form: Lung, AJCC 8th Edition  - Clinical stage from 12/9/2022: Stage IV (cT2, cN2, cM1) - Signed by Reese Mcfarlane MD on 12/9/2022    Completed 4 Cycles of Carbo/Alimta 03/31/2023    Repeat CT CAP 04/11/2023:   Detrimental change.  Increase in number and size of numerous sclerotic lesions throughout the cervical and thoracic spine, left humerus and sternum.  There are also multiple large sclerotic lesions throughout the lumbar spine, pelvis and proximal femurs measuring up to 7.1 cm in size.  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  2.   The lateral left upper lobe mass is decreased in size in the interim, currently measuring 2.2 x 1.0 cm.  Negative for new pulmonary masses.  3.  Exophytic nodule along the posterior right hepatic lobe measuring 1.1 cm.  This was not imaged previously.  Etiology uncertain.  Metastasis is not excluded.    Rybrevant C1D1 04/27/23      Labs reviewed, no concerning cytopenias  -Ok to proceed with C3D1 Rybrevant today      F/u in two weeks with cbc, cmp and Dr. Mcfarlane for C3D15 Rybrevant             Relevant Orders    CBC Auto Differential    Comprehensive Metabolic Panel     Other Visit Diagnoses       Acneiform rash        Relevant Orders    Ambulatory referral/consult to Dermatology               Med Onc Chart Routing      Follow up with physician 2 weeks. with labs for Rybrevant   Follow up with DOLLY    Infusion scheduling note   C3D15 Rybrevnat   Injection scheduling note    Labs CBC and CMP   Scheduling:  Preferred lab:  Lab interval:     Imaging    Pharmacy appointment    Other referrals               MILAGROS Castillo  Hematology/Oncology

## 2023-06-22 NOTE — ASSESSMENT & PLAN NOTE
Lab Results   Component Value Date    K 3.1 (L) 06/22/2023   K down from prior  Rx sent in for 20meq K bid x 3 days with repeat K on 06/26

## 2023-06-22 NOTE — ASSESSMENT & PLAN NOTE
Plan to initiate Zometa pending dental clearance  Pt states she has appt scheduled 07/10/23 with [facility] dent[facility]

## 2023-06-23 ENCOUNTER — PATIENT MESSAGE (OUTPATIENT)
Dept: DERMATOLOGY | Facility: CLINIC | Age: 53
End: 2023-06-23
Payer: COMMERCIAL

## 2023-06-26 ENCOUNTER — LAB VISIT (OUTPATIENT)
Dept: LAB | Facility: HOSPITAL | Age: 53
End: 2023-06-26
Attending: NURSE PRACTITIONER
Payer: COMMERCIAL

## 2023-06-26 ENCOUNTER — TELEPHONE (OUTPATIENT)
Dept: HEMATOLOGY/ONCOLOGY | Facility: CLINIC | Age: 53
End: 2023-06-26
Payer: COMMERCIAL

## 2023-06-26 DIAGNOSIS — E87.6 HYPOKALEMIA: ICD-10-CM

## 2023-06-26 LAB — POTASSIUM SERPL-SCNC: 4.3 MMOL/L (ref 3.5–5.1)

## 2023-06-26 PROCEDURE — 84132 ASSAY OF SERUM POTASSIUM: CPT

## 2023-06-26 PROCEDURE — 36415 COLL VENOUS BLD VENIPUNCTURE: CPT

## 2023-06-26 NOTE — TELEPHONE ENCOUNTER
SW called pt to provide the following resources for possible help with dental needs (fillings and cleaning): [facility] and [facility] .[phone], [phone]. Pt will call and/or apply online. Pt will call [facility] as well to discuss payment options/arrangements. Pt will call SW if additional needs arise. SW will remain available.

## 2023-07-06 ENCOUNTER — OFFICE VISIT (OUTPATIENT)
Dept: HEMATOLOGY/ONCOLOGY | Facility: CLINIC | Age: 53
End: 2023-07-06
Payer: COMMERCIAL

## 2023-07-06 ENCOUNTER — LAB VISIT (OUTPATIENT)
Dept: LAB | Facility: HOSPITAL | Age: 53
End: 2023-07-06
Payer: COMMERCIAL

## 2023-07-06 ENCOUNTER — INFUSION (OUTPATIENT)
Dept: INFUSION THERAPY | Facility: HOSPITAL | Age: 53
End: 2023-07-06
Attending: RADIOLOGY
Payer: COMMERCIAL

## 2023-07-06 VITALS
TEMPERATURE: 98 F | HEART RATE: 67 BPM | RESPIRATION RATE: 18 BRPM | BODY MASS INDEX: 52.65 KG/M2 | OXYGEN SATURATION: 97 % | WEIGHT: 293 LBS | DIASTOLIC BLOOD PRESSURE: 58 MMHG | SYSTOLIC BLOOD PRESSURE: 125 MMHG

## 2023-07-06 DIAGNOSIS — D84.821 IMMUNODEFICIENCY DUE TO CHEMOTHERAPY: ICD-10-CM

## 2023-07-06 DIAGNOSIS — E11.9 TYPE 2 DIABETES MELLITUS WITHOUT COMPLICATION, WITHOUT LONG-TERM CURRENT USE OF INSULIN: ICD-10-CM

## 2023-07-06 DIAGNOSIS — C34.32 MALIGNANT NEOPLASM OF LOWER LOBE OF LEFT LUNG: Primary | ICD-10-CM

## 2023-07-06 DIAGNOSIS — C34.32 MALIGNANT NEOPLASM OF LOWER LOBE OF LEFT LUNG: ICD-10-CM

## 2023-07-06 DIAGNOSIS — E66.01 MORBID OBESITY: ICD-10-CM

## 2023-07-06 DIAGNOSIS — I10 ESSENTIAL HYPERTENSION: ICD-10-CM

## 2023-07-06 DIAGNOSIS — T45.1X5A IMMUNODEFICIENCY DUE TO CHEMOTHERAPY: ICD-10-CM

## 2023-07-06 DIAGNOSIS — Z79.899 IMMUNODEFICIENCY DUE TO CHEMOTHERAPY: ICD-10-CM

## 2023-07-06 DIAGNOSIS — C79.51 SECONDARY MALIGNANT NEOPLASM OF BONE: ICD-10-CM

## 2023-07-06 DIAGNOSIS — E87.6 HYPOKALEMIA: ICD-10-CM

## 2023-07-06 DIAGNOSIS — L27.0 RASH, DRUG: ICD-10-CM

## 2023-07-06 LAB
ALBUMIN SERPL BCP-MCNC: 2.8 G/DL (ref 3.5–5.2)
ALP SERPL-CCNC: 101 U/L (ref 55–135)
ALT SERPL W/O P-5'-P-CCNC: 17 U/L (ref 10–44)
ANION GAP SERPL CALC-SCNC: 11 MMOL/L (ref 8–16)
AST SERPL-CCNC: 14 U/L (ref 10–40)
BASOPHILS # BLD AUTO: 0.05 K/UL (ref 0–0.2)
BASOPHILS NFR BLD: 0.7 % (ref 0–1.9)
BILIRUB SERPL-MCNC: 0.3 MG/DL (ref 0.1–1)
BUN SERPL-MCNC: 18 MG/DL (ref 6–20)
CALCIUM SERPL-MCNC: 8.6 MG/DL (ref 8.7–10.5)
CHLORIDE SERPL-SCNC: 104 MMOL/L (ref 95–110)
CO2 SERPL-SCNC: 28 MMOL/L (ref 23–29)
CREAT SERPL-MCNC: 1 MG/DL (ref 0.5–1.4)
DIFFERENTIAL METHOD: ABNORMAL
EOSINOPHIL # BLD AUTO: 0.3 K/UL (ref 0–0.5)
EOSINOPHIL NFR BLD: 3.9 % (ref 0–8)
ERYTHROCYTE [DISTWIDTH] IN BLOOD BY AUTOMATED COUNT: 12.1 % (ref 11.5–14.5)
EST. GFR  (NO RACE VARIABLE): >60 ML/MIN/1.73 M^2
GLUCOSE SERPL-MCNC: 219 MG/DL (ref 70–110)
HCT VFR BLD AUTO: 35.7 % (ref 37–48.5)
HGB BLD-MCNC: 11.4 G/DL (ref 12–16)
IMM GRANULOCYTES # BLD AUTO: 0.02 K/UL (ref 0–0.04)
IMM GRANULOCYTES NFR BLD AUTO: 0.3 % (ref 0–0.5)
LYMPHOCYTES # BLD AUTO: 2.2 K/UL (ref 1–4.8)
LYMPHOCYTES NFR BLD: 31.2 % (ref 18–48)
MCH RBC QN AUTO: 28.4 PG (ref 27–31)
MCHC RBC AUTO-ENTMCNC: 31.9 G/DL (ref 32–36)
MCV RBC AUTO: 89 FL (ref 82–98)
MONOCYTES # BLD AUTO: 0.4 K/UL (ref 0.3–1)
MONOCYTES NFR BLD: 5.8 % (ref 4–15)
NEUTROPHILS # BLD AUTO: 4.1 K/UL (ref 1.8–7.7)
NEUTROPHILS NFR BLD: 58.1 % (ref 38–73)
NRBC BLD-RTO: 0 /100 WBC
PLATELET # BLD AUTO: 217 K/UL (ref 150–450)
PMV BLD AUTO: 11.3 FL (ref 9.2–12.9)
POTASSIUM SERPL-SCNC: 4 MMOL/L (ref 3.5–5.1)
PROT SERPL-MCNC: 6.1 G/DL (ref 6–8.4)
RBC # BLD AUTO: 4.01 M/UL (ref 4–5.4)
SODIUM SERPL-SCNC: 143 MMOL/L (ref 136–145)
WBC # BLD AUTO: 7.01 K/UL (ref 3.9–12.7)

## 2023-07-06 PROCEDURE — 63600175 PHARM REV CODE 636 W HCPCS

## 2023-07-06 PROCEDURE — A4216 STERILE WATER/SALINE, 10 ML: HCPCS

## 2023-07-06 PROCEDURE — 1159F PR MEDICATION LIST DOCUMENTED IN MEDICAL RECORD: ICD-10-PCS | Mod: CPTII,95,, | Performed by: INTERNAL MEDICINE

## 2023-07-06 PROCEDURE — 1160F PR REVIEW ALL MEDS BY PRESCRIBER/CLIN PHARMACIST DOCUMENTED: ICD-10-PCS | Mod: CPTII,95,, | Performed by: INTERNAL MEDICINE

## 2023-07-06 PROCEDURE — 1160F RVW MEDS BY RX/DR IN RCRD: CPT | Mod: CPTII,95,, | Performed by: INTERNAL MEDICINE

## 2023-07-06 PROCEDURE — 25000003 PHARM REV CODE 250

## 2023-07-06 PROCEDURE — 3051F HG A1C>EQUAL 7.0%<8.0%: CPT | Mod: CPTII,95,, | Performed by: INTERNAL MEDICINE

## 2023-07-06 PROCEDURE — 96523 IRRIG DRUG DELIVERY DEVICE: CPT

## 2023-07-06 PROCEDURE — 85025 COMPLETE CBC W/AUTO DIFF WBC: CPT

## 2023-07-06 PROCEDURE — 36415 COLL VENOUS BLD VENIPUNCTURE: CPT

## 2023-07-06 PROCEDURE — 99214 OFFICE O/P EST MOD 30 MIN: CPT | Mod: 95,,, | Performed by: INTERNAL MEDICINE

## 2023-07-06 PROCEDURE — 3051F PR MOST RECENT HEMOGLOBIN A1C LEVEL 7.0 - < 8.0%: ICD-10-PCS | Mod: CPTII,95,, | Performed by: INTERNAL MEDICINE

## 2023-07-06 PROCEDURE — 1159F MED LIST DOCD IN RCRD: CPT | Mod: CPTII,95,, | Performed by: INTERNAL MEDICINE

## 2023-07-06 PROCEDURE — 99214 PR OFFICE/OUTPT VISIT, EST, LEVL IV, 30-39 MIN: ICD-10-PCS | Mod: 95,,, | Performed by: INTERNAL MEDICINE

## 2023-07-06 PROCEDURE — 80053 COMPREHEN METABOLIC PANEL: CPT

## 2023-07-06 RX ORDER — ACETAMINOPHEN 325 MG/1
650 TABLET ORAL
Status: DISCONTINUED | OUTPATIENT
Start: 2023-07-06 | End: 2023-07-06 | Stop reason: HOSPADM

## 2023-07-06 RX ORDER — SODIUM CHLORIDE 0.9 % (FLUSH) 0.9 %
10 SYRINGE (ML) INJECTION
Status: DISCONTINUED | OUTPATIENT
Start: 2023-07-06 | End: 2023-07-06 | Stop reason: HOSPADM

## 2023-07-06 RX ORDER — DIPHENHYDRAMINE HYDROCHLORIDE 50 MG/ML
25 INJECTION INTRAMUSCULAR; INTRAVENOUS
Status: DISCONTINUED | OUTPATIENT
Start: 2023-07-06 | End: 2023-07-06 | Stop reason: HOSPADM

## 2023-07-06 RX ORDER — HEPARIN 100 UNIT/ML
500 SYRINGE INTRAVENOUS
Status: DISCONTINUED | OUTPATIENT
Start: 2023-07-06 | End: 2023-07-06 | Stop reason: HOSPADM

## 2023-07-06 RX ADMIN — HEPARIN 500 UNITS: 100 SYRINGE at 02:07

## 2023-07-06 RX ADMIN — Medication 10 ML: at 02:07

## 2023-07-06 NOTE — NURSING
Pt was scheduled for rybrevant (q 4w) this would have been Cycle 3 Day 15, saw Dr. Mcfarlane for a virtual appt prior to visit and Dr. Mcfarlane decided to delay treatment until after a CT scan of the abdomen and pelvis, date to be determined.  Port was accessed and flushed after orders were received, pt and spouse were informed and okay with change in treatment plan, pt discharged to home.

## 2023-07-06 NOTE — PROGRESS NOTES
Subjective:       Patient ID: Shaneka Ahmadi is a 53 y.o. female.    Chief Complaint: Results, Lung Cancer, and Chemotherapy    HPI:  53-year-old female presents for cycle 2 day 15 OP NSCLC AMIVANTAMAB-VMJW (Rybrevant) Q4W seen on video visit with significant rash on face ECOG status 1      Past Medical History:   Diagnosis Date    Diabetes mellitus     Hypertension     Malignant neoplasm of lower lobe of left lung 12/9/2022    Secondary malignant neoplasm of bone 12/5/2022     Family History   Problem Relation Age of Onset    Heart failure Father      Social History     Socioeconomic History    Marital status:    Tobacco Use    Smoking status: Never     Passive exposure: Never    Smokeless tobacco: Never   Substance and Sexual Activity    Alcohol use: Never    Drug use: Never    Sexual activity: Yes     Partners: Male     Birth control/protection: None     Comment: tubal     Past Surgical History:   Procedure Laterality Date    CATARACT EXTRACTION W/  INTRAOCULAR LENS IMPLANT Right 06/02/2022    FLUOROSCOPY N/A 1/26/2023    Procedure: FLUOROSCOPY/mediport placement;  Surgeon: Marlon Leone MD;  Location: [facility] CATH LAB;  Service: General;  Laterality: N/A;    TUBAL LIGATION      2007--postpartum tubal ligation       Labs:  Lab Results   Component Value Date    WBC 7.01 07/06/2023    HGB 11.4 (L) 07/06/2023    HCT 35.7 (L) 07/06/2023    MCV 89 07/06/2023     07/06/2023     BMP  Lab Results   Component Value Date     06/22/2023    K 4.3 06/26/2023    CL 99 06/22/2023    CO2 27 06/22/2023    BUN 19 06/22/2023    CREATININE 1.0 06/22/2023    CALCIUM 8.7 06/22/2023    ANIONGAP 13 06/22/2023    ESTGFRAFRICA >60.0 07/28/2022    EGFRNONAA >60.0 07/28/2022     Lab Results   Component Value Date    ALT 16 06/22/2023    AST 14 06/22/2023    ALKPHOS 96 06/22/2023    BILITOT 0.3 06/22/2023       Lab Results   Component Value Date    IRON 71 03/09/2023    TIBC 297 03/09/2023    FERRITIN 646 (H) 03/09/2023      No results found for: [id number]  No results found for: FOLATE  Lab Results   Component Value Date    TSH 1.742 04/08/2022         Review of Systems   Constitutional:  Positive for fatigue. Negative for activity change, appetite change, chills, diaphoresis, fever and unexpected weight change.   HENT:  Negative for congestion, dental problem, drooling, ear discharge, ear pain, facial swelling, hearing loss, mouth sores, nosebleeds, postnasal drip, rhinorrhea, sinus pressure, sneezing, sore throat, tinnitus, trouble swallowing and voice change.    Eyes:  Negative for photophobia, pain, discharge, redness, itching and visual disturbance.   Respiratory:  Negative for cough, choking, chest tightness, shortness of breath, wheezing and stridor.    Cardiovascular:  Negative for chest pain, palpitations and leg swelling.   Gastrointestinal:  Negative for abdominal distention, abdominal pain, anal bleeding, blood in stool, constipation, diarrhea, nausea, rectal pain and vomiting.   Endocrine: Negative for cold intolerance, heat intolerance, polydipsia, polyphagia and polyuria.   Genitourinary:  Negative for decreased urine volume, difficulty urinating, dyspareunia, dysuria, enuresis, flank pain, frequency, genital sores, hematuria, menstrual problem, pelvic pain, urgency, vaginal bleeding, vaginal discharge and vaginal pain.   Musculoskeletal:  Negative for arthralgias, back pain, gait problem, joint swelling, myalgias, neck pain and neck stiffness.   Skin:  Positive for rash. Negative for color change and pallor.   Allergic/Immunologic: Negative for environmental allergies, food allergies and immunocompromised state.   Neurological:  Positive for weakness. Negative for dizziness, tremors, seizures, syncope, facial asymmetry, speech difficulty, light-headedness, numbness and headaches.   Hematological:  Negative for adenopathy. Does not bruise/bleed easily.   Psychiatric/Behavioral:  Positive for dysphoric mood. Negative  for agitation, behavioral problems, confusion, decreased concentration, hallucinations, self-injury, sleep disturbance and suicidal ideas. The patient is nervous/anxious. The patient is not hyperactive.      Objective:      Physical Exam  Constitutional:       Appearance: She is obese. She is ill-appearing.   Skin:     Findings: Rash present.           Assessment:      1. Malignant neoplasm of lower lobe of left lung    2. Immunodeficiency due to chemotherapy    3. Essential hypertension    4. Secondary malignant neoplasm of bone    5. Morbid obesity    6. Type 2 diabetes mellitus without complication, without long-term current use of insulin    7. Rash, drug           Med Onc Chart Routing      Follow up with physician    Follow up with DOLLY    Infusion scheduling note    Injection scheduling note Will hold day 15 today until after CT chest abdomen pelvis are done to see if response   Labs    Imaging CT chest abdomen pelvis   Proceed with CT chest abdomen pelvis prior to day 15 of therapy with rash   Pharmacy appointment    Other referrals             Plan:     The patient location is:  [facility]  The chief complaint leading to consultation is:  Lung cancer    Visit type: audiovisual    Face to Face time with patient: 25 minutes of total time spent on the encounter, which includes face to face time and non-face to face time preparing to see the patient (eg, review of tests), Obtaining and/or reviewing separately obtained history, Documenting clinical information in the electronic or other health record, Independently interpreting results (not separately reported) and communicating results to the patient/family/caregiver, or Care coordination (not separately reported).         Each patient to whom he or she provides medical services by telemedicine is:  (1) informed of the relationship between the physician and patient and the respective role of any other health care provider with respect to management of the  patient; and (2) notified that he or she may decline to receive medical services by telemedicine and may withdraw from such care at any time.    Notes:   Reviewed information with patient at this time would recommend that patient proceed with holding day 15 until after CT chest abdomen pelvis is done to see whether not there has been any progression if there is stable findings will most likely treat and then give 2 week break before additional treatment is warranted.  Discussed implications and answered questions with her.      Reese Mcfarlane Jr, MD FACP

## 2023-07-10 ENCOUNTER — PATIENT MESSAGE (OUTPATIENT)
Dept: INFUSION THERAPY | Facility: HOSPITAL | Age: 53
End: 2023-07-10
Payer: COMMERCIAL

## 2023-07-11 ENCOUNTER — TELEPHONE (OUTPATIENT)
Dept: HEMATOLOGY/ONCOLOGY | Facility: CLINIC | Age: 53
End: 2023-07-11
Payer: COMMERCIAL

## 2023-07-11 ENCOUNTER — HOSPITAL ENCOUNTER (OUTPATIENT)
Dept: RADIOLOGY | Facility: HOSPITAL | Age: 53
Discharge: HOME | End: 2023-07-11
Attending: INTERNAL MEDICINE
Payer: COMMERCIAL

## 2023-07-11 DIAGNOSIS — C34.32 MALIGNANT NEOPLASM OF LOWER LOBE OF LEFT LUNG: ICD-10-CM

## 2023-07-11 PROCEDURE — 74177 CT CHEST ABDOMEN PELVIS WITH CONTRAST (XPD): ICD-10-PCS | Mod: 26,,, | Performed by: RADIOLOGY

## 2023-07-11 PROCEDURE — 74177 CT ABD & PELVIS W/CONTRAST: CPT | Mod: TC

## 2023-07-11 PROCEDURE — 71260 CT THORAX DX C+: CPT | Mod: 26,,, | Performed by: RADIOLOGY

## 2023-07-11 PROCEDURE — 71260 CT CHEST ABDOMEN PELVIS WITH CONTRAST (XPD): ICD-10-PCS | Mod: 26,,, | Performed by: RADIOLOGY

## 2023-07-11 PROCEDURE — 71260 CT THORAX DX C+: CPT | Mod: TC

## 2023-07-11 PROCEDURE — 25500020 PHARM REV CODE 255: Performed by: INTERNAL MEDICINE

## 2023-07-11 PROCEDURE — 74177 CT ABD & PELVIS W/CONTRAST: CPT | Mod: 26,,, | Performed by: RADIOLOGY

## 2023-07-11 PROCEDURE — A9698 NON-RAD CONTRAST MATERIALNOC: HCPCS | Performed by: INTERNAL MEDICINE

## 2023-07-11 RX ADMIN — IOHEXOL 100 ML: 350 INJECTION, SOLUTION INTRAVENOUS at 10:07

## 2023-07-11 RX ADMIN — IOHEXOL 1000 ML: 9 SOLUTION ORAL at 10:07

## 2023-07-11 NOTE — TELEPHONE ENCOUNTER
Called patient and informed her that she would only need one appointment for tomorrow. Additional appointment have been canceled. Patient verbalized understanding and had no other issues at this time.

## 2023-07-11 NOTE — TELEPHONE ENCOUNTER
----- Message from Sharonda Angel sent at 7/11/2023 10:51 AM CDT -----  Contact: aurea  Patient is calling to speak with the nurse regarding appointment. Reports needing to know why she has two appointments back to back. Please give the patient a call back at [phone]   Thanks jose d

## 2023-07-12 ENCOUNTER — OFFICE VISIT (OUTPATIENT)
Dept: HEMATOLOGY/ONCOLOGY | Facility: CLINIC | Age: 53
End: 2023-07-12
Payer: COMMERCIAL

## 2023-07-12 VITALS
HEIGHT: 64 IN | TEMPERATURE: 98 F | WEIGHT: 293 LBS | HEART RATE: 65 BPM | BODY MASS INDEX: 50.02 KG/M2 | SYSTOLIC BLOOD PRESSURE: 128 MMHG | DIASTOLIC BLOOD PRESSURE: 72 MMHG | OXYGEN SATURATION: 100 %

## 2023-07-12 DIAGNOSIS — C34.32 MALIGNANT NEOPLASM OF LOWER LOBE OF LEFT LUNG: Primary | ICD-10-CM

## 2023-07-12 DIAGNOSIS — C79.51 SECONDARY MALIGNANT NEOPLASM OF BONE: ICD-10-CM

## 2023-07-12 DIAGNOSIS — D84.821 IMMUNODEFICIENCY DUE TO CHEMOTHERAPY: ICD-10-CM

## 2023-07-12 DIAGNOSIS — I10 ESSENTIAL HYPERTENSION: ICD-10-CM

## 2023-07-12 DIAGNOSIS — L27.0 RASH, DRUG: ICD-10-CM

## 2023-07-12 DIAGNOSIS — T45.1X5A IMMUNODEFICIENCY DUE TO CHEMOTHERAPY: ICD-10-CM

## 2023-07-12 DIAGNOSIS — Z79.899 IMMUNODEFICIENCY DUE TO CHEMOTHERAPY: ICD-10-CM

## 2023-07-12 DIAGNOSIS — E87.6 HYPOKALEMIA: ICD-10-CM

## 2023-07-12 DIAGNOSIS — E66.01 MORBID OBESITY: ICD-10-CM

## 2023-07-12 PROCEDURE — 3008F PR BODY MASS INDEX (BMI) DOCUMENTED: ICD-10-PCS | Mod: CPTII,S$GLB,, | Performed by: INTERNAL MEDICINE

## 2023-07-12 PROCEDURE — 3074F PR MOST RECENT SYSTOLIC BLOOD PRESSURE < 130 MM HG: ICD-10-PCS | Mod: CPTII,S$GLB,, | Performed by: INTERNAL MEDICINE

## 2023-07-12 PROCEDURE — 3078F PR MOST RECENT DIASTOLIC BLOOD PRESSURE < 80 MM HG: ICD-10-PCS | Mod: CPTII,S$GLB,, | Performed by: INTERNAL MEDICINE

## 2023-07-12 PROCEDURE — 1159F MED LIST DOCD IN RCRD: CPT | Mod: CPTII,S$GLB,, | Performed by: INTERNAL MEDICINE

## 2023-07-12 PROCEDURE — 1159F PR MEDICATION LIST DOCUMENTED IN MEDICAL RECORD: ICD-10-PCS | Mod: CPTII,S$GLB,, | Performed by: INTERNAL MEDICINE

## 2023-07-12 PROCEDURE — 1160F PR REVIEW ALL MEDS BY PRESCRIBER/CLIN PHARMACIST DOCUMENTED: ICD-10-PCS | Mod: CPTII,S$GLB,, | Performed by: INTERNAL MEDICINE

## 2023-07-12 PROCEDURE — 99999 PR PBB SHADOW E&M-EST. PATIENT-LVL V: ICD-10-PCS | Mod: PBBFAC,,, | Performed by: INTERNAL MEDICINE

## 2023-07-12 PROCEDURE — 3051F PR MOST RECENT HEMOGLOBIN A1C LEVEL 7.0 - < 8.0%: ICD-10-PCS | Mod: CPTII,S$GLB,, | Performed by: INTERNAL MEDICINE

## 2023-07-12 PROCEDURE — 99999 PR PBB SHADOW E&M-EST. PATIENT-LVL V: CPT | Mod: PBBFAC,,, | Performed by: INTERNAL MEDICINE

## 2023-07-12 PROCEDURE — 99215 PR OFFICE/OUTPT VISIT, EST, LEVL V, 40-54 MIN: ICD-10-PCS | Mod: S$GLB,,, | Performed by: INTERNAL MEDICINE

## 2023-07-12 PROCEDURE — 3051F HG A1C>EQUAL 7.0%<8.0%: CPT | Mod: CPTII,S$GLB,, | Performed by: INTERNAL MEDICINE

## 2023-07-12 PROCEDURE — 3008F BODY MASS INDEX DOCD: CPT | Mod: CPTII,S$GLB,, | Performed by: INTERNAL MEDICINE

## 2023-07-12 PROCEDURE — 3074F SYST BP LT 130 MM HG: CPT | Mod: CPTII,S$GLB,, | Performed by: INTERNAL MEDICINE

## 2023-07-12 PROCEDURE — 3078F DIAST BP <80 MM HG: CPT | Mod: CPTII,S$GLB,, | Performed by: INTERNAL MEDICINE

## 2023-07-12 PROCEDURE — 99215 OFFICE O/P EST HI 40 MIN: CPT | Mod: S$GLB,,, | Performed by: INTERNAL MEDICINE

## 2023-07-12 PROCEDURE — 1160F RVW MEDS BY RX/DR IN RCRD: CPT | Mod: CPTII,S$GLB,, | Performed by: INTERNAL MEDICINE

## 2023-07-12 NOTE — PROGRESS NOTES
Subjective:       Patient ID: Shaneka Ahmadi is a 53 y.o. female.    Chief Complaint: Results, Chemotherapy, and Lung Cancer    HPI:  53-YEAR-OLD FEMALE HISTORY OF METASTATIC NON-SMALL CELL LUNG CARCINOMA WITH EGD ARE EXON 20 MUTATION.  CURRENTLY ON OP NSCLC AMIVANTAMAB-VMJW (Rybrevant) Q4W PATIENT HAD DOSE HELD LAST WEEK AFTER SEVERE RASH ON FACE.  ECOG STATUS 1 RETURNS AFTER REPEAT IMAGING STUDIES      Past Medical History:   Diagnosis Date    Diabetes mellitus     Hypertension     Malignant neoplasm of lower lobe of left lung 12/9/2022    Secondary malignant neoplasm of bone 12/5/2022     Family History   Problem Relation Age of Onset    Heart failure Father      Social History     Socioeconomic History    Marital status:    Tobacco Use    Smoking status: Never     Passive exposure: Never    Smokeless tobacco: Never   Substance and Sexual Activity    Alcohol use: Never    Drug use: Never    Sexual activity: Yes     Partners: Male     Birth control/protection: None     Comment: tubal     Past Surgical History:   Procedure Laterality Date    CATARACT EXTRACTION W/  INTRAOCULAR LENS IMPLANT Right 06/02/2022    FLUOROSCOPY N/A 1/26/2023    Procedure: FLUOROSCOPY/mediport placement;  Surgeon: Marlon Leone MD;  Location: [facility] CATH LAB;  Service: General;  Laterality: N/A;    TUBAL LIGATION      2007--postpartum tubal ligation       Labs:  Lab Results   Component Value Date    WBC 7.01 07/06/2023    HGB 11.4 (L) 07/06/2023    HCT 35.7 (L) 07/06/2023    MCV 89 07/06/2023     07/06/2023     BMP  Lab Results   Component Value Date     07/06/2023    K 4.0 07/06/2023     07/06/2023    CO2 28 07/06/2023    BUN 18 07/06/2023    CREATININE 1.0 07/06/2023    CALCIUM 8.6 (L) 07/06/2023    ANIONGAP 11 07/06/2023    ESTGFRAFRICA >60.0 07/28/2022    EGFRNONAA >60.0 07/28/2022     Lab Results   Component Value Date    ALT 17 07/06/2023    AST 14 07/06/2023    ALKPHOS 101 07/06/2023    BILITOT 0.3 07/06/2023        Lab Results   Component Value Date    IRON 71 03/09/2023    TIBC 297 03/09/2023    FERRITIN 646 (H) 03/09/2023     No results found for: [id number]  No results found for: FOLATE  Lab Results   Component Value Date    TSH 1.742 04/08/2022         Review of Systems   Constitutional:  Positive for fatigue. Negative for activity change, appetite change, chills, diaphoresis, fever and unexpected weight change.   HENT:  Negative for congestion, dental problem, drooling, ear discharge, ear pain, facial swelling, hearing loss, mouth sores, nosebleeds, postnasal drip, rhinorrhea, sinus pressure, sneezing, sore throat, tinnitus, trouble swallowing and voice change.    Eyes:  Negative for photophobia, pain, discharge, redness, itching and visual disturbance.   Respiratory:  Negative for cough, choking, chest tightness, shortness of breath, wheezing and stridor.    Cardiovascular:  Negative for chest pain, palpitations and leg swelling.   Gastrointestinal:  Negative for abdominal distention, abdominal pain, anal bleeding, blood in stool, constipation, diarrhea, nausea, rectal pain and vomiting.   Endocrine: Negative for cold intolerance, heat intolerance, polydipsia, polyphagia and polyuria.   Genitourinary:  Negative for decreased urine volume, difficulty urinating, dyspareunia, dysuria, enuresis, flank pain, frequency, genital sores, hematuria, menstrual problem, pelvic pain, urgency, vaginal bleeding, vaginal discharge and vaginal pain.   Musculoskeletal:  Negative for arthralgias, back pain, gait problem, joint swelling, myalgias, neck pain and neck stiffness.   Skin:  Positive for rash. Negative for color change and pallor.   Allergic/Immunologic: Negative for environmental allergies, food allergies and immunocompromised state.   Neurological:  Positive for weakness. Negative for dizziness, tremors, seizures, syncope, facial asymmetry, speech difficulty, light-headedness, numbness and headaches.   Hematological:   Negative for adenopathy. Does not bruise/bleed easily.   Psychiatric/Behavioral:  Positive for dysphoric mood. Negative for agitation, behavioral problems, confusion, decreased concentration, hallucinations, self-injury, sleep disturbance and suicidal ideas. The patient is not nervous/anxious and is not hyperactive.      Objective:      Physical Exam  Vitals reviewed.   Constitutional:       General: She is not in acute distress.     Appearance: She is well-developed. She is not diaphoretic.   HENT:      Head: Normocephalic and atraumatic.      Right Ear: External ear normal.      Left Ear: External ear normal.      Nose: Nose normal.      Right Sinus: No maxillary sinus tenderness or frontal sinus tenderness.      Left Sinus: No maxillary sinus tenderness or frontal sinus tenderness.      Mouth/Throat:      Pharynx: No oropharyngeal exudate.   Eyes:      General: Lids are normal. No scleral icterus.        Right eye: No discharge.         Left eye: No discharge.      Conjunctiva/sclera: Conjunctivae normal.      Right eye: Right conjunctiva is not injected. No hemorrhage.     Left eye: Left conjunctiva is not injected. No hemorrhage.     Pupils: Pupils are equal, round, and reactive to light.   Neck:      Thyroid: No thyromegaly.      Vascular: No JVD.      Trachea: No tracheal deviation.   Cardiovascular:      Rate and Rhythm: Normal rate.   Pulmonary:      Effort: Pulmonary effort is normal. No respiratory distress.      Breath sounds: No stridor.   Chest:      Chest wall: No tenderness.   Abdominal:      General: Bowel sounds are normal. There is no distension.      Palpations: Abdomen is soft. There is no hepatomegaly, splenomegaly or mass.      Tenderness: There is no abdominal tenderness. There is no rebound.   Musculoskeletal:         General: No tenderness. Normal range of motion.      Cervical back: Normal range of motion and neck supple.   Lymphadenopathy:      Cervical: No cervical adenopathy.      Upper  Body:      Right upper body: No supraclavicular adenopathy.      Left upper body: No supraclavicular adenopathy.   Skin:     General: Skin is dry.      Findings: Rash present. No erythema.   Neurological:      Mental Status: She is alert and oriented to person, place, and time.      Cranial Nerves: No cranial nerve deficit.      Coordination: Coordination normal.   Psychiatric:         Behavior: Behavior normal.         Thought Content: Thought content normal.         Judgment: Judgment normal.           Assessment:      1. Malignant neoplasm of lower lobe of left lung    2. Immunodeficiency due to chemotherapy    3. Essential hypertension    4. Hypokalemia    5. Rash, drug    6. Secondary malignant neoplasm of bone    7. Morbid obesity           Med Onc Chart Routing      Follow up with physician 6 weeks.   Follow up with DOLLY 2 weeks. CAN BE SEEN BY [facility] NEXT 2 DOSING   Infusion scheduling note    Injection scheduling note OP NSCLC AMIVANTAMAB-VMJW (Rybrevant) Q4W PROCEED WITH CYCLE 4 DAY 1 TODAY ARE AS SOON AS POSSIBLE   Labs CBC and CMP   Scheduling:  Preferred lab:  Lab interval:     Imaging    Pharmacy appointment    Other referrals       CONTINUE WITH FOLLOW-UP IN DERMATOLOGY          Plan:     RESULTS OF CT CHEST ABDOMEN PELVIS DEMONSTRATES NO EVIDENCE OF PROGRESSIVE DISEASE.  REASSURANCE HAS BEEN GIVEN I FEEL COMFORTABLE AT THIS TIME PROCEED WITH TREATMENT WILL PROCEED WITH CYCLE 4 DAY 1 TODAY OR AS SOON AS POSSIBLE LAST CYCLE WILL BE DISMISSED.  AND CONTINUE WITH FOLLOW-UP APAP NEXT 2 DOSING AND I WILL SEE IN 6 WEEKS.  DISCUSSED IMPLICATIONS OF ANSWERED QUESTIONS WITH PATIENT'S        Reese Mcfarlane Jr, MD FACP

## 2023-07-13 ENCOUNTER — PATIENT MESSAGE (OUTPATIENT)
Dept: HEMATOLOGY/ONCOLOGY | Facility: CLINIC | Age: 53
End: 2023-07-13
Payer: COMMERCIAL

## 2023-07-14 ENCOUNTER — TELEPHONE (OUTPATIENT)
Dept: HEMATOLOGY/ONCOLOGY | Facility: CLINIC | Age: 53
End: 2023-07-14
Payer: COMMERCIAL

## 2023-07-14 NOTE — NURSING
0851am: Incoming call from pt regarding chemo appts scheduled incorrectly. Pt didn't have a lab or MD appt. Pt scheduled now for labs on  at 12 pm at , for Dr. Pedroza since Dr. Mcfarlane is out on  at 1 pm at , and for chemo on  at 1 pm at . Pt verbalized understanding. Pt plan to report to appts as scheduled.  Oncology Navigation   Intake  Cancer Type: Other  Initial Nurse Navigator Contact: 23  Date Worked: 23  Multiple appointments: Yes  Start of Treatment: 23     Treatment  Current Status: Active       Medical Oncologist: Dr. Reese Mcfarlane  Consult Date: 22  Chemotherapy: Planned  Chemotherapy Regimen: Carboplatin Alimta  Immunotherapy: Planned  Immunotherapy Name: Rybrevant  Start Date: 23       Procedures: PET scan; Biopsy  Biopsy Schedule Date: 22 (lung)  MRI Schedule Date: 22 (brain)  PET Scan Schedule Date: 22  Other Schedule Date: 23 (EKG)    General Referrals: Social Work  Social Work: notified via in-basket msg  Social Work Referral Date: 23          Support Systems: Spouse/significant other     Acuity  Stage: 2  Systemic Treatment - predicted or initiated: Immunotherapy (+2)  Treatment Tolerability: Has not started treatment yet/treatment fully completed and side effects resolved  ECO  Comorbidities in Medical History: 1  Hospitalization Within the Past Month: 0   Needed: 0  Support: 0  Transportation: 0  History of noncompliance/frequent no shows and cancellations: 0  Verbalizes the need for more education: 1  Navigation Acuity: 6     Follow Up  No follow-ups on file.

## 2023-07-19 ENCOUNTER — OFFICE VISIT (OUTPATIENT)
Dept: DERMATOLOGY | Facility: CLINIC | Age: 53
End: 2023-07-19
Payer: COMMERCIAL

## 2023-07-19 ENCOUNTER — PATIENT MESSAGE (OUTPATIENT)
Dept: DERMATOLOGY | Facility: CLINIC | Age: 53
End: 2023-07-19

## 2023-07-19 VITALS — BODY MASS INDEX: 50.02 KG/M2 | WEIGHT: 293 LBS | HEIGHT: 64 IN

## 2023-07-19 DIAGNOSIS — L70.8 ACNEIFORM RASH: ICD-10-CM

## 2023-07-19 PROCEDURE — 3008F BODY MASS INDEX DOCD: CPT | Mod: CPTII,S$GLB,, | Performed by: STUDENT IN AN ORGANIZED HEALTH CARE EDUCATION/TRAINING PROGRAM

## 2023-07-19 PROCEDURE — 1159F MED LIST DOCD IN RCRD: CPT | Mod: CPTII,S$GLB,, | Performed by: STUDENT IN AN ORGANIZED HEALTH CARE EDUCATION/TRAINING PROGRAM

## 2023-07-19 PROCEDURE — 3051F PR MOST RECENT HEMOGLOBIN A1C LEVEL 7.0 - < 8.0%: ICD-10-PCS | Mod: CPTII,S$GLB,, | Performed by: STUDENT IN AN ORGANIZED HEALTH CARE EDUCATION/TRAINING PROGRAM

## 2023-07-19 PROCEDURE — 1160F RVW MEDS BY RX/DR IN RCRD: CPT | Mod: CPTII,S$GLB,, | Performed by: STUDENT IN AN ORGANIZED HEALTH CARE EDUCATION/TRAINING PROGRAM

## 2023-07-19 PROCEDURE — 99214 OFFICE O/P EST MOD 30 MIN: CPT | Mod: S$GLB,,, | Performed by: STUDENT IN AN ORGANIZED HEALTH CARE EDUCATION/TRAINING PROGRAM

## 2023-07-19 PROCEDURE — 3051F HG A1C>EQUAL 7.0%<8.0%: CPT | Mod: CPTII,S$GLB,, | Performed by: STUDENT IN AN ORGANIZED HEALTH CARE EDUCATION/TRAINING PROGRAM

## 2023-07-19 PROCEDURE — 99999 PR PBB SHADOW E&M-EST. PATIENT-LVL IV: CPT | Mod: PBBFAC,,, | Performed by: STUDENT IN AN ORGANIZED HEALTH CARE EDUCATION/TRAINING PROGRAM

## 2023-07-19 PROCEDURE — 99214 PR OFFICE/OUTPT VISIT, EST, LEVL IV, 30-39 MIN: ICD-10-PCS | Mod: S$GLB,,, | Performed by: STUDENT IN AN ORGANIZED HEALTH CARE EDUCATION/TRAINING PROGRAM

## 2023-07-19 PROCEDURE — 99999 PR PBB SHADOW E&M-EST. PATIENT-LVL IV: ICD-10-PCS | Mod: PBBFAC,,, | Performed by: STUDENT IN AN ORGANIZED HEALTH CARE EDUCATION/TRAINING PROGRAM

## 2023-07-19 PROCEDURE — 1159F PR MEDICATION LIST DOCUMENTED IN MEDICAL RECORD: ICD-10-PCS | Mod: CPTII,S$GLB,, | Performed by: STUDENT IN AN ORGANIZED HEALTH CARE EDUCATION/TRAINING PROGRAM

## 2023-07-19 PROCEDURE — 1160F PR REVIEW ALL MEDS BY PRESCRIBER/CLIN PHARMACIST DOCUMENTED: ICD-10-PCS | Mod: CPTII,S$GLB,, | Performed by: STUDENT IN AN ORGANIZED HEALTH CARE EDUCATION/TRAINING PROGRAM

## 2023-07-19 PROCEDURE — 3008F PR BODY MASS INDEX (BMI) DOCUMENTED: ICD-10-PCS | Mod: CPTII,S$GLB,, | Performed by: STUDENT IN AN ORGANIZED HEALTH CARE EDUCATION/TRAINING PROGRAM

## 2023-07-19 RX ORDER — DESONIDE 0.5 MG/G
CREAM TOPICAL 2 TIMES DAILY
Qty: 60 G | Refills: 1 | Status: SHIPPED | OUTPATIENT
Start: 2023-07-19 | End: 2023-09-07

## 2023-07-19 RX ORDER — CLINDAMYCIN PHOSPHATE 10 MG/ML
SOLUTION TOPICAL 2 TIMES DAILY
Qty: 60 EACH | Refills: 1 | Status: SHIPPED | OUTPATIENT
Start: 2023-07-19 | End: 2023-09-12 | Stop reason: SDUPTHER

## 2023-07-19 NOTE — PROGRESS NOTES
Subjective:       Patient ID:  Shaneka Ahmadi is a 53 y.o. female who presents for   Chief Complaint   Patient presents with    Acne     Med reaction acne breakouts on both cheeks      History of Present Illness: The patient presents with chief complaint of recent rash on the face. Patient is currently on Rybrevant for non small cell lung carcinoma.   Location: face, mostly on the cheeks and lower face  Duration: developed over several weeks ago  Signs/Symptoms: reports having red plaques, red bumps, pus bumps, and painful lesions.   Prior treatments: none      Acne      Review of Systems   Constitutional:  Negative for fever and chills.      Objective:    Physical Exam   Constitutional: She appears well-developed and well-nourished. No distress.   Neurological: She is alert and oriented to person, place, and time. She is not disoriented.   Psychiatric: She has a normal mood and affect.   Skin:   Areas Examined (abnormalities noted in diagram):   Head / Face Inspection Performed  Neck Inspection Performed            Diagram Legend     Erythematous scaling macule/papule c/w actinic keratosis       Vascular papule c/w angioma      Pigmented verrucoid papule/plaque c/w seborrheic keratosis      Yellow umbilicated papule c/w sebaceous hyperplasia      Irregularly shaped tan macule c/w lentigo     1-2 mm smooth white papules consistent with Milia      Movable subcutaneous cyst with punctum c/w epidermal inclusion cyst      Subcutaneous movable cyst c/w pilar cyst      Firm pink to brown papule c/w dermatofibroma      Pedunculated fleshy papule(s) c/w skin tag(s)      Evenly pigmented macule c/w junctional nevus     Mildly variegated pigmented, slightly irregular-bordered macule c/w mildly atypical nevus      Flesh colored to evenly pigmented papule c/w intradermal nevus       Pink pearly papule/plaque c/w basal cell carcinoma      Erythematous hyperkeratotic cursted plaque c/w SCC      Surgical scar with no sign of skin  cancer recurrence      Open and closed comedones      Inflammatory papules and pustules      Verrucoid papule consistent consistent with wart     Erythematous eczematous patches and plaques     Dystrophic onycholytic nail with subungual debris c/w onychomycosis     Umbilicated papule    Erythematous-base heme-crusted tan verrucoid plaque consistent with inflamed seborrheic keratosis     Erythematous Silvery Scaling Plaque c/w Psoriasis     See annotation      Assessment / Plan:        Acneiform rash - likely side effect from Rybrevant. Will treat with topicals for now.   -     clindamycin (CLEOCIN T) 1 % Swab; Apply topically 2 (two) times daily.  Dispense: 60 each; Refill: 1  -     desonide (DESOWEN) 0.05 % cream; Apply topically 2 (two) times daily.  Dispense: 60 g; Refill: 1             Follow up in about 6 weeks (around 8/30/2023).

## 2023-07-20 ENCOUNTER — OFFICE VISIT (OUTPATIENT)
Dept: HEMATOLOGY/ONCOLOGY | Facility: CLINIC | Age: 53
End: 2023-07-20
Payer: COMMERCIAL

## 2023-07-20 ENCOUNTER — LAB VISIT (OUTPATIENT)
Dept: LAB | Facility: HOSPITAL | Age: 53
End: 2023-07-20
Attending: INTERNAL MEDICINE
Payer: COMMERCIAL

## 2023-07-20 VITALS
BODY MASS INDEX: 52.49 KG/M2 | HEART RATE: 57 BPM | WEIGHT: 293 LBS | TEMPERATURE: 97 F | SYSTOLIC BLOOD PRESSURE: 112 MMHG | DIASTOLIC BLOOD PRESSURE: 73 MMHG

## 2023-07-20 DIAGNOSIS — T45.1X5A IMMUNODEFICIENCY DUE TO CHEMOTHERAPY: ICD-10-CM

## 2023-07-20 DIAGNOSIS — C34.32 MALIGNANT NEOPLASM OF LOWER LOBE OF LEFT LUNG: ICD-10-CM

## 2023-07-20 DIAGNOSIS — Z79.899 IMMUNODEFICIENCY DUE TO CHEMOTHERAPY: ICD-10-CM

## 2023-07-20 DIAGNOSIS — D84.821 IMMUNODEFICIENCY DUE TO CHEMOTHERAPY: ICD-10-CM

## 2023-07-20 DIAGNOSIS — C34.32 MALIGNANT NEOPLASM OF LOWER LOBE OF LEFT LUNG: Primary | ICD-10-CM

## 2023-07-20 DIAGNOSIS — E87.6 HYPOKALEMIA: ICD-10-CM

## 2023-07-20 DIAGNOSIS — L27.0 RASH, DRUG: ICD-10-CM

## 2023-07-20 LAB
ALBUMIN SERPL BCP-MCNC: 3 G/DL (ref 3.5–5.2)
ALP SERPL-CCNC: 106 U/L (ref 55–135)
ALT SERPL W/O P-5'-P-CCNC: 15 U/L (ref 10–44)
ANION GAP SERPL CALC-SCNC: 14 MMOL/L (ref 8–16)
AST SERPL-CCNC: 16 U/L (ref 10–40)
BASOPHILS # BLD AUTO: 0.05 K/UL (ref 0–0.2)
BASOPHILS NFR BLD: 0.6 % (ref 0–1.9)
BILIRUB SERPL-MCNC: 0.2 MG/DL (ref 0.1–1)
BUN SERPL-MCNC: 15 MG/DL (ref 6–20)
CALCIUM SERPL-MCNC: 8.7 MG/DL (ref 8.7–10.5)
CHLORIDE SERPL-SCNC: 103 MMOL/L (ref 95–110)
CO2 SERPL-SCNC: 27 MMOL/L (ref 23–29)
CREAT SERPL-MCNC: 1.1 MG/DL (ref 0.5–1.4)
DIFFERENTIAL METHOD: ABNORMAL
EOSINOPHIL # BLD AUTO: 0.3 K/UL (ref 0–0.5)
EOSINOPHIL NFR BLD: 3.3 % (ref 0–8)
ERYTHROCYTE [DISTWIDTH] IN BLOOD BY AUTOMATED COUNT: 12.1 % (ref 11.5–14.5)
EST. GFR  (NO RACE VARIABLE): 60 ML/MIN/1.73 M^2
GLUCOSE SERPL-MCNC: 172 MG/DL (ref 70–110)
HCT VFR BLD AUTO: 36.1 % (ref 37–48.5)
HGB BLD-MCNC: 11.7 G/DL (ref 12–16)
IMM GRANULOCYTES # BLD AUTO: 0.03 K/UL (ref 0–0.04)
IMM GRANULOCYTES NFR BLD AUTO: 0.4 % (ref 0–0.5)
LYMPHOCYTES # BLD AUTO: 2.4 K/UL (ref 1–4.8)
LYMPHOCYTES NFR BLD: 28.8 % (ref 18–48)
MCH RBC QN AUTO: 28.5 PG (ref 27–31)
MCHC RBC AUTO-ENTMCNC: 32.4 G/DL (ref 32–36)
MCV RBC AUTO: 88 FL (ref 82–98)
MONOCYTES # BLD AUTO: 0.5 K/UL (ref 0.3–1)
MONOCYTES NFR BLD: 5.6 % (ref 4–15)
NEUTROPHILS # BLD AUTO: 5.1 K/UL (ref 1.8–7.7)
NEUTROPHILS NFR BLD: 61.3 % (ref 38–73)
NRBC BLD-RTO: 0 /100 WBC
PLATELET # BLD AUTO: 211 K/UL (ref 150–450)
PMV BLD AUTO: 12.2 FL (ref 9.2–12.9)
POTASSIUM SERPL-SCNC: 3 MMOL/L (ref 3.5–5.1)
PROT SERPL-MCNC: 6.3 G/DL (ref 6–8.4)
RBC # BLD AUTO: 4.1 M/UL (ref 4–5.4)
SODIUM SERPL-SCNC: 144 MMOL/L (ref 136–145)
WBC # BLD AUTO: 8.25 K/UL (ref 3.9–12.7)

## 2023-07-20 PROCEDURE — 4010F PR ACE/ARB THEARPY RXD/TAKEN: ICD-10-PCS | Mod: CPTII,S$GLB,, | Performed by: INTERNAL MEDICINE

## 2023-07-20 PROCEDURE — 80053 COMPREHEN METABOLIC PANEL: CPT | Performed by: INTERNAL MEDICINE

## 2023-07-20 PROCEDURE — 3078F DIAST BP <80 MM HG: CPT | Mod: CPTII,S$GLB,, | Performed by: INTERNAL MEDICINE

## 2023-07-20 PROCEDURE — 3074F SYST BP LT 130 MM HG: CPT | Mod: CPTII,S$GLB,, | Performed by: INTERNAL MEDICINE

## 2023-07-20 PROCEDURE — 99215 PR OFFICE/OUTPT VISIT, EST, LEVL V, 40-54 MIN: ICD-10-PCS | Mod: S$GLB,,, | Performed by: INTERNAL MEDICINE

## 2023-07-20 PROCEDURE — 3074F PR MOST RECENT SYSTOLIC BLOOD PRESSURE < 130 MM HG: ICD-10-PCS | Mod: CPTII,S$GLB,, | Performed by: INTERNAL MEDICINE

## 2023-07-20 PROCEDURE — 3008F PR BODY MASS INDEX (BMI) DOCUMENTED: ICD-10-PCS | Mod: CPTII,S$GLB,, | Performed by: INTERNAL MEDICINE

## 2023-07-20 PROCEDURE — 3008F BODY MASS INDEX DOCD: CPT | Mod: CPTII,S$GLB,, | Performed by: INTERNAL MEDICINE

## 2023-07-20 PROCEDURE — 4010F ACE/ARB THERAPY RXD/TAKEN: CPT | Mod: CPTII,S$GLB,, | Performed by: INTERNAL MEDICINE

## 2023-07-20 PROCEDURE — 99999 PR PBB SHADOW E&M-EST. PATIENT-LVL II: ICD-10-PCS | Mod: PBBFAC,,, | Performed by: INTERNAL MEDICINE

## 2023-07-20 PROCEDURE — 3051F HG A1C>EQUAL 7.0%<8.0%: CPT | Mod: CPTII,S$GLB,, | Performed by: INTERNAL MEDICINE

## 2023-07-20 PROCEDURE — 3051F PR MOST RECENT HEMOGLOBIN A1C LEVEL 7.0 - < 8.0%: ICD-10-PCS | Mod: CPTII,S$GLB,, | Performed by: INTERNAL MEDICINE

## 2023-07-20 PROCEDURE — 85025 COMPLETE CBC W/AUTO DIFF WBC: CPT | Performed by: INTERNAL MEDICINE

## 2023-07-20 PROCEDURE — 99215 OFFICE O/P EST HI 40 MIN: CPT | Mod: S$GLB,,, | Performed by: INTERNAL MEDICINE

## 2023-07-20 PROCEDURE — 36415 COLL VENOUS BLD VENIPUNCTURE: CPT | Performed by: INTERNAL MEDICINE

## 2023-07-20 PROCEDURE — 3078F PR MOST RECENT DIASTOLIC BLOOD PRESSURE < 80 MM HG: ICD-10-PCS | Mod: CPTII,S$GLB,, | Performed by: INTERNAL MEDICINE

## 2023-07-20 PROCEDURE — 99999 PR PBB SHADOW E&M-EST. PATIENT-LVL II: CPT | Mod: PBBFAC,,, | Performed by: INTERNAL MEDICINE

## 2023-07-20 RX ORDER — EPINEPHRINE 0.3 MG/.3ML
0.3 INJECTION SUBCUTANEOUS ONCE AS NEEDED
Status: CANCELLED | OUTPATIENT
Start: 2023-07-20

## 2023-07-20 RX ORDER — DIPHENHYDRAMINE HYDROCHLORIDE 50 MG/ML
50 INJECTION INTRAMUSCULAR; INTRAVENOUS ONCE AS NEEDED
Status: CANCELLED | OUTPATIENT
Start: 2023-08-04

## 2023-07-20 RX ORDER — KETOCONAZOLE 20 MG/G
1 CREAM TOPICAL DAILY
COMMUNITY

## 2023-07-20 RX ORDER — EPINEPHRINE 0.3 MG/.3ML
0.3 INJECTION SUBCUTANEOUS ONCE AS NEEDED
Status: CANCELLED | OUTPATIENT
Start: 2023-08-04

## 2023-07-20 RX ORDER — HEPARIN 100 UNIT/ML
500 SYRINGE INTRAVENOUS
Status: CANCELLED | OUTPATIENT
Start: 2023-07-20

## 2023-07-20 RX ORDER — LISINOPRIL 20 MG/1
1 TABLET ORAL DAILY
COMMUNITY
End: 2024-01-26

## 2023-07-20 RX ORDER — ACETAMINOPHEN 325 MG/1
650 TABLET ORAL
Status: CANCELLED
Start: 2023-08-04

## 2023-07-20 RX ORDER — SODIUM CHLORIDE 0.9 % (FLUSH) 0.9 %
10 SYRINGE (ML) INJECTION
Status: CANCELLED | OUTPATIENT
Start: 2023-08-04

## 2023-07-20 RX ORDER — POTASSIUM CHLORIDE 20 MEQ/1
20 TABLET, EXTENDED RELEASE ORAL DAILY
Qty: 30 TABLET | Refills: 11 | Status: SHIPPED | OUTPATIENT
Start: 2023-07-20 | End: 2023-08-03 | Stop reason: SDUPTHER

## 2023-07-20 RX ORDER — ACETAMINOPHEN 325 MG/1
650 TABLET ORAL
Status: CANCELLED
Start: 2023-07-20

## 2023-07-20 RX ORDER — SODIUM CHLORIDE 0.9 % (FLUSH) 0.9 %
10 SYRINGE (ML) INJECTION
Status: CANCELLED | OUTPATIENT
Start: 2023-07-20

## 2023-07-20 RX ORDER — DIPHENHYDRAMINE HYDROCHLORIDE 50 MG/ML
25 INJECTION INTRAMUSCULAR; INTRAVENOUS
Status: CANCELLED
Start: 2023-08-04

## 2023-07-20 RX ORDER — MELOXICAM 15 MG/1
TABLET ORAL
COMMUNITY
End: 2024-01-26

## 2023-07-20 RX ORDER — DIPHENHYDRAMINE HYDROCHLORIDE 50 MG/ML
50 INJECTION INTRAMUSCULAR; INTRAVENOUS ONCE AS NEEDED
Status: CANCELLED | OUTPATIENT
Start: 2023-07-20

## 2023-07-20 RX ORDER — HYDROCHLOROTHIAZIDE 25 MG/1
1 TABLET ORAL DAILY
COMMUNITY
End: 2024-01-26 | Stop reason: SINTOL

## 2023-07-20 RX ORDER — DIPHENHYDRAMINE HYDROCHLORIDE 50 MG/ML
25 INJECTION INTRAMUSCULAR; INTRAVENOUS
Status: CANCELLED
Start: 2023-07-20

## 2023-07-20 RX ORDER — HEPARIN 100 UNIT/ML
500 SYRINGE INTRAVENOUS
Status: CANCELLED | OUTPATIENT
Start: 2023-08-04

## 2023-07-20 NOTE — PROGRESS NOTES
Subjective:      DATE OF VISIT: 7/20/23     ?  Patient ID:?Shaneka Ahmadi is a 53 y.o. female.?? MR#: [id number]   ?   REFERRING PROVIDER: No referring provider defined for this encounter.     ? Primary Care Providers:  Corina Wang NP, NP (General)     CHIEF COMPLAINT: ?Follow-up??   ?   HPI    Ms. Colón is a 53-year-old woman with stage IV non-small cell lung cancer on active therapy with amivantamab q4wks presenting for consideration of treatment.  Recently seen by Dermatology for bilateral facial rash given topical agent.    Review of Systems    ?   A comprehensive 14-point review of systems was reviewed with patient and was negative other than as specified above.   ?   PAST MEDICAL HISTORY:   Past Medical History:   Diagnosis Date    Diabetes mellitus     Hypertension     Malignant neoplasm of lower lobe of left lung 12/9/2022    Secondary malignant neoplasm of bone 12/5/2022    ?     PAST SURGICAL HISTORY:   Past Surgical History:   Procedure Laterality Date    CATARACT EXTRACTION W/  INTRAOCULAR LENS IMPLANT Right 06/02/2022    FLUOROSCOPY N/A 1/26/2023    Procedure: FLUOROSCOPY/mediport placement;  Surgeon: Marlon Leone MD;  Location: [facility] CATH LAB;  Service: General;  Laterality: N/A;    TUBAL LIGATION      2007--postpartum tubal ligation      ?   ALLERGIES:   Allergies as of 07/20/2023 - Reviewed 07/20/2023   Allergen Reaction Noted    Lisinopril Other (See Comments) 04/08/2022    Amoxicillin  11/18/2022      ?   MEDICATIONS:?   Outpatient Medications Marked as Taking for the 7/20/23 encounter (Office Visit) with Carol Pedroza MD   Medication Sig Dispense Refill    albuterol (PROVENTIL/VENTOLIN HFA) 90 mcg/actuation inhaler Inhale 1-2 puffs into the lungs every 6 (six) hours as needed for Wheezing (cough). 6.7 g 0    amLODIPine (NORVASC) 10 MG tablet Take 1 tablet (10 mg total) by mouth once daily. 90 tablet 3    atenoloL (TENORMIN) 50 MG tablet Take 1 tab by mouth twice a day 180 tablet 3     betamethasone valerate 0.1% (VALISONE) 0.1 % Oint APPLY TOPICALLY TO THE TOP OF THE FEET TWO TIMES A DAY 45 g 1    blood sugar diagnostic Strp To check BG 2 times daily, to use with insurance preferred meter 100 each 11    calcium carbonate (OS-BRUNILDA) 500 mg calcium (1,250 mg) tablet Take 1 tablet (500 mg total) by mouth once daily. 90 tablet 3    cetirizine (ZYRTEC) 10 MG tablet Take 1 tablet (10 mg total) by mouth every evening. 30 tablet 0    clindamycin (CLEOCIN T) 1 % Swab Apply topically 2 (two) times daily. 60 each 1    desonide (DESOWEN) 0.05 % cream Apply topically 2 (two) times daily. 60 g 1    dexAMETHasone (DECADRON) 4 MG Tab Take 2 tablets (8 mg total) by mouth once daily. Take as directed on days 2, 3, and 16,17 of your chemotherapy cycle starting with cycle 2 forward. Do not take with cycle 1. 8 tablet 10    dexAMETHasone (DECADRON) 4 MG Tab Take 1 tablet (4 mg total) by mouth As instructed. Take 8 mg the day prior to chemotherapy, and then 8 mg after chemo on days 2, 3, 4 24 tablet 5    fluticasone propionate (FLONASE) 50 mcg/actuation nasal spray 2 sprays (100 mcg total) by Each Nostril route once daily. 9.9 mL 2    folic acid (FOLVITE) 1 MG tablet Take 1 tablet (1 mg total) by mouth once daily. 30 tablet 11    hydroCHLOROthiazide (HYDRODIURIL) 25 MG tablet Take 1 tablet by mouth once daily.      HYDROcodone-acetaminophen (NORCO) 7.5-325 mg per tablet Take 1 tablet by mouth every 4 (four) hours as needed for Pain. 40 tablet 0    hydrocodone-homatropine 5-1.5 mg/5 ml (HYCODAN) 5-1.5 mg/5 mL Syrp Take 5 mL by mouth every 4 (four) hours as needed. 473 mL 0    ketoconazole (NIZORAL) 2 % cream 1 application  once daily.      lancets Misc To check BG 2 times daily, to use with insurance preferred meter 100 each 11    lisinopriL (PRINIVIL,ZESTRIL) 20 MG tablet Take 1 tablet by mouth once daily.      losartan-hydrochlorothiazide 100-25 mg (HYZAAR) 100-25 mg per tablet Take 1 tablet by mouth once daily. 90  tablet 3    meloxicam (MOBIC) 15 MG tablet       metFORMIN (GLUCOPHAGE-XR) 500 MG ER 24hr tablet Take 1 tab with breakfast and take 2 tabs with dinner 90 tablet 11    mupirocin (BACTROBAN) 2 % ointment APPLY TO AFFECTED AREA(S) THREE TIMES A DAY 22 g 0    ondansetron (ZOFRAN-ODT) 8 MG TbDL Take 1 tablet (8 mg total) by mouth every 8 (eight) hours as needed. Starting with cycle 1 of chemotherapy 60 tablet 11    ondansetron (ZOFRAN-ODT) 8 MG TbDL Take 1 tablet (8 mg total) by mouth every 8 (eight) hours as needed (nausea/vomitting). 90 tablet 5    TRUE METRIX GLUCOSE METER Misc       TRUEPLUS LANCETS 33 gauge Misc Apply topically.      [DISCONTINUED] osimertinib (TAGRISSO) 80 mg Tab Take 80 mg by mouth once daily. 30 tablet 11    [DISCONTINUED] potassium chloride SA (KLOR-CON M10) 10 MEQ tablet Take 2 tablets (20 mEq total) by mouth 2 (two) times daily. 12 tablet 0      ?   SOCIAL HISTORY:?   Social History     Tobacco Use    Smoking status: Never     Passive exposure: Never    Smokeless tobacco: Never   Substance Use Topics    Alcohol use: Never      ?      ?   FAMILY HISTORY:   family history includes Heart failure in her father.   ?        Objective:      Physical Exam      ?   Vitals:    07/20/23 1253   BP: 112/73   Pulse: (!) 57   Temp: 97.1 °F (36.2 °C)      ?   ECOG:?0  General appearance: Generally well appearing, in no acute distress.   Head, eyes, ears, nose, and throat: moist mucous membranes.  Bilateral cheeks with maculopapular rash  Respiratory:  Normal work of breathing  Abdomen: nontender, nondistended.   Extremities: Warm, without edema.   Neurologic: Alert and oriented.   Skin: No rashes, ecchymoses or petechial lesion.   Psychiatric:  Normal mood and affect.    ?   Laboratory:    Lab Results   Component Value Date    WBC 8.25 07/20/2023    RBC 4.10 07/20/2023    HGB 11.7 (L) 07/20/2023    HCT 36.1 (L) 07/20/2023    MCV 88 07/20/2023    MCH 28.5 07/20/2023    MCHC 32.4 07/20/2023    RDW 12.1  07/20/2023     07/20/2023    MPV 12.2 07/20/2023    GRAN 5.1 07/20/2023    GRAN 61.3 07/20/2023    LYMPH 2.4 07/20/2023    LYMPH 28.8 07/20/2023    MONO 0.5 07/20/2023    MONO 5.6 07/20/2023    EOS 0.3 07/20/2023    BASO 0.05 07/20/2023    EOSINOPHIL 3.3 07/20/2023    BASOPHIL 0.6 07/20/2023       Sodium   Date Value Ref Range Status   07/20/2023 144 136 - 145 mmol/L Final     Potassium   Date Value Ref Range Status   07/20/2023 3.0 (L) 3.5 - 5.1 mmol/L Final     Chloride   Date Value Ref Range Status   07/20/2023 103 95 - 110 mmol/L Final     CO2   Date Value Ref Range Status   07/20/2023 27 23 - 29 mmol/L Final     Glucose   Date Value Ref Range Status   07/20/2023 172 (H) 70 - 110 mg/dL Final     BUN   Date Value Ref Range Status   07/20/2023 15 6 - 20 mg/dL Final     Creatinine   Date Value Ref Range Status   07/20/2023 1.1 0.5 - 1.4 mg/dL Final     Calcium   Date Value Ref Range Status   07/20/2023 8.7 8.7 - 10.5 mg/dL Final     Total Protein   Date Value Ref Range Status   07/20/2023 6.3 6.0 - 8.4 g/dL Final     Albumin   Date Value Ref Range Status   07/20/2023 3.0 (L) 3.5 - 5.2 g/dL Final     Total Bilirubin   Date Value Ref Range Status   07/20/2023 0.2 0.1 - 1.0 mg/dL Final     Comment:     For infants and newborns, interpretation of results should be based  on gestational age, weight and in agreement with clinical  observations.    Premature Infant recommended reference ranges:  Up to 24 hours.............<8.0 mg/dL  Up to 48 hours............<12.0 mg/dL  3-5 days..................<15.0 mg/dL  6-29 days.................<15.0 mg/dL       Alkaline Phosphatase   Date Value Ref Range Status   07/20/2023 106 55 - 135 U/L Final     AST   Date Value Ref Range Status   07/20/2023 16 10 - 40 U/L Final     ALT   Date Value Ref Range Status   07/20/2023 15 10 - 44 U/L Final     Anion Gap   Date Value Ref Range Status   07/20/2023 14 8 - 16 mmol/L Final     eGFR if    Date Value Ref Range  Status   07/28/2022 >60.0 >60 mL/min/1.73 m^2 Final     eGFR if non    Date Value Ref Range Status   07/28/2022 >60.0 >60 mL/min/1.73 m^2 Final     Comment:     Calculation used to obtain the estimated glomerular filtration  rate (eGFR) is the CKD-EPI equation.          Iron   Date Value Ref Range Status   03/09/2023 71 30 - 160 ug/dL Final     TIBC   Date Value Ref Range Status   03/09/2023 297 250 - 450 ug/dL Final     Saturated Iron   Date Value Ref Range Status   03/09/2023 24 20 - 50 % Final     Ferritin   Date Value Ref Range Status   03/09/2023 646 (H) 20.0 - 300.0 ng/mL Final     TSH   Date Value Ref Range Status   04/08/2022 1.742 0.400 - 4.000 uIU/mL Final           ?   Assessment/Plan:   Malignant neoplasm of lower lobe of left lung    Immunodeficiency due to chemotherapy    Rash, drug    Hypokalemia  -     potassium chloride SA (K-DUR,KLOR-CON) 20 MEQ tablet; Take 1 tablet (20 mEq total) by mouth once daily.  Dispense: 30 tablet; Refill: 11    Other orders  -     palonosetron (ALOXI) 0.25 mg with Dexamethasone (DECADRON) 12 mg in NS 50 mL IVPB  -     acetaminophen tablet 650 mg  -     diphenhydrAMINE injection 25 mg  -     amivantamab-vmjw (RYBREVANT) 1,400 mg in sodium chloride 0.9% SolP 250 mL chemo infusion  -     EPINEPHrine (EPIPEN) 0.3 mg/0.3 mL pen injection 0.3 mg  -     diphenhydrAMINE injection 50 mg  -     hydrocortisone sodium succinate injection 100 mg  -     sodium chloride 0.9% 250 mL flush bag  -     sodium chloride 0.9% flush 10 mL  -     heparin, porcine (PF) 100 unit/mL injection flush 500 Units  -     alteplase injection 2 mg  -     palonosetron (ALOXI) 0.25 mg with Dexamethasone (DECADRON) 12 mg in NS 50 mL IVPB  -     acetaminophen tablet 650 mg  -     diphenhydrAMINE injection 25 mg  -     amivantamab-vmjw (RYBREVANT) 1,400 mg in sodium chloride 0.9% SolP 250 mL chemo infusion  -     EPINEPHrine (EPIPEN) 0.3 mg/0.3 mL pen injection 0.3 mg  -     diphenhydrAMINE  injection 50 mg  -     hydrocortisone sodium succinate injection 100 mg  -     sodium chloride 0.9% 250 mL flush bag  -     sodium chloride 0.9% flush 10 mL  -     heparin, porcine (PF) 100 unit/mL injection flush 500 Units  -     alteplase injection 2 mg       1. Malignant neoplasm of lower lobe of left lung    2. Immunodeficiency due to chemotherapy    3. Rash, drug    4. Hypokalemia          Plan:     Problem List Items Addressed This Visit          Derm    Rash, drug    Overview     OP NSCLC AMIVANTAMAB-VMJW ([facility]) Q4W                Renal/    Hypokalemia       Immunology/Multi System    Immunodeficiency due to chemotherapy       Oncology    Malignant neoplasm of lower lobe of left lung - Primary     Stage IV non-small cell lung cancer Exon 20 EGFR mutation positive previously on carboplatin and pemetrexed, current regimen amivantamab now q2wks tolerating well aside from rash, see below.  Labs reviewed mild recurrent hypokalemia recommend supplementation and dietary change, potassium 20 mEq daily ordered will need follow-up on repeat labs.  Note:  Zometa on hold due to ongoing dental issue     Rash:  Seen by Dermatology on desonide and clindamycin topicals  Follow-Up:     Route Chart for Scheduling    Med Onc Chart Routing      Follow up with physician 4 weeks. Dr. Mcfarlane   Follow up with DOLLY 2 weeks.   Infusion scheduling note   amivantamab q2wks   Injection scheduling note    Labs CBC and CMP   Scheduling:  Preferred lab:  Lab interval:     Imaging    Pharmacy appointment    Other referrals          Treatment Plan Information   OP NSCLC AMIVANTAMAB-VMJW ([facility]) Q4W   Reese Mcfarlane MD   Upcoming Treatment Dates - OP NSCLC AMIVANTAMAB-VMJW ([facility]) Q4W    7/13/2023       Pre-Medications       acetaminophen tablet 650 mg       diphenhydrAMINE injection 25 mg       Antiemetics       palonosetron (ALOXI) 0.25 mg with Dexamethasone (DECADRON) 12 mg in NS 50 mL IVPB       Immunotherapy        amivantamab-vmjw (RYBREVANT) 1,400 mg in sodium chloride 0.9% SolP 250 mL chemo infusion  7/27/2023       Pre-Medications       acetaminophen tablet 650 mg       diphenhydrAMINE injection 25 mg       Antiemetics       palonosetron (ALOXI) 0.25 mg with Dexamethasone (DECADRON) 12 mg in NS 50 mL IVPB       Immunotherapy       amivantamab-vmjw (RYBREVANT) 1,400 mg in sodium chloride 0.9% SolP 250 mL chemo infusion  8/10/2023       Pre-Medications       acetaminophen tablet 650 mg       diphenhydrAMINE injection 25 mg       Antiemetics       palonosetron (ALOXI) 0.25 mg with Dexamethasone (DECADRON) 12 mg in NS 50 mL IVPB       Immunotherapy       amivantamab-vmjw (RYBREVANT) 1,400 mg in sodium chloride 0.9% SolP 250 mL chemo infusion  8/24/2023       Pre-Medications       acetaminophen tablet 650 mg       diphenhydrAMINE injection 25 mg       Antiemetics       palonosetron (ALOXI) 0.25 mg with Dexamethasone (DECADRON) 12 mg in NS 50 mL IVPB       Immunotherapy       amivantamab-vmjw (RYBREVANT) 1,400 mg in sodium chloride 0.9% SolP 250 mL chemo infusion    Supportive Plan Information  OP ZOLEDRONIC ACID (ZOMETA) Q4W   Reese Mcfarlane MD   Upcoming Treatment Dates - OP ZOLEDRONIC ACID (ZOMETA) Q4W    4/1/2023       Medications       zoledronic acid (ZOMETA) 4 mg in sodium chloride 0.9% 100 mL IVPB  4/29/2023       Medications       zoledronic acid (ZOMETA) 4 mg in sodium chloride 0.9% 100 mL IVPB  5/27/2023       Medications       zoledronic acid (ZOMETA) 4 mg in sodium chloride 0.9% 100 mL IVPB  6/24/2023       Medications       zoledronic acid (ZOMETA) 4 mg in sodium chloride 0.9% 100 mL IVPB    Therapy Plan Information  Flushes  sodium chloride 0.9% 250 mL flush bag  Intravenous, Every visit  sodium chloride 0.9% flush 10 mL  10 mL, Intravenous, Every visit  heparin, porcine (PF) 100 unit/mL injection flush 500 Units  500 Units, Intravenous, Every visit  alteplase injection 2 mg  2 mg, Intra-Catheter, Every  visit  5. Medications  cyanocobalamin injection 1,000 mcg  1,000 mcg, Intramuscular, Day 1 of every 1 month

## 2023-07-21 ENCOUNTER — INFUSION (OUTPATIENT)
Dept: INFUSION THERAPY | Facility: HOSPITAL | Age: 53
End: 2023-07-21
Attending: RADIOLOGY
Payer: COMMERCIAL

## 2023-07-21 VITALS
OXYGEN SATURATION: 97 % | SYSTOLIC BLOOD PRESSURE: 110 MMHG | DIASTOLIC BLOOD PRESSURE: 55 MMHG | RESPIRATION RATE: 18 BRPM | BODY MASS INDEX: 52.49 KG/M2 | TEMPERATURE: 97 F | WEIGHT: 293 LBS | HEART RATE: 61 BPM

## 2023-07-21 DIAGNOSIS — C34.32 MALIGNANT NEOPLASM OF LOWER LOBE OF LEFT LUNG: Primary | ICD-10-CM

## 2023-07-21 PROCEDURE — 96413 CHEMO IV INFUSION 1 HR: CPT

## 2023-07-21 PROCEDURE — 96375 TX/PRO/DX INJ NEW DRUG ADDON: CPT

## 2023-07-21 PROCEDURE — 96415 CHEMO IV INFUSION ADDL HR: CPT

## 2023-07-21 PROCEDURE — 63600175 PHARM REV CODE 636 W HCPCS: Performed by: INTERNAL MEDICINE

## 2023-07-21 PROCEDURE — 96367 TX/PROPH/DG ADDL SEQ IV INF: CPT

## 2023-07-21 PROCEDURE — 25000003 PHARM REV CODE 250: Performed by: INTERNAL MEDICINE

## 2023-07-21 RX ORDER — HEPARIN 100 UNIT/ML
500 SYRINGE INTRAVENOUS
Status: DISCONTINUED | OUTPATIENT
Start: 2023-07-21 | End: 2023-07-21 | Stop reason: HOSPADM

## 2023-07-21 RX ORDER — DIPHENHYDRAMINE HYDROCHLORIDE 50 MG/ML
25 INJECTION INTRAMUSCULAR; INTRAVENOUS
Status: COMPLETED | OUTPATIENT
Start: 2023-07-21 | End: 2023-07-21

## 2023-07-21 RX ORDER — ACETAMINOPHEN 325 MG/1
650 TABLET ORAL
Status: COMPLETED | OUTPATIENT
Start: 2023-07-21 | End: 2023-07-21

## 2023-07-21 RX ADMIN — DEXAMETHASONE SODIUM PHOSPHATE 0.25 MG: 4 INJECTION, SOLUTION INTRA-ARTICULAR; INTRALESIONAL; INTRAMUSCULAR; INTRAVENOUS; SOFT TISSUE at 02:07

## 2023-07-21 RX ADMIN — HEPARIN 500 UNITS: 100 SYRINGE at 04:07

## 2023-07-21 RX ADMIN — ACETAMINOPHEN 650 MG: 325 TABLET ORAL at 01:07

## 2023-07-21 RX ADMIN — DIPHENHYDRAMINE HYDROCHLORIDE 25 MG: 50 INJECTION, SOLUTION INTRAMUSCULAR; INTRAVENOUS at 01:07

## 2023-07-21 RX ADMIN — SODIUM CHLORIDE: 9 INJECTION, SOLUTION INTRAVENOUS at 01:07

## 2023-07-21 RX ADMIN — AMIVANTAMAB 1400 MG: 350 INJECTION INTRAVENOUS at 02:07

## 2023-07-21 NOTE — PLAN OF CARE
Problem: Adult Inpatient Plan of Care  Goal: Plan of Care Review  Outcome: Ongoing, Progressing  Flowsheets (Taken 7/21/2023 1500)  Plan of Care Reviewed With:   patient   spouse  Goal: Patient-Specific Goal (Individualized)  Outcome: Ongoing, Progressing  Flowsheets (Taken 7/21/2023 1500)  Anxieties, Fears or Concerns: none verbalized  Individualized Care Needs: warm blankets, pillow, ice, reclining position     Problem: Infection  Goal: Absence of Infection Signs and Symptoms  Outcome: Ongoing, Progressing  Intervention: Prevent or Manage Infection  Flowsheets (Taken 7/21/2023 1500)  Infection Management: aseptic technique maintained

## 2023-07-25 ENCOUNTER — PATIENT MESSAGE (OUTPATIENT)
Dept: HEMATOLOGY/ONCOLOGY | Facility: CLINIC | Age: 53
End: 2023-07-25
Payer: COMMERCIAL

## 2023-08-03 ENCOUNTER — PATIENT MESSAGE (OUTPATIENT)
Dept: HEMATOLOGY/ONCOLOGY | Facility: CLINIC | Age: 53
End: 2023-08-03

## 2023-08-03 ENCOUNTER — OFFICE VISIT (OUTPATIENT)
Dept: HEMATOLOGY/ONCOLOGY | Facility: CLINIC | Age: 53
End: 2023-08-03
Payer: COMMERCIAL

## 2023-08-03 ENCOUNTER — LAB VISIT (OUTPATIENT)
Dept: LAB | Facility: HOSPITAL | Age: 53
End: 2023-08-03
Attending: INTERNAL MEDICINE
Payer: COMMERCIAL

## 2023-08-03 VITALS
WEIGHT: 293 LBS | BODY MASS INDEX: 50.02 KG/M2 | HEART RATE: 58 BPM | DIASTOLIC BLOOD PRESSURE: 73 MMHG | OXYGEN SATURATION: 99 % | SYSTOLIC BLOOD PRESSURE: 134 MMHG | HEIGHT: 64 IN | TEMPERATURE: 97 F

## 2023-08-03 DIAGNOSIS — R91.8 MASS OF LEFT LUNG: ICD-10-CM

## 2023-08-03 DIAGNOSIS — C34.32 MALIGNANT NEOPLASM OF LOWER LOBE OF LEFT LUNG: ICD-10-CM

## 2023-08-03 DIAGNOSIS — E87.6 HYPOKALEMIA: ICD-10-CM

## 2023-08-03 LAB
ALBUMIN SERPL BCP-MCNC: 2.8 G/DL (ref 3.5–5.2)
ALP SERPL-CCNC: 94 U/L (ref 55–135)
ALT SERPL W/O P-5'-P-CCNC: 19 U/L (ref 10–44)
ANION GAP SERPL CALC-SCNC: 9 MMOL/L (ref 8–16)
AST SERPL-CCNC: 16 U/L (ref 10–40)
BASOPHILS # BLD AUTO: 0.03 K/UL (ref 0–0.2)
BASOPHILS NFR BLD: 0.4 % (ref 0–1.9)
BILIRUB SERPL-MCNC: 0.3 MG/DL (ref 0.1–1)
BUN SERPL-MCNC: 13 MG/DL (ref 6–20)
CALCIUM SERPL-MCNC: 8.6 MG/DL (ref 8.7–10.5)
CHLORIDE SERPL-SCNC: 103 MMOL/L (ref 95–110)
CO2 SERPL-SCNC: 29 MMOL/L (ref 23–29)
CREAT SERPL-MCNC: 0.9 MG/DL (ref 0.5–1.4)
DIFFERENTIAL METHOD: ABNORMAL
EOSINOPHIL # BLD AUTO: 0.2 K/UL (ref 0–0.5)
EOSINOPHIL NFR BLD: 2.7 % (ref 0–8)
ERYTHROCYTE [DISTWIDTH] IN BLOOD BY AUTOMATED COUNT: 12.2 % (ref 11.5–14.5)
EST. GFR  (NO RACE VARIABLE): >60 ML/MIN/1.73 M^2
GLUCOSE SERPL-MCNC: 156 MG/DL (ref 70–110)
HCT VFR BLD AUTO: 35.8 % (ref 37–48.5)
HGB BLD-MCNC: 11.6 G/DL (ref 12–16)
IMM GRANULOCYTES # BLD AUTO: 0.02 K/UL (ref 0–0.04)
IMM GRANULOCYTES NFR BLD AUTO: 0.3 % (ref 0–0.5)
LYMPHOCYTES # BLD AUTO: 1.9 K/UL (ref 1–4.8)
LYMPHOCYTES NFR BLD: 26.8 % (ref 18–48)
MCH RBC QN AUTO: 27.4 PG (ref 27–31)
MCHC RBC AUTO-ENTMCNC: 32.4 G/DL (ref 32–36)
MCV RBC AUTO: 85 FL (ref 82–98)
MONOCYTES # BLD AUTO: 0.6 K/UL (ref 0.3–1)
MONOCYTES NFR BLD: 8 % (ref 4–15)
NEUTROPHILS # BLD AUTO: 4.4 K/UL (ref 1.8–7.7)
NEUTROPHILS NFR BLD: 61.8 % (ref 38–73)
NRBC BLD-RTO: 0 /100 WBC
PLATELET # BLD AUTO: 181 K/UL (ref 150–450)
PMV BLD AUTO: 11.9 FL (ref 9.2–12.9)
POTASSIUM SERPL-SCNC: 4.1 MMOL/L (ref 3.5–5.1)
PROT SERPL-MCNC: 6.1 G/DL (ref 6–8.4)
RBC # BLD AUTO: 4.23 M/UL (ref 4–5.4)
SODIUM SERPL-SCNC: 141 MMOL/L (ref 136–145)
WBC # BLD AUTO: 7.1 K/UL (ref 3.9–12.7)

## 2023-08-03 PROCEDURE — 99215 OFFICE O/P EST HI 40 MIN: CPT | Mod: S$GLB,,,

## 2023-08-03 PROCEDURE — 3051F HG A1C>EQUAL 7.0%<8.0%: CPT | Mod: CPTII,S$GLB,,

## 2023-08-03 PROCEDURE — 1160F RVW MEDS BY RX/DR IN RCRD: CPT | Mod: CPTII,S$GLB,,

## 2023-08-03 PROCEDURE — 3075F PR MOST RECENT SYSTOLIC BLOOD PRESS GE 130-139MM HG: ICD-10-PCS | Mod: CPTII,S$GLB,,

## 2023-08-03 PROCEDURE — 3078F DIAST BP <80 MM HG: CPT | Mod: CPTII,S$GLB,,

## 2023-08-03 PROCEDURE — 3075F SYST BP GE 130 - 139MM HG: CPT | Mod: CPTII,S$GLB,,

## 2023-08-03 PROCEDURE — 4010F PR ACE/ARB THEARPY RXD/TAKEN: ICD-10-PCS | Mod: CPTII,S$GLB,,

## 2023-08-03 PROCEDURE — 4010F ACE/ARB THERAPY RXD/TAKEN: CPT | Mod: CPTII,S$GLB,,

## 2023-08-03 PROCEDURE — 36415 COLL VENOUS BLD VENIPUNCTURE: CPT | Performed by: INTERNAL MEDICINE

## 2023-08-03 PROCEDURE — 3078F PR MOST RECENT DIASTOLIC BLOOD PRESSURE < 80 MM HG: ICD-10-PCS | Mod: CPTII,S$GLB,,

## 2023-08-03 PROCEDURE — 85025 COMPLETE CBC W/AUTO DIFF WBC: CPT | Performed by: INTERNAL MEDICINE

## 2023-08-03 PROCEDURE — 99999 PR PBB SHADOW E&M-EST. PATIENT-LVL III: CPT | Mod: PBBFAC,,,

## 2023-08-03 PROCEDURE — 1159F MED LIST DOCD IN RCRD: CPT | Mod: CPTII,S$GLB,,

## 2023-08-03 PROCEDURE — 99999 PR PBB SHADOW E&M-EST. PATIENT-LVL III: ICD-10-PCS | Mod: PBBFAC,,,

## 2023-08-03 PROCEDURE — 3008F BODY MASS INDEX DOCD: CPT | Mod: CPTII,S$GLB,,

## 2023-08-03 PROCEDURE — 3008F PR BODY MASS INDEX (BMI) DOCUMENTED: ICD-10-PCS | Mod: CPTII,S$GLB,,

## 2023-08-03 PROCEDURE — 1159F PR MEDICATION LIST DOCUMENTED IN MEDICAL RECORD: ICD-10-PCS | Mod: CPTII,S$GLB,,

## 2023-08-03 PROCEDURE — 1160F PR REVIEW ALL MEDS BY PRESCRIBER/CLIN PHARMACIST DOCUMENTED: ICD-10-PCS | Mod: CPTII,S$GLB,,

## 2023-08-03 PROCEDURE — 99215 PR OFFICE/OUTPT VISIT, EST, LEVL V, 40-54 MIN: ICD-10-PCS | Mod: S$GLB,,,

## 2023-08-03 PROCEDURE — 80053 COMPREHEN METABOLIC PANEL: CPT | Performed by: INTERNAL MEDICINE

## 2023-08-03 PROCEDURE — 3051F PR MOST RECENT HEMOGLOBIN A1C LEVEL 7.0 - < 8.0%: ICD-10-PCS | Mod: CPTII,S$GLB,,

## 2023-08-03 RX ORDER — POTASSIUM CHLORIDE 750 MG/1
20 TABLET, EXTENDED RELEASE ORAL DAILY
Qty: 60 TABLET | Refills: 11 | Status: SHIPPED | OUTPATIENT
Start: 2023-08-03 | End: 2024-03-01 | Stop reason: ALTCHOICE

## 2023-08-03 NOTE — PROGRESS NOTES
Subjective:     Patient ID:Mateusz Ahmadi is a 53 y.o. female.?? MR#: [id number]   ?   PRIMARY ONCOLOGIST: Dr. Mcfarlane   ?   CHIEF COMPLAINT: Lab review/assessment C1D1 Rybrevant  ?   ONCOLOGIC DIAGNOSIS: Stage IV (cT2, cN2, cM1), Malignant neoplasm of lower lobe of left lung   ?   CURRENT TREATMENT: OP NSCLC AMIVANTAMAB-VMJW (Rybrevant) Q4W     PAST TREATMENT: PEMETREXED + CARBOPLATIN (AUC) Q3W    The patient location is: [facility]  The chief complaint leading to consultation is: chemo    Visit type: audiovisual    Face to Face time with patient: 15  30 minutes of total time spent on the encounter, which includes face to face time and non-face to face time preparing to see the patient (eg, review of tests), Obtaining and/or reviewing separately obtained history, Documenting clinical information in the electronic or other health record, Independently interpreting results (not separately reported) and communicating results to the patient/family/caregiver, or Care coordination (not separately reported).         Each patient to whom he or she provides medical services by telemedicine is:  (1) informed of the relationship between the physician and patient and the respective role of any other health care provider with respect to management of the patient; and (2) notified that he or she may decline to receive medical services by telemedicine and may withdraw from such care at any time.    Notes:    HPI Mrs. Ahmadi is a pleasan 53-renee-old female with metastatic non-small cell lung carcinoma Exon 20 mutation who presents today for lab review and assessment prior to C1D1 Rybrevant. 11/2022 pt seen with PCP with c/o persistent coughing and wheezing. CXR at that time revealed pulmonary nodules, subsequent CT chest found L Lung mass. L lung biopsy positive for adenocarcinoma , EGFR mutated. Pleural fluid also positive for malignancy. PET showed avid STEPHAN mass, mediastinal nodes, bone mets in C1, T1, T11, L3, sacrum, L ischium, sternum.  MRI brain negative for intracranial metastases. Initiated on carbo/ alimta 01/27/23--re imaging after 4 cycles found progressive disease. Pt initiated on Rybrevant 04/27/23.    Interval History: Pt states she is feeling well and voices no c/o today. Denies n/v/d/c, fever, chills, sob, cp, cough, abnormal bleeding. She notes acneform rash to face for which she has been seen by derm and notes continuing improvement. She does note scratchy throat r/t post nasal drip--recommended trial of warm tea for soothing.  Denies difficulty with appetite or maintaining hydration.       Oncology History   Malignant neoplasm of lower lobe of left lung   12/9/2022 Initial Diagnosis    Malignant neoplasm of lower lobe of left lung       12/9/2022 Cancer Staged    Staging form: Lung, AJCC 8th Edition  - Clinical stage from 12/9/2022: Stage IV (cT2, cN2, cM1)       12/27/2022 - 12/27/2022 Chemotherapy    Treatment Summary   Plan Name: OP PEMBROLIZUMAB 400MG Q6W  Treatment Goal: Control  Status: Inactive  Start Date:   End Date:   Provider: Reese Mcfarlane MD  Chemotherapy: [No matching medication found in this treatment plan]        Genetic Testing    Patient has genetic testing done for  TEmpus peripheral blood.                                              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     1/18/2023 - 1/18/2023 Chemotherapy    Treatment Summary   Plan Name: OP NSCLC AMIVANTAMAB-VMJW (Rybrevant) Q4W  Treatment Goal: Palliative  Status: Inactive  Start Date:   End Date:   Provider: Reese Mcfarlane MD  Chemotherapy: amivantamab-vmjw (RYBREVANT) 350 mg in sodium chloride 0.9% SolP 250 mL chemo infusion, 350 mg (100 % of original dose 350 mg), Intravenous, Clinic/HOD 1 time, 0 of 13 cycles  Dose modification: 350 mg (original dose 350 mg, Cycle 1), 1,050 mg (original dose 1,050 mg, Cycle 1), 1,400 mg (original dose 1,400 mg, Cycle 1)       1/27/2023 - 3/31/2023  Chemotherapy    Treatment Summary   Plan Name: OP NSCLC PEMETREXED + CARBOPLATIN (AUC) Q3W  Treatment Goal: Control  Status: Inactive  Start Date: 1/27/2023  End Date: 3/31/2023  Provider: Reese Mcfarlane MD  Chemotherapy: CARBOplatin (PARAPLATIN) 750 mg in sodium chloride 0.9% 335 mL chemo infusion, 750 mg (100 % of original dose 750 mg), Intravenous, Clinic/HOD 1 time, 4 of 4 cycles  Dose modification:   (original dose 750 mg, Cycle 1, Reason: MD Discretion)  Administration: 750 mg (1/27/2023), 750 mg (2/17/2023), 750 mg (3/31/2023), 750 mg (3/10/2023)  PEMEtrexed disodium (ALIMTA) 1,200 mg in sodium chloride 0.9% SolP 100 mL chemo infusion, 1,250 mg, Intravenous, Clinic/HOD 1 time, 4 of 4 cycles  Administration: 1,200 mg (1/27/2023), 1,200 mg (2/17/2023), 1,200 mg (3/31/2023), 1,200 mg (3/10/2023)       4/27/2023 -  Chemotherapy    Treatment Summary   Plan Name: OP NSCLC AMIVANTAMAB-VMJW (Rybrevant) Q4W  Treatment Goal: Palliative  Status: Active  Start Date: 4/27/2023 (Planned)  End Date: 6/6/2024 (Planned)  Provider: Reese Mcfarlane MD  Chemotherapy: amivantamab-vmjw (RYBREVANT) 350 mg in sodium chloride 0.9% SolP 250 mL chemo infusion, 350 mg (original dose ), Intravenous, Clinic/HOD 1 time, 0 of 15 cycles  Dose modification: 350 mg (Cycle 1), 1,050 mg (Cycle 1), 1,400 mg (Cycle 1)          Social History     Socioeconomic History    Marital status:    Tobacco Use    Smoking status: Never     Passive exposure: Never    Smokeless tobacco: Never   Substance and Sexual Activity    Alcohol use: Never    Drug use: Never    Sexual activity: Yes     Partners: Male     Birth control/protection: None     Comment: tubal      Family History   Problem Relation Age of Onset    Heart failure Father       Past Surgical History:   Procedure Laterality Date    CATARACT EXTRACTION W/  INTRAOCULAR LENS IMPLANT Right 06/02/2022    FLUOROSCOPY N/A 1/26/2023    Procedure: FLUOROSCOPY/mediport placement;  Surgeon: Marlon  MD Sulema;  Location: [facility] CATH LAB;  Service: General;  Laterality: N/A;    TUBAL LIGATION      2007--postpartum tubal ligation        Review of Systems   Constitutional:  Negative for activity change, chills, fatigue and fever.   HENT: Negative.     Eyes: Negative.    Respiratory: Negative.     Cardiovascular: Negative.    Gastrointestinal: Negative.    Endocrine: Positive for cold intolerance.   Musculoskeletal:  Negative for arthralgias and myalgias.   Skin:  Negative for rash.   Neurological:  Negative for dizziness, weakness and light-headedness.   Psychiatric/Behavioral:  The patient is not nervous/anxious.        ?   A comprehensive 14-point review of systems was reviewed with patient and was negative other than as specified above.   ?     Objective:      Physical Exam  Vitals reviewed.   Constitutional:       General: She is not in acute distress.     Appearance: Normal appearance. She is not ill-appearing, toxic-appearing or diaphoretic.   HENT:      Head: Normocephalic and atraumatic.   Cardiovascular:      Rate and Rhythm: Normal rate.   Pulmonary:      Effort: Pulmonary effort is normal.      Breath sounds: Normal breath sounds.   Skin:     General: Skin is warm.      Coloration: Skin is not jaundiced or pale.      Findings: Rash (face--much improved) present. No bruising, erythema or lesion.   Neurological:      Mental Status: She is alert.      Motor: No weakness.      Gait: Gait normal.   Psychiatric:         Mood and Affect: Mood normal.         Behavior: Behavior normal.         Thought Content: Thought content normal.         Judgment: Judgment normal.           ?   Vitals:    08/03/23 0749   BP: 134/73   Pulse: (!) 58   Temp: 97.1 °F (36.2 °C)        ?       ?   Laboratory:  ?   Lab Visit on 08/03/2023   Component Date Value Ref Range Status    Sodium 08/03/2023 141  136 - 145 mmol/L Final    Potassium 08/03/2023 4.1  3.5 - 5.1 mmol/L Final    Chloride 08/03/2023 103  95 - 110 mmol/L Final     CO2 08/03/2023 29  23 - 29 mmol/L Final    Glucose 08/03/2023 156 (H)  70 - 110 mg/dL Final    BUN 08/03/2023 13  6 - 20 mg/dL Final    Creatinine 08/03/2023 0.9  0.5 - 1.4 mg/dL Final    Calcium 08/03/2023 8.6 (L)  8.7 - 10.5 mg/dL Final    Total Protein 08/03/2023 6.1  6.0 - 8.4 g/dL Final    Albumin 08/03/2023 2.8 (L)  3.5 - 5.2 g/dL Final    Total Bilirubin 08/03/2023 0.3  0.1 - 1.0 mg/dL Final    Alkaline Phosphatase 08/03/2023 94  55 - 135 U/L Final    AST 08/03/2023 16  10 - 40 U/L Final    ALT 08/03/2023 19  10 - 44 U/L Final    eGFR 08/03/2023 >60  >60 mL/min/1.73 m^2 Final    Anion Gap 08/03/2023 9  8 - 16 mmol/L Final    WBC 08/03/2023 7.10  3.90 - 12.70 K/uL Final    RBC 08/03/2023 4.23  4.00 - 5.40 M/uL Final    Hemoglobin 08/03/2023 11.6 (L)  12.0 - 16.0 g/dL Final    Hematocrit 08/03/2023 35.8 (L)  37.0 - 48.5 % Final    MCV 08/03/2023 85  82 - 98 fL Final    MCH 08/03/2023 27.4  27.0 - 31.0 pg Final    MCHC 08/03/2023 32.4  32.0 - 36.0 g/dL Final    RDW 08/03/2023 12.2  11.5 - 14.5 % Final    Platelets 08/03/2023 181  150 - 450 K/uL Final    MPV 08/03/2023 11.9  9.2 - 12.9 fL Final    Immature Granulocytes 08/03/2023 0.3  0.0 - 0.5 % Final    Gran # (ANC) 08/03/2023 4.4  1.8 - 7.7 K/uL Final    Immature Grans (Abs) 08/03/2023 0.02  0.00 - 0.04 K/uL Final    Lymph # 08/03/2023 1.9  1.0 - 4.8 K/uL Final    Mono # 08/03/2023 0.6  0.3 - 1.0 K/uL Final    Eos # 08/03/2023 0.2  0.0 - 0.5 K/uL Final    Baso # 08/03/2023 0.03  0.00 - 0.20 K/uL Final    nRBC 08/03/2023 0  0 /100 WBC Final    Gran % 08/03/2023 61.8  38.0 - 73.0 % Final    Lymph % 08/03/2023 26.8  18.0 - 48.0 % Final    Mono % 08/03/2023 8.0  4.0 - 15.0 % Final    Eosinophil % 08/03/2023 2.7  0.0 - 8.0 % Final    Basophil % 08/03/2023 0.4  0.0 - 1.9 % Final    Differential Method 08/03/2023 Automated   Final      ?   Imaging:    No results found for this or any previous visit (from the past 2160 hour(s)).     Results for orders placed or  performed during the hospital encounter of 07/11/23 (from the past 2160 hour(s))   CT Chest Abdomen Pelvis With Contrast    Narrative    EXAMINATION:  CT CHEST ABDOMEN PELVIS WITH CONTRAST (XPD)    CLINICAL HISTORY:  Malignant neoplasm of lower lobe, left bronchus or lungMetastatic disease evaluation;    TECHNIQUE:  Postcontrast axial images were obtained.    COMPARISON:  CT chest, 06/06/2023, CT abdomen pelvis 04/11/2023.    FINDINGS:  Chest:    Stable volume loss and atelectasis involving the left lung and left base.  Stable band like area of thickening in the mid left lung.  No evidence of mediastinal hilar lymphadenopathy.    Heart is mildly enlarged.  No coronary artery calcifications or pericardial effusions.  No pleural effusions.  No suspicious lung masses.  Stable densely calcified nodule at the right base measuring 1.3 cm.    Right MediPort catheter in good position.  Few essentially stable sclerotic bone lesions involving the thoracic spine and sclerotic focus involving the left humeral head measuring 1.4 cm.    Abdomen pelvis:    Stable nodule along the posterior aspect of the right lobe of the liver measuring 8.4 mm.  Stable left lobe hepatic cyst.  No new liver lesions.    The pancreas and spleen are unremarkable.    The gallbladder is unremarkable.  There is no bile duct dilatation.    Stable nodule involving the left adrenal gland measuring 2.1 by 1.6 cm.  Stable nonobstructing right upper pole renal stone.  No renal masses or hydronephrosis.    The aorta and inferior vena cava are unremarkable.    There are no acute bowel abnormalities.     No evidence of appendicitis.  No evidence of diverticulitis.    Bladder is normal. No abnormal masses or fluid collections in the pelvis.    Stable osteoblastic lesions seen involving the lumbar spine as well as the pelvic bones and visualized femurs bilaterally.      Impression    Stable appearance of the chest.  No new abnormalities.    Stable appearance of the  abdomen.    Stable osteoblastic bone metastasis the visualized skeleton.    All CT scans at this facility use dose modulation, iterative reconstructions, and/or weight base dosing when appropriate to reduce radiation dose to as low as reasonably achievable      Electronically signed by: Chace Wade MD  Date:    07/11/2023  Time:    10:50   Results for orders placed or performed during the hospital encounter of 06/06/23 (from the past 2160 hour(s))   CT Chest With Contrast    Narrative    EXAMINATION:  CT CHEST WITH CONTRAST    CLINICAL HISTORY:  h/o lung cancer new cough;Cough, unspecified    TECHNIQUE:  Low dose axial images, sagittal and coronal reformations were obtained from the thoracic inlet to the lung bases following the IV administration of 75 mL of Omnipaque 350.    COMPARISON:  CT 04/11/2023; prior chest radiographs    FINDINGS:  Right thyroid lobe nodule unchanged.  Thoracic aorta and great vessels stable.  Heart similar appearance with left-sided chamber enlargement.  Small pericardial effusion similar.  No pathologically enlarged hilar, mediastinal or axillary lymph nodes.    Stable elevation of left hemidiaphragm with adjacent atelectasis.  Left upper lobe has not significantly change with presumed post treatment opacities.  Tiny sub 4 mm right lung pulmonary nodules stable with larger calcified right lower lobe nodule stable as well.    Visualized upper abdominal structures are unchanged.  See 04/11/2023 CT report.  No acute osseous abnormality with similar multiple sclerotic foci.      Impression    Overall no detrimental change of the chest when compared to 04/11/2023.  Findings as above.      Electronically signed by: Rahul Samaniego MD  Date:    06/06/2023  Time:    09:51        ?   Assessment/Plan:     Problem List Items Addressed This Visit          Oncology    Malignant neoplasm of lower lobe of left lung      Cancer Staging   Malignant neoplasm of lower lobe of left lung  Staging form:  Lung, AJCC 8th Edition  - Clinical stage from 12/9/2022: Stage IV (cT2, cN2, cM1) - Signed by Reese Mcfarlane MD on 12/9/2022    Completed 4 Cycles of Carbo/Alimta 03/31/2023    Repeat CT CAP 04/11/2023:   Detrimental change.  Increase in number and size of numerous sclerotic lesions throughout the cervical and thoracic spine, left humerus and sternum.  There are also multiple large sclerotic lesions throughout the lumbar spine, pelvis and proximal femurs measuring up to 7.1 cm in size.  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  2.   The lateral left upper lobe mass is decreased in size in the interim, currently measuring 2.2 x 1.0 cm.  Negative for new pulmonary masses.  3.  Exophytic nodule along the posterior right hepatic lobe measuring 1.1 cm.  This was not imaged previously.  Etiology uncertain.  Metastasis is not excluded.    Rybrevant C1D1 04/27/23    Most recent CT CAP 07/11/23  Stable appearance of the chest.  No new abnormalities.  Stable appearance of the abdomen.  Stable osteoblastic bone metastasis the visualized skeleton.    Labs reviewed, no concerning cytopenias  -Ok to proceed with C4D15 Rybrevant tomorrow      F/u in two weeks with cbc, cmp  C5D1 Rybrevant                   Med Onc Chart Routing      Follow up with physician 2 weeks. with labs prior for Rybrevant   Follow up with DOLLY    Infusion scheduling note    Injection scheduling note    Labs CBC and CMP   Scheduling:  Preferred lab:  Lab interval:     Imaging    Pharmacy appointment    Other referrals                 MARCIO Castillo-AMADOU  Hematology/Oncology

## 2023-08-03 NOTE — ASSESSMENT & PLAN NOTE
Cancer Staging   Malignant neoplasm of lower lobe of left lung  Staging form: Lung, AJCC 8th Edition  - Clinical stage from 12/9/2022: Stage IV (cT2, cN2, cM1) - Signed by Reese Mcfarlane MD on 12/9/2022    Completed 4 Cycles of Carbo/Alimta 03/31/2023    Repeat CT CAP 04/11/2023:   Detrimental change.  Increase in number and size of numerous sclerotic lesions throughout the cervical and thoracic spine, left humerus and sternum.  There are also multiple large sclerotic lesions throughout the lumbar spine, pelvis and proximal femurs measuring up to 7.1 cm in size.  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  2.   The lateral left upper lobe mass is decreased in size in the interim, currently measuring 2.2 x 1.0 cm.  Negative for new pulmonary masses.  3.  Exophytic nodule along the posterior right hepatic lobe measuring 1.1 cm.  This was not imaged previously.  Etiology uncertain.  Metastasis is not excluded.    Rybrevant C1D1 04/27/23    Most recent CT CAP 07/11/23  Stable appearance of the chest.  No new abnormalities.  Stable appearance of the abdomen.  Stable osteoblastic bone metastasis the visualized skeleton.    Labs reviewed, no concerning cytopenias  -Ok to proceed with C4D15 Rybrevant tomorrow      F/u in two weeks with cbc, cmp  C5D1 Rybrevant

## 2023-08-04 ENCOUNTER — INFUSION (OUTPATIENT)
Dept: INFUSION THERAPY | Facility: HOSPITAL | Age: 53
End: 2023-08-04
Attending: RADIOLOGY
Payer: COMMERCIAL

## 2023-08-04 VITALS
RESPIRATION RATE: 16 BRPM | HEART RATE: 59 BPM | BODY MASS INDEX: 50.02 KG/M2 | HEIGHT: 64 IN | WEIGHT: 293 LBS | SYSTOLIC BLOOD PRESSURE: 100 MMHG | OXYGEN SATURATION: 95 % | TEMPERATURE: 97 F | DIASTOLIC BLOOD PRESSURE: 65 MMHG

## 2023-08-04 DIAGNOSIS — C34.32 MALIGNANT NEOPLASM OF LOWER LOBE OF LEFT LUNG: Primary | ICD-10-CM

## 2023-08-04 PROCEDURE — 96375 TX/PRO/DX INJ NEW DRUG ADDON: CPT

## 2023-08-04 PROCEDURE — 25000003 PHARM REV CODE 250: Performed by: INTERNAL MEDICINE

## 2023-08-04 PROCEDURE — 96367 TX/PROPH/DG ADDL SEQ IV INF: CPT

## 2023-08-04 PROCEDURE — 63600175 PHARM REV CODE 636 W HCPCS: Performed by: INTERNAL MEDICINE

## 2023-08-04 PROCEDURE — 96415 CHEMO IV INFUSION ADDL HR: CPT

## 2023-08-04 PROCEDURE — 96413 CHEMO IV INFUSION 1 HR: CPT

## 2023-08-04 RX ORDER — HEPARIN 100 UNIT/ML
500 SYRINGE INTRAVENOUS
Status: DISCONTINUED | OUTPATIENT
Start: 2023-08-04 | End: 2023-08-04 | Stop reason: HOSPADM

## 2023-08-04 RX ORDER — ACETAMINOPHEN 325 MG/1
650 TABLET ORAL
Status: COMPLETED | OUTPATIENT
Start: 2023-08-04 | End: 2023-08-04

## 2023-08-04 RX ORDER — DIPHENHYDRAMINE HYDROCHLORIDE 50 MG/ML
25 INJECTION INTRAMUSCULAR; INTRAVENOUS
Status: COMPLETED | OUTPATIENT
Start: 2023-08-04 | End: 2023-08-04

## 2023-08-04 RX ADMIN — DEXAMETHASONE SODIUM PHOSPHATE 0.25 MG: 4 INJECTION, SOLUTION INTRA-ARTICULAR; INTRALESIONAL; INTRAMUSCULAR; INTRAVENOUS; SOFT TISSUE at 08:08

## 2023-08-04 RX ADMIN — HEPARIN 500 UNITS: 100 SYRINGE at 11:08

## 2023-08-04 RX ADMIN — SODIUM CHLORIDE: 9 INJECTION, SOLUTION INTRAVENOUS at 08:08

## 2023-08-04 RX ADMIN — DIPHENHYDRAMINE HYDROCHLORIDE 25 MG: 50 INJECTION, SOLUTION INTRAMUSCULAR; INTRAVENOUS at 08:08

## 2023-08-04 RX ADMIN — AMIVANTAMAB 1400 MG: 350 INJECTION INTRAVENOUS at 09:08

## 2023-08-04 RX ADMIN — ACETAMINOPHEN 650 MG: 325 TABLET ORAL at 08:08

## 2023-08-04 NOTE — DISCHARGE INSTRUCTIONS
..[facility]  [address] [facility]  [address]  [phone] phone     [phone] fax  Hours of Operation: Monday- Friday 8:00am- 5:00pm  After hours phone  [phone]  Hematology / Oncology Physicians on call    Dr. Denys Cohn      Nurse Practitioners:    Nanci Hawley, BRISA Blackmon, BRISA Caicedo, BRISA Monteiro, BRISA Kwon, BRISA Trujillo, PA      Please don't hesitate to call if you have any concerns.    .FALL PREVENTION   Falls often occur due to slipping, tripping or losing your balance. Here are ways to reduce your risk of falling again.   Was there anything that caused your fall that can be fixed, removed or replaced?   Make your home safe by keeping walkways clear of objects you may trip over.   Use non-slip pads under rugs.   Do not walk in poorly lit areas.   Do not stand on chairs or wobbly ladders.   Use caution when reaching overhead or looking upward. This position can cause a loss of balance.   Be sure your shoes fit properly, have non-slip bottoms and are in good condition.   Be cautious when going up and down stairs, curbs, and when walking on uneven sidewalks.   If your balance is poor, consider using a cane or walker.   If your fall was related to alcohol use, stop or limit alcohol intake.   If your fall was related to use of sleeping medicines, talk to your doctor about this. You may need to reduce your dosage at bedtime if you awaken during the night to go to the bathroom.   To reduce the need for nighttime bathroom trips:   Avoid drinking fluids for several hours before going to bed   Empty your bladder before going to bed   Men can keep a urinal at the bedside   © 9086-4566 [employer], [address] [place] [address], [place] [address]. All rights reserved. This information is not intended as a substitute for professional medical care. Always follow your healthcare  professional's instructions.  .WAYS TO HELP PREVENT INFECTION        WASH YOUR HANDS OFTEN DURING THE DAY, ESPECIALLY BEFORE YOU EAT, AFTER USING THE BATHROOM, AND AFTER TOUCHING ANIMALS    STAY AWAY FROM PEOPLE WHO HAVE ILLNESSES YOU CAN CATCH; SUCH AS COLDS, FLU, CHICKEN POX    TRY TO AVOID CROWDS    STAY AWAY FROM CHILDREN WHO RECENTLY HAVE RECEIVED LIVE VIRUS VACCINES    MAINTAIN GOOD MOUTH CARE    DO NOT SQUEEZE OR SCRATCH PIMPLES    CLEAN CUTS & SCRAPES RIGHT AWAY AND DAILY UNTIL HEALED WITH WARM WATER, SOAP & AN ANTISEPTIC    AVOID CONTACT WITH LITTER BOXES, BIRD CAGES, & FISH TANKS    AVOID STANDING WATER, IE., BIRD BATHS, FLOWER POTS/VASES, OR HUMIDIFIERS    WEAR GLOVES WHEN GARDENING OR CLEANING UP AFTER OTHERS, ESPECIALLY BABIES & SMALL CHILDREN    DO NOT EAT RAW FISH, SEAFOOD, MEAT, OR EGGS

## 2023-08-04 NOTE — PLAN OF CARE
Problem: Adult Inpatient Plan of Care  Goal: Plan of Care Review  Outcome: Ongoing, Progressing  Flowsheets (Taken 8/4/2023 0828)  Plan of Care Reviewed With:   patient   spouse  Goal: Patient-Specific Goal (Individualized)  Outcome: Ongoing, Progressing  Flowsheets (Taken 8/4/2023 0828)  Anxieties, Fears or Concerns: Pt voices no concerns at this time  Individualized Care Needs: Pt reclined, blanket/pillow and snacks offered     Problem: Infection  Goal: Absence of Infection Signs and Symptoms  Outcome: Ongoing, Progressing     Problem: Nausea and Vomiting (Chemotherapy Effects)  Goal: Fluid and Electrolyte Balance  Outcome: Ongoing, Progressing     Problem: Fall Injury Risk  Goal: Absence of Fall and Fall-Related Injury  Outcome: Ongoing, Progressing

## 2023-08-17 ENCOUNTER — LAB VISIT (OUTPATIENT)
Dept: LAB | Facility: HOSPITAL | Age: 53
End: 2023-08-17
Attending: INTERNAL MEDICINE
Payer: COMMERCIAL

## 2023-08-17 ENCOUNTER — TELEPHONE (OUTPATIENT)
Dept: INFUSION THERAPY | Facility: HOSPITAL | Age: 53
End: 2023-08-17
Payer: COMMERCIAL

## 2023-08-17 ENCOUNTER — OFFICE VISIT (OUTPATIENT)
Dept: HEMATOLOGY/ONCOLOGY | Facility: CLINIC | Age: 53
End: 2023-08-17
Payer: COMMERCIAL

## 2023-08-17 VITALS
HEART RATE: 63 BPM | SYSTOLIC BLOOD PRESSURE: 116 MMHG | OXYGEN SATURATION: 96 % | DIASTOLIC BLOOD PRESSURE: 71 MMHG | HEIGHT: 64 IN | WEIGHT: 293 LBS | TEMPERATURE: 98 F | BODY MASS INDEX: 50.02 KG/M2

## 2023-08-17 DIAGNOSIS — D84.821 IMMUNODEFICIENCY DUE TO CHEMOTHERAPY: ICD-10-CM

## 2023-08-17 DIAGNOSIS — T45.1X5A IMMUNODEFICIENCY DUE TO CHEMOTHERAPY: ICD-10-CM

## 2023-08-17 DIAGNOSIS — E66.01 MORBID OBESITY: ICD-10-CM

## 2023-08-17 DIAGNOSIS — C34.32 MALIGNANT NEOPLASM OF LOWER LOBE OF LEFT LUNG: Primary | ICD-10-CM

## 2023-08-17 DIAGNOSIS — I10 ESSENTIAL HYPERTENSION: ICD-10-CM

## 2023-08-17 DIAGNOSIS — C34.32 MALIGNANT NEOPLASM OF LOWER LOBE OF LEFT LUNG: ICD-10-CM

## 2023-08-17 DIAGNOSIS — Z79.899 IMMUNODEFICIENCY DUE TO CHEMOTHERAPY: ICD-10-CM

## 2023-08-17 DIAGNOSIS — E11.9 TYPE 2 DIABETES MELLITUS WITHOUT COMPLICATION, WITHOUT LONG-TERM CURRENT USE OF INSULIN: ICD-10-CM

## 2023-08-17 DIAGNOSIS — C79.51 SECONDARY MALIGNANT NEOPLASM OF BONE: ICD-10-CM

## 2023-08-17 LAB
ALBUMIN SERPL BCP-MCNC: 2.9 G/DL (ref 3.5–5.2)
ALP SERPL-CCNC: 94 U/L (ref 55–135)
ALT SERPL W/O P-5'-P-CCNC: 18 U/L (ref 10–44)
ANION GAP SERPL CALC-SCNC: 10 MMOL/L (ref 8–16)
AST SERPL-CCNC: 14 U/L (ref 10–40)
BASOPHILS # BLD AUTO: 0.04 K/UL (ref 0–0.2)
BASOPHILS NFR BLD: 0.5 % (ref 0–1.9)
BILIRUB SERPL-MCNC: 0.4 MG/DL (ref 0.1–1)
BUN SERPL-MCNC: 13 MG/DL (ref 6–20)
CALCIUM SERPL-MCNC: 8.6 MG/DL (ref 8.7–10.5)
CHLORIDE SERPL-SCNC: 102 MMOL/L (ref 95–110)
CO2 SERPL-SCNC: 28 MMOL/L (ref 23–29)
CREAT SERPL-MCNC: 1 MG/DL (ref 0.5–1.4)
DIFFERENTIAL METHOD: ABNORMAL
EOSINOPHIL # BLD AUTO: 0.3 K/UL (ref 0–0.5)
EOSINOPHIL NFR BLD: 3.4 % (ref 0–8)
ERYTHROCYTE [DISTWIDTH] IN BLOOD BY AUTOMATED COUNT: 12.6 % (ref 11.5–14.5)
EST. GFR  (NO RACE VARIABLE): >60 ML/MIN/1.73 M^2
GLUCOSE SERPL-MCNC: 186 MG/DL (ref 70–110)
HCT VFR BLD AUTO: 36.7 % (ref 37–48.5)
HGB BLD-MCNC: 12.2 G/DL (ref 12–16)
IMM GRANULOCYTES # BLD AUTO: 0.04 K/UL (ref 0–0.04)
IMM GRANULOCYTES NFR BLD AUTO: 0.5 % (ref 0–0.5)
LYMPHOCYTES # BLD AUTO: 2.3 K/UL (ref 1–4.8)
LYMPHOCYTES NFR BLD: 26.1 % (ref 18–48)
MCH RBC QN AUTO: 27.7 PG (ref 27–31)
MCHC RBC AUTO-ENTMCNC: 33.2 G/DL (ref 32–36)
MCV RBC AUTO: 83 FL (ref 82–98)
MONOCYTES # BLD AUTO: 0.5 K/UL (ref 0.3–1)
MONOCYTES NFR BLD: 5.5 % (ref 4–15)
NEUTROPHILS # BLD AUTO: 5.6 K/UL (ref 1.8–7.7)
NEUTROPHILS NFR BLD: 64 % (ref 38–73)
NRBC BLD-RTO: 0 /100 WBC
PLATELET # BLD AUTO: 189 K/UL (ref 150–450)
PMV BLD AUTO: 11.5 FL (ref 9.2–12.9)
POTASSIUM SERPL-SCNC: 3.4 MMOL/L (ref 3.5–5.1)
PROT SERPL-MCNC: 6.1 G/DL (ref 6–8.4)
RBC # BLD AUTO: 4.4 M/UL (ref 4–5.4)
SODIUM SERPL-SCNC: 140 MMOL/L (ref 136–145)
WBC # BLD AUTO: 8.72 K/UL (ref 3.9–12.7)

## 2023-08-17 PROCEDURE — 1159F PR MEDICATION LIST DOCUMENTED IN MEDICAL RECORD: ICD-10-PCS | Mod: CPTII,S$GLB,, | Performed by: INTERNAL MEDICINE

## 2023-08-17 PROCEDURE — 99999 PR PBB SHADOW E&M-EST. PATIENT-LVL III: CPT | Mod: PBBFAC,,, | Performed by: INTERNAL MEDICINE

## 2023-08-17 PROCEDURE — 4010F ACE/ARB THERAPY RXD/TAKEN: CPT | Mod: CPTII,S$GLB,, | Performed by: INTERNAL MEDICINE

## 2023-08-17 PROCEDURE — 85025 COMPLETE CBC W/AUTO DIFF WBC: CPT | Performed by: INTERNAL MEDICINE

## 2023-08-17 PROCEDURE — 3008F PR BODY MASS INDEX (BMI) DOCUMENTED: ICD-10-PCS | Mod: CPTII,S$GLB,, | Performed by: INTERNAL MEDICINE

## 2023-08-17 PROCEDURE — 1159F MED LIST DOCD IN RCRD: CPT | Mod: CPTII,S$GLB,, | Performed by: INTERNAL MEDICINE

## 2023-08-17 PROCEDURE — 3074F SYST BP LT 130 MM HG: CPT | Mod: CPTII,S$GLB,, | Performed by: INTERNAL MEDICINE

## 2023-08-17 PROCEDURE — 3078F PR MOST RECENT DIASTOLIC BLOOD PRESSURE < 80 MM HG: ICD-10-PCS | Mod: CPTII,S$GLB,, | Performed by: INTERNAL MEDICINE

## 2023-08-17 PROCEDURE — 1160F PR REVIEW ALL MEDS BY PRESCRIBER/CLIN PHARMACIST DOCUMENTED: ICD-10-PCS | Mod: CPTII,S$GLB,, | Performed by: INTERNAL MEDICINE

## 2023-08-17 PROCEDURE — 3051F PR MOST RECENT HEMOGLOBIN A1C LEVEL 7.0 - < 8.0%: ICD-10-PCS | Mod: CPTII,S$GLB,, | Performed by: INTERNAL MEDICINE

## 2023-08-17 PROCEDURE — 3078F DIAST BP <80 MM HG: CPT | Mod: CPTII,S$GLB,, | Performed by: INTERNAL MEDICINE

## 2023-08-17 PROCEDURE — 99215 PR OFFICE/OUTPT VISIT, EST, LEVL V, 40-54 MIN: ICD-10-PCS | Mod: 25,S$GLB,, | Performed by: INTERNAL MEDICINE

## 2023-08-17 PROCEDURE — 99215 OFFICE O/P EST HI 40 MIN: CPT | Mod: 25,S$GLB,, | Performed by: INTERNAL MEDICINE

## 2023-08-17 PROCEDURE — 80053 COMPREHEN METABOLIC PANEL: CPT | Performed by: INTERNAL MEDICINE

## 2023-08-17 PROCEDURE — 3008F BODY MASS INDEX DOCD: CPT | Mod: CPTII,S$GLB,, | Performed by: INTERNAL MEDICINE

## 2023-08-17 PROCEDURE — 1160F RVW MEDS BY RX/DR IN RCRD: CPT | Mod: CPTII,S$GLB,, | Performed by: INTERNAL MEDICINE

## 2023-08-17 PROCEDURE — 4010F PR ACE/ARB THEARPY RXD/TAKEN: ICD-10-PCS | Mod: CPTII,S$GLB,, | Performed by: INTERNAL MEDICINE

## 2023-08-17 PROCEDURE — 3074F PR MOST RECENT SYSTOLIC BLOOD PRESSURE < 130 MM HG: ICD-10-PCS | Mod: CPTII,S$GLB,, | Performed by: INTERNAL MEDICINE

## 2023-08-17 PROCEDURE — 36415 COLL VENOUS BLD VENIPUNCTURE: CPT | Performed by: INTERNAL MEDICINE

## 2023-08-17 PROCEDURE — 3051F HG A1C>EQUAL 7.0%<8.0%: CPT | Mod: CPTII,S$GLB,, | Performed by: INTERNAL MEDICINE

## 2023-08-17 PROCEDURE — 99999 PR PBB SHADOW E&M-EST. PATIENT-LVL III: ICD-10-PCS | Mod: PBBFAC,,, | Performed by: INTERNAL MEDICINE

## 2023-08-17 RX ORDER — HEPARIN 100 UNIT/ML
500 SYRINGE INTRAVENOUS
Status: CANCELLED | OUTPATIENT
Start: 2023-08-18

## 2023-08-17 RX ORDER — HEPARIN 100 UNIT/ML
500 SYRINGE INTRAVENOUS
Status: CANCELLED | OUTPATIENT
Start: 2023-09-01

## 2023-08-17 RX ORDER — DIPHENHYDRAMINE HYDROCHLORIDE 50 MG/ML
25 INJECTION INTRAMUSCULAR; INTRAVENOUS
Status: CANCELLED
Start: 2023-08-18

## 2023-08-17 RX ORDER — EPINEPHRINE 0.3 MG/.3ML
0.3 INJECTION SUBCUTANEOUS ONCE AS NEEDED
Status: CANCELLED | OUTPATIENT
Start: 2023-09-01

## 2023-08-17 RX ORDER — DIPHENHYDRAMINE HYDROCHLORIDE 50 MG/ML
50 INJECTION INTRAMUSCULAR; INTRAVENOUS ONCE AS NEEDED
Status: CANCELLED | OUTPATIENT
Start: 2023-08-18

## 2023-08-17 RX ORDER — DIPHENHYDRAMINE HYDROCHLORIDE 50 MG/ML
50 INJECTION INTRAMUSCULAR; INTRAVENOUS ONCE AS NEEDED
Status: CANCELLED | OUTPATIENT
Start: 2023-09-01

## 2023-08-17 RX ORDER — SODIUM CHLORIDE 0.9 % (FLUSH) 0.9 %
10 SYRINGE (ML) INJECTION
Status: CANCELLED | OUTPATIENT
Start: 2023-08-18

## 2023-08-17 RX ORDER — ACETAMINOPHEN 325 MG/1
650 TABLET ORAL
Status: CANCELLED
Start: 2023-09-01

## 2023-08-17 RX ORDER — ACETAMINOPHEN 325 MG/1
650 TABLET ORAL
Status: CANCELLED
Start: 2023-08-18

## 2023-08-17 RX ORDER — DIPHENHYDRAMINE HYDROCHLORIDE 50 MG/ML
25 INJECTION INTRAMUSCULAR; INTRAVENOUS
Status: CANCELLED
Start: 2023-09-01

## 2023-08-17 RX ORDER — SODIUM CHLORIDE 0.9 % (FLUSH) 0.9 %
10 SYRINGE (ML) INJECTION
Status: CANCELLED | OUTPATIENT
Start: 2023-09-01

## 2023-08-17 RX ORDER — EPINEPHRINE 0.3 MG/.3ML
0.3 INJECTION SUBCUTANEOUS ONCE AS NEEDED
Status: CANCELLED | OUTPATIENT
Start: 2023-08-18

## 2023-08-17 NOTE — PROGRESS NOTES
Subjective:       Patient ID: Shaneka Ahmadi is a 53 y.o. female.    Chief Complaint: Results, Chemotherapy, and Lung Cancer    HPI:  53-YEAR-OLD FEMALE HISTORY OF METASTATIC NON-SMALL CELL LUNG CARCINOMA EGD RF POSITIVE.  PATIENT PRESENTS FOR CYCLE 4 DAY 1 OP NSCLC AMIVANTAMAB-VMJW ([facility]) Q4W ECOG STATUS 1 DRAMATIC IMPROVEMENT AFTER DERMATOLOGICAL CONSULTATION FOR RASH.      Past Medical History:   Diagnosis Date    Diabetes mellitus     Hypertension     Malignant neoplasm of lower lobe of left lung 12/9/2022    Secondary malignant neoplasm of bone 12/5/2022     Family History   Problem Relation Age of Onset    Heart failure Father      Social History     Socioeconomic History    Marital status:    Tobacco Use    Smoking status: Never     Passive exposure: Never    Smokeless tobacco: Never   Substance and Sexual Activity    Alcohol use: Never    Drug use: Never    Sexual activity: Yes     Partners: Male     Birth control/protection: None     Comment: tubal     Past Surgical History:   Procedure Laterality Date    CATARACT EXTRACTION W/  INTRAOCULAR LENS IMPLANT Right 06/02/2022    FLUOROSCOPY N/A 1/26/2023    Procedure: FLUOROSCOPY/mediport placement;  Surgeon: Marlon Leone MD;  Location: [facility] CATH LAB;  Service: General;  Laterality: N/A;    TUBAL LIGATION      2007--postpartum tubal ligation       Labs:  Lab Results   Component Value Date    WBC 8.72 08/17/2023    HGB 12.2 08/17/2023    HCT 36.7 (L) 08/17/2023    MCV 83 08/17/2023     08/17/2023     BMP  Lab Results   Component Value Date     08/03/2023    K 4.1 08/03/2023     08/03/2023    CO2 29 08/03/2023    BUN 13 08/03/2023    CREATININE 0.9 08/03/2023    CALCIUM 8.6 (L) 08/03/2023    ANIONGAP 9 08/03/2023    ESTGFRAFRICA >60.0 07/28/2022    EGFRNONAA >60.0 07/28/2022     Lab Results   Component Value Date    ALT 19 08/03/2023    AST 16 08/03/2023    ALKPHOS 94 08/03/2023    BILITOT 0.3 08/03/2023       Lab Results   Component  "Value Date    IRON 71 03/09/2023    TIBC 297 03/09/2023    FERRITIN 646 (H) 03/09/2023     No results found for: "[id number]"  No results found for: "FOLATE"  Lab Results   Component Value Date    TSH 1.742 04/08/2022         Review of Systems   Constitutional:  Negative for activity change, appetite change, chills, diaphoresis, fatigue, fever and unexpected weight change.   HENT:  Negative for congestion, dental problem, drooling, ear discharge, ear pain, facial swelling, hearing loss, mouth sores, nosebleeds, postnasal drip, rhinorrhea, sinus pressure, sneezing, sore throat, tinnitus, trouble swallowing and voice change.    Eyes:  Negative for photophobia, pain, discharge, redness, itching and visual disturbance.   Respiratory:  Negative for cough, choking, chest tightness, shortness of breath, wheezing and stridor.    Cardiovascular:  Negative for chest pain, palpitations and leg swelling.   Gastrointestinal:  Negative for abdominal distention, abdominal pain, anal bleeding, blood in stool, constipation, diarrhea, nausea, rectal pain and vomiting.   Endocrine: Negative for cold intolerance, heat intolerance, polydipsia, polyphagia and polyuria.   Genitourinary:  Negative for decreased urine volume, difficulty urinating, dyspareunia, dysuria, enuresis, flank pain, frequency, genital sores, hematuria, menstrual problem, pelvic pain, urgency, vaginal bleeding, vaginal discharge and vaginal pain.   Musculoskeletal:  Negative for arthralgias, back pain, gait problem, joint swelling, myalgias, neck pain and neck stiffness.   Skin:  Positive for rash. Negative for color change and pallor.        Rash on face dramatically improved   Allergic/Immunologic: Negative for environmental allergies, food allergies and immunocompromised state.   Neurological:  Negative for dizziness, tremors, seizures, syncope, facial asymmetry, speech difficulty, weakness, light-headedness, numbness and headaches.   Hematological:  Negative for " adenopathy. Does not bruise/bleed easily.   Psychiatric/Behavioral:  Negative for agitation, behavioral problems, confusion, decreased concentration, dysphoric mood, hallucinations, self-injury, sleep disturbance and suicidal ideas. The patient is not nervous/anxious and is not hyperactive.        Objective:      Physical Exam  Vitals reviewed.   Constitutional:       General: She is not in acute distress.     Appearance: Normal appearance. She is well-developed. She is obese. She is not diaphoretic.   HENT:      Head: Normocephalic and atraumatic.      Right Ear: External ear normal.      Left Ear: External ear normal.      Nose: Nose normal.      Right Sinus: No maxillary sinus tenderness or frontal sinus tenderness.      Left Sinus: No maxillary sinus tenderness or frontal sinus tenderness.      Mouth/Throat:      Pharynx: No oropharyngeal exudate.   Eyes:      General: Lids are normal. No scleral icterus.        Right eye: No discharge.         Left eye: No discharge.      Conjunctiva/sclera: Conjunctivae normal.      Right eye: Right conjunctiva is not injected. No hemorrhage.     Left eye: Left conjunctiva is not injected. No hemorrhage.     Pupils: Pupils are equal, round, and reactive to light.   Neck:      Thyroid: No thyromegaly.      Vascular: No JVD.      Trachea: No tracheal deviation.   Cardiovascular:      Rate and Rhythm: Normal rate.   Pulmonary:      Effort: Pulmonary effort is normal. No respiratory distress.      Breath sounds: No stridor.   Chest:      Chest wall: No tenderness.   Abdominal:      General: Bowel sounds are normal. There is no distension.      Palpations: Abdomen is soft. There is no hepatomegaly, splenomegaly or mass.      Tenderness: There is no abdominal tenderness. There is no rebound.   Musculoskeletal:         General: No tenderness. Normal range of motion.      Cervical back: Normal range of motion and neck supple.   Lymphadenopathy:      Cervical: No cervical adenopathy.       Upper Body:      Right upper body: No supraclavicular adenopathy.      Left upper body: No supraclavicular adenopathy.   Skin:     General: Skin is dry.      Findings: No erythema or rash.   Neurological:      Mental Status: She is alert and oriented to person, place, and time.      Cranial Nerves: No cranial nerve deficit.      Coordination: Coordination normal.   Psychiatric:         Behavior: Behavior normal.         Thought Content: Thought content normal.         Judgment: Judgment normal.             Assessment:      1. Malignant neoplasm of lower lobe of left lung    2. Secondary malignant neoplasm of bone    3. Immunodeficiency due to chemotherapy    4. Essential hypertension    5. Morbid obesity    6. Type 2 diabetes mellitus without complication, without long-term current use of insulin           Med Onc Chart Routing      Follow up with physician 4 weeks. Return to see me beginning of cycle 5 day 1   Follow up with DOLLY . Return to see APAP cycle 4 day 15 CBC CMP   Infusion scheduling note    Injection scheduling note OP NSCLC AMIVANTAMAB-VMJW ([facility]) Q4W   Labs CBC and CMP   Scheduling:  Preferred lab:  Lab interval:     Imaging    Pharmacy appointment    Other referrals               Plan:     Article from up-to-date on foods hypokalemia.  Difficulty swallowing potassium pills.  Sent to her will follow-up in 1 month can be seen by APAP for week 2.  Will reimage at the completion of 6 cycles of therapy.  Last imaging with response on 07/11/2023.  Continue with current treatment recommendations excellent response to topical therapy through Dermatology        Reese Mcfarlane Jr, MD FACP

## 2023-08-17 NOTE — TELEPHONE ENCOUNTER
Returned pt call. Advised pt that we do not have an earlier appt time available for tomorrow. Pt verbalized understanding.

## 2023-08-17 NOTE — TELEPHONE ENCOUNTER
----- Message from Maia Gilbert sent at 8/17/2023  4:19 PM CDT -----  Contact: Shaneka  Patient is calling to speak with a nurse regarding rescheduling appt tomorrow for an earlier time if possible . Please give a call back at [phone] .

## 2023-08-18 ENCOUNTER — TELEPHONE (OUTPATIENT)
Dept: INFUSION THERAPY | Facility: HOSPITAL | Age: 53
End: 2023-08-18
Payer: COMMERCIAL

## 2023-08-18 ENCOUNTER — INFUSION (OUTPATIENT)
Dept: INFUSION THERAPY | Facility: HOSPITAL | Age: 53
End: 2023-08-18
Attending: RADIOLOGY
Payer: COMMERCIAL

## 2023-08-18 VITALS
OXYGEN SATURATION: 97 % | TEMPERATURE: 98 F | HEIGHT: 64 IN | DIASTOLIC BLOOD PRESSURE: 64 MMHG | WEIGHT: 293 LBS | RESPIRATION RATE: 16 BRPM | BODY MASS INDEX: 50.02 KG/M2 | HEART RATE: 60 BPM | SYSTOLIC BLOOD PRESSURE: 103 MMHG

## 2023-08-18 DIAGNOSIS — C34.32 MALIGNANT NEOPLASM OF LOWER LOBE OF LEFT LUNG: Primary | ICD-10-CM

## 2023-08-18 PROCEDURE — 96415 CHEMO IV INFUSION ADDL HR: CPT

## 2023-08-18 PROCEDURE — 96413 CHEMO IV INFUSION 1 HR: CPT

## 2023-08-18 PROCEDURE — 25000003 PHARM REV CODE 250: Performed by: INTERNAL MEDICINE

## 2023-08-18 PROCEDURE — 96367 TX/PROPH/DG ADDL SEQ IV INF: CPT

## 2023-08-18 PROCEDURE — 96375 TX/PRO/DX INJ NEW DRUG ADDON: CPT

## 2023-08-18 PROCEDURE — 63600175 PHARM REV CODE 636 W HCPCS: Mod: JZ,TB | Performed by: INTERNAL MEDICINE

## 2023-08-18 RX ORDER — HEPARIN 100 UNIT/ML
500 SYRINGE INTRAVENOUS
Status: DISCONTINUED | OUTPATIENT
Start: 2023-08-18 | End: 2023-08-18 | Stop reason: HOSPADM

## 2023-08-18 RX ORDER — ACETAMINOPHEN 325 MG/1
650 TABLET ORAL
Status: COMPLETED | OUTPATIENT
Start: 2023-08-18 | End: 2023-08-18

## 2023-08-18 RX ORDER — DIPHENHYDRAMINE HYDROCHLORIDE 50 MG/ML
25 INJECTION INTRAMUSCULAR; INTRAVENOUS
Status: COMPLETED | OUTPATIENT
Start: 2023-08-18 | End: 2023-08-18

## 2023-08-18 RX ADMIN — HEPARIN 500 UNITS: 100 SYRINGE at 01:08

## 2023-08-18 RX ADMIN — SODIUM CHLORIDE: 9 INJECTION, SOLUTION INTRAVENOUS at 11:08

## 2023-08-18 RX ADMIN — DIPHENHYDRAMINE HYDROCHLORIDE 25 MG: 50 INJECTION, SOLUTION INTRAMUSCULAR; INTRAVENOUS at 11:08

## 2023-08-18 RX ADMIN — ACETAMINOPHEN 650 MG: 325 TABLET ORAL at 11:08

## 2023-08-18 RX ADMIN — DEXAMETHASONE SODIUM PHOSPHATE 0.25 MG: 4 INJECTION, SOLUTION INTRA-ARTICULAR; INTRALESIONAL; INTRAMUSCULAR; INTRAVENOUS; SOFT TISSUE at 11:08

## 2023-08-18 RX ADMIN — AMIVANTAMAB 1400 MG: 350 INJECTION INTRAVENOUS at 11:08

## 2023-08-18 NOTE — DISCHARGE INSTRUCTIONS
Thank you for letting me take care of you!    Thank you for choosing [facility].     Maria Elena COLUNGA

## 2023-08-18 NOTE — PLAN OF CARE
Problem: Fall Injury Risk  Goal: Absence of Fall and Fall-Related Injury  Outcome: Ongoing, Progressing     Problem: Adult Inpatient Plan of Care  Goal: Plan of Care Review  Outcome: Ongoing, Progressing  Flowsheets (Taken 8/18/2023 1824)  Plan of Care Reviewed With: patient

## 2023-08-31 ENCOUNTER — LAB VISIT (OUTPATIENT)
Dept: LAB | Facility: HOSPITAL | Age: 53
End: 2023-08-31
Attending: INTERNAL MEDICINE
Payer: COMMERCIAL

## 2023-08-31 ENCOUNTER — OFFICE VISIT (OUTPATIENT)
Dept: HEMATOLOGY/ONCOLOGY | Facility: CLINIC | Age: 53
End: 2023-08-31
Payer: COMMERCIAL

## 2023-08-31 VITALS
SYSTOLIC BLOOD PRESSURE: 127 MMHG | HEIGHT: 64 IN | HEART RATE: 60 BPM | TEMPERATURE: 98 F | WEIGHT: 293 LBS | DIASTOLIC BLOOD PRESSURE: 76 MMHG | OXYGEN SATURATION: 97 % | BODY MASS INDEX: 50.02 KG/M2

## 2023-08-31 DIAGNOSIS — Z79.899 IMMUNODEFICIENCY DUE TO CHEMOTHERAPY: ICD-10-CM

## 2023-08-31 DIAGNOSIS — D84.821 IMMUNODEFICIENCY DUE TO CHEMOTHERAPY: ICD-10-CM

## 2023-08-31 DIAGNOSIS — E66.01 MORBID OBESITY: ICD-10-CM

## 2023-08-31 DIAGNOSIS — E11.9 TYPE 2 DIABETES MELLITUS WITHOUT COMPLICATION, WITHOUT LONG-TERM CURRENT USE OF INSULIN: ICD-10-CM

## 2023-08-31 DIAGNOSIS — C34.32 MALIGNANT NEOPLASM OF LOWER LOBE OF LEFT LUNG: ICD-10-CM

## 2023-08-31 DIAGNOSIS — C34.32 MALIGNANT NEOPLASM OF LOWER LOBE OF LEFT LUNG: Primary | ICD-10-CM

## 2023-08-31 DIAGNOSIS — L27.0 RASH, DRUG: ICD-10-CM

## 2023-08-31 DIAGNOSIS — C79.51 SECONDARY MALIGNANT NEOPLASM OF BONE: ICD-10-CM

## 2023-08-31 DIAGNOSIS — I10 ESSENTIAL HYPERTENSION: ICD-10-CM

## 2023-08-31 DIAGNOSIS — T45.1X5A IMMUNODEFICIENCY DUE TO CHEMOTHERAPY: ICD-10-CM

## 2023-08-31 LAB
ALBUMIN SERPL BCP-MCNC: 2.8 G/DL (ref 3.5–5.2)
ALP SERPL-CCNC: 93 U/L (ref 55–135)
ALT SERPL W/O P-5'-P-CCNC: 17 U/L (ref 10–44)
ANION GAP SERPL CALC-SCNC: 9 MMOL/L (ref 8–16)
AST SERPL-CCNC: 12 U/L (ref 10–40)
BASOPHILS # BLD AUTO: 0.04 K/UL (ref 0–0.2)
BASOPHILS NFR BLD: 0.6 % (ref 0–1.9)
BILIRUB SERPL-MCNC: 0.4 MG/DL (ref 0.1–1)
BUN SERPL-MCNC: 20 MG/DL (ref 6–20)
CALCIUM SERPL-MCNC: 8.6 MG/DL (ref 8.7–10.5)
CHLORIDE SERPL-SCNC: 104 MMOL/L (ref 95–110)
CO2 SERPL-SCNC: 27 MMOL/L (ref 23–29)
CREAT SERPL-MCNC: 1 MG/DL (ref 0.5–1.4)
DIFFERENTIAL METHOD: ABNORMAL
EOSINOPHIL # BLD AUTO: 0.2 K/UL (ref 0–0.5)
EOSINOPHIL NFR BLD: 2.8 % (ref 0–8)
ERYTHROCYTE [DISTWIDTH] IN BLOOD BY AUTOMATED COUNT: 12.7 % (ref 11.5–14.5)
EST. GFR  (NO RACE VARIABLE): >60 ML/MIN/1.73 M^2
GLUCOSE SERPL-MCNC: 201 MG/DL (ref 70–110)
HCT VFR BLD AUTO: 36 % (ref 37–48.5)
HGB BLD-MCNC: 12.1 G/DL (ref 12–16)
IMM GRANULOCYTES # BLD AUTO: 0.02 K/UL (ref 0–0.04)
IMM GRANULOCYTES NFR BLD AUTO: 0.3 % (ref 0–0.5)
LYMPHOCYTES # BLD AUTO: 2 K/UL (ref 1–4.8)
LYMPHOCYTES NFR BLD: 28.6 % (ref 18–48)
MCH RBC QN AUTO: 27.6 PG (ref 27–31)
MCHC RBC AUTO-ENTMCNC: 33.6 G/DL (ref 32–36)
MCV RBC AUTO: 82 FL (ref 82–98)
MONOCYTES # BLD AUTO: 0.4 K/UL (ref 0.3–1)
MONOCYTES NFR BLD: 6.1 % (ref 4–15)
NEUTROPHILS # BLD AUTO: 4.4 K/UL (ref 1.8–7.7)
NEUTROPHILS NFR BLD: 61.6 % (ref 38–73)
NRBC BLD-RTO: 0 /100 WBC
PLATELET # BLD AUTO: 181 K/UL (ref 150–450)
PMV BLD AUTO: 11 FL (ref 9.2–12.9)
POTASSIUM SERPL-SCNC: 3.6 MMOL/L (ref 3.5–5.1)
PROT SERPL-MCNC: 6.1 G/DL (ref 6–8.4)
RBC # BLD AUTO: 4.39 M/UL (ref 4–5.4)
SODIUM SERPL-SCNC: 140 MMOL/L (ref 136–145)
WBC # BLD AUTO: 7.1 K/UL (ref 3.9–12.7)

## 2023-08-31 PROCEDURE — 3008F BODY MASS INDEX DOCD: CPT | Mod: CPTII,S$GLB,, | Performed by: INTERNAL MEDICINE

## 2023-08-31 PROCEDURE — 3078F DIAST BP <80 MM HG: CPT | Mod: CPTII,S$GLB,, | Performed by: INTERNAL MEDICINE

## 2023-08-31 PROCEDURE — 3078F PR MOST RECENT DIASTOLIC BLOOD PRESSURE < 80 MM HG: ICD-10-PCS | Mod: CPTII,S$GLB,, | Performed by: INTERNAL MEDICINE

## 2023-08-31 PROCEDURE — 3051F HG A1C>EQUAL 7.0%<8.0%: CPT | Mod: CPTII,S$GLB,, | Performed by: INTERNAL MEDICINE

## 2023-08-31 PROCEDURE — 99999 PR PBB SHADOW E&M-EST. PATIENT-LVL V: CPT | Mod: PBBFAC,,, | Performed by: INTERNAL MEDICINE

## 2023-08-31 PROCEDURE — 4010F ACE/ARB THERAPY RXD/TAKEN: CPT | Mod: CPTII,S$GLB,, | Performed by: INTERNAL MEDICINE

## 2023-08-31 PROCEDURE — 99215 OFFICE O/P EST HI 40 MIN: CPT | Mod: S$GLB,,, | Performed by: INTERNAL MEDICINE

## 2023-08-31 PROCEDURE — 1160F RVW MEDS BY RX/DR IN RCRD: CPT | Mod: CPTII,S$GLB,, | Performed by: INTERNAL MEDICINE

## 2023-08-31 PROCEDURE — 85025 COMPLETE CBC W/AUTO DIFF WBC: CPT | Performed by: INTERNAL MEDICINE

## 2023-08-31 PROCEDURE — 36415 COLL VENOUS BLD VENIPUNCTURE: CPT | Performed by: INTERNAL MEDICINE

## 2023-08-31 PROCEDURE — 3074F PR MOST RECENT SYSTOLIC BLOOD PRESSURE < 130 MM HG: ICD-10-PCS | Mod: CPTII,S$GLB,, | Performed by: INTERNAL MEDICINE

## 2023-08-31 PROCEDURE — 1160F PR REVIEW ALL MEDS BY PRESCRIBER/CLIN PHARMACIST DOCUMENTED: ICD-10-PCS | Mod: CPTII,S$GLB,, | Performed by: INTERNAL MEDICINE

## 2023-08-31 PROCEDURE — 3074F SYST BP LT 130 MM HG: CPT | Mod: CPTII,S$GLB,, | Performed by: INTERNAL MEDICINE

## 2023-08-31 PROCEDURE — 4010F PR ACE/ARB THEARPY RXD/TAKEN: ICD-10-PCS | Mod: CPTII,S$GLB,, | Performed by: INTERNAL MEDICINE

## 2023-08-31 PROCEDURE — 3008F PR BODY MASS INDEX (BMI) DOCUMENTED: ICD-10-PCS | Mod: CPTII,S$GLB,, | Performed by: INTERNAL MEDICINE

## 2023-08-31 PROCEDURE — 99215 PR OFFICE/OUTPT VISIT, EST, LEVL V, 40-54 MIN: ICD-10-PCS | Mod: S$GLB,,, | Performed by: INTERNAL MEDICINE

## 2023-08-31 PROCEDURE — 99999 PR PBB SHADOW E&M-EST. PATIENT-LVL V: ICD-10-PCS | Mod: PBBFAC,,, | Performed by: INTERNAL MEDICINE

## 2023-08-31 PROCEDURE — 1159F PR MEDICATION LIST DOCUMENTED IN MEDICAL RECORD: ICD-10-PCS | Mod: CPTII,S$GLB,, | Performed by: INTERNAL MEDICINE

## 2023-08-31 PROCEDURE — 1159F MED LIST DOCD IN RCRD: CPT | Mod: CPTII,S$GLB,, | Performed by: INTERNAL MEDICINE

## 2023-08-31 PROCEDURE — 3051F PR MOST RECENT HEMOGLOBIN A1C LEVEL 7.0 - < 8.0%: ICD-10-PCS | Mod: CPTII,S$GLB,, | Performed by: INTERNAL MEDICINE

## 2023-08-31 PROCEDURE — 80053 COMPREHEN METABOLIC PANEL: CPT | Performed by: INTERNAL MEDICINE

## 2023-08-31 RX ORDER — POTASSIUM CHLORIDE 20 MEQ/1
20 TABLET, EXTENDED RELEASE ORAL
COMMUNITY
Start: 2023-08-25 | End: 2024-03-01 | Stop reason: ALTCHOICE

## 2023-08-31 NOTE — PROGRESS NOTES
Subjective:       Patient ID: Shaneka Ahmadi is a 53 y.o. female.    Chief Complaint: Chemotherapy, Lung Cancer, and Results    HPI:  53-year-old female EGRF positive mutated non-small cell lung carcinoma patient presents for cycle 5 day 1OP NSCLC AMIVANTAMAB-VMJW ([facility]) Q4W medication every 2 weeks at this point tolerating therapy well excellent response on clinical exam in dramatic improvement in rash.  ECOG status 1      Past Medical History:   Diagnosis Date    Diabetes mellitus     Hypertension     Malignant neoplasm of lower lobe of left lung 12/9/2022    Secondary malignant neoplasm of bone 12/5/2022     Family History   Problem Relation Age of Onset    Heart failure Father      Social History     Socioeconomic History    Marital status:    Tobacco Use    Smoking status: Never     Passive exposure: Never    Smokeless tobacco: Never   Substance and Sexual Activity    Alcohol use: Never    Drug use: Never    Sexual activity: Yes     Partners: Male     Birth control/protection: None     Comment: tubal     Past Surgical History:   Procedure Laterality Date    CATARACT EXTRACTION W/  INTRAOCULAR LENS IMPLANT Right 06/02/2022    FLUOROSCOPY N/A 1/26/2023    Procedure: FLUOROSCOPY/mediport placement;  Surgeon: Marlon Leone MD;  Location: [facility] CATH LAB;  Service: General;  Laterality: N/A;    TUBAL LIGATION      2007--postpartum tubal ligation       Labs:  Lab Results   Component Value Date    WBC 7.10 08/31/2023    HGB 12.1 08/31/2023    HCT 36.0 (L) 08/31/2023    MCV 82 08/31/2023     08/31/2023     BMP  Lab Results   Component Value Date     08/17/2023    K 3.4 (L) 08/17/2023     08/17/2023    CO2 28 08/17/2023    BUN 13 08/17/2023    CREATININE 1.0 08/17/2023    CALCIUM 8.6 (L) 08/17/2023    ANIONGAP 10 08/17/2023    ESTGFRAFRICA >60.0 07/28/2022    EGFRNONAA >60.0 07/28/2022     Lab Results   Component Value Date    ALT 18 08/17/2023    AST 14 08/17/2023    ALKPHOS 94 08/17/2023  "   BILITOT 0.4 08/17/2023       Lab Results   Component Value Date    IRON 71 03/09/2023    TIBC 297 03/09/2023    FERRITIN 646 (H) 03/09/2023     No results found for: "[id number]"  No results found for: "FOLATE"  Lab Results   Component Value Date    TSH 1.742 04/08/2022         Review of Systems   Constitutional:  Negative for activity change, appetite change, chills, diaphoresis, fatigue, fever and unexpected weight change.   HENT:  Negative for congestion, dental problem, drooling, ear discharge, ear pain, facial swelling, hearing loss, mouth sores, nosebleeds, postnasal drip, rhinorrhea, sinus pressure, sneezing, sore throat, tinnitus, trouble swallowing and voice change.    Eyes:  Negative for photophobia, pain, discharge, redness, itching and visual disturbance.   Respiratory:  Negative for cough, choking, chest tightness, shortness of breath, wheezing and stridor.    Cardiovascular:  Negative for chest pain, palpitations and leg swelling.   Gastrointestinal:  Negative for abdominal distention, abdominal pain, anal bleeding, blood in stool, constipation, diarrhea, nausea, rectal pain and vomiting.   Endocrine: Negative for cold intolerance, heat intolerance, polydipsia, polyphagia and polyuria.   Genitourinary:  Negative for decreased urine volume, difficulty urinating, dyspareunia, dysuria, enuresis, flank pain, frequency, genital sores, hematuria, menstrual problem, pelvic pain, urgency, vaginal bleeding, vaginal discharge and vaginal pain.   Musculoskeletal:  Negative for arthralgias, back pain, gait problem, joint swelling, myalgias, neck pain and neck stiffness.   Skin:  Negative for color change, pallor and rash.   Allergic/Immunologic: Negative for environmental allergies, food allergies and immunocompromised state.   Neurological:  Negative for dizziness, tremors, seizures, syncope, facial asymmetry, speech difficulty, weakness, light-headedness, numbness and headaches.   Hematological:  Negative for " adenopathy. Does not bruise/bleed easily.   Psychiatric/Behavioral:  Negative for agitation, behavioral problems, confusion, decreased concentration, dysphoric mood, hallucinations, self-injury, sleep disturbance and suicidal ideas. The patient is not nervous/anxious and is not hyperactive.        Objective:      Physical Exam  Vitals reviewed.   Constitutional:       General: She is not in acute distress.     Appearance: She is well-developed. She is not diaphoretic.   HENT:      Head: Normocephalic and atraumatic.      Right Ear: External ear normal.      Left Ear: External ear normal.      Nose: Nose normal.      Right Sinus: No maxillary sinus tenderness or frontal sinus tenderness.      Left Sinus: No maxillary sinus tenderness or frontal sinus tenderness.      Mouth/Throat:      Pharynx: No oropharyngeal exudate.   Eyes:      General: Lids are normal. No scleral icterus.        Right eye: No discharge.         Left eye: No discharge.      Conjunctiva/sclera: Conjunctivae normal.      Right eye: Right conjunctiva is not injected. No hemorrhage.     Left eye: Left conjunctiva is not injected. No hemorrhage.     Pupils: Pupils are equal, round, and reactive to light.   Neck:      Thyroid: No thyromegaly.      Vascular: No JVD.      Trachea: No tracheal deviation.   Cardiovascular:      Rate and Rhythm: Normal rate.   Pulmonary:      Effort: Pulmonary effort is normal. No respiratory distress.      Breath sounds: No stridor.   Chest:      Chest wall: No tenderness.   Abdominal:      General: Bowel sounds are normal. There is no distension.      Palpations: Abdomen is soft. There is no hepatomegaly, splenomegaly or mass.      Tenderness: There is no abdominal tenderness. There is no rebound.   Musculoskeletal:         General: No tenderness. Normal range of motion.      Cervical back: Normal range of motion and neck supple.   Lymphadenopathy:      Cervical: No cervical adenopathy.      Upper Body:      Right upper  body: No supraclavicular adenopathy.      Left upper body: No supraclavicular adenopathy.   Skin:     General: Skin is dry.      Findings: No erythema or rash.   Neurological:      Mental Status: She is alert and oriented to person, place, and time.      Cranial Nerves: No cranial nerve deficit.      Coordination: Coordination normal.   Psychiatric:         Behavior: Behavior normal.         Thought Content: Thought content normal.         Judgment: Judgment normal.             Assessment:      1. Malignant neoplasm of lower lobe of left lung    2. Immunodeficiency due to chemotherapy    3. Essential hypertension    4. Rash, drug    5. Secondary malignant neoplasm of bone    6. Type 2 diabetes mellitus without complication, without long-term current use of insulin    7. Morbid obesity           Med Onc Chart Routing      Follow up with physician . Return to clinic to see me in 6 weeks with CT chest abdomen pelvis prior   Follow up with DOLLY . APAP to see patient every 2 weeks x3 doses   Infusion scheduling note    Injection scheduling note OP NSCLC AMIVANTAMAB-VMJW ([facility]) Q4W   Labs CBC and CMP   Scheduling:  Preferred lab:  Lab interval:     Imaging CT chest abdomen pelvis   Six weeks   Pharmacy appointment    Other referrals               Plan:     Patient continues to do remarkably well scheduled for treatment tomorrow proceed with treatment can be seen by APAP for next 3 doses after tomorrow's dose.  And I will see back in October with repeat CT chest abdomen pelvis for response to therapy patient continues to do remarkably well feels good dramatic improvement in rash has appointment in September to be seen found Dermatology continue with current treatment recommendations and outlined        Reese Mcfarlane Jr, MD FACP

## 2023-09-01 ENCOUNTER — INFUSION (OUTPATIENT)
Dept: INFUSION THERAPY | Facility: HOSPITAL | Age: 53
End: 2023-09-01
Attending: RADIOLOGY
Payer: COMMERCIAL

## 2023-09-01 VITALS
HEART RATE: 58 BPM | TEMPERATURE: 97 F | OXYGEN SATURATION: 99 % | SYSTOLIC BLOOD PRESSURE: 94 MMHG | DIASTOLIC BLOOD PRESSURE: 51 MMHG | HEIGHT: 64 IN | RESPIRATION RATE: 16 BRPM | BODY MASS INDEX: 50.02 KG/M2 | WEIGHT: 293 LBS

## 2023-09-01 DIAGNOSIS — C34.32 MALIGNANT NEOPLASM OF LOWER LOBE OF LEFT LUNG: Primary | ICD-10-CM

## 2023-09-01 PROCEDURE — 96375 TX/PRO/DX INJ NEW DRUG ADDON: CPT

## 2023-09-01 PROCEDURE — 25000003 PHARM REV CODE 250: Performed by: INTERNAL MEDICINE

## 2023-09-01 PROCEDURE — 96415 CHEMO IV INFUSION ADDL HR: CPT

## 2023-09-01 PROCEDURE — 63600175 PHARM REV CODE 636 W HCPCS: Performed by: INTERNAL MEDICINE

## 2023-09-01 PROCEDURE — 96413 CHEMO IV INFUSION 1 HR: CPT

## 2023-09-01 PROCEDURE — A4216 STERILE WATER/SALINE, 10 ML: HCPCS | Performed by: INTERNAL MEDICINE

## 2023-09-01 PROCEDURE — 96367 TX/PROPH/DG ADDL SEQ IV INF: CPT

## 2023-09-01 RX ORDER — ACETAMINOPHEN 325 MG/1
650 TABLET ORAL
Status: COMPLETED | OUTPATIENT
Start: 2023-09-01 | End: 2023-09-01

## 2023-09-01 RX ORDER — SODIUM CHLORIDE 0.9 % (FLUSH) 0.9 %
10 SYRINGE (ML) INJECTION
Status: DISCONTINUED | OUTPATIENT
Start: 2023-09-01 | End: 2023-09-01 | Stop reason: HOSPADM

## 2023-09-01 RX ORDER — HEPARIN 100 UNIT/ML
500 SYRINGE INTRAVENOUS
Status: DISCONTINUED | OUTPATIENT
Start: 2023-09-01 | End: 2023-09-01 | Stop reason: HOSPADM

## 2023-09-01 RX ORDER — DIPHENHYDRAMINE HYDROCHLORIDE 50 MG/ML
25 INJECTION INTRAMUSCULAR; INTRAVENOUS
Status: COMPLETED | OUTPATIENT
Start: 2023-09-01 | End: 2023-09-01

## 2023-09-01 RX ADMIN — DIPHENHYDRAMINE HYDROCHLORIDE 25 MG: 50 INJECTION, SOLUTION INTRAMUSCULAR; INTRAVENOUS at 08:09

## 2023-09-01 RX ADMIN — Medication 10 ML: at 11:09

## 2023-09-01 RX ADMIN — HEPARIN 500 UNITS: 100 SYRINGE at 11:09

## 2023-09-01 RX ADMIN — SODIUM CHLORIDE: 9 INJECTION, SOLUTION INTRAVENOUS at 08:09

## 2023-09-01 RX ADMIN — AMIVANTAMAB 1400 MG: 350 INJECTION INTRAVENOUS at 09:09

## 2023-09-01 RX ADMIN — DEXAMETHASONE SODIUM PHOSPHATE 0.25 MG: 4 INJECTION, SOLUTION INTRA-ARTICULAR; INTRALESIONAL; INTRAMUSCULAR; INTRAVENOUS; SOFT TISSUE at 08:09

## 2023-09-01 RX ADMIN — ACETAMINOPHEN 650 MG: 325 TABLET ORAL at 08:09

## 2023-09-01 NOTE — PLAN OF CARE
Discussed plan of care with pt. Addressed any and ongoing concerns. Pt denies   Problem: Adult Inpatient Plan of Care  Goal: Plan of Care Review  Outcome: Ongoing, Progressing  Goal: Patient-Specific Goal (Individualized)  Outcome: Ongoing, Progressing  Flowsheets (Taken 9/1/2023 0859)  Anxieties, Fears or Concerns: Denies  Individualized Care Needs: Pt in reclined position, warm blanket and pillow provided snack and water spouse at side  Goal: Absence of Hospital-Acquired Illness or Injury  Outcome: Ongoing, Progressing  Intervention: Identify and Manage Fall Risk  Flowsheets (Taken 9/1/2023 0859)  Safety Promotion/Fall Prevention:   in recliner, wheels locked   high risk medications identified  Intervention: Prevent Infection  Flowsheets (Taken 9/1/2023 0859)  Infection Prevention:   equipment surfaces disinfected   hand hygiene promoted   personal protective equipment utilized  Goal: Optimal Comfort and Wellbeing  Outcome: Ongoing, Progressing  Intervention: Provide Person-Centered Care  Flowsheets (Taken 9/1/2023 0859)  Trust Relationship/Rapport:   empathic listening provided   questions answered   questions encouraged

## 2023-09-02 DIAGNOSIS — L70.8 ACNEIFORM RASH: ICD-10-CM

## 2023-09-07 RX ORDER — DESONIDE 0.5 MG/G
CREAM TOPICAL
Qty: 60 G | Refills: 0 | Status: SHIPPED | OUTPATIENT
Start: 2023-09-07 | End: 2023-09-12 | Stop reason: SDUPTHER

## 2023-09-12 ENCOUNTER — OFFICE VISIT (OUTPATIENT)
Dept: DERMATOLOGY | Facility: CLINIC | Age: 53
End: 2023-09-12
Payer: COMMERCIAL

## 2023-09-12 ENCOUNTER — TELEPHONE (OUTPATIENT)
Dept: DERMATOLOGY | Facility: CLINIC | Age: 53
End: 2023-09-12
Payer: COMMERCIAL

## 2023-09-12 DIAGNOSIS — L70.8 ACNEIFORM RASH: ICD-10-CM

## 2023-09-12 PROCEDURE — 3051F HG A1C>EQUAL 7.0%<8.0%: CPT | Mod: CPTII,S$GLB,, | Performed by: STUDENT IN AN ORGANIZED HEALTH CARE EDUCATION/TRAINING PROGRAM

## 2023-09-12 PROCEDURE — 99214 PR OFFICE/OUTPT VISIT, EST, LEVL IV, 30-39 MIN: ICD-10-PCS | Mod: S$GLB,,, | Performed by: STUDENT IN AN ORGANIZED HEALTH CARE EDUCATION/TRAINING PROGRAM

## 2023-09-12 PROCEDURE — 3051F PR MOST RECENT HEMOGLOBIN A1C LEVEL 7.0 - < 8.0%: ICD-10-PCS | Mod: CPTII,S$GLB,, | Performed by: STUDENT IN AN ORGANIZED HEALTH CARE EDUCATION/TRAINING PROGRAM

## 2023-09-12 PROCEDURE — 4010F PR ACE/ARB THEARPY RXD/TAKEN: ICD-10-PCS | Mod: CPTII,S$GLB,, | Performed by: STUDENT IN AN ORGANIZED HEALTH CARE EDUCATION/TRAINING PROGRAM

## 2023-09-12 PROCEDURE — 1159F MED LIST DOCD IN RCRD: CPT | Mod: CPTII,S$GLB,, | Performed by: STUDENT IN AN ORGANIZED HEALTH CARE EDUCATION/TRAINING PROGRAM

## 2023-09-12 PROCEDURE — 99999 PR PBB SHADOW E&M-EST. PATIENT-LVL II: ICD-10-PCS | Mod: PBBFAC,,, | Performed by: STUDENT IN AN ORGANIZED HEALTH CARE EDUCATION/TRAINING PROGRAM

## 2023-09-12 PROCEDURE — 1159F PR MEDICATION LIST DOCUMENTED IN MEDICAL RECORD: ICD-10-PCS | Mod: CPTII,S$GLB,, | Performed by: STUDENT IN AN ORGANIZED HEALTH CARE EDUCATION/TRAINING PROGRAM

## 2023-09-12 PROCEDURE — 1160F PR REVIEW ALL MEDS BY PRESCRIBER/CLIN PHARMACIST DOCUMENTED: ICD-10-PCS | Mod: CPTII,S$GLB,, | Performed by: STUDENT IN AN ORGANIZED HEALTH CARE EDUCATION/TRAINING PROGRAM

## 2023-09-12 PROCEDURE — 99999 PR PBB SHADOW E&M-EST. PATIENT-LVL II: CPT | Mod: PBBFAC,,, | Performed by: STUDENT IN AN ORGANIZED HEALTH CARE EDUCATION/TRAINING PROGRAM

## 2023-09-12 PROCEDURE — 99214 OFFICE O/P EST MOD 30 MIN: CPT | Mod: S$GLB,,, | Performed by: STUDENT IN AN ORGANIZED HEALTH CARE EDUCATION/TRAINING PROGRAM

## 2023-09-12 PROCEDURE — 4010F ACE/ARB THERAPY RXD/TAKEN: CPT | Mod: CPTII,S$GLB,, | Performed by: STUDENT IN AN ORGANIZED HEALTH CARE EDUCATION/TRAINING PROGRAM

## 2023-09-12 PROCEDURE — 1160F RVW MEDS BY RX/DR IN RCRD: CPT | Mod: CPTII,S$GLB,, | Performed by: STUDENT IN AN ORGANIZED HEALTH CARE EDUCATION/TRAINING PROGRAM

## 2023-09-12 RX ORDER — CLINDAMYCIN PHOSPHATE 10 MG/ML
SOLUTION TOPICAL 2 TIMES DAILY
Qty: 60 EACH | Refills: 1 | Status: SHIPPED | OUTPATIENT
Start: 2023-09-12 | End: 2023-11-02

## 2023-09-12 RX ORDER — DESONIDE 0.5 MG/G
CREAM TOPICAL
Qty: 60 G | Refills: 1 | Status: SHIPPED | OUTPATIENT
Start: 2023-09-12 | End: 2023-12-12 | Stop reason: SDUPTHER

## 2023-09-12 RX ORDER — TRETINOIN 0.25 MG/G
CREAM TOPICAL NIGHTLY
Qty: 45 G | Refills: 1 | Status: SHIPPED | OUTPATIENT
Start: 2023-09-12

## 2023-09-12 NOTE — PATIENT INSTRUCTIONS

## 2023-09-12 NOTE — PROGRESS NOTES
Subjective:       Patient ID:  Shaneka Ahmadi is a 53 y.o. female who presents for   Chief Complaint   Patient presents with    Follow-up     On face rash       History of Present Illness: The patient presents for follow up of an acneiform eruption, last seen on 7/19/23 where she was started on topical clindamycin and desonide cream. Reports much improvement in symptoms. Has had fewer breakouts and flares, though still occasionally develops. Main concerning is dark marks left behind on the face. Otherwise, doing well.         Review of Systems   Constitutional:  Negative for fever and chills.   Skin:  Negative for itching, rash and dry skin.        Objective:    Physical Exam   Constitutional: She appears well-developed and well-nourished. No distress.   Neurological: She is alert and oriented to person, place, and time. She is not disoriented.   Psychiatric: She has a normal mood and affect.   Skin:   Areas Examined (abnormalities noted in diagram):   Head / Face Inspection Performed  Neck Inspection Performed              Diagram Legend     Erythematous scaling macule/papule c/w actinic keratosis       Vascular papule c/w angioma      Pigmented verrucoid papule/plaque c/w seborrheic keratosis      Yellow umbilicated papule c/w sebaceous hyperplasia      Irregularly shaped tan macule c/w lentigo     1-2 mm smooth white papules consistent with Milia      Movable subcutaneous cyst with punctum c/w epidermal inclusion cyst      Subcutaneous movable cyst c/w pilar cyst      Firm pink to brown papule c/w dermatofibroma      Pedunculated fleshy papule(s) c/w skin tag(s)      Evenly pigmented macule c/w junctional nevus     Mildly variegated pigmented, slightly irregular-bordered macule c/w mildly atypical nevus      Flesh colored to evenly pigmented papule c/w intradermal nevus       Pink pearly papule/plaque c/w basal cell carcinoma      Erythematous hyperkeratotic cursted plaque c/w SCC      Surgical scar with no sign  of skin cancer recurrence      Open and closed comedones      Inflammatory papules and pustules      Verrucoid papule consistent consistent with wart     Erythematous eczematous patches and plaques     Dystrophic onycholytic nail with subungual debris c/w onychomycosis     Umbilicated papule    Erythematous-base heme-crusted tan verrucoid plaque consistent with inflamed seborrheic keratosis     Erythematous Silvery Scaling Plaque c/w Psoriasis     See annotation      Assessment / Plan:        Acneiform rash - much improvement. Will continue topicals as needed. Add on tretinoin to help with dark marks and scarring on the face. Recommend daily sun protection and avoidance.   -     tretinoin (RETIN-A) 0.025 % cream; Apply topically every evening.  Dispense: 45 g; Refill: 1  -     clindamycin (CLEOCIN T) 1 % Swab; Apply topically 2 (two) times daily.  Dispense: 60 each; Refill: 1  -     desonide (DESOWEN) 0.05 % cream; APPLY TO AFFECTED AREA(S) TWO TIMES A DAY AS DIRECTED  Dispense: 60 g; Refill: 1             Follow up in about 3 months (around 12/12/2023).

## 2023-09-12 NOTE — TELEPHONE ENCOUNTER
----- Message from Louise Atkins sent at 9/12/2023  7:59 AM CDT -----  Contact: Shaneka Munroe is needing a call back in regards to her appt. She stated that she is en route but will be a few minutes late. Please give her a call back at [phone]

## 2023-09-14 ENCOUNTER — LAB VISIT (OUTPATIENT)
Dept: LAB | Facility: HOSPITAL | Age: 53
End: 2023-09-14
Attending: INTERNAL MEDICINE
Payer: COMMERCIAL

## 2023-09-14 ENCOUNTER — PATIENT MESSAGE (OUTPATIENT)
Dept: DERMATOLOGY | Facility: CLINIC | Age: 53
End: 2023-09-14
Payer: COMMERCIAL

## 2023-09-14 ENCOUNTER — OFFICE VISIT (OUTPATIENT)
Dept: HEMATOLOGY/ONCOLOGY | Facility: CLINIC | Age: 53
End: 2023-09-14
Payer: COMMERCIAL

## 2023-09-14 VITALS
HEART RATE: 60 BPM | SYSTOLIC BLOOD PRESSURE: 131 MMHG | TEMPERATURE: 98 F | HEIGHT: 64 IN | DIASTOLIC BLOOD PRESSURE: 78 MMHG | BODY MASS INDEX: 50.02 KG/M2 | WEIGHT: 293 LBS | OXYGEN SATURATION: 98 %

## 2023-09-14 DIAGNOSIS — C34.32 MALIGNANT NEOPLASM OF LOWER LOBE OF LEFT LUNG: ICD-10-CM

## 2023-09-14 DIAGNOSIS — D84.821 IMMUNODEFICIENCY DUE TO CHEMOTHERAPY: ICD-10-CM

## 2023-09-14 DIAGNOSIS — C34.32 MALIGNANT NEOPLASM OF LOWER LOBE OF LEFT LUNG: Primary | ICD-10-CM

## 2023-09-14 DIAGNOSIS — T45.1X5A IMMUNODEFICIENCY DUE TO CHEMOTHERAPY: ICD-10-CM

## 2023-09-14 DIAGNOSIS — Z79.899 IMMUNODEFICIENCY DUE TO CHEMOTHERAPY: ICD-10-CM

## 2023-09-14 DIAGNOSIS — L27.0 RASH, DRUG: ICD-10-CM

## 2023-09-14 DIAGNOSIS — C79.51 SECONDARY MALIGNANT NEOPLASM OF BONE: ICD-10-CM

## 2023-09-14 DIAGNOSIS — E66.01 MORBID OBESITY: ICD-10-CM

## 2023-09-14 DIAGNOSIS — I10 ESSENTIAL HYPERTENSION: ICD-10-CM

## 2023-09-14 LAB
ALBUMIN SERPL BCP-MCNC: 2.8 G/DL (ref 3.5–5.2)
ALP SERPL-CCNC: 93 U/L (ref 55–135)
ALT SERPL W/O P-5'-P-CCNC: 20 U/L (ref 10–44)
ANION GAP SERPL CALC-SCNC: 11 MMOL/L (ref 8–16)
AST SERPL-CCNC: 14 U/L (ref 10–40)
BASOPHILS # BLD AUTO: 0.03 K/UL (ref 0–0.2)
BASOPHILS NFR BLD: 0.4 % (ref 0–1.9)
BILIRUB SERPL-MCNC: 0.3 MG/DL (ref 0.1–1)
BUN SERPL-MCNC: 16 MG/DL (ref 6–20)
CALCIUM SERPL-MCNC: 8.5 MG/DL (ref 8.7–10.5)
CHLORIDE SERPL-SCNC: 102 MMOL/L (ref 95–110)
CO2 SERPL-SCNC: 26 MMOL/L (ref 23–29)
CREAT SERPL-MCNC: 0.9 MG/DL (ref 0.5–1.4)
DIFFERENTIAL METHOD: ABNORMAL
EOSINOPHIL # BLD AUTO: 0.2 K/UL (ref 0–0.5)
EOSINOPHIL NFR BLD: 3.1 % (ref 0–8)
ERYTHROCYTE [DISTWIDTH] IN BLOOD BY AUTOMATED COUNT: 13.2 % (ref 11.5–14.5)
EST. GFR  (NO RACE VARIABLE): >60 ML/MIN/1.73 M^2
GLUCOSE SERPL-MCNC: 182 MG/DL (ref 70–110)
HCT VFR BLD AUTO: 34.8 % (ref 37–48.5)
HGB BLD-MCNC: 11.8 G/DL (ref 12–16)
IMM GRANULOCYTES # BLD AUTO: 0.03 K/UL (ref 0–0.04)
IMM GRANULOCYTES NFR BLD AUTO: 0.4 % (ref 0–0.5)
LYMPHOCYTES # BLD AUTO: 2.3 K/UL (ref 1–4.8)
LYMPHOCYTES NFR BLD: 29.3 % (ref 18–48)
MCH RBC QN AUTO: 27.9 PG (ref 27–31)
MCHC RBC AUTO-ENTMCNC: 33.9 G/DL (ref 32–36)
MCV RBC AUTO: 82 FL (ref 82–98)
MONOCYTES # BLD AUTO: 0.5 K/UL (ref 0.3–1)
MONOCYTES NFR BLD: 6 % (ref 4–15)
NEUTROPHILS # BLD AUTO: 4.7 K/UL (ref 1.8–7.7)
NEUTROPHILS NFR BLD: 60.8 % (ref 38–73)
NRBC BLD-RTO: 0 /100 WBC
PLATELET # BLD AUTO: 194 K/UL (ref 150–450)
PMV BLD AUTO: 11.9 FL (ref 9.2–12.9)
POTASSIUM SERPL-SCNC: 3.3 MMOL/L (ref 3.5–5.1)
PROT SERPL-MCNC: 6.1 G/DL (ref 6–8.4)
RBC # BLD AUTO: 4.23 M/UL (ref 4–5.4)
SODIUM SERPL-SCNC: 139 MMOL/L (ref 136–145)
WBC # BLD AUTO: 7.69 K/UL (ref 3.9–12.7)

## 2023-09-14 PROCEDURE — 1159F MED LIST DOCD IN RCRD: CPT | Mod: CPTII,S$GLB,, | Performed by: INTERNAL MEDICINE

## 2023-09-14 PROCEDURE — 3075F SYST BP GE 130 - 139MM HG: CPT | Mod: CPTII,S$GLB,, | Performed by: INTERNAL MEDICINE

## 2023-09-14 PROCEDURE — 4010F PR ACE/ARB THEARPY RXD/TAKEN: ICD-10-PCS | Mod: CPTII,S$GLB,, | Performed by: INTERNAL MEDICINE

## 2023-09-14 PROCEDURE — 3008F BODY MASS INDEX DOCD: CPT | Mod: CPTII,S$GLB,, | Performed by: INTERNAL MEDICINE

## 2023-09-14 PROCEDURE — 1160F PR REVIEW ALL MEDS BY PRESCRIBER/CLIN PHARMACIST DOCUMENTED: ICD-10-PCS | Mod: CPTII,S$GLB,, | Performed by: INTERNAL MEDICINE

## 2023-09-14 PROCEDURE — 85025 COMPLETE CBC W/AUTO DIFF WBC: CPT | Performed by: INTERNAL MEDICINE

## 2023-09-14 PROCEDURE — 80053 COMPREHEN METABOLIC PANEL: CPT | Performed by: INTERNAL MEDICINE

## 2023-09-14 PROCEDURE — 3078F DIAST BP <80 MM HG: CPT | Mod: CPTII,S$GLB,, | Performed by: INTERNAL MEDICINE

## 2023-09-14 PROCEDURE — 3078F PR MOST RECENT DIASTOLIC BLOOD PRESSURE < 80 MM HG: ICD-10-PCS | Mod: CPTII,S$GLB,, | Performed by: INTERNAL MEDICINE

## 2023-09-14 PROCEDURE — 3075F PR MOST RECENT SYSTOLIC BLOOD PRESS GE 130-139MM HG: ICD-10-PCS | Mod: CPTII,S$GLB,, | Performed by: INTERNAL MEDICINE

## 2023-09-14 PROCEDURE — 99999 PR PBB SHADOW E&M-EST. PATIENT-LVL V: CPT | Mod: PBBFAC,,, | Performed by: INTERNAL MEDICINE

## 2023-09-14 PROCEDURE — 3051F PR MOST RECENT HEMOGLOBIN A1C LEVEL 7.0 - < 8.0%: ICD-10-PCS | Mod: CPTII,S$GLB,, | Performed by: INTERNAL MEDICINE

## 2023-09-14 PROCEDURE — 99215 OFFICE O/P EST HI 40 MIN: CPT | Mod: 25,S$GLB,, | Performed by: INTERNAL MEDICINE

## 2023-09-14 PROCEDURE — 3051F HG A1C>EQUAL 7.0%<8.0%: CPT | Mod: CPTII,S$GLB,, | Performed by: INTERNAL MEDICINE

## 2023-09-14 PROCEDURE — 99215 PR OFFICE/OUTPT VISIT, EST, LEVL V, 40-54 MIN: ICD-10-PCS | Mod: 25,S$GLB,, | Performed by: INTERNAL MEDICINE

## 2023-09-14 PROCEDURE — 99999 PR PBB SHADOW E&M-EST. PATIENT-LVL V: ICD-10-PCS | Mod: PBBFAC,,, | Performed by: INTERNAL MEDICINE

## 2023-09-14 PROCEDURE — 4010F ACE/ARB THERAPY RXD/TAKEN: CPT | Mod: CPTII,S$GLB,, | Performed by: INTERNAL MEDICINE

## 2023-09-14 PROCEDURE — 1159F PR MEDICATION LIST DOCUMENTED IN MEDICAL RECORD: ICD-10-PCS | Mod: CPTII,S$GLB,, | Performed by: INTERNAL MEDICINE

## 2023-09-14 PROCEDURE — 1160F RVW MEDS BY RX/DR IN RCRD: CPT | Mod: CPTII,S$GLB,, | Performed by: INTERNAL MEDICINE

## 2023-09-14 PROCEDURE — 3008F PR BODY MASS INDEX (BMI) DOCUMENTED: ICD-10-PCS | Mod: CPTII,S$GLB,, | Performed by: INTERNAL MEDICINE

## 2023-09-14 PROCEDURE — 36415 COLL VENOUS BLD VENIPUNCTURE: CPT | Performed by: INTERNAL MEDICINE

## 2023-09-14 RX ORDER — EPINEPHRINE 0.3 MG/.3ML
0.3 INJECTION SUBCUTANEOUS ONCE AS NEEDED
Status: CANCELLED | OUTPATIENT
Start: 2023-09-15

## 2023-09-14 RX ORDER — DIPHENHYDRAMINE HYDROCHLORIDE 50 MG/ML
25 INJECTION INTRAMUSCULAR; INTRAVENOUS
Status: CANCELLED
Start: 2023-09-15

## 2023-09-14 RX ORDER — HEPARIN 100 UNIT/ML
500 SYRINGE INTRAVENOUS
Status: CANCELLED | OUTPATIENT
Start: 2023-09-29

## 2023-09-14 RX ORDER — DIPHENHYDRAMINE HYDROCHLORIDE 50 MG/ML
50 INJECTION INTRAMUSCULAR; INTRAVENOUS ONCE AS NEEDED
Status: CANCELLED | OUTPATIENT
Start: 2023-09-15

## 2023-09-14 RX ORDER — ACETAMINOPHEN 325 MG/1
650 TABLET ORAL
Status: CANCELLED
Start: 2023-09-15

## 2023-09-14 RX ORDER — DIPHENHYDRAMINE HYDROCHLORIDE 50 MG/ML
25 INJECTION INTRAMUSCULAR; INTRAVENOUS
Status: CANCELLED
Start: 2023-09-29

## 2023-09-14 RX ORDER — EPINEPHRINE 0.3 MG/.3ML
0.3 INJECTION SUBCUTANEOUS ONCE AS NEEDED
Status: CANCELLED | OUTPATIENT
Start: 2023-09-29

## 2023-09-14 RX ORDER — SODIUM CHLORIDE 0.9 % (FLUSH) 0.9 %
10 SYRINGE (ML) INJECTION
Status: CANCELLED | OUTPATIENT
Start: 2023-09-15

## 2023-09-14 RX ORDER — SODIUM CHLORIDE 0.9 % (FLUSH) 0.9 %
10 SYRINGE (ML) INJECTION
Status: CANCELLED | OUTPATIENT
Start: 2023-09-29

## 2023-09-14 RX ORDER — ACETAMINOPHEN 325 MG/1
650 TABLET ORAL
Status: CANCELLED
Start: 2023-09-29

## 2023-09-14 RX ORDER — DIPHENHYDRAMINE HYDROCHLORIDE 50 MG/ML
50 INJECTION INTRAMUSCULAR; INTRAVENOUS ONCE AS NEEDED
Status: CANCELLED | OUTPATIENT
Start: 2023-09-29

## 2023-09-14 RX ORDER — HEPARIN 100 UNIT/ML
500 SYRINGE INTRAVENOUS
Status: CANCELLED | OUTPATIENT
Start: 2023-09-15

## 2023-09-14 NOTE — PROGRESS NOTES
Subjective:       Patient ID: Shaneka Ahmadi is a 53 y.o. female.    Chief Complaint: Results, Chemotherapy, and Lung Cancer    HPI:  53-year-old female history of metastatic non-small cell lung carcinoma EGD RF positive.  Patient presents for by weekly OP NSCLC AMIVANTAMAB-VMJW (Rybrevant) Q4W patient tolerating therapy well no nausea vomiting fevers chills night sweats no facial rash ECOG status 1      Past Medical History:   Diagnosis Date    Diabetes mellitus     Hypertension     Malignant neoplasm of lower lobe of left lung 12/9/2022    Secondary malignant neoplasm of bone 12/5/2022     Family History   Problem Relation Age of Onset    Heart failure Father      Social History     Socioeconomic History    Marital status:    Tobacco Use    Smoking status: Never     Passive exposure: Never    Smokeless tobacco: Never   Substance and Sexual Activity    Alcohol use: Never    Drug use: Never    Sexual activity: Yes     Partners: Male     Birth control/protection: None     Comment: tubal     Past Surgical History:   Procedure Laterality Date    CATARACT EXTRACTION W/  INTRAOCULAR LENS IMPLANT Right 06/02/2022    FLUOROSCOPY N/A 1/26/2023    Procedure: FLUOROSCOPY/mediport placement;  Surgeon: Marlon Leone MD;  Location: [facility] CATH LAB;  Service: General;  Laterality: N/A;    TUBAL LIGATION      2007--postpartum tubal ligation       Labs:  Lab Results   Component Value Date    WBC 7.69 09/14/2023    HGB 11.8 (L) 09/14/2023    HCT 34.8 (L) 09/14/2023    MCV 82 09/14/2023     09/14/2023     BMP  Lab Results   Component Value Date     08/31/2023    K 3.6 08/31/2023     08/31/2023    CO2 27 08/31/2023    BUN 20 08/31/2023    CREATININE 1.0 08/31/2023    CALCIUM 8.6 (L) 08/31/2023    ANIONGAP 9 08/31/2023    ESTGFRAFRICA >60.0 07/28/2022    EGFRNONAA >60.0 07/28/2022     Lab Results   Component Value Date    ALT 17 08/31/2023    AST 12 08/31/2023    ALKPHOS 93 08/31/2023    BILITOT 0.4 08/31/2023  "      Lab Results   Component Value Date    IRON 71 03/09/2023    TIBC 297 03/09/2023    FERRITIN 646 (H) 03/09/2023     No results found for: "ZCCLFASG48"  No results found for: "FOLATE"  Lab Results   Component Value Date    TSH 1.742 04/08/2022         Review of Systems   Constitutional:  Negative for activity change, appetite change, chills, diaphoresis, fatigue, fever and unexpected weight change.   HENT:  Negative for congestion, dental problem, drooling, ear discharge, ear pain, facial swelling, hearing loss, mouth sores, nosebleeds, postnasal drip, rhinorrhea, sinus pressure, sneezing, sore throat, tinnitus, trouble swallowing and voice change.    Eyes:  Negative for photophobia, pain, discharge, redness, itching and visual disturbance.   Respiratory:  Negative for cough, choking, chest tightness, shortness of breath, wheezing and stridor.    Cardiovascular:  Negative for chest pain, palpitations and leg swelling.   Gastrointestinal:  Negative for abdominal distention, abdominal pain, anal bleeding, blood in stool, constipation, diarrhea, nausea, rectal pain and vomiting.   Endocrine: Negative for cold intolerance, heat intolerance, polydipsia, polyphagia and polyuria.   Genitourinary:  Negative for decreased urine volume, difficulty urinating, dyspareunia, dysuria, enuresis, flank pain, frequency, genital sores, hematuria, menstrual problem, pelvic pain, urgency, vaginal bleeding, vaginal discharge and vaginal pain.   Musculoskeletal:  Negative for arthralgias, back pain, gait problem, joint swelling, myalgias, neck pain and neck stiffness.   Skin:  Negative for color change, pallor and rash.   Allergic/Immunologic: Negative for environmental allergies, food allergies and immunocompromised state.   Neurological:  Negative for dizziness, tremors, seizures, syncope, facial asymmetry, speech difficulty, weakness, light-headedness, numbness and headaches.   Hematological:  Negative for adenopathy. Does not " bruise/bleed easily.   Psychiatric/Behavioral:  Negative for agitation, behavioral problems, confusion, decreased concentration, dysphoric mood, hallucinations, self-injury, sleep disturbance and suicidal ideas. The patient is not nervous/anxious and is not hyperactive.        Objective:      Physical Exam  Vitals reviewed.   Constitutional:       General: She is not in acute distress.     Appearance: She is well-developed. She is obese. She is not diaphoretic.   HENT:      Head: Normocephalic and atraumatic.      Right Ear: External ear normal.      Left Ear: External ear normal.      Nose: Nose normal.      Right Sinus: No maxillary sinus tenderness or frontal sinus tenderness.      Left Sinus: No maxillary sinus tenderness or frontal sinus tenderness.      Mouth/Throat:      Pharynx: No oropharyngeal exudate.   Eyes:      General: Lids are normal. No scleral icterus.        Right eye: No discharge.         Left eye: No discharge.      Conjunctiva/sclera: Conjunctivae normal.      Right eye: Right conjunctiva is not injected. No hemorrhage.     Left eye: Left conjunctiva is not injected. No hemorrhage.     Pupils: Pupils are equal, round, and reactive to light.   Neck:      Thyroid: No thyromegaly.      Vascular: No JVD.      Trachea: No tracheal deviation.   Cardiovascular:      Rate and Rhythm: Normal rate.   Pulmonary:      Effort: Pulmonary effort is normal. No respiratory distress.      Breath sounds: No stridor.   Chest:      Chest wall: No tenderness.   Abdominal:      General: Bowel sounds are normal. There is no distension.      Palpations: Abdomen is soft. There is no hepatomegaly, splenomegaly or mass.      Tenderness: There is no abdominal tenderness. There is no rebound.   Musculoskeletal:         General: No tenderness. Normal range of motion.      Cervical back: Normal range of motion and neck supple.   Lymphadenopathy:      Cervical: No cervical adenopathy.      Upper Body:      Right upper body:  No supraclavicular adenopathy.      Left upper body: No supraclavicular adenopathy.   Skin:     General: Skin is dry.      Findings: No erythema or rash.   Neurological:      Mental Status: She is alert and oriented to person, place, and time.      Cranial Nerves: No cranial nerve deficit.      Coordination: Coordination normal.   Psychiatric:         Behavior: Behavior normal.         Thought Content: Thought content normal.         Judgment: Judgment normal.             Assessment:      1. Malignant neoplasm of lower lobe of left lung    2. Immunodeficiency due to chemotherapy    3. Secondary malignant neoplasm of bone    4. Essential hypertension    5. Rash, drug    6. Morbid obesity           Med Onc Chart Routing      Follow up with physician . RTC in 1 month with CBC CMP and CT chest abdomen pelvis prior   Follow up with DOLLY . Can be seen by APAP for next cycle   Infusion scheduling note    Injection scheduling note OP NSCLC AMIVANTAMAB-VMJW ([facility]) Q4W   Labs    Imaging    Pharmacy appointment    Other referrals               Plan:      patient doing remarkably well feels good rash completely resolved really appreciate help Dermatology.  Proceed with treatment today can be seen by APAP in 2 weeks.  And I will see back in 1 month which will be in comparison to CT chest abdomen pelvis from July 2023.  Patient continues to do remarkably well stable findings at present time        Reese Mcfarlane Jr, MD FACP

## 2023-09-15 ENCOUNTER — INFUSION (OUTPATIENT)
Dept: INFUSION THERAPY | Facility: HOSPITAL | Age: 53
End: 2023-09-15
Attending: RADIOLOGY
Payer: COMMERCIAL

## 2023-09-15 VITALS
HEART RATE: 65 BPM | WEIGHT: 293 LBS | HEIGHT: 64 IN | BODY MASS INDEX: 50.02 KG/M2 | TEMPERATURE: 98 F | SYSTOLIC BLOOD PRESSURE: 103 MMHG | OXYGEN SATURATION: 96 % | RESPIRATION RATE: 18 BRPM | DIASTOLIC BLOOD PRESSURE: 64 MMHG

## 2023-09-15 DIAGNOSIS — C34.32 MALIGNANT NEOPLASM OF LOWER LOBE OF LEFT LUNG: Primary | ICD-10-CM

## 2023-09-15 PROCEDURE — 96413 CHEMO IV INFUSION 1 HR: CPT

## 2023-09-15 PROCEDURE — 63600175 PHARM REV CODE 636 W HCPCS: Mod: JZ,TB | Performed by: INTERNAL MEDICINE

## 2023-09-15 PROCEDURE — 96375 TX/PRO/DX INJ NEW DRUG ADDON: CPT

## 2023-09-15 PROCEDURE — 96415 CHEMO IV INFUSION ADDL HR: CPT

## 2023-09-15 PROCEDURE — 96367 TX/PROPH/DG ADDL SEQ IV INF: CPT

## 2023-09-15 PROCEDURE — 25000003 PHARM REV CODE 250: Performed by: INTERNAL MEDICINE

## 2023-09-15 RX ORDER — DIPHENHYDRAMINE HYDROCHLORIDE 50 MG/ML
25 INJECTION INTRAMUSCULAR; INTRAVENOUS
Status: COMPLETED | OUTPATIENT
Start: 2023-09-15 | End: 2023-09-15

## 2023-09-15 RX ORDER — HEPARIN 100 UNIT/ML
500 SYRINGE INTRAVENOUS
Status: DISCONTINUED | OUTPATIENT
Start: 2023-09-15 | End: 2023-09-15 | Stop reason: HOSPADM

## 2023-09-15 RX ORDER — ACETAMINOPHEN 325 MG/1
650 TABLET ORAL
Status: COMPLETED | OUTPATIENT
Start: 2023-09-15 | End: 2023-09-15

## 2023-09-15 RX ADMIN — ACETAMINOPHEN 650 MG: 325 TABLET ORAL at 01:09

## 2023-09-15 RX ADMIN — DIPHENHYDRAMINE HYDROCHLORIDE 25 MG: 50 INJECTION, SOLUTION INTRAMUSCULAR; INTRAVENOUS at 01:09

## 2023-09-15 RX ADMIN — HEPARIN 500 UNITS: 100 SYRINGE at 03:09

## 2023-09-15 RX ADMIN — DEXAMETHASONE SODIUM PHOSPHATE 0.25 MG: 4 INJECTION, SOLUTION INTRA-ARTICULAR; INTRALESIONAL; INTRAMUSCULAR; INTRAVENOUS; SOFT TISSUE at 01:09

## 2023-09-15 RX ADMIN — AMIVANTAMAB 1400 MG: 350 INJECTION INTRAVENOUS at 01:09

## 2023-09-15 NOTE — DISCHARGE INSTRUCTIONS
[facility] [place]  [address] [address]  [address]  [phone] phone     [phone] fax  Hours of Operation: Monday- Friday 8:00am- 5:00pm  After hours phone  [phone]  Hematology / Oncology Physicians on call      SHAINA Banks Dr., NP Sydney Prescott, BRISA Enriquez FNP    Please call with any concerns regarding your appointment today.

## 2023-09-15 NOTE — PLAN OF CARE
Problem: Adult Inpatient Plan of Care  Goal: Plan of Care Review  Outcome: Ongoing, Progressing  Flowsheets (Taken 9/15/2023 1338)  Plan of Care Reviewed With: patient  Goal: Patient-Specific Goal (Individualized)  Outcome: Ongoing, Progressing  Flowsheets (Taken 9/15/2023 1338)  Anxieties, Fears or Concerns: none  Individualized Care Needs: feet up, 2 blankets, pillow, ice water and chips  Goal: Optimal Comfort and Wellbeing  Outcome: Ongoing, Progressing  Intervention: Provide Person-Centered Care  Flowsheets (Taken 9/15/2023 1338)  Trust Relationship/Rapport:   care explained   questions encouraged   choices provided   reassurance provided   emotional support provided   thoughts/feelings acknowledged   empathic listening provided   questions answered     Problem: Fall Injury Risk  Goal: Absence of Fall and Fall-Related Injury  Outcome: Ongoing, Progressing  Intervention: Identify and Manage Contributors  Flowsheets (Taken 9/15/2023 1338)  Self-Care Promotion: BADL personal routines maintained  Medication Review/Management: medications reviewed  Intervention: Promote Injury-Free Environment  Flowsheets (Taken 9/15/2023 1338)  Safety Promotion/Fall Prevention:   in recliner, wheels locked   nonskid shoes/socks when out of bed   family to remain at bedside   medications reviewed

## 2023-09-27 NOTE — PROGRESS NOTES
Subjective:       Patient ID: Shaneka Ahmadi is a 53 y.o. female.    Chief Complaint: Chemotherapy and Lung Cancer    Primary Oncologist/Hematologist: Dr. Mcfarlane    HPI: Ms. Ahmadi is a pleasant 53 year old female who is following up for her metastatic non-small cell lung carcinoma. She is here for cycle 6 day 15 of amivantamab (rybrevant). She gets this day 1 and day 15 on a 28 day cycle. She is s/p 4 cycles of alimta + carboplatin  q 3 weeks, re imaging showed progression.    Cancer Hx: Noted cough and SOB, CXR and CT chest showed L Lung mass. L lung biopsy positive for adenocarcinoma , EGFR mutated. Pleural fluid also positive for malignancy. PET showed avid STEPHAN mass, mediastinal nodes, bone mets in C1, T1, T11, L3, sacrum, L ischium, sternum. MRI brain negative for intracranial metastases. Initiated on carbo/ alimta 01/27/23--re imaging after 4 cycles found progressive disease.     Today:  She has some constipation, taking stool softeners with relief. She is not taking calcium but she continues to take vitamin D. She states she has been really good. She continues to work. She states her appetite is good and she has been staying hydrated. She denies any fevers, illnesses, n/v, bleeding, cp. She states her energy level is good.     Social History     Socioeconomic History    Marital status:    Tobacco Use    Smoking status: Never     Passive exposure: Never    Smokeless tobacco: Never   Substance and Sexual Activity    Alcohol use: Never    Drug use: Never    Sexual activity: Yes     Partners: Male     Birth control/protection: None     Comment: tubal       Past Medical History:   Diagnosis Date    Diabetes mellitus     Hypertension     Malignant neoplasm of lower lobe of left lung 12/9/2022    Secondary malignant neoplasm of bone 12/5/2022       Family History   Problem Relation Age of Onset    Heart failure Father        Past Surgical History:   Procedure Laterality Date    CATARACT EXTRACTION W/   INTRAOCULAR LENS IMPLANT Right 06/02/2022    FLUOROSCOPY N/A 1/26/2023    Procedure: FLUOROSCOPY/mediport placement;  Surgeon: Marlon Leone MD;  Location: [facility] CATH LAB;  Service: General;  Laterality: N/A;    TUBAL LIGATION      2007--postpartum tubal ligation       Review of Systems   Constitutional:  Negative for activity change, appetite change, chills, diaphoresis, fatigue, fever and unexpected weight change.   HENT:  Negative for congestion, nosebleeds and rhinorrhea.    Eyes:  Negative for visual disturbance.   Respiratory:  Negative for cough and shortness of breath.    Cardiovascular:  Negative for chest pain and leg swelling.   Gastrointestinal:  Negative for abdominal pain, anal bleeding, blood in stool, constipation, diarrhea, nausea and vomiting.   Genitourinary:  Negative for hematuria.   Musculoskeletal:  Negative for myalgias.   Skin:  Negative for color change and pallor.   Allergic/Immunologic: Positive for immunocompromised state.   Neurological:  Negative for dizziness, weakness, light-headedness, numbness and headaches.         Medication List with Changes/Refills   Current Medications    ALBUTEROL (PROVENTIL/VENTOLIN HFA) 90 MCG/ACTUATION INHALER    Inhale 1-2 puffs into the lungs every 6 (six) hours as needed for Wheezing (cough).    AMLODIPINE (NORVASC) 10 MG TABLET    Take 1 tablet (10 mg total) by mouth once daily.    ATENOLOL (TENORMIN) 50 MG TABLET    Take 1 tab by mouth twice a day    BETAMETHASONE VALERATE 0.1% (VALISONE) 0.1 % OINT    APPLY TOPICALLY TO THE TOP OF THE FEET TWO TIMES A DAY    BLOOD SUGAR DIAGNOSTIC STRP    To check BG 2 times daily, to use with insurance preferred meter    CALCIUM CARBONATE (OS-BRUNILDA) 500 MG CALCIUM (1,250 MG) TABLET    Take 1 tablet (500 mg total) by mouth once daily.    CETIRIZINE (ZYRTEC) 10 MG TABLET    Take 1 tablet (10 mg total) by mouth every evening.    CLINDAMYCIN (CLEOCIN T) 1 % SWAB    Apply topically 2 (two) times daily.    DESONIDE  (DESOWEN) 0.05 % CREAM    APPLY TO AFFECTED AREA(S) TWO TIMES A DAY AS DIRECTED    DEXAMETHASONE (DECADRON) 4 MG TAB    Take 2 tablets (8 mg total) by mouth once daily. Take as directed on days 2, 3, and 16,17 of your chemotherapy cycle starting with cycle 2 forward. Do not take with cycle 1.    DEXAMETHASONE (DECADRON) 4 MG TAB    Take 1 tablet (4 mg total) by mouth As instructed. Take 8 mg the day prior to chemotherapy, and then 8 mg after chemo on days 2, 3, 4    FLUTICASONE PROPIONATE (FLONASE) 50 MCG/ACTUATION NASAL SPRAY    2 sprays (100 mcg total) by Each Nostril route once daily.    FOLIC ACID (FOLVITE) 1 MG TABLET    Take 1 tablet (1 mg total) by mouth once daily.    HYDROCHLOROTHIAZIDE (HYDRODIURIL) 25 MG TABLET    Take 1 tablet by mouth once daily.    HYDROCODONE-ACETAMINOPHEN (NORCO) 7.5-325 MG PER TABLET    Take 1 tablet by mouth every 4 (four) hours as needed for Pain.    HYDROCODONE-HOMATROPINE 5-1.5 MG/5 ML (HYCODAN) 5-1.5 MG/5 ML SYRP    Take 5 mL by mouth every 4 (four) hours as needed.    KETOCONAZOLE (NIZORAL) 2 % CREAM    1 application  once daily.    LANCETS MISC    To check BG 2 times daily, to use with insurance preferred meter    LISINOPRIL (PRINIVIL,ZESTRIL) 20 MG TABLET    Take 1 tablet by mouth once daily.    LOSARTAN-HYDROCHLOROTHIAZIDE 100-25 MG (HYZAAR) 100-25 MG PER TABLET    Take 1 tablet by mouth once daily.    MELOXICAM (MOBIC) 15 MG TABLET        METFORMIN (GLUCOPHAGE-XR) 500 MG ER 24HR TABLET    Take 1 tab with breakfast and take 2 tabs with dinner    MUPIROCIN (BACTROBAN) 2 % OINTMENT    APPLY TO AFFECTED AREA(S) THREE TIMES A DAY    ONDANSETRON (ZOFRAN-ODT) 8 MG TBDL    Take 1 tablet (8 mg total) by mouth every 8 (eight) hours as needed. Starting with cycle 1 of chemotherapy    ONDANSETRON (ZOFRAN-ODT) 8 MG TBDL    Take 1 tablet (8 mg total) by mouth every 8 (eight) hours as needed (nausea/vomitting).    POTASSIUM CHLORIDE SA (K-DUR,KLOR-CON M) 10 MEQ TABLET    Take 2 tablets  (20 mEq total) by mouth once daily.    POTASSIUM CHLORIDE SA (K-DUR,KLOR-CON) 20 MEQ TABLET    Take 20 mEq by mouth.    PRAVASTATIN (PRAVACHOL) 10 MG TABLET    Take 1 tablet (10 mg total) by mouth every evening.    TRETINOIN (RETIN-A) 0.025 % CREAM    Apply topically every evening.    TRUE METRIX GLUCOSE METER MISC        TRUEPLUS LANCETS 33 GAUGE MISC    Apply topically.     Objective:     Vitals:    09/28/23 0757   BP: 126/78   Pulse: (!) 58   Temp: 97.4 °F (36.3 °C)     Physical Exam  Vitals reviewed.   Constitutional:       General: She is not in acute distress.     Appearance: She is not ill-appearing, toxic-appearing or diaphoretic.   HENT:      Head: Normocephalic and atraumatic.   Cardiovascular:      Rate and Rhythm: Bradycardia present.   Pulmonary:      Effort: Pulmonary effort is normal.   Skin:     General: Skin is warm.      Coloration: Skin is not jaundiced or pale.      Findings: No bruising, erythema, lesion or rash.   Neurological:      Mental Status: She is alert.      Motor: No weakness.      Gait: Gait normal.   Psychiatric:         Mood and Affect: Mood normal.         Behavior: Behavior normal.         Thought Content: Thought content normal.          Labs/Results:  Lab Results   Component Value Date    WBC 7.05 09/28/2023    RBC 4.37 09/28/2023    HGB 11.9 (L) 09/28/2023    HCT 36.4 (L) 09/28/2023    MCV 83 09/28/2023    MCH 27.2 09/28/2023    MCHC 32.7 09/28/2023    RDW 13.2 09/28/2023     09/28/2023    MPV 10.7 09/28/2023    GRAN 4.3 09/28/2023    GRAN 60.3 09/28/2023    LYMPH 2.0 09/28/2023    LYMPH 28.5 09/28/2023    MONO 0.5 09/28/2023    MONO 7.1 09/28/2023    EOS 0.2 09/28/2023    BASO 0.03 09/28/2023    EOSINOPHIL 3.1 09/28/2023    BASOPHIL 0.4 09/28/2023     CMP  Sodium   Date Value Ref Range Status   09/28/2023 142 136 - 145 mmol/L Final     Potassium   Date Value Ref Range Status   09/28/2023 4.1 3.5 - 5.1 mmol/L Final     Chloride   Date Value Ref Range Status   09/28/2023  106 95 - 110 mmol/L Final     CO2   Date Value Ref Range Status   09/28/2023 28 23 - 29 mmol/L Final     Glucose   Date Value Ref Range Status   09/28/2023 181 (H) 70 - 110 mg/dL Final     BUN   Date Value Ref Range Status   09/28/2023 18 6 - 20 mg/dL Final     Creatinine   Date Value Ref Range Status   09/28/2023 1.0 0.5 - 1.4 mg/dL Final     Calcium   Date Value Ref Range Status   09/28/2023 8.7 8.7 - 10.5 mg/dL Final     Total Protein   Date Value Ref Range Status   09/28/2023 6.1 6.0 - 8.4 g/dL Final     Albumin   Date Value Ref Range Status   09/28/2023 2.8 (L) 3.5 - 5.2 g/dL Final     Total Bilirubin   Date Value Ref Range Status   09/28/2023 0.4 0.1 - 1.0 mg/dL Final     Comment:     For infants and newborns, interpretation of results should be based  on gestational age, weight and in agreement with clinical  observations.    Premature Infant recommended reference ranges:  Up to 24 hours.............<8.0 mg/dL  Up to 48 hours............<12.0 mg/dL  3-5 days..................<15.0 mg/dL  6-29 days.................<15.0 mg/dL       Alkaline Phosphatase   Date Value Ref Range Status   09/28/2023 98 55 - 135 U/L Final     AST   Date Value Ref Range Status   09/28/2023 12 10 - 40 U/L Final     ALT   Date Value Ref Range Status   09/28/2023 16 10 - 44 U/L Final     Anion Gap   Date Value Ref Range Status   09/28/2023 8 8 - 16 mmol/L Final     eGFR   Date Value Ref Range Status   09/28/2023 >60 >60 mL/min/1.73 m^2 Final       Ct c/a/p 7/11/23  Impression:  Stable appearance of the chest.  No new abnormalities.  Stable appearance of the abdomen.  Stable osteoblastic bone metastasis the visualized skeleton    Assessment:     Problem List Items Addressed This Visit          Immunology/Multi System    Immunodeficiency due to chemotherapy       Oncology    Secondary malignant neoplasm of bone - Primary    Malignant neoplasm of lower lobe of left lung     Plan:     Malignant neoplasm of lower lobe of left lung, Secondary  malignant neoplasm of bone, Immunodeficiency due to chemotherapy  --Exon 20 mutation  --s/p 4 cycles of alimta + carboplatin q 3 weeks. Initiated on carbo/ alimta 01/27/23--re imaging after 4 cycles found progressive disease.  --getting treated on days 1 and day 15 on a 28 day cycle.   --MRI brain negative  --continue with cycle 6 day 15 of amivantamab (rybrevant)  --ANC:4.3, plts: 179  --continue with vitamin D supplement. Hold on calcium unitl start zometa.   --has not had zometa q 4 weeks. Dental clearance needed first. Patient need 1 tooth pulled and a couple of fillings. She is trying to find dentist but hard due to expenses.  --potassium supplement- 1x/day. Has trouble swallowing pill. Increase potassium rich foods.    Cough  --follow up with pulmonology given hx of bronchitis  --CT chest 6/6/23- overall no detrimental change of chest    Follow-Up: 2 weeks with cbc cmp prior for next cycle. Standing orders in. 3 weeks for CT scan (done on [facility]) and follow up with Dr. Mcfarlane in 4 weeks with cbc cmp prior for next cycle.     Debi Trujillo PA-C  Hematology Oncology    Route Chart for Scheduling    Med Onc Chart Routing      Follow up with physician 4 weeks. with cbc cmp and ct scan prior -Dr. Beverly   Follow up with DOLLY 2 weeks. with cbc cmp prior for next cycle   Infusion scheduling note   2 weeks for next cycle   Injection scheduling note    Labs   Scheduling:  Preferred lab:  Lab interval:  Standing orders in   Imaging CT chest abdomen pelvis   3 weeks for CT scan. Wants it done at [facility] location   Pharmacy appointment    Other referrals                  Treatment Plan Information   OP NSCLC AMIVANTAMAB-VMJW ([facility]) Q4W   Reese Mcfarlane MD   Upcoming Treatment Dates - OP NSCLC AMIVANTAMAB-VMJW ([facility]bre[facility]) Q4W    9/29/2023       Pre-Medications       acetaminophen tablet 650 mg       diphenhydrAMINE injection 25 mg       Antiemetics       palonosetron (ALOXI) 0.25 mg with Dexamethasone (DECADRON) 12 mg  in NS 50 mL IVPB       Immunotherapy       amivantamab-vmjw (RYBREVANT) 1,400 mg in sodium chloride 0.9% SolP 250 mL chemo infusion  10/13/2023       Pre-Medications       acetaminophen tablet 650 mg       diphenhydrAMINE injection 25 mg       Antiemetics       palonosetron (ALOXI) 0.25 mg with Dexamethasone (DECADRON) 12 mg in NS 50 mL IVPB       Immunotherapy       amivantamab-vmjw (RYBREVANT) 1,400 mg in sodium chloride 0.9% SolP 250 mL chemo infusion  10/27/2023       Pre-Medications       acetaminophen tablet 650 mg       diphenhydrAMINE injection 25 mg       Antiemetics       palonosetron (ALOXI) 0.25 mg with Dexamethasone (DECADRON) 12 mg in NS 50 mL IVPB       Immunotherapy       amivantamab-vmjw (RYBREVANT) 1,400 mg in sodium chloride 0.9% SolP 250 mL chemo infusion  11/10/2023       Pre-Medications       acetaminophen tablet 650 mg       diphenhydrAMINE injection 25 mg       Antiemetics       palonosetron (ALOXI) 0.25 mg with Dexamethasone (DECADRON) 12 mg in NS 50 mL IVPB       Immunotherapy       amivantamab-vmjw (RYBREVANT) 1,400 mg in sodium chloride 0.9% SolP 250 mL chemo infusion    Supportive Plan Information  OP ZOLEDRONIC ACID (ZOMETA) Q4W   Reese Mcfarlane MD   Upcoming Treatment Dates - OP ZOLEDRONIC ACID (ZOMETA) Q4W    4/1/2023       Medications       zoledronic acid (ZOMETA) 4 mg in sodium chloride 0.9% 100 mL IVPB  4/29/2023       Medications       zoledronic acid (ZOMETA) 4 mg in sodium chloride 0.9% 100 mL IVPB  5/27/2023       Medications       zoledronic acid (ZOMETA) 4 mg in sodium chloride 0.9% 100 mL IVPB  6/24/2023       Medications       zoledronic acid (ZOMETA) 4 mg in sodium chloride 0.9% 100 mL IVPB    Therapy Plan Information  Flushes  sodium chloride 0.9% 250 mL flush bag  Intravenous, Every visit  sodium chloride 0.9% flush 10 mL  10 mL, Intravenous, Every visit  heparin, porcine (PF) 100 unit/mL injection flush 500 Units  500 Units, Intravenous, Every visit  alteplase  injection 2 mg  2 mg, Intra-Catheter, Every visit  5. Medications  cyanocobalamin injection 1,000 mcg  1,000 mcg, Intramuscular, Day 1 of every 1 month

## 2023-09-28 ENCOUNTER — LAB VISIT (OUTPATIENT)
Dept: LAB | Facility: HOSPITAL | Age: 53
End: 2023-09-28
Attending: INTERNAL MEDICINE
Payer: COMMERCIAL

## 2023-09-28 ENCOUNTER — TELEPHONE (OUTPATIENT)
Dept: INFUSION THERAPY | Facility: HOSPITAL | Age: 53
End: 2023-09-28
Payer: COMMERCIAL

## 2023-09-28 ENCOUNTER — OFFICE VISIT (OUTPATIENT)
Dept: HEMATOLOGY/ONCOLOGY | Facility: CLINIC | Age: 53
End: 2023-09-28
Payer: COMMERCIAL

## 2023-09-28 VITALS
OXYGEN SATURATION: 98 % | WEIGHT: 293 LBS | TEMPERATURE: 97 F | DIASTOLIC BLOOD PRESSURE: 78 MMHG | BODY MASS INDEX: 50.02 KG/M2 | HEART RATE: 58 BPM | HEIGHT: 64 IN | SYSTOLIC BLOOD PRESSURE: 126 MMHG

## 2023-09-28 DIAGNOSIS — C79.51 SECONDARY MALIGNANT NEOPLASM OF BONE: Primary | ICD-10-CM

## 2023-09-28 DIAGNOSIS — T45.1X5A IMMUNODEFICIENCY DUE TO CHEMOTHERAPY: ICD-10-CM

## 2023-09-28 DIAGNOSIS — D84.821 IMMUNODEFICIENCY DUE TO CHEMOTHERAPY: ICD-10-CM

## 2023-09-28 DIAGNOSIS — C34.32 MALIGNANT NEOPLASM OF LOWER LOBE OF LEFT LUNG: ICD-10-CM

## 2023-09-28 DIAGNOSIS — Z79.899 IMMUNODEFICIENCY DUE TO CHEMOTHERAPY: ICD-10-CM

## 2023-09-28 LAB
ALBUMIN SERPL BCP-MCNC: 2.8 G/DL (ref 3.5–5.2)
ALP SERPL-CCNC: 98 U/L (ref 55–135)
ALT SERPL W/O P-5'-P-CCNC: 16 U/L (ref 10–44)
ANION GAP SERPL CALC-SCNC: 8 MMOL/L (ref 8–16)
AST SERPL-CCNC: 12 U/L (ref 10–40)
BASOPHILS # BLD AUTO: 0.03 K/UL (ref 0–0.2)
BASOPHILS NFR BLD: 0.4 % (ref 0–1.9)
BILIRUB SERPL-MCNC: 0.4 MG/DL (ref 0.1–1)
BUN SERPL-MCNC: 18 MG/DL (ref 6–20)
CALCIUM SERPL-MCNC: 8.7 MG/DL (ref 8.7–10.5)
CHLORIDE SERPL-SCNC: 106 MMOL/L (ref 95–110)
CO2 SERPL-SCNC: 28 MMOL/L (ref 23–29)
CREAT SERPL-MCNC: 1 MG/DL (ref 0.5–1.4)
DIFFERENTIAL METHOD: ABNORMAL
EOSINOPHIL # BLD AUTO: 0.2 K/UL (ref 0–0.5)
EOSINOPHIL NFR BLD: 3.1 % (ref 0–8)
ERYTHROCYTE [DISTWIDTH] IN BLOOD BY AUTOMATED COUNT: 13.2 % (ref 11.5–14.5)
EST. GFR  (NO RACE VARIABLE): >60 ML/MIN/1.73 M^2
GLUCOSE SERPL-MCNC: 181 MG/DL (ref 70–110)
HCT VFR BLD AUTO: 36.4 % (ref 37–48.5)
HGB BLD-MCNC: 11.9 G/DL (ref 12–16)
IMM GRANULOCYTES # BLD AUTO: 0.04 K/UL (ref 0–0.04)
IMM GRANULOCYTES NFR BLD AUTO: 0.6 % (ref 0–0.5)
LYMPHOCYTES # BLD AUTO: 2 K/UL (ref 1–4.8)
LYMPHOCYTES NFR BLD: 28.5 % (ref 18–48)
MCH RBC QN AUTO: 27.2 PG (ref 27–31)
MCHC RBC AUTO-ENTMCNC: 32.7 G/DL (ref 32–36)
MCV RBC AUTO: 83 FL (ref 82–98)
MONOCYTES # BLD AUTO: 0.5 K/UL (ref 0.3–1)
MONOCYTES NFR BLD: 7.1 % (ref 4–15)
NEUTROPHILS # BLD AUTO: 4.3 K/UL (ref 1.8–7.7)
NEUTROPHILS NFR BLD: 60.3 % (ref 38–73)
NRBC BLD-RTO: 0 /100 WBC
PLATELET # BLD AUTO: 179 K/UL (ref 150–450)
PMV BLD AUTO: 10.7 FL (ref 9.2–12.9)
POTASSIUM SERPL-SCNC: 4.1 MMOL/L (ref 3.5–5.1)
PROT SERPL-MCNC: 6.1 G/DL (ref 6–8.4)
RBC # BLD AUTO: 4.37 M/UL (ref 4–5.4)
SODIUM SERPL-SCNC: 142 MMOL/L (ref 136–145)
WBC # BLD AUTO: 7.05 K/UL (ref 3.9–12.7)

## 2023-09-28 PROCEDURE — 80053 COMPREHEN METABOLIC PANEL: CPT | Performed by: INTERNAL MEDICINE

## 2023-09-28 PROCEDURE — 1159F MED LIST DOCD IN RCRD: CPT | Mod: CPTII,S$GLB,,

## 2023-09-28 PROCEDURE — 4010F PR ACE/ARB THEARPY RXD/TAKEN: ICD-10-PCS | Mod: CPTII,S$GLB,,

## 2023-09-28 PROCEDURE — 4010F ACE/ARB THERAPY RXD/TAKEN: CPT | Mod: CPTII,S$GLB,,

## 2023-09-28 PROCEDURE — 3008F BODY MASS INDEX DOCD: CPT | Mod: CPTII,S$GLB,,

## 2023-09-28 PROCEDURE — 3051F HG A1C>EQUAL 7.0%<8.0%: CPT | Mod: CPTII,S$GLB,,

## 2023-09-28 PROCEDURE — 3008F PR BODY MASS INDEX (BMI) DOCUMENTED: ICD-10-PCS | Mod: CPTII,S$GLB,,

## 2023-09-28 PROCEDURE — 99215 OFFICE O/P EST HI 40 MIN: CPT | Mod: S$GLB,,,

## 2023-09-28 PROCEDURE — 99999 PR PBB SHADOW E&M-EST. PATIENT-LVL V: CPT | Mod: PBBFAC,,,

## 2023-09-28 PROCEDURE — 1159F PR MEDICATION LIST DOCUMENTED IN MEDICAL RECORD: ICD-10-PCS | Mod: CPTII,S$GLB,,

## 2023-09-28 PROCEDURE — 3074F PR MOST RECENT SYSTOLIC BLOOD PRESSURE < 130 MM HG: ICD-10-PCS | Mod: CPTII,S$GLB,,

## 2023-09-28 PROCEDURE — 3074F SYST BP LT 130 MM HG: CPT | Mod: CPTII,S$GLB,,

## 2023-09-28 PROCEDURE — 3051F PR MOST RECENT HEMOGLOBIN A1C LEVEL 7.0 - < 8.0%: ICD-10-PCS | Mod: CPTII,S$GLB,,

## 2023-09-28 PROCEDURE — 3078F PR MOST RECENT DIASTOLIC BLOOD PRESSURE < 80 MM HG: ICD-10-PCS | Mod: CPTII,S$GLB,,

## 2023-09-28 PROCEDURE — 3078F DIAST BP <80 MM HG: CPT | Mod: CPTII,S$GLB,,

## 2023-09-28 PROCEDURE — 85025 COMPLETE CBC W/AUTO DIFF WBC: CPT | Performed by: INTERNAL MEDICINE

## 2023-09-28 PROCEDURE — 99999 PR PBB SHADOW E&M-EST. PATIENT-LVL V: ICD-10-PCS | Mod: PBBFAC,,,

## 2023-09-28 PROCEDURE — 99215 PR OFFICE/OUTPT VISIT, EST, LEVL V, 40-54 MIN: ICD-10-PCS | Mod: S$GLB,,,

## 2023-09-28 PROCEDURE — 36415 COLL VENOUS BLD VENIPUNCTURE: CPT | Performed by: INTERNAL MEDICINE

## 2023-09-28 NOTE — TELEPHONE ENCOUNTER
Patient calling to move her 10/13 infusion to morning if possible.  Patient's infusion moved to 8am per her preference. Call ended well.

## 2023-09-29 ENCOUNTER — INFUSION (OUTPATIENT)
Dept: INFUSION THERAPY | Facility: HOSPITAL | Age: 53
End: 2023-09-29
Attending: RADIOLOGY
Payer: COMMERCIAL

## 2023-09-29 VITALS
SYSTOLIC BLOOD PRESSURE: 103 MMHG | WEIGHT: 293 LBS | BODY MASS INDEX: 50.02 KG/M2 | HEIGHT: 64 IN | HEART RATE: 59 BPM | RESPIRATION RATE: 18 BRPM | DIASTOLIC BLOOD PRESSURE: 57 MMHG | OXYGEN SATURATION: 94 % | TEMPERATURE: 98 F

## 2023-09-29 DIAGNOSIS — C34.32 MALIGNANT NEOPLASM OF LOWER LOBE OF LEFT LUNG: Primary | ICD-10-CM

## 2023-09-29 PROCEDURE — 25000003 PHARM REV CODE 250: Performed by: INTERNAL MEDICINE

## 2023-09-29 PROCEDURE — 96367 TX/PROPH/DG ADDL SEQ IV INF: CPT

## 2023-09-29 PROCEDURE — 96413 CHEMO IV INFUSION 1 HR: CPT

## 2023-09-29 PROCEDURE — 96415 CHEMO IV INFUSION ADDL HR: CPT

## 2023-09-29 PROCEDURE — 63600175 PHARM REV CODE 636 W HCPCS: Performed by: INTERNAL MEDICINE

## 2023-09-29 PROCEDURE — 96375 TX/PRO/DX INJ NEW DRUG ADDON: CPT

## 2023-09-29 RX ORDER — ACETAMINOPHEN 325 MG/1
650 TABLET ORAL
Status: COMPLETED | OUTPATIENT
Start: 2023-09-29 | End: 2023-09-29

## 2023-09-29 RX ORDER — DIPHENHYDRAMINE HYDROCHLORIDE 50 MG/ML
50 INJECTION INTRAMUSCULAR; INTRAVENOUS ONCE AS NEEDED
Status: DISCONTINUED | OUTPATIENT
Start: 2023-09-29 | End: 2023-09-29 | Stop reason: HOSPADM

## 2023-09-29 RX ORDER — EPINEPHRINE 0.3 MG/.3ML
0.3 INJECTION SUBCUTANEOUS ONCE AS NEEDED
Status: DISCONTINUED | OUTPATIENT
Start: 2023-09-29 | End: 2023-09-29 | Stop reason: HOSPADM

## 2023-09-29 RX ORDER — HEPARIN 100 UNIT/ML
500 SYRINGE INTRAVENOUS
Status: DISCONTINUED | OUTPATIENT
Start: 2023-09-29 | End: 2023-09-29 | Stop reason: HOSPADM

## 2023-09-29 RX ORDER — DIPHENHYDRAMINE HYDROCHLORIDE 50 MG/ML
25 INJECTION INTRAMUSCULAR; INTRAVENOUS
Status: COMPLETED | OUTPATIENT
Start: 2023-09-29 | End: 2023-09-29

## 2023-09-29 RX ADMIN — AMIVANTAMAB 1400 MG: 350 INJECTION INTRAVENOUS at 09:09

## 2023-09-29 RX ADMIN — DEXAMETHASONE SODIUM PHOSPHATE 0.25 MG: 4 INJECTION, SOLUTION INTRA-ARTICULAR; INTRALESIONAL; INTRAMUSCULAR; INTRAVENOUS; SOFT TISSUE at 08:09

## 2023-09-29 RX ADMIN — DIPHENHYDRAMINE HYDROCHLORIDE 25 MG: 50 INJECTION, SOLUTION INTRAMUSCULAR; INTRAVENOUS at 08:09

## 2023-09-29 RX ADMIN — HEPARIN 500 UNITS: 100 SYRINGE at 11:09

## 2023-09-29 RX ADMIN — ACETAMINOPHEN 650 MG: 325 TABLET ORAL at 08:09

## 2023-09-29 NOTE — PLAN OF CARE
Problem: Adult Inpatient Plan of Care  Goal: Plan of Care Review  Outcome: Ongoing, Progressing  Flowsheets (Taken 9/29/2023 0839)  Plan of Care Reviewed With:   patient   spouse  Goal: Optimal Comfort and Wellbeing  Outcome: Ongoing, Progressing  Intervention: Provide Person-Centered Care  Flowsheets (Taken 9/29/2023 0839)  Trust Relationship/Rapport:   care explained   choices provided   emotional support provided   empathic listening provided   questions answered   questions encouraged   reassurance provided   thoughts/feelings acknowledged     Problem: Neutropenia (Chemotherapy Effects)  Goal: Absence of Infection  Outcome: Ongoing, Progressing  Intervention: Prevent Infection and Maximize Resistance  Flowsheets (Taken 9/29/2023 0839)  Infection Prevention:   equipment surfaces disinfected   hand hygiene promoted   personal protective equipment utilized   rest/sleep promoted

## 2023-10-11 ENCOUNTER — PATIENT OUTREACH (OUTPATIENT)
Dept: ADMINISTRATIVE | Facility: HOSPITAL | Age: 53
End: 2023-10-11
Payer: COMMERCIAL

## 2023-10-12 ENCOUNTER — LAB VISIT (OUTPATIENT)
Dept: LAB | Facility: HOSPITAL | Age: 53
End: 2023-10-12
Attending: INTERNAL MEDICINE
Payer: COMMERCIAL

## 2023-10-12 ENCOUNTER — OFFICE VISIT (OUTPATIENT)
Dept: HEMATOLOGY/ONCOLOGY | Facility: CLINIC | Age: 53
End: 2023-10-12
Payer: COMMERCIAL

## 2023-10-12 DIAGNOSIS — C79.51 SECONDARY MALIGNANT NEOPLASM OF BONE: Primary | ICD-10-CM

## 2023-10-12 DIAGNOSIS — Z79.899 IMMUNODEFICIENCY DUE TO CHEMOTHERAPY: ICD-10-CM

## 2023-10-12 DIAGNOSIS — T45.1X5A IMMUNODEFICIENCY DUE TO CHEMOTHERAPY: ICD-10-CM

## 2023-10-12 DIAGNOSIS — C34.32 MALIGNANT NEOPLASM OF LOWER LOBE OF LEFT LUNG: ICD-10-CM

## 2023-10-12 DIAGNOSIS — D84.821 IMMUNODEFICIENCY DUE TO CHEMOTHERAPY: ICD-10-CM

## 2023-10-12 LAB
ALBUMIN SERPL BCP-MCNC: 3.1 G/DL (ref 3.5–5.2)
ALP SERPL-CCNC: 105 U/L (ref 55–135)
ALT SERPL W/O P-5'-P-CCNC: 18 U/L (ref 10–44)
ANION GAP SERPL CALC-SCNC: 11 MMOL/L (ref 8–16)
AST SERPL-CCNC: 16 U/L (ref 10–40)
BASOPHILS # BLD AUTO: 0.06 K/UL (ref 0–0.2)
BASOPHILS NFR BLD: 0.7 % (ref 0–1.9)
BILIRUB SERPL-MCNC: 0.4 MG/DL (ref 0.1–1)
BUN SERPL-MCNC: 13 MG/DL (ref 6–20)
CALCIUM SERPL-MCNC: 9 MG/DL (ref 8.7–10.5)
CHLORIDE SERPL-SCNC: 103 MMOL/L (ref 95–110)
CO2 SERPL-SCNC: 29 MMOL/L (ref 23–29)
CREAT SERPL-MCNC: 0.9 MG/DL (ref 0.5–1.4)
DIFFERENTIAL METHOD: NORMAL
EOSINOPHIL # BLD AUTO: 0.2 K/UL (ref 0–0.5)
EOSINOPHIL NFR BLD: 2.7 % (ref 0–8)
ERYTHROCYTE [DISTWIDTH] IN BLOOD BY AUTOMATED COUNT: 13.4 % (ref 11.5–14.5)
EST. GFR  (NO RACE VARIABLE): >60 ML/MIN/1.73 M^2
GLUCOSE SERPL-MCNC: 134 MG/DL (ref 70–110)
HCT VFR BLD AUTO: 37.8 % (ref 37–48.5)
HGB BLD-MCNC: 12.4 G/DL (ref 12–16)
IMM GRANULOCYTES # BLD AUTO: 0.04 K/UL (ref 0–0.04)
IMM GRANULOCYTES NFR BLD AUTO: 0.4 % (ref 0–0.5)
LYMPHOCYTES # BLD AUTO: 2.7 K/UL (ref 1–4.8)
LYMPHOCYTES NFR BLD: 30.5 % (ref 18–48)
MCH RBC QN AUTO: 27.3 PG (ref 27–31)
MCHC RBC AUTO-ENTMCNC: 32.8 G/DL (ref 32–36)
MCV RBC AUTO: 83 FL (ref 82–98)
MONOCYTES # BLD AUTO: 0.6 K/UL (ref 0.3–1)
MONOCYTES NFR BLD: 6.2 % (ref 4–15)
NEUTROPHILS # BLD AUTO: 5.3 K/UL (ref 1.8–7.7)
NEUTROPHILS NFR BLD: 59.5 % (ref 38–73)
NRBC BLD-RTO: 0 /100 WBC
PLATELET # BLD AUTO: 218 K/UL (ref 150–450)
PMV BLD AUTO: 11.2 FL (ref 9.2–12.9)
POTASSIUM SERPL-SCNC: 4.2 MMOL/L (ref 3.5–5.1)
PROT SERPL-MCNC: 6.6 G/DL (ref 6–8.4)
RBC # BLD AUTO: 4.55 M/UL (ref 4–5.4)
SODIUM SERPL-SCNC: 143 MMOL/L (ref 136–145)
WBC # BLD AUTO: 8.92 K/UL (ref 3.9–12.7)

## 2023-10-12 PROCEDURE — 3051F HG A1C>EQUAL 7.0%<8.0%: CPT | Mod: CPTII,95,,

## 2023-10-12 PROCEDURE — 4010F ACE/ARB THERAPY RXD/TAKEN: CPT | Mod: CPTII,95,,

## 2023-10-12 PROCEDURE — 99215 PR OFFICE/OUTPT VISIT, EST, LEVL V, 40-54 MIN: ICD-10-PCS | Mod: 95,,,

## 2023-10-12 PROCEDURE — 85025 COMPLETE CBC W/AUTO DIFF WBC: CPT | Performed by: INTERNAL MEDICINE

## 2023-10-12 PROCEDURE — 3051F PR MOST RECENT HEMOGLOBIN A1C LEVEL 7.0 - < 8.0%: ICD-10-PCS | Mod: CPTII,95,,

## 2023-10-12 PROCEDURE — 4010F PR ACE/ARB THEARPY RXD/TAKEN: ICD-10-PCS | Mod: CPTII,95,,

## 2023-10-12 PROCEDURE — 80053 COMPREHEN METABOLIC PANEL: CPT | Performed by: INTERNAL MEDICINE

## 2023-10-12 PROCEDURE — 36415 COLL VENOUS BLD VENIPUNCTURE: CPT | Performed by: INTERNAL MEDICINE

## 2023-10-12 PROCEDURE — 99215 OFFICE O/P EST HI 40 MIN: CPT | Mod: 95,,,

## 2023-10-12 RX ORDER — ACETAMINOPHEN 325 MG/1
650 TABLET ORAL
Status: CANCELLED
Start: 2023-10-13

## 2023-10-12 RX ORDER — DIPHENHYDRAMINE HYDROCHLORIDE 50 MG/ML
50 INJECTION INTRAMUSCULAR; INTRAVENOUS ONCE AS NEEDED
Status: CANCELLED | OUTPATIENT
Start: 2023-10-13

## 2023-10-12 RX ORDER — HEPARIN 100 UNIT/ML
500 SYRINGE INTRAVENOUS
Status: CANCELLED | OUTPATIENT
Start: 2023-10-13

## 2023-10-12 RX ORDER — EPINEPHRINE 0.3 MG/.3ML
0.3 INJECTION SUBCUTANEOUS ONCE AS NEEDED
Status: CANCELLED | OUTPATIENT
Start: 2023-10-13

## 2023-10-12 RX ORDER — DIPHENHYDRAMINE HYDROCHLORIDE 50 MG/ML
25 INJECTION INTRAMUSCULAR; INTRAVENOUS
Status: CANCELLED
Start: 2023-10-13

## 2023-10-12 RX ORDER — SODIUM CHLORIDE 0.9 % (FLUSH) 0.9 %
10 SYRINGE (ML) INJECTION
Status: CANCELLED | OUTPATIENT
Start: 2023-10-13

## 2023-10-12 NOTE — PROGRESS NOTES
Subjective:       Patient ID: Shaneka Ahmadi is a 53 y.o. female.    Chief Complaint: Chemotherapy and Lung Cancer    Primary Oncologist/Hematologist: Dr. Mcfarlane    HPI: Ms. Ahmadi is a pleasant 53 year old female who is following up for her metastatic non-small cell lung carcinoma. She is here for cycle 7 day 1 of amivantamab (rybrevant). She gets this day 1 and day 15 on a 28 day cycle. She is s/p 4 cycles of alimta + carboplatin  q 3 weeks, re imaging showed progression.    Cancer Hx: Noted cough and SOB, CXR and CT chest showed L Lung mass. L lung biopsy positive for adenocarcinoma , EGFR mutated. Pleural fluid also positive for malignancy. PET showed avid STEPHAN mass, mediastinal nodes, bone mets in C1, T1, T11, L3, sacrum, L ischium, sternum. MRI brain negative for intracranial metastases. Initiated on carbo/ alimta 01/27/23--re imaging after 4 cycles found progressive disease.     Today:  She has some constipation, taking stool softeners with relief. She is not taking calcium but she continues to take vitamin D. She continues to work. She states these past two weeks have been good. She denies any new issues. She states her appetite is still good and she has been staying hydrated. She states she has more energy. She denies any fevers, illnesses, n.v, bleeding.     Social History     Socioeconomic History    Marital status:    Tobacco Use    Smoking status: Never     Passive exposure: Never    Smokeless tobacco: Never   Substance and Sexual Activity    Alcohol use: Never    Drug use: Never    Sexual activity: Yes     Partners: Male     Birth control/protection: None     Comment: tubal       Past Medical History:   Diagnosis Date    Diabetes mellitus     Hypertension     Malignant neoplasm of lower lobe of left lung 12/9/2022    Secondary malignant neoplasm of bone 12/5/2022       Family History   Problem Relation Age of Onset    Heart failure Father        Past Surgical History:   Procedure Laterality Date     CATARACT EXTRACTION W/  INTRAOCULAR LENS IMPLANT Right 06/02/2022    FLUOROSCOPY N/A 1/26/2023    Procedure: FLUOROSCOPY/mediport placement;  Surgeon: Marlon Leone MD;  Location: [facility] CATH LAB;  Service: General;  Laterality: N/A;    TUBAL LIGATION      2007--postpartum tubal ligation       Review of Systems   Constitutional:  Negative for activity change, appetite change, chills, diaphoresis, fatigue, fever and unexpected weight change.   HENT:  Negative for congestion, nosebleeds and rhinorrhea.    Eyes:  Negative for visual disturbance.   Respiratory:  Negative for cough and shortness of breath.    Cardiovascular:  Negative for chest pain and leg swelling.   Gastrointestinal:  Negative for abdominal pain, anal bleeding, blood in stool, constipation, diarrhea, nausea and vomiting.   Genitourinary:  Negative for hematuria.   Musculoskeletal:  Negative for myalgias.   Skin:  Negative for color change and pallor.   Allergic/Immunologic: Positive for immunocompromised state.   Neurological:  Negative for dizziness, weakness, light-headedness, numbness and headaches.         Medication List with Changes/Refills   Current Medications    ALBUTEROL (PROVENTIL/VENTOLIN HFA) 90 MCG/ACTUATION INHALER    Inhale 1-2 puffs into the lungs every 6 (six) hours as needed for Wheezing (cough).    AMLODIPINE (NORVASC) 10 MG TABLET    Take 1 tablet (10 mg total) by mouth once daily.    ATENOLOL (TENORMIN) 50 MG TABLET    Take 1 tab by mouth twice a day    BETAMETHASONE VALERATE 0.1% (VALISONE) 0.1 % OINT    APPLY TOPICALLY TO THE TOP OF THE FEET TWO TIMES A DAY    BLOOD SUGAR DIAGNOSTIC STRP    To check BG 2 times daily, to use with insurance preferred meter    CALCIUM CARBONATE (OS-BRUNILDA) 500 MG CALCIUM (1,250 MG) TABLET    Take 1 tablet (500 mg total) by mouth once daily.    CETIRIZINE (ZYRTEC) 10 MG TABLET    Take 1 tablet (10 mg total) by mouth every evening.    CLINDAMYCIN (CLEOCIN T) 1 % SWAB    Apply topically 2 (two) times  daily.    DESONIDE (DESOWEN) 0.05 % CREAM    APPLY TO AFFECTED AREA(S) TWO TIMES A DAY AS DIRECTED    DEXAMETHASONE (DECADRON) 4 MG TAB    Take 2 tablets (8 mg total) by mouth once daily. Take as directed on days 2, 3, and 16,17 of your chemotherapy cycle starting with cycle 2 forward. Do not take with cycle 1.    DEXAMETHASONE (DECADRON) 4 MG TAB    Take 1 tablet (4 mg total) by mouth As instructed. Take 8 mg the day prior to chemotherapy, and then 8 mg after chemo on days 2, 3, 4    FLUTICASONE PROPIONATE (FLONASE) 50 MCG/ACTUATION NASAL SPRAY    2 sprays (100 mcg total) by Each Nostril route once daily.    FOLIC ACID (FOLVITE) 1 MG TABLET    Take 1 tablet (1 mg total) by mouth once daily.    HYDROCHLOROTHIAZIDE (HYDRODIURIL) 25 MG TABLET    Take 1 tablet by mouth once daily.    HYDROCODONE-ACETAMINOPHEN (NORCO) 7.5-325 MG PER TABLET    Take 1 tablet by mouth every 4 (four) hours as needed for Pain.    HYDROCODONE-HOMATROPINE 5-1.5 MG/5 ML (HYCODAN) 5-1.5 MG/5 ML SYRP    Take 5 mL by mouth every 4 (four) hours as needed.    KETOCONAZOLE (NIZORAL) 2 % CREAM    1 application  once daily.    LANCETS MISC    To check BG 2 times daily, to use with insurance preferred meter    LISINOPRIL (PRINIVIL,ZESTRIL) 20 MG TABLET    Take 1 tablet by mouth once daily.    LOSARTAN-HYDROCHLOROTHIAZIDE 100-25 MG (HYZAAR) 100-25 MG PER TABLET    Take 1 tablet by mouth once daily.    MELOXICAM (MOBIC) 15 MG TABLET        METFORMIN (GLUCOPHAGE-XR) 500 MG ER 24HR TABLET    Take 1 tab with breakfast and take 2 tabs with dinner    MUPIROCIN (BACTROBAN) 2 % OINTMENT    APPLY TO AFFECTED AREA(S) THREE TIMES A DAY    ONDANSETRON (ZOFRAN-ODT) 8 MG TBDL    Take 1 tablet (8 mg total) by mouth every 8 (eight) hours as needed. Starting with cycle 1 of chemotherapy    ONDANSETRON (ZOFRAN-ODT) 8 MG TBDL    Take 1 tablet (8 mg total) by mouth every 8 (eight) hours as needed (nausea/vomitting).    POTASSIUM CHLORIDE SA (K-DUR,KLOR-CON M) 10 MEQ TABLET     Take 2 tablets (20 mEq total) by mouth once daily.    POTASSIUM CHLORIDE SA (K-DUR,KLOR-CON) 20 MEQ TABLET    Take 20 mEq by mouth.    PRAVASTATIN (PRAVACHOL) 10 MG TABLET    Take 1 tablet (10 mg total) by mouth every evening.    TRETINOIN (RETIN-A) 0.025 % CREAM    Apply topically every evening.    TRUE METRIX GLUCOSE METER MISC        TRUEPLUS LANCETS 33 GAUGE MISC    Apply topically.     Objective:     There were no vitals filed for this visit.    Physical Exam  Constitutional:       General: She is not in acute distress.     Appearance: She is not ill-appearing, toxic-appearing or diaphoretic.   Neurological:      Mental Status: She is alert.   Psychiatric:         Mood and Affect: Mood normal.          Physical exam limited due to video visit    Labs/Results:  Lab Results   Component Value Date    WBC 8.92 10/12/2023    RBC 4.55 10/12/2023    HGB 12.4 10/12/2023    HCT 37.8 10/12/2023    MCV 83 10/12/2023    MCH 27.3 10/12/2023    MCHC 32.8 10/12/2023    RDW 13.4 10/12/2023     10/12/2023    MPV 11.2 10/12/2023    GRAN 5.3 10/12/2023    GRAN 59.5 10/12/2023    LYMPH 2.7 10/12/2023    LYMPH 30.5 10/12/2023    MONO 0.6 10/12/2023    MONO 6.2 10/12/2023    EOS 0.2 10/12/2023    BASO 0.06 10/12/2023    EOSINOPHIL 2.7 10/12/2023    BASOPHIL 0.7 10/12/2023     CMP  Sodium   Date Value Ref Range Status   10/12/2023 143 136 - 145 mmol/L Final     Potassium   Date Value Ref Range Status   10/12/2023 4.2 3.5 - 5.1 mmol/L Final     Chloride   Date Value Ref Range Status   10/12/2023 103 95 - 110 mmol/L Final     CO2   Date Value Ref Range Status   10/12/2023 29 23 - 29 mmol/L Final     Glucose   Date Value Ref Range Status   10/12/2023 134 (H) 70 - 110 mg/dL Final     BUN   Date Value Ref Range Status   10/12/2023 13 6 - 20 mg/dL Final     Creatinine   Date Value Ref Range Status   10/12/2023 0.9 0.5 - 1.4 mg/dL Final     Calcium   Date Value Ref Range Status   10/12/2023 9.0 8.7 - 10.5 mg/dL Final     Total  Protein   Date Value Ref Range Status   10/12/2023 6.6 6.0 - 8.4 g/dL Final     Albumin   Date Value Ref Range Status   10/12/2023 3.1 (L) 3.5 - 5.2 g/dL Final     Total Bilirubin   Date Value Ref Range Status   10/12/2023 0.4 0.1 - 1.0 mg/dL Final     Comment:     For infants and newborns, interpretation of results should be based  on gestational age, weight and in agreement with clinical  observations.    Premature Infant recommended reference ranges:  Up to 24 hours.............<8.0 mg/dL  Up to 48 hours............<12.0 mg/dL  3-5 days..................<15.0 mg/dL  6-29 days.................<15.0 mg/dL       Alkaline Phosphatase   Date Value Ref Range Status   10/12/2023 105 55 - 135 U/L Final     AST   Date Value Ref Range Status   10/12/2023 16 10 - 40 U/L Final     ALT   Date Value Ref Range Status   10/12/2023 18 10 - 44 U/L Final     Anion Gap   Date Value Ref Range Status   10/12/2023 11 8 - 16 mmol/L Final     eGFR   Date Value Ref Range Status   10/12/2023 >60 >60 mL/min/1.73 m^2 Final       Ct c/a/p 7/11/23  Impression:  Stable appearance of the chest.  No new abnormalities.  Stable appearance of the abdomen.  Stable osteoblastic bone metastasis the visualized skeleton    Assessment:     Problem List Items Addressed This Visit          Immunology/Multi System    Immunodeficiency due to chemotherapy       Oncology    Secondary malignant neoplasm of bone - Primary    Malignant neoplasm of lower lobe of left lung     Plan:     Malignant neoplasm of lower lobe of left lung, Secondary malignant neoplasm of bone, Immunodeficiency due to chemotherapy  --Exon 20 mutation  --s/p 4 cycles of alimta + carboplatin q 3 weeks. Initiated on carbo/ alimta 01/27/23--re imaging after 4 cycles found progressive disease.  --getting treated on days 1 and day 15 on a 28 day cycle.   --MRI brain negative  --continue with cycle 7 day 1 of amivantamab (rybrevant)  --ANC:5.3, plts: 218  --continue with vitamin D supplement. Hold  on calcium unitl start zometa.   --has not had zometa q 4 weeks. Dental clearance needed first. Patient need 1 tooth pulled and a couple of fillings. She is trying to find dentist but hard due to expenses.  --potassium supplement- 1x/day. Has trouble swallowing pill. Increase potassium rich foods.    Cough  --follow up with pulmonology given hx of bronchitis  --CT chest 6/6/23- overall no detrimental change of chest    Follow-Up:1 weeks for CT scan (done on mohamud) and follow up with Dr. Mcfarlane in 2 weeks with cbc cmp prior for next cycle.     Debi Trujillo PA-C  Hematology Oncology    Route Chart for Scheduling    Med Onc Chart Routing      Follow up with physician . 2 weeks with dr galeano or dr mcfarlane for ct scan review   Follow up with DOLLY    Infusion scheduling note   2 weeks for next infusion (likes labs and doctors visit early on same day then chemo the next day)   Injection scheduling note    Labs CBC and CMP   Scheduling:  Preferred lab:  Lab interval:  standing orders in   Imaging    Pharmacy appointment    Other referrals                Treatment Plan Information   OP NSCLC AMIVANTAMAB-VMJW (Rybrevant) Q4W   Reese Mcfarlane MD   Upcoming Treatment Dates - OP NSCLC AMIVANTAMAB-VMJW (Rybrevant) Q4W    10/13/2023       Pre-Medications       acetaminophen tablet 650 mg       diphenhydrAMINE injection 25 mg       Antiemetics       palonosetron (ALOXI) 0.25 mg with Dexamethasone (DECADRON) 12 mg in NS 50 mL IVPB       Immunotherapy       amivantamab-vmjw (RYBREVANT) 1,400 mg in sodium chloride 0.9% SolP 250 mL chemo infusion  10/27/2023       Pre-Medications       acetaminophen tablet 650 mg       diphenhydrAMINE injection 25 mg       Antiemetics       palonosetron (ALOXI) 0.25 mg with Dexamethasone (DECADRON) 12 mg in NS 50 mL IVPB       Immunotherapy       amivantamab-vmjw (RYBREVANT) 1,400 mg in sodium chloride 0.9% SolP 250 mL chemo infusion  11/10/2023       Pre-Medications       acetaminophen tablet 650  mg       diphenhydrAMINE injection 25 mg       Antiemetics       palonosetron (ALOXI) 0.25 mg with Dexamethasone (DECADRON) 12 mg in NS 50 mL IVPB       Immunotherapy       amivantamab-vmjw (RYBREVANT) 1,400 mg in sodium chloride 0.9% SolP 250 mL chemo infusion  11/24/2023       Pre-Medications       acetaminophen tablet 650 mg       diphenhydrAMINE injection 25 mg       Antiemetics       palonosetron (ALOXI) 0.25 mg with Dexamethasone (DECADRON) 12 mg in NS 50 mL IVPB       Immunotherapy       amivantamab-vmjw (RYBREVANT) 1,400 mg in sodium chloride 0.9% SolP 250 mL chemo infusion    Supportive Plan Information  OP ZOLEDRONIC ACID (ZOMETA) Q4W   Reese Mcfarlane MD   Upcoming Treatment Dates - OP ZOLEDRONIC ACID (ZOMETA) Q4W    4/1/2023       Medications       zoledronic acid (ZOMETA) 4 mg in sodium chloride 0.9% 100 mL IVPB  4/29/2023       Medications       zoledronic acid (ZOMETA) 4 mg in sodium chloride 0.9% 100 mL IVPB  5/27/2023       Medications       zoledronic acid (ZOMETA) 4 mg in sodium chloride 0.9% 100 mL IVPB  6/24/2023       Medications       zoledronic acid (ZOMETA) 4 mg in sodium chloride 0.9% 100 mL IVPB    Therapy Plan Information  Flushes  sodium chloride 0.9% 250 mL flush bag  Intravenous, Every visit  sodium chloride 0.9% flush 10 mL  10 mL, Intravenous, Every visit  heparin, porcine (PF) 100 unit/mL injection flush 500 Units  500 Units, Intravenous, Every visit  alteplase injection 2 mg  2 mg, Intra-Catheter, Every visit  5. Medications  cyanocobalamin injection 1,000 mcg  1,000 mcg, Intramuscular, Day 1 of every 1 month      The patient location is: work  The chief complaint leading to consultation is: lung cx and chemo     Visit type:  Synchronous audio video      Face to Face time with patient: 15 minutes of total time spent on the encounter, which includes face to face time and non-face to face time preparing to see the patient (eg, review of tests), Obtaining and/or reviewing separately  obtained history, Documenting clinical information in the electronic or other health record, Independently interpreting results (not separately reported) and communicating results to the patient/family/caregiver, or Care coordination (not separately reported).      Each patient to whom he or she provides medical services by telemedicine is:  (1) informed of the relationship between the provider and patient and the respective role of any other health care provider with respect to management of the patient; and (2) notified that he or she may decline to receive medical services

## 2023-10-13 ENCOUNTER — INFUSION (OUTPATIENT)
Dept: INFUSION THERAPY | Facility: HOSPITAL | Age: 53
End: 2023-10-13
Attending: RADIOLOGY
Payer: COMMERCIAL

## 2023-10-13 VITALS
WEIGHT: 293 LBS | DIASTOLIC BLOOD PRESSURE: 62 MMHG | OXYGEN SATURATION: 98 % | HEART RATE: 65 BPM | BODY MASS INDEX: 50.02 KG/M2 | HEIGHT: 64 IN | TEMPERATURE: 98 F | RESPIRATION RATE: 18 BRPM | SYSTOLIC BLOOD PRESSURE: 134 MMHG

## 2023-10-13 DIAGNOSIS — C34.32 MALIGNANT NEOPLASM OF LOWER LOBE OF LEFT LUNG: Primary | ICD-10-CM

## 2023-10-13 PROCEDURE — 96413 CHEMO IV INFUSION 1 HR: CPT

## 2023-10-13 PROCEDURE — 25000003 PHARM REV CODE 250

## 2023-10-13 PROCEDURE — 96415 CHEMO IV INFUSION ADDL HR: CPT

## 2023-10-13 PROCEDURE — 96375 TX/PRO/DX INJ NEW DRUG ADDON: CPT

## 2023-10-13 PROCEDURE — 96367 TX/PROPH/DG ADDL SEQ IV INF: CPT

## 2023-10-13 PROCEDURE — 63600175 PHARM REV CODE 636 W HCPCS

## 2023-10-13 RX ORDER — DIPHENHYDRAMINE HYDROCHLORIDE 50 MG/ML
25 INJECTION INTRAMUSCULAR; INTRAVENOUS
Status: COMPLETED | OUTPATIENT
Start: 2023-10-13 | End: 2023-10-13

## 2023-10-13 RX ORDER — ACETAMINOPHEN 325 MG/1
650 TABLET ORAL
Status: COMPLETED | OUTPATIENT
Start: 2023-10-13 | End: 2023-10-13

## 2023-10-13 RX ORDER — HEPARIN 100 UNIT/ML
500 SYRINGE INTRAVENOUS
Status: DISCONTINUED | OUTPATIENT
Start: 2023-10-13 | End: 2023-10-13 | Stop reason: HOSPADM

## 2023-10-13 RX ORDER — SODIUM CHLORIDE 0.9 % (FLUSH) 0.9 %
10 SYRINGE (ML) INJECTION
Status: DISCONTINUED | OUTPATIENT
Start: 2023-10-13 | End: 2023-10-13 | Stop reason: HOSPADM

## 2023-10-13 RX ADMIN — DEXAMETHASONE SODIUM PHOSPHATE 0.25 MG: 4 INJECTION, SOLUTION INTRA-ARTICULAR; INTRALESIONAL; INTRAMUSCULAR; INTRAVENOUS; SOFT TISSUE at 08:10

## 2023-10-13 RX ADMIN — HEPARIN 500 UNITS: 100 SYRINGE at 11:10

## 2023-10-13 RX ADMIN — AMIVANTAMAB 1400 MG: 350 INJECTION INTRAVENOUS at 09:10

## 2023-10-13 RX ADMIN — ACETAMINOPHEN 650 MG: 325 TABLET ORAL at 08:10

## 2023-10-13 RX ADMIN — DIPHENHYDRAMINE HYDROCHLORIDE 25 MG: 50 INJECTION, SOLUTION INTRAMUSCULAR; INTRAVENOUS at 08:10

## 2023-10-13 RX ADMIN — SODIUM CHLORIDE: 9 INJECTION, SOLUTION INTRAVENOUS at 08:10

## 2023-10-13 NOTE — PLAN OF CARE
Discussed plan of care with pt. Addressed any and ongoing concerns. Pt denies   Problem: Adult Inpatient Plan of Care  Goal: Plan of Care Review  Outcome: Ongoing, Progressing  Goal: Patient-Specific Goal (Individualized)  Outcome: Ongoing, Progressing  Flowsheets (Taken 10/13/2023 0853)  Anxieties, Fears or Concerns: Denies  Individualized Care Needs: Reclined position warm blanket, pillow, ice water  Goal: Absence of Hospital-Acquired Illness or Injury  Outcome: Ongoing, Progressing  Intervention: Identify and Manage Fall Risk  Flowsheets (Taken 10/13/2023 0853)  Safety Promotion/Fall Prevention: in recliner, wheels locked  Intervention: Prevent Infection  Flowsheets (Taken 10/13/2023 0853)  Infection Prevention:   equipment surfaces disinfected   hand hygiene promoted   personal protective equipment utilized  Goal: Optimal Comfort and Wellbeing  Outcome: Ongoing, Progressing  Intervention: Provide Person-Centered Care  Flowsheets (Taken 10/13/2023 0853)  Trust Relationship/Rapport:   care explained   choices provided   emotional support provided   empathic listening provided   questions answered   questions encouraged   reassurance provided   thoughts/feelings acknowledged

## 2023-10-16 ENCOUNTER — TELEPHONE (OUTPATIENT)
Dept: INFUSION THERAPY | Facility: HOSPITAL | Age: 53
End: 2023-10-16
Payer: COMMERCIAL

## 2023-10-16 NOTE — TELEPHONE ENCOUNTER
----- Message from Сергей Mcgovern sent at 10/16/2023  8:58 AM CDT -----  Contact: aurea  Patient is requesting a call to reschedule for an earlier time  on 10/27. Please call her back at [phone].      Thanks  DD

## 2023-10-17 ENCOUNTER — TELEPHONE (OUTPATIENT)
Dept: PODIATRY | Facility: CLINIC | Age: 53
End: 2023-10-17
Payer: COMMERCIAL

## 2023-10-19 ENCOUNTER — HOSPITAL ENCOUNTER (OUTPATIENT)
Dept: RADIOLOGY | Facility: HOSPITAL | Age: 53
Discharge: HOME | End: 2023-10-19
Attending: INTERNAL MEDICINE
Payer: COMMERCIAL

## 2023-10-19 ENCOUNTER — PATIENT MESSAGE (OUTPATIENT)
Dept: HEMATOLOGY/ONCOLOGY | Facility: CLINIC | Age: 53
End: 2023-10-19
Payer: COMMERCIAL

## 2023-10-19 DIAGNOSIS — C34.32 MALIGNANT NEOPLASM OF LOWER LOBE OF LEFT LUNG: ICD-10-CM

## 2023-10-19 PROCEDURE — 25500020 PHARM REV CODE 255: Performed by: INTERNAL MEDICINE

## 2023-10-19 PROCEDURE — 74177 CT CHEST ABDOMEN PELVIS WITH CONTRAST (XPD): ICD-10-PCS | Mod: 26,,, | Performed by: RADIOLOGY

## 2023-10-19 PROCEDURE — 71260 CT THORAX DX C+: CPT | Mod: 26,,, | Performed by: RADIOLOGY

## 2023-10-19 PROCEDURE — A9698 NON-RAD CONTRAST MATERIALNOC: HCPCS | Performed by: INTERNAL MEDICINE

## 2023-10-19 PROCEDURE — 71260 CT THORAX DX C+: CPT | Mod: TC

## 2023-10-19 PROCEDURE — 71260 CT CHEST ABDOMEN PELVIS WITH CONTRAST (XPD): ICD-10-PCS | Mod: 26,,, | Performed by: RADIOLOGY

## 2023-10-19 PROCEDURE — 74177 CT ABD & PELVIS W/CONTRAST: CPT | Mod: TC

## 2023-10-19 PROCEDURE — 74177 CT ABD & PELVIS W/CONTRAST: CPT | Mod: 26,,, | Performed by: RADIOLOGY

## 2023-10-19 RX ADMIN — IOHEXOL 100 ML: 350 INJECTION, SOLUTION INTRAVENOUS at 09:10

## 2023-10-19 RX ADMIN — IOHEXOL 1000 ML: 9 SOLUTION ORAL at 09:10

## 2023-10-26 ENCOUNTER — LAB VISIT (OUTPATIENT)
Dept: LAB | Facility: HOSPITAL | Age: 53
End: 2023-10-26
Attending: INTERNAL MEDICINE
Payer: COMMERCIAL

## 2023-10-26 ENCOUNTER — OFFICE VISIT (OUTPATIENT)
Dept: HEMATOLOGY/ONCOLOGY | Facility: CLINIC | Age: 53
End: 2023-10-26
Payer: COMMERCIAL

## 2023-10-26 DIAGNOSIS — D84.821 IMMUNODEFICIENCY DUE TO CHEMOTHERAPY: ICD-10-CM

## 2023-10-26 DIAGNOSIS — Z79.899 IMMUNODEFICIENCY DUE TO CHEMOTHERAPY: ICD-10-CM

## 2023-10-26 DIAGNOSIS — I10 ESSENTIAL HYPERTENSION: ICD-10-CM

## 2023-10-26 DIAGNOSIS — T45.1X5A IMMUNODEFICIENCY DUE TO CHEMOTHERAPY: ICD-10-CM

## 2023-10-26 DIAGNOSIS — C34.32 MALIGNANT NEOPLASM OF LOWER LOBE OF LEFT LUNG: Primary | ICD-10-CM

## 2023-10-26 DIAGNOSIS — E11.9 TYPE 2 DIABETES MELLITUS WITHOUT COMPLICATION, WITHOUT LONG-TERM CURRENT USE OF INSULIN: ICD-10-CM

## 2023-10-26 DIAGNOSIS — E66.01 MORBID OBESITY: ICD-10-CM

## 2023-10-26 DIAGNOSIS — C79.51 SECONDARY MALIGNANT NEOPLASM OF BONE: ICD-10-CM

## 2023-10-26 DIAGNOSIS — C34.32 MALIGNANT NEOPLASM OF LOWER LOBE OF LEFT LUNG: ICD-10-CM

## 2023-10-26 PROBLEM — L27.0 RASH, DRUG: Status: RESOLVED | Noted: 2023-07-06 | Resolved: 2023-10-26

## 2023-10-26 LAB
ALBUMIN SERPL BCP-MCNC: 2.8 G/DL (ref 3.5–5.2)
ALP SERPL-CCNC: 96 U/L (ref 55–135)
ALT SERPL W/O P-5'-P-CCNC: 22 U/L (ref 10–44)
ANION GAP SERPL CALC-SCNC: 12 MMOL/L (ref 8–16)
AST SERPL-CCNC: 17 U/L (ref 10–40)
BASOPHILS # BLD AUTO: 0.03 K/UL (ref 0–0.2)
BASOPHILS NFR BLD: 0.5 % (ref 0–1.9)
BILIRUB SERPL-MCNC: 0.4 MG/DL (ref 0.1–1)
BUN SERPL-MCNC: 13 MG/DL (ref 6–20)
CALCIUM SERPL-MCNC: 8.5 MG/DL (ref 8.7–10.5)
CHLORIDE SERPL-SCNC: 103 MMOL/L (ref 95–110)
CO2 SERPL-SCNC: 28 MMOL/L (ref 23–29)
CREAT SERPL-MCNC: 0.9 MG/DL (ref 0.5–1.4)
DIFFERENTIAL METHOD: NORMAL
EOSINOPHIL # BLD AUTO: 0.3 K/UL (ref 0–0.5)
EOSINOPHIL NFR BLD: 4.8 % (ref 0–8)
ERYTHROCYTE [DISTWIDTH] IN BLOOD BY AUTOMATED COUNT: 13.2 % (ref 11.5–14.5)
EST. GFR  (NO RACE VARIABLE): >60 ML/MIN/1.73 M^2
GLUCOSE SERPL-MCNC: 174 MG/DL (ref 70–110)
HCT VFR BLD AUTO: 37.7 % (ref 37–48.5)
HGB BLD-MCNC: 12.4 G/DL (ref 12–16)
IMM GRANULOCYTES # BLD AUTO: 0.02 K/UL (ref 0–0.04)
IMM GRANULOCYTES NFR BLD AUTO: 0.3 % (ref 0–0.5)
LYMPHOCYTES # BLD AUTO: 1.9 K/UL (ref 1–4.8)
LYMPHOCYTES NFR BLD: 30.3 % (ref 18–48)
MCH RBC QN AUTO: 27.7 PG (ref 27–31)
MCHC RBC AUTO-ENTMCNC: 32.9 G/DL (ref 32–36)
MCV RBC AUTO: 84 FL (ref 82–98)
MONOCYTES # BLD AUTO: 0.5 K/UL (ref 0.3–1)
MONOCYTES NFR BLD: 7.4 % (ref 4–15)
NEUTROPHILS # BLD AUTO: 3.5 K/UL (ref 1.8–7.7)
NEUTROPHILS NFR BLD: 56.7 % (ref 38–73)
NRBC BLD-RTO: 0 /100 WBC
PLATELET # BLD AUTO: 190 K/UL (ref 150–450)
PMV BLD AUTO: 10.9 FL (ref 9.2–12.9)
POTASSIUM SERPL-SCNC: 3.7 MMOL/L (ref 3.5–5.1)
PROT SERPL-MCNC: 6 G/DL (ref 6–8.4)
RBC # BLD AUTO: 4.47 M/UL (ref 4–5.4)
SODIUM SERPL-SCNC: 143 MMOL/L (ref 136–145)
WBC # BLD AUTO: 6.1 K/UL (ref 3.9–12.7)

## 2023-10-26 PROCEDURE — 99214 PR OFFICE/OUTPT VISIT, EST, LEVL IV, 30-39 MIN: ICD-10-PCS | Mod: 95,,, | Performed by: INTERNAL MEDICINE

## 2023-10-26 PROCEDURE — 85025 COMPLETE CBC W/AUTO DIFF WBC: CPT | Performed by: INTERNAL MEDICINE

## 2023-10-26 PROCEDURE — 3051F HG A1C>EQUAL 7.0%<8.0%: CPT | Mod: CPTII,95,, | Performed by: INTERNAL MEDICINE

## 2023-10-26 PROCEDURE — 4010F PR ACE/ARB THEARPY RXD/TAKEN: ICD-10-PCS | Mod: CPTII,95,, | Performed by: INTERNAL MEDICINE

## 2023-10-26 PROCEDURE — 80053 COMPREHEN METABOLIC PANEL: CPT | Performed by: INTERNAL MEDICINE

## 2023-10-26 PROCEDURE — 36415 COLL VENOUS BLD VENIPUNCTURE: CPT | Performed by: INTERNAL MEDICINE

## 2023-10-26 PROCEDURE — 4010F ACE/ARB THERAPY RXD/TAKEN: CPT | Mod: CPTII,95,, | Performed by: INTERNAL MEDICINE

## 2023-10-26 PROCEDURE — 99214 OFFICE O/P EST MOD 30 MIN: CPT | Mod: 95,,, | Performed by: INTERNAL MEDICINE

## 2023-10-26 PROCEDURE — 3051F PR MOST RECENT HEMOGLOBIN A1C LEVEL 7.0 - < 8.0%: ICD-10-PCS | Mod: CPTII,95,, | Performed by: INTERNAL MEDICINE

## 2023-10-26 RX ORDER — SODIUM CHLORIDE 0.9 % (FLUSH) 0.9 %
10 SYRINGE (ML) INJECTION
Status: CANCELLED | OUTPATIENT
Start: 2023-10-27

## 2023-10-26 RX ORDER — DIPHENHYDRAMINE HYDROCHLORIDE 50 MG/ML
25 INJECTION INTRAMUSCULAR; INTRAVENOUS
Status: CANCELLED
Start: 2023-10-27

## 2023-10-26 RX ORDER — HEPARIN 100 UNIT/ML
500 SYRINGE INTRAVENOUS
Status: CANCELLED | OUTPATIENT
Start: 2023-10-27

## 2023-10-26 RX ORDER — ACETAMINOPHEN 325 MG/1
650 TABLET ORAL
Status: CANCELLED
Start: 2023-10-27

## 2023-10-26 RX ORDER — DIPHENHYDRAMINE HYDROCHLORIDE 50 MG/ML
50 INJECTION INTRAMUSCULAR; INTRAVENOUS ONCE AS NEEDED
Status: CANCELLED | OUTPATIENT
Start: 2023-10-27

## 2023-10-26 RX ORDER — EPINEPHRINE 0.3 MG/.3ML
0.3 INJECTION SUBCUTANEOUS ONCE AS NEEDED
Status: CANCELLED | OUTPATIENT
Start: 2023-10-27

## 2023-10-26 NOTE — PROGRESS NOTES
Subjective:       Patient ID: Shaneka Ahmadi is a 53 y.o. female.    Chief Complaint: No chief complaint on file.    HPI:  53-year-old female history of metastatic non-small cell lung carcinoma with Exon 20 mutation.  Patient continues onOP NSCLC AMIVANTAMAB-VMJW (Rybrevant) Q2W on a 2 week basis tolerating therapy well recent CT chest abdomen pelvis demonstrates stable findings ECOG status 1 seen in virtual visit      Past Medical History:   Diagnosis Date    Diabetes mellitus     Hypertension     Malignant neoplasm of lower lobe of left lung 12/9/2022    Rash, drug 7/6/2023    OP NSCLC AMIVANTAMAB-VMJW (Rybrevant) Q4W      Secondary malignant neoplasm of bone 12/5/2022     Family History   Problem Relation Age of Onset    Heart failure Father      Social History     Socioeconomic History    Marital status:    Tobacco Use    Smoking status: Never     Passive exposure: Never    Smokeless tobacco: Never   Substance and Sexual Activity    Alcohol use: Never    Drug use: Never    Sexual activity: Yes     Partners: Male     Birth control/protection: None     Comment: tubal     Past Surgical History:   Procedure Laterality Date    CATARACT EXTRACTION W/  INTRAOCULAR LENS IMPLANT Right 06/02/2022    FLUOROSCOPY N/A 1/26/2023    Procedure: FLUOROSCOPY/mediport placement;  Surgeon: Marlon Leone MD;  Location: [facility] CATH LAB;  Service: General;  Laterality: N/A;    TUBAL LIGATION      2007--postpartum tubal ligation       Labs:  Lab Results   Component Value Date    WBC 6.10 10/26/2023    HGB 12.4 10/26/2023    HCT 37.7 10/26/2023    MCV 84 10/26/2023     10/26/2023     BMP  Lab Results   Component Value Date     10/26/2023    K 3.7 10/26/2023     10/26/2023    CO2 28 10/26/2023    BUN 13 10/26/2023    CREATININE 0.9 10/26/2023    CALCIUM 8.5 (L) 10/26/2023    ANIONGAP 12 10/26/2023    ESTGFRAFRICA >60.0 07/28/2022    EGFRNONAA >60.0 07/28/2022     Lab Results   Component Value Date    ALT 22  "10/26/2023    AST 17 10/26/2023    ALKPHOS 96 10/26/2023    BILITOT 0.4 10/26/2023       Lab Results   Component Value Date    IRON 71 03/09/2023    TIBC 297 03/09/2023    FERRITIN 646 (H) 03/09/2023     No results found for: "[id number]"  No results found for: "FOLATE"  Lab Results   Component Value Date    TSH 1.742 04/08/2022         Review of Systems   Constitutional:  Negative for activity change, appetite change, chills, diaphoresis, fatigue, fever and unexpected weight change.   HENT:  Positive for rhinorrhea. Negative for congestion, dental problem, drooling, ear discharge, ear pain, facial swelling, hearing loss, mouth sores, nosebleeds, postnasal drip, sinus pressure, sneezing, sore throat, tinnitus, trouble swallowing and voice change.         Seasonal rhinorrhea   Eyes:  Negative for photophobia, pain, discharge, redness, itching and visual disturbance.   Respiratory:  Negative for cough, choking, chest tightness, shortness of breath, wheezing and stridor.    Cardiovascular:  Negative for chest pain, palpitations and leg swelling.   Gastrointestinal:  Negative for abdominal distention, abdominal pain, anal bleeding, blood in stool, constipation, diarrhea, nausea, rectal pain and vomiting.   Endocrine: Negative for cold intolerance, heat intolerance, polydipsia, polyphagia and polyuria.   Genitourinary:  Negative for decreased urine volume, difficulty urinating, dyspareunia, dysuria, enuresis, flank pain, frequency, genital sores, hematuria, menstrual problem, pelvic pain, urgency, vaginal bleeding, vaginal discharge and vaginal pain.   Musculoskeletal:  Negative for arthralgias, back pain, gait problem, joint swelling, myalgias, neck pain and neck stiffness.   Skin:  Negative for color change, pallor and rash.   Allergic/Immunologic: Negative for environmental allergies, food allergies and immunocompromised state.   Neurological:  Negative for dizziness, tremors, seizures, syncope, facial asymmetry, " speech difficulty, weakness, light-headedness, numbness and headaches.   Hematological:  Negative for adenopathy. Does not bruise/bleed easily.   Psychiatric/Behavioral:  Negative for agitation, behavioral problems, confusion, decreased concentration, dysphoric mood, hallucinations, self-injury, sleep disturbance and suicidal ideas. The patient is not nervous/anxious and is not hyperactive.        Objective:      Physical Exam  Constitutional:       Appearance: She is obese.             Assessment:      1. Malignant neoplasm of lower lobe of left lung    2. Secondary malignant neoplasm of bone    3. Immunodeficiency due to chemotherapy    4. Morbid obesity    5. Type 2 diabetes mellitus without complication, without long-term current use of insulin    6. Essential hypertension           Med Onc Chart Routing      Follow up with physician . Return to clinic in 2 months with CBC CMP to see me   Follow up with DOLLY . Can be seen by APAP q.2 weeks x3   Infusion scheduling note    Injection scheduling note OP NSCLC AMIVANTAMAB-VMJW (Rybrevant) Q2W   Labs CBC and CMP   Scheduling:  Preferred lab:  Lab interval:     Imaging    Pharmacy appointment    Other referrals                   Plan:     The patient location is:  Home  The chief complaint leading to consultation is:  Lung cancer    Visit type: audiovisual    Face to Face time with patient: 25 minutes of total time spent on the encounter, which includes face to face time and non-face to face time preparing to see the patient (eg, review of tests), Obtaining and/or reviewing separately obtained history, Documenting clinical information in the electronic or other health record, Independently interpreting results (not separately reported) and communicating results to the patient/family/caregiver, or Care coordination (not separately reported).         Each patient to whom he or she provides medical services by telemedicine is:  (1) informed of the relationship between the  physician and patient and the respective role of any other health care provider with respect to management of the patient; and (2) notified that he or she may decline to receive medical services by telemedicine and may withdraw from such care at any time.    Notes:  Reviewed information with patient.  At this time patient has excellent stable findings no evidence of progression on 2 week basis patient has done remarkably well with minimal toxicity seasonal rhinorrhea today.  Taking Zyrtec patient's CT chest abdomen pelvis reveals no evidence of infiltrative process.  Follow-up APAP next 3 dosing and myself in 2 months.  CBC CMP        Reese Mcfarlane Jr, MD FACP

## 2023-10-27 ENCOUNTER — INFUSION (OUTPATIENT)
Dept: INFUSION THERAPY | Facility: HOSPITAL | Age: 53
End: 2023-10-27
Attending: RADIOLOGY
Payer: COMMERCIAL

## 2023-10-27 VITALS
RESPIRATION RATE: 16 BRPM | SYSTOLIC BLOOD PRESSURE: 107 MMHG | DIASTOLIC BLOOD PRESSURE: 66 MMHG | HEART RATE: 57 BPM | BODY MASS INDEX: 50.02 KG/M2 | HEIGHT: 64 IN | WEIGHT: 293 LBS | OXYGEN SATURATION: 96 % | TEMPERATURE: 98 F

## 2023-10-27 DIAGNOSIS — C34.32 MALIGNANT NEOPLASM OF LOWER LOBE OF LEFT LUNG: Primary | ICD-10-CM

## 2023-10-27 PROCEDURE — 96367 TX/PROPH/DG ADDL SEQ IV INF: CPT

## 2023-10-27 PROCEDURE — A4216 STERILE WATER/SALINE, 10 ML: HCPCS | Performed by: INTERNAL MEDICINE

## 2023-10-27 PROCEDURE — 25000003 PHARM REV CODE 250: Performed by: INTERNAL MEDICINE

## 2023-10-27 PROCEDURE — 63600175 PHARM REV CODE 636 W HCPCS: Mod: JZ,TB | Performed by: INTERNAL MEDICINE

## 2023-10-27 PROCEDURE — 96375 TX/PRO/DX INJ NEW DRUG ADDON: CPT

## 2023-10-27 PROCEDURE — 96413 CHEMO IV INFUSION 1 HR: CPT

## 2023-10-27 PROCEDURE — 96415 CHEMO IV INFUSION ADDL HR: CPT

## 2023-10-27 RX ORDER — ACETAMINOPHEN 325 MG/1
650 TABLET ORAL
Status: COMPLETED | OUTPATIENT
Start: 2023-10-27 | End: 2023-10-27

## 2023-10-27 RX ORDER — EPINEPHRINE 0.3 MG/.3ML
0.3 INJECTION SUBCUTANEOUS ONCE AS NEEDED
Status: DISCONTINUED | OUTPATIENT
Start: 2023-10-27 | End: 2023-10-27 | Stop reason: HOSPADM

## 2023-10-27 RX ORDER — DIPHENHYDRAMINE HYDROCHLORIDE 50 MG/ML
50 INJECTION INTRAMUSCULAR; INTRAVENOUS ONCE AS NEEDED
Status: DISCONTINUED | OUTPATIENT
Start: 2023-10-27 | End: 2023-10-27 | Stop reason: HOSPADM

## 2023-10-27 RX ORDER — HEPARIN 100 UNIT/ML
500 SYRINGE INTRAVENOUS
Status: DISCONTINUED | OUTPATIENT
Start: 2023-10-27 | End: 2023-10-27 | Stop reason: HOSPADM

## 2023-10-27 RX ORDER — DIPHENHYDRAMINE HYDROCHLORIDE 50 MG/ML
25 INJECTION INTRAMUSCULAR; INTRAVENOUS
Status: COMPLETED | OUTPATIENT
Start: 2023-10-27 | End: 2023-10-27

## 2023-10-27 RX ORDER — SODIUM CHLORIDE 0.9 % (FLUSH) 0.9 %
10 SYRINGE (ML) INJECTION
Status: DISCONTINUED | OUTPATIENT
Start: 2023-10-27 | End: 2023-10-27 | Stop reason: HOSPADM

## 2023-10-27 RX ADMIN — AMIVANTAMAB 1400 MG: 350 INJECTION INTRAVENOUS at 09:10

## 2023-10-27 RX ADMIN — DEXAMETHASONE SODIUM PHOSPHATE 0.25 MG: 4 INJECTION, SOLUTION INTRA-ARTICULAR; INTRALESIONAL; INTRAMUSCULAR; INTRAVENOUS; SOFT TISSUE at 08:10

## 2023-10-27 RX ADMIN — HEPARIN 500 UNITS: 100 SYRINGE at 11:10

## 2023-10-27 RX ADMIN — DIPHENHYDRAMINE HYDROCHLORIDE 25 MG: 50 INJECTION, SOLUTION INTRAMUSCULAR; INTRAVENOUS at 08:10

## 2023-10-27 RX ADMIN — Medication 10 ML: at 11:10

## 2023-10-27 RX ADMIN — ACETAMINOPHEN 650 MG: 325 TABLET ORAL at 08:10

## 2023-10-27 NOTE — PLAN OF CARE
Problem: Adult Inpatient Plan of Care  Goal: Plan of Care Review  Outcome: Ongoing, Progressing  Flowsheets (Taken 10/27/2023 0827)  Plan of Care Reviewed With:   spouse   patient  Goal: Optimal Comfort and Wellbeing  Outcome: Ongoing, Progressing  Intervention: Provide Person-Centered Care  Flowsheets (Taken 10/27/2023 0827)  Trust Relationship/Rapport:   care explained   reassurance provided   choices provided   thoughts/feelings acknowledged   emotional support provided   empathic listening provided   questions answered   questions encouraged     Problem: Nausea and Vomiting (Chemotherapy Effects)  Goal: Fluid and Electrolyte Balance  Outcome: Ongoing, Progressing  Intervention: Prevent and Manage Nausea and Vomiting  Flowsheets (Taken 10/27/2023 0827)  Environmental Support:   calm environment promoted   environmental consistency promoted  Oral Care: (premeds for chemo as ordered) other (see comments)

## 2023-11-01 ENCOUNTER — OFFICE VISIT (OUTPATIENT)
Dept: PODIATRY | Facility: CLINIC | Age: 53
End: 2023-11-01
Payer: COMMERCIAL

## 2023-11-01 VITALS — HEIGHT: 64 IN | WEIGHT: 293 LBS | BODY MASS INDEX: 50.02 KG/M2

## 2023-11-01 DIAGNOSIS — E11.9 TYPE 2 DIABETES MELLITUS WITHOUT COMPLICATION, WITHOUT LONG-TERM CURRENT USE OF INSULIN: ICD-10-CM

## 2023-11-01 DIAGNOSIS — L60.0 INGROWN TOENAIL WITHOUT INFECTION: Primary | ICD-10-CM

## 2023-11-01 PROCEDURE — 4010F ACE/ARB THERAPY RXD/TAKEN: CPT | Mod: CPTII,S$GLB,, | Performed by: PODIATRIST

## 2023-11-01 PROCEDURE — 3008F PR BODY MASS INDEX (BMI) DOCUMENTED: ICD-10-PCS | Mod: CPTII,S$GLB,, | Performed by: PODIATRIST

## 2023-11-01 PROCEDURE — 3051F PR MOST RECENT HEMOGLOBIN A1C LEVEL 7.0 - < 8.0%: ICD-10-PCS | Mod: CPTII,S$GLB,, | Performed by: PODIATRIST

## 2023-11-01 PROCEDURE — 1159F PR MEDICATION LIST DOCUMENTED IN MEDICAL RECORD: ICD-10-PCS | Mod: CPTII,S$GLB,, | Performed by: PODIATRIST

## 2023-11-01 PROCEDURE — 11730 PR REMOVAL OF NAIL PLATE: ICD-10-PCS | Mod: TA,S$GLB,, | Performed by: PODIATRIST

## 2023-11-01 PROCEDURE — 11730 AVULSION NAIL PLATE SIMPLE 1: CPT | Mod: TA,S$GLB,, | Performed by: PODIATRIST

## 2023-11-01 PROCEDURE — 1160F RVW MEDS BY RX/DR IN RCRD: CPT | Mod: CPTII,S$GLB,, | Performed by: PODIATRIST

## 2023-11-01 PROCEDURE — 99213 OFFICE O/P EST LOW 20 MIN: CPT | Mod: 25,S$GLB,, | Performed by: PODIATRIST

## 2023-11-01 PROCEDURE — 3008F BODY MASS INDEX DOCD: CPT | Mod: CPTII,S$GLB,, | Performed by: PODIATRIST

## 2023-11-01 PROCEDURE — 3051F HG A1C>EQUAL 7.0%<8.0%: CPT | Mod: CPTII,S$GLB,, | Performed by: PODIATRIST

## 2023-11-01 PROCEDURE — 99213 PR OFFICE/OUTPT VISIT, EST, LEVL III, 20-29 MIN: ICD-10-PCS | Mod: 25,S$GLB,, | Performed by: PODIATRIST

## 2023-11-01 PROCEDURE — 1159F MED LIST DOCD IN RCRD: CPT | Mod: CPTII,S$GLB,, | Performed by: PODIATRIST

## 2023-11-01 PROCEDURE — 99999 PR PBB SHADOW E&M-EST. PATIENT-LVL IV: CPT | Mod: PBBFAC,,, | Performed by: PODIATRIST

## 2023-11-01 PROCEDURE — 1160F PR REVIEW ALL MEDS BY PRESCRIBER/CLIN PHARMACIST DOCUMENTED: ICD-10-PCS | Mod: CPTII,S$GLB,, | Performed by: PODIATRIST

## 2023-11-01 PROCEDURE — 4010F PR ACE/ARB THEARPY RXD/TAKEN: ICD-10-PCS | Mod: CPTII,S$GLB,, | Performed by: PODIATRIST

## 2023-11-01 PROCEDURE — 99999 PR PBB SHADOW E&M-EST. PATIENT-LVL IV: ICD-10-PCS | Mod: PBBFAC,,, | Performed by: PODIATRIST

## 2023-11-01 NOTE — PROGRESS NOTES
Subjective:     Patient ID: Shaneka Ahmadi is a 53 y.o. female.    Chief Complaint: Ingrown Toenail (Pt c/o right medial  ingrown nail pain 6/10, diabetic pt wears tennis shoes, PCP Dr. Wang last seen Hem/Onc Dr Mcfarlane on 10-26-23)    Shaneka is a 53 y.o. female who presents to the clinic upon referral from Dr. Trent ref. provider found  for evaluation and treatment of diabetic feet. Shaneka has a past medical history of Diabetes mellitus, Hypertension, Malignant neoplasm of lower lobe of left lung (12/9/2022), Rash, drug (7/6/2023), and Secondary malignant neoplasm of bone (12/5/2022). Patient relates no major problem with feet. Only complaints today consist of left hallux ingrown toenail. Patient points to left medial hallux. Patient rates pain 6/10. Patient has no other pedal complaints at this time. .    PCP: Corina Wang, NP    Date Last Seen by PCP: 10/26/2023    Current shoe gear: Tennis shoes    Hemoglobin A1C   Date Value Ref Range Status   02/16/2023 7.1 (H) 4.0 - 5.6 % Final     Comment:     ADA Screening Guidelines:  5.7-6.4%  Consistent with prediabetes  >or=6.5%  Consistent with diabetes    High levels of fetal hemoglobin interfere with the HbA1C  assay. Heterozygous hemoglobin variants (HbS, HgC, etc)do  not significantly interfere with this assay.   However, presence of multiple variants may affect accuracy.     07/28/2022 7.4 (H) 4.0 - 5.6 % Final     Comment:     ADA Screening Guidelines:  5.7-6.4%  Consistent with prediabetes  >or=6.5%  Consistent with diabetes    High levels of fetal hemoglobin interfere with the HbA1C  assay. Heterozygous hemoglobin variants (HbS, HgC, etc)do  not significantly interfere with this assay.   However, presence of multiple variants may affect accuracy.     04/08/2022 8.2 (H) 4.0 - 5.6 % Final     Comment:     ADA Screening Guidelines:  5.7-6.4%  Consistent with prediabetes  >or=6.5%  Consistent with diabetes    High levels of fetal hemoglobin interfere with the  HbA1C  assay. Heterozygous hemoglobin variants (HbS, HgC, etc)do  not significantly interfere with this assay.   However, presence of multiple variants may affect accuracy.           Patient Active Problem List   Diagnosis    Essential hypertension    Type 2 diabetes mellitus without complication, without long-term current use of insulin    History of COVID-19    Morbid obesity    Secondary malignant neoplasm of bone    Malignant neoplasm of lower lobe of left lung    Immunodeficiency due to chemotherapy    Hypokalemia       Medication List with Changes/Refills   Current Medications    ALBUTEROL (PROVENTIL/VENTOLIN HFA) 90 MCG/ACTUATION INHALER    Inhale 1-2 puffs into the lungs every 6 (six) hours as needed for Wheezing (cough).    AMLODIPINE (NORVASC) 10 MG TABLET    Take 1 tablet (10 mg total) by mouth once daily.    ATENOLOL (TENORMIN) 50 MG TABLET    Take 1 tab by mouth twice a day    BETAMETHASONE VALERATE 0.1% (VALISONE) 0.1 % OINT    APPLY TOPICALLY TO THE TOP OF THE FEET TWO TIMES A DAY    BLOOD SUGAR DIAGNOSTIC STRP    To check BG 2 times daily, to use with insurance preferred meter    CALCIUM CARBONATE (OS-BRUNILDA) 500 MG CALCIUM (1,250 MG) TABLET    Take 1 tablet (500 mg total) by mouth once daily.    CETIRIZINE (ZYRTEC) 10 MG TABLET    Take 1 tablet (10 mg total) by mouth every evening.    DESONIDE (DESOWEN) 0.05 % CREAM    APPLY TO AFFECTED AREA(S) TWO TIMES A DAY AS DIRECTED    DEXAMETHASONE (DECADRON) 4 MG TAB    Take 2 tablets (8 mg total) by mouth once daily. Take as directed on days 2, 3, and 16,17 of your chemotherapy cycle starting with cycle 2 forward. Do not take with cycle 1.    DEXAMETHASONE (DECADRON) 4 MG TAB    Take 1 tablet (4 mg total) by mouth As instructed. Take 8 mg the day prior to chemotherapy, and then 8 mg after chemo on days 2, 3, 4    FLUTICASONE PROPIONATE (FLONASE) 50 MCG/ACTUATION NASAL SPRAY    2 sprays (100 mcg total) by Each Nostril route once daily.    FOLIC ACID (FOLVITE) 1  MG TABLET    Take 1 tablet (1 mg total) by mouth once daily.    HYDROCHLOROTHIAZIDE (HYDRODIURIL) 25 MG TABLET    Take 1 tablet by mouth once daily.    HYDROCODONE-ACETAMINOPHEN (NORCO) 7.5-325 MG PER TABLET    Take 1 tablet by mouth every 4 (four) hours as needed for Pain.    HYDROCODONE-HOMATROPINE 5-1.5 MG/5 ML (HYCODAN) 5-1.5 MG/5 ML SYRP    Take 5 mL by mouth every 4 (four) hours as needed.    KETOCONAZOLE (NIZORAL) 2 % CREAM    1 application  once daily.    LANCETS MISC    To check BG 2 times daily, to use with insurance preferred meter    LISINOPRIL (PRINIVIL,ZESTRIL) 20 MG TABLET    Take 1 tablet by mouth once daily.    LOSARTAN-HYDROCHLOROTHIAZIDE 100-25 MG (HYZAAR) 100-25 MG PER TABLET    Take 1 tablet by mouth once daily.    MELOXICAM (MOBIC) 15 MG TABLET        METFORMIN (GLUCOPHAGE-XR) 500 MG ER 24HR TABLET    Take 1 tab with breakfast and take 2 tabs with dinner    MUPIROCIN (BACTROBAN) 2 % OINTMENT    APPLY TO AFFECTED AREA(S) THREE TIMES A DAY    ONDANSETRON (ZOFRAN-ODT) 8 MG TBDL    Take 1 tablet (8 mg total) by mouth every 8 (eight) hours as needed. Starting with cycle 1 of chemotherapy    ONDANSETRON (ZOFRAN-ODT) 8 MG TBDL    Take 1 tablet (8 mg total) by mouth every 8 (eight) hours as needed (nausea/vomitting).    POTASSIUM CHLORIDE SA (K-DUR,KLOR-CON M) 10 MEQ TABLET    Take 2 tablets (20 mEq total) by mouth once daily.    POTASSIUM CHLORIDE SA (K-DUR,KLOR-CON) 20 MEQ TABLET    Take 20 mEq by mouth.    PRAVASTATIN (PRAVACHOL) 10 MG TABLET    Take 1 tablet (10 mg total) by mouth every evening.    TRETINOIN (RETIN-A) 0.025 % CREAM    Apply topically every evening.    TRUE METRIX GLUCOSE METER MISC        TRUEPLUS LANCETS 33 GAUGE MISC    Apply topically.   Changed and/or Refilled Medications    Modified Medication Previous Medication    CLINDAMYCIN (CLEOCIN T) 1 % SWAB clindamycin (CLEOCIN T) 1 % Swab       APPLY TO AFFECTED AREA(S) TWO TIMES A DAY AS DIRECTED    Apply topically 2 (two) times  "daily.       Review of patient's allergies indicates:   Allergen Reactions    Lisinopril Other (See Comments)     coughing    Amoxicillin        Past Surgical History:   Procedure Laterality Date    CATARACT EXTRACTION W/  INTRAOCULAR LENS IMPLANT Right 06/02/2022    FLUOROSCOPY N/A 1/26/2023    Procedure: FLUOROSCOPY/mediport placement;  Surgeon: Marlon Leone MD;  Location: [facility] CATH LAB;  Service: General;  Laterality: N/A;    TUBAL LIGATION      2007--postpartum tubal ligation       Family History   Problem Relation Age of Onset    Heart failure Father        Social History     Socioeconomic History    Marital status:    Tobacco Use    Smoking status: Never     Passive exposure: Never    Smokeless tobacco: Never   Substance and Sexual Activity    Alcohol use: Never    Drug use: Never    Sexual activity: Yes     Partners: Male     Birth control/protection: None     Comment: tubal       Vitals:    11/01/23 1535   Weight: (!) 139.5 kg (307 lb 8.7 oz)   Height: 5' 4" (1.626 m)   PainSc:   6       Hemoglobin A1C   Date Value Ref Range Status   02/16/2023 7.1 (H) 4.0 - 5.6 % Final     Comment:     ADA Screening Guidelines:  5.7-6.4%  Consistent with prediabetes  >or=6.5%  Consistent with diabetes    High levels of fetal hemoglobin interfere with the HbA1C  assay. Heterozygous hemoglobin variants (HbS, HgC, etc)do  not significantly interfere with this assay.   However, presence of multiple variants may affect accuracy.     07/28/2022 7.4 (H) 4.0 - 5.6 % Final     Comment:     ADA Screening Guidelines:  5.7-6.4%  Consistent with prediabetes  >or=6.5%  Consistent with diabetes    High levels of fetal hemoglobin interfere with the HbA1C  assay. Heterozygous hemoglobin variants (HbS, HgC, etc)do  not significantly interfere with this assay.   However, presence of multiple variants may affect accuracy.     04/08/2022 8.2 (H) 4.0 - 5.6 % Final     Comment:     ADA Screening Guidelines:  5.7-6.4%  Consistent with " prediabetes  >or=6.5%  Consistent with diabetes    High levels of fetal hemoglobin interfere with the HbA1C  assay. Heterozygous hemoglobin variants (HbS, HgC, etc)do  not significantly interfere with this assay.   However, presence of multiple variants may affect accuracy.         Review of Systems   Constitutional:  Negative for chills and fever.   Respiratory:  Negative for shortness of breath.    Cardiovascular:  Negative for chest pain, palpitations, orthopnea, claudication and leg swelling.   Gastrointestinal:  Negative for diarrhea, nausea and vomiting.   Musculoskeletal:  Negative for joint pain.   Skin:  Negative for rash.   Neurological:  Negative for dizziness, tingling, sensory change, focal weakness and weakness.   Psychiatric/Behavioral: Negative.           Objective:      PHYSICAL EXAM: Apperance: Alert and orient in no distress,well developed, and with good attention to grooming and body habits  Lower Extremity Exam   VASCULAR: Dorsalis pedis pulses 2/4 left and Posterior Tibial pulses 2/4 left.   DERMATOLOGICAL: No skin rash, subcutaneous nodules, lesions or ulcers observed. Left hallux nail observed to be mildly incurvated at medial borders and slight obstructed in the nail grooves with soft tissue. No purulent drainage, no odor, and no increased temperature observed to left hallux.   NEUROLOGICAL: Light touch, sharp-dull, proprioception all present and equal bilaterally.    MUSCULOSKELETAL: Muscle strength 5/5 for all foot inverters, everters, plantarflexors, and  dorsiflexors bilateral. Pain on palpation of left hallux medial nail border. No pain on palpation of dorsal nail plate left hallux.         Assessment:       ICD-10-CM ICD-9-CM   1. Ingrown toenail without infection - Left Foot  L60.0 703.0   2. Type 2 diabetes mellitus without complication, without long-term current use of insulin  E11.9 250.00       Plan:   Ingrown toenail without infection - Left Foot    Type 2 diabetes mellitus  without complication, without long-term current use of insulin      I counseled the patient on her conditions, regarding findings of my examination, my impressions, and usual treatment plan.   Patient consented verbal and written to temporary partial nail avulsion of left hallux.  Procedure performed: A local digital blue was administered to the left hallux of  6cc of 1% Lidocaine plain. Attention was then directed to the left hallux to check for adequate anesthesia. A penrose drain tourniquet was applied to the base of the left hallux for hemostasis.  Attention was then directed the medial nail border to separate using a spatula the most proximal nail border at the eponychium was released to the area of the matrix. Then the border was released at the medial aspect and at the plantar aspect distally to proximally. Using the English anvil, a cut was then made approximately 3mm lateral to the medial nail fold in a longitudinal manner at the distal aspect extending to the proximal end of the nail plate. Using a straight hemostat, the free nail plate segment was clamped and removed. Using a tissue nipper, residual tissue was then removed. The nail groove area was inspected and probed proximally with a curette for any spicules. Next a 60 second application of phenol was applied to the medial nail border. The area was flushed with copious amounts of sterile normal saline. Betadine was applied to the area and dry sterile dressing of gauze and Coflex. The penrose drain tourniquet was released. Neurovascular status was assessed and noted to be intact. Patient tolerated procedure well.   The patient was given oral and written instructions for post-op care including BID soaks of water and Epson salt followed by application of antibiotic ointment  and light dressing.  The patient was instructed to take Tylenol over-the-counter q4h prn pain and to call clinic immediately if there is increased pain, increased redness, pus, fever,  chills, nausea, or vomiting present.  Patient to return 2 weeks or sooner if needed.        Janie Jacobs DPM  [facility]

## 2023-11-02 DIAGNOSIS — L70.8 ACNEIFORM RASH: ICD-10-CM

## 2023-11-02 RX ORDER — CLINDAMYCIN PHOSPHATE 10 MG/ML
SOLUTION TOPICAL
Qty: 60 EACH | Refills: 1 | Status: SHIPPED | OUTPATIENT
Start: 2023-11-02 | End: 2023-12-12 | Stop reason: SDUPTHER

## 2023-11-09 ENCOUNTER — OFFICE VISIT (OUTPATIENT)
Dept: HEMATOLOGY/ONCOLOGY | Facility: CLINIC | Age: 53
End: 2023-11-09
Payer: COMMERCIAL

## 2023-11-09 ENCOUNTER — LAB VISIT (OUTPATIENT)
Dept: LAB | Facility: HOSPITAL | Age: 53
End: 2023-11-09
Attending: INTERNAL MEDICINE
Payer: COMMERCIAL

## 2023-11-09 VITALS
TEMPERATURE: 98 F | HEIGHT: 64 IN | BODY MASS INDEX: 50.02 KG/M2 | DIASTOLIC BLOOD PRESSURE: 62 MMHG | HEART RATE: 64 BPM | WEIGHT: 293 LBS | SYSTOLIC BLOOD PRESSURE: 111 MMHG | OXYGEN SATURATION: 98 %

## 2023-11-09 DIAGNOSIS — T45.1X5A IMMUNODEFICIENCY DUE TO CHEMOTHERAPY: ICD-10-CM

## 2023-11-09 DIAGNOSIS — C34.32 MALIGNANT NEOPLASM OF LOWER LOBE OF LEFT LUNG: ICD-10-CM

## 2023-11-09 DIAGNOSIS — E66.01 MORBID OBESITY: ICD-10-CM

## 2023-11-09 DIAGNOSIS — C79.51 SECONDARY MALIGNANT NEOPLASM OF BONE: ICD-10-CM

## 2023-11-09 DIAGNOSIS — D84.821 IMMUNODEFICIENCY DUE TO CHEMOTHERAPY: ICD-10-CM

## 2023-11-09 DIAGNOSIS — Z79.899 IMMUNODEFICIENCY DUE TO CHEMOTHERAPY: ICD-10-CM

## 2023-11-09 LAB
ALBUMIN SERPL BCP-MCNC: 3 G/DL (ref 3.5–5.2)
ALP SERPL-CCNC: 103 U/L (ref 55–135)
ALT SERPL W/O P-5'-P-CCNC: 22 U/L (ref 10–44)
ANION GAP SERPL CALC-SCNC: 9 MMOL/L (ref 8–16)
AST SERPL-CCNC: 13 U/L (ref 10–40)
BASOPHILS # BLD AUTO: 0.03 K/UL (ref 0–0.2)
BASOPHILS NFR BLD: 0.5 % (ref 0–1.9)
BILIRUB SERPL-MCNC: 0.3 MG/DL (ref 0.1–1)
BUN SERPL-MCNC: 17 MG/DL (ref 6–20)
CALCIUM SERPL-MCNC: 8.5 MG/DL (ref 8.7–10.5)
CHLORIDE SERPL-SCNC: 105 MMOL/L (ref 95–110)
CO2 SERPL-SCNC: 28 MMOL/L (ref 23–29)
CREAT SERPL-MCNC: 0.9 MG/DL (ref 0.5–1.4)
DIFFERENTIAL METHOD: ABNORMAL
EOSINOPHIL # BLD AUTO: 0.3 K/UL (ref 0–0.5)
EOSINOPHIL NFR BLD: 3.9 % (ref 0–8)
ERYTHROCYTE [DISTWIDTH] IN BLOOD BY AUTOMATED COUNT: 13.2 % (ref 11.5–14.5)
EST. GFR  (NO RACE VARIABLE): >60 ML/MIN/1.73 M^2
GLUCOSE SERPL-MCNC: 158 MG/DL (ref 70–110)
HCT VFR BLD AUTO: 36.5 % (ref 37–48.5)
HGB BLD-MCNC: 12 G/DL (ref 12–16)
IMM GRANULOCYTES # BLD AUTO: 0.03 K/UL (ref 0–0.04)
IMM GRANULOCYTES NFR BLD AUTO: 0.5 % (ref 0–0.5)
LYMPHOCYTES # BLD AUTO: 1.9 K/UL (ref 1–4.8)
LYMPHOCYTES NFR BLD: 28.5 % (ref 18–48)
MCH RBC QN AUTO: 27.6 PG (ref 27–31)
MCHC RBC AUTO-ENTMCNC: 32.9 G/DL (ref 32–36)
MCV RBC AUTO: 84 FL (ref 82–98)
MONOCYTES # BLD AUTO: 0.4 K/UL (ref 0.3–1)
MONOCYTES NFR BLD: 5.3 % (ref 4–15)
NEUTROPHILS # BLD AUTO: 4.1 K/UL (ref 1.8–7.7)
NEUTROPHILS NFR BLD: 61.3 % (ref 38–73)
NRBC BLD-RTO: 0 /100 WBC
PLATELET # BLD AUTO: 122 K/UL (ref 150–450)
PLATELET BLD QL SMEAR: ABNORMAL
PMV BLD AUTO: 11.8 FL (ref 9.2–12.9)
POTASSIUM SERPL-SCNC: 3.8 MMOL/L (ref 3.5–5.1)
PROT SERPL-MCNC: 6.4 G/DL (ref 6–8.4)
RBC # BLD AUTO: 4.35 M/UL (ref 4–5.4)
SODIUM SERPL-SCNC: 142 MMOL/L (ref 136–145)
WBC # BLD AUTO: 6.63 K/UL (ref 3.9–12.7)

## 2023-11-09 PROCEDURE — 4010F PR ACE/ARB THEARPY RXD/TAKEN: ICD-10-PCS | Mod: CPTII,S$GLB,, | Performed by: NURSE PRACTITIONER

## 2023-11-09 PROCEDURE — 3078F DIAST BP <80 MM HG: CPT | Mod: CPTII,S$GLB,, | Performed by: NURSE PRACTITIONER

## 2023-11-09 PROCEDURE — 3078F PR MOST RECENT DIASTOLIC BLOOD PRESSURE < 80 MM HG: ICD-10-PCS | Mod: CPTII,S$GLB,, | Performed by: NURSE PRACTITIONER

## 2023-11-09 PROCEDURE — 80053 COMPREHEN METABOLIC PANEL: CPT | Performed by: INTERNAL MEDICINE

## 2023-11-09 PROCEDURE — 99999 PR PBB SHADOW E&M-EST. PATIENT-LVL V: ICD-10-PCS | Mod: PBBFAC,,, | Performed by: NURSE PRACTITIONER

## 2023-11-09 PROCEDURE — 4010F ACE/ARB THERAPY RXD/TAKEN: CPT | Mod: CPTII,S$GLB,, | Performed by: NURSE PRACTITIONER

## 2023-11-09 PROCEDURE — 3008F BODY MASS INDEX DOCD: CPT | Mod: CPTII,S$GLB,, | Performed by: NURSE PRACTITIONER

## 2023-11-09 PROCEDURE — 85025 COMPLETE CBC W/AUTO DIFF WBC: CPT | Performed by: INTERNAL MEDICINE

## 2023-11-09 PROCEDURE — 1159F PR MEDICATION LIST DOCUMENTED IN MEDICAL RECORD: ICD-10-PCS | Mod: CPTII,S$GLB,, | Performed by: NURSE PRACTITIONER

## 2023-11-09 PROCEDURE — 3074F PR MOST RECENT SYSTOLIC BLOOD PRESSURE < 130 MM HG: ICD-10-PCS | Mod: CPTII,S$GLB,, | Performed by: NURSE PRACTITIONER

## 2023-11-09 PROCEDURE — 3008F PR BODY MASS INDEX (BMI) DOCUMENTED: ICD-10-PCS | Mod: CPTII,S$GLB,, | Performed by: NURSE PRACTITIONER

## 2023-11-09 PROCEDURE — 99999 PR PBB SHADOW E&M-EST. PATIENT-LVL V: CPT | Mod: PBBFAC,,, | Performed by: NURSE PRACTITIONER

## 2023-11-09 PROCEDURE — 3074F SYST BP LT 130 MM HG: CPT | Mod: CPTII,S$GLB,, | Performed by: NURSE PRACTITIONER

## 2023-11-09 PROCEDURE — 3051F PR MOST RECENT HEMOGLOBIN A1C LEVEL 7.0 - < 8.0%: ICD-10-PCS | Mod: CPTII,S$GLB,, | Performed by: NURSE PRACTITIONER

## 2023-11-09 PROCEDURE — 3051F HG A1C>EQUAL 7.0%<8.0%: CPT | Mod: CPTII,S$GLB,, | Performed by: NURSE PRACTITIONER

## 2023-11-09 PROCEDURE — 99214 PR OFFICE/OUTPT VISIT, EST, LEVL IV, 30-39 MIN: ICD-10-PCS | Mod: 25,S$GLB,, | Performed by: NURSE PRACTITIONER

## 2023-11-09 PROCEDURE — 36415 COLL VENOUS BLD VENIPUNCTURE: CPT | Performed by: INTERNAL MEDICINE

## 2023-11-09 PROCEDURE — 1159F MED LIST DOCD IN RCRD: CPT | Mod: CPTII,S$GLB,, | Performed by: NURSE PRACTITIONER

## 2023-11-09 PROCEDURE — 99214 OFFICE O/P EST MOD 30 MIN: CPT | Mod: 25,S$GLB,, | Performed by: NURSE PRACTITIONER

## 2023-11-09 PROCEDURE — 1160F PR REVIEW ALL MEDS BY PRESCRIBER/CLIN PHARMACIST DOCUMENTED: ICD-10-PCS | Mod: CPTII,S$GLB,, | Performed by: NURSE PRACTITIONER

## 2023-11-09 PROCEDURE — 1160F RVW MEDS BY RX/DR IN RCRD: CPT | Mod: CPTII,S$GLB,, | Performed by: NURSE PRACTITIONER

## 2023-11-09 NOTE — ASSESSMENT & PLAN NOTE
Cancer Staging   Malignant neoplasm of lower lobe of left lung  Staging form: Lung, AJCC 8th Edition  - Clinical stage from 12/9/2022: Stage IV (cT2, cN2, cM1) - Signed by Reese Mcfarlane MD on 12/9/2022    Patient has continued on [employer] Q 2 weeks    Most recent CT c/a/p w/contrast 10/19/2023 with stable findings compared to prior CT 7/2023    Labs reviewed unremarkable. No concerning cytopenias    Proceed with Rybrevant in am as planned. F/u 2 weeks with decoupled labs. Next day Visit and chemo

## 2023-11-10 ENCOUNTER — INFUSION (OUTPATIENT)
Dept: INFUSION THERAPY | Facility: HOSPITAL | Age: 53
End: 2023-11-10
Attending: RADIOLOGY
Payer: COMMERCIAL

## 2023-11-10 VITALS
DIASTOLIC BLOOD PRESSURE: 61 MMHG | BODY MASS INDEX: 50.02 KG/M2 | HEART RATE: 59 BPM | OXYGEN SATURATION: 97 % | RESPIRATION RATE: 16 BRPM | TEMPERATURE: 97 F | HEIGHT: 64 IN | SYSTOLIC BLOOD PRESSURE: 105 MMHG | WEIGHT: 293 LBS

## 2023-11-10 DIAGNOSIS — C34.32 MALIGNANT NEOPLASM OF LOWER LOBE OF LEFT LUNG: Primary | ICD-10-CM

## 2023-11-10 PROCEDURE — 63600175 PHARM REV CODE 636 W HCPCS: Performed by: NURSE PRACTITIONER

## 2023-11-10 PROCEDURE — 96367 TX/PROPH/DG ADDL SEQ IV INF: CPT

## 2023-11-10 PROCEDURE — 96375 TX/PRO/DX INJ NEW DRUG ADDON: CPT

## 2023-11-10 PROCEDURE — 25000003 PHARM REV CODE 250: Performed by: NURSE PRACTITIONER

## 2023-11-10 PROCEDURE — 96415 CHEMO IV INFUSION ADDL HR: CPT

## 2023-11-10 PROCEDURE — 96413 CHEMO IV INFUSION 1 HR: CPT

## 2023-11-10 RX ORDER — EPINEPHRINE 0.3 MG/.3ML
0.3 INJECTION SUBCUTANEOUS ONCE AS NEEDED
Status: CANCELLED | OUTPATIENT
Start: 2023-11-10

## 2023-11-10 RX ORDER — SODIUM CHLORIDE 0.9 % (FLUSH) 0.9 %
10 SYRINGE (ML) INJECTION
Status: CANCELLED | OUTPATIENT
Start: 2023-11-10

## 2023-11-10 RX ORDER — ACETAMINOPHEN 325 MG/1
650 TABLET ORAL
Status: CANCELLED
Start: 2023-11-10

## 2023-11-10 RX ORDER — ACETAMINOPHEN 325 MG/1
650 TABLET ORAL
Status: COMPLETED | OUTPATIENT
Start: 2023-11-10 | End: 2023-11-10

## 2023-11-10 RX ORDER — HEPARIN 100 UNIT/ML
500 SYRINGE INTRAVENOUS
Status: CANCELLED | OUTPATIENT
Start: 2023-11-10

## 2023-11-10 RX ORDER — DIPHENHYDRAMINE HYDROCHLORIDE 50 MG/ML
25 INJECTION INTRAMUSCULAR; INTRAVENOUS
Status: CANCELLED
Start: 2023-11-10

## 2023-11-10 RX ORDER — DIPHENHYDRAMINE HYDROCHLORIDE 50 MG/ML
25 INJECTION INTRAMUSCULAR; INTRAVENOUS
Status: COMPLETED | OUTPATIENT
Start: 2023-11-10 | End: 2023-11-10

## 2023-11-10 RX ORDER — DIPHENHYDRAMINE HYDROCHLORIDE 50 MG/ML
50 INJECTION INTRAMUSCULAR; INTRAVENOUS ONCE AS NEEDED
Status: CANCELLED | OUTPATIENT
Start: 2023-11-10

## 2023-11-10 RX ORDER — HEPARIN 100 UNIT/ML
500 SYRINGE INTRAVENOUS
Status: DISCONTINUED | OUTPATIENT
Start: 2023-11-10 | End: 2023-11-10 | Stop reason: HOSPADM

## 2023-11-10 RX ADMIN — ACETAMINOPHEN 650 MG: 325 TABLET ORAL at 09:11

## 2023-11-10 RX ADMIN — SODIUM CHLORIDE: 9 INJECTION, SOLUTION INTRAVENOUS at 09:11

## 2023-11-10 RX ADMIN — HEPARIN 500 UNITS: 100 SYRINGE at 12:11

## 2023-11-10 RX ADMIN — DIPHENHYDRAMINE HYDROCHLORIDE 25 MG: 50 INJECTION, SOLUTION INTRAMUSCULAR; INTRAVENOUS at 09:11

## 2023-11-10 RX ADMIN — AMIVANTAMAB 1400 MG: 350 INJECTION INTRAVENOUS at 10:11

## 2023-11-10 RX ADMIN — DEXAMETHASONE SODIUM PHOSPHATE 0.25 MG: 4 INJECTION, SOLUTION INTRA-ARTICULAR; INTRALESIONAL; INTRAMUSCULAR; INTRAVENOUS; SOFT TISSUE at 09:11

## 2023-11-10 NOTE — DISCHARGE INSTRUCTIONS
.[facility]  [address] [address]  [address] [address]  [phone] phone     [phone] fax  Hours of Operation: Monday- Friday 8:00am- 5:00pm  After hours phone  [phone]  Hematology / Oncology Physicians on call    Dr. Denys Mendez           Nurse Practitioners:     Nanci Hawley, BRISA Caicedo, TATIANNA Islas, BRISA Kwon, BRISA Blackmon, NP    Please don't hesitate to call if you have any concerns.      FALL PREVENTION   Falls often occur due to slipping, tripping or losing your balance. Here are ways to reduce your risk of falling again.   Was there anything that caused your fall that can be fixed, removed or replaced?   Make your home safe by keeping walkways clear of objects you may trip over.   Use non-slip pads under rugs.   Do not walk in poorly lit areas.   Do not stand on chairs or wobbly ladders.   Use caution when reaching overhead or looking upward. This position can cause a loss of balance.   Be sure your shoes fit properly, have non-slip bottoms and are in good condition.   Be cautious when going up and down stairs, curbs, and when walking on uneven sidewalks.   If your balance is poor, consider using a cane or walker.   If your fall was related to alcohol use, stop or limit alcohol intake.   If your fall was related to use of sleeping medicines, talk to your doctor about this. You may need to reduce your dosage at bedtime if you awaken during the night to go to the bathroom.   To reduce the need for nighttime bathroom trips:   Avoid drinking fluids for several hours before going to bed   Empty your bladder before going to bed   Men can keep a urinal at the bedside   © 4299-0124 [employer] Thur[employer], [address] [place] [address], [place] [address]. All rights reserved. This information is not intended as a substitute for professional medical care. Always follow your healthcare  professional's instructions.    WAYS TO HELP PREVENT INFECTION        WASH YOUR HANDS OFTEN DURING THE DAY, ESPECIALLY BEFORE YOU EAT, AFTER USING THE BATHROOM, AND AFTER TOUCHING ANIMALS    STAY AWAY FROM PEOPLE WHO HAVE ILLNESSES YOU CAN CATCH; SUCH AS COLDS, FLU, CHICKEN POX    TRY TO AVOID CROWDS    STAY AWAY FROM CHILDREN WHO RECENTLY HAVE RECEIVED LIVE VIRUS VACCINES    MAINTAIN GOOD MOUTH CARE    DO NOT SQUEEZE OR SCRATCH PIMPLES    CLEAN CUTS & SCRAPES RIGHT AWAY AND DAILY UNTIL HEALED WITH WARM WATER, SOAP & AN ANTISEPTIC    AVOID CONTACT WITH LITTER BOXES, BIRD CAGES, & FISH TANKS    AVOID STANDING WATER, IE., BIRD BATHS, FLOWER POTS/VASES, OR HUMIDIFIERS    WEAR GLOVES WHEN GARDENING OR CLEANING UP AFTER OTHERS, ESPECIALLY BABIES & SMALL CHILDREN    DO NOT EAT RAW FISH, SEAFOOD, MEAT, OR EGGS

## 2023-11-10 NOTE — PLAN OF CARE
Problem: Adult Inpatient Plan of Care  Goal: Plan of Care Review  Outcome: Ongoing, Progressing  Flowsheets (Taken 11/10/2023 0943)  Plan of Care Reviewed With:   patient   spouse  Goal: Patient-Specific Goal (Individualized)  Outcome: Ongoing, Progressing  Flowsheets (Taken 11/10/2023 0943)  Anxieties, Fears or Concerns: No concerns expressed, patient in good spirits  Individualized Care Needs: Reclined, warm blanket and pillow provided. Ginger ale and straw given as well.  Goal: Optimal Comfort and Wellbeing  Outcome: Ongoing, Progressing  Intervention: Monitor Pain and Promote Comfort  Flowsheets (Taken 11/10/2023 0943)  Pain Management Interventions:   warm blanket provided   pillow support provided  Intervention: Provide Person-Centered Care  Flowsheets (Taken 11/10/2023 0943)  Trust Relationship/Rapport:   care explained   reassurance provided   choices provided   thoughts/feelings acknowledged   emotional support provided   empathic listening provided   questions answered   questions encouraged

## 2023-11-15 ENCOUNTER — OFFICE VISIT (OUTPATIENT)
Dept: PODIATRY | Facility: CLINIC | Age: 53
End: 2023-11-15
Payer: COMMERCIAL

## 2023-11-15 VITALS — BODY MASS INDEX: 50.02 KG/M2 | HEIGHT: 64 IN | WEIGHT: 293 LBS

## 2023-11-15 DIAGNOSIS — L60.0 INGROWN TOENAIL WITHOUT INFECTION: Primary | ICD-10-CM

## 2023-11-15 PROCEDURE — 3051F HG A1C>EQUAL 7.0%<8.0%: CPT | Mod: CPTII,S$GLB,, | Performed by: PODIATRIST

## 2023-11-15 PROCEDURE — 3008F BODY MASS INDEX DOCD: CPT | Mod: CPTII,S$GLB,, | Performed by: PODIATRIST

## 2023-11-15 PROCEDURE — 3051F PR MOST RECENT HEMOGLOBIN A1C LEVEL 7.0 - < 8.0%: ICD-10-PCS | Mod: CPTII,S$GLB,, | Performed by: PODIATRIST

## 2023-11-15 PROCEDURE — 1160F RVW MEDS BY RX/DR IN RCRD: CPT | Mod: CPTII,S$GLB,, | Performed by: PODIATRIST

## 2023-11-15 PROCEDURE — 99999 PR PBB SHADOW E&M-EST. PATIENT-LVL IV: CPT | Mod: PBBFAC,,, | Performed by: PODIATRIST

## 2023-11-15 PROCEDURE — 99213 OFFICE O/P EST LOW 20 MIN: CPT | Mod: S$GLB,,, | Performed by: PODIATRIST

## 2023-11-15 PROCEDURE — 4010F ACE/ARB THERAPY RXD/TAKEN: CPT | Mod: CPTII,S$GLB,, | Performed by: PODIATRIST

## 2023-11-15 PROCEDURE — 99213 PR OFFICE/OUTPT VISIT, EST, LEVL III, 20-29 MIN: ICD-10-PCS | Mod: S$GLB,,, | Performed by: PODIATRIST

## 2023-11-15 PROCEDURE — 1159F PR MEDICATION LIST DOCUMENTED IN MEDICAL RECORD: ICD-10-PCS | Mod: CPTII,S$GLB,, | Performed by: PODIATRIST

## 2023-11-15 PROCEDURE — 3008F PR BODY MASS INDEX (BMI) DOCUMENTED: ICD-10-PCS | Mod: CPTII,S$GLB,, | Performed by: PODIATRIST

## 2023-11-15 PROCEDURE — 1160F PR REVIEW ALL MEDS BY PRESCRIBER/CLIN PHARMACIST DOCUMENTED: ICD-10-PCS | Mod: CPTII,S$GLB,, | Performed by: PODIATRIST

## 2023-11-15 PROCEDURE — 99999 PR PBB SHADOW E&M-EST. PATIENT-LVL IV: ICD-10-PCS | Mod: PBBFAC,,, | Performed by: PODIATRIST

## 2023-11-15 PROCEDURE — 4010F PR ACE/ARB THEARPY RXD/TAKEN: ICD-10-PCS | Mod: CPTII,S$GLB,, | Performed by: PODIATRIST

## 2023-11-15 PROCEDURE — 1159F MED LIST DOCD IN RCRD: CPT | Mod: CPTII,S$GLB,, | Performed by: PODIATRIST

## 2023-11-15 NOTE — PROGRESS NOTES
Subjective:     Patient ID: Shaneka Ahmadi is a 53 y.o. female.    Chief Complaint: Ingrown Toenail (Left hallux ingrown toenail f/u, diabetic pt wears tennis shoes, PCP Dr. Wang last seen 10-26-23)    HPI: This 53 year old female returns to the clinic 2 weeks post nail procedure of left hallux.  Patient has no complaints of fever chills or sweats.  Patient denies pain.  Patient has been dressing as instructed.     Patient Active Problem List   Diagnosis    Essential hypertension    Type 2 diabetes mellitus without complication, without long-term current use of insulin    History of COVID-19    Morbid obesity    Secondary malignant neoplasm of bone    Malignant neoplasm of lower lobe of left lung    Immunodeficiency due to chemotherapy    Hypokalemia       Medication List with Changes/Refills   Current Medications    ALBUTEROL (PROVENTIL/VENTOLIN HFA) 90 MCG/ACTUATION INHALER    Inhale 1-2 puffs into the lungs every 6 (six) hours as needed for Wheezing (cough).    AMLODIPINE (NORVASC) 10 MG TABLET    Take 1 tablet (10 mg total) by mouth once daily.    ATENOLOL (TENORMIN) 50 MG TABLET    Take 1 tab by mouth twice a day    BETAMETHASONE VALERATE 0.1% (VALISONE) 0.1 % OINT    APPLY TOPICALLY TO THE TOP OF THE FEET TWO TIMES A DAY    BLOOD SUGAR DIAGNOSTIC STRP    To check BG 2 times daily, to use with insurance preferred meter    CALCIUM CARBONATE (OS-BRUNILDA) 500 MG CALCIUM (1,250 MG) TABLET    Take 1 tablet (500 mg total) by mouth once daily.    CETIRIZINE (ZYRTEC) 10 MG TABLET    Take 1 tablet (10 mg total) by mouth every evening.    CLINDAMYCIN (CLEOCIN T) 1 % SWAB    APPLY TO AFFECTED AREA(S) TWO TIMES A DAY AS DIRECTED    DESONIDE (DESOWEN) 0.05 % CREAM    APPLY TO AFFECTED AREA(S) TWO TIMES A DAY AS DIRECTED    DEXAMETHASONE (DECADRON) 4 MG TAB    Take 2 tablets (8 mg total) by mouth once daily. Take as directed on days 2, 3, and 16,17 of your chemotherapy cycle starting with cycle 2 forward. Do not take with cycle  1.    DEXAMETHASONE (DECADRON) 4 MG TAB    Take 1 tablet (4 mg total) by mouth As instructed. Take 8 mg the day prior to chemotherapy, and then 8 mg after chemo on days 2, 3, 4    FLUTICASONE PROPIONATE (FLONASE) 50 MCG/ACTUATION NASAL SPRAY    2 sprays (100 mcg total) by Each Nostril route once daily.    FOLIC ACID (FOLVITE) 1 MG TABLET    Take 1 tablet (1 mg total) by mouth once daily.    HYDROCHLOROTHIAZIDE (HYDRODIURIL) 25 MG TABLET    Take 1 tablet by mouth once daily.    HYDROCODONE-ACETAMINOPHEN (NORCO) 7.5-325 MG PER TABLET    Take 1 tablet by mouth every 4 (four) hours as needed for Pain.    HYDROCODONE-HOMATROPINE 5-1.5 MG/5 ML (HYCODAN) 5-1.5 MG/5 ML SYRP    Take 5 mL by mouth every 4 (four) hours as needed.    KETOCONAZOLE (NIZORAL) 2 % CREAM    1 application  once daily.    LANCETS MISC    To check BG 2 times daily, to use with insurance preferred meter    LISINOPRIL (PRINIVIL,ZESTRIL) 20 MG TABLET    Take 1 tablet by mouth once daily.    LOSARTAN-HYDROCHLOROTHIAZIDE 100-25 MG (HYZAAR) 100-25 MG PER TABLET    Take 1 tablet by mouth once daily.    MELOXICAM (MOBIC) 15 MG TABLET        METFORMIN (GLUCOPHAGE-XR) 500 MG ER 24HR TABLET    Take 1 tab with breakfast and take 2 tabs with dinner    MUPIROCIN (BACTROBAN) 2 % OINTMENT    APPLY TO AFFECTED AREA(S) THREE TIMES A DAY    ONDANSETRON (ZOFRAN-ODT) 8 MG TBDL    Take 1 tablet (8 mg total) by mouth every 8 (eight) hours as needed. Starting with cycle 1 of chemotherapy    ONDANSETRON (ZOFRAN-ODT) 8 MG TBDL    Take 1 tablet (8 mg total) by mouth every 8 (eight) hours as needed (nausea/vomitting).    POTASSIUM CHLORIDE SA (K-DUR,KLOR-CON M) 10 MEQ TABLET    Take 2 tablets (20 mEq total) by mouth once daily.    POTASSIUM CHLORIDE SA (K-DUR,KLOR-CON) 20 MEQ TABLET    Take 20 mEq by mouth.    PRAVASTATIN (PRAVACHOL) 10 MG TABLET    Take 1 tablet (10 mg total) by mouth every evening.    TRETINOIN (RETIN-A) 0.025 % CREAM    Apply topically every evening.    TRUE  "METRIX GLUCOSE METER MISC        TRUEPLUS LANCETS 33 GAUGE MISC    Apply topically.       Review of patient's allergies indicates:   Allergen Reactions    Lisinopril Other (See Comments)     coughing    Amoxicillin        Past Surgical History:   Procedure Laterality Date    CATARACT EXTRACTION W/  INTRAOCULAR LENS IMPLANT Right 06/02/2022    FLUOROSCOPY N/A 1/26/2023    Procedure: FLUOROSCOPY/mediport placement;  Surgeon: Marlon Leone MD;  Location: [facility] CATH LAB;  Service: General;  Laterality: N/A;    TUBAL LIGATION      2007--postpartum tubal ligation       Family History   Problem Relation Age of Onset    Heart failure Father        Social History     Socioeconomic History    Marital status:    Tobacco Use    Smoking status: Never     Passive exposure: Never    Smokeless tobacco: Never   Substance and Sexual Activity    Alcohol use: Never    Drug use: Never    Sexual activity: Yes     Partners: Male     Birth control/protection: None     Comment: tubal       Vitals:    11/15/23 1546   Weight: (!) 143 kg (315 lb 4.1 oz)   Height: 5' 4" (1.626 m)   PainSc: 0-No pain       ROS        Objective:      PHYSICAL EXAM: Apperance: Alert and orient in no distress,well developed, and with good attention to grooming and body habits  Lower Extremity Exam   VASCULAR: Dorsalis pedis pulses 2/4 left and Posterior Tibial pulses 2/4 left.  DERMATOLOGICAL: No skin rash, subcutaneous nodules, lesions or ulcers observed. Left hallux medial nail border observed to be clean with pink epithealized tissue noted. Minimal erythema noted. No purulent drainage, no odor, and no increased temperature observed to left hallux.   NEUROLOGICAL: Light touch, sharp-dull, proprioception all present and equal bilaterally.    MUSCULOSKELETAL: Muscle strength 5/5 for all foot inverters, everters, plantarflexors, and  dorsiflexors bilateral. No pain on palpation of left hallux medial nail border. No pain on palpation of dorsal nail plate left " hallux.         Assessment:       ICD-10-CM ICD-9-CM   1. Ingrown toenail without infection - Left Foot  L60.0 703.0       Plan:   Ingrown toenail without infection - Left Foot      I counseled the patient on her conditions, regarding findings of my examination, my impressions, and usual treatment plan.   Patient to continue local care until completely healed with no drainage and no redness.  Patient should call the clinic immediately if any signs of infection such as fever chills sweats increased redness or pain but was otherwise discharged.         Janie Jacobs DPM  [facility]

## 2023-11-22 ENCOUNTER — LAB VISIT (OUTPATIENT)
Dept: LAB | Facility: HOSPITAL | Age: 53
End: 2023-11-22
Attending: INTERNAL MEDICINE
Payer: COMMERCIAL

## 2023-11-22 DIAGNOSIS — C34.32 MALIGNANT NEOPLASM OF LOWER LOBE OF LEFT LUNG: ICD-10-CM

## 2023-11-22 DIAGNOSIS — E11.9 TYPE 2 DIABETES MELLITUS WITHOUT COMPLICATION: ICD-10-CM

## 2023-11-22 LAB
ALBUMIN SERPL BCP-MCNC: 2.8 G/DL (ref 3.5–5.2)
ALP SERPL-CCNC: 100 U/L (ref 55–135)
ALT SERPL W/O P-5'-P-CCNC: 20 U/L (ref 10–44)
ANION GAP SERPL CALC-SCNC: 11 MMOL/L (ref 8–16)
AST SERPL-CCNC: 13 U/L (ref 10–40)
BASOPHILS # BLD AUTO: 0.04 K/UL (ref 0–0.2)
BASOPHILS NFR BLD: 0.5 % (ref 0–1.9)
BILIRUB SERPL-MCNC: 0.5 MG/DL (ref 0.1–1)
BUN SERPL-MCNC: 16 MG/DL (ref 6–20)
CALCIUM SERPL-MCNC: 8.7 MG/DL (ref 8.7–10.5)
CHLORIDE SERPL-SCNC: 102 MMOL/L (ref 95–110)
CO2 SERPL-SCNC: 27 MMOL/L (ref 23–29)
CREAT SERPL-MCNC: 0.9 MG/DL (ref 0.5–1.4)
DIFFERENTIAL METHOD: ABNORMAL
EOSINOPHIL # BLD AUTO: 0.4 K/UL (ref 0–0.5)
EOSINOPHIL NFR BLD: 4.6 % (ref 0–8)
ERYTHROCYTE [DISTWIDTH] IN BLOOD BY AUTOMATED COUNT: 12.9 % (ref 11.5–14.5)
EST. GFR  (NO RACE VARIABLE): >60 ML/MIN/1.73 M^2
GLUCOSE SERPL-MCNC: 181 MG/DL (ref 70–110)
HCT VFR BLD AUTO: 36.1 % (ref 37–48.5)
HGB BLD-MCNC: 12.1 G/DL (ref 12–16)
IMM GRANULOCYTES # BLD AUTO: 0.02 K/UL (ref 0–0.04)
IMM GRANULOCYTES NFR BLD AUTO: 0.3 % (ref 0–0.5)
LYMPHOCYTES # BLD AUTO: 2.1 K/UL (ref 1–4.8)
LYMPHOCYTES NFR BLD: 27.8 % (ref 18–48)
MCH RBC QN AUTO: 27.9 PG (ref 27–31)
MCHC RBC AUTO-ENTMCNC: 33.5 G/DL (ref 32–36)
MCV RBC AUTO: 83 FL (ref 82–98)
MONOCYTES # BLD AUTO: 0.6 K/UL (ref 0.3–1)
MONOCYTES NFR BLD: 7.2 % (ref 4–15)
NEUTROPHILS # BLD AUTO: 4.6 K/UL (ref 1.8–7.7)
NEUTROPHILS NFR BLD: 59.6 % (ref 38–73)
NRBC BLD-RTO: 0 /100 WBC
PLATELET # BLD AUTO: 180 K/UL (ref 150–450)
PMV BLD AUTO: 12 FL (ref 9.2–12.9)
POTASSIUM SERPL-SCNC: 3.6 MMOL/L (ref 3.5–5.1)
PROT SERPL-MCNC: 6 G/DL (ref 6–8.4)
RBC # BLD AUTO: 4.34 M/UL (ref 4–5.4)
SODIUM SERPL-SCNC: 140 MMOL/L (ref 136–145)
WBC # BLD AUTO: 7.66 K/UL (ref 3.9–12.7)

## 2023-11-22 PROCEDURE — 85025 COMPLETE CBC W/AUTO DIFF WBC: CPT | Performed by: INTERNAL MEDICINE

## 2023-11-22 PROCEDURE — 80053 COMPREHEN METABOLIC PANEL: CPT | Performed by: INTERNAL MEDICINE

## 2023-11-22 PROCEDURE — 36415 COLL VENOUS BLD VENIPUNCTURE: CPT | Performed by: INTERNAL MEDICINE

## 2023-11-24 ENCOUNTER — OFFICE VISIT (OUTPATIENT)
Dept: HEMATOLOGY/ONCOLOGY | Facility: CLINIC | Age: 53
End: 2023-11-24
Payer: COMMERCIAL

## 2023-11-24 ENCOUNTER — INFUSION (OUTPATIENT)
Dept: INFUSION THERAPY | Facility: HOSPITAL | Age: 53
End: 2023-11-24
Attending: RADIOLOGY
Payer: COMMERCIAL

## 2023-11-24 VITALS
SYSTOLIC BLOOD PRESSURE: 107 MMHG | OXYGEN SATURATION: 99 % | TEMPERATURE: 97 F | HEIGHT: 64 IN | DIASTOLIC BLOOD PRESSURE: 58 MMHG | HEART RATE: 58 BPM | RESPIRATION RATE: 16 BRPM | BODY MASS INDEX: 50.02 KG/M2 | WEIGHT: 293 LBS

## 2023-11-24 VITALS
BODY MASS INDEX: 50.02 KG/M2 | HEIGHT: 64 IN | SYSTOLIC BLOOD PRESSURE: 105 MMHG | HEART RATE: 63 BPM | DIASTOLIC BLOOD PRESSURE: 54 MMHG | TEMPERATURE: 97 F | WEIGHT: 293 LBS

## 2023-11-24 DIAGNOSIS — C79.51 SECONDARY MALIGNANT NEOPLASM OF BONE: ICD-10-CM

## 2023-11-24 DIAGNOSIS — C34.32 MALIGNANT NEOPLASM OF LOWER LOBE OF LEFT LUNG: Primary | ICD-10-CM

## 2023-11-24 PROCEDURE — 3008F BODY MASS INDEX DOCD: CPT | Mod: CPTII,S$GLB,, | Performed by: NURSE PRACTITIONER

## 2023-11-24 PROCEDURE — 3074F PR MOST RECENT SYSTOLIC BLOOD PRESSURE < 130 MM HG: ICD-10-PCS | Mod: CPTII,S$GLB,, | Performed by: NURSE PRACTITIONER

## 2023-11-24 PROCEDURE — 3008F PR BODY MASS INDEX (BMI) DOCUMENTED: ICD-10-PCS | Mod: CPTII,S$GLB,, | Performed by: NURSE PRACTITIONER

## 2023-11-24 PROCEDURE — 4010F ACE/ARB THERAPY RXD/TAKEN: CPT | Mod: CPTII,S$GLB,, | Performed by: NURSE PRACTITIONER

## 2023-11-24 PROCEDURE — 3051F PR MOST RECENT HEMOGLOBIN A1C LEVEL 7.0 - < 8.0%: ICD-10-PCS | Mod: CPTII,S$GLB,, | Performed by: NURSE PRACTITIONER

## 2023-11-24 PROCEDURE — 96415 CHEMO IV INFUSION ADDL HR: CPT

## 2023-11-24 PROCEDURE — 96413 CHEMO IV INFUSION 1 HR: CPT

## 2023-11-24 PROCEDURE — 4010F PR ACE/ARB THEARPY RXD/TAKEN: ICD-10-PCS | Mod: CPTII,S$GLB,, | Performed by: NURSE PRACTITIONER

## 2023-11-24 PROCEDURE — 1160F RVW MEDS BY RX/DR IN RCRD: CPT | Mod: CPTII,S$GLB,, | Performed by: NURSE PRACTITIONER

## 2023-11-24 PROCEDURE — A4216 STERILE WATER/SALINE, 10 ML: HCPCS | Performed by: NURSE PRACTITIONER

## 2023-11-24 PROCEDURE — 99999 PR PBB SHADOW E&M-EST. PATIENT-LVL V: CPT | Mod: PBBFAC,,, | Performed by: NURSE PRACTITIONER

## 2023-11-24 PROCEDURE — 1159F MED LIST DOCD IN RCRD: CPT | Mod: CPTII,S$GLB,, | Performed by: NURSE PRACTITIONER

## 2023-11-24 PROCEDURE — 3078F PR MOST RECENT DIASTOLIC BLOOD PRESSURE < 80 MM HG: ICD-10-PCS | Mod: CPTII,S$GLB,, | Performed by: NURSE PRACTITIONER

## 2023-11-24 PROCEDURE — 96375 TX/PRO/DX INJ NEW DRUG ADDON: CPT

## 2023-11-24 PROCEDURE — 3051F HG A1C>EQUAL 7.0%<8.0%: CPT | Mod: CPTII,S$GLB,, | Performed by: NURSE PRACTITIONER

## 2023-11-24 PROCEDURE — 99215 PR OFFICE/OUTPT VISIT, EST, LEVL V, 40-54 MIN: ICD-10-PCS | Mod: 25,S$GLB,, | Performed by: NURSE PRACTITIONER

## 2023-11-24 PROCEDURE — 1160F PR REVIEW ALL MEDS BY PRESCRIBER/CLIN PHARMACIST DOCUMENTED: ICD-10-PCS | Mod: CPTII,S$GLB,, | Performed by: NURSE PRACTITIONER

## 2023-11-24 PROCEDURE — 25000003 PHARM REV CODE 250: Performed by: NURSE PRACTITIONER

## 2023-11-24 PROCEDURE — 3078F DIAST BP <80 MM HG: CPT | Mod: CPTII,S$GLB,, | Performed by: NURSE PRACTITIONER

## 2023-11-24 PROCEDURE — 96367 TX/PROPH/DG ADDL SEQ IV INF: CPT

## 2023-11-24 PROCEDURE — 99999 PR PBB SHADOW E&M-EST. PATIENT-LVL V: ICD-10-PCS | Mod: PBBFAC,,, | Performed by: NURSE PRACTITIONER

## 2023-11-24 PROCEDURE — 3074F SYST BP LT 130 MM HG: CPT | Mod: CPTII,S$GLB,, | Performed by: NURSE PRACTITIONER

## 2023-11-24 PROCEDURE — 1159F PR MEDICATION LIST DOCUMENTED IN MEDICAL RECORD: ICD-10-PCS | Mod: CPTII,S$GLB,, | Performed by: NURSE PRACTITIONER

## 2023-11-24 PROCEDURE — 63600175 PHARM REV CODE 636 W HCPCS: Mod: JZ,TB | Performed by: NURSE PRACTITIONER

## 2023-11-24 PROCEDURE — 99215 OFFICE O/P EST HI 40 MIN: CPT | Mod: 25,S$GLB,, | Performed by: NURSE PRACTITIONER

## 2023-11-24 RX ORDER — HEPARIN 100 UNIT/ML
500 SYRINGE INTRAVENOUS
Status: CANCELLED | OUTPATIENT
Start: 2023-11-24

## 2023-11-24 RX ORDER — DIPHENHYDRAMINE HYDROCHLORIDE 50 MG/ML
50 INJECTION INTRAMUSCULAR; INTRAVENOUS ONCE AS NEEDED
Status: CANCELLED | OUTPATIENT
Start: 2023-11-24

## 2023-11-24 RX ORDER — DIPHENHYDRAMINE HYDROCHLORIDE 50 MG/ML
25 INJECTION INTRAMUSCULAR; INTRAVENOUS
Status: COMPLETED | OUTPATIENT
Start: 2023-11-24 | End: 2023-11-24

## 2023-11-24 RX ORDER — DIPHENHYDRAMINE HYDROCHLORIDE 50 MG/ML
25 INJECTION INTRAMUSCULAR; INTRAVENOUS
Status: CANCELLED
Start: 2023-11-24

## 2023-11-24 RX ORDER — HEPARIN 100 UNIT/ML
500 SYRINGE INTRAVENOUS
Status: DISCONTINUED | OUTPATIENT
Start: 2023-11-24 | End: 2023-11-24 | Stop reason: HOSPADM

## 2023-11-24 RX ORDER — ACETAMINOPHEN 325 MG/1
650 TABLET ORAL
Status: COMPLETED | OUTPATIENT
Start: 2023-11-24 | End: 2023-11-24

## 2023-11-24 RX ORDER — SODIUM CHLORIDE 0.9 % (FLUSH) 0.9 %
10 SYRINGE (ML) INJECTION
Status: CANCELLED | OUTPATIENT
Start: 2023-11-24

## 2023-11-24 RX ORDER — SODIUM CHLORIDE 0.9 % (FLUSH) 0.9 %
10 SYRINGE (ML) INJECTION
Status: DISCONTINUED | OUTPATIENT
Start: 2023-11-24 | End: 2023-11-24 | Stop reason: HOSPADM

## 2023-11-24 RX ORDER — EPINEPHRINE 0.3 MG/.3ML
0.3 INJECTION SUBCUTANEOUS ONCE AS NEEDED
Status: CANCELLED | OUTPATIENT
Start: 2023-11-24

## 2023-11-24 RX ORDER — ACETAMINOPHEN 325 MG/1
650 TABLET ORAL
Status: CANCELLED
Start: 2023-11-24

## 2023-11-24 RX ADMIN — DEXAMETHASONE SODIUM PHOSPHATE 0.25 MG: 4 INJECTION, SOLUTION INTRA-ARTICULAR; INTRALESIONAL; INTRAMUSCULAR; INTRAVENOUS; SOFT TISSUE at 12:11

## 2023-11-24 RX ADMIN — HEPARIN 500 UNITS: 100 SYRINGE at 03:11

## 2023-11-24 RX ADMIN — DIPHENHYDRAMINE HYDROCHLORIDE 25 MG: 50 INJECTION, SOLUTION INTRAMUSCULAR; INTRAVENOUS at 12:11

## 2023-11-24 RX ADMIN — SODIUM CHLORIDE: 9 INJECTION, SOLUTION INTRAVENOUS at 12:11

## 2023-11-24 RX ADMIN — Medication 10 ML: at 03:11

## 2023-11-24 RX ADMIN — ACETAMINOPHEN 650 MG: 325 TABLET ORAL at 12:11

## 2023-11-24 RX ADMIN — AMIVANTAMAB 1400 MG: 350 INJECTION INTRAVENOUS at 01:11

## 2023-11-24 NOTE — PLAN OF CARE
Patient reports no complaints or concerns at this time. Tolerated infusion well with no adverse reactions. Patient to return in two weeks for next treatment.

## 2023-11-24 NOTE — PROGRESS NOTES
Subjective:       Patient ID: Shaneka Ahmadi is a 53 y.o. female.    Chief Complaint:   1. Malignant neoplasm of lower lobe of left lung  Stage IV (cT2, cN2, cM1)       2. Secondary malignant neoplasm of bone          Current Treatment:  OP NSCLC AMIVANTAMAB-VMJW (Rybrevant) Q4W     OP ZOLEDRONIC ACID (ZOMETA) Q4W   THERAPY SHELL - B12     Treatment History:   Carbo/Alimta started in 1/2023; stopped due to progression after 4 cycles    HPI: This is a 53 year old  woman with diabetes and HTN who is seen in Hem/Onc for metastatic non small cell lung cancer. She noted a cough and SOB; CXR and CT chest showed a left lung mass. Biopsy was positive for adenocarcinoma, EGFR mutated. Pleural fluid was also positive for malignancy. PET showed an avid left upper lobe mass, mediastinal nodes, bone mets in C1, T1, T11, L3, sacrum, L ischium, sternum. MRI of the brain was negative for intracranial metastases. She was started on Carbo/Alimta on 1/27/23. Restaging scans after 4 cycles found progressive disease.     She is currently on amivantamab (Rybrevant) which she receives on days 1 and 15 every 28 days.    Her primary Hematologist/Oncologist is Dr. Mcfarlane.    Interval History: Patient presents for follow up on Rybrevant; She is scheduled to receive C8D15 today. She presents alone and has no complaints today. She reports a good appetite and normal Bms. She states her normal BP is 101/93; today 105/54. CBC, CMP unremarkable. Will proceed with treatment and have her follow up in 2 weeks prior to C9D1.     Reviewed labs with patient:   CBC:   Recent Labs   Lab 11/22/23  0710   WBC 7.66   RBC 4.34   Hemoglobin 12.1   Hematocrit 36.1 L   Platelets 180   MCV 83   MCH 27.9   MCHC 33.5     CMP:  Recent Labs   Lab 11/22/23  0710   Glucose 181 H   Calcium 8.7   Albumin 2.8 L   Total Protein 6.0   Sodium 140   Potassium 3.6   CO2 27   Chloride 102   BUN 16   Creatinine 0.9   Alkaline Phosphatase 100   ALT 20   AST 13    Total Bilirubin 0.5       Social History     Socioeconomic History    Marital status:    Tobacco Use    Smoking status: Never     Passive exposure: Never    Smokeless tobacco: Never   Substance and Sexual Activity    Alcohol use: Never    Drug use: Never    Sexual activity: Yes     Partners: Male     Birth control/protection: None     Comment: tubal     Social Determinants of Health     Financial Resource Strain: Unknown (11/22/2023)    Overall Financial Resource Strain (CARDIA)     Difficulty of Paying Living Expenses: Patient refused   Food Insecurity: Unknown (11/22/2023)    Hunger Vital Sign     Worried About Running Out of Food in the Last Year: Patient refused     Ran Out of Food in the Last Year: Patient refused   Transportation Needs: Unknown (11/22/2023)    PRAPARE - Transportation     Lack of Transportation (Medical): Patient refused     Lack of Transportation (Non-Medical): Patient refused   Physical Activity: Unknown (11/22/2023)    Exercise Vital Sign     Days of Exercise per Week: 3 days   Stress: Unknown (11/22/2023)    [employer] - Occupational Stress Questionnaire     Feeling of Stress : Patient refused   Social Connections: Unknown (11/22/2023)    Social Connection and Isolation Panel [NHANES]     Frequency of Communication with Friends and Family: Patient refused     Frequency of Social Gatherings with Friends and Family: Patient refused     Active Member of Clubs or Organizations: Patient refused     Attends Club or Organization Meetings: Patient refused     Marital Status: Patient refused   Housing Stability: Unknown (11/22/2023)    Housing Stability Vital Sign     Unable to Pay for Housing in the Last Year: Patient refused     Unstable Housing in the Last Year: Patient refused     Past Medical History:   Diagnosis Date    Diabetes mellitus     Hypertension     Malignant neoplasm of lower lobe of left lung 12/9/2022    Rash, drug 7/6/2023    OP NSCLC  AMIVANTAMAB-VMJW (Rybrevant) Q4W      Secondary malignant neoplasm of bone 12/5/2022     Family History   Problem Relation Age of Onset    Heart failure Father      Past Surgical History:   Procedure Laterality Date    CATARACT EXTRACTION W/  INTRAOCULAR LENS IMPLANT Right 06/02/2022    FLUOROSCOPY N/A 1/26/2023    Procedure: FLUOROSCOPY/mediport placement;  Surgeon: Marlon Leone MD;  Location[facility] CATH LAB;  Service: General;  Laterality: N/A;    TUBAL LIGATION      2007--postpartum tubal ligation     Review of Systems   Constitutional:  Negative for appetite change and fatigue.   HENT:  Negative for mouth sores, rhinorrhea and sore throat.    Eyes: Negative.    Respiratory: Negative.     Cardiovascular: Negative.    Gastrointestinal:  Negative for constipation, diarrhea, nausea and vomiting.   Endocrine: Negative.    Genitourinary: Negative.    Musculoskeletal: Negative.    Integumentary:  Negative.   Allergic/Immunologic: Negative.    Neurological:  Negative for weakness and numbness.   Hematological: Negative.    Psychiatric/Behavioral: Negative.         Medication List with Changes/Refills   Current Medications    ALBUTEROL (PROVENTIL/VENTOLIN HFA) 90 MCG/ACTUATION INHALER    Inhale 1-2 puffs into the lungs every 6 (six) hours as needed for Wheezing (cough).    AMLODIPINE (NORVASC) 10 MG TABLET    Take 1 tablet (10 mg total) by mouth once daily.    ATENOLOL (TENORMIN) 50 MG TABLET    Take 1 tab by mouth twice a day    BETAMETHASONE VALERATE 0.1% (VALISONE) 0.1 % OINT    APPLY TOPICALLY TO THE TOP OF THE FEET TWO TIMES A DAY    BLOOD SUGAR DIAGNOSTIC STRP    To check BG 2 times daily, to use with insurance preferred meter    CALCIUM CARBONATE (OS-BRUNILDA) 500 MG CALCIUM (1,250 MG) TABLET    Take 1 tablet (500 mg total) by mouth once daily.    CETIRIZINE (ZYRTEC) 10 MG TABLET    Take 1 tablet (10 mg total) by mouth every evening.    CLINDAMYCIN (CLEOCIN T) 1 % SWAB    APPLY TO AFFECTED AREA(S) TWO TIMES A DAY AS  DIRECTED    DESONIDE (DESOWEN) 0.05 % CREAM    APPLY TO AFFECTED AREA(S) TWO TIMES A DAY AS DIRECTED    DEXAMETHASONE (DECADRON) 4 MG TAB    Take 2 tablets (8 mg total) by mouth once daily. Take as directed on days 2, 3, and 16,17 of your chemotherapy cycle starting with cycle 2 forward. Do not take with cycle 1.    DEXAMETHASONE (DECADRON) 4 MG TAB    Take 1 tablet (4 mg total) by mouth As instructed. Take 8 mg the day prior to chemotherapy, and then 8 mg after chemo on days 2, 3, 4    FLUTICASONE PROPIONATE (FLONASE) 50 MCG/ACTUATION NASAL SPRAY    2 sprays (100 mcg total) by Each Nostril route once daily.    FOLIC ACID (FOLVITE) 1 MG TABLET    Take 1 tablet (1 mg total) by mouth once daily.    HYDROCHLOROTHIAZIDE (HYDRODIURIL) 25 MG TABLET    Take 1 tablet by mouth once daily.    HYDROCODONE-ACETAMINOPHEN (NORCO) 7.5-325 MG PER TABLET    Take 1 tablet by mouth every 4 (four) hours as needed for Pain.    HYDROCODONE-HOMATROPINE 5-1.5 MG/5 ML (HYCODAN) 5-1.5 MG/5 ML SYRP    Take 5 mL by mouth every 4 (four) hours as needed.    KETOCONAZOLE (NIZORAL) 2 % CREAM    1 application  once daily.    LANCETS MISC    To check BG 2 times daily, to use with insurance preferred meter    LISINOPRIL (PRINIVIL,ZESTRIL) 20 MG TABLET    Take 1 tablet by mouth once daily.    LOSARTAN-HYDROCHLOROTHIAZIDE 100-25 MG (HYZAAR) 100-25 MG PER TABLET    Take 1 tablet by mouth once daily.    MELOXICAM (MOBIC) 15 MG TABLET        METFORMIN (GLUCOPHAGE-XR) 500 MG ER 24HR TABLET    Take 1 tab with breakfast and take 2 tabs with dinner    MUPIROCIN (BACTROBAN) 2 % OINTMENT    APPLY TO AFFECTED AREA(S) THREE TIMES A DAY    ONDANSETRON (ZOFRAN-ODT) 8 MG TBDL    Take 1 tablet (8 mg total) by mouth every 8 (eight) hours as needed. Starting with cycle 1 of chemotherapy    ONDANSETRON (ZOFRAN-ODT) 8 MG TBDL    Take 1 tablet (8 mg total) by mouth every 8 (eight) hours as needed (nausea/vomitting).    POTASSIUM CHLORIDE SA (K-DUR,KLOR-CON M) 10 MEQ  TABLET    Take 2 tablets (20 mEq total) by mouth once daily.    POTASSIUM CHLORIDE SA (K-DUR,KLOR-CON) 20 MEQ TABLET    Take 20 mEq by mouth.    PRAVASTATIN (PRAVACHOL) 10 MG TABLET    Take 1 tablet (10 mg total) by mouth every evening.    TRETINOIN (RETIN-A) 0.025 % CREAM    Apply topically every evening.    TRUE METRIX GLUCOSE METER MISC        TRUEPLUS LANCETS 33 GAUGE MISC    Apply topically.     Objective:     Vitals:    11/24/23 1157   BP: (!) 105/54   Pulse: 63   Temp: 97.4 °F (36.3 °C)     Physical Exam  Vitals reviewed.   Constitutional:       Appearance: Normal appearance. She is obese.   HENT:      Head: Normocephalic.      Mouth/Throat:      Comments:     Eyes:      Extraocular Movements: Extraocular movements intact.      Pupils: Pupils are equal, round, and reactive to light.   Cardiovascular:      Rate and Rhythm: Normal rate and regular rhythm.      Heart sounds: Normal heart sounds.   Pulmonary:      Effort: Pulmonary effort is normal.      Breath sounds: Normal breath sounds.   Abdominal:      General: Bowel sounds are normal.      Palpations: Abdomen is soft.      Comments: rounded     Genitourinary:     Comments: deferred    Musculoskeletal:         General: Normal range of motion.      Cervical back: Normal range of motion and neck supple.   Skin:     General: Skin is warm and dry.   Neurological:      Mental Status: She is alert and oriented to person, place, and time.   Psychiatric:         Behavior: Behavior normal.         Thought Content: Thought content normal.          (1) Restricted in physically strenuous activity, ambulatory and able to do work of light nature  Assessment:     Problem List Items Addressed This Visit          Oncology    Secondary malignant neoplasm of bone     Currently on Zometa every 4 weeks.         Malignant neoplasm of lower lobe of left lung - Primary     Currently on Rybrevant on days 1 & 15 every 28 days.          Plan:     Malignant neoplasm of lower lobe of  left lung    Secondary malignant neoplasm of bone    Labs reviewed; stable.   Ok to proceed with C8D15 of Rybrevant today.  Follow up in 2 weeks with CBC and Comprehensive Metabolic Panel prior to C9D1.  Patient to see Dr. Mcfarlane in 12/2023.     Route Chart for Scheduling    Med Onc Chart Routing      Follow up with physician 4 weeks. Dr. Mcfarlane   Follow up with DOLLY 2 weeks.   Infusion scheduling note    Injection scheduling note in 2 weeks for C9D1 Rybrevant   Labs CBC and CMP   Scheduling:  Preferred lab:  Lab interval:  in 2 weeks   Imaging None      Pharmacy appointment No pharmacy appointment needed      Other referrals       No additional referrals needed             I will review assessment/plan with collaborating physician.      MARCIO Pena

## 2023-11-24 NOTE — DISCHARGE INSTRUCTIONS
.[facility]  [address] [address]  [address]  [phone] phone     [phone] fax  Hours of Operation: Monday- Friday 8:00am- 5:00pm  After hours phone  [phone]  Hematology / Oncology Physicians on call    Dr. Denys Lynch      Nurse Practitioners:    Lorie Blackmon, BRISA Caicedo, BIRSA Monteiro, BRISA Kwon, BRISA Trujillo, PA      Please don't hesitate to call if you have any concerns.    .WAYS TO HELP PREVENT INFECTION        WASH YOUR HANDS OFTEN DURING THE DAY, ESPECIALLY BEFORE YOU EAT, AFTER USING THE BATHROOM, AND AFTER TOUCHING ANIMALS    STAY AWAY FROM PEOPLE WHO HAVE ILLNESSES YOU CAN CATCH; SUCH AS COLDS, FLU, CHICKEN POX    TRY TO AVOID CROWDS    STAY AWAY FROM CHILDREN WHO RECENTLY HAVE RECEIVED LIVE VIRUS VACCINES    MAINTAIN GOOD MOUTH CARE    DO NOT SQUEEZE OR SCRATCH PIMPLES    CLEAN CUTS & SCRAPES RIGHT AWAY AND DAILY UNTIL HEALED WITH WARM WATER, SOAP & AN ANTISEPTIC    AVOID CONTACT WITH LITTER BOXES, BIRD CAGES, & FISH TANKS    AVOID STANDING WATER, IE., BIRD BATHS, FLOWER POTS/VASES, OR HUMIDIFIERS    WEAR GLOVES WHEN GARDENING OR CLEANING UP AFTER OTHERS, ESPECIALLY BABIES & SMALL CHILDREN    DO NOT EAT RAW FISH, SEAFOOD, MEAT, OR EGGS     .FALL PREVENTION   Falls often occur due to slipping, tripping or losing your balance. Here are ways to reduce your risk of falling again.   Was there anything that caused your fall that can be fixed, removed or replaced?   Make your home safe by keeping walkways clear of objects you may trip over.   Use non-slip pads under rugs.   Do not walk in poorly lit areas.   Do not stand on chairs or wobbly ladders.   Use caution when reaching overhead or looking upward. This position can cause a loss of balance.   Be sure your shoes fit properly, have non-slip bottoms and are in good condition.   Be cautious when going up and down stairs, curbs,  and when walking on uneven sidewalks.   If your balance is poor, consider using a cane or walker.   If your fall was related to alcohol use, stop or limit alcohol intake.   If your fall was related to use of sleeping medicines, talk to your doctor about this. You may need to reduce your dosage at bedtime if you awaken during the night to go to the bathroom.   To reduce the need for nighttime bathroom trips:   Avoid drinking fluids for several hours before going to bed   Empty your bladder before going to bed   Men can keep a urinal at the bedside   © 4566-8015 [facility], [address] [place] [address]. All rights reserved. This information is not intended as a substitute for professional medical care. Always follow your healthcare professional's instructions.

## 2023-12-06 NOTE — PROGRESS NOTES
Subjective:       Patient ID: Shaneka Ahmadi is a 53 y.o. female.    Chief Complaint:   1. Malignant neoplasm of lower lobe of left lung  Stage IV (cT2, cN2, cM1)       2. Secondary malignant neoplasm of bone          Current Treatment:  OP NSCLC AMIVANTAMAB-VMJW (Rybrevant) Q4W     OP ZOLEDRONIC ACID (ZOMETA) Q4W   THERAPY SHELL - B12     Treatment History:   Carbo/Alimta started in 1/2023; stopped due to progression after 4 cycles    HPI: This is a 53 year old  woman with diabetes and HTN who is seen in Hem/Onc for metastatic non small cell lung cancer. She noted a cough and SOB; CXR and CT chest showed a left lung mass. Biopsy was positive for adenocarcinoma, EGFR mutated. Pleural fluid was also positive for malignancy. PET showed an avid left upper lobe mass, mediastinal nodes, bone mets in C1, T1, T11, L3, sacrum, L ischium, sternum. MRI of the brain was negative for intracranial metastases. She was started on Carbo/Alimta on 1/27/23. Restaging scans after 4 cycles found progressive disease.     She is currently on amivantamab (Rybrevant) which she receives on days 1 and 15 every 28 days.    Her primary Hematologist/Oncologist is Dr. Mcfarlane.    Interval History: Patient presents for follow up on Rybrevant; She is scheduled to receive C9D1 today. She presents alone at home and has no complaints today. She reports a good appetite and normal Bms. CBC reveals slight decline in Hgb; WBC, plts, ANC WNL. CMP reveals slight decline in K; remaining lytes WNL. No kidney or liver dysfunction. Will proceed with treatment today. She asks about B12 injections which she is supposed to receive monthly but has not received since 1/2023. No B12 levels on record; will check with next blood draw. She asks if she can take a B complex; advised her that she can. Has not started Zometa yet as she is planning to have a tooth extracted and 3 fillings placed.     Reviewed labs with patient:   CBC:   Recent Labs   Lab  12/07/23  1000   WBC 8.88   RBC 4.34   Hemoglobin 11.8 L   Hematocrit 36.4 L   Platelets 175   MCV 84   MCH 27.2   MCHC 32.4     CMP:  Recent Labs   Lab 12/07/23  1000   Glucose 198 H   Calcium 8.4 L   Albumin 2.8 L   Total Protein 6.0   Sodium 139   Potassium 3.4 L   CO2 29   Chloride 102   BUN 15   Creatinine 0.9   Alkaline Phosphatase 107   ALT 14   AST 12   Total Bilirubin 0.3     The patient location is: [place]  The chief complaint leading to consultation is: lung cancer    Visit type: audiovisual    Face to Face time with patient: 8 minutes  20 minutes of total time spent on the encounter, which includes face to face time and non-face to face time preparing to see the patient (eg, review of tests), Obtaining and/or reviewing separately obtained history, Documenting clinical information in the electronic or other health record, Independently interpreting results (not separately reported) and communicating results to the patient/family/caregiver, or Care coordination (not separately reported).     Each patient to whom he or she provides medical services by telemedicine is:  (1) informed of the relationship between the physician and patient and the respective role of any other health care provider with respect to management of the patient; and (2) notified that he or she may decline to receive medical services by telemedicine and may withdraw from such care at any time.    Social History     Socioeconomic History    Marital status:    Tobacco Use    Smoking status: Never     Passive exposure: Never    Smokeless tobacco: Never   Substance and Sexual Activity    Alcohol use: Never    Drug use: Never    Sexual activity: Yes     Partners: Male     Birth control/protection: None     Comment: tubal     Social Determinants of Health     Financial Resource Strain: Unknown (11/22/2023)    Overall Financial Resource Strain (CARDIA)     Difficulty of Paying Living Expenses: Patient refused   Food Insecurity:  Unknown (11/22/2023)    Hunger Vital Sign     Worried About Running Out of Food in the Last Year: Patient refused     Ran Out of Food in the Last Year: Patient refused   Transportation Needs: Unknown (11/22/2023)    PRAPARE - Transportation     Lack of Transportation (Medical): Patient refused     Lack of Transportation (Non-Medical): Patient refused   Physical Activity: Unknown (11/22/2023)    Exercise Vital Sign     Days of Exercise per Week: 3 days   Stress: Unknown (11/22/2023)    [employer] [place] [employer] - Occupational Stress Questionnaire     Feeling of Stress : Patient refused   Social Connections: Unknown (11/22/2023)    Social Connection and Isolation Panel [NHANES]     Frequency of Communication with Friends and Family: Patient refused     Frequency of Social Gatherings with Friends and Family: Patient refused     Active Member of Clubs or Organizations: Patient refused     Attends Club or Organization Meetings: Patient refused     Marital Status: Patient refused   Housing Stability: Unknown (11/22/2023)    Housing Stability Vital Sign     Unable to Pay for Housing in the Last Year: Patient refused     Unstable Housing in the Last Year: Patient refused     Past Medical History:   Diagnosis Date    Diabetes mellitus     Hypertension     Malignant neoplasm of lower lobe of left lung 12/9/2022    Rash, drug 7/6/2023    OP NSCLC AMIVANTAMAB-VMJW (Rybrevant) Q4W      Secondary malignant neoplasm of bone 12/5/2022     Family History   Problem Relation Age of Onset    Heart failure Father      Past Surgical History:   Procedure Laterality Date    CATARACT EXTRACTION W/  INTRAOCULAR LENS IMPLANT Right 06/02/2022    FLUOROSCOPY N/A 1/26/2023    Procedure: FLUOROSCOPY/mediport placement;  Surgeon: Marlon Leone MD;  Location: [facility] CATH LAB;  Service: General;  Laterality: N/A;    TUBAL LIGATION      2007--postpartum tubal ligation     Review of Systems   Constitutional:  Negative for appetite  change and fatigue.   HENT:  Negative for mouth sores, rhinorrhea and sore throat.    Eyes: Negative.    Respiratory: Negative.     Cardiovascular: Negative.    Gastrointestinal:  Negative for constipation, diarrhea, nausea and vomiting.   Endocrine: Negative.    Genitourinary: Negative.    Musculoskeletal: Negative.    Integumentary:  Negative.   Allergic/Immunologic: Negative.    Neurological:  Negative for weakness and numbness.   Hematological: Negative.    Psychiatric/Behavioral: Negative.         Medication List with Changes/Refills   Current Medications    ALBUTEROL (PROVENTIL/VENTOLIN HFA) 90 MCG/ACTUATION INHALER    Inhale 1-2 puffs into the lungs every 6 (six) hours as needed for Wheezing (cough).    AMLODIPINE (NORVASC) 10 MG TABLET    Take 1 tablet (10 mg total) by mouth once daily.    ATENOLOL (TENORMIN) 50 MG TABLET    Take 1 tab by mouth twice a day    BETAMETHASONE VALERATE 0.1% (VALISONE) 0.1 % OINT    APPLY TOPICALLY TO THE TOP OF THE FEET TWO TIMES A DAY    BLOOD SUGAR DIAGNOSTIC STRP    To check BG 2 times daily, to use with insurance preferred meter    CALCIUM CARBONATE (OS-BRUNILDA) 500 MG CALCIUM (1,250 MG) TABLET    Take 1 tablet (500 mg total) by mouth once daily.    CETIRIZINE (ZYRTEC) 10 MG TABLET    Take 1 tablet (10 mg total) by mouth every evening.    CLINDAMYCIN (CLEOCIN T) 1 % SWAB    APPLY TO AFFECTED AREA(S) TWO TIMES A DAY AS DIRECTED    DESONIDE (DESOWEN) 0.05 % CREAM    APPLY TO AFFECTED AREA(S) TWO TIMES A DAY AS DIRECTED    DEXAMETHASONE (DECADRON) 4 MG TAB    Take 2 tablets (8 mg total) by mouth once daily. Take as directed on days 2, 3, and 16,17 of your chemotherapy cycle starting with cycle 2 forward. Do not take with cycle 1.    DEXAMETHASONE (DECADRON) 4 MG TAB    Take 1 tablet (4 mg total) by mouth As instructed. Take 8 mg the day prior to chemotherapy, and then 8 mg after chemo on days 2, 3, 4    FLUTICASONE PROPIONATE (FLONASE) 50 MCG/ACTUATION NASAL SPRAY    2 sprays (100  mcg total) by Each Nostril route once daily.    FOLIC ACID (FOLVITE) 1 MG TABLET    Take 1 tablet (1 mg total) by mouth once daily.    HYDROCHLOROTHIAZIDE (HYDRODIURIL) 25 MG TABLET    Take 1 tablet by mouth once daily.    HYDROCODONE-ACETAMINOPHEN (NORCO) 7.5-325 MG PER TABLET    Take 1 tablet by mouth every 4 (four) hours as needed for Pain.    HYDROCODONE-HOMATROPINE 5-1.5 MG/5 ML (HYCODAN) 5-1.5 MG/5 ML SYRP    Take 5 mL by mouth every 4 (four) hours as needed.    KETOCONAZOLE (NIZORAL) 2 % CREAM    1 application  once daily.    LANCETS MISC    To check BG 2 times daily, to use with insurance preferred meter    LISINOPRIL (PRINIVIL,ZESTRIL) 20 MG TABLET    Take 1 tablet by mouth once daily.    LOSARTAN-HYDROCHLOROTHIAZIDE 100-25 MG (HYZAAR) 100-25 MG PER TABLET    Take 1 tablet by mouth once daily.    MELOXICAM (MOBIC) 15 MG TABLET        METFORMIN (GLUCOPHAGE-XR) 500 MG ER 24HR TABLET    Take 1 tab with breakfast and take 2 tabs with dinner    MUPIROCIN (BACTROBAN) 2 % OINTMENT    APPLY TO AFFECTED AREA(S) THREE TIMES A DAY    ONDANSETRON (ZOFRAN-ODT) 8 MG TBDL    Take 1 tablet (8 mg total) by mouth every 8 (eight) hours as needed. Starting with cycle 1 of chemotherapy    ONDANSETRON (ZOFRAN-ODT) 8 MG TBDL    Take 1 tablet (8 mg total) by mouth every 8 (eight) hours as needed (nausea/vomitting).    POTASSIUM CHLORIDE SA (K-DUR,KLOR-CON M) 10 MEQ TABLET    Take 2 tablets (20 mEq total) by mouth once daily.    POTASSIUM CHLORIDE SA (K-DUR,KLOR-CON) 20 MEQ TABLET    Take 20 mEq by mouth.    PRAVASTATIN (PRAVACHOL) 10 MG TABLET    Take 1 tablet (10 mg total) by mouth every evening.    TRETINOIN (RETIN-A) 0.025 % CREAM    Apply topically every evening.    TRUE METRIX GLUCOSE METER MISC        TRUEPLUS LANCETS 33 GAUGE MISC    Apply topically.     Objective:     There were no vitals filed for this visit.  Physical Exam     Unable to assess due to virtual visit.    (1) Restricted in physically strenuous activity,  ambulatory and able to do work of light nature  Assessment:     Problem List Items Addressed This Visit          Oncology    Secondary malignant neoplasm of bone    Malignant neoplasm of lower lobe of left lung - Primary     Currently on Rybrevant on days 1 & 15 every 28 days.          Plan:     Malignant neoplasm of lower lobe of left lung    Secondary malignant neoplasm of bone    Labs reviewed; stable.   Ok to proceed with C9D1 of Rybrevant today.  Follow up in 2 weeks with Dr. Mcfarlane with  B12, CBC, and Comprehensive Metabolic Panel prior to C9D15.    Route Chart for Scheduling    Med Onc Chart Routing      Follow up with physician 2 weeks. Dr. Mcfarlane   Follow up with DOLLY    Infusion scheduling note    Injection scheduling note in 2 weeks for C9D15 Rybrevant   Labs CBC, CMP and vitamin B12   Scheduling:  Preferred lab:  Lab interval:  in 2 weeks   Imaging None      Pharmacy appointment No pharmacy appointment needed      Other referrals       No additional referrals needed             I will review assessment/plan with collaborating physician.      MARCIO Pena

## 2023-12-07 ENCOUNTER — LAB VISIT (OUTPATIENT)
Dept: LAB | Facility: HOSPITAL | Age: 53
End: 2023-12-07
Attending: NURSE PRACTITIONER
Payer: COMMERCIAL

## 2023-12-07 DIAGNOSIS — C34.32 MALIGNANT NEOPLASM OF LOWER LOBE OF LEFT LUNG: ICD-10-CM

## 2023-12-07 DIAGNOSIS — C79.51 SECONDARY MALIGNANT NEOPLASM OF BONE: ICD-10-CM

## 2023-12-07 LAB
ALBUMIN SERPL BCP-MCNC: 2.8 G/DL (ref 3.5–5.2)
ALP SERPL-CCNC: 107 U/L (ref 55–135)
ALT SERPL W/O P-5'-P-CCNC: 14 U/L (ref 10–44)
ANION GAP SERPL CALC-SCNC: 8 MMOL/L (ref 8–16)
AST SERPL-CCNC: 12 U/L (ref 10–40)
BASOPHILS # BLD AUTO: 0.03 K/UL (ref 0–0.2)
BASOPHILS NFR BLD: 0.3 % (ref 0–1.9)
BILIRUB SERPL-MCNC: 0.3 MG/DL (ref 0.1–1)
BUN SERPL-MCNC: 15 MG/DL (ref 6–20)
CALCIUM SERPL-MCNC: 8.4 MG/DL (ref 8.7–10.5)
CHLORIDE SERPL-SCNC: 102 MMOL/L (ref 95–110)
CO2 SERPL-SCNC: 29 MMOL/L (ref 23–29)
CREAT SERPL-MCNC: 0.9 MG/DL (ref 0.5–1.4)
DIFFERENTIAL METHOD: ABNORMAL
EOSINOPHIL # BLD AUTO: 0.3 K/UL (ref 0–0.5)
EOSINOPHIL NFR BLD: 2.9 % (ref 0–8)
ERYTHROCYTE [DISTWIDTH] IN BLOOD BY AUTOMATED COUNT: 12.7 % (ref 11.5–14.5)
EST. GFR  (NO RACE VARIABLE): >60 ML/MIN/1.73 M^2
GLUCOSE SERPL-MCNC: 198 MG/DL (ref 70–110)
HCT VFR BLD AUTO: 36.4 % (ref 37–48.5)
HGB BLD-MCNC: 11.8 G/DL (ref 12–16)
IMM GRANULOCYTES # BLD AUTO: 0.03 K/UL (ref 0–0.04)
IMM GRANULOCYTES NFR BLD AUTO: 0.3 % (ref 0–0.5)
LYMPHOCYTES # BLD AUTO: 2.4 K/UL (ref 1–4.8)
LYMPHOCYTES NFR BLD: 27 % (ref 18–48)
MCH RBC QN AUTO: 27.2 PG (ref 27–31)
MCHC RBC AUTO-ENTMCNC: 32.4 G/DL (ref 32–36)
MCV RBC AUTO: 84 FL (ref 82–98)
MONOCYTES # BLD AUTO: 0.5 K/UL (ref 0.3–1)
MONOCYTES NFR BLD: 6.1 % (ref 4–15)
NEUTROPHILS # BLD AUTO: 5.6 K/UL (ref 1.8–7.7)
NEUTROPHILS NFR BLD: 63.4 % (ref 38–73)
NRBC BLD-RTO: 0 /100 WBC
PLATELET # BLD AUTO: 175 K/UL (ref 150–450)
PMV BLD AUTO: 11.4 FL (ref 9.2–12.9)
POTASSIUM SERPL-SCNC: 3.4 MMOL/L (ref 3.5–5.1)
PROT SERPL-MCNC: 6 G/DL (ref 6–8.4)
RBC # BLD AUTO: 4.34 M/UL (ref 4–5.4)
SODIUM SERPL-SCNC: 139 MMOL/L (ref 136–145)
WBC # BLD AUTO: 8.88 K/UL (ref 3.9–12.7)

## 2023-12-07 PROCEDURE — 80053 COMPREHEN METABOLIC PANEL: CPT | Performed by: NURSE PRACTITIONER

## 2023-12-07 PROCEDURE — 85025 COMPLETE CBC W/AUTO DIFF WBC: CPT | Performed by: NURSE PRACTITIONER

## 2023-12-07 PROCEDURE — 36415 COLL VENOUS BLD VENIPUNCTURE: CPT | Performed by: NURSE PRACTITIONER

## 2023-12-08 ENCOUNTER — OFFICE VISIT (OUTPATIENT)
Dept: HEMATOLOGY/ONCOLOGY | Facility: CLINIC | Age: 53
End: 2023-12-08
Payer: COMMERCIAL

## 2023-12-08 ENCOUNTER — INFUSION (OUTPATIENT)
Dept: INFUSION THERAPY | Facility: HOSPITAL | Age: 53
End: 2023-12-08
Attending: RADIOLOGY
Payer: COMMERCIAL

## 2023-12-08 VITALS
BODY MASS INDEX: 50.02 KG/M2 | DIASTOLIC BLOOD PRESSURE: 61 MMHG | SYSTOLIC BLOOD PRESSURE: 106 MMHG | TEMPERATURE: 98 F | RESPIRATION RATE: 18 BRPM | WEIGHT: 293 LBS | OXYGEN SATURATION: 95 % | HEART RATE: 54 BPM | HEIGHT: 64 IN

## 2023-12-08 DIAGNOSIS — C34.32 MALIGNANT NEOPLASM OF LOWER LOBE OF LEFT LUNG: Primary | ICD-10-CM

## 2023-12-08 DIAGNOSIS — C79.51 SECONDARY MALIGNANT NEOPLASM OF BONE: ICD-10-CM

## 2023-12-08 PROCEDURE — 4010F ACE/ARB THERAPY RXD/TAKEN: CPT | Mod: CPTII,95,, | Performed by: NURSE PRACTITIONER

## 2023-12-08 PROCEDURE — 4010F PR ACE/ARB THEARPY RXD/TAKEN: ICD-10-PCS | Mod: CPTII,95,, | Performed by: NURSE PRACTITIONER

## 2023-12-08 PROCEDURE — 96375 TX/PRO/DX INJ NEW DRUG ADDON: CPT

## 2023-12-08 PROCEDURE — 63600175 PHARM REV CODE 636 W HCPCS: Performed by: NURSE PRACTITIONER

## 2023-12-08 PROCEDURE — 3051F PR MOST RECENT HEMOGLOBIN A1C LEVEL 7.0 - < 8.0%: ICD-10-PCS | Mod: CPTII,95,, | Performed by: NURSE PRACTITIONER

## 2023-12-08 PROCEDURE — 96415 CHEMO IV INFUSION ADDL HR: CPT

## 2023-12-08 PROCEDURE — 1159F PR MEDICATION LIST DOCUMENTED IN MEDICAL RECORD: ICD-10-PCS | Mod: CPTII,95,, | Performed by: NURSE PRACTITIONER

## 2023-12-08 PROCEDURE — 25000003 PHARM REV CODE 250: Performed by: NURSE PRACTITIONER

## 2023-12-08 PROCEDURE — 1160F PR REVIEW ALL MEDS BY PRESCRIBER/CLIN PHARMACIST DOCUMENTED: ICD-10-PCS | Mod: CPTII,95,, | Performed by: NURSE PRACTITIONER

## 2023-12-08 PROCEDURE — 3051F HG A1C>EQUAL 7.0%<8.0%: CPT | Mod: CPTII,95,, | Performed by: NURSE PRACTITIONER

## 2023-12-08 PROCEDURE — 96367 TX/PROPH/DG ADDL SEQ IV INF: CPT

## 2023-12-08 PROCEDURE — 1159F MED LIST DOCD IN RCRD: CPT | Mod: CPTII,95,, | Performed by: NURSE PRACTITIONER

## 2023-12-08 PROCEDURE — 1160F RVW MEDS BY RX/DR IN RCRD: CPT | Mod: CPTII,95,, | Performed by: NURSE PRACTITIONER

## 2023-12-08 PROCEDURE — 99215 OFFICE O/P EST HI 40 MIN: CPT | Mod: 25,95,, | Performed by: NURSE PRACTITIONER

## 2023-12-08 PROCEDURE — 96413 CHEMO IV INFUSION 1 HR: CPT

## 2023-12-08 PROCEDURE — 99215 PR OFFICE/OUTPT VISIT, EST, LEVL V, 40-54 MIN: ICD-10-PCS | Mod: 25,95,, | Performed by: NURSE PRACTITIONER

## 2023-12-08 RX ORDER — DIPHENHYDRAMINE HYDROCHLORIDE 50 MG/ML
50 INJECTION INTRAMUSCULAR; INTRAVENOUS ONCE AS NEEDED
Status: CANCELLED | OUTPATIENT
Start: 2023-12-22

## 2023-12-08 RX ORDER — DIPHENHYDRAMINE HYDROCHLORIDE 50 MG/ML
50 INJECTION INTRAMUSCULAR; INTRAVENOUS ONCE AS NEEDED
Status: CANCELLED | OUTPATIENT
Start: 2023-12-08

## 2023-12-08 RX ORDER — ACETAMINOPHEN 325 MG/1
650 TABLET ORAL
Status: COMPLETED | OUTPATIENT
Start: 2023-12-08 | End: 2023-12-08

## 2023-12-08 RX ORDER — DIPHENHYDRAMINE HYDROCHLORIDE 50 MG/ML
25 INJECTION INTRAMUSCULAR; INTRAVENOUS
Status: CANCELLED
Start: 2023-12-08

## 2023-12-08 RX ORDER — DIPHENHYDRAMINE HYDROCHLORIDE 50 MG/ML
25 INJECTION INTRAMUSCULAR; INTRAVENOUS
Status: CANCELLED
Start: 2023-12-22

## 2023-12-08 RX ORDER — DIPHENHYDRAMINE HYDROCHLORIDE 50 MG/ML
25 INJECTION INTRAMUSCULAR; INTRAVENOUS
Status: COMPLETED | OUTPATIENT
Start: 2023-12-08 | End: 2023-12-08

## 2023-12-08 RX ORDER — SODIUM CHLORIDE 0.9 % (FLUSH) 0.9 %
10 SYRINGE (ML) INJECTION
Status: CANCELLED | OUTPATIENT
Start: 2023-12-08

## 2023-12-08 RX ORDER — EPINEPHRINE 0.3 MG/.3ML
0.3 INJECTION SUBCUTANEOUS ONCE AS NEEDED
Status: CANCELLED | OUTPATIENT
Start: 2023-12-22

## 2023-12-08 RX ORDER — HEPARIN 100 UNIT/ML
500 SYRINGE INTRAVENOUS
Status: CANCELLED | OUTPATIENT
Start: 2023-12-22

## 2023-12-08 RX ORDER — HEPARIN 100 UNIT/ML
500 SYRINGE INTRAVENOUS
Status: CANCELLED | OUTPATIENT
Start: 2023-12-08

## 2023-12-08 RX ORDER — HEPARIN 100 UNIT/ML
500 SYRINGE INTRAVENOUS
Status: DISCONTINUED | OUTPATIENT
Start: 2023-12-08 | End: 2023-12-08 | Stop reason: HOSPADM

## 2023-12-08 RX ORDER — EPINEPHRINE 0.3 MG/.3ML
0.3 INJECTION SUBCUTANEOUS ONCE AS NEEDED
Status: CANCELLED | OUTPATIENT
Start: 2023-12-08

## 2023-12-08 RX ORDER — SODIUM CHLORIDE 0.9 % (FLUSH) 0.9 %
10 SYRINGE (ML) INJECTION
Status: CANCELLED | OUTPATIENT
Start: 2023-12-22

## 2023-12-08 RX ORDER — ACETAMINOPHEN 325 MG/1
650 TABLET ORAL
Status: CANCELLED
Start: 2023-12-22

## 2023-12-08 RX ORDER — ACETAMINOPHEN 325 MG/1
650 TABLET ORAL
Status: CANCELLED
Start: 2023-12-08

## 2023-12-08 RX ADMIN — ACETAMINOPHEN 650 MG: 325 TABLET ORAL at 10:12

## 2023-12-08 RX ADMIN — HEPARIN 500 UNITS: 100 SYRINGE at 12:12

## 2023-12-08 RX ADMIN — SODIUM CHLORIDE: 9 INJECTION, SOLUTION INTRAVENOUS at 10:12

## 2023-12-08 RX ADMIN — AMIVANTAMAB 1400 MG: 350 INJECTION INTRAVENOUS at 10:12

## 2023-12-08 RX ADMIN — DIPHENHYDRAMINE HYDROCHLORIDE 25 MG: 50 INJECTION, SOLUTION INTRAMUSCULAR; INTRAVENOUS at 10:12

## 2023-12-08 RX ADMIN — DEXAMETHASONE SODIUM PHOSPHATE 0.25 MG: 4 INJECTION, SOLUTION INTRA-ARTICULAR; INTRALESIONAL; INTRAMUSCULAR; INTRAVENOUS; SOFT TISSUE at 10:12

## 2023-12-08 NOTE — DISCHARGE INSTRUCTIONS
.[facility]  [address] [address]  [address]  [phone] phone     [phone] fax  Hours of Operation: Monday- Friday 8:00am- 5:00pm  After hours phone  [phone]  Hematology / Oncology Physicians on call    Dr. Denys Mendez           Nurse Practitioners:     Nanci Hawley, BRISA Caicedo, TATIANNA Islas, BRISA Kwon, BRISA Blackmon, NP    Please don't hesitate to call if you have any concerns.      FALL PREVENTION   Falls often occur due to slipping, tripping or losing your balance. Here are ways to reduce your risk of falling again.   Was there anything that caused your fall that can be fixed, removed or replaced?   Make your home safe by keeping walkways clear of objects you may trip over.   Use non-slip pads under rugs.   Do not walk in poorly lit areas.   Do not stand on chairs or wobbly ladders.   Use caution when reaching overhead or looking upward. This position can cause a loss of balance.   Be sure your shoes fit properly, have non-slip bottoms and are in good condition.   Be cautious when going up and down stairs, curbs, and when walking on uneven sidewalks.   If your balance is poor, consider using a cane or walker.   If your fall was related to alcohol use, stop or limit alcohol intake.   If your fall was related to use of sleeping medicines, talk to your doctor about this. You may need to reduce your dosage at bedtime if you awaken during the night to go to the bathroom.   To reduce the need for nighttime bathroom trips:   Avoid drinking fluids for several hours before going to bed   Empty your bladder before going to bed   Men can keep a urinal at the bedside   © 6198-7325 Stephanie Thurston, [address] [place] [address], [place] [address]. All rights reserved. This information is not intended as a substitute for professional medical care. Always follow your healthcare  professional's instructions.    WAYS TO HELP PREVENT INFECTION        WASH YOUR HANDS OFTEN DURING THE DAY, ESPECIALLY BEFORE YOU EAT, AFTER USING THE BATHROOM, AND AFTER TOUCHING ANIMALS    STAY AWAY FROM PEOPLE WHO HAVE ILLNESSES YOU CAN CATCH; SUCH AS COLDS, FLU, CHICKEN POX    TRY TO AVOID CROWDS    STAY AWAY FROM CHILDREN WHO RECENTLY HAVE RECEIVED LIVE VIRUS VACCINES    MAINTAIN GOOD MOUTH CARE    DO NOT SQUEEZE OR SCRATCH PIMPLES    CLEAN CUTS & SCRAPES RIGHT AWAY AND DAILY UNTIL HEALED WITH WARM WATER, SOAP & AN ANTISEPTIC    AVOID CONTACT WITH LITTER BOXES, BIRD CAGES, & FISH TANKS    AVOID STANDING WATER, IE., BIRD BATHS, FLOWER POTS/VASES, OR HUMIDIFIERS    WEAR GLOVES WHEN GARDENING OR CLEANING UP AFTER OTHERS, ESPECIALLY BABIES & SMALL CHILDREN    DO NOT EAT RAW FISH, SEAFOOD, MEAT, OR EGGS

## 2023-12-08 NOTE — PLAN OF CARE
Problem: Adult Inpatient Plan of Care  Goal: Plan of Care Review  Outcome: Ongoing, Progressing  Flowsheets (Taken 12/8/2023 1030)  Plan of Care Reviewed With:   patient   spouse  Goal: Patient-Specific Goal (Individualized)  Outcome: Ongoing, Progressing  Flowsheets (Taken 12/8/2023 1030)  Anxieties, Fears or Concerns: Patient in good spiritis today, no complaints or concerns.  Individualized Care Needs: Reclined chair, warm blabkets and pillow provided.  Goal: Optimal Comfort and Wellbeing  Outcome: Ongoing, Progressing  Intervention: Monitor Pain and Promote Comfort  Flowsheets (Taken 12/8/2023 1030)  Pain Management Interventions:   warm blanket provided   quiet environment facilitated  Intervention: Provide Person-Centered Care  Flowsheets (Taken 12/8/2023 1030)  Trust Relationship/Rapport:   care explained   reassurance provided   thoughts/feelings acknowledged   emotional support provided   choices provided   empathic listening provided   questions answered   questions encouraged

## 2023-12-12 ENCOUNTER — OFFICE VISIT (OUTPATIENT)
Dept: DERMATOLOGY | Facility: CLINIC | Age: 53
End: 2023-12-12
Payer: COMMERCIAL

## 2023-12-12 ENCOUNTER — PATIENT OUTREACH (OUTPATIENT)
Dept: ADMINISTRATIVE | Facility: HOSPITAL | Age: 53
End: 2023-12-12
Payer: COMMERCIAL

## 2023-12-12 DIAGNOSIS — L70.8 ACNEIFORM RASH: ICD-10-CM

## 2023-12-12 PROCEDURE — 4010F PR ACE/ARB THEARPY RXD/TAKEN: ICD-10-PCS | Mod: CPTII,S$GLB,, | Performed by: STUDENT IN AN ORGANIZED HEALTH CARE EDUCATION/TRAINING PROGRAM

## 2023-12-12 PROCEDURE — 4010F ACE/ARB THERAPY RXD/TAKEN: CPT | Mod: CPTII,S$GLB,, | Performed by: STUDENT IN AN ORGANIZED HEALTH CARE EDUCATION/TRAINING PROGRAM

## 2023-12-12 PROCEDURE — 99999 PR PBB SHADOW E&M-EST. PATIENT-LVL II: CPT | Mod: PBBFAC,,, | Performed by: STUDENT IN AN ORGANIZED HEALTH CARE EDUCATION/TRAINING PROGRAM

## 2023-12-12 PROCEDURE — 99213 PR OFFICE/OUTPT VISIT, EST, LEVL III, 20-29 MIN: ICD-10-PCS | Mod: S$GLB,,, | Performed by: STUDENT IN AN ORGANIZED HEALTH CARE EDUCATION/TRAINING PROGRAM

## 2023-12-12 PROCEDURE — 99213 OFFICE O/P EST LOW 20 MIN: CPT | Mod: S$GLB,,, | Performed by: STUDENT IN AN ORGANIZED HEALTH CARE EDUCATION/TRAINING PROGRAM

## 2023-12-12 PROCEDURE — 1160F RVW MEDS BY RX/DR IN RCRD: CPT | Mod: CPTII,S$GLB,, | Performed by: STUDENT IN AN ORGANIZED HEALTH CARE EDUCATION/TRAINING PROGRAM

## 2023-12-12 PROCEDURE — 1159F PR MEDICATION LIST DOCUMENTED IN MEDICAL RECORD: ICD-10-PCS | Mod: CPTII,S$GLB,, | Performed by: STUDENT IN AN ORGANIZED HEALTH CARE EDUCATION/TRAINING PROGRAM

## 2023-12-12 PROCEDURE — 3051F HG A1C>EQUAL 7.0%<8.0%: CPT | Mod: CPTII,S$GLB,, | Performed by: STUDENT IN AN ORGANIZED HEALTH CARE EDUCATION/TRAINING PROGRAM

## 2023-12-12 PROCEDURE — 99999 PR PBB SHADOW E&M-EST. PATIENT-LVL II: ICD-10-PCS | Mod: PBBFAC,,, | Performed by: STUDENT IN AN ORGANIZED HEALTH CARE EDUCATION/TRAINING PROGRAM

## 2023-12-12 PROCEDURE — 1159F MED LIST DOCD IN RCRD: CPT | Mod: CPTII,S$GLB,, | Performed by: STUDENT IN AN ORGANIZED HEALTH CARE EDUCATION/TRAINING PROGRAM

## 2023-12-12 PROCEDURE — 1160F PR REVIEW ALL MEDS BY PRESCRIBER/CLIN PHARMACIST DOCUMENTED: ICD-10-PCS | Mod: CPTII,S$GLB,, | Performed by: STUDENT IN AN ORGANIZED HEALTH CARE EDUCATION/TRAINING PROGRAM

## 2023-12-12 PROCEDURE — 3051F PR MOST RECENT HEMOGLOBIN A1C LEVEL 7.0 - < 8.0%: ICD-10-PCS | Mod: CPTII,S$GLB,, | Performed by: STUDENT IN AN ORGANIZED HEALTH CARE EDUCATION/TRAINING PROGRAM

## 2023-12-12 RX ORDER — CLINDAMYCIN PHOSPHATE 10 MG/ML
SOLUTION TOPICAL
Qty: 60 EACH | Refills: 3 | Status: SHIPPED | OUTPATIENT
Start: 2023-12-12

## 2023-12-12 RX ORDER — DESONIDE 0.5 MG/G
CREAM TOPICAL
Qty: 60 G | Refills: 2 | Status: SHIPPED | OUTPATIENT
Start: 2023-12-12

## 2023-12-12 NOTE — PROGRESS NOTES
LPN Care Coordination Encounter Details:    Outreach Performed: YES Telephone Successful                              1/12/2024: outreach not performed     Recent ED and/or IP Discharges:              []  Reviewed recent ED & Inpatient Discharges to ensure appropriate           follow up appointments are scheduled         Additional Notes:  N/a           Provider Team Continuity:        [x]  Reviewed Primary Care Provider Visits, Annual Wellness Visit, and Future          Appointments to ensure appointments have been scheduled and/or           completed        Additional Notes:  Patient declined to schedule at this time, will call back after checking her work schedule.   1/12/24: Patient is scheduled with PCP on 1/26/24         MyChart Portal Status:         [x]  Reviewed MyChart Portal Status offered / enrolled if applicable        Additional Notes:     MyChart Outcomes: Pt is enrolled & active            Health Maintenance Screening(s) Due:        Updates Requested / Reviewed:        Care Everywhere, , Care Team Updated, and Immunizations Reconciliation Completed or Queried: [place]         Health Maintenance Topics Overdue:      VBHM Score: 5     Urine Screening  Eye Exam  Hemoglobin A1c  Lipid Panel  Mammogram                Health Maintenance Topic(s) Outreach Outcomes & Actions Taken:    Breast Cancer Screening - Outreach Outcomes & Actions Taken  : Mammogram Screening Scheduled 1/22/24    Eye Exam - Outreach Outcomes & Actions Taken  : will call back to schedule appt    Lab(s) - Outreach Outcomes & Actions Taken  : Patient Declined Scheduling Labs or Will Call Back to Schedule 1/12/24: lab orders linked to lab appointment on 1/17/24          Chronic Disease Management:     Diabetes Measures        Lab Results   Component Value Date    HGBA1C 7.1 (H) 02/16/2023           [x]  Reviewed chart for active Diabetes diagnosis     []  Scheduled necessary follow up appointments if needed          Additional Notes:  Patient will call back to schedule lab appointment after checking her work schedule.  Order linked to lab appointment on 1/17/24         Hypertension Measures        BP Readings from Last 1 Encounters:   12/08/23 106/61           [x]  Reviewed chart for active Hypertension diagnosis     []  Reviewed & documented Home BP Cuff     []  Documented a Remote BP if needed & applicable     []  Scheduled necessary follow up appointments with Primary Care if needed           Social Determinants of Health         []  Reviewed, completed, and/or updated the following sections:                  Food Insecurity, Transportation Needs, Financial Resource Strain,                 Tobacco Use          Care Management, Digital Medicine, and/or Education Referrals    OPCM Risk Score: 5         Next Steps - Referral Actions: No referrals placed

## 2023-12-12 NOTE — PROGRESS NOTES
Subjective:       Patient ID:  Shaneka Ahmadi is a 53 y.o. female who presents for   Chief Complaint   Patient presents with    Rash     Follow up     History of Present Illness: The patient presents for follow up of an acneiform eruption, last seen on 9/12/23, using topical clindamycin, desonide, and started tretinoin at last visit. Reports continued improvement in symptoms. Continues with fewer breakouts and flares, though still occasionally develops. And dark spots are getting lighter. Otherwise, doing well.     Rash        Review of Systems   Constitutional:  Negative for fever and chills.   Skin:  Negative for itching and rash.        Objective:    Physical Exam   Constitutional: She appears well-developed and well-nourished. No distress.   Neurological: She is alert and oriented to person, place, and time. She is not disoriented.   Psychiatric: She has a normal mood and affect.   Skin:   Areas Examined (abnormalities noted in diagram):   Head / Face Inspection Performed  Neck Inspection Performed              Diagram Legend     Erythematous scaling macule/papule c/w actinic keratosis       Vascular papule c/w angioma      Pigmented verrucoid papule/plaque c/w seborrheic keratosis      Yellow umbilicated papule c/w sebaceous hyperplasia      Irregularly shaped tan macule c/w lentigo     1-2 mm smooth white papules consistent with Milia      Movable subcutaneous cyst with punctum c/w epidermal inclusion cyst      Subcutaneous movable cyst c/w pilar cyst      Firm pink to brown papule c/w dermatofibroma      Pedunculated fleshy papule(s) c/w skin tag(s)      Evenly pigmented macule c/w junctional nevus     Mildly variegated pigmented, slightly irregular-bordered macule c/w mildly atypical nevus      Flesh colored to evenly pigmented papule c/w intradermal nevus       Pink pearly papule/plaque c/w basal cell carcinoma      Erythematous hyperkeratotic cursted plaque c/w SCC      Surgical scar with no sign of skin  cancer recurrence      Open and closed comedones      Inflammatory papules and pustules      Verrucoid papule consistent consistent with wart     Erythematous eczematous patches and plaques     Dystrophic onycholytic nail with subungual debris c/w onychomycosis     Umbilicated papule    Erythematous-base heme-crusted tan verrucoid plaque consistent with inflamed seborrheic keratosis     Erythematous Silvery Scaling Plaque c/w Psoriasis     See annotation      Assessment / Plan:        Acneiform rash - stable. After discussion, will continue current treatment of clindamycin, desonide and tretinoin. Refills sent.   -     clindamycin (CLEOCIN T) 1 % Swab; APPLY TO AFFECTED AREA(S) TWO TIMES A DAY AS DIRECTED  Dispense: 60 each; Refill: 3  -     desonide (DESOWEN) 0.05 % cream; APPLY TO AFFECTED AREA(S) TWO TIMES A DAY AS DIRECTED  Dispense: 60 g; Refill: 2             No follow-ups on file.

## 2023-12-12 NOTE — PATIENT INSTRUCTIONS
XEROSIS (DRY SKIN)        Definition    Xerosis is the term for dry skin.  We all have a natural oil coating over our skin produced by the skin oil glands.  If this oil is removed, the skin becomes dry which can lead to cracking, which can lead to inflammation.  Xerosis is usually a long-term problem that recurs often, especially in the winter.    Cause    Long hot baths or showers can remove our natural oil and lead to xerosis.  One should never take more than one bath or shower a day and for no longer than ten minutes.  Use of harsh soaps such as Zest, Dial, and Ivory can worsen and cause xerosis.  Cold winter weather worsens xerosis because the amount of moisture contained in cold air is much less than the amount of moisture in warm air.    Treatment    Treatment is intended to restore the natural oil to your skin.  Keep the skin lubricated.    Do not take more than one bath or shower a day.  Use lukewarm water, not hot.  Hot water dries out the skin.    Use a gentle moisturizing soap such as Cetaphil soap, Oil of Olay, Dove, Basis, Ivory moisture care, Restoraderm cleanser.    When toweling dry, dont rub.  Blot the skin so there is still some water left on the skin.  You should apply a moisturizing cream to all of the skin such as Cerave cream, Cetaphil cream, Restoraderm or Eucerin Original Formula cream.   Alpha hydroxyacid lotions, i.e., AmLactin, also work very well for preventing dry skin, but may burn when used on inflamed or reddened skin.    If you like to swim during the winter months, you should not use soap when getting out of the pool.  When you have finished swimming, rinse off the chlorine with cool to warm water.  If this will be the only shower of the day, then you may use Cetaphil or another mild soap to cleanse your skin.  After the shower, apply a moisturizing cream to all of the skin as above.        [address], [place] [address]/ [phone]; fax: [phone]/  [email or web address]

## 2023-12-15 ENCOUNTER — PATIENT MESSAGE (OUTPATIENT)
Dept: INTERNAL MEDICINE | Facility: CLINIC | Age: 53
End: 2023-12-15
Payer: COMMERCIAL

## 2023-12-15 ENCOUNTER — TELEPHONE (OUTPATIENT)
Dept: INTERNAL MEDICINE | Facility: CLINIC | Age: 53
End: 2023-12-15
Payer: COMMERCIAL

## 2023-12-19 ENCOUNTER — PATIENT OUTREACH (OUTPATIENT)
Dept: ADMINISTRATIVE | Facility: HOSPITAL | Age: 53
End: 2023-12-19
Payer: COMMERCIAL

## 2023-12-19 NOTE — PROGRESS NOTES
Working Mammogram Report:     Pt overdue for mammogram. Called to offer scheduling.  Scheduled 01/22/2024

## 2023-12-21 ENCOUNTER — LAB VISIT (OUTPATIENT)
Dept: LAB | Facility: HOSPITAL | Age: 53
End: 2023-12-21
Attending: NURSE PRACTITIONER
Payer: COMMERCIAL

## 2023-12-21 DIAGNOSIS — C79.51 SECONDARY MALIGNANT NEOPLASM OF BONE: ICD-10-CM

## 2023-12-21 DIAGNOSIS — C34.32 MALIGNANT NEOPLASM OF LOWER LOBE OF LEFT LUNG: ICD-10-CM

## 2023-12-21 LAB
ALBUMIN SERPL BCP-MCNC: 3 G/DL (ref 3.5–5.2)
ALP SERPL-CCNC: 103 U/L (ref 55–135)
ALT SERPL W/O P-5'-P-CCNC: 22 U/L (ref 10–44)
ANION GAP SERPL CALC-SCNC: 11 MMOL/L (ref 8–16)
AST SERPL-CCNC: 14 U/L (ref 10–40)
BASOPHILS # BLD AUTO: 0.05 K/UL (ref 0–0.2)
BASOPHILS NFR BLD: 0.6 % (ref 0–1.9)
BILIRUB SERPL-MCNC: 0.4 MG/DL (ref 0.1–1)
BUN SERPL-MCNC: 18 MG/DL (ref 6–20)
CALCIUM SERPL-MCNC: 8.7 MG/DL (ref 8.7–10.5)
CHLORIDE SERPL-SCNC: 101 MMOL/L (ref 95–110)
CO2 SERPL-SCNC: 29 MMOL/L (ref 23–29)
CREAT SERPL-MCNC: 1 MG/DL (ref 0.5–1.4)
DIFFERENTIAL METHOD: NORMAL
EOSINOPHIL # BLD AUTO: 0.2 K/UL (ref 0–0.5)
EOSINOPHIL NFR BLD: 2.5 % (ref 0–8)
ERYTHROCYTE [DISTWIDTH] IN BLOOD BY AUTOMATED COUNT: 12.9 % (ref 11.5–14.5)
EST. GFR  (NO RACE VARIABLE): >60 ML/MIN/1.73 M^2
GLUCOSE SERPL-MCNC: 196 MG/DL (ref 70–110)
HCT VFR BLD AUTO: 37.9 % (ref 37–48.5)
HGB BLD-MCNC: 12.7 G/DL (ref 12–16)
IMM GRANULOCYTES # BLD AUTO: 0.03 K/UL (ref 0–0.04)
IMM GRANULOCYTES NFR BLD AUTO: 0.3 % (ref 0–0.5)
LYMPHOCYTES # BLD AUTO: 2.5 K/UL (ref 1–4.8)
LYMPHOCYTES NFR BLD: 28.9 % (ref 18–48)
MCH RBC QN AUTO: 27.9 PG (ref 27–31)
MCHC RBC AUTO-ENTMCNC: 33.5 G/DL (ref 32–36)
MCV RBC AUTO: 83 FL (ref 82–98)
MONOCYTES # BLD AUTO: 0.5 K/UL (ref 0.3–1)
MONOCYTES NFR BLD: 5.9 % (ref 4–15)
NEUTROPHILS # BLD AUTO: 5.4 K/UL (ref 1.8–7.7)
NEUTROPHILS NFR BLD: 61.8 % (ref 38–73)
NRBC BLD-RTO: 0 /100 WBC
PLATELET # BLD AUTO: 207 K/UL (ref 150–450)
PMV BLD AUTO: 11.3 FL (ref 9.2–12.9)
POTASSIUM SERPL-SCNC: 3.9 MMOL/L (ref 3.5–5.1)
PROT SERPL-MCNC: 6.4 G/DL (ref 6–8.4)
RBC # BLD AUTO: 4.56 M/UL (ref 4–5.4)
SODIUM SERPL-SCNC: 141 MMOL/L (ref 136–145)
VIT B12 SERPL-MCNC: 1057 PG/ML (ref 210–950)
WBC # BLD AUTO: 8.78 K/UL (ref 3.9–12.7)

## 2023-12-21 PROCEDURE — 85025 COMPLETE CBC W/AUTO DIFF WBC: CPT | Performed by: NURSE PRACTITIONER

## 2023-12-21 PROCEDURE — 82607 VITAMIN B-12: CPT | Performed by: NURSE PRACTITIONER

## 2023-12-21 PROCEDURE — 36415 COLL VENOUS BLD VENIPUNCTURE: CPT | Performed by: NURSE PRACTITIONER

## 2023-12-21 PROCEDURE — 80053 COMPREHEN METABOLIC PANEL: CPT | Performed by: NURSE PRACTITIONER

## 2023-12-22 ENCOUNTER — OFFICE VISIT (OUTPATIENT)
Dept: HEMATOLOGY/ONCOLOGY | Facility: CLINIC | Age: 53
End: 2023-12-22
Payer: COMMERCIAL

## 2023-12-22 ENCOUNTER — INFUSION (OUTPATIENT)
Dept: INFUSION THERAPY | Facility: HOSPITAL | Age: 53
End: 2023-12-22
Attending: RADIOLOGY
Payer: COMMERCIAL

## 2023-12-22 VITALS
DIASTOLIC BLOOD PRESSURE: 52 MMHG | SYSTOLIC BLOOD PRESSURE: 103 MMHG | TEMPERATURE: 97 F | WEIGHT: 293 LBS | HEIGHT: 64 IN | HEART RATE: 56 BPM | OXYGEN SATURATION: 97 % | BODY MASS INDEX: 50.02 KG/M2 | RESPIRATION RATE: 18 BRPM

## 2023-12-22 VITALS
SYSTOLIC BLOOD PRESSURE: 113 MMHG | WEIGHT: 293 LBS | TEMPERATURE: 98 F | HEIGHT: 64 IN | RESPIRATION RATE: 18 BRPM | BODY MASS INDEX: 50.02 KG/M2 | OXYGEN SATURATION: 98 % | DIASTOLIC BLOOD PRESSURE: 61 MMHG | HEART RATE: 64 BPM

## 2023-12-22 DIAGNOSIS — C79.51 SECONDARY MALIGNANT NEOPLASM OF BONE: ICD-10-CM

## 2023-12-22 DIAGNOSIS — C34.32 MALIGNANT NEOPLASM OF LOWER LOBE OF LEFT LUNG: Primary | ICD-10-CM

## 2023-12-22 PROCEDURE — 99999 PR PBB SHADOW E&M-EST. PATIENT-LVL V: ICD-10-PCS | Mod: PBBFAC,,, | Performed by: INTERNAL MEDICINE

## 2023-12-22 PROCEDURE — 3078F PR MOST RECENT DIASTOLIC BLOOD PRESSURE < 80 MM HG: ICD-10-PCS | Mod: CPTII,S$GLB,, | Performed by: INTERNAL MEDICINE

## 2023-12-22 PROCEDURE — 4010F PR ACE/ARB THEARPY RXD/TAKEN: ICD-10-PCS | Mod: CPTII,S$GLB,, | Performed by: INTERNAL MEDICINE

## 2023-12-22 PROCEDURE — 3078F DIAST BP <80 MM HG: CPT | Mod: CPTII,S$GLB,, | Performed by: INTERNAL MEDICINE

## 2023-12-22 PROCEDURE — 3074F SYST BP LT 130 MM HG: CPT | Mod: CPTII,S$GLB,, | Performed by: INTERNAL MEDICINE

## 2023-12-22 PROCEDURE — 4010F ACE/ARB THERAPY RXD/TAKEN: CPT | Mod: CPTII,S$GLB,, | Performed by: INTERNAL MEDICINE

## 2023-12-22 PROCEDURE — 96367 TX/PROPH/DG ADDL SEQ IV INF: CPT

## 2023-12-22 PROCEDURE — 25000003 PHARM REV CODE 250: Performed by: NURSE PRACTITIONER

## 2023-12-22 PROCEDURE — 3008F BODY MASS INDEX DOCD: CPT | Mod: CPTII,S$GLB,, | Performed by: INTERNAL MEDICINE

## 2023-12-22 PROCEDURE — 99999 PR PBB SHADOW E&M-EST. PATIENT-LVL V: CPT | Mod: PBBFAC,,, | Performed by: INTERNAL MEDICINE

## 2023-12-22 PROCEDURE — 63600175 PHARM REV CODE 636 W HCPCS: Performed by: NURSE PRACTITIONER

## 2023-12-22 PROCEDURE — 96415 CHEMO IV INFUSION ADDL HR: CPT

## 2023-12-22 PROCEDURE — 99213 PR OFFICE/OUTPT VISIT, EST, LEVL III, 20-29 MIN: ICD-10-PCS | Mod: S$GLB,,, | Performed by: INTERNAL MEDICINE

## 2023-12-22 PROCEDURE — A4216 STERILE WATER/SALINE, 10 ML: HCPCS | Performed by: NURSE PRACTITIONER

## 2023-12-22 PROCEDURE — 96375 TX/PRO/DX INJ NEW DRUG ADDON: CPT

## 2023-12-22 PROCEDURE — 3051F HG A1C>EQUAL 7.0%<8.0%: CPT | Mod: CPTII,S$GLB,, | Performed by: INTERNAL MEDICINE

## 2023-12-22 PROCEDURE — 96413 CHEMO IV INFUSION 1 HR: CPT

## 2023-12-22 PROCEDURE — 3008F PR BODY MASS INDEX (BMI) DOCUMENTED: ICD-10-PCS | Mod: CPTII,S$GLB,, | Performed by: INTERNAL MEDICINE

## 2023-12-22 PROCEDURE — 3051F PR MOST RECENT HEMOGLOBIN A1C LEVEL 7.0 - < 8.0%: ICD-10-PCS | Mod: CPTII,S$GLB,, | Performed by: INTERNAL MEDICINE

## 2023-12-22 PROCEDURE — 99213 OFFICE O/P EST LOW 20 MIN: CPT | Mod: S$GLB,,, | Performed by: INTERNAL MEDICINE

## 2023-12-22 PROCEDURE — 3074F PR MOST RECENT SYSTOLIC BLOOD PRESSURE < 130 MM HG: ICD-10-PCS | Mod: CPTII,S$GLB,, | Performed by: INTERNAL MEDICINE

## 2023-12-22 RX ORDER — SODIUM CHLORIDE 0.9 % (FLUSH) 0.9 %
10 SYRINGE (ML) INJECTION
Status: DISCONTINUED | OUTPATIENT
Start: 2023-12-22 | End: 2023-12-22 | Stop reason: HOSPADM

## 2023-12-22 RX ORDER — HEPARIN 100 UNIT/ML
500 SYRINGE INTRAVENOUS
Status: DISCONTINUED | OUTPATIENT
Start: 2023-12-22 | End: 2023-12-22 | Stop reason: HOSPADM

## 2023-12-22 RX ORDER — DIPHENHYDRAMINE HYDROCHLORIDE 50 MG/ML
25 INJECTION INTRAMUSCULAR; INTRAVENOUS
Status: COMPLETED | OUTPATIENT
Start: 2023-12-22 | End: 2023-12-22

## 2023-12-22 RX ORDER — ACETAMINOPHEN 325 MG/1
650 TABLET ORAL
Status: COMPLETED | OUTPATIENT
Start: 2023-12-22 | End: 2023-12-22

## 2023-12-22 RX ADMIN — SODIUM CHLORIDE: 9 INJECTION, SOLUTION INTRAVENOUS at 12:12

## 2023-12-22 RX ADMIN — AMIVANTAMAB 1400 MG: 350 INJECTION INTRAVENOUS at 12:12

## 2023-12-22 RX ADMIN — DIPHENHYDRAMINE HYDROCHLORIDE 25 MG: 50 INJECTION, SOLUTION INTRAMUSCULAR; INTRAVENOUS at 11:12

## 2023-12-22 RX ADMIN — DEXAMETHASONE SODIUM PHOSPHATE 0.25 MG: 4 INJECTION, SOLUTION INTRA-ARTICULAR; INTRALESIONAL; INTRAMUSCULAR; INTRAVENOUS; SOFT TISSUE at 12:12

## 2023-12-22 RX ADMIN — Medication 10 ML: at 12:12

## 2023-12-22 RX ADMIN — Medication 10 ML: at 02:12

## 2023-12-22 RX ADMIN — ACETAMINOPHEN 650 MG: 325 TABLET ORAL at 11:12

## 2023-12-22 RX ADMIN — HEPARIN 500 UNITS: 100 SYRINGE at 02:12

## 2023-12-22 NOTE — PROGRESS NOTES
[facility]  [address] [facility] [address]  Phone: [phone];  Fax: [phone]    Patient ID: Shaneka Ahmadi   Chief Complaint: Follow-up  MRN:  [id number]     Oncologic Diagnosis:  Stage IV (cT2, cN2, cM1) NSCLC, metastatic to bone  Previous Treatment:    OP NSCLC AMIVANTAMAB-VMJW ([facility]) Q4W   OP ZOLEDRONIC ACID (ZOMETA) Q4W   THERAPY SHELL - B12   Current Treatment:  Carbo/Alimta started in 1/2023; stopped due to progression after 4 cycles   Subjective   Shaneka Ahmadi is a 53 y.o. female who presents to clinic for follow-up.    It is my 1st time meeting Ms. Ahmadi.  She is doing well overall and tolerating imatinib without any notable side effects.  I reviewed with her the need to start bisphosphonate therapy as her metastatic disease to her bones.  We are waiting on her to have some dental work completed prior to starting.  I explained to her the importance of getting this done so that we can start her on bisphosphonate therapy.  She expressed understanding.  She has no acute complaints.    Review of Systems:  Review of Systems   Constitutional:  Negative for activity change, appetite change, chills, diaphoresis, fatigue, fever and unexpected weight change.   HENT:  Negative for nosebleeds.    Respiratory:  Negative for shortness of breath.    Cardiovascular:  Negative for chest pain.   Gastrointestinal:  Negative for abdominal distention, abdominal pain, anal bleeding, blood in stool, constipation, diarrhea, nausea and vomiting.   Genitourinary:  Negative for difficulty urinating and hematuria.   Musculoskeletal:  Negative for arthralgias, back pain and myalgias.   Skin:  Negative for rash.   Neurological:  Negative for dizziness, weakness, light-headedness and headaches.   Hematological:  Does not bruise/bleed easily.   Psychiatric/Behavioral:  The patient is not nervous/anxious.      History     Oncology History   Malignant neoplasm of lower lobe of left lung    12/9/2022 Initial Diagnosis    Malignant neoplasm of lower lobe of left lung     12/9/2022 Cancer Staged    Staging form: Lung, AJCC 8th Edition  - Clinical stage from 12/9/2022: Stage IV (cT2, cN2, cM1)     12/27/2022 - 12/27/2022 Chemotherapy    Treatment Summary   Plan Name: OP PEMBROLIZUMAB 400MG Q6W  Treatment Goal: Control  Status: Inactive  Start Date:   End Date:   Provider: Reese Mcfarlane MD  Chemotherapy: [No matching medication found in this treatment plan]      Genetic Testing    Patient has genetic testing done for  TEmpus peripheral blood.                                              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     1/18/2023 - 1/18/2023 Chemotherapy    Treatment Summary   Plan Name: OP NSCLC AMIVANTAMAB-VMJW ([facility]) Q4W  Treatment Goal: Palliative  Status: Inactive  Start Date:   End Date:   Provider: Reese Mcfarlane MD  Chemotherapy: amivantamab-vmjw ([facility]BREVANT) 350 mg in sodium chloride 0.9% SolP 250 mL chemo infusion, 350 mg (100 % of original dose 350 mg), Intravenous, Clinic/HOD 1 time, 0 of 13 cycles  Dose modification: 350 mg (original dose 350 mg, Cycle 1), 1,050 mg (original dose 1,050 mg, Cycle 1), 1,400 mg (original dose 1,400 mg, Cycle 1)     1/27/2023 - 3/31/2023 Chemotherapy    Treatment Summary   Plan Name: OP NSCLC PEMETREXED + CARBOPLATIN (AUC) Q3W  Treatment Goal: Control  Status: Inactive  Start Date: 1/27/2023  End Date: 3/31/2023  Provider: Reese Mcfarlane MD  Chemotherapy: CARBOplatin (PARAPLATIN) 750 mg in sodium chloride 0.9% 335 mL chemo infusion, 750 mg (100 % of original dose 750 mg), Intravenous, Clinic/HOD 1 time, 4 of 4 cycles  Dose modification:   (original dose 750 mg, Cycle 1, Reason: MD Discretion)  Administration: 750 mg (1/27/2023), 750 mg (2/17/2023), 750 mg (3/31/2023), 750 mg (3/10/2023)  PEMEtrexed disodium (ALIMTA) 1,200 mg in sodium chloride 0.9% SolP 100 mL chemo infusion, 1,250 mg,  Intravenous, Clinic/HOD 1 time, 4 of 4 cycles  Administration: 1,200 mg (1/27/2023), 1,200 mg (2/17/2023), 1,200 mg (3/31/2023), 1,200 mg (3/10/2023)     4/27/2023 -  Chemotherapy    Treatment Summary   Plan Name: OP NSCLC AMIVANTAMAB-VMJW ([facility]) Q4W  Treatment Goal: Palliative  Status: Active  Start Date: 4/27/2023  End Date: 6/7/2024 (Planned)  Provider: Reese Mcfarlane MD  Chemotherapy: amivantamab-vmjw (RYBREVANT) 350 mg in sodium chloride 0.9% SolP 250 mL chemo infusion, 350 mg (100 % of original dose 350 mg), Intravenous, Clinic/HOD 1 time, 9 of 15 cycles  Dose modification: 350 mg (original dose 350 mg, Cycle 1), 1,050 mg (original dose 1,050 mg, Cycle 1), 1,400 mg (original dose 1,400 mg, Cycle 1)  Administration: 350 mg (4/27/2023), 1,050 mg (4/28/2023), 1,400 mg (5/4/2023), 1,400 mg (5/11/2023), 1,400 mg (5/18/2023), 1,400 mg (5/25/2023), 1,400 mg (6/8/2023), 1,400 mg (6/22/2023), 1,400 mg (7/21/2023), 1,400 mg (8/4/2023), 1,400 mg (8/18/2023), 1,400 mg (9/1/2023), 1,400 mg (9/15/2023), 1,400 mg (9/29/2023), 1,400 mg (10/13/2023), 1,400 mg (10/27/2023), 1,400 mg (11/10/2023), 1,400 mg (11/24/2023), 1,400 mg (12/8/2023), 1,400 mg (12/22/2023)           Past Medical History:   Diagnosis Date    Diabetes mellitus     Hypertension     Malignant neoplasm of lower lobe of left lung 12/9/2022    Rash, drug 7/6/2023    OP NSCLC AMIVANTAMAB-VMJW ([facility]) Q4W      Secondary malignant neoplasm of bone 12/5/2022       Past Surgical History:   Procedure Laterality Date    CATARACT EXTRACTION W/  INTRAOCULAR LENS IMPLANT Right 06/02/2022    FLUOROSCOPY N/A 1/26/2023    Procedure: FLUOROSCOPY/mediport placement;  Surgeon: Marlon Leone MD;  Location: [facility] CATH LAB;  Service: General;  Laterality: N/A;    TUBAL LIGATION      2007--postpartum tubal ligation       Family History   Problem Relation Age of Onset    Heart failure Father        Review of patient's allergies indicates:   Allergen Reactions     "Lisinopril Other (See Comments)     coughing    Amoxicillin        Social History     Tobacco Use    Smoking status: Never     Passive exposure: Never    Smokeless tobacco: Never   Substance Use Topics    Alcohol use: Never    Drug use: Never       Physical Exam   ECOG:   ECOG SCORE    0 - Fully active-able to carry on all pre-disease performance without restriction          Vitals:  /61   Pulse 64   Temp 97.5 °F (36.4 °C)   Resp 18   Ht 5' 4" (1.626 m)   Wt (!) 141.9 kg (312 lb 13.3 oz)   LMP 05/01/2021 (Approximate)   SpO2 98%   BMI 53.70 kg/m²     Physical Exam:  Physical Exam  Constitutional:       General: She is not in acute distress.     Appearance: Normal appearance. She is not ill-appearing.   HENT:      Head: Normocephalic and atraumatic.   Eyes:      Extraocular Movements: Extraocular movements intact.      Conjunctiva/sclera: Conjunctivae normal.   Cardiovascular:      Rate and Rhythm: Normal rate.   Pulmonary:      Effort: Pulmonary effort is normal. No respiratory distress.   Abdominal:      Palpations: There is no hepatomegaly or splenomegaly.   Musculoskeletal:         General: Normal range of motion.      Right lower leg: No edema.      Left lower leg: No edema.   Skin:     Findings: No rash.   Neurological:      General: No focal deficit present.      Mental Status: She is alert and oriented to person, place, and time.   Psychiatric:         Mood and Affect: Mood normal.         Behavior: Behavior normal.         Thought Content: Thought content normal.            Labs   Labs:  Lab Visit on 12/21/2023   Component Date Value Ref Range Status    WBC 12/21/2023 8.78  3.90 - 12.70 K/uL Final    RBC 12/21/2023 4.56  4.00 - 5.40 M/uL Final    Hemoglobin 12/21/2023 12.7  12.0 - 16.0 g/dL Final    Hematocrit 12/21/2023 37.9  37.0 - 48.5 % Final    MCV 12/21/2023 83  82 - 98 fL Final    MCH 12/21/2023 27.9  27.0 - 31.0 pg Final    MCHC 12/21/2023 33.5  32.0 - 36.0 g/dL Final    RDW 12/21/2023 " 12.9  11.5 - 14.5 % Final    Platelets 12/21/2023 207  150 - 450 K/uL Final    MPV 12/21/2023 11.3  9.2 - 12.9 fL Final    Immature Granulocytes 12/21/2023 0.3  0.0 - 0.5 % Final    Gran # (ANC) 12/21/2023 5.4  1.8 - 7.7 K/uL Final    Immature Grans (Abs) 12/21/2023 0.03  0.00 - 0.04 K/uL Final    Comment: Mild elevation in immature granulocytes is non specific and   can be seen in a variety of conditions including stress response,   acute inflammation, trauma and pregnancy. Correlation with other   laboratory and clinical findings is essential.      Lymph # 12/21/2023 2.5  1.0 - 4.8 K/uL Final    Mono # 12/21/2023 0.5  0.3 - 1.0 K/uL Final    Eos # 12/21/2023 0.2  0.0 - 0.5 K/uL Final    Baso # 12/21/2023 0.05  0.00 - 0.20 K/uL Final    nRBC 12/21/2023 0  0 /100 WBC Final    Gran % 12/21/2023 61.8  38.0 - 73.0 % Final    Lymph % 12/21/2023 28.9  18.0 - 48.0 % Final    Mono % 12/21/2023 5.9  4.0 - 15.0 % Final    Eosinophil % 12/21/2023 2.5  0.0 - 8.0 % Final    Basophil % 12/21/2023 0.6  0.0 - 1.9 % Final    Differential Method 12/21/2023 Automated   Final    Sodium 12/21/2023 141  136 - 145 mmol/L Final    Potassium 12/21/2023 3.9  3.5 - 5.1 mmol/L Final    Chloride 12/21/2023 101  95 - 110 mmol/L Final    CO2 12/21/2023 29  23 - 29 mmol/L Final    Glucose 12/21/2023 196 (H)  70 - 110 mg/dL Final    BUN 12/21/2023 18  6 - 20 mg/dL Final    Creatinine 12/21/2023 1.0  0.5 - 1.4 mg/dL Final    Calcium 12/21/2023 8.7  8.7 - 10.5 mg/dL Final    Total Protein 12/21/2023 6.4  6.0 - 8.4 g/dL Final    Albumin 12/21/2023 3.0 (L)  3.5 - 5.2 g/dL Final    Total Bilirubin 12/21/2023 0.4  0.1 - 1.0 mg/dL Final    Comment: For infants and newborns, interpretation of results should be based  on gestational age, weight and in agreement with clinical  observations.    Premature Infant recommended reference ranges:  Up to 24 hours.............<8.0 mg/dL  Up to 48 hours............<12.0 mg/dL  3-5 days..................<15.0  mg/dL  6-29 days.................<15.0 mg/dL      Alkaline Phosphatase 12/21/2023 103  55 - 135 U/L Final    AST 12/21/2023 14  10 - 40 U/L Final    ALT 12/21/2023 22  10 - 44 U/L Final    eGFR 12/21/2023 >60  >60 mL/min/1.73 m^2 Final    Anion Gap 12/21/2023 11  8 - 16 mmol/L Final    Vitamin B-12 12/21/2023 1057 (H)  210 - 950 pg/mL Final        Imaging   CT CHEST ABDOMEN PELVIS WITH CONTRAST (XPD) - 10/19/23     CLINICAL HISTORY:  Metastatic disease evaluation;Malignant neoplasm of lower lobe, left bronchus or lung     TECHNIQUE:  Low dose axial images, sagittal and coronal reformations were obtained from the thoracic inlet to the pubic synthesis following the IV administration of 100 mL of Omnipaque 350.  Oral contrast also administered.  All CT scans at this location are performed using dose modulation techniques as appropriate to a performed exam including the following: Automated exposure control; adjustment of the mA and/or kV  according to patient size.     COMPARISON:  07/11/2023.  12/02/2022     FINDINGS:  Chest:     Thoracic soft tissues: No significant abnormality.     Aorta: Normal in course and caliber, without significant atherosclerotic plaque. There are three branching vessels at the arch.     Heart: Normal in size. No pericardial effusion.  Mild aortic and coronary artery atherosclerotic calcification.     Brooke/Mediastinum: No significant lymphadenopathy .  No measurable hilar lesion.     Lungs: Unchanged left lung base volume loss and bronchovascular crowding secondary to elevation left hemidiaphragm.  No measurable mass lesion.  Right lung base benign calcified granuloma.     Abdomen Pelvis:     Liver: Unchanged 9 mm nodule posterior to the inferior right liver.     Gallbladder: No calcified gallstones.     Bile Ducts: No evidence of dilated ducts.     Pancreas: No mass or peripancreatic fat stranding.     Spleen: Unremarkable.     Adrenals: Unchanged benign-appearing 2.1 cm left adrenal fluid  density nodule.     Kidneys/ Ureters: Punctate right upper pole nonobstructing renal stone.  Normal in size and location. Normal concentration and excretion of contrast. No hydronephrosis or nephrolithiasis. No ureteral dilatation. Left upper pole 12 mm benign angiomyolipoma.     Bladder: No evidence of wall thickening.     Reproductive organs: Fibroid uterus.     GI Tract/Mesentery: No evidence of bowel obstruction or inflammation. Large volume stool throughout the colon     Peritoneal Space: No ascites. No free air.     Retroperitoneum: No significant adenopathy.     Abdominal wall: Unremarkable.     Vasculature: No significant atherosclerosis or aneurysm.     Bones: No acute fracture. Unchanged widely disseminated osteoblastic metastatic disease.  No new lesions.     Impression:  Stable exam compared to 07/11/2023.     Assessment and Plan   Stage IV (cT2, cN2, cM1) NSCLC, Metastatic to Bone  Exon 20 EGFR mutation positive   Previously on Carbo/Alimta started in 1/2023; stopped due to progression after 4 cycles and started on Amivantamab  Due for repeat imaging  Tolerating Amivantamab well; has mild intermittent rash but other notable side effects        Metastatic Bone Disease  Patient has yet to start bisphosphonate therapy as she has some ongoing dental issues pending completion of dental work and dental clearance  Continue Calcium and Vitamin D/Weight Bearing Exercise      Chronic Medical Conditions  DM II  Hx of COVID-19        Med Onc Chart Routing      Follow up with physician 2 weeks.   Follow up with DOLLY    Infusion scheduling note   Amivatamab today and q2w   Injection scheduling note    Labs CMP and CBC   Scheduling:  Preferred lab:  Lab interval:     Imaging CT chest abdomen pelvis   Please schedule mid-end of January at the [place]   [facility] appointment    Other referrals                     The patient was seen, interviewed and examined. Pertinent lab and radiologic studies were reviewed. Pt  instructed to call should they develop concerning signs/symptoms or have further questions.        Portions of the record may have been created with voice recognition software. Occasional wrong-word or sound-a-like substitutions may have occurred due to the inherent limitations of voice recognition software. Read the chart carefully and recognize, using context, where substitutions have occurred.      Mirtha Wilhelm MD    Hematology/Oncology

## 2023-12-22 NOTE — PLAN OF CARE
Discussed plan of care with pt. Addressed any and ongoing concerns. Pt denies   Problem: Adult Inpatient Plan of Care  Goal: Plan of Care Review  Outcome: Ongoing, Progressing  Goal: Patient-Specific Goal (Individualized)  Outcome: Ongoing, Progressing  Flowsheets (Taken 12/22/2023 1348)  Anxieties, Fears or Concerns: Denies  Individualized Care Needs: Reclined position, pillow and blanket provided with ginger ale and potatoe chips  Goal: Absence of Hospital-Acquired Illness or Injury  Outcome: Ongoing, Progressing  Intervention: Identify and Manage Fall Risk  Flowsheets (Taken 12/22/2023 1348)  Safety Promotion/Fall Prevention: in recliner, wheels locked  Intervention: Prevent Infection  Flowsheets (Taken 12/22/2023 1348)  Infection Prevention:   equipment surfaces disinfected   hand hygiene promoted   personal protective equipment utilized  Goal: Optimal Comfort and Wellbeing  Outcome: Ongoing, Progressing  Intervention: Provide Person-Centered Care  Flowsheets (Taken 12/22/2023 1348)  Trust Relationship/Rapport:   care explained   choices provided   emotional support provided   empathic listening provided   questions answered   reassurance provided   questions encouraged   thoughts/feelings acknowledged

## 2024-01-04 ENCOUNTER — LAB VISIT (OUTPATIENT)
Dept: LAB | Facility: HOSPITAL | Age: 54
End: 2024-01-04
Attending: INTERNAL MEDICINE
Payer: COMMERCIAL

## 2024-01-04 DIAGNOSIS — C34.32 MALIGNANT NEOPLASM OF LOWER LOBE OF LEFT LUNG: ICD-10-CM

## 2024-01-04 LAB
ALBUMIN SERPL BCP-MCNC: 3 G/DL (ref 3.5–5.2)
ALP SERPL-CCNC: 108 U/L (ref 55–135)
ALT SERPL W/O P-5'-P-CCNC: 22 U/L (ref 10–44)
ANION GAP SERPL CALC-SCNC: 10 MMOL/L (ref 8–16)
AST SERPL-CCNC: 14 U/L (ref 10–40)
BASOPHILS # BLD AUTO: 0.04 K/UL (ref 0–0.2)
BASOPHILS NFR BLD: 0.3 % (ref 0–1.9)
BILIRUB SERPL-MCNC: 0.2 MG/DL (ref 0.1–1)
BUN SERPL-MCNC: 20 MG/DL (ref 6–20)
CALCIUM SERPL-MCNC: 8.9 MG/DL (ref 8.7–10.5)
CHLORIDE SERPL-SCNC: 100 MMOL/L (ref 95–110)
CO2 SERPL-SCNC: 29 MMOL/L (ref 23–29)
CREAT SERPL-MCNC: 1 MG/DL (ref 0.5–1.4)
DIFFERENTIAL METHOD BLD: NORMAL
EOSINOPHIL # BLD AUTO: 0.3 K/UL (ref 0–0.5)
EOSINOPHIL NFR BLD: 2.4 % (ref 0–8)
ERYTHROCYTE [DISTWIDTH] IN BLOOD BY AUTOMATED COUNT: 12.8 % (ref 11.5–14.5)
EST. GFR  (NO RACE VARIABLE): >60 ML/MIN/1.73 M^2
GLUCOSE SERPL-MCNC: 149 MG/DL (ref 70–110)
HCT VFR BLD AUTO: 38.2 % (ref 37–48.5)
HGB BLD-MCNC: 12.6 G/DL (ref 12–16)
IMM GRANULOCYTES # BLD AUTO: 0.04 K/UL (ref 0–0.04)
IMM GRANULOCYTES NFR BLD AUTO: 0.3 % (ref 0–0.5)
LYMPHOCYTES # BLD AUTO: 3 K/UL (ref 1–4.8)
LYMPHOCYTES NFR BLD: 26.3 % (ref 18–48)
MCH RBC QN AUTO: 27.3 PG (ref 27–31)
MCHC RBC AUTO-ENTMCNC: 33 G/DL (ref 32–36)
MCV RBC AUTO: 83 FL (ref 82–98)
MONOCYTES # BLD AUTO: 0.7 K/UL (ref 0.3–1)
MONOCYTES NFR BLD: 6.4 % (ref 4–15)
NEUTROPHILS # BLD AUTO: 7.4 K/UL (ref 1.8–7.7)
NEUTROPHILS NFR BLD: 64.3 % (ref 38–73)
NRBC BLD-RTO: 0 /100 WBC
PLATELET # BLD AUTO: 194 K/UL (ref 150–450)
PMV BLD AUTO: 11 FL (ref 9.2–12.9)
POTASSIUM SERPL-SCNC: 4 MMOL/L (ref 3.5–5.1)
PROT SERPL-MCNC: 6.3 G/DL (ref 6–8.4)
RBC # BLD AUTO: 4.61 M/UL (ref 4–5.4)
SODIUM SERPL-SCNC: 139 MMOL/L (ref 136–145)
WBC # BLD AUTO: 11.47 K/UL (ref 3.9–12.7)

## 2024-01-04 PROCEDURE — 36415 COLL VENOUS BLD VENIPUNCTURE: CPT | Performed by: INTERNAL MEDICINE

## 2024-01-04 PROCEDURE — 85025 COMPLETE CBC W/AUTO DIFF WBC: CPT | Performed by: INTERNAL MEDICINE

## 2024-01-04 PROCEDURE — 80053 COMPREHEN METABOLIC PANEL: CPT | Performed by: INTERNAL MEDICINE

## 2024-01-05 ENCOUNTER — INFUSION (OUTPATIENT)
Dept: INFUSION THERAPY | Facility: HOSPITAL | Age: 54
End: 2024-01-05
Attending: RADIOLOGY
Payer: COMMERCIAL

## 2024-01-05 ENCOUNTER — OFFICE VISIT (OUTPATIENT)
Dept: HEMATOLOGY/ONCOLOGY | Facility: CLINIC | Age: 54
End: 2024-01-05
Payer: COMMERCIAL

## 2024-01-05 VITALS
RESPIRATION RATE: 16 BRPM | OXYGEN SATURATION: 99 % | TEMPERATURE: 97 F | SYSTOLIC BLOOD PRESSURE: 102 MMHG | DIASTOLIC BLOOD PRESSURE: 59 MMHG | WEIGHT: 293 LBS | HEART RATE: 60 BPM | BODY MASS INDEX: 50.02 KG/M2 | HEIGHT: 64 IN

## 2024-01-05 DIAGNOSIS — D84.821 IMMUNODEFICIENCY DUE TO CHEMOTHERAPY: ICD-10-CM

## 2024-01-05 DIAGNOSIS — C34.32 MALIGNANT NEOPLASM OF LOWER LOBE OF LEFT LUNG: Primary | ICD-10-CM

## 2024-01-05 DIAGNOSIS — T45.1X5A IMMUNODEFICIENCY DUE TO CHEMOTHERAPY: ICD-10-CM

## 2024-01-05 DIAGNOSIS — C79.51 SECONDARY MALIGNANT NEOPLASM OF BONE: Primary | ICD-10-CM

## 2024-01-05 DIAGNOSIS — C34.32 MALIGNANT NEOPLASM OF LOWER LOBE OF LEFT LUNG: ICD-10-CM

## 2024-01-05 DIAGNOSIS — Z79.899 IMMUNODEFICIENCY DUE TO CHEMOTHERAPY: ICD-10-CM

## 2024-01-05 PROCEDURE — 63600175 PHARM REV CODE 636 W HCPCS: Performed by: INTERNAL MEDICINE

## 2024-01-05 PROCEDURE — A4216 STERILE WATER/SALINE, 10 ML: HCPCS | Performed by: INTERNAL MEDICINE

## 2024-01-05 PROCEDURE — 25000003 PHARM REV CODE 250: Performed by: INTERNAL MEDICINE

## 2024-01-05 PROCEDURE — 96375 TX/PRO/DX INJ NEW DRUG ADDON: CPT

## 2024-01-05 PROCEDURE — 96413 CHEMO IV INFUSION 1 HR: CPT

## 2024-01-05 PROCEDURE — 96367 TX/PROPH/DG ADDL SEQ IV INF: CPT

## 2024-01-05 PROCEDURE — 99215 OFFICE O/P EST HI 40 MIN: CPT | Mod: 95,,, | Performed by: INTERNAL MEDICINE

## 2024-01-05 PROCEDURE — 96415 CHEMO IV INFUSION ADDL HR: CPT

## 2024-01-05 RX ORDER — SODIUM CHLORIDE 0.9 % (FLUSH) 0.9 %
10 SYRINGE (ML) INJECTION
Status: DISCONTINUED | OUTPATIENT
Start: 2024-01-05 | End: 2024-01-05 | Stop reason: HOSPADM

## 2024-01-05 RX ORDER — EPINEPHRINE 0.3 MG/.3ML
0.3 INJECTION SUBCUTANEOUS ONCE AS NEEDED
Status: CANCELLED | OUTPATIENT
Start: 2024-01-19

## 2024-01-05 RX ORDER — HEPARIN 100 UNIT/ML
500 SYRINGE INTRAVENOUS
Status: CANCELLED | OUTPATIENT
Start: 2024-01-19

## 2024-01-05 RX ORDER — DIPHENHYDRAMINE HYDROCHLORIDE 50 MG/ML
25 INJECTION, SOLUTION INTRAMUSCULAR; INTRAVENOUS
Status: CANCELLED
Start: 2024-01-05

## 2024-01-05 RX ORDER — DIPHENHYDRAMINE HYDROCHLORIDE 50 MG/ML
50 INJECTION, SOLUTION INTRAMUSCULAR; INTRAVENOUS ONCE AS NEEDED
Status: CANCELLED | OUTPATIENT
Start: 2024-01-19

## 2024-01-05 RX ORDER — ACETAMINOPHEN 325 MG/1
650 TABLET ORAL
Status: CANCELLED
Start: 2024-01-19

## 2024-01-05 RX ORDER — DIPHENHYDRAMINE HYDROCHLORIDE 50 MG/ML
25 INJECTION, SOLUTION INTRAMUSCULAR; INTRAVENOUS
Status: COMPLETED | OUTPATIENT
Start: 2024-01-05 | End: 2024-01-05

## 2024-01-05 RX ORDER — DIPHENHYDRAMINE HYDROCHLORIDE 50 MG/ML
25 INJECTION, SOLUTION INTRAMUSCULAR; INTRAVENOUS
Status: CANCELLED
Start: 2024-01-19

## 2024-01-05 RX ORDER — EPINEPHRINE 0.3 MG/.3ML
0.3 INJECTION SUBCUTANEOUS ONCE AS NEEDED
Status: CANCELLED | OUTPATIENT
Start: 2024-01-05

## 2024-01-05 RX ORDER — DIPHENHYDRAMINE HYDROCHLORIDE 50 MG/ML
50 INJECTION, SOLUTION INTRAMUSCULAR; INTRAVENOUS ONCE AS NEEDED
Status: CANCELLED | OUTPATIENT
Start: 2024-01-05

## 2024-01-05 RX ORDER — SODIUM CHLORIDE 0.9 % (FLUSH) 0.9 %
10 SYRINGE (ML) INJECTION
Status: CANCELLED | OUTPATIENT
Start: 2024-01-05

## 2024-01-05 RX ORDER — ACETAMINOPHEN 325 MG/1
650 TABLET ORAL
Status: CANCELLED
Start: 2024-01-05

## 2024-01-05 RX ORDER — SODIUM CHLORIDE 0.9 % (FLUSH) 0.9 %
10 SYRINGE (ML) INJECTION
Status: CANCELLED | OUTPATIENT
Start: 2024-01-19

## 2024-01-05 RX ORDER — ACETAMINOPHEN 325 MG/1
650 TABLET ORAL
Status: COMPLETED | OUTPATIENT
Start: 2024-01-05 | End: 2024-01-05

## 2024-01-05 RX ORDER — HEPARIN 100 UNIT/ML
500 SYRINGE INTRAVENOUS
Status: DISCONTINUED | OUTPATIENT
Start: 2024-01-05 | End: 2024-01-05 | Stop reason: HOSPADM

## 2024-01-05 RX ORDER — HEPARIN 100 UNIT/ML
500 SYRINGE INTRAVENOUS
Status: CANCELLED | OUTPATIENT
Start: 2024-01-05

## 2024-01-05 RX ADMIN — DEXAMETHASONE SODIUM PHOSPHATE 0.25 MG: 4 INJECTION, SOLUTION INTRA-ARTICULAR; INTRALESIONAL; INTRAMUSCULAR; INTRAVENOUS; SOFT TISSUE at 10:01

## 2024-01-05 RX ADMIN — DIPHENHYDRAMINE HYDROCHLORIDE 25 MG: 50 INJECTION, SOLUTION INTRAMUSCULAR; INTRAVENOUS at 10:01

## 2024-01-05 RX ADMIN — AMIVANTAMAB 1400 MG: 350 INJECTION INTRAVENOUS at 11:01

## 2024-01-05 RX ADMIN — ACETAMINOPHEN 650 MG: 325 TABLET ORAL at 10:01

## 2024-01-05 RX ADMIN — HEPARIN 500 UNITS: 100 SYRINGE at 01:01

## 2024-01-05 RX ADMIN — Medication 10 ML: at 01:01

## 2024-01-05 RX ADMIN — SODIUM CHLORIDE: 9 INJECTION, SOLUTION INTRAVENOUS at 10:01

## 2024-01-05 NOTE — PLAN OF CARE
Patient reports no complaints or concerns at this time. Tolerated infusion well with no adverse reactions. Patient to return in two week for next treatment.

## 2024-01-05 NOTE — PROGRESS NOTES
[facility]  [id number] [place] [id number]  Phone: [phone];  Fax: [phone]    Patient ID: Shaneka Ahmadi   Chief Complaint: Follow-up and Lung Cancer  MRN:  [id number]     Oncologic Diagnosis:  Stage IV (cT2, cN2, cM1) NSCLC, metastatic to bone  Previous Treatment:    Carbo/Alimta started in 1/2023; stopped due to progression after 4 cycles     Current Treatment:   OP NSCLC AMIVANTAMAB-VMJW ([facility]) Q4W   OP ZOLEDRONIC ACID (ZOMETA) Q4W   THERAPY SHELL - B12     The patient location is: Home  The chief complaint leading to consultation is: Follow and treatment of lung cancer    Visit type: audiovisual    Face to Face time with patient: 10 minutes  20 minutes of total time spent on the encounter, which includes face to face time and non-face to face time preparing to see the patient (eg, review of tests), Obtaining and/or reviewing separately obtained history, Documenting clinical information in the electronic or other health record, Independently interpreting results (not separately reported) and communicating results to the patient/family/caregiver, or Care coordination (not separately reported).     Each patient to whom he or she provides medical services by telemedicine is:  (1) informed of the relationship between the physician and patient and the respective role of any other health care provider with respect to management of the patient; and (2) notified that he or she may decline to receive medical services by telemedicine and may withdraw from such care at any time.    Subjective   Shaneka Ahmadi is a 54 y.o. female who presents to clinic for follow-up.    She is doing well on imatinib.  She does have intermittent rash on the right side of her face.  She is following with Dermatology and is to be using a retinol cream.  She states that she stopped using it for a little while so the rash started to break out but she has resumed using it with some  improvement.      She is going to obtain dental insurance with coverage starting next week.  She states that she has to have a deep cleaning, and extraction, and a few fillings.  She will have all this done in the next couple of months.  We may be able to tentatively start Zometa in about 4 months out she completely heals.    Otherwise she is doing really well.  Her restaging scan is scheduled for January 30th.  I reviewed her blood work with her which is stable.      Review of Systems:  Review of Systems   Constitutional:  Negative for activity change, appetite change, chills, diaphoresis, fatigue, fever and unexpected weight change.   HENT:  Negative for nosebleeds.    Respiratory:  Negative for shortness of breath.    Cardiovascular:  Negative for chest pain.   Gastrointestinal:  Negative for abdominal distention, abdominal pain, anal bleeding, blood in stool, constipation, diarrhea, nausea and vomiting.   Genitourinary:  Negative for difficulty urinating and hematuria.   Musculoskeletal:  Negative for arthralgias, back pain and myalgias.   Skin:  Positive for rash (right side face).   Neurological:  Negative for dizziness, weakness, light-headedness and headaches.   Hematological:  Does not bruise/bleed easily.   Psychiatric/Behavioral:  The patient is not nervous/anxious.      History     Oncology History   Malignant neoplasm of lower lobe of left lung   12/9/2022 Initial Diagnosis    Malignant neoplasm of lower lobe of left lung     12/9/2022 Cancer Staged    Staging form: Lung, AJCC 8th Edition  - Clinical stage from 12/9/2022: Stage IV (cT2, cN2, cM1)     12/27/2022 - 12/27/2022 Chemotherapy    Treatment Summary   Plan Name: OP PEMBROLIZUMAB 400MG Q6W  Treatment Goal: Control  Status: Inactive  Start Date:   End Date:   Provider: Reese Mcfarlane MD  Chemotherapy: [No matching medication found in this treatment plan]      Genetic Testing    Patient has genetic testing done for  TEmpus peripheral blood.                                               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     1/18/2023 - 1/18/2023 Chemotherapy    Treatment Summary   Plan Name: OP NSCLC AMIVANTAMAB-VMJW ([facility]) Q4W  Treatment Goal: Palliative  Status: Inactive  Start Date:   End Date:   Provider: Reese Mcfarlane MD  Chemotherapy: amivantamab-vmjw ([facility]BRE[facility]ANT) 350 mg in sodium chloride 0.9% SolP 250 mL chemo infusion, 350 mg (100 % of original dose 350 mg), Intravenous, Clinic/HOD 1 time, 0 of 13 cycles  Dose modification: 350 mg (original dose 350 mg, Cycle 1), 1,050 mg (original dose 1,050 mg, Cycle 1), 1,400 mg (original dose 1,400 mg, Cycle 1)     1/27/2023 - 3/31/2023 Chemotherapy    Treatment Summary   Plan Name: OP NSCLC PEMETREXED + CARBOPLATIN (AUC) Q3W  Treatment Goal: Control  Status: Inactive  Start Date: 1/27/2023  End Date: 3/31/2023  Provider: Reese Mcfarlane MD  Chemotherapy: CARBOplatin (PARAPLATIN) 750 mg in sodium chloride 0.9% 335 mL chemo infusion, 750 mg (100 % of original dose 750 mg), Intravenous, Clinic/HOD 1 time, 4 of 4 cycles  Dose modification:   (original dose 750 mg, Cycle 1, Reason: MD Discretion)  Administration: 750 mg (1/27/2023), 750 mg (2/17/2023), 750 mg (3/31/2023), 750 mg (3/10/2023)  PEMEtrexed disodium (ALIMTA) 1,200 mg in sodium chloride 0.9% SolP 100 mL chemo infusion, 1,250 mg, Intravenous, Clinic/HOD 1 time, 4 of 4 cycles  Administration: 1,200 mg (1/27/2023), 1,200 mg (2/17/2023), 1,200 mg (3/31/2023), 1,200 mg (3/10/2023)     4/27/2023 -  Chemotherapy    Treatment Summary   Plan Name: OP NSCLC AMIVANTAMAB-VMJW ([facility]) Q4W  Treatment Goal: Palliative  Status: Active  Start Date: 4/27/2023  End Date: 6/7/2024 (Planned)  Provider: Reese Mcfarlane MD  Chemotherapy: amivantamab-vmjw (RYBREVANT) 350 mg in sodium chloride 0.9% SolP 250 mL chemo infusion, 350 mg (100 % of original dose 350 mg), Intravenous, Clinic/HOD 1 time, 9 of 15  cycles  Dose modification: 350 mg (original dose 350 mg, Cycle 1), 1,050 mg (original dose 1,050 mg, Cycle 1), 1,400 mg (original dose 1,400 mg, Cycle 1)  Administration: 350 mg (4/27/2023), 1,050 mg (4/28/2023), 1,400 mg (5/4/2023), 1,400 mg (5/11/2023), 1,400 mg (5/18/2023), 1,400 mg (5/25/2023), 1,400 mg (6/8/2023), 1,400 mg (6/22/2023), 1,400 mg (7/21/2023), 1,400 mg (8/4/2023), 1,400 mg (8/18/2023), 1,400 mg (9/1/2023), 1,400 mg (9/15/2023), 1,400 mg (9/29/2023), 1,400 mg (10/13/2023), 1,400 mg (10/27/2023), 1,400 mg (11/10/2023), 1,400 mg (11/24/2023), 1,400 mg (12/8/2023), 1,400 mg (12/22/2023)           Past Medical History:   Diagnosis Date    Diabetes mellitus     Hypertension     Malignant neoplasm of lower lobe of left lung 12/9/2022    Rash, drug 7/6/2023    OP NSCLC AMIVANTAMAB-VMJW ([facility]) Q4W      Secondary malignant neoplasm of bone 12/5/2022       Past Surgical History:   Procedure Laterality Date    CATARACT EXTRACTION W/  INTRAOCULAR LENS IMPLANT Right 06/02/2022    FLUOROSCOPY N/A 1/26/2023    Procedure: FLUOROSCOPY/mediport placement;  Surgeon: Marlon Leone MD;  [facility] C[facility] LAB;  Service: General;  Laterality: N/A;    TUBAL LIGATION      2007--postpartum tubal ligation       Family History   Problem Relation Age of Onset    Heart failure Father        Review of patient's allergies indicates:   Allergen Reactions    Lisinopril Other (See Comments)     coughing    Amoxicillin        Social History     Tobacco Use    Smoking status: Never     Passive exposure: Never    Smokeless tobacco: Never   Substance Use Topics    Alcohol use: Never    Drug use: Never       Labs   Labs:  Lab Visit on 01/04/2024   Component Date Value Ref Range Status    Sodium 01/04/2024 139  136 - 145 mmol/L Final    Potassium 01/04/2024 4.0  3.5 - 5.1 mmol/L Final    Chloride 01/04/2024 100  95 - 110 mmol/L Final    CO2 01/04/2024 29  23 - 29 mmol/L Final    Glucose 01/04/2024 149 (H)  70 - 110 mg/dL Final     BUN 01/04/2024 20  6 - 20 mg/dL Final    Creatinine 01/04/2024 1.0  0.5 - 1.4 mg/dL Final    Calcium 01/04/2024 8.9  8.7 - 10.5 mg/dL Final    Total Protein 01/04/2024 6.3  6.0 - 8.4 g/dL Final    Albumin 01/04/2024 3.0 (L)  3.5 - 5.2 g/dL Final    Total Bilirubin 01/04/2024 0.2  0.1 - 1.0 mg/dL Final    Comment: For infants and newborns, interpretation of results should be based  on gestational age, weight and in agreement with clinical  observations.    Premature Infant recommended reference ranges:  Up to 24 hours.............<8.0 mg/dL  Up to 48 hours............<12.0 mg/dL  3-5 days..................<15.0 mg/dL  6-29 days.................<15.0 mg/dL      Alkaline Phosphatase 01/04/2024 108  55 - 135 U/L Final    AST 01/04/2024 14  10 - 40 U/L Final    ALT 01/04/2024 22  10 - 44 U/L Final    eGFR 01/04/2024 >60  >60 mL/min/1.73 m^2 Final    Anion Gap 01/04/2024 10  8 - 16 mmol/L Final    WBC 01/04/2024 11.47  3.90 - 12.70 K/uL Final    RBC 01/04/2024 4.61  4.00 - 5.40 M/uL Final    Hemoglobin 01/04/2024 12.6  12.0 - 16.0 g/dL Final    Hematocrit 01/04/2024 38.2  37.0 - 48.5 % Final    MCV 01/04/2024 83  82 - 98 fL Final    MCH 01/04/2024 27.3  27.0 - 31.0 pg Final    MCHC 01/04/2024 33.0  32.0 - 36.0 g/dL Final    RDW 01/04/2024 12.8  11.5 - 14.5 % Final    Platelets 01/04/2024 194  150 - 450 K/uL Final    MPV 01/04/2024 11.0  9.2 - 12.9 fL Final    Immature Granulocytes 01/04/2024 0.3  0.0 - 0.5 % Final    Gran # (ANC) 01/04/2024 7.4  1.8 - 7.7 K/uL Final    Immature Grans (Abs) 01/04/2024 0.04  0.00 - 0.04 K/uL Final    Comment: Mild elevation in immature granulocytes is non specific and   can be seen in a variety of conditions including stress response,   acute inflammation, trauma and pregnancy. Correlation with other   laboratory and clinical findings is essential.      Lymph # 01/04/2024 3.0  1.0 - 4.8 K/uL Final    Mono # 01/04/2024 0.7  0.3 - 1.0 K/uL Final    Eos # 01/04/2024 0.3  0.0 - 0.5 K/uL Final     Baso # 01/04/2024 0.04  0.00 - 0.20 K/uL Final    nRBC 01/04/2024 0  0 /100 WBC Final    Gran % 01/04/2024 64.3  38.0 - 73.0 % Final    Lymph % 01/04/2024 26.3  18.0 - 48.0 % Final    Mono % 01/04/2024 6.4  4.0 - 15.0 % Final    Eosinophil % 01/04/2024 2.4  0.0 - 8.0 % Final    Basophil % 01/04/2024 0.3  0.0 - 1.9 % Final    Differential Method 01/04/2024 Automated   Final        Imaging   CT CHEST ABDOMEN PELVIS WITH CONTRAST (XPD) - 10/19/23     CLINICAL HISTORY:  Metastatic disease evaluation;Malignant neoplasm of lower lobe, left bronchus or lung     TECHNIQUE:  Low dose axial images, sagittal and coronal reformations were obtained from the thoracic inlet to the pubic synthesis following the IV administration of 100 mL of Omnipaque 350.  Oral contrast also administered.  All CT scans at this location are performed using dose modulation techniques as appropriate to a performed exam including the following: Automated exposure control; adjustment of the mA and/or kV  according to patient size.     COMPARISON:  07/11/2023.  12/02/2022     FINDINGS:  Chest:     Thoracic soft tissues: No significant abnormality.     Aorta: Normal in course and caliber, without significant atherosclerotic plaque. There are three branching vessels at the arch.     Heart: Normal in size. No pericardial effusion.  Mild aortic and coronary artery atherosclerotic calcification.     Brooke/Mediastinum: No significant lymphadenopathy .  No measurable hilar lesion.     Lungs: Unchanged left lung base volume loss and bronchovascular crowding secondary to elevation left hemidiaphragm.  No measurable mass lesion.  Right lung base benign calcified granuloma.     Abdomen Pelvis:     Liver: Unchanged 9 mm nodule posterior to the inferior right liver.     Gallbladder: No calcified gallstones.     Bile Ducts: No evidence of dilated ducts.     Pancreas: No mass or peripancreatic fat stranding.     Spleen: Unremarkable.     Adrenals: Unchanged  benign-appearing 2.1 cm left adrenal fluid density nodule.     Kidneys/ Ureters: Punctate right upper pole nonobstructing renal stone.  Normal in size and location. Normal concentration and excretion of contrast. No hydronephrosis or nephrolithiasis. No ureteral dilatation. Left upper pole 12 mm benign angiomyolipoma.     Bladder: No evidence of wall thickening.     Reproductive organs: Fibroid uterus.     GI Tract/Mesentery: No evidence of bowel obstruction or inflammation. Large volume stool throughout the colon     Peritoneal Space: No ascites. No free air.     Retroperitoneum: No significant adenopathy.     Abdominal wall: Unremarkable.     Vasculature: No significant atherosclerosis or aneurysm.     Bones: No acute fracture. Unchanged widely disseminated osteoblastic metastatic disease.  No new lesions.     Impression:  Stable exam compared to 07/11/2023.     Assessment and Plan   Stage IV (cT2, cN2, cM1) NSCLC, Metastatic to Bone  Exon 20 EGFR mutation positive   Previously on Carbo/Alimta started in 1/2023; stopped due to progression after 4 cycles and started on Amivantamab  Due for repeat imaging - CT CAP scheduled 01/30/24  Tolerating Amivantamab well; has mild intermittent rash on right side of face but no other notable side effects      Metastatic Bone Disease  Patient has yet to start bisphosphonate therapy as she has some ongoing dental issues pending completion of dental work and dental clearance  She will obtain dental work tentatively in the next 4-8 weeks  Continue Calcium and Vitamin D/Weight Bearing Exercise      Chronic Medical Conditions  DM II  Hx of COVID-19        Med Onc Chart Routing      Follow up with physician 2 weeks.   Follow up with DOLLY    Infusion scheduling note   Amivatamab today and q2w   Injection scheduling note    Labs CBC and CMP   Scheduling:  Preferred lab:  Lab interval:     Imaging    Pharmacy appointment    Other referrals                     The patient was seen,  interviewed and examined. Pertinent lab and radiologic studies were reviewed. Pt instructed to call should they develop concerning signs/symptoms or have further questions.        Portions of the record may have been created with voice recognition software. Occasional wrong-word or sound-a-like substitutions may have occurred due to the inherent limitations of voice recognition software. Read the chart carefully and recognize, using context, where substitutions have occurred.      Mirtha Wilhelm MD    Hematology/Oncology

## 2024-01-05 NOTE — DISCHARGE INSTRUCTIONS
.[facility]  [address] [facility]  [address]  [phone] phone     [phone] fax  Hours of Operation: Monday- Friday 8:00am- 5:00pm  After hours phone  [phone]  Hematology / Oncology Physicians on call    Dr. Denys Lynch      Nurse Practitioners:    Lorie Blackmon, BRISA Caicedo, BRISA Monteiro, BRISA Kwon, BRISA Trujillo, PA      Please don't hesitate to call if you have any concerns.     .WAYS TO HELP PREVENT INFECTION        WASH YOUR HANDS OFTEN DURING THE DAY, ESPECIALLY BEFORE YOU EAT, AFTER USING THE BATHROOM, AND AFTER TOUCHING ANIMALS    STAY AWAY FROM PEOPLE WHO HAVE ILLNESSES YOU CAN CATCH; SUCH AS COLDS, FLU, CHICKEN POX    TRY TO AVOID CROWDS    STAY AWAY FROM CHILDREN WHO RECENTLY HAVE RECEIVED LIVE VIRUS VACCINES    MAINTAIN GOOD MOUTH CARE    DO NOT SQUEEZE OR SCRATCH PIMPLES    CLEAN CUTS & SCRAPES RIGHT AWAY AND DAILY UNTIL HEALED WITH WARM WATER, SOAP & AN ANTISEPTIC    AVOID CONTACT WITH LITTER BOXES, BIRD CAGES, & FISH TANKS    AVOID STANDING WATER, IE., BIRD BATHS, FLOWER POTS/VASES, OR HUMIDIFIERS    WEAR GLOVES WHEN GARDENING OR CLEANING UP AFTER OTHERS, ESPECIALLY BABIES & SMALL CHILDREN    DO NOT EAT RAW FISH, SEAFOOD, MEAT, OR EGGS     .FALL PREVENTION   Falls often occur due to slipping, tripping or losing your balance. Here are ways to reduce your risk of falling again.   Was there anything that caused your fall that can be fixed, removed or replaced?   Make your home safe by keeping walkways clear of objects you may trip over.   Use non-slip pads under rugs.   Do not walk in poorly lit areas.   Do not stand on chairs or wobbly ladders.   Use caution when reaching overhead or looking upward. This position can cause a loss of balance.   Be sure your shoes fit properly, have non-slip bottoms and are in good condition.   Be cautious when going up and down stairs, curbs,  and when walking on uneven sidewalks.   If your balance is poor, consider using a cane or walker.   If your fall was related to alcohol use, stop or limit alcohol intake.   If your fall was related to use of sleeping medicines, talk to your doctor about this. You may need to reduce your dosage at bedtime if you awaken during the night to go to the bathroom.   To reduce the need for nighttime bathroom trips:   Avoid drinking fluids for several hours before going to bed   Empty your bladder before going to bed   Men can keep a urinal at the bedside   © 6532-8499 [facility], [address] [place] [address]. All rights reserved. This information is not intended as a substitute for professional medical care. Always follow your healthcare professional's instructions.

## 2024-01-18 ENCOUNTER — LAB VISIT (OUTPATIENT)
Dept: LAB | Facility: HOSPITAL | Age: 54
End: 2024-01-18
Attending: INTERNAL MEDICINE
Payer: COMMERCIAL

## 2024-01-18 DIAGNOSIS — E11.9 TYPE 2 DIABETES MELLITUS WITHOUT COMPLICATION: ICD-10-CM

## 2024-01-18 DIAGNOSIS — C34.32 MALIGNANT NEOPLASM OF LOWER LOBE OF LEFT LUNG: ICD-10-CM

## 2024-01-18 LAB
ALBUMIN SERPL BCP-MCNC: 3 G/DL (ref 3.5–5.2)
ALP SERPL-CCNC: 103 U/L (ref 55–135)
ALT SERPL W/O P-5'-P-CCNC: 20 U/L (ref 10–44)
ANION GAP SERPL CALC-SCNC: 9 MMOL/L (ref 8–16)
AST SERPL-CCNC: 13 U/L (ref 10–40)
BASOPHILS # BLD AUTO: 0.04 K/UL (ref 0–0.2)
BASOPHILS NFR BLD: 0.5 % (ref 0–1.9)
BILIRUB SERPL-MCNC: 0.4 MG/DL (ref 0.1–1)
BUN SERPL-MCNC: 13 MG/DL (ref 6–20)
CALCIUM SERPL-MCNC: 8.5 MG/DL (ref 8.7–10.5)
CHLORIDE SERPL-SCNC: 101 MMOL/L (ref 95–110)
CO2 SERPL-SCNC: 31 MMOL/L (ref 23–29)
CREAT SERPL-MCNC: 0.9 MG/DL (ref 0.5–1.4)
DIFFERENTIAL METHOD BLD: ABNORMAL
EOSINOPHIL # BLD AUTO: 0.2 K/UL (ref 0–0.5)
EOSINOPHIL NFR BLD: 2.7 % (ref 0–8)
ERYTHROCYTE [DISTWIDTH] IN BLOOD BY AUTOMATED COUNT: 12.6 % (ref 11.5–14.5)
EST. GFR  (NO RACE VARIABLE): >60 ML/MIN/1.73 M^2
GLUCOSE SERPL-MCNC: 197 MG/DL (ref 70–110)
HCT VFR BLD AUTO: 36.7 % (ref 37–48.5)
HGB BLD-MCNC: 12.4 G/DL (ref 12–16)
IMM GRANULOCYTES # BLD AUTO: 0.02 K/UL (ref 0–0.04)
IMM GRANULOCYTES NFR BLD AUTO: 0.3 % (ref 0–0.5)
LYMPHOCYTES # BLD AUTO: 2.4 K/UL (ref 1–4.8)
LYMPHOCYTES NFR BLD: 30.7 % (ref 18–48)
MCH RBC QN AUTO: 27.9 PG (ref 27–31)
MCHC RBC AUTO-ENTMCNC: 33.8 G/DL (ref 32–36)
MCV RBC AUTO: 83 FL (ref 82–98)
MONOCYTES # BLD AUTO: 0.5 K/UL (ref 0.3–1)
MONOCYTES NFR BLD: 6.2 % (ref 4–15)
NEUTROPHILS # BLD AUTO: 4.6 K/UL (ref 1.8–7.7)
NEUTROPHILS NFR BLD: 59.6 % (ref 38–73)
NRBC BLD-RTO: 0 /100 WBC
PLATELET # BLD AUTO: 188 K/UL (ref 150–450)
PMV BLD AUTO: 11.3 FL (ref 9.2–12.9)
POTASSIUM SERPL-SCNC: 3.5 MMOL/L (ref 3.5–5.1)
PROT SERPL-MCNC: 6.2 G/DL (ref 6–8.4)
RBC # BLD AUTO: 4.45 M/UL (ref 4–5.4)
SODIUM SERPL-SCNC: 141 MMOL/L (ref 136–145)
WBC # BLD AUTO: 7.76 K/UL (ref 3.9–12.7)

## 2024-01-18 PROCEDURE — 82043 UR ALBUMIN QUANTITATIVE: CPT | Performed by: NURSE PRACTITIONER

## 2024-01-18 PROCEDURE — 85025 COMPLETE CBC W/AUTO DIFF WBC: CPT | Performed by: INTERNAL MEDICINE

## 2024-01-18 PROCEDURE — 83036 HEMOGLOBIN GLYCOSYLATED A1C: CPT | Performed by: NURSE PRACTITIONER

## 2024-01-18 PROCEDURE — 36415 COLL VENOUS BLD VENIPUNCTURE: CPT | Performed by: INTERNAL MEDICINE

## 2024-01-18 PROCEDURE — 80061 LIPID PANEL: CPT | Performed by: NURSE PRACTITIONER

## 2024-01-18 PROCEDURE — 80053 COMPREHEN METABOLIC PANEL: CPT | Performed by: INTERNAL MEDICINE

## 2024-01-19 ENCOUNTER — OFFICE VISIT (OUTPATIENT)
Dept: HEMATOLOGY/ONCOLOGY | Facility: CLINIC | Age: 54
End: 2024-01-19
Payer: COMMERCIAL

## 2024-01-19 ENCOUNTER — INFUSION (OUTPATIENT)
Dept: INFUSION THERAPY | Facility: HOSPITAL | Age: 54
End: 2024-01-19
Attending: RADIOLOGY
Payer: COMMERCIAL

## 2024-01-19 VITALS
WEIGHT: 293 LBS | BODY MASS INDEX: 50.02 KG/M2 | OXYGEN SATURATION: 97 % | TEMPERATURE: 97 F | RESPIRATION RATE: 16 BRPM | DIASTOLIC BLOOD PRESSURE: 60 MMHG | SYSTOLIC BLOOD PRESSURE: 103 MMHG | HEIGHT: 64 IN | HEART RATE: 57 BPM

## 2024-01-19 DIAGNOSIS — R91.8 MULTIPLE LUNG NODULES: ICD-10-CM

## 2024-01-19 DIAGNOSIS — Z79.899 IMMUNODEFICIENCY DUE TO CHEMOTHERAPY: ICD-10-CM

## 2024-01-19 DIAGNOSIS — T45.1X5A IMMUNODEFICIENCY DUE TO CHEMOTHERAPY: ICD-10-CM

## 2024-01-19 DIAGNOSIS — C34.32 MALIGNANT NEOPLASM OF LOWER LOBE OF LEFT LUNG: Primary | ICD-10-CM

## 2024-01-19 DIAGNOSIS — D84.821 IMMUNODEFICIENCY DUE TO CHEMOTHERAPY: ICD-10-CM

## 2024-01-19 LAB
ALBUMIN/CREAT UR: NORMAL UG/MG (ref 0–30)
CHOLEST SERPL-MCNC: 174 MG/DL (ref 120–199)
CHOLEST/HDLC SERPL: 3.6 {RATIO} (ref 2–5)
CREAT UR-MCNC: 26 MG/DL (ref 15–325)
ESTIMATED AVG GLUCOSE: 197 MG/DL (ref 68–131)
HBA1C MFR BLD: 8.5 % (ref 4–5.6)
HDLC SERPL-MCNC: 48 MG/DL (ref 40–75)
HDLC SERPL: 27.6 % (ref 20–50)
LDLC SERPL CALC-MCNC: 114 MG/DL (ref 63–159)
MICROALBUMIN UR DL<=1MG/L-MCNC: <5 UG/ML
NONHDLC SERPL-MCNC: 126 MG/DL
TRIGL SERPL-MCNC: 60 MG/DL (ref 30–150)

## 2024-01-19 PROCEDURE — 3066F NEPHROPATHY DOC TX: CPT | Mod: CPTII,95,, | Performed by: INTERNAL MEDICINE

## 2024-01-19 PROCEDURE — 3061F NEG MICROALBUMINURIA REV: CPT | Mod: CPTII,95,, | Performed by: INTERNAL MEDICINE

## 2024-01-19 PROCEDURE — 99215 OFFICE O/P EST HI 40 MIN: CPT | Mod: 95,,, | Performed by: INTERNAL MEDICINE

## 2024-01-19 PROCEDURE — 96375 TX/PRO/DX INJ NEW DRUG ADDON: CPT

## 2024-01-19 PROCEDURE — 3052F HG A1C>EQUAL 8.0%<EQUAL 9.0%: CPT | Mod: CPTII,95,, | Performed by: INTERNAL MEDICINE

## 2024-01-19 PROCEDURE — 63600175 PHARM REV CODE 636 W HCPCS: Mod: JZ,TB | Performed by: INTERNAL MEDICINE

## 2024-01-19 PROCEDURE — 96413 CHEMO IV INFUSION 1 HR: CPT

## 2024-01-19 PROCEDURE — 96415 CHEMO IV INFUSION ADDL HR: CPT

## 2024-01-19 PROCEDURE — 25000003 PHARM REV CODE 250: Performed by: INTERNAL MEDICINE

## 2024-01-19 PROCEDURE — 96367 TX/PROPH/DG ADDL SEQ IV INF: CPT

## 2024-01-19 RX ORDER — SODIUM CHLORIDE 0.9 % (FLUSH) 0.9 %
10 SYRINGE (ML) INJECTION
Status: CANCELLED | OUTPATIENT
Start: 2024-02-02

## 2024-01-19 RX ORDER — HEPARIN 100 UNIT/ML
500 SYRINGE INTRAVENOUS
Status: DISCONTINUED | OUTPATIENT
Start: 2024-01-19 | End: 2024-01-19 | Stop reason: HOSPADM

## 2024-01-19 RX ORDER — DIPHENHYDRAMINE HYDROCHLORIDE 50 MG/ML
25 INJECTION INTRAMUSCULAR; INTRAVENOUS
Status: COMPLETED | OUTPATIENT
Start: 2024-01-19 | End: 2024-01-19

## 2024-01-19 RX ORDER — EPINEPHRINE 0.3 MG/.3ML
0.3 INJECTION SUBCUTANEOUS ONCE AS NEEDED
Status: CANCELLED | OUTPATIENT
Start: 2024-02-02

## 2024-01-19 RX ORDER — HEPARIN 100 UNIT/ML
500 SYRINGE INTRAVENOUS
Status: CANCELLED | OUTPATIENT
Start: 2024-02-02

## 2024-01-19 RX ORDER — DIPHENHYDRAMINE HYDROCHLORIDE 50 MG/ML
50 INJECTION INTRAMUSCULAR; INTRAVENOUS ONCE AS NEEDED
Status: CANCELLED | OUTPATIENT
Start: 2024-02-02

## 2024-01-19 RX ORDER — DIPHENHYDRAMINE HYDROCHLORIDE 50 MG/ML
25 INJECTION INTRAMUSCULAR; INTRAVENOUS
Status: CANCELLED
Start: 2024-02-02

## 2024-01-19 RX ORDER — ACETAMINOPHEN 325 MG/1
650 TABLET ORAL
Status: COMPLETED | OUTPATIENT
Start: 2024-01-19 | End: 2024-01-19

## 2024-01-19 RX ORDER — ACETAMINOPHEN 325 MG/1
650 TABLET ORAL
Status: CANCELLED
Start: 2024-02-02

## 2024-01-19 RX ADMIN — HEPARIN 500 UNITS: 100 SYRINGE at 01:01

## 2024-01-19 RX ADMIN — DEXAMETHASONE SODIUM PHOSPHATE 0.25 MG: 4 INJECTION, SOLUTION INTRA-ARTICULAR; INTRALESIONAL; INTRAMUSCULAR; INTRAVENOUS; SOFT TISSUE at 10:01

## 2024-01-19 RX ADMIN — AMIVANTAMAB 1400 MG: 350 INJECTION INTRAVENOUS at 10:01

## 2024-01-19 RX ADMIN — ACETAMINOPHEN 650 MG: 325 TABLET ORAL at 10:01

## 2024-01-19 RX ADMIN — SODIUM CHLORIDE: 9 INJECTION, SOLUTION INTRAVENOUS at 10:01

## 2024-01-19 RX ADMIN — DIPHENHYDRAMINE HYDROCHLORIDE 25 MG: 50 INJECTION, SOLUTION INTRAMUSCULAR; INTRAVENOUS at 10:01

## 2024-01-19 NOTE — PROGRESS NOTES
[facility]  [id number] [place] [id number]  Phone: [phone];  Fax: [phone]    Patient ID: Shaneka Ahmadi   Chief Complaint: Follow-up and Lung Cancer  MRN:  [id number]     Oncologic Diagnosis:  Stage IV (cT2, cN2, cM1) NSCLC, metastatic to bone  Previous Treatment:    Carbo/Alimta started in 1/2023; stopped due to progression after 4 cycles     Current Treatment:   OP NSCLC AMIVANTAMAB-VMJW ([facility]) Q4W   OP ZOLEDRONIC ACID (ZOMETA) Q4W   THERAPY SHELL - B12     The patient location is: Home  The chief complaint leading to consultation is: Follow and treatment of lung cancer    Visit type: audiovisual    Face to Face time with patient: 10 minutes  20 minutes of total time spent on the encounter, which includes face to face time and non-face to face time preparing to see the patient (eg, review of tests), Obtaining and/or reviewing separately obtained history, Documenting clinical information in the electronic or other health record, Independently interpreting results (not separately reported) and communicating results to the patient/family/caregiver, or Care coordination (not separately reported).     Each patient to whom he or she provides medical services by telemedicine is:  (1) informed of the relationship between the physician and patient and the respective role of any other health care provider with respect to management of the patient; and (2) notified that he or she may decline to receive medical services by telemedicine and may withdraw from such care at any time.    Subjective   Shaneka Ahmadi is a 54 y.o. female who presents to clinic for follow-up.    She is doing well on amivantamab. She has no acute complaints.  Labs reviewed and stable for treatment. She will see her dentist in February once her insurance kicks in.      Review of Systems:  Review of Systems   Constitutional:  Negative for activity change, appetite change, chills, diaphoresis,  fatigue, fever and unexpected weight change.   HENT:  Negative for nosebleeds.    Respiratory:  Negative for shortness of breath.    Cardiovascular:  Negative for chest pain.   Gastrointestinal:  Negative for abdominal distention, abdominal pain, anal bleeding, blood in stool, constipation, diarrhea, nausea and vomiting.   Genitourinary:  Negative for difficulty urinating and hematuria.   Musculoskeletal:  Negative for arthralgias, back pain and myalgias.   Skin:  Positive for rash (right side face).   Neurological:  Negative for dizziness, weakness, light-headedness and headaches.   Hematological:  Does not bruise/bleed easily.   Psychiatric/Behavioral:  The patient is not nervous/anxious.      History     Oncology History   Malignant neoplasm of lower lobe of left lung   12/9/2022 Initial Diagnosis    Malignant neoplasm of lower lobe of left lung     12/9/2022 Cancer Staged    Staging form: Lung, AJCC 8th Edition  - Clinical stage from 12/9/2022: Stage IV (cT2, cN2, cM1)     12/27/2022 - 12/27/2022 Chemotherapy    Treatment Summary   Plan Name: OP PEMBROLIZUMAB 400MG Q6W  Treatment Goal: Control  Status: Inactive  Start Date:   End Date:   Provider: Reese Mcfarlane MD  Chemotherapy: [No matching medication found in this treatment plan]      Genetic Testing    Patient has genetic testing done for  TEmpus peripheral blood.                                              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     1/18/2023 - 1/18/2023 Chemotherapy    Treatment Summary   Plan Name: OP NSCLC AMIVANTAMAB-VMJW ([facility]) Q4W  Treatment Goal: Palliative  Status: Inactive  Start Date:   End Date:   Provider: Reese Mcfarlane MD  Chemotherapy: amivantamab-vmjw ([facility]) 350 mg in sodium chloride 0.9% SolP 250 mL chemo infusion, 350 mg (100 % of original dose 350 mg), Intravenous, Clinic/HOD 1 time, 0 of 13 cycles  Dose modification: 350 mg (original dose 350 mg,  Cycle 1), 1,050 mg (original dose 1,050 mg, Cycle 1), 1,400 mg (original dose 1,400 mg, Cycle 1)     1/27/2023 - 3/31/2023 Chemotherapy    Treatment Summary   Plan Name: OP NSCLC PEMETREXED + CARBOPLATIN (AUC) Q3W  Treatment Goal: Control  Status: Inactive  Start Date: 1/27/2023  End Date: 3/31/2023  Provider: Reese Mcfarlane MD  Chemotherapy: CARBOplatin (PARAPLATIN) 750 mg in sodium chloride 0.9% 335 mL chemo infusion, 750 mg (100 % of original dose 750 mg), Intravenous, Clinic/HOD 1 time, 4 of 4 cycles  Dose modification:   (original dose 750 mg, Cycle 1, Reason: MD Discretion)  Administration: 750 mg (1/27/2023), 750 mg (2/17/2023), 750 mg (3/31/2023), 750 mg (3/10/2023)  PEMEtrexed disodium (ALIMTA) 1,200 mg in sodium chloride 0.9% SolP 100 mL chemo infusion, 1,250 mg, Intravenous, Clinic/HOD 1 time, 4 of 4 cycles  Administration: 1,200 mg (1/27/2023), 1,200 mg (2/17/2023), 1,200 mg (3/31/2023), 1,200 mg (3/10/2023)     4/27/2023 -  Chemotherapy    Treatment Summary   Plan Name: OP NSCLC AMIVANTAMAB-VMJW ([facility]) Q4W  Treatment Goal: Palliative  Status: Active  Start Date: 4/27/2023  End Date: 6/7/2024 (Planned)  Provider: Reese Mcfarlane MD  Chemotherapy: amivantamab-vmjw ([facility]) 350 mg in sodium chloride 0.9% SolP 250 mL chemo infusion, 350 mg (100 % of original dose 350 mg), Intravenous, Clinic/HOD 1 time, 10 of 15 cycles  Dose modification: 350 mg (original dose 350 mg, Cycle 1), 1,050 mg (original dose 1,050 mg, Cycle 1), 1,400 mg (original dose 1,400 mg, Cycle 1)  Administration: 350 mg (4/27/2023), 1,050 mg (4/28/2023), 1,400 mg (5/4/2023), 1,400 mg (5/11/2023), 1,400 mg (5/18/2023), 1,400 mg (5/25/2023), 1,400 mg (6/8/2023), 1,400 mg (6/22/2023), 1,400 mg (7/21/2023), 1,400 mg (8/4/2023), 1,400 mg (8/18/2023), 1,400 mg (9/1/2023), 1,400 mg (9/15/2023), 1,400 mg (9/29/2023), 1,400 mg (10/13/2023), 1,400 mg (10/27/2023), 1,400 mg (11/10/2023), 1,400 mg (11/24/2023), 1,400 mg (12/8/2023), 1,400 mg  (12/22/2023), 1,400 mg (1/5/2024), 1,400 mg (1/19/2024)           Past Medical History:   Diagnosis Date    Diabetes mellitus     Hypertension     Malignant neoplasm of lower lobe of left lung 12/9/2022    Rash, drug 7/6/2023    OP NSCLC AMIVANTAMAB-VMJW ([facility]) Q4W      Secondary malignant neoplasm of bone 12/5/2022       Past Surgical History:   Procedure Laterality Date    CATARACT EXTRACTION W/  INTRAOCULAR LENS IMPLANT Right 06/02/2022    FLUOROSCOPY N/A 1/26/2023    Procedure: FLUOROSCOPY/mediport placement;  Surgeon: Marlon Leone MD;  Location: [facility] CATH LAB;  Service: General;  Laterality: N/A;    TUBAL LIGATION      2007--postpartum tubal ligation       Family History   Problem Relation Age of Onset    Heart failure Father        Review of patient's allergies indicates:   Allergen Reactions    Lisinopril Other (See Comments)     coughing    Amoxicillin        Social History     Tobacco Use    Smoking status: Never     Passive exposure: Never    Smokeless tobacco: Never   Substance Use Topics    Alcohol use: Never    Drug use: Never       Labs   Labs:  Lab Visit on 01/18/2024   Component Date Value Ref Range Status    Cholesterol 01/18/2024 174  120 - 199 mg/dL Final    Comment: The National Cholesterol Education Program (NCEP) has set the  following guidelines (reference ranges) for Cholesterol:  Optimal.....................<200 mg/dL  Borderline High.............200-239 mg/dL  High........................> or = 240 mg/dL      Triglycerides 01/18/2024 60  30 - 150 mg/dL Final    Comment: The National Cholesterol Education Program (NCEP) has set the  following guidelines (reference values) for triglycerides:  Normal......................<150 mg/dL  Borderline High.............150-199 mg/dL  High........................200-499 mg/dL      HDL 01/18/2024 48  40 - 75 mg/dL Final    Comment: The National Cholesterol Education Program (NCEP) has set the  following guidelines (reference values) for HDL  Cholesterol:  Low...............<40 mg/dL  Optimal...........>60 mg/dL      LDL Cholesterol 01/18/2024 114.0  63.0 - 159.0 mg/dL Final    Comment: The National Cholesterol Education Program (NCEP) has set the  following guidelines (reference values) for LDL Cholesterol:  Optimal.......................<130 mg/dL  Borderline High...............130-159 mg/dL  High..........................160-189 mg/dL  Very High.....................>190 mg/dL      HDL/Cholesterol Ratio 01/18/2024 27.6  20.0 - 50.0 % Final    Total Cholesterol/HDL Ratio 01/18/2024 3.6  2.0 - 5.0 Final    Non-HDL Cholesterol 01/18/2024 126  mg/dL Final    Comment: Risk category and Non-HDL cholesterol goals:  Coronary heart disease (CHD)or equivalent (10-year risk of CHD >20%):  Non-HDL cholesterol goal     <130 mg/dL  Two or more CHD risk factors and 10-year risk of CHD <= 20%:  Non-HDL cholesterol goal     <160 mg/dL  0 to 1 CHD risk factor:  Non-HDL cholesterol goal     <190 mg/dL      Sodium 01/18/2024 141  136 - 145 mmol/L Final    Potassium 01/18/2024 3.5  3.5 - 5.1 mmol/L Final    Chloride 01/18/2024 101  95 - 110 mmol/L Final    CO2 01/18/2024 31 (H)  23 - 29 mmol/L Final    Glucose 01/18/2024 197 (H)  70 - 110 mg/dL Final    BUN 01/18/2024 13  6 - 20 mg/dL Final    Creatinine 01/18/2024 0.9  0.5 - 1.4 mg/dL Final    Calcium 01/18/2024 8.5 (L)  8.7 - 10.5 mg/dL Final    Total Protein 01/18/2024 6.2  6.0 - 8.4 g/dL Final    Albumin 01/18/2024 3.0 (L)  3.5 - 5.2 g/dL Final    Total Bilirubin 01/18/2024 0.4  0.1 - 1.0 mg/dL Final    Comment: For infants and newborns, interpretation of results should be based  on gestational age, weight and in agreement with clinical  observations.    Premature Infant recommended reference ranges:  Up to 24 hours.............<8.0 mg/dL  Up to 48 hours............<12.0 mg/dL  3-5 days..................<15.0 mg/dL  6-29 days.................<15.0 mg/dL      Alkaline Phosphatase 01/18/2024 103  55 - 135 U/L Final     AST 01/18/2024 13  10 - 40 U/L Final    ALT 01/18/2024 20  10 - 44 U/L Final    eGFR 01/18/2024 >60  >60 mL/min/1.73 m^2 Final    Anion Gap 01/18/2024 9  8 - 16 mmol/L Final    WBC 01/18/2024 7.76  3.90 - 12.70 K/uL Final    RBC 01/18/2024 4.45  4.00 - 5.40 M/uL Final    Hemoglobin 01/18/2024 12.4  12.0 - 16.0 g/dL Final    Hematocrit 01/18/2024 36.7 (L)  37.0 - 48.5 % Final    MCV 01/18/2024 83  82 - 98 fL Final    MCH 01/18/2024 27.9  27.0 - 31.0 pg Final    MCHC 01/18/2024 33.8  32.0 - 36.0 g/dL Final    RDW 01/18/2024 12.6  11.5 - 14.5 % Final    Platelets 01/18/2024 188  150 - 450 K/uL Final    MPV 01/18/2024 11.3  9.2 - 12.9 fL Final    Immature Granulocytes 01/18/2024 0.3  0.0 - 0.5 % Final    Gran # (ANC) 01/18/2024 4.6  1.8 - 7.7 K/uL Final    Immature Grans (Abs) 01/18/2024 0.02  0.00 - 0.04 K/uL Final    Comment: Mild elevation in immature granulocytes is non specific and   can be seen in a variety of conditions including stress response,   acute inflammation, trauma and pregnancy. Correlation with other   laboratory and clinical findings is essential.      Lymph # 01/18/2024 2.4  1.0 - 4.8 K/uL Final    Mono # 01/18/2024 0.5  0.3 - 1.0 K/uL Final    Eos # 01/18/2024 0.2  0.0 - 0.5 K/uL Final    Baso # 01/18/2024 0.04  0.00 - 0.20 K/uL Final    nRBC 01/18/2024 0  0 /100 WBC Final    Gran % 01/18/2024 59.6  38.0 - 73.0 % Final    Lymph % 01/18/2024 30.7  18.0 - 48.0 % Final    Mono % 01/18/2024 6.2  4.0 - 15.0 % Final    Eosinophil % 01/18/2024 2.7  0.0 - 8.0 % Final    Basophil % 01/18/2024 0.5  0.0 - 1.9 % Final    Differential Method 01/18/2024 Automated   Final    Hemoglobin A1C 01/18/2024 8.5 (H)  4.0 - 5.6 % Final    Comment: ADA Screening Guidelines:  5.7-6.4%  Consistent with prediabetes  >or=6.5%  Consistent with diabetes    High levels of fetal hemoglobin interfere with the HbA1C  assay. Heterozygous hemoglobin variants (HbS, HgC, etc)do  not significantly interfere with this assay.   However,  presence of multiple variants may affect accuracy.      Estimated Avg Glucose 01/18/2024 197 (H)  68 - 131 mg/dL Final    Microalbumin, Urine 01/18/2024 <5.0  ug/mL Final    Creatinine, Urine 01/18/2024 26.0  15.0 - 325.0 mg/dL Final    Microalb/Creat Ratio 01/18/2024 Unable to calculate  0.0 - 30.0 ug/mg Final        Imaging   CT CHEST ABDOMEN PELVIS WITH CONTRAST (XPD) - 10/19/23     CLINICAL HISTORY:  Metastatic disease evaluation;Malignant neoplasm of lower lobe, left bronchus or lung     TECHNIQUE:  Low dose axial images, sagittal and coronal reformations were obtained from the thoracic inlet to the pubic synthesis following the IV administration of 100 mL of Omnipaque 350.  Oral contrast also administered.  All CT scans at this location are performed using dose modulation techniques as appropriate to a performed exam including the following: Automated exposure control; adjustment of the mA and/or kV  according to patient size.     COMPARISON:  07/11/2023.  12/02/2022     FINDINGS:  Chest:     Thoracic soft tissues: No significant abnormality.     Aorta: Normal in course and caliber, without significant atherosclerotic plaque. There are three branching vessels at the arch.     Heart: Normal in size. No pericardial effusion.  Mild aortic and coronary artery atherosclerotic calcification.     Brooke/Mediastinum: No significant lymphadenopathy .  No measurable hilar lesion.     Lungs: Unchanged left lung base volume loss and bronchovascular crowding secondary to elevation left hemidiaphragm.  No measurable mass lesion.  Right lung base benign calcified granuloma.     Abdomen Pelvis:     Liver: Unchanged 9 mm nodule posterior to the inferior right liver.     Gallbladder: No calcified gallstones.     Bile Ducts: No evidence of dilated ducts.     Pancreas: No mass or peripancreatic fat stranding.     Spleen: Unremarkable.     Adrenals: Unchanged benign-appearing 2.1 cm left adrenal fluid density nodule.     Kidneys/  Ureters: Punctate right upper pole nonobstructing renal stone.  Normal in size and location. Normal concentration and excretion of contrast. No hydronephrosis or nephrolithiasis. No ureteral dilatation. Left upper pole 12 mm benign angiomyolipoma.     Bladder: No evidence of wall thickening.     Reproductive organs: Fibroid uterus.     GI Tract/Mesentery: No evidence of bowel obstruction or inflammation. Large volume stool throughout the colon     Peritoneal Space: No ascites. No free air.     Retroperitoneum: No significant adenopathy.     Abdominal wall: Unremarkable.     Vasculature: No significant atherosclerosis or aneurysm.     Bones: No acute fracture. Unchanged widely disseminated osteoblastic metastatic disease.  No new lesions.     Impression:  Stable exam compared to 07/11/2023.     Assessment and Plan   Stage IV (cT2, cN2, cM1) NSCLC, Metastatic to Bone  Exon 20 EGFR mutation positive   Previously on Carbo/Alimta started in 1/2023; stopped due to progression after 4 cycles and started on Amivantamab  Due for repeat imaging - CT CAP scheduled 01/30/24  Tolerating Amivantamab well; has mild intermittent rash on right side of face but no other notable side effects      Metastatic Bone Disease  Patient has yet to start bisphosphonate therapy as she has some ongoing dental issues pending completion of dental work and dental clearance  She will obtain dental work tentatively in the next 4-8 weeks; new dental insurance starts Feb 1 so she will see the dentist after that  Continue Calcium and Vitamin D/Weight Bearing Exercise      Chronic Medical Conditions  DM II  Hx of COVID-19        Med Onc Chart Routing      Follow up with physician 2 weeks.   Follow up with DOLLY    Infusion scheduling note   Amivatimab today and in 2 weeks   Injection scheduling note    Labs CMP and CBC   Scheduling:  Preferred lab:  Lab interval:     Imaging    Pharmacy appointment    Other referrals                     The patient was  seen, interviewed and examined. Pertinent lab and radiologic studies were reviewed. Pt instructed to call should they develop concerning signs/symptoms or have further questions.        Portions of the record may have been created with voice recognition software. Occasional wrong-word or sound-a-like substitutions may have occurred due to the inherent limitations of voice recognition software. Read the chart carefully and recognize, using context, where substitutions have occurred.      Mirtha Wilhelm MD    Hematology/Oncology

## 2024-01-19 NOTE — DISCHARGE INSTRUCTIONS
.[facility]  [address] [facility]  [address]  [phone] phone     [phone] fax  Hours of Operation: Monday- Friday 8:00am- 5:00pm  After hours phone  [phone]  Hematology / Oncology Physicians on call    Dr. Denys Mendez           Nurse Practitioners:     Nanci Hawley, BRISA Caicedo, TATIANNA Islas, BRISA Kwon, BRISA Blackmon, NP    Please don't hesitate to call if you have any concerns.      FALL PREVENTION   Falls often occur due to slipping, tripping or losing your balance. Here are ways to reduce your risk of falling again.   Was there anything that caused your fall that can be fixed, removed or replaced?   Make your home safe by keeping walkways clear of objects you may trip over.   Use non-slip pads under rugs.   Do not walk in poorly lit areas.   Do not stand on chairs or wobbly ladders.   Use caution when reaching overhead or looking upward. This position can cause a loss of balance.   Be sure your shoes fit properly, have non-slip bottoms and are in good condition.   Be cautious when going up and down stairs, curbs, and when walking on uneven sidewalks.   If your balance is poor, consider using a cane or walker.   If your fall was related to alcohol use, stop or limit alcohol intake.   If your fall was related to use of sleeping medicines, talk to your doctor about this. You may need to reduce your dosage at bedtime if you awaken during the night to go to the bathroom.   To reduce the need for nighttime bathroom trips:   Avoid drinking fluids for several hours before going to bed   Empty your bladder before going to bed   Men can keep a urinal at the bedside   © 7739-0405 [employer], [address] [place] [address], [place] [address]. All rights reserved. This information is not intended as a substitute for professional medical care. Always follow your healthcare  professional's instructions.    WAYS TO HELP PREVENT INFECTION        WASH YOUR HANDS OFTEN DURING THE DAY, ESPECIALLY BEFORE YOU EAT, AFTER USING THE BATHROOM, AND AFTER TOUCHING ANIMALS    STAY AWAY FROM PEOPLE WHO HAVE ILLNESSES YOU CAN CATCH; SUCH AS COLDS, FLU, CHICKEN POX    TRY TO AVOID CROWDS    STAY AWAY FROM CHILDREN WHO RECENTLY HAVE RECEIVED LIVE VIRUS VACCINES    MAINTAIN GOOD MOUTH CARE    DO NOT SQUEEZE OR SCRATCH PIMPLES    CLEAN CUTS & SCRAPES RIGHT AWAY AND DAILY UNTIL HEALED WITH WARM WATER, SOAP & AN ANTISEPTIC    AVOID CONTACT WITH LITTER BOXES, BIRD CAGES, & FISH TANKS    AVOID STANDING WATER, IE., BIRD BATHS, FLOWER POTS/VASES, OR HUMIDIFIERS    WEAR GLOVES WHEN GARDENING OR CLEANING UP AFTER OTHERS, ESPECIALLY BABIES & SMALL CHILDREN    DO NOT EAT RAW FISH, SEAFOOD, MEAT, OR EGGS

## 2024-01-19 NOTE — PLAN OF CARE
Problem: Adult Inpatient Plan of Care  Goal: Plan of Care Review  Outcome: Ongoing, Progressing  Flowsheets (Taken 1/19/2024 1342)  Plan of Care Reviewed With:   patient   spouse  Goal: Patient-Specific Goal (Individualized)  Outcome: Ongoing, Progressing  Flowsheets (Taken 1/19/2024 1342)  Anxieties, Fears or Concerns: no concerns expresssed  Individualized Care Needs: reclined legs, pillow and warm blankets provided. Apple juice and fritos given per her request.  Goal: Optimal Comfort and Wellbeing  Outcome: Ongoing, Progressing  Intervention: Monitor Pain and Promote Comfort  Flowsheets (Taken 1/19/2024 1344)  Pain Management Interventions:   warm blanket provided   pillow support provided  Intervention: Provide Person-Centered Care  Flowsheets (Taken 1/19/2024 1344)  Trust Relationship/Rapport:   care explained   reassurance provided   choices provided   thoughts/feelings acknowledged   emotional support provided   questions answered   questions encouraged   empathic listening provided     Problem: Fall Injury Risk  Goal: Absence of Fall and Fall-Related Injury  Outcome: Ongoing, Progressing  Intervention: Identify and Manage Contributors  Flowsheets (Taken 1/19/2024 1344)  Self-Care Promotion: BADL personal objects within reach  Medication Review/Management:   medications reviewed   infusion initiated  Intervention: Promote Injury-Free Environment  Flowsheets (Taken 1/19/2024 1344)  Safety Promotion/Fall Prevention:   in recliner, wheels locked   lighting adjusted   medications reviewed   instructed to call staff for mobility     Problem: Nausea and Vomiting (Chemotherapy Effects)  Goal: Fluid and Electrolyte Balance  Outcome: Ongoing, Progressing  Intervention: Prevent and Manage Nausea and Vomiting  Flowsheets (Taken 1/19/2024 1344)  Environmental Support:   calm environment promoted   rest periods encouraged

## 2024-01-22 ENCOUNTER — HOSPITAL ENCOUNTER (OUTPATIENT)
Dept: RADIOLOGY | Facility: HOSPITAL | Age: 54
Discharge: HOME | End: 2024-01-22
Attending: OBSTETRICS & GYNECOLOGY
Payer: COMMERCIAL

## 2024-01-22 VITALS — WEIGHT: 293 LBS | BODY MASS INDEX: 50.02 KG/M2 | HEIGHT: 64 IN

## 2024-01-22 DIAGNOSIS — Z12.39 ENCOUNTER FOR SCREENING FOR MALIGNANT NEOPLASM OF BREAST, UNSPECIFIED SCREENING MODALITY: ICD-10-CM

## 2024-01-22 PROCEDURE — 77063 BREAST TOMOSYNTHESIS BI: CPT | Mod: 26,,, | Performed by: RADIOLOGY

## 2024-01-22 PROCEDURE — 77067 SCR MAMMO BI INCL CAD: CPT | Mod: 26,,, | Performed by: RADIOLOGY

## 2024-01-22 PROCEDURE — 77067 SCR MAMMO BI INCL CAD: CPT | Mod: TC

## 2024-01-26 ENCOUNTER — OFFICE VISIT (OUTPATIENT)
Dept: INTERNAL MEDICINE | Facility: CLINIC | Age: 54
End: 2024-01-26
Payer: COMMERCIAL

## 2024-01-26 VITALS
WEIGHT: 293 LBS | BODY MASS INDEX: 54.83 KG/M2 | SYSTOLIC BLOOD PRESSURE: 126 MMHG | OXYGEN SATURATION: 97 % | RESPIRATION RATE: 20 BRPM | TEMPERATURE: 98 F | DIASTOLIC BLOOD PRESSURE: 80 MMHG

## 2024-01-26 DIAGNOSIS — E66.01 MORBID OBESITY: ICD-10-CM

## 2024-01-26 DIAGNOSIS — Z00.00 ANNUAL PHYSICAL EXAM: Primary | ICD-10-CM

## 2024-01-26 DIAGNOSIS — I10 ESSENTIAL HYPERTENSION: ICD-10-CM

## 2024-01-26 DIAGNOSIS — E11.9 TYPE 2 DIABETES MELLITUS WITHOUT COMPLICATION, WITHOUT LONG-TERM CURRENT USE OF INSULIN: ICD-10-CM

## 2024-01-26 PROCEDURE — 1159F MED LIST DOCD IN RCRD: CPT | Mod: CPTII,S$GLB,, | Performed by: NURSE PRACTITIONER

## 2024-01-26 PROCEDURE — 99999 PR PBB SHADOW E&M-EST. PATIENT-LVL V: CPT | Mod: PBBFAC,,, | Performed by: NURSE PRACTITIONER

## 2024-01-26 PROCEDURE — 3066F NEPHROPATHY DOC TX: CPT | Mod: CPTII,S$GLB,, | Performed by: NURSE PRACTITIONER

## 2024-01-26 PROCEDURE — 1160F RVW MEDS BY RX/DR IN RCRD: CPT | Mod: CPTII,S$GLB,, | Performed by: NURSE PRACTITIONER

## 2024-01-26 PROCEDURE — 3079F DIAST BP 80-89 MM HG: CPT | Mod: CPTII,S$GLB,, | Performed by: NURSE PRACTITIONER

## 2024-01-26 PROCEDURE — 3061F NEG MICROALBUMINURIA REV: CPT | Mod: CPTII,S$GLB,, | Performed by: NURSE PRACTITIONER

## 2024-01-26 PROCEDURE — 3052F HG A1C>EQUAL 8.0%<EQUAL 9.0%: CPT | Mod: CPTII,S$GLB,, | Performed by: NURSE PRACTITIONER

## 2024-01-26 PROCEDURE — 3074F SYST BP LT 130 MM HG: CPT | Mod: CPTII,S$GLB,, | Performed by: NURSE PRACTITIONER

## 2024-01-26 PROCEDURE — 3008F BODY MASS INDEX DOCD: CPT | Mod: CPTII,S$GLB,, | Performed by: NURSE PRACTITIONER

## 2024-01-26 PROCEDURE — 99396 PREV VISIT EST AGE 40-64: CPT | Mod: S$GLB,,, | Performed by: NURSE PRACTITIONER

## 2024-01-26 RX ORDER — AMLODIPINE BESYLATE 10 MG/1
10 TABLET ORAL DAILY
Qty: 90 TABLET | Refills: 3 | Status: SHIPPED | OUTPATIENT
Start: 2024-01-26

## 2024-01-26 RX ORDER — LOSARTAN POTASSIUM AND HYDROCHLOROTHIAZIDE 25; 100 MG/1; MG/1
1 TABLET ORAL DAILY
Qty: 90 TABLET | Refills: 3 | Status: SHIPPED | OUTPATIENT
Start: 2024-01-26

## 2024-01-26 RX ORDER — ATENOLOL 50 MG/1
TABLET ORAL
Qty: 180 TABLET | Refills: 3 | Status: SHIPPED | OUTPATIENT
Start: 2024-01-26

## 2024-01-26 RX ORDER — ROSUVASTATIN CALCIUM 5 MG/1
5 TABLET, COATED ORAL DAILY
Qty: 90 TABLET | Refills: 3 | Status: SHIPPED | OUTPATIENT
Start: 2024-01-26 | End: 2025-01-25

## 2024-01-26 NOTE — PROGRESS NOTES
Subjective     Patient ID: Shaneka Ahmadi is a 54 y.o. female.    Chief Complaint: Annual Exam    Patient presents for annual exam.  Doing well.     Medical history:  Hypertension, type 2 diabetes, COVID, Obesity, Neoplasm of the left lower lobe lung, Hypokalemia, Malignant neoplasm of bone    Up-to-date with Oncology Hematology provider.  Has chemotherapy every other week.  HERON    Reviewed labs with patient.  A1C increased 8.5%.  Taking 1 metformin twice a day.  Reports more intake of sugary drink.    Had mammo at [facility].  Waiting on report.      Review of Systems   Constitutional:  Negative for activity change and unexpected weight change.   HENT:  Negative for hearing loss, rhinorrhea and trouble swallowing.    Eyes:  Negative for discharge and visual disturbance.   Respiratory:  Positive for wheezing. Negative for chest tightness.    Cardiovascular:  Negative for chest pain and palpitations.   Gastrointestinal:  Negative for blood in stool, constipation, diarrhea and vomiting.   Endocrine: Negative for polydipsia and polyuria.   Genitourinary:  Negative for difficulty urinating, dysuria, hematuria and menstrual problem.   Musculoskeletal:  Negative for arthralgias, joint swelling and neck pain.   Neurological:  Negative for weakness and headaches.   Psychiatric/Behavioral:  Negative for confusion, dysphoric mood and sleep disturbance. The patient is not nervous/anxious.           Objective     Physical Exam  Vitals reviewed.   Constitutional:       Appearance: She is obese.   HENT:      Head: Normocephalic.      Right Ear: Tympanic membrane normal.      Left Ear: Tympanic membrane normal.      Nose: Nose normal.   Eyes:      Pupils: Pupils are equal, round, and reactive to light.   Cardiovascular:      Rate and Rhythm: Normal rate and regular rhythm.   Pulmonary:      Effort: Pulmonary effort is normal.      Breath sounds: Normal breath sounds.   Abdominal:      General: Bowel sounds are normal. There is  no distension.      Tenderness: There is no abdominal tenderness.   Musculoskeletal:         General: Normal range of motion.   Skin:     General: Skin is warm.   Neurological:      General: No focal deficit present.      Mental Status: She is alert and oriented to person, place, and time.   Psychiatric:         Mood and Affect: Mood normal.            Assessment and Plan     1. Annual physical exam    2. Essential hypertension  Comments:  Stable.  Continue current treatment plan.   Orders:  -     amLODIPine (NORVASC) 10 MG tablet; Take 1 tablet (10 mg total) by mouth once daily.  Dispense: 90 tablet; Refill: 3  -     atenoloL (TENORMIN) 50 MG tablet; Take 1 tab by mouth twice a day  Dispense: 180 tablet; Refill: 3  -     losartan-hydrochlorothiazide 100-25 mg (HYZAAR) 100-25 mg per tablet; Take 1 tablet by mouth once daily.  Dispense: 90 tablet; Refill: 3    3. Type 2 diabetes mellitus without complication, without long-term current use of insulin  -     rosuvastatin (CRESTOR) 5 MG tablet; Take 1 tablet (5 mg total) by mouth once daily.  Dispense: 90 tablet; Refill: 3  -     HEMOGLOBIN A1C; Future; Expected date: 01/26/2024      SCHEDULE ANNUAL EYE EXAM AND PAP SOON.          Follow up in about 3 months (around 4/26/2024).

## 2024-01-27 NOTE — PROGRESS NOTES
I am happy to report that your recent breast imaging did NOT show evidence of cancer. An annual mammogram is the best test to screen for breast cancer, but it is not perfect, and it can miss some cancers. So, even though your mammogram was normal, if you notice any lump or change in one of your breasts, please schedule an appointment with me for a proper evaluation. Thank you for letting me care for you. I look forward to seeing you again. Sincerely, Dr. Catalina Arriaga

## 2024-01-30 ENCOUNTER — HOSPITAL ENCOUNTER (OUTPATIENT)
Dept: RADIOLOGY | Facility: HOSPITAL | Age: 54
Discharge: HOME | End: 2024-01-30
Attending: INTERNAL MEDICINE
Payer: COMMERCIAL

## 2024-01-30 DIAGNOSIS — C34.32 MALIGNANT NEOPLASM OF LOWER LOBE OF LEFT LUNG: ICD-10-CM

## 2024-01-30 PROCEDURE — A9698 NON-RAD CONTRAST MATERIALNOC: HCPCS | Performed by: INTERNAL MEDICINE

## 2024-01-30 PROCEDURE — 74177 CT ABD & PELVIS W/CONTRAST: CPT | Mod: 26,,, | Performed by: RADIOLOGY

## 2024-01-30 PROCEDURE — 71260 CT THORAX DX C+: CPT | Mod: 26,,, | Performed by: RADIOLOGY

## 2024-01-30 PROCEDURE — 74177 CT ABD & PELVIS W/CONTRAST: CPT | Mod: TC

## 2024-01-30 PROCEDURE — 25500020 PHARM REV CODE 255: Performed by: INTERNAL MEDICINE

## 2024-01-30 RX ADMIN — IOHEXOL 1000 ML: 12 SOLUTION ORAL at 08:01

## 2024-01-30 RX ADMIN — IOHEXOL 100 ML: 350 INJECTION, SOLUTION INTRAVENOUS at 09:01

## 2024-02-01 ENCOUNTER — LAB VISIT (OUTPATIENT)
Dept: LAB | Facility: HOSPITAL | Age: 54
End: 2024-02-01
Attending: INTERNAL MEDICINE
Payer: COMMERCIAL

## 2024-02-01 DIAGNOSIS — C34.32 MALIGNANT NEOPLASM OF LOWER LOBE OF LEFT LUNG: ICD-10-CM

## 2024-02-01 LAB
ALBUMIN SERPL BCP-MCNC: 2.8 G/DL (ref 3.5–5.2)
ALP SERPL-CCNC: 96 U/L (ref 55–135)
ALT SERPL W/O P-5'-P-CCNC: 17 U/L (ref 10–44)
ANION GAP SERPL CALC-SCNC: 7 MMOL/L (ref 8–16)
AST SERPL-CCNC: 13 U/L (ref 10–40)
BASOPHILS # BLD AUTO: 0.04 K/UL (ref 0–0.2)
BASOPHILS NFR BLD: 0.5 % (ref 0–1.9)
BILIRUB SERPL-MCNC: 0.4 MG/DL (ref 0.1–1)
BUN SERPL-MCNC: 14 MG/DL (ref 6–20)
CALCIUM SERPL-MCNC: 8.3 MG/DL (ref 8.7–10.5)
CHLORIDE SERPL-SCNC: 104 MMOL/L (ref 95–110)
CO2 SERPL-SCNC: 28 MMOL/L (ref 23–29)
CREAT SERPL-MCNC: 0.9 MG/DL (ref 0.5–1.4)
DIFFERENTIAL METHOD BLD: ABNORMAL
EOSINOPHIL # BLD AUTO: 0.2 K/UL (ref 0–0.5)
EOSINOPHIL NFR BLD: 2.9 % (ref 0–8)
ERYTHROCYTE [DISTWIDTH] IN BLOOD BY AUTOMATED COUNT: 12.7 % (ref 11.5–14.5)
EST. GFR  (NO RACE VARIABLE): >60 ML/MIN/1.73 M^2
GLUCOSE SERPL-MCNC: 202 MG/DL (ref 70–110)
HCT VFR BLD AUTO: 36.1 % (ref 37–48.5)
HGB BLD-MCNC: 12.2 G/DL (ref 12–16)
IMM GRANULOCYTES # BLD AUTO: 0.01 K/UL (ref 0–0.04)
IMM GRANULOCYTES NFR BLD AUTO: 0.1 % (ref 0–0.5)
LYMPHOCYTES # BLD AUTO: 1.9 K/UL (ref 1–4.8)
LYMPHOCYTES NFR BLD: 25.6 % (ref 18–48)
MCH RBC QN AUTO: 27.6 PG (ref 27–31)
MCHC RBC AUTO-ENTMCNC: 33.8 G/DL (ref 32–36)
MCV RBC AUTO: 82 FL (ref 82–98)
MONOCYTES # BLD AUTO: 0.5 K/UL (ref 0.3–1)
MONOCYTES NFR BLD: 7.1 % (ref 4–15)
NEUTROPHILS # BLD AUTO: 4.7 K/UL (ref 1.8–7.7)
NEUTROPHILS NFR BLD: 63.8 % (ref 38–73)
NRBC BLD-RTO: 0 /100 WBC
PLATELET # BLD AUTO: 171 K/UL (ref 150–450)
PMV BLD AUTO: 11 FL (ref 9.2–12.9)
POTASSIUM SERPL-SCNC: 3.8 MMOL/L (ref 3.5–5.1)
PROT SERPL-MCNC: 5.9 G/DL (ref 6–8.4)
RBC # BLD AUTO: 4.42 M/UL (ref 4–5.4)
SODIUM SERPL-SCNC: 139 MMOL/L (ref 136–145)
WBC # BLD AUTO: 7.31 K/UL (ref 3.9–12.7)

## 2024-02-01 PROCEDURE — 36415 COLL VENOUS BLD VENIPUNCTURE: CPT | Performed by: INTERNAL MEDICINE

## 2024-02-01 PROCEDURE — 85025 COMPLETE CBC W/AUTO DIFF WBC: CPT | Performed by: INTERNAL MEDICINE

## 2024-02-01 PROCEDURE — 80053 COMPREHEN METABOLIC PANEL: CPT | Performed by: INTERNAL MEDICINE

## 2024-02-02 ENCOUNTER — OFFICE VISIT (OUTPATIENT)
Dept: HEMATOLOGY/ONCOLOGY | Facility: CLINIC | Age: 54
End: 2024-02-02
Payer: COMMERCIAL

## 2024-02-02 ENCOUNTER — INFUSION (OUTPATIENT)
Dept: INFUSION THERAPY | Facility: HOSPITAL | Age: 54
End: 2024-02-02
Attending: RADIOLOGY
Payer: COMMERCIAL

## 2024-02-02 VITALS
DIASTOLIC BLOOD PRESSURE: 73 MMHG | RESPIRATION RATE: 18 BRPM | TEMPERATURE: 98 F | OXYGEN SATURATION: 99 % | HEART RATE: 61 BPM | SYSTOLIC BLOOD PRESSURE: 118 MMHG | BODY MASS INDEX: 50.02 KG/M2 | WEIGHT: 293 LBS | HEIGHT: 64 IN

## 2024-02-02 VITALS
TEMPERATURE: 97 F | BODY MASS INDEX: 50.02 KG/M2 | SYSTOLIC BLOOD PRESSURE: 99 MMHG | HEART RATE: 56 BPM | DIASTOLIC BLOOD PRESSURE: 65 MMHG | WEIGHT: 293 LBS | HEIGHT: 64 IN | OXYGEN SATURATION: 95 % | RESPIRATION RATE: 16 BRPM

## 2024-02-02 DIAGNOSIS — T45.1X5A IMMUNODEFICIENCY DUE TO CHEMOTHERAPY: ICD-10-CM

## 2024-02-02 DIAGNOSIS — Z79.899 IMMUNODEFICIENCY DUE TO CHEMOTHERAPY: ICD-10-CM

## 2024-02-02 DIAGNOSIS — D84.821 IMMUNODEFICIENCY DUE TO CHEMOTHERAPY: ICD-10-CM

## 2024-02-02 DIAGNOSIS — C34.32 MALIGNANT NEOPLASM OF LOWER LOBE OF LEFT LUNG: Primary | ICD-10-CM

## 2024-02-02 DIAGNOSIS — C34.32 MALIGNANT NEOPLASM OF LOWER LOBE OF LEFT LUNG: ICD-10-CM

## 2024-02-02 DIAGNOSIS — C79.51 SECONDARY MALIGNANT NEOPLASM OF BONE: Primary | ICD-10-CM

## 2024-02-02 PROCEDURE — 96413 CHEMO IV INFUSION 1 HR: CPT

## 2024-02-02 PROCEDURE — 3052F HG A1C>EQUAL 8.0%<EQUAL 9.0%: CPT | Mod: CPTII,S$GLB,, | Performed by: INTERNAL MEDICINE

## 2024-02-02 PROCEDURE — 99215 OFFICE O/P EST HI 40 MIN: CPT | Mod: S$GLB,,, | Performed by: INTERNAL MEDICINE

## 2024-02-02 PROCEDURE — 25000003 PHARM REV CODE 250: Performed by: INTERNAL MEDICINE

## 2024-02-02 PROCEDURE — 63600175 PHARM REV CODE 636 W HCPCS: Performed by: INTERNAL MEDICINE

## 2024-02-02 PROCEDURE — 3078F DIAST BP <80 MM HG: CPT | Mod: CPTII,S$GLB,, | Performed by: INTERNAL MEDICINE

## 2024-02-02 PROCEDURE — 3061F NEG MICROALBUMINURIA REV: CPT | Mod: CPTII,S$GLB,, | Performed by: INTERNAL MEDICINE

## 2024-02-02 PROCEDURE — 3074F SYST BP LT 130 MM HG: CPT | Mod: CPTII,S$GLB,, | Performed by: INTERNAL MEDICINE

## 2024-02-02 PROCEDURE — 3066F NEPHROPATHY DOC TX: CPT | Mod: CPTII,S$GLB,, | Performed by: INTERNAL MEDICINE

## 2024-02-02 PROCEDURE — 96375 TX/PRO/DX INJ NEW DRUG ADDON: CPT

## 2024-02-02 PROCEDURE — 1159F MED LIST DOCD IN RCRD: CPT | Mod: CPTII,S$GLB,, | Performed by: INTERNAL MEDICINE

## 2024-02-02 PROCEDURE — 96417 CHEMO IV INFUS EACH ADDL SEQ: CPT

## 2024-02-02 PROCEDURE — 96367 TX/PROPH/DG ADDL SEQ IV INF: CPT

## 2024-02-02 PROCEDURE — 3008F BODY MASS INDEX DOCD: CPT | Mod: CPTII,S$GLB,, | Performed by: INTERNAL MEDICINE

## 2024-02-02 PROCEDURE — 99999 PR PBB SHADOW E&M-EST. PATIENT-LVL III: CPT | Mod: PBBFAC,,, | Performed by: INTERNAL MEDICINE

## 2024-02-02 PROCEDURE — 96415 CHEMO IV INFUSION ADDL HR: CPT

## 2024-02-02 RX ORDER — HEPARIN 100 UNIT/ML
500 SYRINGE INTRAVENOUS
Status: CANCELLED | OUTPATIENT
Start: 2024-02-16

## 2024-02-02 RX ORDER — EPINEPHRINE 0.3 MG/.3ML
0.3 INJECTION SUBCUTANEOUS ONCE AS NEEDED
Status: CANCELLED | OUTPATIENT
Start: 2024-02-16

## 2024-02-02 RX ORDER — DIPHENHYDRAMINE HYDROCHLORIDE 50 MG/ML
25 INJECTION INTRAMUSCULAR; INTRAVENOUS
Status: CANCELLED
Start: 2024-02-16

## 2024-02-02 RX ORDER — ACETAMINOPHEN 325 MG/1
650 TABLET ORAL
Status: COMPLETED | OUTPATIENT
Start: 2024-02-02 | End: 2024-02-02

## 2024-02-02 RX ORDER — SODIUM CHLORIDE 0.9 % (FLUSH) 0.9 %
10 SYRINGE (ML) INJECTION
Status: CANCELLED | OUTPATIENT
Start: 2024-02-16

## 2024-02-02 RX ORDER — DIPHENHYDRAMINE HYDROCHLORIDE 50 MG/ML
50 INJECTION INTRAMUSCULAR; INTRAVENOUS ONCE AS NEEDED
Status: CANCELLED | OUTPATIENT
Start: 2024-02-16

## 2024-02-02 RX ORDER — ACETAMINOPHEN 325 MG/1
650 TABLET ORAL
Status: CANCELLED
Start: 2024-02-16

## 2024-02-02 RX ORDER — DIPHENHYDRAMINE HYDROCHLORIDE 50 MG/ML
25 INJECTION INTRAMUSCULAR; INTRAVENOUS
Status: COMPLETED | OUTPATIENT
Start: 2024-02-02 | End: 2024-02-02

## 2024-02-02 RX ORDER — HEPARIN 100 UNIT/ML
500 SYRINGE INTRAVENOUS
Status: DISCONTINUED | OUTPATIENT
Start: 2024-02-02 | End: 2024-02-02 | Stop reason: HOSPADM

## 2024-02-02 RX ADMIN — AMIVANTAMAB 1400 MG: 350 INJECTION INTRAVENOUS at 10:02

## 2024-02-02 RX ADMIN — ACETAMINOPHEN 650 MG: 325 TABLET ORAL at 09:02

## 2024-02-02 RX ADMIN — DIPHENHYDRAMINE HYDROCHLORIDE 25 MG: 50 INJECTION, SOLUTION INTRAMUSCULAR; INTRAVENOUS at 09:02

## 2024-02-02 RX ADMIN — DEXAMETHASONE SODIUM PHOSPHATE 0.25 MG: 4 INJECTION, SOLUTION INTRA-ARTICULAR; INTRALESIONAL; INTRAMUSCULAR; INTRAVENOUS; SOFT TISSUE at 09:02

## 2024-02-02 RX ADMIN — HEPARIN 500 UNITS: 100 SYRINGE at 12:02

## 2024-02-02 NOTE — PLAN OF CARE
Problem: Adult Inpatient Plan of Care  Goal: Plan of Care Review  Outcome: Ongoing, Progressing  Flowsheets (Taken 2/2/2024 0954)  Plan of Care Reviewed With: patient  Goal: Patient-Specific Goal (Individualized)  Outcome: Ongoing, Progressing  Flowsheets (Taken 2/2/2024 0954)  Anxieties, Fears or Concerns: no  Individualized Care Needs: feet up, pillow, warms  Goal: Optimal Comfort and Wellbeing  Outcome: Ongoing, Progressing  Intervention: Provide Person-Centered Care  Flowsheets (Taken 2/2/2024 0954)  Trust Relationship/Rapport:   care explained   reassurance provided   choices provided   thoughts/feelings acknowledged   emotional support provided   empathic listening provided   questions answered   questions encouraged     Problem: Fall Injury Risk  Goal: Absence of Fall and Fall-Related Injury  Outcome: Ongoing, Progressing  Intervention: Identify and Manage Contributors  Flowsheets (Taken 2/2/2024 0954)  Self-Care Promotion: BADL personal routines maintained  Medication Review/Management: medications reviewed  Intervention: Promote Injury-Free Environment  Flowsheets (Taken 2/2/2024 0954)  Safety Promotion/Fall Prevention:   in recliner, wheels locked   nonskid shoes/socks when out of bed

## 2024-02-02 NOTE — DISCHARGE INSTRUCTIONS
THANKS FOR ALLOWING ME TO CARE FOR YOU TODAY!!!!! ~ABHI          THANKS FOR CHOOSING [facility]!!!          [facility]  [address] [facility]  [id number] [place] [address] Drive  [phone] phone     [phone] fax  Hours of Operation: Monday- Friday 8:00am- 5:00pm  After hours phone  [phone]  Hematology / Oncology Physicians on call      SHAINA Banks Dr., NP Sydney Prescott, BRISA Monteiro, MARCIO Tucker    Please call with any concerns regarding your appointment today.

## 2024-02-02 NOTE — PROGRESS NOTES
[facility]  [address] [facility] [address]  Phone: [phone];  Fax: [phone]    Patient ID: Shaneka Ahmadi   Chief Complaint: Follow-up and Lung Cancer  MRN:  [id number]     Oncologic Diagnosis:  Stage IV (cT2, cN2, cM1) NSCLC, metastatic to bone  Previous Treatment:    Carbo/Alimta started in 1/2023; stopped due to progression after 4 cycles     Current Treatment:   OP NSCLC AMIVANTAMAB-VMJW ([facility]) Q2W   OP ZOLEDRONIC ACID (ZOMETA) Q4W   THERAPY SHELL - B12   Subjective   Shaneka Ahmadi is a 54 y.o. female who presents to clinic for follow-up.    She is doing well on amivantamab. She has no acute complaints.  Labs reviewed and stable for treatment. CT CAP reviewed and stable.  She will see her dentist this month so that we can start Zometa.  We will plan to have her do her treatment next treatment without MD/DOLLY visit as she is doing very well.      Review of Systems:  Review of Systems   Constitutional:  Negative for activity change, appetite change, chills, diaphoresis, fatigue, fever and unexpected weight change.   HENT:  Negative for nosebleeds.    Respiratory:  Negative for shortness of breath.    Cardiovascular:  Negative for chest pain.   Gastrointestinal:  Negative for abdominal distention, abdominal pain, anal bleeding, blood in stool, constipation, diarrhea, nausea and vomiting.   Genitourinary:  Negative for difficulty urinating and hematuria.   Musculoskeletal:  Negative for arthralgias, back pain and myalgias.   Skin:  Positive for rash (right side face).   Neurological:  Negative for dizziness, weakness, light-headedness and headaches.   Hematological:  Does not bruise/bleed easily.   Psychiatric/Behavioral:  The patient is not nervous/anxious.      History     Oncology History   Malignant neoplasm of lower lobe of left lung   12/9/2022 Initial Diagnosis    Malignant neoplasm of lower lobe of left lung     12/9/2022 Cancer Staged    Staging form:  Lung, AJCC 8th Edition  - Clinical stage from 12/9/2022: Stage IV (cT2, cN2, cM1)     12/27/2022 - 12/27/2022 Chemotherapy    Treatment Summary   Plan Name: OP PEMBROLIZUMAB 400MG Q6W  Treatment Goal: Control  Status: Inactive  Start Date:   End Date:   Provider: Reese Mcfarlane MD  Chemotherapy: [No matching medication found in this treatment plan]      Genetic Testing    Patient has genetic testing done for  TEmpus peripheral blood.                                              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     1/18/2023 - 1/18/2023 Chemotherapy    Treatment Summary   Plan Name: OP NSCLC AMIVANTAMAB-VMJW ([facility]) Q4W  Treatment Goal: Palliative  Status: Inactive  Start Date:   End Date:   Provider: Reese Mcfarlane MD  Chemotherapy: amivantamab-vmjw ([employer]ANT) 350 mg in sodium chloride 0.9% SolP 250 mL chemo infusion, 350 mg (100 % of original dose 350 mg), Intravenous, Clinic/HOD 1 time, 0 of 13 cycles  Dose modification: 350 mg (original dose 350 mg, Cycle 1), 1,050 mg (original dose 1,050 mg, Cycle 1), 1,400 mg (original dose 1,400 mg, Cycle 1)     1/27/2023 - 3/31/2023 Chemotherapy    Treatment Summary   Plan Name: OP NSCLC PEMETREXED + CARBOPLATIN (AUC) Q3W  Treatment Goal: Control  Status: Inactive  Start Date: 1/27/2023  End Date: 3/31/2023  Provider: Reese Mcfarlane MD  Chemotherapy: CARBOplatin (PARAPLATIN) 750 mg in sodium chloride 0.9% 335 mL chemo infusion, 750 mg (100 % of original dose 750 mg), Intravenous, Clinic/HOD 1 time, 4 of 4 cycles  Dose modification:   (original dose 750 mg, Cycle 1, Reason: MD Discretion)  Administration: 750 mg (1/27/2023), 750 mg (2/17/2023), 750 mg (3/31/2023), 750 mg (3/10/2023)  PEMEtrexed disodium (ALIMTA) 1,200 mg in sodium chloride 0.9% SolP 100 mL chemo infusion, 1,250 mg, Intravenous, Clinic/HOD 1 time, 4 of 4 cycles  Administration: 1,200 mg (1/27/2023), 1,200 mg (2/17/2023), 1,200 mg  (3/31/2023), 1,200 mg (3/10/2023)     4/27/2023 -  Chemotherapy    Treatment Summary   Plan Name: OP NSCLC AMIVANTAMAB-VMJW ([facility]) Q4W  Treatment Goal: Palliative  Status: Active  Start Date: 4/27/2023  End Date: 6/7/2024 (Planned)  Provider: Reese Mcfarlane MD  Chemotherapy: amivantamab-vmjw (RYBREVANT) 350 mg in sodium chloride 0.9% SolP 250 mL chemo infusion, 350 mg (100 % of original dose 350 mg), Intravenous, Clinic/hospitals 1 time, 10 of 15 cycles  Dose modification: 350 mg (original dose 350 mg, Cycle 1), 1,050 mg (original dose 1,050 mg, Cycle 1), 1,400 mg (original dose 1,400 mg, Cycle 1)  Administration: 350 mg (4/27/2023), 1,050 mg (4/28/2023), 1,400 mg (5/4/2023), 1,400 mg (5/11/2023), 1,400 mg (5/18/2023), 1,400 mg (5/25/2023), 1,400 mg (6/8/2023), 1,400 mg (6/22/2023), 1,400 mg (7/21/2023), 1,400 mg (8/4/2023), 1,400 mg (8/18/2023), 1,400 mg (9/1/2023), 1,400 mg (9/15/2023), 1,400 mg (9/29/2023), 1,400 mg (10/13/2023), 1,400 mg (10/27/2023), 1,400 mg (11/10/2023), 1,400 mg (11/24/2023), 1,400 mg (12/8/2023), 1,400 mg (12/22/2023), 1,400 mg (1/5/2024), 1,400 mg (1/19/2024)           Past Medical History:   Diagnosis Date    Diabetes mellitus     Hypertension     Malignant neoplasm of lower lobe of left lung 12/9/2022    Rash, drug 7/6/2023    OP NSCLC AMIVANTAMAB-VMJW ([facility]) Q4W      Secondary malignant neoplasm of bone 12/5/2022       Past Surgical History:   Procedure Laterality Date    CATARACT EXTRACTION W/  INTRAOCULAR LENS IMPLANT Right 06/02/2022    FLUOROSCOPY N/A 1/26/2023    Procedure: FLUOROSCOPY/mediport placement;  Surgeon: Marlon Leone MD;  Location: [facility] CATH LAB;  Service: General;  Laterality: N/A;    TUBAL LIGATION      2007--postpartum tubal ligation       Family History   Problem Relation Age of Onset    Heart failure Father        Review of patient's allergies indicates:   Allergen Reactions    Lisinopril Other (See Comments)     coughing    Amoxicillin        Social  History     Tobacco Use    Smoking status: Never     Passive exposure: Never    Smokeless tobacco: Never   Substance Use Topics    Alcohol use: Never    Drug use: Never       Labs   Labs:  Lab Visit on 02/01/2024   Component Date Value Ref Range Status    Sodium 02/01/2024 139  136 - 145 mmol/L Final    Potassium 02/01/2024 3.8  3.5 - 5.1 mmol/L Final    Chloride 02/01/2024 104  95 - 110 mmol/L Final    CO2 02/01/2024 28  23 - 29 mmol/L Final    Glucose 02/01/2024 202 (H)  70 - 110 mg/dL Final    BUN 02/01/2024 14  6 - 20 mg/dL Final    Creatinine 02/01/2024 0.9  0.5 - 1.4 mg/dL Final    Calcium 02/01/2024 8.3 (L)  8.7 - 10.5 mg/dL Final    Total Protein 02/01/2024 5.9 (L)  6.0 - 8.4 g/dL Final    Albumin 02/01/2024 2.8 (L)  3.5 - 5.2 g/dL Final    Total Bilirubin 02/01/2024 0.4  0.1 - 1.0 mg/dL Final    Comment: For infants and newborns, interpretation of results should be based  on gestational age, weight and in agreement with clinical  observations.    Premature Infant recommended reference ranges:  Up to 24 hours.............<8.0 mg/dL  Up to 48 hours............<12.0 mg/dL  3-5 days..................<15.0 mg/dL  6-29 days.................<15.0 mg/dL      Alkaline Phosphatase 02/01/2024 96  55 - 135 U/L Final    AST 02/01/2024 13  10 - 40 U/L Final    ALT 02/01/2024 17  10 - 44 U/L Final    eGFR 02/01/2024 >60  >60 mL/min/1.73 m^2 Final    Anion Gap 02/01/2024 7 (L)  8 - 16 mmol/L Final    WBC 02/01/2024 7.31  3.90 - 12.70 K/uL Final    RBC 02/01/2024 4.42  4.00 - 5.40 M/uL Final    Hemoglobin 02/01/2024 12.2  12.0 - 16.0 g/dL Final    Hematocrit 02/01/2024 36.1 (L)  37.0 - 48.5 % Final    MCV 02/01/2024 82  82 - 98 fL Final    MCH 02/01/2024 27.6  27.0 - 31.0 pg Final    MCHC 02/01/2024 33.8  32.0 - 36.0 g/dL Final    RDW 02/01/2024 12.7  11.5 - 14.5 % Final    Platelets 02/01/2024 171  150 - 450 K/uL Final    MPV 02/01/2024 11.0  9.2 - 12.9 fL Final    Immature Granulocytes 02/01/2024 0.1  0.0 - 0.5 % Final     Gran # (ANC) 02/01/2024 4.7  1.8 - 7.7 K/uL Final    Immature Grans (Abs) 02/01/2024 0.01  0.00 - 0.04 K/uL Final    Comment: Mild elevation in immature granulocytes is non specific and   can be seen in a variety of conditions including stress response,   acute inflammation, trauma and pregnancy. Correlation with other   laboratory and clinical findings is essential.      Lymph # 02/01/2024 1.9  1.0 - 4.8 K/uL Final    Mono # 02/01/2024 0.5  0.3 - 1.0 K/uL Final    Eos # 02/01/2024 0.2  0.0 - 0.5 K/uL Final    Baso # 02/01/2024 0.04  0.00 - 0.20 K/uL Final    nRBC 02/01/2024 0  0 /100 WBC Final    Gran % 02/01/2024 63.8  38.0 - 73.0 % Final    Lymph % 02/01/2024 25.6  18.0 - 48.0 % Final    Mono % 02/01/2024 7.1  4.0 - 15.0 % Final    Eosinophil % 02/01/2024 2.9  0.0 - 8.0 % Final    Basophil % 02/01/2024 0.5  0.0 - 1.9 % Final    Differential Method 02/01/2024 Automated   Final        Imaging   CT CHEST ABDOMEN PELVIS WITH CONTRAST (XPD) - 10/19/23     CLINICAL HISTORY:  Metastatic disease evaluation;Malignant neoplasm of lower lobe, left bronchus or lung     TECHNIQUE:  Low dose axial images, sagittal and coronal reformations were obtained from the thoracic inlet to the pubic synthesis following the IV administration of 100 mL of Omnipaque 350.  Oral contrast also administered.  All CT scans at this location are performed using dose modulation techniques as appropriate to a performed exam including the following: Automated exposure control; adjustment of the mA and/or kV  according to patient size.     COMPARISON:  07/11/2023.  12/02/2022     FINDINGS:  Chest:     Thoracic soft tissues: No significant abnormality.     Aorta: Normal in course and caliber, without significant atherosclerotic plaque. There are three branching vessels at the arch.     Heart: Normal in size. No pericardial effusion.  Mild aortic and coronary artery atherosclerotic calcification.     Brooke/Mediastinum: No significant lymphadenopathy .   No measurable hilar lesion.     Lungs: Unchanged left lung base volume loss and bronchovascular crowding secondary to elevation left hemidiaphragm.  No measurable mass lesion.  Right lung base benign calcified granuloma.     Abdomen Pelvis:     Liver: Unchanged 9 mm nodule posterior to the inferior right liver.     Gallbladder: No calcified gallstones.     Bile Ducts: No evidence of dilated ducts.     Pancreas: No mass or peripancreatic fat stranding.     Spleen: Unremarkable.     Adrenals: Unchanged benign-appearing 2.1 cm left adrenal fluid density nodule.     Kidneys/ Ureters: Punctate right upper pole nonobstructing renal stone.  Normal in size and location. Normal concentration and excretion of contrast. No hydronephrosis or nephrolithiasis. No ureteral dilatation. Left upper pole 12 mm benign angiomyolipoma.     Bladder: No evidence of wall thickening.     Reproductive organs: Fibroid uterus.     GI Tract/Mesentery: No evidence of bowel obstruction or inflammation. Large volume stool throughout the colon     Peritoneal Space: No ascites. No free air.     Retroperitoneum: No significant adenopathy.     Abdominal wall: Unremarkable.     Vasculature: No significant atherosclerosis or aneurysm.     Bones: No acute fracture. Unchanged widely disseminated osteoblastic metastatic disease.  No new lesions.     Impression:  Stable exam compared to 07/11/2023.     Assessment and Plan   Stage IV (cT2, cN2, cM1) NSCLC, Metastatic to Bone  Exon 20 EGFR mutation positive   Previously on Carbo/Alimta started in 1/2023; stopped due to progression after 4 cycles and started on Amivantamab  CT CAP scheduled 01/30/24: stable  Tolerating Amivantamab well; has mild intermittent rash on right side of face but no other notable side effects      Metastatic Bone Disease  Patient has yet to start bisphosphonate therapy as she has some ongoing dental issues pending completion of dental work and dental clearance  She will obtain dental  work tentatively in the next 4-8 weeks; new dental insurance starts Feb 1 so she will see the dentist after that  Continue Calcium and Vitamin D/Weight Bearing Exercise      Chronic Medical Conditions  DM II  Hx of COVID-19        Med Onc Chart Routing      Follow up with physician 2 months.   Follow up with DOLLY 4 weeks.   Infusion scheduling note   Amivantimab today and q2w   Injection scheduling note    Labs CBC, CMP, magnesium and phosphorus   Scheduling:  Preferred lab:  Lab interval:     Imaging    Pharmacy appointment    Other referrals                     The patient was seen, interviewed and examined. Pertinent lab and radiologic studies were reviewed. Pt instructed to call should they develop concerning signs/symptoms or have further questions.        Portions of the record may have been created with voice recognition software. Occasional wrong-word or sound-a-like substitutions may have occurred due to the inherent limitations of voice recognition software. Read the chart carefully and recognize, using context, where substitutions have occurred.      Mirtha Wilhelm MD    Hematology/Oncology

## 2024-02-09 ENCOUNTER — OFFICE VISIT (OUTPATIENT)
Dept: INTERNAL MEDICINE | Facility: CLINIC | Age: 54
End: 2024-02-09
Payer: COMMERCIAL

## 2024-02-09 DIAGNOSIS — C34.32 MALIGNANT NEOPLASM OF LOWER LOBE OF LEFT LUNG: ICD-10-CM

## 2024-02-09 DIAGNOSIS — J06.9 VIRAL URI WITH COUGH: Primary | ICD-10-CM

## 2024-02-09 LAB
CTP QC/QA: YES
CTP QC/QA: YES
POC MOLECULAR INFLUENZA A AGN: NEGATIVE
POC MOLECULAR INFLUENZA B AGN: NEGATIVE
SARS-COV-2 RDRP RESP QL NAA+PROBE: NEGATIVE

## 2024-02-09 PROCEDURE — 3066F NEPHROPATHY DOC TX: CPT | Mod: CPTII,95,, | Performed by: FAMILY MEDICINE

## 2024-02-09 PROCEDURE — 87635 SARS-COV-2 COVID-19 AMP PRB: CPT | Mod: QW,NDTC,, | Performed by: FAMILY MEDICINE

## 2024-02-09 PROCEDURE — 87502 INFLUENZA DNA AMP PROBE: CPT | Mod: QW,NDTC,, | Performed by: FAMILY MEDICINE

## 2024-02-09 PROCEDURE — 3061F NEG MICROALBUMINURIA REV: CPT | Mod: CPTII,95,, | Performed by: FAMILY MEDICINE

## 2024-02-09 PROCEDURE — 3052F HG A1C>EQUAL 8.0%<EQUAL 9.0%: CPT | Mod: CPTII,95,, | Performed by: FAMILY MEDICINE

## 2024-02-09 PROCEDURE — 99213 OFFICE O/P EST LOW 20 MIN: CPT | Mod: 95,,, | Performed by: FAMILY MEDICINE

## 2024-02-09 RX ORDER — FOLIC ACID 1 MG/1
1 TABLET ORAL DAILY
Qty: 30 TABLET | Refills: 11 | Status: SHIPPED | OUTPATIENT
Start: 2024-02-09 | End: 2024-04-18 | Stop reason: SDUPTHER

## 2024-02-09 RX ORDER — IPRATROPIUM BROMIDE 21 UG/1
2 SPRAY, METERED NASAL 2 TIMES DAILY
Qty: 30 ML | Refills: 0 | Status: SHIPPED | OUTPATIENT
Start: 2024-02-09 | End: 2024-04-30 | Stop reason: SDUPTHER

## 2024-02-09 RX ORDER — FOLIC ACID 1 MG/1
1000 TABLET ORAL
Qty: 30 TABLET | Refills: 11 | OUTPATIENT
Start: 2024-02-09

## 2024-02-09 NOTE — PROGRESS NOTES
Subjective:       Patient ID: Shaneka Ahmadi is a 54 y.o. female.    Chief Complaint: No chief complaint on file.    Sore Throat   This is a new problem. The current episode started yesterday. The problem has been rapidly worsening. The pain is worse on the right side. There has been no fever. The fever has been present for Less than 1 day. The pain is at a severity of 7/10. The pain is moderate. Associated symptoms include congestion, coughing, a hoarse voice and a plugged ear sensation. Pertinent negatives include no abdominal pain, diarrhea, drooling, ear discharge, ear pain, headaches, neck pain, shortness of breath, stridor, swollen glands, trouble swallowing or vomiting. She has had no exposure to strep or mono. She has tried acetaminophen and gargles (zyrtec) for the symptoms. The treatment provided no relief.     Review of Systems   HENT:  Positive for congestion, hoarse voice and sore throat. Negative for drooling, ear discharge, ear pain and trouble swallowing.    Respiratory:  Positive for cough. Negative for shortness of breath and stridor.    Gastrointestinal:  Negative for abdominal pain, diarrhea and vomiting.   Musculoskeletal:  Negative for neck pain.   Neurological:  Negative for headaches.       Objective:      Physical Exam  HENT:      Head: Normocephalic and atraumatic.   Eyes:      General: No scleral icterus.  Pulmonary:      Effort: Pulmonary effort is normal. No respiratory distress.   Neurological:      Mental Status: She is alert.   Psychiatric:         Mood and Affect: Mood normal.         Behavior: Behavior normal.         Thought Content: Thought content normal.         Judgment: Judgment normal.         Assessment:       1. Viral URI with cough        Plan:   1. Viral URI with cough  Acute, COVID and flu negative, continue symptomatic relief  -     POCT COVID-19 Rapid Screening  -     POCT Influenza A/B Molecular  -     ipratropium (ATROVENT) 21 mcg (0.03 %) nasal spray; 2 sprays by  Each Nostril route 2 (two) times daily.  Dispense: 30 mL; Refill: 0    Follow up if symptoms worsen or fail to improve.    Debi Ace MD  Family Medicine

## 2024-02-15 ENCOUNTER — LAB VISIT (OUTPATIENT)
Dept: LAB | Facility: HOSPITAL | Age: 54
End: 2024-02-15
Attending: INTERNAL MEDICINE
Payer: COMMERCIAL

## 2024-02-15 DIAGNOSIS — C34.32 MALIGNANT NEOPLASM OF LOWER LOBE OF LEFT LUNG: ICD-10-CM

## 2024-02-15 LAB
ALBUMIN SERPL BCP-MCNC: 2.8 G/DL (ref 3.5–5.2)
ALP SERPL-CCNC: 99 U/L (ref 55–135)
ALT SERPL W/O P-5'-P-CCNC: 24 U/L (ref 10–44)
ANION GAP SERPL CALC-SCNC: 9 MMOL/L (ref 8–16)
AST SERPL-CCNC: 14 U/L (ref 10–40)
BASOPHILS # BLD AUTO: 0.04 K/UL (ref 0–0.2)
BASOPHILS NFR BLD: 0.5 % (ref 0–1.9)
BILIRUB SERPL-MCNC: 0.3 MG/DL (ref 0.1–1)
BUN SERPL-MCNC: 18 MG/DL (ref 6–20)
CALCIUM SERPL-MCNC: 8.3 MG/DL (ref 8.7–10.5)
CHLORIDE SERPL-SCNC: 105 MMOL/L (ref 95–110)
CO2 SERPL-SCNC: 30 MMOL/L (ref 23–29)
CREAT SERPL-MCNC: 0.9 MG/DL (ref 0.5–1.4)
DIFFERENTIAL METHOD BLD: ABNORMAL
EOSINOPHIL # BLD AUTO: 0.2 K/UL (ref 0–0.5)
EOSINOPHIL NFR BLD: 2.7 % (ref 0–8)
ERYTHROCYTE [DISTWIDTH] IN BLOOD BY AUTOMATED COUNT: 12.7 % (ref 11.5–14.5)
EST. GFR  (NO RACE VARIABLE): >60 ML/MIN/1.73 M^2
GLUCOSE SERPL-MCNC: 175 MG/DL (ref 70–110)
HCT VFR BLD AUTO: 36.1 % (ref 37–48.5)
HGB BLD-MCNC: 12.1 G/DL (ref 12–16)
IMM GRANULOCYTES # BLD AUTO: 0.01 K/UL (ref 0–0.04)
IMM GRANULOCYTES NFR BLD AUTO: 0.1 % (ref 0–0.5)
LYMPHOCYTES # BLD AUTO: 2.5 K/UL (ref 1–4.8)
LYMPHOCYTES NFR BLD: 30.6 % (ref 18–48)
MCH RBC QN AUTO: 27.8 PG (ref 27–31)
MCHC RBC AUTO-ENTMCNC: 33.5 G/DL (ref 32–36)
MCV RBC AUTO: 83 FL (ref 82–98)
MONOCYTES # BLD AUTO: 0.5 K/UL (ref 0.3–1)
MONOCYTES NFR BLD: 6.5 % (ref 4–15)
NEUTROPHILS # BLD AUTO: 4.9 K/UL (ref 1.8–7.7)
NEUTROPHILS NFR BLD: 59.6 % (ref 38–73)
NRBC BLD-RTO: 0 /100 WBC
PLATELET # BLD AUTO: 191 K/UL (ref 150–450)
PMV BLD AUTO: 11.3 FL (ref 9.2–12.9)
POTASSIUM SERPL-SCNC: 3.7 MMOL/L (ref 3.5–5.1)
PROT SERPL-MCNC: 5.5 G/DL (ref 6–8.4)
RBC # BLD AUTO: 4.36 M/UL (ref 4–5.4)
SODIUM SERPL-SCNC: 144 MMOL/L (ref 136–145)
WBC # BLD AUTO: 8.17 K/UL (ref 3.9–12.7)

## 2024-02-15 PROCEDURE — 85025 COMPLETE CBC W/AUTO DIFF WBC: CPT | Performed by: INTERNAL MEDICINE

## 2024-02-15 PROCEDURE — 36415 COLL VENOUS BLD VENIPUNCTURE: CPT | Performed by: INTERNAL MEDICINE

## 2024-02-15 PROCEDURE — 80053 COMPREHEN METABOLIC PANEL: CPT | Performed by: INTERNAL MEDICINE

## 2024-02-16 ENCOUNTER — INFUSION (OUTPATIENT)
Dept: INFUSION THERAPY | Facility: HOSPITAL | Age: 54
End: 2024-02-16
Attending: RADIOLOGY
Payer: COMMERCIAL

## 2024-02-16 ENCOUNTER — TELEPHONE (OUTPATIENT)
Dept: INFUSION THERAPY | Facility: HOSPITAL | Age: 54
End: 2024-02-16
Payer: COMMERCIAL

## 2024-02-16 VITALS
HEIGHT: 64 IN | SYSTOLIC BLOOD PRESSURE: 105 MMHG | WEIGHT: 293 LBS | DIASTOLIC BLOOD PRESSURE: 61 MMHG | OXYGEN SATURATION: 95 % | RESPIRATION RATE: 116 BRPM | BODY MASS INDEX: 50.02 KG/M2 | TEMPERATURE: 97 F | HEART RATE: 65 BPM

## 2024-02-16 DIAGNOSIS — C34.32 MALIGNANT NEOPLASM OF LOWER LOBE OF LEFT LUNG: Primary | ICD-10-CM

## 2024-02-16 PROCEDURE — 96415 CHEMO IV INFUSION ADDL HR: CPT

## 2024-02-16 PROCEDURE — 96375 TX/PRO/DX INJ NEW DRUG ADDON: CPT

## 2024-02-16 PROCEDURE — 96413 CHEMO IV INFUSION 1 HR: CPT

## 2024-02-16 PROCEDURE — 63600175 PHARM REV CODE 636 W HCPCS: Performed by: INTERNAL MEDICINE

## 2024-02-16 PROCEDURE — 25000003 PHARM REV CODE 250: Performed by: INTERNAL MEDICINE

## 2024-02-16 PROCEDURE — 96367 TX/PROPH/DG ADDL SEQ IV INF: CPT

## 2024-02-16 RX ORDER — ACETAMINOPHEN 325 MG/1
650 TABLET ORAL
Status: COMPLETED | OUTPATIENT
Start: 2024-02-16 | End: 2024-02-16

## 2024-02-16 RX ORDER — HEPARIN 100 UNIT/ML
500 SYRINGE INTRAVENOUS
Status: DISCONTINUED | OUTPATIENT
Start: 2024-02-16 | End: 2024-02-16 | Stop reason: HOSPADM

## 2024-02-16 RX ORDER — DIPHENHYDRAMINE HYDROCHLORIDE 50 MG/ML
25 INJECTION INTRAMUSCULAR; INTRAVENOUS
Status: COMPLETED | OUTPATIENT
Start: 2024-02-16 | End: 2024-02-16

## 2024-02-16 RX ADMIN — SODIUM CHLORIDE: 9 INJECTION, SOLUTION INTRAVENOUS at 12:02

## 2024-02-16 RX ADMIN — DEXAMETHASONE SODIUM PHOSPHATE 0.25 MG: 4 INJECTION, SOLUTION INTRA-ARTICULAR; INTRALESIONAL; INTRAMUSCULAR; INTRAVENOUS; SOFT TISSUE at 12:02

## 2024-02-16 RX ADMIN — DIPHENHYDRAMINE HYDROCHLORIDE 25 MG: 50 INJECTION, SOLUTION INTRAMUSCULAR; INTRAVENOUS at 12:02

## 2024-02-16 RX ADMIN — AMIVANTAMAB 1400 MG: 350 INJECTION INTRAVENOUS at 01:02

## 2024-02-16 RX ADMIN — ACETAMINOPHEN 650 MG: 325 TABLET ORAL at 12:02

## 2024-02-16 RX ADMIN — HEPARIN 500 UNITS: 100 SYRINGE at 03:02

## 2024-02-16 NOTE — PLAN OF CARE
Problem: Adult Inpatient Plan of Care  Goal: Plan of Care Review  Outcome: Ongoing, Progressing  Flowsheets (Taken 2/16/2024 1541)  Plan of Care Reviewed With: patient  Goal: Patient-Specific Goal (Individualized)  Outcome: Ongoing, Progressing  Flowsheets (Taken 2/16/2024 1541)  Anxieties, Fears or Concerns: none expressed  Individualized Care Needs: Elevated feet, pillows, warm blanket, and snacks given  Goal: Optimal Comfort and Wellbeing  Outcome: Ongoing, Progressing  Intervention: Monitor Pain and Promote Comfort  Flowsheets (Taken 2/16/2024 1541)  Pain Management Interventions:   warm blanket provided   quiet environment facilitated  Intervention: Provide Person-Centered Care  Flowsheets (Taken 2/16/2024 1541)  Trust Relationship/Rapport:   care explained   reassurance provided   choices provided   thoughts/feelings acknowledged   emotional support provided   empathic listening provided   questions answered   questions encouraged     Problem: Fall Injury Risk  Goal: Absence of Fall and Fall-Related Injury  Outcome: Ongoing, Progressing  Intervention: Identify and Manage Contributors  Flowsheets (Taken 2/16/2024 1541)  Self-Care Promotion: BADL personal routines maintained  Medication Review/Management:   medications reviewed   infusion initiated  Intervention: Promote Injury-Free Environment  Flowsheets (Taken 2/16/2024 1541)  Safety Promotion/Fall Prevention:   instructed to call staff for mobility   in recliner, wheels locked     Problem: Nausea and Vomiting (Chemotherapy Effects)  Goal: Fluid and Electrolyte Balance  Outcome: Ongoing, Progressing  Intervention: Prevent and Manage Nausea and Vomiting  Flowsheets (Taken 2/16/2024 1541)  Environmental Support: calm environment promoted

## 2024-02-16 NOTE — TELEPHONE ENCOUNTER
Spoke with patient who asks if she could come earlier for her infusion today.  I explained that all chairs are filled for today. She acknowledged. Call ended well.

## 2024-02-23 ENCOUNTER — TELEPHONE (OUTPATIENT)
Dept: INTERNAL MEDICINE | Facility: CLINIC | Age: 54
End: 2024-02-23
Payer: COMMERCIAL

## 2024-02-23 NOTE — TELEPHONE ENCOUNTER
I spoke to the pt and assisted her with rescheduling her appt with Corina LEDEZMA from 04/26/24 to 04/25/24 due to the provider being booked out the morning. She verbalized understanding. //kah

## 2024-02-29 ENCOUNTER — LAB VISIT (OUTPATIENT)
Dept: LAB | Facility: HOSPITAL | Age: 54
End: 2024-02-29
Attending: INTERNAL MEDICINE
Payer: COMMERCIAL

## 2024-02-29 DIAGNOSIS — C79.51 SECONDARY MALIGNANT NEOPLASM OF BONE: ICD-10-CM

## 2024-02-29 LAB
ALBUMIN SERPL BCP-MCNC: 2.8 G/DL (ref 3.5–5.2)
ALP SERPL-CCNC: 107 U/L (ref 55–135)
ALT SERPL W/O P-5'-P-CCNC: 22 U/L (ref 10–44)
ANION GAP SERPL CALC-SCNC: 9 MMOL/L (ref 8–16)
AST SERPL-CCNC: 13 U/L (ref 10–40)
BASOPHILS # BLD AUTO: 0.04 K/UL (ref 0–0.2)
BASOPHILS NFR BLD: 0.6 % (ref 0–1.9)
BILIRUB SERPL-MCNC: 0.4 MG/DL (ref 0.1–1)
BUN SERPL-MCNC: 16 MG/DL (ref 6–20)
CALCIUM SERPL-MCNC: 8.6 MG/DL (ref 8.7–10.5)
CHLORIDE SERPL-SCNC: 104 MMOL/L (ref 95–110)
CO2 SERPL-SCNC: 28 MMOL/L (ref 23–29)
CREAT SERPL-MCNC: 0.9 MG/DL (ref 0.5–1.4)
DIFFERENTIAL METHOD BLD: NORMAL
EOSINOPHIL # BLD AUTO: 0.2 K/UL (ref 0–0.5)
EOSINOPHIL NFR BLD: 3.2 % (ref 0–8)
ERYTHROCYTE [DISTWIDTH] IN BLOOD BY AUTOMATED COUNT: 12.8 % (ref 11.5–14.5)
EST. GFR  (NO RACE VARIABLE): >60 ML/MIN/1.73 M^2
GLUCOSE SERPL-MCNC: 273 MG/DL (ref 70–110)
HCT VFR BLD AUTO: 37.2 % (ref 37–48.5)
HGB BLD-MCNC: 12.4 G/DL (ref 12–16)
IMM GRANULOCYTES # BLD AUTO: 0.01 K/UL (ref 0–0.04)
IMM GRANULOCYTES NFR BLD AUTO: 0.1 % (ref 0–0.5)
LYMPHOCYTES # BLD AUTO: 1.8 K/UL (ref 1–4.8)
LYMPHOCYTES NFR BLD: 25.1 % (ref 18–48)
MAGNESIUM SERPL-MCNC: 1.4 MG/DL (ref 1.6–2.6)
MCH RBC QN AUTO: 27.7 PG (ref 27–31)
MCHC RBC AUTO-ENTMCNC: 33.3 G/DL (ref 32–36)
MCV RBC AUTO: 83 FL (ref 82–98)
MONOCYTES # BLD AUTO: 0.4 K/UL (ref 0.3–1)
MONOCYTES NFR BLD: 5.8 % (ref 4–15)
NEUTROPHILS # BLD AUTO: 4.6 K/UL (ref 1.8–7.7)
NEUTROPHILS NFR BLD: 65.2 % (ref 38–73)
NRBC BLD-RTO: 0 /100 WBC
PHOSPHATE SERPL-MCNC: 3.5 MG/DL (ref 2.7–4.5)
PLATELET # BLD AUTO: 169 K/UL (ref 150–450)
PMV BLD AUTO: 11.7 FL (ref 9.2–12.9)
POTASSIUM SERPL-SCNC: 3.6 MMOL/L (ref 3.5–5.1)
PROT SERPL-MCNC: 6 G/DL (ref 6–8.4)
RBC # BLD AUTO: 4.48 M/UL (ref 4–5.4)
SODIUM SERPL-SCNC: 141 MMOL/L (ref 136–145)
WBC # BLD AUTO: 7.12 K/UL (ref 3.9–12.7)

## 2024-02-29 PROCEDURE — 83735 ASSAY OF MAGNESIUM: CPT | Performed by: INTERNAL MEDICINE

## 2024-02-29 PROCEDURE — 80053 COMPREHEN METABOLIC PANEL: CPT | Performed by: INTERNAL MEDICINE

## 2024-02-29 PROCEDURE — 85025 COMPLETE CBC W/AUTO DIFF WBC: CPT | Performed by: INTERNAL MEDICINE

## 2024-02-29 PROCEDURE — 84100 ASSAY OF PHOSPHORUS: CPT | Performed by: INTERNAL MEDICINE

## 2024-02-29 PROCEDURE — 36415 COLL VENOUS BLD VENIPUNCTURE: CPT | Performed by: INTERNAL MEDICINE

## 2024-03-01 ENCOUNTER — TELEPHONE (OUTPATIENT)
Dept: HEMATOLOGY/ONCOLOGY | Facility: CLINIC | Age: 54
End: 2024-03-01
Payer: COMMERCIAL

## 2024-03-01 ENCOUNTER — INFUSION (OUTPATIENT)
Dept: INFUSION THERAPY | Facility: HOSPITAL | Age: 54
End: 2024-03-01
Attending: RADIOLOGY
Payer: COMMERCIAL

## 2024-03-01 ENCOUNTER — PATIENT MESSAGE (OUTPATIENT)
Dept: HEMATOLOGY/ONCOLOGY | Facility: CLINIC | Age: 54
End: 2024-03-01

## 2024-03-01 ENCOUNTER — OFFICE VISIT (OUTPATIENT)
Dept: HEMATOLOGY/ONCOLOGY | Facility: CLINIC | Age: 54
End: 2024-03-01
Payer: COMMERCIAL

## 2024-03-01 VITALS
DIASTOLIC BLOOD PRESSURE: 60 MMHG | SYSTOLIC BLOOD PRESSURE: 120 MMHG | OXYGEN SATURATION: 98 % | TEMPERATURE: 98 F | HEART RATE: 66 BPM | RESPIRATION RATE: 16 BRPM

## 2024-03-01 VITALS
SYSTOLIC BLOOD PRESSURE: 118 MMHG | DIASTOLIC BLOOD PRESSURE: 63 MMHG | HEART RATE: 59 BPM | HEIGHT: 64 IN | WEIGHT: 293 LBS | BODY MASS INDEX: 50.02 KG/M2 | TEMPERATURE: 97 F | OXYGEN SATURATION: 95 %

## 2024-03-01 DIAGNOSIS — E87.6 HYPOKALEMIA: ICD-10-CM

## 2024-03-01 DIAGNOSIS — C34.32 MALIGNANT NEOPLASM OF LOWER LOBE OF LEFT LUNG: Primary | ICD-10-CM

## 2024-03-01 DIAGNOSIS — C34.32 MALIGNANT NEOPLASM OF LOWER LOBE OF LEFT LUNG: ICD-10-CM

## 2024-03-01 DIAGNOSIS — C79.51 SECONDARY MALIGNANT NEOPLASM OF BONE: Primary | ICD-10-CM

## 2024-03-01 PROCEDURE — 3066F NEPHROPATHY DOC TX: CPT | Mod: CPTII,S$GLB,,

## 2024-03-01 PROCEDURE — 96367 TX/PROPH/DG ADDL SEQ IV INF: CPT

## 2024-03-01 PROCEDURE — 3052F HG A1C>EQUAL 8.0%<EQUAL 9.0%: CPT | Mod: CPTII,S$GLB,,

## 2024-03-01 PROCEDURE — 99215 OFFICE O/P EST HI 40 MIN: CPT | Mod: S$GLB,,,

## 2024-03-01 PROCEDURE — 99999 PR PBB SHADOW E&M-EST. PATIENT-LVL V: CPT | Mod: PBBFAC,,,

## 2024-03-01 PROCEDURE — 96413 CHEMO IV INFUSION 1 HR: CPT

## 2024-03-01 PROCEDURE — 96375 TX/PRO/DX INJ NEW DRUG ADDON: CPT

## 2024-03-01 PROCEDURE — 1160F RVW MEDS BY RX/DR IN RCRD: CPT | Mod: CPTII,S$GLB,,

## 2024-03-01 PROCEDURE — 3008F BODY MASS INDEX DOCD: CPT | Mod: CPTII,S$GLB,,

## 2024-03-01 PROCEDURE — 3078F DIAST BP <80 MM HG: CPT | Mod: CPTII,S$GLB,,

## 2024-03-01 PROCEDURE — 96415 CHEMO IV INFUSION ADDL HR: CPT

## 2024-03-01 PROCEDURE — 25000003 PHARM REV CODE 250

## 2024-03-01 PROCEDURE — 3061F NEG MICROALBUMINURIA REV: CPT | Mod: CPTII,S$GLB,,

## 2024-03-01 PROCEDURE — 63600175 PHARM REV CODE 636 W HCPCS

## 2024-03-01 PROCEDURE — 1159F MED LIST DOCD IN RCRD: CPT | Mod: CPTII,S$GLB,,

## 2024-03-01 PROCEDURE — 3074F SYST BP LT 130 MM HG: CPT | Mod: CPTII,S$GLB,,

## 2024-03-01 RX ORDER — SODIUM CHLORIDE 0.9 % (FLUSH) 0.9 %
10 SYRINGE (ML) INJECTION
Status: CANCELLED | OUTPATIENT
Start: 2024-03-01

## 2024-03-01 RX ORDER — DIPHENHYDRAMINE HYDROCHLORIDE 50 MG/ML
25 INJECTION INTRAMUSCULAR; INTRAVENOUS
Status: CANCELLED
Start: 2024-03-01

## 2024-03-01 RX ORDER — EPINEPHRINE 0.3 MG/.3ML
0.3 INJECTION SUBCUTANEOUS ONCE AS NEEDED
Status: CANCELLED | OUTPATIENT
Start: 2024-03-01

## 2024-03-01 RX ORDER — DIPHENHYDRAMINE HYDROCHLORIDE 50 MG/ML
25 INJECTION INTRAMUSCULAR; INTRAVENOUS
Status: COMPLETED | OUTPATIENT
Start: 2024-03-01 | End: 2024-03-01

## 2024-03-01 RX ORDER — DIPHENHYDRAMINE HYDROCHLORIDE 50 MG/ML
50 INJECTION INTRAMUSCULAR; INTRAVENOUS ONCE AS NEEDED
Status: CANCELLED | OUTPATIENT
Start: 2024-03-01

## 2024-03-01 RX ORDER — HEPARIN 100 UNIT/ML
500 SYRINGE INTRAVENOUS
Status: CANCELLED | OUTPATIENT
Start: 2024-03-01

## 2024-03-01 RX ORDER — ACETAMINOPHEN 325 MG/1
650 TABLET ORAL
Status: COMPLETED | OUTPATIENT
Start: 2024-03-01 | End: 2024-03-01

## 2024-03-01 RX ORDER — ACETAMINOPHEN 325 MG/1
650 TABLET ORAL
Status: CANCELLED
Start: 2024-03-01

## 2024-03-01 RX ORDER — POTASSIUM CHLORIDE 750 MG/1
20 TABLET, EXTENDED RELEASE ORAL DAILY
Qty: 60 TABLET | Refills: 11 | Status: SHIPPED | OUTPATIENT
Start: 2024-03-01 | End: 2025-03-01

## 2024-03-01 RX ORDER — HEPARIN 100 UNIT/ML
500 SYRINGE INTRAVENOUS
Status: DISCONTINUED | OUTPATIENT
Start: 2024-03-01 | End: 2024-03-01 | Stop reason: HOSPADM

## 2024-03-01 RX ADMIN — DEXAMETHASONE SODIUM PHOSPHATE 0.25 MG: 4 INJECTION, SOLUTION INTRA-ARTICULAR; INTRALESIONAL; INTRAMUSCULAR; INTRAVENOUS; SOFT TISSUE at 09:03

## 2024-03-01 RX ADMIN — HEPARIN 500 UNITS: 100 SYRINGE at 12:03

## 2024-03-01 RX ADMIN — ACETAMINOPHEN 650 MG: 325 TABLET ORAL at 09:03

## 2024-03-01 RX ADMIN — SODIUM CHLORIDE: 9 INJECTION, SOLUTION INTRAVENOUS at 09:03

## 2024-03-01 RX ADMIN — DIPHENHYDRAMINE HYDROCHLORIDE 25 MG: 50 INJECTION INTRAMUSCULAR; INTRAVENOUS at 09:03

## 2024-03-01 RX ADMIN — AMIVANTAMAB 1400 MG: 350 INJECTION INTRAVENOUS at 10:03

## 2024-03-01 NOTE — TELEPHONE ENCOUNTER
----- Message from Shyann Ruiz sent at 3/1/2024  8:15 AM CST -----  Patient stuck in traffic will arrive by 8:25. Please call back  [phone]. Thanks tpw

## 2024-03-01 NOTE — ASSESSMENT & PLAN NOTE
Cancer Staging   Malignant neoplasm of lower lobe of left lung  Staging form: Lung, AJCC 8th Edition  - Clinical stage from 12/9/2022: Stage IV (cT2, cN2, cM1) - Signed by Reese Mcfarlane MD on 12/9/2022    Completed 4 Cycles of Carbo/Alimta 03/31/2023    Repeat CT CAP 04/11/2023:   Detrimental change.  Increase in number and size of numerous sclerotic lesions throughout the cervical and thoracic spine, left humerus and sternum.  There are also multiple large sclerotic lesions throughout the lumbar spine, pelvis and proximal femurs measuring up to 7.1 cm in size.  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  2.   The lateral left upper lobe mass is decreased in size in the interim, currently measuring 2.2 x 1.0 cm.  Negative for new pulmonary masses.  3.  Exophytic nodule along the posterior right hepatic lobe measuring 1.1 cm.  This was not imaged previously.  Etiology uncertain.  Metastasis is not excluded.    --Rybrevant C1D1 04/27/23--    Most recent CT CAP 01/30/24  1.    Minimal residual linear increased density periphery of the left upper lobe could represent residual left upper lobe lung nodule or residual scarring.  No new or enlarging pulmonary nodules or masses bilaterally.     2.    Nonspecific exophytic soft tissue density nodule projects posteriorly off of the posterior segment right lobe liver.  No change.  No definite evidence for metastatic disease throughout the abdomen or pelvis.     3.    Osseous metastatic disease significantly improved.      Labs reviewed, no concerning cytopenias  Magnesium a little low pt notes she has mag supplement at home which she will start on     -Ok to proceed with C4D15 Rybrevant today      RTC in two weeks with cbc, cmp  C12D15 Rybrevant OW  F/u in 4 weeks with cbc, cmp, mag prior for C13D1 Rybrevant

## 2024-03-01 NOTE — ASSESSMENT & PLAN NOTE
Plan to initiate Zometa pending dental clearance  She notes currently with plan complete dental work  Two deep cleanings scheduled for 03/22 and 04/05/24 to be followed by extractions and fillings

## 2024-03-01 NOTE — PROGRESS NOTES
Subjective:     Patient ID:Mateusz Ahmadi is a 54 y.o. female.?? MR#: [id number]   ?   PRIMARY ONCOLOGIST: Dr. Mcfarlane   ?   CHIEF COMPLAINT: Lab review/assessment C1D1 Rybrevant  ?   ONCOLOGIC DIAGNOSIS: Stage IV (cT2, cN2, cM1), Malignant neoplasm of lower lobe of left lung   ?   CURRENT TREATMENT: OP NSCLC AMIVANTAMAB-VMJW (Rybrevant) Q4W     PAST TREATMENT: PEMETREXED + CARBOPLATIN (AUC) Q3W      HPI Mrs. Ahmadi is a pleasan 53-renee-old female with metastatic non-small cell lung carcinoma Exon 20 mutation who presents today for lab review and assessment prior to C1D1 Rybrevant. 11/2022 pt seen with PCP with c/o persistent coughing and wheezing. CXR at that time revealed pulmonary nodules, subsequent CT chest found L Lung mass. L lung biopsy positive for adenocarcinoma , EGFR mutated. Pleural fluid also positive for malignancy. PET showed avid STEPHAN mass, mediastinal nodes, bone mets in C1, T1, T11, L3, sacrum, L ischium, sternum. MRI brain negative for intracranial metastases. Initiated on carbo/ alimta 01/27/23--re imaging after 4 cycles found progressive disease. Pt initiated on Rybrevant 04/27/23.    Interval History: Pt states she is feeling well and voices no c/o today. Denies n/v/d/c, fever, chills, sob, cp, cough, abnormal bleeding. Denies difficulty with appetite or maintaining hydration.       Oncology History   Malignant neoplasm of lower lobe of left lung   12/9/2022 Initial Diagnosis    Malignant neoplasm of lower lobe of left lung       12/9/2022 Cancer Staged    Staging form: Lung, AJCC 8th Edition  - Clinical stage from 12/9/2022: Stage IV (cT2, cN2, cM1)       12/27/2022 - 12/27/2022 Chemotherapy    Treatment Summary   Plan Name: OP PEMBROLIZUMAB 400MG Q6W  Treatment Goal: Control  Status: Inactive  Start Date:   End Date:   Provider: Reese Mcfarlane MD  Chemotherapy: [No matching medication found in this treatment plan]        Genetic Testing    Patient has genetic testing done for  TEmpus peripheral  blood.                                              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     1/18/2023 - 1/18/2023 Chemotherapy    Treatment Summary   Plan Name: OP NSCLC AMIVANTAMAB-VMJW (Rybrevant) Q4W  Treatment Goal: Palliative  Status: Inactive  Start Date:   End Date:   Provider: Reese Mcfarlane MD  Chemotherapy: amivantamab-vmjw (RYBREVANT) 350 mg in sodium chloride 0.9% SolP 250 mL chemo infusion, 350 mg (100 % of original dose 350 mg), Intravenous, Clinic/HOD 1 time, 0 of 13 cycles  Dose modification: 350 mg (original dose 350 mg, Cycle 1), 1,050 mg (original dose 1,050 mg, Cycle 1), 1,400 mg (original dose 1,400 mg, Cycle 1)       1/27/2023 - 3/31/2023 Chemotherapy    Treatment Summary   Plan Name: OP NSCLC PEMETREXED + CARBOPLATIN (AUC) Q3W  Treatment Goal: Control  Status: Inactive  Start Date: 1/27/2023  End Date: 3/31/2023  Provider: Reese Mcfarlane MD  Chemotherapy: CARBOplatin (PARAPLATIN) 750 mg in sodium chloride 0.9% 335 mL chemo infusion, 750 mg (100 % of original dose 750 mg), Intravenous, Clinic/HOD 1 time, 4 of 4 cycles  Dose modification:   (original dose 750 mg, Cycle 1, Reason: MD Discretion)  Administration: 750 mg (1/27/2023), 750 mg (2/17/2023), 750 mg (3/31/2023), 750 mg (3/10/2023)  PEMEtrexed disodium (ALIMTA) 1,200 mg in sodium chloride 0.9% SolP 100 mL chemo infusion, 1,250 mg, Intravenous, Clinic/HOD 1 time, 4 of 4 cycles  Administration: 1,200 mg (1/27/2023), 1,200 mg (2/17/2023), 1,200 mg (3/31/2023), 1,200 mg (3/10/2023)       4/27/2023 -  Chemotherapy    Treatment Summary   Plan Name: OP NSCLC AMIVANTAMAB-VMJW (Rybrevant) Q4W  Treatment Goal: Palliative  Status: Active  Start Date: 4/27/2023 (Planned)  End Date: 6/6/2024 (Planned)  Provider: Reese Mcfarlane MD  Chemotherapy: amivantamab-vmjw (RYBREVANT) 350 mg in sodium chloride 0.9% SolP 250 mL chemo infusion, 350 mg (original dose ), Intravenous, Clinic/HOD 1  time, 0 of 15 cycles  Dose modification: 350 mg (Cycle 1), 1,050 mg (Cycle 1), 1,400 mg (Cycle 1)          Social History     Socioeconomic History    Marital status:    Tobacco Use    Smoking status: Never     Passive exposure: Never    Smokeless tobacco: Never   Substance and Sexual Activity    Alcohol use: Never    Drug use: Never    Sexual activity: Yes     Partners: Male     Birth control/protection: None     Comment: tubal     Social Determinants of Health     Financial Resource Strain: Patient Declined (11/22/2023)    Overall Financial Resource Strain (CARDIA)     Difficulty of Paying Living Expenses: Patient declined   Food Insecurity: Patient Declined (11/22/2023)    Hunger Vital Sign     Worried About Running Out of Food in the Last Year: Patient declined     Ran Out of Food in the Last Year: Patient declined   Transportation Needs: Patient Declined (11/22/2023)    PRAPARE - Transportation     Lack of Transportation (Medical): Patient declined     Lack of Transportation (Non-Medical): Patient declined   Physical Activity: Unknown (11/22/2023)    Exercise Vital Sign     Days of Exercise per Week: 3 days   Stress: Patient Declined (11/22/2023)    Citizen of the [place] [employer] Occup[employer] - Occupational Stress Questionnaire     Feeling of Stress : Patient declined   Social Connections: Unknown (11/22/2023)    Social Connection and Isolation Panel [NHANES]     Frequency of Communication with Friends and Family: Patient declined     Frequency of Social Gatherings with Friends and Family: Patient declined     Active Member of Clubs or Organizations: Patient declined     Attends Club or Organization Meetings: Patient declined     Marital Status: Patient declined   Housing Stability: Unknown (11/22/2023)    Housing Stability Vital Sign     Unable to Pay for Housing in the Last Year: Patient refused     Unstable Housing in the Last Year: Patient refused      Family History   Problem Relation Age of Onset     Heart failure Father       Past Surgical History:   Procedure Laterality Date    CATARACT EXTRACTION W/  INTRAOCULAR LENS IMPLANT Right 06/02/2022    FLUOROSCOPY N/A 1/26/2023    Procedure: FLUOROSCOPY/mediport placement;  Surgeon: Marlon Leone MD;  Location: [facility] CATH LAB;  Service: General;  Laterality: N/A;    TUBAL LIGATION      2007--postpartum tubal ligation        Review of Systems   Constitutional:  Negative for activity change, chills, fatigue and fever.   HENT: Negative.     Eyes: Negative.    Respiratory: Negative.     Cardiovascular: Negative.    Gastrointestinal: Negative.    Endocrine: Positive for cold intolerance.   Musculoskeletal:  Negative for arthralgias and myalgias.   Skin:  Negative for rash.   Neurological:  Negative for dizziness, weakness and light-headedness.   Psychiatric/Behavioral:  The patient is not nervous/anxious.        ?   A comprehensive 14-point review of systems was reviewed with patient and was negative other than as specified above.   ?     Objective:      Physical Exam  Vitals reviewed.   Constitutional:       General: She is not in acute distress.     Appearance: Normal appearance. She is not ill-appearing, toxic-appearing or diaphoretic.   HENT:      Head: Normocephalic and atraumatic.   Cardiovascular:      Rate and Rhythm: Normal rate.   Pulmonary:      Effort: Pulmonary effort is normal.   Skin:     General: Skin is warm.      Coloration: Skin is not jaundiced or pale.      Findings: No bruising, erythema, lesion or rash.   Neurological:      Mental Status: She is alert.      Motor: No weakness.      Gait: Gait normal.   Psychiatric:         Mood and Affect: Mood normal.         Behavior: Behavior normal.           ?   Vitals:    03/01/24 0836   BP: 118/63   Pulse: (!) 59   Temp: 97.2 °F (36.2 °C)        ?       ?   Laboratory:  ?   No visits with results within 1 Day(s) from this visit.   Latest known visit with results is:   Lab Visit on 02/29/2024   Component Date  Value Ref Range Status    Phosphorus 02/29/2024 3.5  2.7 - 4.5 mg/dL Final    Magnesium 02/29/2024 1.4 (L)  1.6 - 2.6 mg/dL Final    Sodium 02/29/2024 141  136 - 145 mmol/L Final    Potassium 02/29/2024 3.6  3.5 - 5.1 mmol/L Final    Chloride 02/29/2024 104  95 - 110 mmol/L Final    CO2 02/29/2024 28  23 - 29 mmol/L Final    Glucose 02/29/2024 273 (H)  70 - 110 mg/dL Final    BUN 02/29/2024 16  6 - 20 mg/dL Final    Creatinine 02/29/2024 0.9  0.5 - 1.4 mg/dL Final    Calcium 02/29/2024 8.6 (L)  8.7 - 10.5 mg/dL Final    Total Protein 02/29/2024 6.0  6.0 - 8.4 g/dL Final    Albumin 02/29/2024 2.8 (L)  3.5 - 5.2 g/dL Final    Total Bilirubin 02/29/2024 0.4  0.1 - 1.0 mg/dL Final    Alkaline Phosphatase 02/29/2024 107  55 - 135 U/L Final    AST 02/29/2024 13  10 - 40 U/L Final    ALT 02/29/2024 22  10 - 44 U/L Final    eGFR 02/29/2024 >60  >60 mL/min/1.73 m^2 Final    Anion Gap 02/29/2024 9  8 - 16 mmol/L Final    WBC 02/29/2024 7.12  3.90 - 12.70 K/uL Final    RBC 02/29/2024 4.48  4.00 - 5.40 M/uL Final    Hemoglobin 02/29/2024 12.4  12.0 - 16.0 g/dL Final    Hematocrit 02/29/2024 37.2  37.0 - 48.5 % Final    MCV 02/29/2024 83  82 - 98 fL Final    MCH 02/29/2024 27.7  27.0 - 31.0 pg Final    MCHC 02/29/2024 33.3  32.0 - 36.0 g/dL Final    RDW 02/29/2024 12.8  11.5 - 14.5 % Final    Platelets 02/29/2024 169  150 - 450 K/uL Final    MPV 02/29/2024 11.7  9.2 - 12.9 fL Final    Immature Granulocytes 02/29/2024 0.1  0.0 - 0.5 % Final    Gran # (ANC) 02/29/2024 4.6  1.8 - 7.7 K/uL Final    Immature Grans (Abs) 02/29/2024 0.01  0.00 - 0.04 K/uL Final    Lymph # 02/29/2024 1.8  1.0 - 4.8 K/uL Final    Mono # 02/29/2024 0.4  0.3 - 1.0 K/uL Final    Eos # 02/29/2024 0.2  0.0 - 0.5 K/uL Final    Baso # 02/29/2024 0.04  0.00 - 0.20 K/uL Final    nRBC 02/29/2024 0  0 /100 WBC Final    Gran % 02/29/2024 65.2  38.0 - 73.0 % Final    Lymph % 02/29/2024 25.1  18.0 - 48.0 % Final    Mono % 02/29/2024 5.8  4.0 - 15.0 % Final     Eosinophil % 02/29/2024 3.2  0.0 - 8.0 % Final    Basophil % 02/29/2024 0.6  0.0 - 1.9 % Final    Differential Method 02/29/2024 Automated   Final      ?   Imaging:    No results found for this or any previous visit (from the past 2160 hour(s)).     Results for orders placed or performed during the hospital encounter of 01/30/24 (from the past 2160 hour(s))   CT Chest Abdomen Pelvis With IV Contrast (XPD) NO Oral Contrast    Narrative    EXAM: CT CHEST ABDOMEN PELVIS WITH IV CONTRAST (XPD)    CLINICAL HISTORY: Lung carcinoma.    COMPARISON: 04/11/2023    TECHNIQUE: Standard thin-section axial images, with reformatted sagittal and coronal images.    FINDINGS:    CT CHEST:    1.2 x 1.2 cm calcified granuloma right lung base, unchanged.  Otherwise the right lung is clear    minimal linear focal increased density subpleural lateral left upper lobe could represent residual lung mass or residual scarring, measuring 1.9 x 0.5 cm as compared to 2.2 x 1.0 cm on the prior examination.  Moderate consolidation left lower lobe.  No new or enlarging pulmonary nodules or lung masses bilaterally.    Heart normal in size.  Normal caliber thoracic aorta.  Negative for adenopathy.  Calcified right hilar right paratracheal lymph nodes, unchanged since prior examination.    Soft tissues inferior neck are normal.  Trachea and esophagus are midline and are normal.  Chest wall soft tissues are normal.  Degenerative changes the spine.  Blastic metastatic lesions of the thoracic spine and decreased in number.    CT ABDOMEN PELVIS    Incidental small cysts lateral segment left lobe liver, unchanged since prior examination.  Exophytic nodule posterior aspect of the posterior segment right lobe measures 10 mm and is unchanged.  No suspicious hepatic lesion.    Gallbladder, portal vein, spleen, pancreas are normal.    Low-density left adrenal gland compatible with adenomas unchanged.  Right adrenal gland is normal.  Normal caliber aorta and IVC.   No retroperitoneal adenopathy.  No suspicious renal lesion bilaterally.  No hydronephrosis.  Ureters are nondilated and normal.    Stomach and small intestine normal.  Appendix normal.  Otherwise the bowel is normal.    Mildly distended normal-appearing bladder.  Anteverted uterus.  Negative for adnexal mass.    Abdominal pelvic wall soft tissues are normal.  Degenerative changes of the spine again noted.  Blastic metastatic lesions of the lumbar spine remain unchanged.  Largest is within the L3 vertebral body measuring 2.1 cm in maximal dimension.  Blastic metastatic lesions of the right ilium and proximal bilateral femurs.  Overall osseous metastatic disease within the pelvis and bilateral proximal femurs has significantly improved.        Impression    1.    Minimal residual linear increased density periphery of the left upper lobe could represent residual left upper lobe lung nodule or residual scarring.  No new or enlarging pulmonary nodules or masses bilaterally.    2.    Nonspecific exophytic soft tissue density nodule projects posteriorly off of the posterior segment right lobe liver.  No change.  No definite evidence for metastatic disease throughout the abdomen or pelvis.    3.    Osseous metastatic disease significantly improved.    Finalized on: 1/30/2024 4:39 PM By:  Kwesi Carrasco MD  [facility]# [id number]      2024-01-30 16:41:[id number]    [facility]        ?   Assessment/Plan:     Problem List Items Addressed This Visit          Renal/    Hypokalemia    Relevant Medications    potassium chloride SA (K-DUR,KLOR-CON M) 10 MEQ tablet       Oncology    Secondary malignant neoplasm of bone - Primary     Plan to initiate Zometa pending dental clearance  She notes currently with plan complete dental work  Two deep cleanings scheduled for 03/22 and 04/05/24 to be followed by extractions and fillings         Relevant Orders    CBC Auto Differential    Comprehensive Metabolic Panel    Malignant neoplasm of lower lobe of  left lung      Cancer Staging   Malignant neoplasm of lower lobe of left lung  Staging form: Lung, AJCC 8th Edition  - Clinical stage from 12/9/2022: Stage IV (cT2, cN2, cM1) - Signed by Reese Mcfarlane MD on 12/9/2022    Completed 4 Cycles of Carbo/Alimta 03/31/2023    Repeat CT CAP 04/11/2023:   Detrimental change.  Increase in number and size of numerous sclerotic lesions throughout the cervical and thoracic spine, left humerus and sternum.  There are also multiple large sclerotic lesions throughout the lumbar spine, pelvis and proximal femurs measuring up to 7.1 cm in size.  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  2.   The lateral left upper lobe mass is decreased in size in the interim, currently measuring 2.2 x 1.0 cm.  Negative for new pulmonary masses.  3.  Exophytic nodule along the posterior right hepatic lobe measuring 1.1 cm.  This was not imaged previously.  Etiology uncertain.  Metastasis is not excluded.    --Rybrevant C1D1 04/27/23--    Most recent CT CAP 01/30/24  1.    Minimal residual linear increased density periphery of the left upper lobe could represent residual left upper lobe lung nodule or residual scarring.  No new or enlarging pulmonary nodules or masses bilaterally.     2.    Nonspecific exophytic soft tissue density nodule projects posteriorly off of the posterior segment right lobe liver.  No change.  No definite evidence for metastatic disease throughout the abdomen or pelvis.     3.    Osseous metastatic disease significantly improved.      Labs reviewed, no concerning cytopenias  Magnesium a little low pt notes she has mag supplement at home which she will start on     -Ok to proceed with C4D15 Rybrevant today      RTC in two weeks with cbc, cmp  C12D15 Rybrevant OW  F/u in 4 weeks with cbc, cmp, mag prior for C13D1 Rybrevant              Relevant Orders    CBC Auto Differential    Comprehensive Metabolic Panel          Med Onc Chart Routing      Follow up with physician 4  weeks. with labs prior for Rybrevant;   Follow up with DOLLY    Infusion scheduling note   Rybrevant ow in two weeks with cbc cmp prior and Rybrevant after f/u in 4 weeks   Injection scheduling note    Labs CBC, CMP and magnesium   Scheduling:  Preferred lab:  Lab interval:     Imaging    Pharmacy appointment    Other referrals                     JU Monteiro, FNP-C  Hematology/Oncology

## 2024-03-12 ENCOUNTER — OFFICE VISIT (OUTPATIENT)
Dept: DERMATOLOGY | Facility: CLINIC | Age: 54
End: 2024-03-12
Payer: COMMERCIAL

## 2024-03-12 VITALS — HEIGHT: 64 IN | WEIGHT: 293 LBS | BODY MASS INDEX: 50.02 KG/M2

## 2024-03-12 DIAGNOSIS — L70.8 ACNEIFORM ERUPTION: Primary | ICD-10-CM

## 2024-03-12 PROCEDURE — 1160F RVW MEDS BY RX/DR IN RCRD: CPT | Mod: CPTII,S$GLB,, | Performed by: STUDENT IN AN ORGANIZED HEALTH CARE EDUCATION/TRAINING PROGRAM

## 2024-03-12 PROCEDURE — 99999 PR PBB SHADOW E&M-EST. PATIENT-LVL III: CPT | Mod: PBBFAC,,, | Performed by: STUDENT IN AN ORGANIZED HEALTH CARE EDUCATION/TRAINING PROGRAM

## 2024-03-12 PROCEDURE — 3061F NEG MICROALBUMINURIA REV: CPT | Mod: CPTII,S$GLB,, | Performed by: STUDENT IN AN ORGANIZED HEALTH CARE EDUCATION/TRAINING PROGRAM

## 2024-03-12 PROCEDURE — 99214 OFFICE O/P EST MOD 30 MIN: CPT | Mod: S$GLB,,, | Performed by: STUDENT IN AN ORGANIZED HEALTH CARE EDUCATION/TRAINING PROGRAM

## 2024-03-12 PROCEDURE — 3066F NEPHROPATHY DOC TX: CPT | Mod: CPTII,S$GLB,, | Performed by: STUDENT IN AN ORGANIZED HEALTH CARE EDUCATION/TRAINING PROGRAM

## 2024-03-12 PROCEDURE — 1159F MED LIST DOCD IN RCRD: CPT | Mod: CPTII,S$GLB,, | Performed by: STUDENT IN AN ORGANIZED HEALTH CARE EDUCATION/TRAINING PROGRAM

## 2024-03-12 PROCEDURE — G2211 COMPLEX E/M VISIT ADD ON: HCPCS | Mod: S$GLB,,, | Performed by: STUDENT IN AN ORGANIZED HEALTH CARE EDUCATION/TRAINING PROGRAM

## 2024-03-12 PROCEDURE — 3052F HG A1C>EQUAL 8.0%<EQUAL 9.0%: CPT | Mod: CPTII,S$GLB,, | Performed by: STUDENT IN AN ORGANIZED HEALTH CARE EDUCATION/TRAINING PROGRAM

## 2024-03-12 PROCEDURE — 3008F BODY MASS INDEX DOCD: CPT | Mod: CPTII,S$GLB,, | Performed by: STUDENT IN AN ORGANIZED HEALTH CARE EDUCATION/TRAINING PROGRAM

## 2024-03-12 RX ORDER — DOXYCYCLINE HYCLATE 100 MG
100 TABLET ORAL DAILY
Qty: 90 TABLET | Refills: 0 | Status: SHIPPED | OUTPATIENT
Start: 2024-03-12

## 2024-03-12 NOTE — PROGRESS NOTES
Subjective:       Patient ID:  Shaneka Ahmadi is a 54 y.o. female who presents for   Chief Complaint   Patient presents with    Rash     History of Present Illness: The patient presents for follow up of acneiform eruption on the face, last seen on on 12/12/23. Patient is currently on Rybrevant for non small cell lung carcinoma. Using topical clindamycin, desonide and tretinoin which was initially helping with symptoms. However, she recently had a bad flare of symptoms, consisting of pus bumps and painful red bumps on the face, leading to more scarring. Symptoms only on the face and denies any other concerning rash or skin lesions.           Review of Systems   Constitutional:  Negative for fever and chills.        Objective:    Physical Exam   Constitutional: She appears well-developed and well-nourished. No distress.   Neurological: She is alert and oriented to person, place, and time. She is not disoriented.   Psychiatric: She has a normal mood and affect.   Skin:   Areas Examined (abnormalities noted in diagram):   Head / Face Inspection Performed  Neck Inspection Performed              Diagram Legend     Erythematous scaling macule/papule c/w actinic keratosis       Vascular papule c/w angioma      Pigmented verrucoid papule/plaque c/w seborrheic keratosis      Yellow umbilicated papule c/w sebaceous hyperplasia      Irregularly shaped tan macule c/w lentigo     1-2 mm smooth white papules consistent with Milia      Movable subcutaneous cyst with punctum c/w epidermal inclusion cyst      Subcutaneous movable cyst c/w pilar cyst      Firm pink to brown papule c/w dermatofibroma      Pedunculated fleshy papule(s) c/w skin tag(s)      Evenly pigmented macule c/w junctional nevus     Mildly variegated pigmented, slightly irregular-bordered macule c/w mildly atypical nevus      Flesh colored to evenly pigmented papule c/w intradermal nevus       Pink pearly papule/plaque c/w basal cell carcinoma      Erythematous  hyperkeratotic cursted plaque c/w SCC      Surgical scar with no sign of skin cancer recurrence      Open and closed comedones      Inflammatory papules and pustules      Verrucoid papule consistent consistent with wart     Erythematous eczematous patches and plaques     Dystrophic onycholytic nail with subungual debris c/w onychomycosis     Umbilicated papule    Erythematous-base heme-crusted tan verrucoid plaque consistent with inflamed seborrheic keratosis     Erythematous Silvery Scaling Plaque c/w Psoriasis     See annotation      Assessment / Plan:        Acneiform eruption - patient in flare of symptoms; will add doxycycline to regimen and continue with topical regimen (clindamycin and tretinoin).   -     doxycycline (VIBRA-TABS) 100 MG tablet; Take 1 tablet (100 mg total) by mouth once daily.  Dispense: 90 tablet; Refill: 0             Follow up in about 8 weeks (around 5/7/2024).

## 2024-03-14 ENCOUNTER — LAB VISIT (OUTPATIENT)
Dept: LAB | Facility: HOSPITAL | Age: 54
End: 2024-03-14
Attending: INTERNAL MEDICINE
Payer: COMMERCIAL

## 2024-03-14 DIAGNOSIS — C79.51 SECONDARY MALIGNANT NEOPLASM OF BONE: ICD-10-CM

## 2024-03-14 LAB
ALBUMIN SERPL BCP-MCNC: 2.8 G/DL (ref 3.5–5.2)
ALP SERPL-CCNC: 98 U/L (ref 55–135)
ALT SERPL W/O P-5'-P-CCNC: 16 U/L (ref 10–44)
ANION GAP SERPL CALC-SCNC: 9 MMOL/L (ref 8–16)
AST SERPL-CCNC: 13 U/L (ref 10–40)
BASOPHILS # BLD AUTO: 0.05 K/UL (ref 0–0.2)
BASOPHILS NFR BLD: 0.7 % (ref 0–1.9)
BILIRUB SERPL-MCNC: 0.4 MG/DL (ref 0.1–1)
BUN SERPL-MCNC: 18 MG/DL (ref 6–20)
CALCIUM SERPL-MCNC: 8.6 MG/DL (ref 8.7–10.5)
CHLORIDE SERPL-SCNC: 102 MMOL/L (ref 95–110)
CO2 SERPL-SCNC: 31 MMOL/L (ref 23–29)
CREAT SERPL-MCNC: 1 MG/DL (ref 0.5–1.4)
DIFFERENTIAL METHOD BLD: ABNORMAL
EOSINOPHIL # BLD AUTO: 0.2 K/UL (ref 0–0.5)
EOSINOPHIL NFR BLD: 2.9 % (ref 0–8)
ERYTHROCYTE [DISTWIDTH] IN BLOOD BY AUTOMATED COUNT: 12.6 % (ref 11.5–14.5)
EST. GFR  (NO RACE VARIABLE): >60 ML/MIN/1.73 M^2
GLUCOSE SERPL-MCNC: 222 MG/DL (ref 70–110)
HCT VFR BLD AUTO: 36.3 % (ref 37–48.5)
HGB BLD-MCNC: 11.8 G/DL (ref 12–16)
IMM GRANULOCYTES # BLD AUTO: 0.02 K/UL (ref 0–0.04)
IMM GRANULOCYTES NFR BLD AUTO: 0.3 % (ref 0–0.5)
LYMPHOCYTES # BLD AUTO: 2.2 K/UL (ref 1–4.8)
LYMPHOCYTES NFR BLD: 29.3 % (ref 18–48)
MCH RBC QN AUTO: 27.3 PG (ref 27–31)
MCHC RBC AUTO-ENTMCNC: 32.5 G/DL (ref 32–36)
MCV RBC AUTO: 84 FL (ref 82–98)
MONOCYTES # BLD AUTO: 0.5 K/UL (ref 0.3–1)
MONOCYTES NFR BLD: 7 % (ref 4–15)
NEUTROPHILS # BLD AUTO: 4.5 K/UL (ref 1.8–7.7)
NEUTROPHILS NFR BLD: 59.8 % (ref 38–73)
NRBC BLD-RTO: 0 /100 WBC
PLATELET # BLD AUTO: 152 K/UL (ref 150–450)
PMV BLD AUTO: 12.3 FL (ref 9.2–12.9)
POTASSIUM SERPL-SCNC: 4.1 MMOL/L (ref 3.5–5.1)
PROT SERPL-MCNC: 5.5 G/DL (ref 6–8.4)
RBC # BLD AUTO: 4.33 M/UL (ref 4–5.4)
SODIUM SERPL-SCNC: 142 MMOL/L (ref 136–145)
WBC # BLD AUTO: 7.48 K/UL (ref 3.9–12.7)

## 2024-03-14 PROCEDURE — 36415 COLL VENOUS BLD VENIPUNCTURE: CPT | Mod: PN | Performed by: INTERNAL MEDICINE

## 2024-03-14 PROCEDURE — 85025 COMPLETE CBC W/AUTO DIFF WBC: CPT | Performed by: INTERNAL MEDICINE

## 2024-03-14 PROCEDURE — 80053 COMPREHEN METABOLIC PANEL: CPT | Performed by: INTERNAL MEDICINE

## 2024-03-15 ENCOUNTER — INFUSION (OUTPATIENT)
Dept: INFUSION THERAPY | Facility: HOSPITAL | Age: 54
End: 2024-03-15
Attending: RADIOLOGY
Payer: COMMERCIAL

## 2024-03-15 VITALS
WEIGHT: 293 LBS | OXYGEN SATURATION: 97 % | SYSTOLIC BLOOD PRESSURE: 103 MMHG | HEIGHT: 64 IN | BODY MASS INDEX: 50.02 KG/M2 | HEART RATE: 59 BPM | RESPIRATION RATE: 16 BRPM | DIASTOLIC BLOOD PRESSURE: 65 MMHG | TEMPERATURE: 97 F

## 2024-03-15 DIAGNOSIS — C34.32 MALIGNANT NEOPLASM OF LOWER LOBE OF LEFT LUNG: Primary | ICD-10-CM

## 2024-03-15 PROCEDURE — 96367 TX/PROPH/DG ADDL SEQ IV INF: CPT

## 2024-03-15 PROCEDURE — 63600175 PHARM REV CODE 636 W HCPCS

## 2024-03-15 PROCEDURE — 96415 CHEMO IV INFUSION ADDL HR: CPT

## 2024-03-15 PROCEDURE — 25000003 PHARM REV CODE 250

## 2024-03-15 PROCEDURE — 96413 CHEMO IV INFUSION 1 HR: CPT

## 2024-03-15 PROCEDURE — 96375 TX/PRO/DX INJ NEW DRUG ADDON: CPT

## 2024-03-15 RX ORDER — SODIUM CHLORIDE 0.9 % (FLUSH) 0.9 %
10 SYRINGE (ML) INJECTION
Status: CANCELLED | OUTPATIENT
Start: 2024-03-15

## 2024-03-15 RX ORDER — ACETAMINOPHEN 325 MG/1
650 TABLET ORAL
Status: CANCELLED
Start: 2024-03-15

## 2024-03-15 RX ORDER — EPINEPHRINE 0.3 MG/.3ML
0.3 INJECTION SUBCUTANEOUS ONCE AS NEEDED
Status: CANCELLED | OUTPATIENT
Start: 2024-03-15

## 2024-03-15 RX ORDER — DIPHENHYDRAMINE HYDROCHLORIDE 50 MG/ML
25 INJECTION INTRAMUSCULAR; INTRAVENOUS
Status: CANCELLED
Start: 2024-03-15

## 2024-03-15 RX ORDER — EPINEPHRINE 0.3 MG/.3ML
0.3 INJECTION SUBCUTANEOUS ONCE AS NEEDED
Status: DISCONTINUED | OUTPATIENT
Start: 2024-03-15 | End: 2024-03-15 | Stop reason: HOSPADM

## 2024-03-15 RX ORDER — DIPHENHYDRAMINE HYDROCHLORIDE 50 MG/ML
25 INJECTION INTRAMUSCULAR; INTRAVENOUS
Status: COMPLETED | OUTPATIENT
Start: 2024-03-15 | End: 2024-03-15

## 2024-03-15 RX ORDER — HEPARIN 100 UNIT/ML
500 SYRINGE INTRAVENOUS
Status: CANCELLED | OUTPATIENT
Start: 2024-03-15

## 2024-03-15 RX ORDER — HEPARIN 100 UNIT/ML
500 SYRINGE INTRAVENOUS
Status: DISCONTINUED | OUTPATIENT
Start: 2024-03-15 | End: 2024-03-15 | Stop reason: HOSPADM

## 2024-03-15 RX ORDER — DIPHENHYDRAMINE HYDROCHLORIDE 50 MG/ML
50 INJECTION INTRAMUSCULAR; INTRAVENOUS ONCE AS NEEDED
Status: DISCONTINUED | OUTPATIENT
Start: 2024-03-15 | End: 2024-03-15 | Stop reason: HOSPADM

## 2024-03-15 RX ORDER — ACETAMINOPHEN 325 MG/1
650 TABLET ORAL
Status: COMPLETED | OUTPATIENT
Start: 2024-03-15 | End: 2024-03-15

## 2024-03-15 RX ORDER — DIPHENHYDRAMINE HYDROCHLORIDE 50 MG/ML
50 INJECTION INTRAMUSCULAR; INTRAVENOUS ONCE AS NEEDED
Status: CANCELLED | OUTPATIENT
Start: 2024-03-15

## 2024-03-15 RX ADMIN — DEXAMETHASONE SODIUM PHOSPHATE 0.25 MG: 4 INJECTION, SOLUTION INTRA-ARTICULAR; INTRALESIONAL; INTRAMUSCULAR; INTRAVENOUS; SOFT TISSUE at 08:03

## 2024-03-15 RX ADMIN — AMIVANTAMAB 1400 MG: 350 INJECTION INTRAVENOUS at 09:03

## 2024-03-15 RX ADMIN — HEPARIN 500 UNITS: 100 SYRINGE at 10:03

## 2024-03-15 RX ADMIN — ACETAMINOPHEN 650 MG: 325 TABLET ORAL at 08:03

## 2024-03-15 RX ADMIN — DIPHENHYDRAMINE HYDROCHLORIDE 25 MG: 50 INJECTION INTRAMUSCULAR; INTRAVENOUS at 08:03

## 2024-03-15 NOTE — PLAN OF CARE
Problem: Adult Inpatient Plan of Care  Goal: Plan of Care Review  Outcome: Ongoing, Progressing  Flowsheets (Taken 3/15/2024 0756)  Plan of Care Reviewed With: patient  Goal: Optimal Comfort and Wellbeing  Outcome: Ongoing, Progressing  Intervention: Provide Person-Centered Care  Flowsheets (Taken 3/15/2024 0756)  Trust Relationship/Rapport:   care explained   choices provided   emotional support provided   empathic listening provided   questions encouraged   questions answered   thoughts/feelings acknowledged   reassurance provided     Problem: Neutropenia (Chemotherapy Effects)  Goal: Absence of Infection  Outcome: Ongoing, Progressing  Intervention: Prevent Infection and Maximize Resistance  Flowsheets (Taken 3/15/2024 0756)  Infection Prevention:   equipment surfaces disinfected   hand hygiene promoted   personal protective equipment utilized   rest/sleep promoted

## 2024-03-21 ENCOUNTER — TELEPHONE (OUTPATIENT)
Dept: PHARMACY | Facility: CLINIC | Age: 54
End: 2024-03-21
Payer: COMMERCIAL

## 2024-04-04 ENCOUNTER — LAB VISIT (OUTPATIENT)
Dept: LAB | Facility: HOSPITAL | Age: 54
End: 2024-04-04
Attending: INTERNAL MEDICINE
Payer: COMMERCIAL

## 2024-04-04 DIAGNOSIS — C34.32 MALIGNANT NEOPLASM OF LOWER LOBE OF LEFT LUNG: ICD-10-CM

## 2024-04-04 DIAGNOSIS — C79.51 SECONDARY MALIGNANT NEOPLASM OF BONE: ICD-10-CM

## 2024-04-04 LAB
ALBUMIN SERPL BCP-MCNC: 2.9 G/DL (ref 3.5–5.2)
ALP SERPL-CCNC: 100 U/L (ref 55–135)
ALT SERPL W/O P-5'-P-CCNC: 22 U/L (ref 10–44)
ANION GAP SERPL CALC-SCNC: 10 MMOL/L (ref 8–16)
AST SERPL-CCNC: 15 U/L (ref 10–40)
BASOPHILS # BLD AUTO: 0.04 K/UL (ref 0–0.2)
BASOPHILS NFR BLD: 0.6 % (ref 0–1.9)
BILIRUB SERPL-MCNC: 0.5 MG/DL (ref 0.1–1)
BUN SERPL-MCNC: 12 MG/DL (ref 6–20)
CALCIUM SERPL-MCNC: 8.3 MG/DL (ref 8.7–10.5)
CHLORIDE SERPL-SCNC: 103 MMOL/L (ref 95–110)
CO2 SERPL-SCNC: 28 MMOL/L (ref 23–29)
CREAT SERPL-MCNC: 0.9 MG/DL (ref 0.5–1.4)
DIFFERENTIAL METHOD BLD: ABNORMAL
EOSINOPHIL # BLD AUTO: 0.2 K/UL (ref 0–0.5)
EOSINOPHIL NFR BLD: 3.2 % (ref 0–8)
ERYTHROCYTE [DISTWIDTH] IN BLOOD BY AUTOMATED COUNT: 13 % (ref 11.5–14.5)
EST. GFR  (NO RACE VARIABLE): >60 ML/MIN/1.73 M^2
GLUCOSE SERPL-MCNC: 189 MG/DL (ref 70–110)
HCT VFR BLD AUTO: 36.6 % (ref 37–48.5)
HGB BLD-MCNC: 12.3 G/DL (ref 12–16)
IMM GRANULOCYTES # BLD AUTO: 0.01 K/UL (ref 0–0.04)
IMM GRANULOCYTES NFR BLD AUTO: 0.2 % (ref 0–0.5)
LYMPHOCYTES # BLD AUTO: 2.1 K/UL (ref 1–4.8)
LYMPHOCYTES NFR BLD: 31.8 % (ref 18–48)
MAGNESIUM SERPL-MCNC: 1.6 MG/DL (ref 1.6–2.6)
MCH RBC QN AUTO: 28.1 PG (ref 27–31)
MCHC RBC AUTO-ENTMCNC: 33.6 G/DL (ref 32–36)
MCV RBC AUTO: 84 FL (ref 82–98)
MONOCYTES # BLD AUTO: 0.4 K/UL (ref 0.3–1)
MONOCYTES NFR BLD: 6.5 % (ref 4–15)
NEUTROPHILS # BLD AUTO: 3.8 K/UL (ref 1.8–7.7)
NEUTROPHILS NFR BLD: 57.7 % (ref 38–73)
NRBC BLD-RTO: 0 /100 WBC
PLATELET # BLD AUTO: 172 K/UL (ref 150–450)
PMV BLD AUTO: 11.1 FL (ref 9.2–12.9)
POTASSIUM SERPL-SCNC: 3.6 MMOL/L (ref 3.5–5.1)
PROT SERPL-MCNC: 5.4 G/DL (ref 6–8.4)
RBC # BLD AUTO: 4.38 M/UL (ref 4–5.4)
SODIUM SERPL-SCNC: 141 MMOL/L (ref 136–145)
WBC # BLD AUTO: 6.63 K/UL (ref 3.9–12.7)

## 2024-04-04 PROCEDURE — 36415 COLL VENOUS BLD VENIPUNCTURE: CPT

## 2024-04-04 PROCEDURE — 85025 COMPLETE CBC W/AUTO DIFF WBC: CPT

## 2024-04-04 PROCEDURE — 80053 COMPREHEN METABOLIC PANEL: CPT

## 2024-04-04 PROCEDURE — 83735 ASSAY OF MAGNESIUM: CPT | Performed by: INTERNAL MEDICINE

## 2024-04-05 ENCOUNTER — INFUSION (OUTPATIENT)
Dept: INFUSION THERAPY | Facility: HOSPITAL | Age: 54
End: 2024-04-05
Attending: RADIOLOGY
Payer: COMMERCIAL

## 2024-04-05 ENCOUNTER — OFFICE VISIT (OUTPATIENT)
Dept: HEMATOLOGY/ONCOLOGY | Facility: CLINIC | Age: 54
End: 2024-04-05
Payer: COMMERCIAL

## 2024-04-05 VITALS
SYSTOLIC BLOOD PRESSURE: 105 MMHG | OXYGEN SATURATION: 99 % | BODY MASS INDEX: 50.02 KG/M2 | HEART RATE: 58 BPM | DIASTOLIC BLOOD PRESSURE: 62 MMHG | WEIGHT: 293 LBS | RESPIRATION RATE: 16 BRPM | HEIGHT: 64 IN | TEMPERATURE: 97 F

## 2024-04-05 DIAGNOSIS — T45.1X5A IMMUNODEFICIENCY DUE TO CHEMOTHERAPY: ICD-10-CM

## 2024-04-05 DIAGNOSIS — Z79.899 IMMUNODEFICIENCY DUE TO CHEMOTHERAPY: ICD-10-CM

## 2024-04-05 DIAGNOSIS — C34.32 MALIGNANT NEOPLASM OF LOWER LOBE OF LEFT LUNG: ICD-10-CM

## 2024-04-05 DIAGNOSIS — C79.51 SECONDARY MALIGNANT NEOPLASM OF BONE: ICD-10-CM

## 2024-04-05 DIAGNOSIS — H93.8X2 EAR CONGESTION, LEFT: Primary | ICD-10-CM

## 2024-04-05 DIAGNOSIS — C34.32 MALIGNANT NEOPLASM OF LOWER LOBE OF LEFT LUNG: Primary | ICD-10-CM

## 2024-04-05 DIAGNOSIS — D84.821 IMMUNODEFICIENCY DUE TO CHEMOTHERAPY: ICD-10-CM

## 2024-04-05 PROCEDURE — 63600175 PHARM REV CODE 636 W HCPCS: Mod: JZ,TB | Performed by: INTERNAL MEDICINE

## 2024-04-05 PROCEDURE — 25000003 PHARM REV CODE 250: Performed by: INTERNAL MEDICINE

## 2024-04-05 PROCEDURE — 96367 TX/PROPH/DG ADDL SEQ IV INF: CPT

## 2024-04-05 PROCEDURE — 96415 CHEMO IV INFUSION ADDL HR: CPT

## 2024-04-05 PROCEDURE — 3066F NEPHROPATHY DOC TX: CPT | Mod: CPTII,95,, | Performed by: INTERNAL MEDICINE

## 2024-04-05 PROCEDURE — A4216 STERILE WATER/SALINE, 10 ML: HCPCS | Performed by: INTERNAL MEDICINE

## 2024-04-05 PROCEDURE — 3052F HG A1C>EQUAL 8.0%<EQUAL 9.0%: CPT | Mod: CPTII,95,, | Performed by: INTERNAL MEDICINE

## 2024-04-05 PROCEDURE — 96413 CHEMO IV INFUSION 1 HR: CPT

## 2024-04-05 PROCEDURE — 3061F NEG MICROALBUMINURIA REV: CPT | Mod: CPTII,95,, | Performed by: INTERNAL MEDICINE

## 2024-04-05 PROCEDURE — 99215 OFFICE O/P EST HI 40 MIN: CPT | Mod: 95,,, | Performed by: INTERNAL MEDICINE

## 2024-04-05 PROCEDURE — 96375 TX/PRO/DX INJ NEW DRUG ADDON: CPT

## 2024-04-05 RX ORDER — EPINEPHRINE 0.3 MG/.3ML
0.3 INJECTION SUBCUTANEOUS ONCE AS NEEDED
Status: CANCELLED | OUTPATIENT
Start: 2024-04-05

## 2024-04-05 RX ORDER — DIPHENHYDRAMINE HYDROCHLORIDE 50 MG/ML
50 INJECTION INTRAMUSCULAR; INTRAVENOUS ONCE AS NEEDED
Status: CANCELLED | OUTPATIENT
Start: 2024-04-05

## 2024-04-05 RX ORDER — DIPHENHYDRAMINE HYDROCHLORIDE 50 MG/ML
25 INJECTION INTRAMUSCULAR; INTRAVENOUS
Status: COMPLETED | OUTPATIENT
Start: 2024-04-05 | End: 2024-04-05

## 2024-04-05 RX ORDER — DIPHENHYDRAMINE HYDROCHLORIDE 50 MG/ML
25 INJECTION INTRAMUSCULAR; INTRAVENOUS
Status: CANCELLED
Start: 2024-04-05

## 2024-04-05 RX ORDER — HEPARIN 100 UNIT/ML
500 SYRINGE INTRAVENOUS
Status: CANCELLED | OUTPATIENT
Start: 2024-04-05

## 2024-04-05 RX ORDER — ACETAMINOPHEN 325 MG/1
650 TABLET ORAL
Status: COMPLETED | OUTPATIENT
Start: 2024-04-05 | End: 2024-04-05

## 2024-04-05 RX ORDER — HEPARIN 100 UNIT/ML
500 SYRINGE INTRAVENOUS
Status: DISCONTINUED | OUTPATIENT
Start: 2024-04-05 | End: 2024-04-05 | Stop reason: HOSPADM

## 2024-04-05 RX ORDER — SODIUM CHLORIDE 0.9 % (FLUSH) 0.9 %
10 SYRINGE (ML) INJECTION
Status: DISCONTINUED | OUTPATIENT
Start: 2024-04-05 | End: 2024-04-05 | Stop reason: HOSPADM

## 2024-04-05 RX ORDER — SODIUM CHLORIDE 0.9 % (FLUSH) 0.9 %
10 SYRINGE (ML) INJECTION
Status: CANCELLED | OUTPATIENT
Start: 2024-04-05

## 2024-04-05 RX ORDER — ACETAMINOPHEN 325 MG/1
650 TABLET ORAL
Status: CANCELLED
Start: 2024-04-05

## 2024-04-05 RX ADMIN — AMIVANTAMAB 1400 MG: 350 INJECTION INTRAVENOUS at 12:04

## 2024-04-05 RX ADMIN — SODIUM CHLORIDE: 9 INJECTION, SOLUTION INTRAVENOUS at 11:04

## 2024-04-05 RX ADMIN — HEPARIN 500 UNITS: 100 SYRINGE at 01:04

## 2024-04-05 RX ADMIN — DIPHENHYDRAMINE HYDROCHLORIDE 25 MG: 50 INJECTION INTRAMUSCULAR; INTRAVENOUS at 11:04

## 2024-04-05 RX ADMIN — ACETAMINOPHEN 650 MG: 325 TABLET ORAL at 11:04

## 2024-04-05 RX ADMIN — DEXAMETHASONE SODIUM PHOSPHATE 0.25 MG: 4 INJECTION, SOLUTION INTRA-ARTICULAR; INTRALESIONAL; INTRAMUSCULAR; INTRAVENOUS; SOFT TISSUE at 11:04

## 2024-04-05 RX ADMIN — Medication 10 ML: at 01:04

## 2024-04-05 NOTE — PROGRESS NOTES
Patient ID: Shaneka Ahmadi   Chief Complaint: Follow-up  MRN:  [id number]     Oncologic Diagnosis:  Stage IV (cT2, cN2, cM1) NSCLC, metastatic to bone  Previous Treatment:    Carbo/Alimta started in 1/2023; stopped due to progression after 4 cycles     Current Treatment:   OP NSCLC AMIVANTAMAB-VMJW ([facility]) Q2W   OP ZOLEDRONIC ACID (ZOMETA) Q4W   THERAPY SHELL - B12     The patient location is: Home   The chief complaint leading to consultation is: follow up    Visit type: audiovisual    Face to Face time with patient: 15  30 minutes of total time spent on the encounter, which includes face to face time and non-face to face time preparing to see the patient (eg, review of tests), Obtaining and/or reviewing separately obtained history, Documenting clinical information in the electronic or other health record, Independently interpreting results (not separately reported) and communicating results to the patient/family/caregiver, or Care coordination (not separately reported).         Each patient to whom he or she provides medical services by telemedicine is:  (1) informed of the relationship between the physician and patient and the respective role of any other health care provider with respect to management of the patient; and (2) notified that he or she may decline to receive medical services by telemedicine and may withdraw from such care at any time.    Notes:   Subjective   Shaneka Ahmadi is a 54 y.o. female who presents to clinic for follow-up.      She reports that her left ear feels stopped up for the last 2 months after a sinus infection; no drainage. Recommend Debrox.  The rash worsened a few weeks ago; she started doxycycline per dermatology and rash has improved.  Otherwise, she feels at her baseline and has no acute complaints.  Labs reviewed and stable for treatment.    Review of Systems:  Review of Systems   Constitutional:  Negative for activity change, appetite change, chills, diaphoresis,  fatigue, fever and unexpected weight change.   HENT:  Negative for nosebleeds.    Respiratory:  Negative for shortness of breath.    Cardiovascular:  Negative for chest pain.   Gastrointestinal:  Negative for abdominal distention, abdominal pain, anal bleeding, blood in stool, constipation, diarrhea, nausea and vomiting.   Genitourinary:  Negative for difficulty urinating and hematuria.   Musculoskeletal:  Negative for arthralgias, back pain and myalgias.   Skin:  Positive for rash (right side face).   Neurological:  Negative for dizziness, weakness, light-headedness and headaches.   Hematological:  Does not bruise/bleed easily.   Psychiatric/Behavioral:  The patient is not nervous/anxious.      History     Oncology History   Malignant neoplasm of lower lobe of left lung   12/9/2022 Initial Diagnosis    Malignant neoplasm of lower lobe of left lung     12/9/2022 Cancer Staged    Staging form: Lung, AJCC 8th Edition  - Clinical stage from 12/9/2022: Stage IV (cT2, cN2, cM1)     12/27/2022 - 12/27/2022 Chemotherapy    Treatment Summary   Plan Name: OP PEMBROLIZUMAB 400MG Q6W  Treatment Goal: Control  Status: Inactive  Start Date:   End Date:   Provider: Reese Mcfarlane MD  Chemotherapy: [No matching medication found in this treatment plan]      Genetic Testing    Patient has genetic testing done for  TEmpus peripheral blood.                                              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     1/18/2023 - 1/18/2023 Chemotherapy    Treatment Summary   Plan Name: OP NSCLC AMIVANTAMAB-VMJW ([facility]) Q4W  Treatment Goal: Palliative  Status: Inactive  Start Date:   End Date:   Provider: Reese Mcfarlane MD  Chemotherapy: amivantamab-vmjw ([facility]) 350 mg in sodium chloride 0.9% SolP 250 mL chemo infusion, 350 mg (100 % of original dose 350 mg), Intravenous, Clinic/HOD 1 time, 0 of 13 cycles  Dose modification: 350 mg (original dose 350 mg,  Cycle 1), 1,050 mg (original dose 1,050 mg, Cycle 1), 1,400 mg (original dose 1,400 mg, Cycle 1)     1/27/2023 - 3/31/2023 Chemotherapy    Treatment Summary   Plan Name: OP NSCLC PEMETREXED + CARBOPLATIN (AUC) Q3W  Treatment Goal: Control  Status: Inactive  Start Date: 1/27/2023  End Date: 3/31/2023  Provider: Reese Mcfarlane MD  Chemotherapy: CARBOplatin (PARAPLATIN) 750 mg in sodium chloride 0.9% 335 mL chemo infusion, 750 mg (100 % of original dose 750 mg), Intravenous, Clinic/HOD 1 time, 4 of 4 cycles  Dose modification:   (original dose 750 mg, Cycle 1, Reason: MD Discretion)  Administration: 750 mg (1/27/2023), 750 mg (2/17/2023), 750 mg (3/31/2023), 750 mg (3/10/2023)  PEMEtrexed disodium (ALIMTA) 1,200 mg in sodium chloride 0.9% SolP 100 mL chemo infusion, 1,250 mg, Intravenous, Clinic/HOD 1 time, 4 of 4 cycles  Administration: 1,200 mg (1/27/2023), 1,200 mg (2/17/2023), 1,200 mg (3/31/2023), 1,200 mg (3/10/2023)     4/27/2023 -  Chemotherapy    Treatment Summary   Plan Name: OP NSCLC AMIVANTAMAB-VMJW ([facility]) Q4W  Treatment Goal: Palliative  Status: Active  Start Date: 4/27/2023  End Date: 6/7/2024 (Planned)  Provider: Reese Mcfarlane MD  Chemotherapy: amivantamab-vmjw (RYBRE[facility]ANT) 350 mg in sodium chloride 0.9% SolP 250 mL chemo infusion, 350 mg (100 % of original dose 350 mg), Intravenous, Clinic/HOD 1 time, 12 of 15 cycles  Dose modification: 350 mg (original dose 350 mg, Cycle 1), 1,050 mg (original dose 1,050 mg, Cycle 1), 1,400 mg (original dose 1,400 mg, Cycle 1)  Administration: 350 mg (4/27/2023), 1,050 mg (4/28/2023), 1,400 mg (5/4/2023), 1,400 mg (5/11/2023), 1,400 mg (5/18/2023), 1,400 mg (5/25/2023), 1,400 mg (6/8/2023), 1,400 mg (6/22/2023), 1,400 mg (7/21/2023), 1,400 mg (8/4/2023), 1,400 mg (8/18/2023), 1,400 mg (9/1/2023), 1,400 mg (9/15/2023), 1,400 mg (9/29/2023), 1,400 mg (10/13/2023), 1,400 mg (10/27/2023), 1,400 mg (11/10/2023), 1,400 mg (11/24/2023), 1,400 mg (12/8/2023), 1,400 mg  (12/22/2023), 1,400 mg (1/5/2024), 1,400 mg (1/19/2024), 1,400 mg (2/2/2024), 1,400 mg (2/16/2024), 1,400 mg (3/1/2024), 1,400 mg (3/15/2024)           Past Medical History:   Diagnosis Date    Diabetes mellitus     Hypertension     Malignant neoplasm of lower lobe of left lung 12/9/2022    Rash, drug 7/6/2023    OP NSCLC AMIVANTAMAB-VMJW ([facility]) Q4W      Secondary malignant neoplasm of bone 12/5/2022       Past Surgical History:   Procedure Laterality Date    CATARACT EXTRACTION W/  INTRAOCULAR LENS IMPLANT Right 06/02/2022    FLUOROSCOPY N/A 1/26/2023    Procedure: FLUOROSCOPY/mediport placement;  Surgeon: Marlon Leone MD;  Location: [facility] CATH LAB;  Service: General;  Laterality: N/A;    TUBAL LIGATION      2007--postpartum tubal ligation       Family History   Problem Relation Age of Onset    Heart failure Father        Review of patient's allergies indicates:   Allergen Reactions    Lisinopril Other (See Comments)     coughing    Amoxicillin        Social History     Tobacco Use    Smoking status: Never     Passive exposure: Never    Smokeless tobacco: Never   Substance Use Topics    Alcohol use: Never    Drug use: Never       Labs   Labs:  Lab Visit on 04/04/2024   Component Date Value Ref Range Status    Magnesium 04/04/2024 1.6  1.6 - 2.6 mg/dL Final    WBC 04/04/2024 6.63  3.90 - 12.70 K/uL Final    RBC 04/04/2024 4.38  4.00 - 5.40 M/uL Final    Hemoglobin 04/04/2024 12.3  12.0 - 16.0 g/dL Final    Hematocrit 04/04/2024 36.6 (L)  37.0 - 48.5 % Final    MCV 04/04/2024 84  82 - 98 fL Final    MCH 04/04/2024 28.1  27.0 - 31.0 pg Final    MCHC 04/04/2024 33.6  32.0 - 36.0 g/dL Final    RDW 04/04/2024 13.0  11.5 - 14.5 % Final    Platelets 04/04/2024 172  150 - 450 K/uL Final    MPV 04/04/2024 11.1  9.2 - 12.9 fL Final    Immature Granulocytes 04/04/2024 0.2  0.0 - 0.5 % Final    Gran # (ANC) 04/04/2024 3.8  1.8 - 7.7 K/uL Final    Immature Grans (Abs) 04/04/2024 0.01  0.00 - 0.04 K/uL Final    Comment:  Mild elevation in immature granulocytes is non specific and   can be seen in a variety of conditions including stress response,   acute inflammation, trauma and pregnancy. Correlation with other   laboratory and clinical findings is essential.      Lymph # 04/04/2024 2.1  1.0 - 4.8 K/uL Final    Mono # 04/04/2024 0.4  0.3 - 1.0 K/uL Final    Eos # 04/04/2024 0.2  0.0 - 0.5 K/uL Final    Baso # 04/04/2024 0.04  0.00 - 0.20 K/uL Final    nRBC 04/04/2024 0  0 /100 WBC Final    Gran % 04/04/2024 57.7  38.0 - 73.0 % Final    Lymph % 04/04/2024 31.8  18.0 - 48.0 % Final    Mono % 04/04/2024 6.5  4.0 - 15.0 % Final    Eosinophil % 04/04/2024 3.2  0.0 - 8.0 % Final    Basophil % 04/04/2024 0.6  0.0 - 1.9 % Final    Differential Method 04/04/2024 Automated   Final    Sodium 04/04/2024 141  136 - 145 mmol/L Final    Potassium 04/04/2024 3.6  3.5 - 5.1 mmol/L Final    Chloride 04/04/2024 103  95 - 110 mmol/L Final    CO2 04/04/2024 28  23 - 29 mmol/L Final    Glucose 04/04/2024 189 (H)  70 - 110 mg/dL Final    BUN 04/04/2024 12  6 - 20 mg/dL Final    Creatinine 04/04/2024 0.9  0.5 - 1.4 mg/dL Final    Calcium 04/04/2024 8.3 (L)  8.7 - 10.5 mg/dL Final    Total Protein 04/04/2024 5.4 (L)  6.0 - 8.4 g/dL Final    Albumin 04/04/2024 2.9 (L)  3.5 - 5.2 g/dL Final    Total Bilirubin 04/04/2024 0.5  0.1 - 1.0 mg/dL Final    Comment: For infants and newborns, interpretation of results should be based  on gestational age, weight and in agreement with clinical  observations.    Premature Infant recommended reference ranges:  Up to 24 hours.............<8.0 mg/dL  Up to 48 hours............<12.0 mg/dL  3-5 days..................<15.0 mg/dL  6-29 days.................<15.0 mg/dL      Alkaline Phosphatase 04/04/2024 100  55 - 135 U/L Final    AST 04/04/2024 15  10 - 40 U/L Final    ALT 04/04/2024 22  10 - 44 U/L Final    eGFR 04/04/2024 >60  >60 mL/min/1.73 m^2 Final    Anion Gap 04/04/2024 10  8 - 16 mmol/L Final        Imaging   CT  CHEST ABDOMEN PELVIS WITH CONTRAST (XPD) - 10/19/23     CLINICAL HISTORY:  Metastatic disease evaluation;Malignant neoplasm of lower lobe, left bronchus or lung     TECHNIQUE:  Low dose axial images, sagittal and coronal reformations were obtained from the thoracic inlet to the pubic synthesis following the IV administration of 100 mL of Omnipaque 350.  Oral contrast also administered.  All CT scans at this location are performed using dose modulation techniques as appropriate to a performed exam including the following: Automated exposure control; adjustment of the mA and/or kV  according to patient size.     COMPARISON:  07/11/2023.  12/02/2022     FINDINGS:  Chest:     Thoracic soft tissues: No significant abnormality.     Aorta: Normal in course and caliber, without significant atherosclerotic plaque. There are three branching vessels at the arch.     Heart: Normal in size. No pericardial effusion.  Mild aortic and coronary artery atherosclerotic calcification.     Brooke/Mediastinum: No significant lymphadenopathy .  No measurable hilar lesion.     Lungs: Unchanged left lung base volume loss and bronchovascular crowding secondary to elevation left hemidiaphragm.  No measurable mass lesion.  Right lung base benign calcified granuloma.     Abdomen Pelvis:     Liver: Unchanged 9 mm nodule posterior to the inferior right liver.     Gallbladder: No calcified gallstones.     Bile Ducts: No evidence of dilated ducts.     Pancreas: No mass or peripancreatic fat stranding.     Spleen: Unremarkable.     Adrenals: Unchanged benign-appearing 2.1 cm left adrenal fluid density nodule.     Kidneys/ Ureters: Punctate right upper pole nonobstructing renal stone.  Normal in size and location. Normal concentration and excretion of contrast. No hydronephrosis or nephrolithiasis. No ureteral dilatation. Left upper pole 12 mm benign angiomyolipoma.     Bladder: No evidence of wall thickening.     Reproductive organs: Fibroid uterus.      GI Tract/Mesentery: No evidence of bowel obstruction or inflammation. Large volume stool throughout the colon     Peritoneal Space: No ascites. No free air.     Retroperitoneum: No significant adenopathy.     Abdominal wall: Unremarkable.     Vasculature: No significant atherosclerosis or aneurysm.     Bones: No acute fracture. Unchanged widely disseminated osteoblastic metastatic disease.  No new lesions.     Impression:  Stable exam compared to 07/11/2023.         CT CHEST ABDOMEN PELVIS WITH IV CONTRAST (XPD) - 01/30/2024  FINDINGS:  CT CHEST:     1.2 x 1.2 cm calcified granuloma right lung base, unchanged.  Otherwise the right lung is clear     minimal linear focal increased density subpleural lateral left upper lobe could represent residual lung mass or residual scarring, measuring 1.9 x 0.5 cm as compared to 2.2 x 1.0 cm on the prior examination.  Moderate consolidation left lower lobe.  No new or enlarging pulmonary nodules or lung masses bilaterally.     Heart normal in size.  Normal caliber thoracic aorta.  Negative for adenopathy.  Calcified right hilar right paratracheal lymph nodes, unchanged since prior examination.     Soft tissues inferior neck are normal.  Trachea and esophagus are midline and are normal.  Chest wall soft tissues are normal.  Degenerative changes the spine.  Blastic metastatic lesions of the thoracic spine and decreased in number.     CT ABDOMEN PELVIS     Incidental small cysts lateral segment left lobe liver, unchanged since prior examination.  Exophytic nodule posterior aspect of the posterior segment right lobe measures 10 mm and is unchanged.  No suspicious hepatic lesion.     Gallbladder, portal vein, spleen, pancreas are normal.     Low-density left adrenal gland compatible with adenomas unchanged.  Right adrenal gland is normal.  Normal caliber aorta and IVC.  No retroperitoneal adenopathy.  No suspicious renal lesion bilaterally.  No hydronephrosis.  Ureters are nondilated  and normal.     Stomach and small intestine normal.  Appendix normal.  Otherwise the bowel is normal.     Mildly distended normal-appearing bladder.  Anteverted uterus.  Negative for adnexal mass.     Abdominal pelvic wall soft tissues are normal.  Degenerative changes of the spine again noted.  Blastic metastatic lesions of the lumbar spine remain unchanged.  Largest is within the L3 vertebral body measuring 2.1 cm in maximal dimension.  Blastic metastatic lesions of the right ilium and proximal bilateral femurs.  Overall osseous metastatic disease within the pelvis and bilateral proximal femurs has significantly improved.        Impression:        1.    Minimal residual linear increased density periphery of the left upper lobe could represent residual left upper lobe lung nodule or residual scarring.  No new or enlarging pulmonary nodules or masses bilaterally.     2.    Nonspecific exophytic soft tissue density nodule projects posteriorly off of the posterior segment right lobe liver.  No change.  No definite evidence for metastatic disease throughout the abdomen or pelvis.     3.    Osseous metastatic disease significantly improved.       Assessment and Plan   Stage IV (cT2, cN2, cM1) NSCLC, Metastatic to Bone  Exon 20 EGFR mutation positive   Previously on Carbo/Alimta started in 1/2023; stopped due to progression after 4 cycles and started on Amivantamab  CT CAP scheduled 01/30/24: stable  Tolerating Amivantamab well; has mild intermittent rash on right side of face but no other notable side effects  Restaging CT ordered      Metastatic Bone Disease  Patient has yet to start bisphosphonate therapy as she has some ongoing dental issues pending completion of dental work and dental clearance  She will obtain dental work tentatively in the next 4-8 weeks; new dental insurance starts Feb 1 so she will see the dentist after that  Continue Calcium and Vitamin D/Weight Bearing Exercise      Amivantamab Induced  Rash  Continue topical clindamycin, and tretinoin   Started on doxycycline per derm  Continue follow up with derm      Chronic Medical Conditions  DM II  Hx of COVID-19        Med Onc Chart Routing      Follow up with physician 6 weeks.   Follow up with DOLLY 2 weeks and 4 weeks. Monteiro   Infusion scheduling note   Amivantimab today and in 2 weeks   Injection scheduling note    Labs CBC, CMP and magnesium   Scheduling:  Preferred lab:  Lab interval:     Imaging CT chest abdomen pelvis   please schedule at the [facility] 1st week in May   Pharmacy appointment    Other referrals                     The patient was seen, interviewed and examined. Pertinent lab and radiologic studies were reviewed. Pt instructed to call should they develop concerning signs/symptoms or have further questions.        Portions of the record may have been created with voice recognition software. Occasional wrong-word or sound-a-like substitutions may have occurred due to the inherent limitations of voice recognition software. Read the chart carefully and recognize, using context, where substitutions have occurred.      Mirtha Wilhelm MD    Hematology/Oncology

## 2024-04-05 NOTE — PLAN OF CARE
Patient reports no complaint or concerns at this time. Tolerated infusion well with no adverse reactions. Patient to return in two weeks for next treatment.

## 2024-04-05 NOTE — DISCHARGE INSTRUCTIONS
.[facility]  [address] [address]  [address]  [phone] phone     [phone] fax  Hours of Operation: Monday- Friday 8:00am- 5:00pm  After hours phone  [phone]  Hematology / Oncology Physicians on call    Dr. Denys Sinclair        Nurse Practitioners:    Lorie Blackmon, BRISA Caicedo, BRISA Monteiro, BRISA Kwon, BRISA Trujillo, PA      Please don't hesitate to call if you have any concerns.     .WAYS TO HELP PREVENT INFECTION        WASH YOUR HANDS OFTEN DURING THE DAY, ESPECIALLY BEFORE YOU EAT, AFTER USING THE BATHROOM, AND AFTER TOUCHING ANIMALS    STAY AWAY FROM PEOPLE WHO HAVE ILLNESSES YOU CAN CATCH; SUCH AS COLDS, FLU, CHICKEN POX    TRY TO AVOID CROWDS    STAY AWAY FROM CHILDREN WHO RECENTLY HAVE RECEIVED LIVE VIRUS VACCINES    MAINTAIN GOOD MOUTH CARE    DO NOT SQUEEZE OR SCRATCH PIMPLES    CLEAN CUTS & SCRAPES RIGHT AWAY AND DAILY UNTIL HEALED WITH WARM WATER, SOAP & AN ANTISEPTIC    AVOID CONTACT WITH LITTER BOXES, BIRD CAGES, & FISH TANKS    AVOID STANDING WATER, IE., BIRD BATHS, FLOWER POTS/VASES, OR HUMIDIFIERS    WEAR GLOVES WHEN GARDENING OR CLEANING UP AFTER OTHERS, ESPECIALLY BABIES & SMALL CHILDREN    DO NOT EAT RAW FISH, SEAFOOD, MEAT, OR EGGS     .FALL PREVENTION   Falls often occur due to slipping, tripping or losing your balance. Here are ways to reduce your risk of falling again.   Was there anything that caused your fall that can be fixed, removed or replaced?   Make your home safe by keeping walkways clear of objects you may trip over.   Use non-slip pads under rugs.   Do not walk in poorly lit areas.   Do not stand on chairs or wobbly ladders.   Use caution when reaching overhead or looking upward. This position can cause a loss of balance.   Be sure your shoes fit properly, have non-slip bottoms and are in good condition.   Be cautious when going up and down  stairs, curbs, and when walking on uneven sidewalks.   If your balance is poor, consider using a cane or walker.   If your fall was related to alcohol use, stop or limit alcohol intake.   If your fall was related to use of sleeping medicines, talk to your doctor about this. You may need to reduce your dosage at bedtime if you awaken during the night to go to the bathroom.   To reduce the need for nighttime bathroom trips:   Avoid drinking fluids for several hours before going to bed   Empty your bladder before going to bed   Men can keep a urinal at the bedside   © [id number] [facility], [address] [place] [address], [place] [address]. All rights reserved. This information is not intended as a substitute for professional medical care. Always follow your healthcare professional's instructions.

## 2024-04-12 ENCOUNTER — OFFICE VISIT (OUTPATIENT)
Dept: PODIATRY | Facility: CLINIC | Age: 54
End: 2024-04-12
Payer: COMMERCIAL

## 2024-04-12 VITALS — HEIGHT: 64 IN | BODY MASS INDEX: 50.02 KG/M2 | WEIGHT: 293 LBS

## 2024-04-12 DIAGNOSIS — L60.0 INGROWN TOENAIL WITHOUT INFECTION: Primary | ICD-10-CM

## 2024-04-12 PROCEDURE — 99499 UNLISTED E&M SERVICE: CPT | Mod: S$GLB,,, | Performed by: PODIATRIST

## 2024-04-12 PROCEDURE — 99999 PR PBB SHADOW E&M-EST. PATIENT-LVL IV: CPT | Mod: PBBFAC,,, | Performed by: PODIATRIST

## 2024-04-12 PROCEDURE — 11750 EXCISION NAIL&NAIL MATRIX: CPT | Mod: T5,S$GLB,, | Performed by: PODIATRIST

## 2024-04-12 NOTE — PROGRESS NOTES
Subjective:     Patient ID: Shaneka Ahmadi is a 54 y.o. female.    Chief Complaint: Ingrown Toenail (Pt c/o right hallux toe pain 3/10, diabetic pt wears tennis shoes, PCP Dr. Wang last seen 2-9-24)    Shaneka is a 54 y.o. female who presents to the clinic complaining of painful ingrown toenail on the right foot. Patient points to right medial hallux nail border. Patient rates pain 3/10. Patient has no other pedal complaints at this time.     Patient Active Problem List   Diagnosis    Essential hypertension    Type 2 diabetes mellitus without complication, without long-term current use of insulin    History of COVID-19    Morbid obesity    Secondary malignant neoplasm of bone    Malignant neoplasm of lower lobe of left lung    Immunodeficiency due to chemotherapy    Hypokalemia       Medication List with Changes/Refills   Current Medications    ALBUTEROL (PROVENTIL/VENTOLIN HFA) 90 MCG/ACTUATION INHALER    Inhale 1-2 puffs into the lungs every 6 (six) hours as needed for Wheezing (cough).    AMLODIPINE (NORVASC) 10 MG TABLET    Take 1 tablet (10 mg total) by mouth once daily.    ATENOLOL (TENORMIN) 50 MG TABLET    Take 1 tab by mouth twice a day    BETAMETHASONE VALERATE 0.1% (VALISONE) 0.1 % OINT    APPLY TOPICALLY TO THE TOP OF THE FEET TWO TIMES A DAY    BLOOD SUGAR DIAGNOSTIC STRP    To check BG 2 times daily, to use with insurance preferred meter    CALCIUM CARBONATE (OS-BRUNILDA) 500 MG CALCIUM (1,250 MG) TABLET    Take 1 tablet (500 mg total) by mouth once daily.    CARBAMIDE PEROXIDE (DEBROX) 6.5 % OTIC SOLUTION    Place 5 drops into the left ear 2 (two) times daily.    CETIRIZINE (ZYRTEC) 10 MG TABLET    Take 1 tablet (10 mg total) by mouth every evening.    CLINDAMYCIN (CLEOCIN T) 1 % SWAB    APPLY TO AFFECTED AREA(S) TWO TIMES A DAY AS DIRECTED    DESONIDE (DESOWEN) 0.05 % CREAM    APPLY TO AFFECTED AREA(S) TWO TIMES A DAY AS DIRECTED    DEXAMETHASONE (DECADRON) 4 MG TAB    Take 2 tablets (8 mg total) by mouth  once daily. Take as directed on days 2, 3, and 16,17 of your chemotherapy cycle starting with cycle 2 forward. Do not take with cycle 1.    DEXAMETHASONE (DECADRON) 4 MG TAB    Take 1 tablet (4 mg total) by mouth As instructed. Take 8 mg the day prior to chemotherapy, and then 8 mg after chemo on days 2, 3, 4    DOXYCYCLINE (VIBRA-TABS) 100 MG TABLET    Take 1 tablet (100 mg total) by mouth once daily.    FLUTICASONE PROPIONATE (FLONASE) 50 MCG/ACTUATION NASAL SPRAY    2 sprays (100 mcg total) by Each Nostril route once daily.    FOLIC ACID (FOLVITE) 1 MG TABLET    Take 1 tablet (1 mg total) by mouth once daily.    HYDROCODONE-ACETAMINOPHEN (NORCO) 7.5-325 MG PER TABLET    Take 1 tablet by mouth every 4 (four) hours as needed for Pain.    HYDROCODONE-HOMATROPINE 5-1.5 MG/5 ML (HYCODAN) 5-1.5 MG/5 ML SYRP    Take 5 mL by mouth every 4 (four) hours as needed.    IPRATROPIUM (ATROVENT) 21 MCG (0.03 %) NASAL SPRAY    2 sprays by Each Nostril route 2 (two) times daily.    KETOCONAZOLE (NIZORAL) 2 % CREAM    1 application  once daily.    LANCETS MISC    To check BG 2 times daily, to use with insurance preferred meter    LOSARTAN-HYDROCHLOROTHIAZIDE 100-25 MG (HYZAAR) 100-25 MG PER TABLET    Take 1 tablet by mouth once daily.    METFORMIN (GLUCOPHAGE-XR) 500 MG ER 24HR TABLET    Take 1 tab with breakfast and take 2 tabs with dinner    MUPIROCIN (BACTROBAN) 2 % OINTMENT    APPLY TO AFFECTED AREA(S) THREE TIMES A DAY    ONDANSETRON (ZOFRAN-ODT) 8 MG TBDL    Take 1 tablet (8 mg total) by mouth every 8 (eight) hours as needed. Starting with cycle 1 of chemotherapy    ONDANSETRON (ZOFRAN-ODT) 8 MG TBDL    Take 1 tablet (8 mg total) by mouth every 8 (eight) hours as needed (nausea/vomitting).    POTASSIUM CHLORIDE SA (K-DUR,KLOR-CON M) 10 MEQ TABLET    Take 2 tablets (20 mEq total) by mouth once daily.    ROSUVASTATIN (CRESTOR) 5 MG TABLET    Take 1 tablet (5 mg total) by mouth once daily.    TRETINOIN (RETIN-A) 0.025 % CREAM     Apply topically every evening.    TRUE METRIX GLUCOSE METER MISC        TRUEPLUS LANCETS 33 GAUGE MISC    Apply topically.       Review of patient's allergies indicates:   Allergen Reactions    Lisinopril Other (See Comments)     coughing    Amoxicillin        Past Surgical History:   Procedure Laterality Date    CATARACT EXTRACTION W/  INTRAOCULAR LENS IMPLANT Right 06/02/2022    FLUOROSCOPY N/A 1/26/2023    Procedure: FLUOROSCOPY/mediport placement;  Surgeon: Marlon Leone MD;  Location: [facility] CATH LAB;  Service: General;  Laterality: N/A;    TUBAL LIGATION      2007--postpartum tubal ligation       Family History   Problem Relation Name Age of Onset    Heart failure Father         Social History     Socioeconomic History    Marital status:    Tobacco Use    Smoking status: Never     Passive exposure: Never    Smokeless tobacco: Never   Substance and Sexual Activity    Alcohol use: Never    Drug use: Never    Sexual activity: Yes     Partners: Male     Birth control/protection: None     Comment: tubal     Social Determinants of Health     Financial Resource Strain: Patient Declined (11/22/2023)    Overall Financial Resource Strain (CARDIA)     Difficulty of Paying Living Expenses: Patient declined   Food Insecurity: Patient Declined (11/22/2023)    Hunger Vital Sign     Worried About Running Out of Food in the Last Year: Patient declined     Ran Out of Food in the Last Year: Patient declined   Transportation Needs: Patient Declined (11/22/2023)    PRAPARE - Transportation     Lack of Transportation (Medical): Patient declined     Lack of Transportation (Non-Medical): Patient declined   Physical Activity: Unknown (11/22/2023)    Exercise Vital Sign     Days of Exercise per Week: 3 days   Stress: Patient Declined (11/22/2023)    [employer] [place] [employer] - Occupational Stress Questionnaire     Feeling of Stress : Patient declined   Social Connections: Unknown (11/22/2023)    Social Connection  "and Isolation Panel [NHANES]     Frequency of Communication with Friends and Family: Patient declined     Frequency of Social Gatherings with Friends and Family: Patient declined     Active Member of Clubs or Organizations: Patient declined     Attends Club or Organization Meetings: Patient declined     Marital Status: Patient declined   Housing Stability: Unknown (11/22/2023)    Housing Stability Vital Sign     Unable to Pay for Housing in the Last Year: Patient refused     Unstable Housing in the Last Year: Patient refused       Vitals:    04/12/24 0811   Weight: (!) 146.6 kg (323 lb 3.1 oz)   Height: 5' 4" (1.626 m)   PainSc:   3       Hemoglobin A1C   Date Value Ref Range Status   01/18/2024 8.5 (H) 4.0 - 5.6 % Final     Comment:     ADA Screening Guidelines:  5.7-6.4%  Consistent with prediabetes  >or=6.5%  Consistent with diabetes    High levels of fetal hemoglobin interfere with the HbA1C  assay. Heterozygous hemoglobin variants (HbS, HgC, etc)do  not significantly interfere with this assay.   However, presence of multiple variants may affect accuracy.     02/16/2023 7.1 (H) 4.0 - 5.6 % Final     Comment:     ADA Screening Guidelines:  5.7-6.4%  Consistent with prediabetes  >or=6.5%  Consistent with diabetes    High levels of fetal hemoglobin interfere with the HbA1C  assay. Heterozygous hemoglobin variants (HbS, HgC, etc)do  not significantly interfere with this assay.   However, presence of multiple variants may affect accuracy.     07/28/2022 7.4 (H) 4.0 - 5.6 % Final     Comment:     ADA Screening Guidelines:  5.7-6.4%  Consistent with prediabetes  >or=6.5%  Consistent with diabetes    High levels of fetal hemoglobin interfere with the HbA1C  assay. Heterozygous hemoglobin variants (HbS, HgC, etc)do  not significantly interfere with this assay.   However, presence of multiple variants may affect accuracy.         Review of Systems   Constitutional:  Negative for chills and fever.   Respiratory:  Negative " for shortness of breath.    Cardiovascular:  Negative for chest pain, palpitations, orthopnea, claudication and leg swelling.   Gastrointestinal:  Negative for diarrhea, nausea and vomiting.   Musculoskeletal:  Negative for joint pain.   Skin:  Negative for rash.   Neurological:  Negative for dizziness, tingling, sensory change, focal weakness and weakness.   Psychiatric/Behavioral: Negative.           Objective:      PHYSICAL EXAM: Apperance: Alert and orient in no distress,well developed, and with good attention to grooming and body habits  Lower Extremity Exam   VASCULAR: Dorsalis pedis pulses 2/4 right and Posterior Tibial pulses 2/4 bilateral.   DERMATOLOGICAL: No skin rash, subcutaneous nodules, lesions or ulcers observed. Right hallux nail observed to be mildly incurvated at medial borders and slight obstructed in the nail grooves with soft tissue. No purulent drainage, no odor, and no increased temperature observed to right hallux.   NEUROLOGICAL: Light touch, sharp-dull, proprioception all present and equal bilaterally.    MUSCULOSKELETAL: Muscle strength 5/5 for all foot inverters, everters, plantarflexors, and  dorsiflexors bilateral. Pain on palpation of right hallux medial nail border. No pain on palpation of dorsal nail plate right hallux.         Assessment:       ICD-10-CM ICD-9-CM   1. Ingrown toenail without infection - Right Foot  L60.0 703.0       Plan:   Ingrown toenail without infection - Right Foot    I counseled the patient on her conditions, regarding findings of my examination, my impressions, and usual treatment plan.   Patient consented verbal and written to permanent partial nail avulsion of right hallux.  Procedure performed: A local digital blue was administered to the right hallux of  6cc of 1% Lidocaine plain. Attention was then directed to the right hallux to check for adequate anesthesia. A penrose drain tourniquet was applied to the base of the right hallux for  hemostasis.  Attention was then directed the medial nail border to separate using a spatula the most proximal nail border at the eponychium was released to the area of the matrix. Then the border was released at the medial aspect and at the plantar aspect distally to proximally. Using the English anvil, a cut was then made approximately 3mm lateral to the medial nail fold in a longitudinal manner at the distal aspect extending to the proximal end of the nail plate. Using a straight hemostat, the free nail plate segment was clamped and removed. Using a tissue nipper, residual tissue was then removed. The nail groove area was inspected and probed proximally with a curette for any spicules. Next a 60 second application of phenol was applied to the medial nail border. The area was flushed with copious amounts of sterile normal saline. Betadine was applied to the area and dry sterile dressing of gauze and Coflex. The penrose drain tourniquet was released. Neurovascular status was assessed and noted to be intact. Patient tolerated procedure well.   The patient was given oral and written instructions for post-op care including BID soaks of water and Epson salt followed by application of antibiotic ointment  and light dressing.  The patient was instructed to take Tylenol over-the-counter q4h prn pain and to call clinic immediately if there is increased pain, increased redness, pus, fever, chills, nausea, or vomiting present.  Patient to return 2 weeks or sooner if needed.        Janie Jacobs DPM  [facility]

## 2024-04-17 ENCOUNTER — TELEPHONE (OUTPATIENT)
Dept: INFUSION THERAPY | Facility: HOSPITAL | Age: 54
End: 2024-04-17
Payer: COMMERCIAL

## 2024-04-17 ENCOUNTER — TELEPHONE (OUTPATIENT)
Dept: PHARMACY | Facility: CLINIC | Age: 54
End: 2024-04-17
Payer: COMMERCIAL

## 2024-04-17 NOTE — TELEPHONE ENCOUNTER
----- Message from Alma Ruiz MA sent at 4/17/2024 10:26 AM CDT -----  Please reschedule patients upcoming appt and infusion due to Bakari being out.         Thanks

## 2024-04-18 ENCOUNTER — LAB VISIT (OUTPATIENT)
Dept: LAB | Facility: HOSPITAL | Age: 54
End: 2024-04-18
Attending: NURSE PRACTITIONER
Payer: COMMERCIAL

## 2024-04-18 ENCOUNTER — OFFICE VISIT (OUTPATIENT)
Dept: HEMATOLOGY/ONCOLOGY | Facility: CLINIC | Age: 54
End: 2024-04-18
Payer: COMMERCIAL

## 2024-04-18 DIAGNOSIS — C34.32 MALIGNANT NEOPLASM OF LOWER LOBE OF LEFT LUNG: ICD-10-CM

## 2024-04-18 DIAGNOSIS — T45.1X5A IMMUNODEFICIENCY DUE TO CHEMOTHERAPY: ICD-10-CM

## 2024-04-18 DIAGNOSIS — E11.9 TYPE 2 DIABETES MELLITUS WITHOUT COMPLICATION, WITHOUT LONG-TERM CURRENT USE OF INSULIN: ICD-10-CM

## 2024-04-18 DIAGNOSIS — D84.821 IMMUNODEFICIENCY DUE TO CHEMOTHERAPY: ICD-10-CM

## 2024-04-18 DIAGNOSIS — C79.51 SECONDARY MALIGNANT NEOPLASM OF BONE: ICD-10-CM

## 2024-04-18 DIAGNOSIS — C79.51 SECONDARY MALIGNANT NEOPLASM OF BONE: Primary | ICD-10-CM

## 2024-04-18 DIAGNOSIS — Z79.899 IMMUNODEFICIENCY DUE TO CHEMOTHERAPY: ICD-10-CM

## 2024-04-18 LAB
ALBUMIN SERPL BCP-MCNC: 2.8 G/DL (ref 3.5–5.2)
ALP SERPL-CCNC: 96 U/L (ref 55–135)
ALT SERPL W/O P-5'-P-CCNC: 22 U/L (ref 10–44)
ANION GAP SERPL CALC-SCNC: 11 MMOL/L (ref 8–16)
AST SERPL-CCNC: 12 U/L (ref 10–40)
BASOPHILS # BLD AUTO: 0.04 K/UL (ref 0–0.2)
BASOPHILS NFR BLD: 0.6 % (ref 0–1.9)
BILIRUB SERPL-MCNC: 0.3 MG/DL (ref 0.1–1)
BUN SERPL-MCNC: 18 MG/DL (ref 6–20)
CALCIUM SERPL-MCNC: 8.7 MG/DL (ref 8.7–10.5)
CHLORIDE SERPL-SCNC: 105 MMOL/L (ref 95–110)
CO2 SERPL-SCNC: 27 MMOL/L (ref 23–29)
CREAT SERPL-MCNC: 0.9 MG/DL (ref 0.5–1.4)
DIFFERENTIAL METHOD BLD: NORMAL
EOSINOPHIL # BLD AUTO: 0.2 K/UL (ref 0–0.5)
EOSINOPHIL NFR BLD: 2.3 % (ref 0–8)
ERYTHROCYTE [DISTWIDTH] IN BLOOD BY AUTOMATED COUNT: 12.8 % (ref 11.5–14.5)
EST. GFR  (NO RACE VARIABLE): >60 ML/MIN/1.73 M^2
ESTIMATED AVG GLUCOSE: 189 MG/DL (ref 68–131)
GLUCOSE SERPL-MCNC: 190 MG/DL (ref 70–110)
HBA1C MFR BLD: 8.2 % (ref 4–5.6)
HCT VFR BLD AUTO: 37.2 % (ref 37–48.5)
HGB BLD-MCNC: 12.6 G/DL (ref 12–16)
IMM GRANULOCYTES # BLD AUTO: 0.02 K/UL (ref 0–0.04)
IMM GRANULOCYTES NFR BLD AUTO: 0.3 % (ref 0–0.5)
LYMPHOCYTES # BLD AUTO: 2.1 K/UL (ref 1–4.8)
LYMPHOCYTES NFR BLD: 29.5 % (ref 18–48)
MAGNESIUM SERPL-MCNC: 1.6 MG/DL (ref 1.6–2.6)
MCH RBC QN AUTO: 27.9 PG (ref 27–31)
MCHC RBC AUTO-ENTMCNC: 33.9 G/DL (ref 32–36)
MCV RBC AUTO: 82 FL (ref 82–98)
MONOCYTES # BLD AUTO: 0.5 K/UL (ref 0.3–1)
MONOCYTES NFR BLD: 7.1 % (ref 4–15)
NEUTROPHILS # BLD AUTO: 4.2 K/UL (ref 1.8–7.7)
NEUTROPHILS NFR BLD: 60.2 % (ref 38–73)
NRBC BLD-RTO: 0 /100 WBC
PHOSPHATE SERPL-MCNC: 3.6 MG/DL (ref 2.7–4.5)
PLATELET # BLD AUTO: 175 K/UL (ref 150–450)
PMV BLD AUTO: 11.5 FL (ref 9.2–12.9)
POTASSIUM SERPL-SCNC: 3.7 MMOL/L (ref 3.5–5.1)
PROT SERPL-MCNC: 5.8 G/DL (ref 6–8.4)
RBC # BLD AUTO: 4.52 M/UL (ref 4–5.4)
SODIUM SERPL-SCNC: 143 MMOL/L (ref 136–145)
WBC # BLD AUTO: 6.95 K/UL (ref 3.9–12.7)

## 2024-04-18 PROCEDURE — 36415 COLL VENOUS BLD VENIPUNCTURE: CPT | Performed by: INTERNAL MEDICINE

## 2024-04-18 PROCEDURE — 84100 ASSAY OF PHOSPHORUS: CPT | Performed by: INTERNAL MEDICINE

## 2024-04-18 PROCEDURE — 83036 HEMOGLOBIN GLYCOSYLATED A1C: CPT | Performed by: NURSE PRACTITIONER

## 2024-04-18 PROCEDURE — 3061F NEG MICROALBUMINURIA REV: CPT | Mod: CPTII,95,,

## 2024-04-18 PROCEDURE — 83735 ASSAY OF MAGNESIUM: CPT | Performed by: INTERNAL MEDICINE

## 2024-04-18 PROCEDURE — 3052F HG A1C>EQUAL 8.0%<EQUAL 9.0%: CPT | Mod: CPTII,95,,

## 2024-04-18 PROCEDURE — 80053 COMPREHEN METABOLIC PANEL: CPT | Performed by: INTERNAL MEDICINE

## 2024-04-18 PROCEDURE — 3066F NEPHROPATHY DOC TX: CPT | Mod: CPTII,95,,

## 2024-04-18 PROCEDURE — 99215 OFFICE O/P EST HI 40 MIN: CPT | Mod: 95,,,

## 2024-04-18 PROCEDURE — 85025 COMPLETE CBC W/AUTO DIFF WBC: CPT | Performed by: INTERNAL MEDICINE

## 2024-04-18 RX ORDER — ACETAMINOPHEN 325 MG/1
650 TABLET ORAL
Status: CANCELLED
Start: 2024-04-19

## 2024-04-18 RX ORDER — SODIUM CHLORIDE 0.9 % (FLUSH) 0.9 %
10 SYRINGE (ML) INJECTION
Status: CANCELLED | OUTPATIENT
Start: 2024-04-19

## 2024-04-18 RX ORDER — HEPARIN 100 UNIT/ML
500 SYRINGE INTRAVENOUS
Status: CANCELLED | OUTPATIENT
Start: 2024-04-19

## 2024-04-18 RX ORDER — FOLIC ACID 1 MG/1
1 TABLET ORAL DAILY
Qty: 30 TABLET | Refills: 11 | Status: SHIPPED | OUTPATIENT
Start: 2024-04-18

## 2024-04-18 RX ORDER — DIPHENHYDRAMINE HYDROCHLORIDE 50 MG/ML
50 INJECTION INTRAMUSCULAR; INTRAVENOUS ONCE AS NEEDED
Status: CANCELLED | OUTPATIENT
Start: 2024-04-19

## 2024-04-18 RX ORDER — DIPHENHYDRAMINE HYDROCHLORIDE 50 MG/ML
25 INJECTION INTRAMUSCULAR; INTRAVENOUS
Status: CANCELLED
Start: 2024-04-19

## 2024-04-18 RX ORDER — EPINEPHRINE 0.3 MG/.3ML
0.3 INJECTION SUBCUTANEOUS ONCE AS NEEDED
Status: CANCELLED | OUTPATIENT
Start: 2024-04-19

## 2024-04-18 RX ORDER — MUPIROCIN 20 MG/G
OINTMENT TOPICAL DAILY
Qty: 22 G | Refills: 0 | Status: SHIPPED | OUTPATIENT
Start: 2024-04-18

## 2024-04-18 NOTE — PROGRESS NOTES
"Subjective:       Patient ID: Shaneka Ahmadi is a 54 y.o. female.    Chief Complaint: Chemotherapy and Lung Cancer    Primary Oncologist/Hematologist: Dr. Wilhelm    HPI: Ms. 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 She is here for cycle 13 day 15 of amivantamab (rybrevant). She gets this day 1 and day 15 on a 28 day cycle. She is s/p 4 cycles of alimta + carboplatin  q 3 weeks, re imaging showed progression.    Cancer Hx: Noted cough and SOB, CXR and CT chest showed L Lung mass. L lung biopsy positive for adenocarcinoma , EGFR mutated. Pleural fluid also positive for malignancy. PET showed avid STEPHAN mass, mediastinal nodes, bone mets in C1, T1, T11, L3, sacrum, L ischium, sternum. MRI brain negative for intracranial metastases. Initiated on carbo/ alimta 01/27/23--re imaging after 4 cycles found progressive disease.     Today:  The rash worsened a few weeks ago; she started doxycycline per dermatology and rash has improved. She states she is wearing sunscreen as well, and rash has improved. She has not used decrox for her L ear being "stopped up", will pick it up today. She denies any fevers, n/v/d/c, sore throat, congestion, numbness. She states her appetite is good and she has been staying hydrated.      Social History     Socioeconomic History    Marital status:    Tobacco Use    Smoking status: Never     Passive exposure: Never    Smokeless tobacco: Never   Substance and Sexual Activity    Alcohol use: Never    Drug use: Never    Sexual activity: Yes     Partners: Male     Birth control/protection: None     Comment: tubal     Social Determinants of Health     Financial Resource Strain: Patient Declined (11/22/2023)    Overall Financial Resource Strain (CARDIA)     Difficulty of Paying Living Expenses: Patient declined   Food Insecurity: Patient Declined (11/22/2023)    Hunger Vital Sign     Worried About Running Out of Food in the " Last Year: Patient declined     Ran Out of Food in the Last Year: Patient declined   Transportation Needs: Patient Declined (11/22/2023)    PRAPARE - Transportation     Lack of Transportation (Medical): Patient declined     Lack of Transportation (Non-Medical): Patient declined   Physical Activity: Unknown (11/22/2023)    Exercise Vital Sign     Days of Exercise per Week: 3 days   Stress: Patient Declined (11/22/2023)    [employer] - Occupational Stress Questionnaire     Feeling of Stress : Patient declined   Social Connections: Unknown (11/22/2023)    Social Connection and Isolation Panel [NHANES]     Frequency of Communication with Friends and Family: Patient declined     Frequency of Social Gatherings with Friends and Family: Patient declined     Active Member of Clubs or Organizations: Patient declined     Attends Club or Organization Meetings: Patient declined     Marital Status: Patient declined   Housing Stability: Unknown (11/22/2023)    Housing Stability Vital Sign     Unable to Pay for Housing in the Last Year: Patient refused     Unstable Housing in the Last Year: Patient refused       Past Medical History:   Diagnosis Date    Diabetes mellitus     Hypertension     Malignant neoplasm of lower lobe of left lung 12/9/2022    Rash, drug 7/6/2023    OP NSCLC AMIVANTAMAB-VMJW (Rybrevant) Q4W      Secondary malignant neoplasm of bone 12/5/2022       Family History   Problem Relation Name Age of Onset    Heart failure Father         Past Surgical History:   Procedure Laterality Date    CATARACT EXTRACTION W/  INTRAOCULAR LENS IMPLANT Right 06/02/2022    FLUOROSCOPY N/A 1/26/2023    Procedure: FLUOROSCOPY/mediport placement;  Surgeon: Marlon Leone MD;  Location: [facility] CATH LAB;  Service: General;  Laterality: N/A;    TUBAL LIGATION      2007--postpartum tubal ligation       Review of Systems   Constitutional:  Negative for activity change, appetite change, chills, diaphoresis,  fatigue, fever and unexpected weight change.   HENT:  Positive for ear pain (congested). Negative for congestion, nosebleeds and rhinorrhea.    Eyes:  Negative for visual disturbance.   Respiratory:  Negative for cough and shortness of breath.    Cardiovascular:  Negative for chest pain and leg swelling.   Gastrointestinal:  Negative for abdominal pain, anal bleeding, blood in stool, constipation, diarrhea, nausea and vomiting.   Genitourinary:  Negative for hematuria.   Musculoskeletal:  Negative for myalgias.   Skin:  Positive for rash (improved). Negative for color change and pallor.   Allergic/Immunologic: Positive for immunocompromised state.   Neurological:  Negative for dizziness, weakness, light-headedness, numbness and headaches.         Medication List with Changes/Refills   Current Medications    ALBUTEROL (PROVENTIL/VENTOLIN HFA) 90 MCG/ACTUATION INHALER    Inhale 1-2 puffs into the lungs every 6 (six) hours as needed for Wheezing (cough).    AMLODIPINE (NORVASC) 10 MG TABLET    Take 1 tablet (10 mg total) by mouth once daily.    ATENOLOL (TENORMIN) 50 MG TABLET    Take 1 tab by mouth twice a day    BETAMETHASONE VALERATE 0.1% (VALISONE) 0.1 % OINT    APPLY TOPICALLY TO THE TOP OF THE FEET TWO TIMES A DAY    BLOOD SUGAR DIAGNOSTIC STRP    To check BG 2 times daily, to use with insurance preferred meter    CALCIUM CARBONATE (OS-BRUNILDA) 500 MG CALCIUM (1,250 MG) TABLET    Take 1 tablet (500 mg total) by mouth once daily.    CARBAMIDE PEROXIDE (DEBROX) 6.5 % OTIC SOLUTION    Place 5 drops into the left ear 2 (two) times daily.    CETIRIZINE (ZYRTEC) 10 MG TABLET    Take 1 tablet (10 mg total) by mouth every evening.    CLINDAMYCIN (CLEOCIN T) 1 % SWAB    APPLY TO AFFECTED AREA(S) TWO TIMES A DAY AS DIRECTED    DESONIDE (DESOWEN) 0.05 % CREAM    APPLY TO AFFECTED AREA(S) TWO TIMES A DAY AS DIRECTED    DEXAMETHASONE (DECADRON) 4 MG TAB    Take 2 tablets (8 mg total) by mouth once daily. Take as directed on days  2, 3, and 16,17 of your chemotherapy cycle starting with cycle 2 forward. Do not take with cycle 1.    DEXAMETHASONE (DECADRON) 4 MG TAB    Take 1 tablet (4 mg total) by mouth As instructed. Take 8 mg the day prior to chemotherapy, and then 8 mg after chemo on days 2, 3, 4    DOXYCYCLINE (VIBRA-TABS) 100 MG TABLET    Take 1 tablet (100 mg total) by mouth once daily.    FLUTICASONE PROPIONATE (FLONASE) 50 MCG/ACTUATION NASAL SPRAY    2 sprays (100 mcg total) by Each Nostril route once daily.    HYDROCODONE-ACETAMINOPHEN (NORCO) 7.5-325 MG PER TABLET    Take 1 tablet by mouth every 4 (four) hours as needed for Pain.    HYDROCODONE-HOMATROPINE 5-1.5 MG/5 ML (HYCODAN) 5-1.5 MG/5 ML SYRP    Take 5 mL by mouth every 4 (four) hours as needed.    IPRATROPIUM (ATROVENT) 21 MCG (0.03 %) NASAL SPRAY    2 sprays by Each Nostril route 2 (two) times daily.    KETOCONAZOLE (NIZORAL) 2 % CREAM    1 application  once daily.    LANCETS MISC    To check BG 2 times daily, to use with insurance preferred meter    LOSARTAN-HYDROCHLOROTHIAZIDE 100-25 MG (HYZAAR) 100-25 MG PER TABLET    Take 1 tablet by mouth once daily.    METFORMIN (GLUCOPHAGE-XR) 500 MG ER 24HR TABLET    Take 1 tab with breakfast and take 2 tabs with dinner    ONDANSETRON (ZOFRAN-ODT) 8 MG TBDL    Take 1 tablet (8 mg total) by mouth every 8 (eight) hours as needed. Starting with cycle 1 of chemotherapy    ONDANSETRON (ZOFRAN-ODT) 8 MG TBDL    Take 1 tablet (8 mg total) by mouth every 8 (eight) hours as needed (nausea/vomitting).    POTASSIUM CHLORIDE SA (K-DUR,KLOR-CON M) 10 MEQ TABLET    Take 2 tablets (20 mEq total) by mouth once daily.    ROSUVASTATIN (CRESTOR) 5 MG TABLET    Take 1 tablet (5 mg total) by mouth once daily.    TRETINOIN (RETIN-A) 0.025 % CREAM    Apply topically every evening.    TRUE METRIX GLUCOSE METER MISC        TRUEPLUS LANCETS 33 GAUGE MISC    Apply topically.   Changed and/or Refilled Medications    Modified Medication Previous Medication     FOLIC ACID (FOLVITE) 1 MG TABLET folic acid (FOLVITE) 1 MG tablet       Take 1 tablet (1 mg total) by mouth once daily.    Take 1 tablet (1 mg total) by mouth once daily.    MUPIROCIN (BACTROBAN) 2 % OINTMENT mupirocin (BACTROBAN) 2 % ointment       Apply topically once daily.    APPLY TO AFFECTED AREA(S) THREE TIMES A DAY     Objective:     There were no vitals filed for this visit.    Physical Exam  Constitutional:       General: She is not in acute distress.     Appearance: She is not ill-appearing, toxic-appearing or diaphoretic.   Skin:     Comments: Hyperpigmented rash located on face, without drainage or erythema   Neurological:      Mental Status: She is alert.   Psychiatric:         Mood and Affect: Mood normal.          Physical exam limited due to video visit    Labs/Results:  Lab Results   Component Value Date    WBC 6.95 04/18/2024    RBC 4.52 04/18/2024    HGB 12.6 04/18/2024    HCT 37.2 04/18/2024    MCV 82 04/18/2024    MCH 27.9 04/18/2024    MCHC 33.9 04/18/2024    RDW 12.8 04/18/2024     04/18/2024    MPV 11.5 04/18/2024    GRAN 4.2 04/18/2024    GRAN 60.2 04/18/2024    LYMPH 2.1 04/18/2024    LYMPH 29.5 04/18/2024    MONO 0.5 04/18/2024    MONO 7.1 04/18/2024    EOS 0.2 04/18/2024    BASO 0.04 04/18/2024    EOSINOPHIL 2.3 04/18/2024    BASOPHIL 0.6 04/18/2024   CMP  Sodium   Date Value Ref Range Status   04/18/2024 143 136 - 145 mmol/L Final     Potassium   Date Value Ref Range Status   04/18/2024 3.7 3.5 - 5.1 mmol/L Final     Chloride   Date Value Ref Range Status   04/18/2024 105 95 - 110 mmol/L Final     CO2   Date Value Ref Range Status   04/18/2024 27 23 - 29 mmol/L Final     Glucose   Date Value Ref Range Status   04/18/2024 190 (H) 70 - 110 mg/dL Final     BUN   Date Value Ref Range Status   04/18/2024 18 6 - 20 mg/dL Final     Creatinine   Date Value Ref Range Status   04/18/2024 0.9 0.5 - 1.4 mg/dL Final     Calcium   Date Value Ref Range Status   04/18/2024 8.7 8.7 - 10.5 mg/dL  Final     Total Protein   Date Value Ref Range Status   04/18/2024 5.8 (L) 6.0 - 8.4 g/dL Final     Albumin   Date Value Ref Range Status   04/18/2024 2.8 (L) 3.5 - 5.2 g/dL Final     Total Bilirubin   Date Value Ref Range Status   04/18/2024 0.3 0.1 - 1.0 mg/dL Final     Comment:     For infants and newborns, interpretation of results should be based  on gestational age, weight and in agreement with clinical  observations.    Premature Infant recommended reference ranges:  Up to 24 hours.............<8.0 mg/dL  Up to 48 hours............<12.0 mg/dL  3-5 days..................<15.0 mg/dL  6-29 days.................<15.0 mg/dL       Alkaline Phosphatase   Date Value Ref Range Status   04/18/2024 96 55 - 135 U/L Final     AST   Date Value Ref Range Status   04/18/2024 12 10 - 40 U/L Final     ALT   Date Value Ref Range Status   04/18/2024 22 10 - 44 U/L Final     Anion Gap   Date Value Ref Range Status   04/18/2024 11 8 - 16 mmol/L Final     eGFR   Date Value Ref Range Status   04/18/2024 >60 >60 mL/min/1.73 m^2 Final       Ct c/a/p 1/30/24  1.    Minimal residual linear increased density periphery of the left upper lobe could represent residual left upper lobe lung nodule or residual scarring.  No new or enlarging pulmonary nodules or masses bilaterally.  2.    Nonspecific exophytic soft tissue density nodule projects posteriorly off of the posterior segment right lobe liver.  No change.  No definite evidence for metastatic disease throughout the abdomen or pelvis.  3.    Osseous metastatic disease significantly improved    Assessment:     Problem List Items Addressed This Visit          Immunology/Multi System    Immunodeficiency due to chemotherapy       Oncology    Secondary malignant neoplasm of bone - Primary    Malignant neoplasm of lower lobe of left lung    Relevant Medications    mupirocin (BACTROBAN) 2 % ointment    folic acid (FOLVITE) 1 MG tablet     Plan:     Malignant neoplasm of lower lobe of left  lung, Secondary malignant neoplasm of bone, Immunodeficiency due to chemotherapy  --Exon 20 mutation  --s/p 4 cycles of alimta + carboplatin q 3 weeks. Initiated on carbo/ alimta 01/27/23--re imaging after 4 cycles found progressive disease.  --getting treated on days 1 and day 15 on a 28 day cycle.   --MRI brain negative  --continue with cycle 13 day 15 of amivantamab (rybrevant)  --ANC:4.2, plts: 175  --continue with vitamin D supplement. Hold on calcium unitl start zometa.   --has not had zometa q 4 weeks. Dental clearance needed first.pending dental clearance  --potassium supplement- 1x/day. Has trouble swallowing pill. Increase potassium rich foods.  --CT CAP 01/30/24: stable     Amivantamab Induced Rash  --Continue topical clindamycin, and tretinoin   --Started on doxycycline per derm  --Continue follow up with derm    Follow-Up:2 weeks with cbc cmp mag priro for next cycle with [facility]. 4 weeks with cbc cmp mag  and ct prior with primary oncologist. CT c/a/p to be scheduled the [place], 1st week of May     Debi Trujillo PA-C  Hematology Oncology    Route Chart for Scheduling    Med Onc Chart Routing      Follow up with physician . 4 weeks with cbc cmp mag and CT prior   Follow up with DOLLY . 2 weeks with cbc cmp mag priro for next cycle   Infusion scheduling note   2 weeks and 4 weeks for next cycle   Injection scheduling note    Labs    Imaging    Pharmacy appointment    Other referrals                Treatment Plan Information   OP NSCLC AMIVANTAMAB-VMJW (Rybrevant) Q4W   Reese Mcfarlane MD   Upcoming Treatment Dates - OP NSCLC AMIVANTAMAB-VMJW (Rybrevant) Q4W    4/19/2024       Pre-Medications       acetaminophen tablet 650 mg       diphenhydrAMINE injection 25 mg       Antiemetics       palonosetron (ALOXI) 0.25 mg with Dexamethasone (DECADRON) 12 mg in NS 50 mL IVPB       Immunotherapy       amivantamab-vmjw (RYBREVANT) 1,400 mg in sodium chloride 0.9% SolP 250 mL chemo infusion  5/3/2024        Pre-Medications       acetaminophen tablet 650 mg       diphenhydrAMINE injection 25 mg       Antiemetics       palonosetron (ALOXI) 0.25 mg with Dexamethasone (DECADRON) 12 mg in NS 50 mL IVPB       Immunotherapy       amivantamab-vmjw (RYBREVANT) 1,400 mg in sodium chloride 0.9% SolP 250 mL chemo infusion  5/17/2024       Pre-Medications       acetaminophen tablet 650 mg       diphenhydrAMINE injection 25 mg       Antiemetics       palonosetron (ALOXI) 0.25 mg with Dexamethasone (DECADRON) 12 mg in NS 50 mL IVPB       Immunotherapy       amivantamab-vmjw (RYBREVANT) 1,400 mg in sodium chloride 0.9% SolP 250 mL chemo infusion  5/31/2024       Pre-Medications       acetaminophen tablet 650 mg       diphenhydrAMINE injection 25 mg       Antiemetics       palonosetron (ALOXI) 0.25 mg with Dexamethasone (DECADRON) 12 mg in NS 50 mL IVPB       Immunotherapy       amivantamab-vmjw (RYBREVANT) 1,400 mg in sodium chloride 0.9% SolP 250 mL chemo infusion    Supportive Plan Information  OP ZOLEDRONIC ACID (ZOMETA) Q4W   Reese Mcfarlane MD   Upcoming Treatment Dates - OP ZOLEDRONIC ACID (ZOMETA) Q4W    4/1/2023       Medications       zoledronic acid (ZOMETA) 4 mg in sodium chloride 0.9% 100 mL IVPB  4/29/2023       Medications       zoledronic acid (ZOMETA) 4 mg in sodium chloride 0.9% 100 mL IVPB  5/27/2023       Medications       zoledronic acid (ZOMETA) 4 mg in sodium chloride 0.9% 100 mL IVPB  6/24/2023       Medications       zoledronic acid (ZOMETA) 4 mg in sodium chloride 0.9% 100 mL IVPB    Therapy Plan Information  Flushes  sodium chloride 0.9% 250 mL flush bag  Intravenous, Every visit  sodium chloride 0.9% flush 10 mL  10 mL, Intravenous, Every visit  heparin, porcine (PF) 100 unit/mL injection flush 500 Units  500 Units, Intravenous, Every visit  alteplase injection 2 mg  2 mg, Intra-Catheter, Every visit  5. Medications  cyanocobalamin injection 1,000 mcg  1,000 mcg, Intramuscular, Day 1 of every 1  month      The patient location is: work  The chief complaint leading to consultation is: lung cx and chemo  Visit type:  Synchronous audio video   Face to Face time with patient: 15 minutes of total time spent on the encounter, which includes face to face time and non-face to face time preparing to see the patient (eg, review of tests), Obtaining and/or reviewing separately obtained history, Documenting clinical information in the electronic or other health record, Independently interpreting results (not separately reported) and communicating results to the patient/family/caregiver, or Care coordination (not separately reported).   Each patient to whom he or she provides medical services by telemedicine is:  (1) informed of the relationship between the provider and patient and the respective role of any other health care provider with respect to management of the patient; and (2) notified that he or she may decline to receive medical services

## 2024-04-19 ENCOUNTER — INFUSION (OUTPATIENT)
Dept: INFUSION THERAPY | Facility: HOSPITAL | Age: 54
End: 2024-04-19
Attending: RADIOLOGY
Payer: COMMERCIAL

## 2024-04-19 VITALS
HEIGHT: 64 IN | HEART RATE: 57 BPM | RESPIRATION RATE: 16 BRPM | SYSTOLIC BLOOD PRESSURE: 104 MMHG | TEMPERATURE: 97 F | DIASTOLIC BLOOD PRESSURE: 60 MMHG | OXYGEN SATURATION: 98 % | WEIGHT: 293 LBS | BODY MASS INDEX: 50.02 KG/M2

## 2024-04-19 DIAGNOSIS — C34.32 MALIGNANT NEOPLASM OF LOWER LOBE OF LEFT LUNG: Primary | ICD-10-CM

## 2024-04-19 PROCEDURE — 96413 CHEMO IV INFUSION 1 HR: CPT

## 2024-04-19 PROCEDURE — 96415 CHEMO IV INFUSION ADDL HR: CPT

## 2024-04-19 PROCEDURE — A4216 STERILE WATER/SALINE, 10 ML: HCPCS

## 2024-04-19 PROCEDURE — 63600175 PHARM REV CODE 636 W HCPCS: Mod: JZ,TB

## 2024-04-19 PROCEDURE — 96367 TX/PROPH/DG ADDL SEQ IV INF: CPT

## 2024-04-19 PROCEDURE — 96375 TX/PRO/DX INJ NEW DRUG ADDON: CPT

## 2024-04-19 PROCEDURE — 25000003 PHARM REV CODE 250

## 2024-04-19 RX ORDER — SODIUM CHLORIDE 0.9 % (FLUSH) 0.9 %
10 SYRINGE (ML) INJECTION
Status: DISCONTINUED | OUTPATIENT
Start: 2024-04-19 | End: 2024-04-19 | Stop reason: HOSPADM

## 2024-04-19 RX ORDER — HEPARIN 100 UNIT/ML
500 SYRINGE INTRAVENOUS
Status: DISCONTINUED | OUTPATIENT
Start: 2024-04-19 | End: 2024-04-19 | Stop reason: HOSPADM

## 2024-04-19 RX ORDER — DIPHENHYDRAMINE HYDROCHLORIDE 50 MG/ML
25 INJECTION INTRAMUSCULAR; INTRAVENOUS
Status: COMPLETED | OUTPATIENT
Start: 2024-04-19 | End: 2024-04-19

## 2024-04-19 RX ORDER — ACETAMINOPHEN 325 MG/1
650 TABLET ORAL
Status: COMPLETED | OUTPATIENT
Start: 2024-04-19 | End: 2024-04-19

## 2024-04-19 RX ADMIN — Medication 10 ML: at 11:04

## 2024-04-19 RX ADMIN — DIPHENHYDRAMINE HYDROCHLORIDE 25 MG: 50 INJECTION INTRAMUSCULAR; INTRAVENOUS at 09:04

## 2024-04-19 RX ADMIN — HEPARIN 500 UNITS: 100 SYRINGE at 11:04

## 2024-04-19 RX ADMIN — DEXAMETHASONE SODIUM PHOSPHATE 0.25 MG: 4 INJECTION, SOLUTION INTRA-ARTICULAR; INTRALESIONAL; INTRAMUSCULAR; INTRAVENOUS; SOFT TISSUE at 09:04

## 2024-04-19 RX ADMIN — ACETAMINOPHEN 650 MG: 325 TABLET ORAL at 09:04

## 2024-04-19 RX ADMIN — SODIUM CHLORIDE: 9 INJECTION, SOLUTION INTRAVENOUS at 09:04

## 2024-04-19 RX ADMIN — AMIVANTAMAB 1400 MG: 350 INJECTION INTRAVENOUS at 10:04

## 2024-04-19 NOTE — PLAN OF CARE
Problem: Adult Inpatient Plan of Care  Goal: Plan of Care Review  Outcome: Ongoing, Progressing  Flowsheets (Taken 4/19/2024 1019)  Plan of Care Reviewed With:   patient   spouse  Goal: Patient-Specific Goal (Individualized)  Outcome: Ongoing, Progressing  Flowsheets (Taken 4/19/2024 1019)  Anxieties, Fears or Concerns: no concerns expressed, States she feels pretty good.  Individualized Care Needs: Elevate legs, warm blankets, and pillow provided. Lays plain chips and apple juice given as well.  Goal: Optimal Comfort and Wellbeing  Outcome: Ongoing, Progressing  Intervention: Monitor Pain and Promote Comfort  Flowsheets (Taken 4/19/2024 1019)  Pain Management Interventions:   warm blanket provided   quiet environment facilitated   pillow support provided  Intervention: Provide Person-Centered Care  Flowsheets (Taken 4/19/2024 1019)  Trust Relationship/Rapport:   care explained   reassurance provided   choices provided   thoughts/feelings acknowledged   emotional support provided   empathic listening provided   questions answered   questions encouraged     Problem: Fall Injury Risk  Goal: Absence of Fall and Fall-Related Injury  Outcome: Ongoing, Progressing  Intervention: Identify and Manage Contributors  Flowsheets (Taken 4/19/2024 1019)  Self-Care Promotion: BADL personal objects within reach  Medication Review/Management:   medications reviewed   infusion initiated  Intervention: Promote Injury-Free Environment  Flowsheets (Taken 4/19/2024 1019)  Safety Promotion/Fall Prevention:   instructed to call staff for mobility   medications reviewed   lighting adjusted   in recliner, wheels locked     Problem: Nausea and Vomiting (Chemotherapy Effects)  Goal: Fluid and Electrolyte Balance  Outcome: Ongoing, Progressing  Intervention: Prevent and Manage Nausea and Vomiting  Flowsheets (Taken 4/19/2024 1019)  Environmental Support:   calm environment promoted   rest periods encouraged

## 2024-04-19 NOTE — DISCHARGE INSTRUCTIONS
.[facility]  [address] [address]  [address]  [phone] phone     [phone] fax  Hours of Operation: Monday- Friday 8:00am- 5:00pm  After hours phone  [phone]  Hematology / Oncology Physicians on call    Dr. Denys Mendez           Nurse Practitioners:     Nanci Hawley, BRISA Caicedo, TATIANNA Islas, BRISA Kwon, BRISA Blackmon, NP    Please don't hesitate to call if you have any concerns.      FALL PREVENTION   Falls often occur due to slipping, tripping or losing your balance. Here are ways to reduce your risk of falling again.   Was there anything that caused your fall that can be fixed, removed or replaced?   Make your home safe by keeping walkways clear of objects you may trip over.   Use non-slip pads under rugs.   Do not walk in poorly lit areas.   Do not stand on chairs or wobbly ladders.   Use caution when reaching overhead or looking upward. This position can cause a loss of balance.   Be sure your shoes fit properly, have non-slip bottoms and are in good condition.   Be cautious when going up and down stairs, curbs, and when walking on uneven sidewalks.   If your balance is poor, consider using a cane or walker.   If your fall was related to alcohol use, stop or limit alcohol intake.   If your fall was related to use of sleeping medicines, talk to your doctor about this. You may need to reduce your dosage at bedtime if you awaken during the night to go to the bathroom.   To reduce the need for nighttime bathroom trips:   Avoid drinking fluids for several hours before going to bed   Empty your bladder before going to bed   Men can keep a urinal at the bedside   © 0582-1161 [employer], [address] [place] [address], [place] [address]. All rights reserved. This information is not intended as a substitute for professional medical care. Always follow your healthcare  professional's instructions.    WAYS TO HELP PREVENT INFECTION        WASH YOUR HANDS OFTEN DURING THE DAY, ESPECIALLY BEFORE YOU EAT, AFTER USING THE BATHROOM, AND AFTER TOUCHING ANIMALS    STAY AWAY FROM PEOPLE WHO HAVE ILLNESSES YOU CAN CATCH; SUCH AS COLDS, FLU, CHICKEN POX    TRY TO AVOID CROWDS    STAY AWAY FROM CHILDREN WHO RECENTLY HAVE RECEIVED LIVE VIRUS VACCINES    MAINTAIN GOOD MOUTH CARE    DO NOT SQUEEZE OR SCRATCH PIMPLES    CLEAN CUTS & SCRAPES RIGHT AWAY AND DAILY UNTIL HEALED WITH WARM WATER, SOAP & AN ANTISEPTIC    AVOID CONTACT WITH LITTER BOXES, BIRD CAGES, & FISH TANKS    AVOID STANDING WATER, IE., BIRD BATHS, FLOWER POTS/VASES, OR HUMIDIFIERS    WEAR GLOVES WHEN GARDENING OR CLEANING UP AFTER OTHERS, ESPECIALLY BABIES & SMALL CHILDREN    DO NOT EAT RAW FISH, SEAFOOD, MEAT, OR EGGS

## 2024-04-27 DIAGNOSIS — L70.8 ACNEIFORM RASH: ICD-10-CM

## 2024-04-30 ENCOUNTER — OFFICE VISIT (OUTPATIENT)
Dept: INTERNAL MEDICINE | Facility: CLINIC | Age: 54
End: 2024-04-30
Payer: COMMERCIAL

## 2024-04-30 VITALS
DIASTOLIC BLOOD PRESSURE: 72 MMHG | HEIGHT: 64 IN | RESPIRATION RATE: 18 BRPM | SYSTOLIC BLOOD PRESSURE: 112 MMHG | WEIGHT: 293 LBS | OXYGEN SATURATION: 97 % | TEMPERATURE: 96 F | HEART RATE: 74 BPM | BODY MASS INDEX: 50.02 KG/M2

## 2024-04-30 DIAGNOSIS — E11.9 TYPE 2 DIABETES MELLITUS WITHOUT COMPLICATION, WITHOUT LONG-TERM CURRENT USE OF INSULIN: ICD-10-CM

## 2024-04-30 DIAGNOSIS — I10 ESSENTIAL HYPERTENSION: ICD-10-CM

## 2024-04-30 DIAGNOSIS — Z00.00 ROUTINE MEDICAL EXAM: Primary | ICD-10-CM

## 2024-04-30 DIAGNOSIS — C34.32 MALIGNANT NEOPLASM OF LOWER LOBE OF LEFT LUNG: ICD-10-CM

## 2024-04-30 DIAGNOSIS — R05.1 ACUTE COUGH: ICD-10-CM

## 2024-04-30 DIAGNOSIS — J30.2 SEASONAL ALLERGIES: ICD-10-CM

## 2024-04-30 DIAGNOSIS — E66.01 MORBID OBESITY: ICD-10-CM

## 2024-04-30 DIAGNOSIS — Z86.16 HISTORY OF COVID-19: ICD-10-CM

## 2024-04-30 PROCEDURE — 3074F SYST BP LT 130 MM HG: CPT | Mod: CPTII,S$GLB,, | Performed by: NURSE PRACTITIONER

## 2024-04-30 PROCEDURE — 99999 PR PBB SHADOW E&M-EST. PATIENT-LVL IV: CPT | Mod: PBBFAC,,, | Performed by: NURSE PRACTITIONER

## 2024-04-30 PROCEDURE — 3052F HG A1C>EQUAL 8.0%<EQUAL 9.0%: CPT | Mod: CPTII,S$GLB,, | Performed by: NURSE PRACTITIONER

## 2024-04-30 PROCEDURE — 1160F RVW MEDS BY RX/DR IN RCRD: CPT | Mod: CPTII,S$GLB,, | Performed by: NURSE PRACTITIONER

## 2024-04-30 PROCEDURE — 3008F BODY MASS INDEX DOCD: CPT | Mod: CPTII,S$GLB,, | Performed by: NURSE PRACTITIONER

## 2024-04-30 PROCEDURE — 3066F NEPHROPATHY DOC TX: CPT | Mod: CPTII,S$GLB,, | Performed by: NURSE PRACTITIONER

## 2024-04-30 PROCEDURE — 3061F NEG MICROALBUMINURIA REV: CPT | Mod: CPTII,S$GLB,, | Performed by: NURSE PRACTITIONER

## 2024-04-30 PROCEDURE — 1159F MED LIST DOCD IN RCRD: CPT | Mod: CPTII,S$GLB,, | Performed by: NURSE PRACTITIONER

## 2024-04-30 PROCEDURE — 99214 OFFICE O/P EST MOD 30 MIN: CPT | Mod: S$GLB,,, | Performed by: NURSE PRACTITIONER

## 2024-04-30 PROCEDURE — 3078F DIAST BP <80 MM HG: CPT | Mod: CPTII,S$GLB,, | Performed by: NURSE PRACTITIONER

## 2024-04-30 RX ORDER — LANCETS
EACH MISCELLANEOUS
Qty: 100 EACH | Refills: 5 | Status: SHIPPED | OUTPATIENT
Start: 2024-04-30

## 2024-04-30 RX ORDER — CLINDAMYCIN PHOSPHATE 10 MG/ML
SOLUTION TOPICAL
Qty: 60 EACH | Refills: 3 | Status: SHIPPED | OUTPATIENT
Start: 2024-04-30

## 2024-04-30 RX ORDER — GLIPIZIDE AND METFORMIN HCL 5; 500 MG/1; MG/1
1 TABLET, FILM COATED ORAL
Qty: 180 TABLET | Refills: 3 | Status: SHIPPED | OUTPATIENT
Start: 2024-04-30 | End: 2025-04-30

## 2024-04-30 RX ORDER — IPRATROPIUM BROMIDE 21 UG/1
2 SPRAY, METERED NASAL 2 TIMES DAILY
Qty: 30 ML | Refills: 0 | Status: SHIPPED | OUTPATIENT
Start: 2024-04-30 | End: 2024-06-14 | Stop reason: SDUPTHER

## 2024-04-30 RX ORDER — MONTELUKAST SODIUM 10 MG/1
10 TABLET ORAL NIGHTLY
Qty: 30 TABLET | Refills: 5 | Status: SHIPPED | OUTPATIENT
Start: 2024-04-30

## 2024-04-30 RX ORDER — BLOOD SUGAR DIAGNOSTIC
1 STRIP MISCELLANEOUS 2 TIMES DAILY
Qty: 100 EACH | Refills: 5 | Status: SHIPPED | OUTPATIENT
Start: 2024-04-30

## 2024-04-30 RX ORDER — INSULIN PUMP SYRINGE, 3 ML
EACH MISCELLANEOUS
Qty: 1 EACH | Refills: 0 | Status: SHIPPED | OUTPATIENT
Start: 2024-04-30 | End: 2025-04-30

## 2024-04-30 NOTE — PROGRESS NOTES
Subjective     Patient ID: Shaneka Ahmadi is a 54 y.o. female.    Chief Complaint: Follow-up (3 mo)    Patient presents for 3 month follow up.    Medical history:  Hypertension, type 2 diabetes, COVID, Obesity, Neoplasm of the left lower lobe lung, Hypokalemia, Malignant neoplasm of bone    Reports left ear fullness and throat discomfort/post nasal drainage.      Had eye exam: [facility].  Will request records.      Has chemo treatments.  Tolerating well.       Follow-up  Associated symptoms include congestion. Pertinent negatives include no abdominal pain, chest pain, coughing, fatigue, fever, headaches, neck pain, numbness, rash or sore throat.     Review of Systems   Constitutional:  Negative for activity change, appetite change, fatigue and fever.   HENT:  Positive for nasal congestion, ear pain and sinus pressure/congestion. Negative for ear discharge, mouth sores, nosebleeds, sore throat and tinnitus.    Eyes:  Negative for discharge, redness and itching.   Respiratory:  Negative for apnea, cough and shortness of breath.    Cardiovascular:  Negative for chest pain and leg swelling.   Gastrointestinal:  Negative for abdominal distention, abdominal pain, blood in stool and constipation.   Endocrine: Negative for polydipsia, polyphagia and polyuria.   Genitourinary:  Negative for difficulty urinating, flank pain, frequency and hematuria.   Musculoskeletal:  Negative for back pain, gait problem, neck pain and neck stiffness.   Integumentary:  Negative for rash and wound.   Allergic/Immunologic: Negative for environmental allergies, food allergies and immunocompromised state.   Neurological:  Negative for dizziness, seizures, numbness and headaches.   Psychiatric/Behavioral:  Negative for agitation, confusion, hallucinations, self-injury and suicidal ideas.           Objective     Physical Exam  Constitutional:       Appearance: Normal appearance. She is well-developed. She is obese.   HENT:      Head:  Normocephalic.      Right Ear: Tympanic membrane normal.      Left Ear: Tympanic membrane normal.      Nose: Nose normal.   Cardiovascular:      Rate and Rhythm: Normal rate.      Heart sounds: Normal heart sounds.   Pulmonary:      Effort: Pulmonary effort is normal.      Breath sounds: Normal breath sounds.   Abdominal:      General: Bowel sounds are normal.      Palpations: Abdomen is soft.   Musculoskeletal:         General: Normal range of motion.   Skin:     General: Skin is warm and dry.   Neurological:      General: No focal deficit present.      Mental Status: She is alert and oriented to person, place, and time.   Psychiatric:         Mood and Affect: Mood normal.         Behavior: Behavior normal.            Assessment and Plan     Routine medical exam    Essential hypertension  Comments:  Stable.  Continue current treatment plan.    Malignant neoplasm of lower lobe of left lung  Comments:  Stable. Continue current treatment plan per oncology    Type 2 diabetes mellitus without complication, without long-term current use of insulin  Comments:  Stable. Continue current treatment plan.  Orders:  -     blood-glucose meter kit; To check BG 2 times daily, to use with insurance preferred meter  Dispense: 1 each; Refill: 0  -     lancets Misc; To check BG 2 times daily, to use with insurance preferred meter  Dispense: 100 each; Refill: 5  -     glipizide-metformin (METAGLIP) 5-500 mg per tablet; Take 1 tablet by mouth 2 (two) times daily before meals.  Dispense: 180 tablet; Refill: 3  -     HEMOGLOBIN A1C; Future; Expected date: 04/30/2024    Morbid obesity  Comments:  Stable. Continue current treatment plan.    History of COVID-19    Cough    Seasonal allergies  -     ipratropium (ATROVENT) 21 mcg (0.03 %) nasal spray; 2 sprays by Each Nostril route 2 (two) times daily.  Dispense: 30 mL; Refill: 0  -     montelukast (SINGULAIR) 10 mg tablet; Take 1 tablet (10 mg total) by mouth every evening. allergies   Dispense: 30 tablet; Refill: 5    Other orders  -     blood sugar diagnostic (CONTOUR NEXT TEST STRIPS) Strp; 1 each by Misc.(Non-Drug; Combo Route) route 2 (two) times a day.  Dispense: 100 each; Refill: 5         Had eye exam: [facility]     Labs and visit in 3 months        Follow up in about 3 months (around 7/30/2024).

## 2024-05-02 ENCOUNTER — LAB VISIT (OUTPATIENT)
Dept: LAB | Facility: HOSPITAL | Age: 54
End: 2024-05-02
Attending: INTERNAL MEDICINE
Payer: COMMERCIAL

## 2024-05-02 DIAGNOSIS — C79.51 SECONDARY MALIGNANT NEOPLASM OF BONE: ICD-10-CM

## 2024-05-02 LAB
ALBUMIN SERPL BCP-MCNC: 2.8 G/DL (ref 3.5–5.2)
ALP SERPL-CCNC: 99 U/L (ref 55–135)
ALT SERPL W/O P-5'-P-CCNC: 19 U/L (ref 10–44)
ANION GAP SERPL CALC-SCNC: 10 MMOL/L (ref 8–16)
AST SERPL-CCNC: 11 U/L (ref 10–40)
BASOPHILS # BLD AUTO: 0.04 K/UL (ref 0–0.2)
BASOPHILS NFR BLD: 0.5 % (ref 0–1.9)
BILIRUB SERPL-MCNC: 0.4 MG/DL (ref 0.1–1)
BUN SERPL-MCNC: 15 MG/DL (ref 6–20)
CALCIUM SERPL-MCNC: 8.8 MG/DL (ref 8.7–10.5)
CHLORIDE SERPL-SCNC: 103 MMOL/L (ref 95–110)
CO2 SERPL-SCNC: 27 MMOL/L (ref 23–29)
CREAT SERPL-MCNC: 0.9 MG/DL (ref 0.5–1.4)
DIFFERENTIAL METHOD BLD: NORMAL
EOSINOPHIL # BLD AUTO: 0.2 K/UL (ref 0–0.5)
EOSINOPHIL NFR BLD: 3.1 % (ref 0–8)
ERYTHROCYTE [DISTWIDTH] IN BLOOD BY AUTOMATED COUNT: 13 % (ref 11.5–14.5)
EST. GFR  (NO RACE VARIABLE): >60 ML/MIN/1.73 M^2
GLUCOSE SERPL-MCNC: 211 MG/DL (ref 70–110)
HCT VFR BLD AUTO: 37 % (ref 37–48.5)
HGB BLD-MCNC: 12.3 G/DL (ref 12–16)
IMM GRANULOCYTES # BLD AUTO: 0.02 K/UL (ref 0–0.04)
IMM GRANULOCYTES NFR BLD AUTO: 0.3 % (ref 0–0.5)
LYMPHOCYTES # BLD AUTO: 2.4 K/UL (ref 1–4.8)
LYMPHOCYTES NFR BLD: 32.3 % (ref 18–48)
MAGNESIUM SERPL-MCNC: 1.5 MG/DL (ref 1.6–2.6)
MCH RBC QN AUTO: 27.5 PG (ref 27–31)
MCHC RBC AUTO-ENTMCNC: 33.2 G/DL (ref 32–36)
MCV RBC AUTO: 83 FL (ref 82–98)
MONOCYTES # BLD AUTO: 0.5 K/UL (ref 0.3–1)
MONOCYTES NFR BLD: 6.4 % (ref 4–15)
NEUTROPHILS # BLD AUTO: 4.3 K/UL (ref 1.8–7.7)
NEUTROPHILS NFR BLD: 57.4 % (ref 38–73)
NRBC BLD-RTO: 0 /100 WBC
PHOSPHATE SERPL-MCNC: 3.6 MG/DL (ref 2.7–4.5)
PLATELET # BLD AUTO: 181 K/UL (ref 150–450)
PMV BLD AUTO: 12 FL (ref 9.2–12.9)
POTASSIUM SERPL-SCNC: 3.4 MMOL/L (ref 3.5–5.1)
PROT SERPL-MCNC: 5.9 G/DL (ref 6–8.4)
RBC # BLD AUTO: 4.47 M/UL (ref 4–5.4)
SODIUM SERPL-SCNC: 140 MMOL/L (ref 136–145)
WBC # BLD AUTO: 7.5 K/UL (ref 3.9–12.7)

## 2024-05-02 PROCEDURE — 36415 COLL VENOUS BLD VENIPUNCTURE: CPT | Performed by: INTERNAL MEDICINE

## 2024-05-02 PROCEDURE — 83735 ASSAY OF MAGNESIUM: CPT | Performed by: INTERNAL MEDICINE

## 2024-05-02 PROCEDURE — 85025 COMPLETE CBC W/AUTO DIFF WBC: CPT | Performed by: INTERNAL MEDICINE

## 2024-05-02 PROCEDURE — 84100 ASSAY OF PHOSPHORUS: CPT | Performed by: INTERNAL MEDICINE

## 2024-05-02 PROCEDURE — 80053 COMPREHEN METABOLIC PANEL: CPT | Performed by: INTERNAL MEDICINE

## 2024-05-03 ENCOUNTER — INFUSION (OUTPATIENT)
Dept: INFUSION THERAPY | Facility: HOSPITAL | Age: 54
End: 2024-05-03
Attending: RADIOLOGY
Payer: COMMERCIAL

## 2024-05-03 ENCOUNTER — OFFICE VISIT (OUTPATIENT)
Dept: HEMATOLOGY/ONCOLOGY | Facility: CLINIC | Age: 54
End: 2024-05-03
Payer: COMMERCIAL

## 2024-05-03 VITALS
SYSTOLIC BLOOD PRESSURE: 109 MMHG | HEIGHT: 64 IN | BODY MASS INDEX: 50.02 KG/M2 | HEART RATE: 58 BPM | RESPIRATION RATE: 16 BRPM | DIASTOLIC BLOOD PRESSURE: 65 MMHG | OXYGEN SATURATION: 100 % | TEMPERATURE: 97 F | WEIGHT: 293 LBS

## 2024-05-03 VITALS
SYSTOLIC BLOOD PRESSURE: 131 MMHG | BODY MASS INDEX: 50.02 KG/M2 | HEIGHT: 64 IN | OXYGEN SATURATION: 99 % | TEMPERATURE: 98 F | HEART RATE: 62 BPM | WEIGHT: 293 LBS | RESPIRATION RATE: 18 BRPM | DIASTOLIC BLOOD PRESSURE: 72 MMHG

## 2024-05-03 DIAGNOSIS — D84.821 IMMUNODEFICIENCY DUE TO CHEMOTHERAPY: ICD-10-CM

## 2024-05-03 DIAGNOSIS — C79.51 SECONDARY MALIGNANT NEOPLASM OF BONE: ICD-10-CM

## 2024-05-03 DIAGNOSIS — Z79.899 IMMUNODEFICIENCY DUE TO CHEMOTHERAPY: ICD-10-CM

## 2024-05-03 DIAGNOSIS — E83.42 HYPOMAGNESEMIA: ICD-10-CM

## 2024-05-03 DIAGNOSIS — C34.32 MALIGNANT NEOPLASM OF LOWER LOBE OF LEFT LUNG: Primary | ICD-10-CM

## 2024-05-03 DIAGNOSIS — T45.1X5A IMMUNODEFICIENCY DUE TO CHEMOTHERAPY: ICD-10-CM

## 2024-05-03 PROCEDURE — 96375 TX/PRO/DX INJ NEW DRUG ADDON: CPT

## 2024-05-03 PROCEDURE — 3075F SYST BP GE 130 - 139MM HG: CPT | Mod: CPTII,S$GLB,, | Performed by: INTERNAL MEDICINE

## 2024-05-03 PROCEDURE — 63600175 PHARM REV CODE 636 W HCPCS: Mod: JZ,TB | Performed by: INTERNAL MEDICINE

## 2024-05-03 PROCEDURE — 99999 PR PBB SHADOW E&M-EST. PATIENT-LVL V: CPT | Mod: PBBFAC,,, | Performed by: INTERNAL MEDICINE

## 2024-05-03 PROCEDURE — 3008F BODY MASS INDEX DOCD: CPT | Mod: CPTII,S$GLB,, | Performed by: INTERNAL MEDICINE

## 2024-05-03 PROCEDURE — 96415 CHEMO IV INFUSION ADDL HR: CPT

## 2024-05-03 PROCEDURE — 3066F NEPHROPATHY DOC TX: CPT | Mod: CPTII,S$GLB,, | Performed by: INTERNAL MEDICINE

## 2024-05-03 PROCEDURE — 99215 OFFICE O/P EST HI 40 MIN: CPT | Mod: S$GLB,,, | Performed by: INTERNAL MEDICINE

## 2024-05-03 PROCEDURE — 96367 TX/PROPH/DG ADDL SEQ IV INF: CPT

## 2024-05-03 PROCEDURE — 96413 CHEMO IV INFUSION 1 HR: CPT

## 2024-05-03 PROCEDURE — 3061F NEG MICROALBUMINURIA REV: CPT | Mod: CPTII,S$GLB,, | Performed by: INTERNAL MEDICINE

## 2024-05-03 PROCEDURE — 1159F MED LIST DOCD IN RCRD: CPT | Mod: CPTII,S$GLB,, | Performed by: INTERNAL MEDICINE

## 2024-05-03 PROCEDURE — 3052F HG A1C>EQUAL 8.0%<EQUAL 9.0%: CPT | Mod: CPTII,S$GLB,, | Performed by: INTERNAL MEDICINE

## 2024-05-03 PROCEDURE — 3078F DIAST BP <80 MM HG: CPT | Mod: CPTII,S$GLB,, | Performed by: INTERNAL MEDICINE

## 2024-05-03 PROCEDURE — 25000003 PHARM REV CODE 250: Performed by: INTERNAL MEDICINE

## 2024-05-03 RX ORDER — EPINEPHRINE 0.3 MG/.3ML
0.3 INJECTION SUBCUTANEOUS ONCE AS NEEDED
Status: DISCONTINUED | OUTPATIENT
Start: 2024-05-03 | End: 2024-05-03 | Stop reason: HOSPADM

## 2024-05-03 RX ORDER — ACETAMINOPHEN 325 MG/1
650 TABLET ORAL
Status: COMPLETED | OUTPATIENT
Start: 2024-05-03 | End: 2024-05-03

## 2024-05-03 RX ORDER — EPINEPHRINE 0.3 MG/.3ML
0.3 INJECTION SUBCUTANEOUS ONCE AS NEEDED
Status: CANCELLED | OUTPATIENT
Start: 2024-05-03

## 2024-05-03 RX ORDER — SODIUM CHLORIDE 0.9 % (FLUSH) 0.9 %
10 SYRINGE (ML) INJECTION
Status: CANCELLED | OUTPATIENT
Start: 2024-05-03

## 2024-05-03 RX ORDER — LANOLIN ALCOHOL/MO/W.PET/CERES
400 CREAM (GRAM) TOPICAL DAILY
Qty: 200 TABLET | Refills: 1 | Status: SHIPPED | OUTPATIENT
Start: 2024-05-03 | End: 2025-06-07

## 2024-05-03 RX ORDER — DIPHENHYDRAMINE HYDROCHLORIDE 50 MG/ML
50 INJECTION INTRAMUSCULAR; INTRAVENOUS ONCE AS NEEDED
Status: CANCELLED | OUTPATIENT
Start: 2024-05-03

## 2024-05-03 RX ORDER — ACETAMINOPHEN 325 MG/1
650 TABLET ORAL
Status: CANCELLED
Start: 2024-05-03

## 2024-05-03 RX ORDER — DIPHENHYDRAMINE HYDROCHLORIDE 50 MG/ML
50 INJECTION INTRAMUSCULAR; INTRAVENOUS ONCE AS NEEDED
Status: DISCONTINUED | OUTPATIENT
Start: 2024-05-03 | End: 2024-05-03 | Stop reason: HOSPADM

## 2024-05-03 RX ORDER — DIPHENHYDRAMINE HYDROCHLORIDE 50 MG/ML
25 INJECTION INTRAMUSCULAR; INTRAVENOUS
Status: COMPLETED | OUTPATIENT
Start: 2024-05-03 | End: 2024-05-03

## 2024-05-03 RX ORDER — HEPARIN 100 UNIT/ML
500 SYRINGE INTRAVENOUS
Status: CANCELLED | OUTPATIENT
Start: 2024-05-03

## 2024-05-03 RX ORDER — HEPARIN 100 UNIT/ML
500 SYRINGE INTRAVENOUS
Status: DISCONTINUED | OUTPATIENT
Start: 2024-05-03 | End: 2024-05-03 | Stop reason: HOSPADM

## 2024-05-03 RX ORDER — DIPHENHYDRAMINE HYDROCHLORIDE 50 MG/ML
25 INJECTION INTRAMUSCULAR; INTRAVENOUS
Status: CANCELLED
Start: 2024-05-03

## 2024-05-03 RX ADMIN — HEPARIN 500 UNITS: 100 SYRINGE at 11:05

## 2024-05-03 RX ADMIN — ACETAMINOPHEN 650 MG: 325 TABLET ORAL at 09:05

## 2024-05-03 RX ADMIN — DEXAMETHASONE SODIUM PHOSPHATE 0.25 MG: 4 INJECTION, SOLUTION INTRA-ARTICULAR; INTRALESIONAL; INTRAMUSCULAR; INTRAVENOUS; SOFT TISSUE at 09:05

## 2024-05-03 RX ADMIN — DIPHENHYDRAMINE HYDROCHLORIDE 25 MG: 50 INJECTION INTRAMUSCULAR; INTRAVENOUS at 09:05

## 2024-05-03 RX ADMIN — AMIVANTAMAB 1400 MG: 350 INJECTION INTRAVENOUS at 09:05

## 2024-05-03 NOTE — PROGRESS NOTES
Patient ID: Shaneka Ahmadi   Chief Complaint: Follow-up  MRN:  [id number]     Oncologic Diagnosis:  Stage IV (cT2, cN2, cM1) NSCLC, metastatic to bone  Previous Treatment:    Carbo/Alimta started in 1/2023; stopped due to progression after 4 cycles     Current Treatment:   OP NSCLC AMIVANTAMAB-VMJW ([facility]) Q2W   OP ZOLEDRONIC ACID (ZOMETA) Q4W   THERAPY SHELL - B12     Subjective   Shaneka Ahmadi is a 54 y.o. female who presents to clinic for follow-up.    She feels well; reports good energy, appetite; rash improving.  She has no acute complaints.      Review of Systems   Constitutional:  Negative for activity change, appetite change, chills, diaphoresis, fatigue, fever and unexpected weight change.   HENT:  Negative for nosebleeds.    Respiratory:  Negative for shortness of breath.    Cardiovascular:  Negative for chest pain.   Gastrointestinal:  Negative for abdominal distention, abdominal pain, anal bleeding, blood in stool, constipation, diarrhea, nausea and vomiting.   Genitourinary:  Negative for difficulty urinating and hematuria.   Musculoskeletal:  Negative for arthralgias, back pain and myalgias.   Skin:  Positive for rash (right side face).   Neurological:  Negative for dizziness, weakness, light-headedness and headaches.   Hematological:  Does not bruise/bleed easily.   Psychiatric/Behavioral:  The patient is not nervous/anxious.      History     Oncology History   Malignant neoplasm of lower lobe of left lung   12/9/2022 Initial Diagnosis    Malignant neoplasm of lower lobe of left lung     12/9/2022 Cancer Staged    Staging form: Lung, AJCC 8th Edition  - Clinical stage from 12/9/2022: Stage IV (cT2, cN2, cM1)     12/27/2022 - 12/27/2022 Chemotherapy    Treatment Summary   Plan Name: OP PEMBROLIZUMAB 400MG Q6W  Treatment Goal: Control  Status: Inactive  Start Date:   End Date:   Provider: Reese Mcfarlane MD  Chemotherapy: [No matching medication found in this treatment plan]      Genetic  Testing    Patient has genetic testing done for  TEmpus peripheral blood.                                              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     1/18/2023 - 1/18/2023 Chemotherapy    Treatment Summary   Plan Name: OP NSCLC AMIVANTAMAB-VMJW ([facility]) Q4W  Treatment Goal: Palliative  Status: Inactive  Start Date:   End Date:   Provider: Reese Mcfarlane MD  Chemotherapy: amivantamab-vmjw ([facility]) 350 mg in sodium chloride 0.9% SolP 250 mL chemo infusion, 350 mg (100 % of original dose 350 mg), Intravenous, Clinic/[facility] 1 time, 0 of 13 cycles  Dose modification: 350 mg (original dose 350 mg, Cycle 1), 1,050 mg (original dose 1,050 mg, Cycle 1), 1,400 mg (original dose 1,400 mg, Cycle 1)     1/27/2023 - 3/31/2023 Chemotherapy    Treatment Summary   Plan Name: OP NSCLC PEMETREXED + CARBOPLATIN (AUC) Q3W  Treatment Goal: Control  Status: Inactive  Start Date: 1/27/2023  End Date: 3/31/2023  Provider: Reese Mcfarlane MD  Chemotherapy: CARBOplatin (PARAPLATIN) 750 mg in sodium chloride 0.9% 335 mL chemo infusion, 750 mg (100 % of original dose 750 mg), Intravenous, Clinic/[facility] 1 time, 4 of 4 cycles  Dose modification:   (original dose 750 mg, Cycle 1, Reason: MD Discretion)  Administration: 750 mg (1/27/2023), 750 mg (2/17/2023), 750 mg (3/31/2023), 750 mg (3/10/2023)  PEMEtrexed disodium (ALIMTA) 1,200 mg in sodium chloride 0.9% SolP 100 mL chemo infusion, 1,250 mg, Intravenous, Clinic/[facility] 1 time, 4 of 4 cycles  Administration: 1,200 mg (1/27/2023), 1,200 mg (2/17/2023), 1,200 mg (3/31/2023), 1,200 mg (3/10/2023)     4/27/2023 -  Chemotherapy    Treatment Summary   Plan Name: OP NSCLC AMIVANTAMAB-VMJW ([facility]) Q4W  Treatment Goal: Palliative  Status: Active  Start Date: 4/27/2023  End Date: 6/14/2024 (Planned)  Provider: Reese Mcfarlane MD  Chemotherapy: amivantamab-vmjw (RYBRE[facility]ANT) 350 mg in sodium chloride 0.9% SolP 250 mL chemo infusion,  350 mg (100 % of original dose 350 mg), Intravenous, Clinic/[facility] 1 time, 13 of 15 cycles  Dose modification: 350 mg (original dose 350 mg, Cycle 1), 1,050 mg (original dose 1,050 mg, Cycle 1), 1,400 mg (original dose 1,400 mg, Cycle 1)  Administration: 350 mg (4/27/2023), 1,050 mg (4/28/2023), 1,400 mg (5/4/2023), 1,400 mg (5/11/2023), 1,400 mg (5/18/2023), 1,400 mg (5/25/2023), 1,400 mg (6/8/2023), 1,400 mg (6/22/2023), 1,400 mg (7/21/2023), 1,400 mg (8/4/2023), 1,400 mg (8/18/2023), 1,400 mg (9/1/2023), 1,400 mg (9/15/2023), 1,400 mg (9/29/2023), 1,400 mg (10/13/2023), 1,400 mg (10/27/2023), 1,400 mg (11/10/2023), 1,400 mg (11/24/2023), 1,400 mg (12/8/2023), 1,400 mg (12/22/2023), 1,400 mg (1/5/2024), 1,400 mg (1/19/2024), 1,400 mg (2/2/2024), 1,400 mg (2/16/2024), 1,400 mg (3/1/2024), 1,400 mg (3/15/2024), 1,400 mg (4/5/2024), 1,400 mg (4/19/2024)           Past Medical History:   Diagnosis Date    Diabetes mellitus     Hypertension     Malignant neoplasm of lower lobe of left lung 12/9/2022    Rash, drug 7/6/2023    OP NSCLC AMIVANTAMAB-VMJW ([facility]) Q4W      Secondary malignant neoplasm of bone 12/5/2022       Past Surgical History:   Procedure Laterality Date    CATARACT EXTRACTION W/  INTRAOCULAR LENS IMPLANT Right 06/02/2022    FLUOROSCOPY N/A 1/26/2023    Procedure: FLUOROSCOPY/mediport placement;  Surgeon: Marlon Leone MD;  Location[facility] CATH LAB;  Service: General;  Laterality: N/A;    TUBAL LIGATION      2007--postpartum tubal ligation       Family History   Problem Relation Name Age of Onset    Heart failure Father         Review of patient's allergies indicates:   Allergen Reactions    Lisinopril Other (See Comments)     coughing    Amoxicillin        Social History     Tobacco Use    Smoking status: Never     Passive exposure: Never    Smokeless tobacco: Never   Substance Use Topics    Alcohol use: Never    Drug use: Never     Physical Exam     ECOG SCORE    0 - Fully active-able to carry on all  pre-disease performance without restriction       There were no vitals filed for this visit.    Physical Exam  Constitutional:       General: She is not in acute distress.     Appearance: Normal appearance. She is not toxic-appearing.   HENT:      Head: Normocephalic and atraumatic.   Eyes:      Extraocular Movements: Extraocular movements intact.      Conjunctiva/sclera: Conjunctivae normal.   Cardiovascular:      Rate and Rhythm: Normal rate and regular rhythm.      Heart sounds: No murmur heard.  Pulmonary:      Effort: Pulmonary effort is normal. No respiratory distress.      Breath sounds: No wheezing.   Abdominal:      General: Bowel sounds are normal. There is no distension.      Palpations: Abdomen is soft.      Tenderness: There is no abdominal tenderness. There is no guarding.   Musculoskeletal:      Right lower leg: No edema.      Left lower leg: No edema.   Skin:     Findings: Rash (facial rash improved) present.   Neurological:      General: No focal deficit present.      Mental Status: She is alert and oriented to person, place, and time. Mental status is at baseline.   Psychiatric:         Mood and Affect: Mood normal.         Behavior: Behavior normal.         Thought Content: Thought content normal.           Labs   Labs:  Lab Visit on 05/02/2024   Component Date Value Ref Range Status    Phosphorus 05/02/2024 3.6  2.7 - 4.5 mg/dL Final    Magnesium 05/02/2024 1.5 (L)  1.6 - 2.6 mg/dL Final    Sodium 05/02/2024 140  136 - 145 mmol/L Final    Potassium 05/02/2024 3.4 (L)  3.5 - 5.1 mmol/L Final    Chloride 05/02/2024 103  95 - 110 mmol/L Final    CO2 05/02/2024 27  23 - 29 mmol/L Final    Glucose 05/02/2024 211 (H)  70 - 110 mg/dL Final    BUN 05/02/2024 15  6 - 20 mg/dL Final    Creatinine 05/02/2024 0.9  0.5 - 1.4 mg/dL Final    Calcium 05/02/2024 8.8  8.7 - 10.5 mg/dL Final    Total Protein 05/02/2024 5.9 (L)  6.0 - 8.4 g/dL Final    Albumin 05/02/2024 2.8 (L)  3.5 - 5.2 g/dL Final    Total  Bilirubin 05/02/2024 0.4  0.1 - 1.0 mg/dL Final    Comment: For infants and newborns, interpretation of results should be based  on gestational age, weight and in agreement with clinical  observations.    Premature Infant recommended reference ranges:  Up to 24 hours.............<8.0 mg/dL  Up to 48 hours............<12.0 mg/dL  3-5 days..................<15.0 mg/dL  6-29 days.................<15.0 mg/dL      Alkaline Phosphatase 05/02/2024 99  55 - 135 U/L Final    AST 05/02/2024 11  10 - 40 U/L Final    ALT 05/02/2024 19  10 - 44 U/L Final    eGFR 05/02/2024 >60  >60 mL/min/1.73 m^2 Final    Anion Gap 05/02/2024 10  8 - 16 mmol/L Final    WBC 05/02/2024 7.50  3.90 - 12.70 K/uL Final    RBC 05/02/2024 4.47  4.00 - 5.40 M/uL Final    Hemoglobin 05/02/2024 12.3  12.0 - 16.0 g/dL Final    Hematocrit 05/02/2024 37.0  37.0 - 48.5 % Final    MCV 05/02/2024 83  82 - 98 fL Final    MCH 05/02/2024 27.5  27.0 - 31.0 pg Final    MCHC 05/02/2024 33.2  32.0 - 36.0 g/dL Final    RDW 05/02/2024 13.0  11.5 - 14.5 % Final    Platelets 05/02/2024 181  150 - 450 K/uL Final    MPV 05/02/2024 12.0  9.2 - 12.9 fL Final    Immature Granulocytes 05/02/2024 0.3  0.0 - 0.5 % Final    Gran # (ANC) 05/02/2024 4.3  1.8 - 7.7 K/uL Final    Immature Grans (Abs) 05/02/2024 0.02  0.00 - 0.04 K/uL Final    Comment: Mild elevation in immature granulocytes is non specific and   can be seen in a variety of conditions including stress response,   acute inflammation, trauma and pregnancy. Correlation with other   laboratory and clinical findings is essential.      Lymph # 05/02/2024 2.4  1.0 - 4.8 K/uL Final    Mono # 05/02/2024 0.5  0.3 - 1.0 K/uL Final    Eos # 05/02/2024 0.2  0.0 - 0.5 K/uL Final    Baso # 05/02/2024 0.04  0.00 - 0.20 K/uL Final    nRBC 05/02/2024 0  0 /100 WBC Final    Gran % 05/02/2024 57.4  38.0 - 73.0 % Final    Lymph % 05/02/2024 32.3  18.0 - 48.0 % Final    Mono % 05/02/2024 6.4  4.0 - 15.0 % Final    Eosinophil % 05/02/2024  3.1  0.0 - 8.0 % Final    Basophil % 05/02/2024 0.5  0.0 - 1.9 % Final    Differential Method 05/02/2024 Automated   Final        Imaging   CT CHEST ABDOMEN PELVIS WITH CONTRAST (XPD) - 10/19/23     CLINICAL HISTORY:  Metastatic disease evaluation;Malignant neoplasm of lower lobe, left bronchus or lung     TECHNIQUE:  Low dose axial images, sagittal and coronal reformations were obtained from the thoracic inlet to the pubic synthesis following the IV administration of 100 mL of Omnipaque 350.  Oral contrast also administered.  All CT scans at this location are performed using dose modulation techniques as appropriate to a performed exam including the following: Automated exposure control; adjustment of the mA and/or kV  according to patient size.     COMPARISON:  07/11/2023.  12/02/2022     FINDINGS:  Chest:     Thoracic soft tissues: No significant abnormality.     Aorta: Normal in course and caliber, without significant atherosclerotic plaque. There are three branching vessels at the arch.     Heart: Normal in size. No pericardial effusion.  Mild aortic and coronary artery atherosclerotic calcification.     Brooke/Mediastinum: No significant lymphadenopathy .  No measurable hilar lesion.     Lungs: Unchanged left lung base volume loss and bronchovascular crowding secondary to elevation left hemidiaphragm.  No measurable mass lesion.  Right lung base benign calcified granuloma.     Abdomen Pelvis:     Liver: Unchanged 9 mm nodule posterior to the inferior right liver.     Gallbladder: No calcified gallstones.     Bile Ducts: No evidence of dilated ducts.     Pancreas: No mass or peripancreatic fat stranding.     Spleen: Unremarkable.     Adrenals: Unchanged benign-appearing 2.1 cm left adrenal fluid density nodule.     Kidneys/ Ureters: Punctate right upper pole nonobstructing renal stone.  Normal in size and location. Normal concentration and excretion of contrast. No hydronephrosis or nephrolithiasis. No ureteral  dilatation. Left upper pole 12 mm benign angiomyolipoma.     Bladder: No evidence of wall thickening.     Reproductive organs: Fibroid uterus.     GI Tract/Mesentery: No evidence of bowel obstruction or inflammation. Large volume stool throughout the colon     Peritoneal Space: No ascites. No free air.     Retroperitoneum: No significant adenopathy.     Abdominal wall: Unremarkable.     Vasculature: No significant atherosclerosis or aneurysm.     Bones: No acute fracture. Unchanged widely disseminated osteoblastic metastatic disease.  No new lesions.     Impression:  Stable exam compared to 07/11/2023.         CT CHEST ABDOMEN PELVIS WITH IV CONTRAST (XPD) - 01/30/2024  FINDINGS:  CT CHEST:     1.2 x 1.2 cm calcified granuloma right lung base, unchanged.  Otherwise the right lung is clear     minimal linear focal increased density subpleural lateral left upper lobe could represent residual lung mass or residual scarring, measuring 1.9 x 0.5 cm as compared to 2.2 x 1.0 cm on the prior examination.  Moderate consolidation left lower lobe.  No new or enlarging pulmonary nodules or lung masses bilaterally.     Heart normal in size.  Normal caliber thoracic aorta.  Negative for adenopathy.  Calcified right hilar right paratracheal lymph nodes, unchanged since prior examination.     Soft tissues inferior neck are normal.  Trachea and esophagus are midline and are normal.  Chest wall soft tissues are normal.  Degenerative changes the spine.  Blastic metastatic lesions of the thoracic spine and decreased in number.     CT ABDOMEN PELVIS     Incidental small cysts lateral segment left lobe liver, unchanged since prior examination.  Exophytic nodule posterior aspect of the posterior segment right lobe measures 10 mm and is unchanged.  No suspicious hepatic lesion.     Gallbladder, portal vein, spleen, pancreas are normal.     Low-density left adrenal gland compatible with adenomas unchanged.  Right adrenal gland is normal.   Normal caliber aorta and IVC.  No retroperitoneal adenopathy.  No suspicious renal lesion bilaterally.  No hydronephrosis.  Ureters are nondilated and normal.     Stomach and small intestine normal.  Appendix normal.  Otherwise the bowel is normal.     Mildly distended normal-appearing bladder.  Anteverted uterus.  Negative for adnexal mass.     Abdominal pelvic wall soft tissues are normal.  Degenerative changes of the spine again noted.  Blastic metastatic lesions of the lumbar spine remain unchanged.  Largest is within the L3 vertebral body measuring 2.1 cm in maximal dimension.  Blastic metastatic lesions of the right ilium and proximal bilateral femurs.  Overall osseous metastatic disease within the pelvis and bilateral proximal femurs has significantly improved.        Impression:        1.    Minimal residual linear increased density periphery of the left upper lobe could represent residual left upper lobe lung nodule or residual scarring.  No new or enlarging pulmonary nodules or masses bilaterally.     2.    Nonspecific exophytic soft tissue density nodule projects posteriorly off of the posterior segment right lobe liver.  No change.  No definite evidence for metastatic disease throughout the abdomen or pelvis.     3.    Osseous metastatic disease significantly improved.       Assessment and Plan   Stage IV (cT2, cN2, cM1) NSCLC, Metastatic to Bone  Exon 20 EGFR mutation positive   Previously on Carbo/Alimta started in 1/2023; stopped due to progression after 4 cycles and started on Amivantamab  CT CAP scheduled 01/30/24: stable  Tolerating Amivantamab well; has mild intermittent rash on right side of face but no other notable side effects  Restaging CT ordered - scheduled 05/08/24  Amivantimab today and in 2 weeks     Metastatic Bone Disease  Patient has yet to start bisphosphonate therapy as she has some ongoing dental issues pending completion of dental work and dental clearance  She will obtain dental  work tentatively in the next 4-8 weeks; new dental insurance starts Feb 1 so she will see the dentist after that  Continue Calcium and Vitamin D/Weight Bearing Exercise      Amivantamab Induced Rash  Continue topical clindamycin, and tretinoin   Started on doxycycline per derm  Continue follow up with derm      Chronic Medical Conditions  DM II  Hx of COVID-19        Med Onc Chart Routing      Follow up with physician 6 weeks.   Follow up with DOLLY 2 weeks and 4 weeks. [place]   Infusion scheduling note   Amivantimab today and in 2 weeks   Injection scheduling note    Labs CBC, CMP, phosphorus and magnesium   Scheduling:  Preferred lab:  Lab interval:     Imaging    Pharmacy appointment    Other referrals                     The patient was seen, interviewed and examined. Pertinent lab and radiologic studies were reviewed. Pt instructed to call should they develop concerning signs/symptoms or have further questions.        Portions of the record may have been created with voice recognition software. Occasional wrong-word or sound-a-like substitutions may have occurred due to the inherent limitations of voice recognition software. Read the chart carefully and recognize, using context, where substitutions have occurred.      Mirtha Wilhelm MD    Hematology/Oncology

## 2024-05-03 NOTE — PLAN OF CARE
Problem: Adult Inpatient Plan of Care  Goal: Plan of Care Review  Outcome: Progressing  Flowsheets (Taken 5/3/2024 0823)  Plan of Care Reviewed With:   patient   spouse  Goal: Optimal Comfort and Wellbeing  Outcome: Progressing  Intervention: Provide Person-Centered Care  Flowsheets (Taken 5/3/2024 0823)  Trust Relationship/Rapport:   care explained   choices provided   emotional support provided   empathic listening provided   questions encouraged   questions answered   thoughts/feelings acknowledged   reassurance provided

## 2024-05-08 ENCOUNTER — HOSPITAL ENCOUNTER (OUTPATIENT)
Dept: RADIOLOGY | Facility: HOSPITAL | Age: 54
Discharge: HOME | End: 2024-05-08
Attending: INTERNAL MEDICINE
Payer: COMMERCIAL

## 2024-05-08 DIAGNOSIS — C34.32 MALIGNANT NEOPLASM OF LOWER LOBE OF LEFT LUNG: ICD-10-CM

## 2024-05-08 DIAGNOSIS — C79.51 SECONDARY MALIGNANT NEOPLASM OF BONE: ICD-10-CM

## 2024-05-08 PROCEDURE — A9698 NON-RAD CONTRAST MATERIALNOC: HCPCS | Performed by: INTERNAL MEDICINE

## 2024-05-08 PROCEDURE — 74177 CT ABD & PELVIS W/CONTRAST: CPT | Mod: 26,,, | Performed by: RADIOLOGY

## 2024-05-08 PROCEDURE — 74177 CT ABD & PELVIS W/CONTRAST: CPT | Mod: TC

## 2024-05-08 PROCEDURE — 25500020 PHARM REV CODE 255: Performed by: INTERNAL MEDICINE

## 2024-05-08 PROCEDURE — 71260 CT THORAX DX C+: CPT | Mod: 26,,, | Performed by: RADIOLOGY

## 2024-05-08 RX ADMIN — IOHEXOL 100 ML: 350 INJECTION, SOLUTION INTRAVENOUS at 09:05

## 2024-05-08 RX ADMIN — IOHEXOL 1000 ML: 12 SOLUTION ORAL at 07:05

## 2024-05-16 ENCOUNTER — LAB VISIT (OUTPATIENT)
Dept: LAB | Facility: HOSPITAL | Age: 54
End: 2024-05-16
Attending: INTERNAL MEDICINE
Payer: COMMERCIAL

## 2024-05-16 DIAGNOSIS — C79.51 SECONDARY MALIGNANT NEOPLASM OF BONE: ICD-10-CM

## 2024-05-16 LAB
ALBUMIN SERPL BCP-MCNC: 2.9 G/DL (ref 3.5–5.2)
ALP SERPL-CCNC: 99 U/L (ref 55–135)
ALT SERPL W/O P-5'-P-CCNC: 22 U/L (ref 10–44)
ANION GAP SERPL CALC-SCNC: 10 MMOL/L (ref 8–16)
AST SERPL-CCNC: 15 U/L (ref 10–40)
BASOPHILS # BLD AUTO: 0.04 K/UL (ref 0–0.2)
BASOPHILS NFR BLD: 0.6 % (ref 0–1.9)
BILIRUB SERPL-MCNC: 0.4 MG/DL (ref 0.1–1)
BUN SERPL-MCNC: 16 MG/DL (ref 6–20)
CALCIUM SERPL-MCNC: 8.7 MG/DL (ref 8.7–10.5)
CHLORIDE SERPL-SCNC: 102 MMOL/L (ref 95–110)
CO2 SERPL-SCNC: 28 MMOL/L (ref 23–29)
CREAT SERPL-MCNC: 0.9 MG/DL (ref 0.5–1.4)
DIFFERENTIAL METHOD BLD: NORMAL
EOSINOPHIL # BLD AUTO: 0.2 K/UL (ref 0–0.5)
EOSINOPHIL NFR BLD: 2.7 % (ref 0–8)
ERYTHROCYTE [DISTWIDTH] IN BLOOD BY AUTOMATED COUNT: 12.8 % (ref 11.5–14.5)
EST. GFR  (NO RACE VARIABLE): >60 ML/MIN/1.73 M^2
GLUCOSE SERPL-MCNC: 197 MG/DL (ref 70–110)
HCT VFR BLD AUTO: 37.2 % (ref 37–48.5)
HGB BLD-MCNC: 12.4 G/DL (ref 12–16)
IMM GRANULOCYTES # BLD AUTO: 0.01 K/UL (ref 0–0.04)
IMM GRANULOCYTES NFR BLD AUTO: 0.1 % (ref 0–0.5)
LYMPHOCYTES # BLD AUTO: 1.9 K/UL (ref 1–4.8)
LYMPHOCYTES NFR BLD: 27.2 % (ref 18–48)
MAGNESIUM SERPL-MCNC: 1.7 MG/DL (ref 1.6–2.6)
MCH RBC QN AUTO: 27.9 PG (ref 27–31)
MCHC RBC AUTO-ENTMCNC: 33.3 G/DL (ref 32–36)
MCV RBC AUTO: 84 FL (ref 82–98)
MONOCYTES # BLD AUTO: 0.5 K/UL (ref 0.3–1)
MONOCYTES NFR BLD: 7.2 % (ref 4–15)
NEUTROPHILS # BLD AUTO: 4.3 K/UL (ref 1.8–7.7)
NEUTROPHILS NFR BLD: 62.2 % (ref 38–73)
NRBC BLD-RTO: 0 /100 WBC
PHOSPHATE SERPL-MCNC: 3.7 MG/DL (ref 2.7–4.5)
PLATELET # BLD AUTO: 162 K/UL (ref 150–450)
PMV BLD AUTO: 11.8 FL (ref 9.2–12.9)
POTASSIUM SERPL-SCNC: 3.5 MMOL/L (ref 3.5–5.1)
PROT SERPL-MCNC: 5.9 G/DL (ref 6–8.4)
RBC # BLD AUTO: 4.45 M/UL (ref 4–5.4)
SODIUM SERPL-SCNC: 140 MMOL/L (ref 136–145)
WBC # BLD AUTO: 6.94 K/UL (ref 3.9–12.7)

## 2024-05-16 PROCEDURE — 36415 COLL VENOUS BLD VENIPUNCTURE: CPT | Performed by: INTERNAL MEDICINE

## 2024-05-16 PROCEDURE — 80053 COMPREHEN METABOLIC PANEL: CPT | Performed by: INTERNAL MEDICINE

## 2024-05-16 PROCEDURE — 85025 COMPLETE CBC W/AUTO DIFF WBC: CPT | Performed by: INTERNAL MEDICINE

## 2024-05-16 PROCEDURE — 83735 ASSAY OF MAGNESIUM: CPT | Performed by: INTERNAL MEDICINE

## 2024-05-16 PROCEDURE — 84100 ASSAY OF PHOSPHORUS: CPT | Performed by: INTERNAL MEDICINE

## 2024-05-17 ENCOUNTER — OFFICE VISIT (OUTPATIENT)
Dept: HEMATOLOGY/ONCOLOGY | Facility: CLINIC | Age: 54
End: 2024-05-17
Payer: COMMERCIAL

## 2024-05-17 ENCOUNTER — INFUSION (OUTPATIENT)
Dept: INFUSION THERAPY | Facility: HOSPITAL | Age: 54
End: 2024-05-17
Attending: RADIOLOGY
Payer: COMMERCIAL

## 2024-05-17 VITALS
WEIGHT: 293 LBS | HEART RATE: 56 BPM | SYSTOLIC BLOOD PRESSURE: 106 MMHG | TEMPERATURE: 97 F | OXYGEN SATURATION: 98 % | RESPIRATION RATE: 16 BRPM | BODY MASS INDEX: 50.02 KG/M2 | DIASTOLIC BLOOD PRESSURE: 69 MMHG | HEIGHT: 64 IN

## 2024-05-17 VITALS
WEIGHT: 293 LBS | SYSTOLIC BLOOD PRESSURE: 114 MMHG | HEIGHT: 64 IN | HEART RATE: 60 BPM | DIASTOLIC BLOOD PRESSURE: 60 MMHG | TEMPERATURE: 97 F | BODY MASS INDEX: 50.02 KG/M2

## 2024-05-17 DIAGNOSIS — C34.32 MALIGNANT NEOPLASM OF LOWER LOBE OF LEFT LUNG: Primary | ICD-10-CM

## 2024-05-17 DIAGNOSIS — C79.51 SECONDARY MALIGNANT NEOPLASM OF BONE: ICD-10-CM

## 2024-05-17 PROCEDURE — 3061F NEG MICROALBUMINURIA REV: CPT | Mod: CPTII,S$GLB,,

## 2024-05-17 PROCEDURE — 63600175 PHARM REV CODE 636 W HCPCS: Mod: JZ,TB

## 2024-05-17 PROCEDURE — 96367 TX/PROPH/DG ADDL SEQ IV INF: CPT

## 2024-05-17 PROCEDURE — 25000003 PHARM REV CODE 250

## 2024-05-17 PROCEDURE — 3052F HG A1C>EQUAL 8.0%<EQUAL 9.0%: CPT | Mod: CPTII,S$GLB,,

## 2024-05-17 PROCEDURE — 96375 TX/PRO/DX INJ NEW DRUG ADDON: CPT

## 2024-05-17 PROCEDURE — 99215 OFFICE O/P EST HI 40 MIN: CPT | Mod: S$GLB,,,

## 2024-05-17 PROCEDURE — 1159F MED LIST DOCD IN RCRD: CPT | Mod: CPTII,S$GLB,,

## 2024-05-17 PROCEDURE — 3008F BODY MASS INDEX DOCD: CPT | Mod: CPTII,S$GLB,,

## 2024-05-17 PROCEDURE — 3066F NEPHROPATHY DOC TX: CPT | Mod: CPTII,S$GLB,,

## 2024-05-17 PROCEDURE — 99999 PR PBB SHADOW E&M-EST. PATIENT-LVL IV: CPT | Mod: PBBFAC,,,

## 2024-05-17 PROCEDURE — 96413 CHEMO IV INFUSION 1 HR: CPT

## 2024-05-17 PROCEDURE — 3078F DIAST BP <80 MM HG: CPT | Mod: CPTII,S$GLB,,

## 2024-05-17 PROCEDURE — 96415 CHEMO IV INFUSION ADDL HR: CPT

## 2024-05-17 PROCEDURE — A4216 STERILE WATER/SALINE, 10 ML: HCPCS

## 2024-05-17 PROCEDURE — 1160F RVW MEDS BY RX/DR IN RCRD: CPT | Mod: CPTII,S$GLB,,

## 2024-05-17 PROCEDURE — 3074F SYST BP LT 130 MM HG: CPT | Mod: CPTII,S$GLB,,

## 2024-05-17 RX ORDER — SODIUM CHLORIDE 0.9 % (FLUSH) 0.9 %
10 SYRINGE (ML) INJECTION
Status: DISCONTINUED | OUTPATIENT
Start: 2024-05-17 | End: 2024-05-17 | Stop reason: HOSPADM

## 2024-05-17 RX ORDER — HEPARIN 100 UNIT/ML
500 SYRINGE INTRAVENOUS
Status: CANCELLED | OUTPATIENT
Start: 2024-05-17

## 2024-05-17 RX ORDER — EPINEPHRINE 0.3 MG/.3ML
0.3 INJECTION SUBCUTANEOUS ONCE AS NEEDED
Status: CANCELLED | OUTPATIENT
Start: 2024-05-17

## 2024-05-17 RX ORDER — DIPHENHYDRAMINE HYDROCHLORIDE 50 MG/ML
25 INJECTION INTRAMUSCULAR; INTRAVENOUS
Status: CANCELLED
Start: 2024-05-17

## 2024-05-17 RX ORDER — DIPHENHYDRAMINE HYDROCHLORIDE 50 MG/ML
50 INJECTION INTRAMUSCULAR; INTRAVENOUS ONCE AS NEEDED
Status: CANCELLED | OUTPATIENT
Start: 2024-05-17

## 2024-05-17 RX ORDER — ACETAMINOPHEN 325 MG/1
650 TABLET ORAL
Status: COMPLETED | OUTPATIENT
Start: 2024-05-17 | End: 2024-05-17

## 2024-05-17 RX ORDER — DIPHENHYDRAMINE HYDROCHLORIDE 50 MG/ML
25 INJECTION INTRAMUSCULAR; INTRAVENOUS
Status: COMPLETED | OUTPATIENT
Start: 2024-05-17 | End: 2024-05-17

## 2024-05-17 RX ORDER — ACETAMINOPHEN 325 MG/1
650 TABLET ORAL
Status: CANCELLED
Start: 2024-05-17

## 2024-05-17 RX ORDER — HEPARIN 100 UNIT/ML
500 SYRINGE INTRAVENOUS
Status: DISCONTINUED | OUTPATIENT
Start: 2024-05-17 | End: 2024-05-17 | Stop reason: HOSPADM

## 2024-05-17 RX ORDER — SODIUM CHLORIDE 0.9 % (FLUSH) 0.9 %
10 SYRINGE (ML) INJECTION
Status: CANCELLED | OUTPATIENT
Start: 2024-05-17

## 2024-05-17 RX ADMIN — HEPARIN 500 UNITS: 100 SYRINGE at 12:05

## 2024-05-17 RX ADMIN — DEXAMETHASONE SODIUM PHOSPHATE 0.25 MG: 4 INJECTION, SOLUTION INTRA-ARTICULAR; INTRALESIONAL; INTRAMUSCULAR; INTRAVENOUS; SOFT TISSUE at 10:05

## 2024-05-17 RX ADMIN — AMIVANTAMAB 1400 MG: 350 INJECTION INTRAVENOUS at 10:05

## 2024-05-17 RX ADMIN — DIPHENHYDRAMINE HYDROCHLORIDE 25 MG: 50 INJECTION INTRAMUSCULAR; INTRAVENOUS at 10:05

## 2024-05-17 RX ADMIN — ACETAMINOPHEN 650 MG: 325 TABLET ORAL at 10:05

## 2024-05-17 RX ADMIN — Medication 10 ML: at 12:05

## 2024-05-17 NOTE — PROGRESS NOTES
Subjective:     Patient ID:Mateusz Ahmadi is a 54 y.o. female.?? MR#: [id number]   ?   PRIMARY ONCOLOGIST: Dr. Mcfarlane   ?   CHIEF COMPLAINT: Lab review/assessment C1D1 [facility]  ?   ONCOLOGIC DIAGNOSIS: Stage IV (cT2, cN2, cM1), Malignant neoplasm of lower lobe of left lung   ?   CURRENT TREATMENT: OP NSCLC AMIVANTAMAB-VMJW ([facility]) Q4W     PAST TREATMENT: PEMETREXED + CARBOPLATIN (AUC) Q3W      HPI Mrs. Ahmadi is a pleasan 53-renee-old female with metastatic non-small cell lung carcinoma Exon 20 mutation who presents today for lab review and assessment prior to C1D1 [facility]. 11/2022 pt seen with PCP with c/o persistent coughing and wheezing. CXR at that time revealed pulmonary nodules, subsequent CT chest found L Lung mass. L lung biopsy positive for adenocarcinoma , EGFR mutated. Pleural fluid also positive for malignancy. PET showed avid STEPHAN mass, mediastinal nodes, bone mets in C1, T1, T11, L3, sacrum, L ischium, sternum. MRI brain negative for intracranial metastases. Initiated on carbo/ alimta 01/27/23--re imaging after 4 cycles found progressive disease. Pt initiated on Rybrevant 04/27/23.    Interval History: Pt states she is feeling well and voices no c/o today. Denies n/v/d/c, fever, chills, sob, cp, cough, abnormal bleeding. Denies difficulty with appetite or maintaining hydration.       Oncology History   Malignant neoplasm of lower lobe of left lung   12/9/2022 Initial Diagnosis    Malignant neoplasm of lower lobe of left lung       12/9/2022 Cancer Staged    Staging form: Lung, AJCC 8th Edition  - Clinical stage from 12/9/2022: Stage IV (cT2, cN2, cM1)       12/27/2022 - 12/27/2022 Chemotherapy    Treatment Summary   Plan Name: OP PEMBROLIZUMAB 400MG Q6W  Treatment Goal: Control  Status: Inactive  Start Date:   End Date:   Provider: Reese Mcfarlane MD  Chemotherapy: [No matching medication found in this treatment plan]        Genetic Testing    Patient has genetic testing done for  TEmpus peripheral  blood.                                              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     1/18/2023 - 1/18/2023 Chemotherapy    Treatment Summary   Plan Name: OP NSCLC AMIVANTAMAB-VMJW ([facility]) Q4W  Treatment Goal: Palliative  Status: Inactive  Start Date:   End Date:   Provider: Reese Mcfarlane MD  Chemotherapy: amivantamab-vmjw (RYBREVANT) 350 mg in sodium chloride 0.9% SolP 250 mL chemo infusion, 350 mg (100 % of original dose 350 mg), Intravenous, Clinic/HOD 1 time, 0 of 13 cycles  Dose modification: 350 mg (original dose 350 mg, Cycle 1), 1,050 mg (original dose 1,050 mg, Cycle 1), 1,400 mg (original dose 1,400 mg, Cycle 1)       1/27/2023 - 3/31/2023 Chemotherapy    Treatment Summary   Plan Name: OP NSCLC PEMETREXED + CARBOPLATIN (AUC) Q3W  Treatment Goal: Control  Status: Inactive  Start Date: 1/27/2023  End Date: 3/31/2023  Provider: Reese Mcfarlane MD  Chemotherapy: CARBOplatin (PARAPLATIN) 750 mg in sodium chloride 0.9% 335 mL chemo infusion, 750 mg (100 % of original dose 750 mg), Intravenous, Clinic/HOD 1 time, 4 of 4 cycles  Dose modification:   (original dose 750 mg, Cycle 1, Reason: MD Discretion)  Administration: 750 mg (1/27/2023), 750 mg (2/17/2023), 750 mg (3/31/2023), 750 mg (3/10/2023)  PEMEtrexed disodium (ALIMTA) 1,200 mg in sodium chloride 0.9% SolP 100 mL chemo infusion, 1,250 mg, Intravenous, Clinic/HOD 1 time, 4 of 4 cycles  Administration: 1,200 mg (1/27/2023), 1,200 mg (2/17/2023), 1,200 mg (3/31/2023), 1,200 mg (3/10/2023)       4/27/2023 -  Chemotherapy    Treatment Summary   Plan Name: OP NSCLC AMIVANTAMAB-VMJW ([facility]) Q4W  Treatment Goal: Palliative  Status: Active  Start Date: 4/27/2023 (Planned)  End Date: 6/6/2024 (Planned)  Provider: Reese Mcfarlane MD  Chemotherapy: amivantamab-vmjw (RYBREVANT) 350 mg in sodium chloride 0.9% SolP 250 mL chemo infusion, 350 mg (original dose ), Intravenous, Clinic/HOD 1  time, 0 of 15 cycles  Dose modification: 350 mg (Cycle 1), 1,050 mg (Cycle 1), 1,400 mg (Cycle 1)          Social History     Socioeconomic History    Marital status:    Tobacco Use    Smoking status: Never     Passive exposure: Never    Smokeless tobacco: Never   Substance and Sexual Activity    Alcohol use: Never    Drug use: Never    Sexual activity: Yes     Partners: Male     Birth control/protection: None     Comment: tubal     Social Determinants of Health     Financial Resource Strain: Patient Declined (11/22/2023)    Overall Financial Resource Strain (CARDIA)     Difficulty of Paying Living Expenses: Patient declined   Food Insecurity: Patient Declined (11/22/2023)    Hunger Vital Sign     Worried About Running Out of Food in the Last Year: Patient declined     Ran Out of Food in the Last Year: Patient declined   Transportation Needs: Patient Declined (11/22/2023)    PRAPARE - Transportation     Lack of Transportation (Medical): Patient declined     Lack of Transportation (Non-Medical): Patient declined   Physical Activity: Unknown (11/22/2023)    Exercise Vital Sign     Days of Exercise per Week: 3 days   Stress: Patient Declined (11/22/2023)    [employer] - Occupational Stress Questionnaire     Feeling of Stress : Patient declined   Housing Stability: Unknown (11/22/2023)    Housing Stability Vital Sign     Unable to Pay for Housing in the Last Year: Patient refused     Unstable Housing in the Last Year: Patient refused      Family History   Problem Relation Name Age of Onset    Heart failure Father        Past Surgical History:   Procedure Laterality Date    CATARACT EXTRACTION W/  INTRAOCULAR LENS IMPLANT Right 06/02/2022    FLUOROSCOPY N/A 1/26/2023    Procedure: FLUOROSCOPY/mediport placement;  Surgeon: Marlon Leone MD;  Location: [facility] CATH LAB;  Service: General;  Laterality: N/A;    TUBAL LIGATION      2007--postpartum tubal ligation        Review of Systems    Constitutional:  Negative for activity change, chills, fatigue and fever.   HENT: Negative.     Eyes: Negative.    Respiratory: Negative.     Cardiovascular: Negative.    Gastrointestinal: Negative.    Endocrine: Positive for cold intolerance.   Musculoskeletal:  Negative for arthralgias and myalgias.   Skin:  Negative for rash.   Neurological:  Negative for dizziness, weakness and light-headedness.   Psychiatric/Behavioral:  The patient is not nervous/anxious.        ?   A comprehensive 14-point review of systems was reviewed with patient and was negative other than as specified above.   ?     Objective:      Physical Exam  Vitals reviewed.   Constitutional:       General: She is not in acute distress.     Appearance: Normal appearance. She is not ill-appearing, toxic-appearing or diaphoretic.   HENT:      Head: Normocephalic and atraumatic.   Cardiovascular:      Rate and Rhythm: Normal rate.   Pulmonary:      Effort: Pulmonary effort is normal.   Skin:     General: Skin is warm.      Coloration: Skin is not jaundiced or pale.      Findings: No bruising, erythema, lesion or rash.   Neurological:      Mental Status: She is alert.      Motor: No weakness.      Gait: Gait normal.   Psychiatric:         Mood and Affect: Mood normal.         Behavior: Behavior normal.           ?   Vitals:    05/17/24 0932   BP: 114/60   Pulse: 60   Temp: 97.3 °F (36.3 °C)        ?       ?   Laboratory:  ?   No visits with results within 1 Day(s) from this visit.   Latest known visit with results is:   Lab Visit on 05/16/2024   Component Date Value Ref Range Status    Phosphorus 05/16/2024 3.7  2.7 - 4.5 mg/dL Final    Magnesium 05/16/2024 1.7  1.6 - 2.6 mg/dL Final    Sodium 05/16/2024 140  136 - 145 mmol/L Final    Potassium 05/16/2024 3.5  3.5 - 5.1 mmol/L Final    Chloride 05/16/2024 102  95 - 110 mmol/L Final    CO2 05/16/2024 28  23 - 29 mmol/L Final    Glucose 05/16/2024 197 (H)  70 - 110 mg/dL Final    BUN 05/16/2024 16  6 -  20 mg/dL Final    Creatinine 05/16/2024 0.9  0.5 - 1.4 mg/dL Final    Calcium 05/16/2024 8.7  8.7 - 10.5 mg/dL Final    Total Protein 05/16/2024 5.9 (L)  6.0 - 8.4 g/dL Final    Albumin 05/16/2024 2.9 (L)  3.5 - 5.2 g/dL Final    Total Bilirubin 05/16/2024 0.4  0.1 - 1.0 mg/dL Final    Alkaline Phosphatase 05/16/2024 99  55 - 135 U/L Final    AST 05/16/2024 15  10 - 40 U/L Final    ALT 05/16/2024 22  10 - 44 U/L Final    eGFR 05/16/2024 >60  >60 mL/min/1.73 m^2 Final    Anion Gap 05/16/2024 10  8 - 16 mmol/L Final    WBC 05/16/2024 6.94  3.90 - 12.70 K/uL Final    RBC 05/16/2024 4.45  4.00 - 5.40 M/uL Final    Hemoglobin 05/16/2024 12.4  12.0 - 16.0 g/dL Final    Hematocrit 05/16/2024 37.2  37.0 - 48.5 % Final    MCV 05/16/2024 84  82 - 98 fL Final    MCH 05/16/2024 27.9  27.0 - 31.0 pg Final    MCHC 05/16/2024 33.3  32.0 - 36.0 g/dL Final    RDW 05/16/2024 12.8  11.5 - 14.5 % Final    Platelets 05/16/2024 162  150 - 450 K/uL Final    MPV 05/16/2024 11.8  9.2 - 12.9 fL Final    Immature Granulocytes 05/16/2024 0.1  0.0 - 0.5 % Final    Gran # (ANC) 05/16/2024 4.3  1.8 - 7.7 K/uL Final    Immature Grans (Abs) 05/16/2024 0.01  0.00 - 0.04 K/uL Final    Lymph # 05/16/2024 1.9  1.0 - 4.8 K/uL Final    Mono # 05/16/2024 0.5  0.3 - 1.0 K/uL Final    Eos # 05/16/2024 0.2  0.0 - 0.5 K/uL Final    Baso # 05/16/2024 0.04  0.00 - 0.20 K/uL Final    nRBC 05/16/2024 0  0 /100 WBC Final    Gran % 05/16/2024 62.2  38.0 - 73.0 % Final    Lymph % 05/16/2024 27.2  18.0 - 48.0 % Final    Mono % 05/16/2024 7.2  4.0 - 15.0 % Final    Eosinophil % 05/16/2024 2.7  0.0 - 8.0 % Final    Basophil % 05/16/2024 0.6  0.0 - 1.9 % Final    Differential Method 05/16/2024 Automated   Final      ?   Imaging:    No results found. However, due to the size of the patient record, not all encounters were searched. Please check Results Review for a complete set of results.     Results for orders placed or performed during the hospital encounter of  05/08/24 (from the past 2160 hour(s))   CT Chest Abdomen Pelvis With IV Contrast (XPD) Routine Oral Contrast    Narrative    EXAMINATION:  CT CHEST ABDOMEN PELVIS WITH IV CONTRAST (XPD)    CLINICAL HISTORY:  restaging imaging;Secondary malignant neoplasm of bone    TECHNIQUE:  Low-dose CT of the chest abdomen and pelvis with contrast was acquired helically from the lung apices through the ischial tuberosities status post administration of 100 cc of Omnipaque 350. Oral contrast was administered.  Axial and reformatted images were reviewed.    COMPARISON:  01/30/2024    FINDINGS:  CHEST    There are scarring changes seen within the left lower lobe near the lung base and also in the region of the left upper lobe.  The overall appearance is unchanged when compared to the study from 01/30/2024.  No new or enlarging pulmonary nodules.  Large densely calcified granuloma seen within the right lung base.  Calcified right paratracheal subcarinal and right hilar lymph nodes are in present.  A right-sided MediPort catheter is noted.    The aorta is unremarkable in appearance. There is no pericardial effusion.    ABDOMEN    Liver/gallbladder/biliary: Stable appearance of a small soft tissue nodule seen projecting off the posterior segment of the right hepatic lobe best seen on series 3, image 63 that measures approximately 12 mm in size.  The size and appearance is unchanged when compared to the prior exam.  There is a stable 13 mm hypodense lesion within the lateral segment of the left hepatic lobe.  Findings likely represent a cyst.  The gallbladder is present and unremarkable.  No biliary ductal dilation.    Pancreas: The pancreas is unremarkable in appearance.    Spleen: The spleen is not enlarged.    Adrenals: Low density left adrenal gland nodule noted that measures approximately 2 cm in size and is unchanged when compared to the prior exams.  The right adrenal gland is unremarkable.    Kidneys: The kidneys are equally  perfused and demonstrate no solid masses. No nephrolithiasis.    Bowel/Mesentery: There is no evidence of bowel obstruction.  No mesenteric stranding or adenopathy.    Retroperitoneum: No adenopathy.  The aorta demonstrates a normal caliber.    PELVIS    Genitourinary/Reproductive organs: Probable fibroid uterus.    Adenopathy: None    Free Fluid: No free fluid identified.    Osseus Structures/Soft tissues: Sclerotic metastatic disease seen scattered throughout the visualized axial and proximal appendicular skeleton.  The overall appearance is unchanged.  No new osseous metastatic disease identified.  No significant soft tissue abnormality.      Impression    1. Stable exam.      Electronically signed by: Christiano Yepez DO  Date:    05/08/2024  Time:    09:47     *Note: Due to a large number of results and/or encounters for the requested time period, some results have not been displayed. A complete set of results can be found in Results Review.        ?   Assessment/Plan:     Problem List Items Addressed This Visit          Oncology    Secondary malignant neoplasm of bone    Relevant Orders    CBC Auto Differential    Comprehensive Metabolic Panel    Magnesium    Phosphorus    Malignant neoplasm of lower lobe of left lung - Primary      Cancer Staging   Malignant neoplasm of lower lobe of left lung  Staging form: Lung, AJCC 8th Edition  - Clinical stage from 12/9/2022: Stage IV (cT2, cN2, cM1) - Signed by Reese Mcfarlane MD on 12/9/2022    Completed 4 Cycles of Carbo/Alimta 03/31/2023    Repeat CT CAP 04/11/2023:   Detrimental change.  Increase in number and size of numerous sclerotic lesions throughout the cervical and thoracic spine, left humerus and sternum.  There are also multiple large sclerotic lesions throughout the lumbar spine, pelvis and proximal femurs measuring up to 7.1 cm in size.  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  2.   The lateral left upper lobe mass is decreased in size in the  interim, currently measuring 2.2 x 1.0 cm.  Negative for new pulmonary masses.  3.  Exophytic nodule along the posterior right hepatic lobe measuring 1.1 cm.  This was not imaged previously.  Etiology uncertain.  Metastasis is not excluded.    --Rybrevant C1D1 04/27/23--    Most recent CT CAP 05/08/24 Stable     Labs reviewed, no concerning cytopenias      -Ok to proceed with C14D15 Rybrevant today      RTC in two weeks with cbc, cmp  C15D1 [facility]brevant              Relevant Orders    CBC Auto Differential    Comprehensive Metabolic Panel    Magnesium    Phosphorus          Med Onc Chart Routing      Follow up with physician    Follow up with DOLLY 2 weeks. with PD labs prior for rybrevant   Infusion scheduling note   rybrevant   Injection scheduling note    Labs CBC, CMP, magnesium and phosphorus   Scheduling:  Preferred lab:  Lab interval:     Imaging    Pharmacy appointment    Other referrals                     REBEL CastilloP-C  Hematology/Oncology

## 2024-05-17 NOTE — PLAN OF CARE
Patient reports no complaints or concerns at this time. Tolerated Rybrevant infusion well with no adverse reactions. Patient to return in two weeks for next treatment.

## 2024-05-17 NOTE — DISCHARGE INSTRUCTIONS
.[facility]  [address] [address]  [address]  [phone] phone     [phone] fax  Hours of Operation: Monday- Friday 8:00am- 5:00pm  After hours phone  [phone]  Hematology / Oncology Physicians on call    Dr. Denys Sinclair        Nurse Practitioners:    Lorie Blackmon, BRISA Caicedo, BRISA Monteiro, BRISA Kwon, BRISA Trujillo, PA      Please don't hesitate to call if you have any concerns.       .WAYS TO HELP PREVENT INFECTION        WASH YOUR HANDS OFTEN DURING THE DAY, ESPECIALLY BEFORE YOU EAT, AFTER USING THE BATHROOM, AND AFTER TOUCHING ANIMALS    STAY AWAY FROM PEOPLE WHO HAVE ILLNESSES YOU CAN CATCH; SUCH AS COLDS, FLU, CHICKEN POX    TRY TO AVOID CROWDS    STAY AWAY FROM CHILDREN WHO RECENTLY HAVE RECEIVED LIVE VIRUS VACCINES    MAINTAIN GOOD MOUTH CARE    DO NOT SQUEEZE OR SCRATCH PIMPLES    CLEAN CUTS & SCRAPES RIGHT AWAY AND DAILY UNTIL HEALED WITH WARM WATER, SOAP & AN ANTISEPTIC    AVOID CONTACT WITH LITTER BOXES, BIRD CAGES, & FISH TANKS    AVOID STANDING WATER, IE., BIRD BATHS, FLOWER POTS/VASES, OR HUMIDIFIERS    WEAR GLOVES WHEN GARDENING OR CLEANING UP AFTER OTHERS, ESPECIALLY BABIES & SMALL CHILDREN    DO NOT EAT RAW FISH, SEAFOOD, MEAT, OR EGGS

## 2024-05-28 NOTE — ASSESSMENT & PLAN NOTE
Cancer Staging   Malignant neoplasm of lower lobe of left lung  Staging form: Lung, AJCC 8th Edition  - Clinical stage from 12/9/2022: Stage IV (cT2, cN2, cM1) - Signed by Reese Mcfarlane MD on 12/9/2022    Completed 4 Cycles of Carbo/Alimta 03/31/2023    Repeat CT CAP 04/11/2023:   Detrimental change.  Increase in number and size of numerous sclerotic lesions throughout the cervical and thoracic spine, left humerus and sternum.  There are also multiple large sclerotic lesions throughout the lumbar spine, pelvis and proximal femurs measuring up to 7.1 cm in size.  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  2.   The lateral left upper lobe mass is decreased in size in the interim, currently measuring 2.2 x 1.0 cm.  Negative for new pulmonary masses.  3.  Exophytic nodule along the posterior right hepatic lobe measuring 1.1 cm.  This was not imaged previously.  Etiology uncertain.  Metastasis is not excluded.    --Rybrevant C1D1 04/27/23--    Most recent CT CAP 05/08/24 Stable     Labs reviewed, no concerning cytopenias      -Ok to proceed with C14D15 Rybrevant today      RTC in two weeks with cbc, cmp  C15D1 [facility]brevant

## 2024-05-30 ENCOUNTER — LAB VISIT (OUTPATIENT)
Dept: LAB | Facility: HOSPITAL | Age: 54
End: 2024-05-30
Payer: COMMERCIAL

## 2024-05-30 DIAGNOSIS — C79.51 SECONDARY MALIGNANT NEOPLASM OF BONE: ICD-10-CM

## 2024-05-30 DIAGNOSIS — C34.32 MALIGNANT NEOPLASM OF LOWER LOBE OF LEFT LUNG: ICD-10-CM

## 2024-05-30 LAB
ALBUMIN SERPL BCP-MCNC: 2.7 G/DL (ref 3.5–5.2)
ALP SERPL-CCNC: 94 U/L (ref 55–135)
ALT SERPL W/O P-5'-P-CCNC: 19 U/L (ref 10–44)
ANION GAP SERPL CALC-SCNC: 10 MMOL/L (ref 8–16)
AST SERPL-CCNC: 14 U/L (ref 10–40)
BASOPHILS # BLD AUTO: 0.04 K/UL (ref 0–0.2)
BASOPHILS NFR BLD: 0.6 % (ref 0–1.9)
BILIRUB SERPL-MCNC: 0.3 MG/DL (ref 0.1–1)
BUN SERPL-MCNC: 17 MG/DL (ref 6–20)
CALCIUM SERPL-MCNC: 8.9 MG/DL (ref 8.7–10.5)
CHLORIDE SERPL-SCNC: 105 MMOL/L (ref 95–110)
CO2 SERPL-SCNC: 27 MMOL/L (ref 23–29)
CREAT SERPL-MCNC: 0.8 MG/DL (ref 0.5–1.4)
DIFFERENTIAL METHOD BLD: ABNORMAL
EOSINOPHIL # BLD AUTO: 0.2 K/UL (ref 0–0.5)
EOSINOPHIL NFR BLD: 3.3 % (ref 0–8)
ERYTHROCYTE [DISTWIDTH] IN BLOOD BY AUTOMATED COUNT: 12.8 % (ref 11.5–14.5)
EST. GFR  (NO RACE VARIABLE): >60 ML/MIN/1.73 M^2
GLUCOSE SERPL-MCNC: 170 MG/DL (ref 70–110)
HCT VFR BLD AUTO: 36.8 % (ref 37–48.5)
HGB BLD-MCNC: 12.3 G/DL (ref 12–16)
IMM GRANULOCYTES # BLD AUTO: 0.02 K/UL (ref 0–0.04)
IMM GRANULOCYTES NFR BLD AUTO: 0.3 % (ref 0–0.5)
LYMPHOCYTES # BLD AUTO: 1.9 K/UL (ref 1–4.8)
LYMPHOCYTES NFR BLD: 26.9 % (ref 18–48)
MAGNESIUM SERPL-MCNC: 1.6 MG/DL (ref 1.6–2.6)
MCH RBC QN AUTO: 27.9 PG (ref 27–31)
MCHC RBC AUTO-ENTMCNC: 33.4 G/DL (ref 32–36)
MCV RBC AUTO: 83 FL (ref 82–98)
MONOCYTES # BLD AUTO: 0.5 K/UL (ref 0.3–1)
MONOCYTES NFR BLD: 6.9 % (ref 4–15)
NEUTROPHILS # BLD AUTO: 4.3 K/UL (ref 1.8–7.7)
NEUTROPHILS NFR BLD: 62 % (ref 38–73)
NRBC BLD-RTO: 0 /100 WBC
PHOSPHATE SERPL-MCNC: 4 MG/DL (ref 2.7–4.5)
PLATELET # BLD AUTO: 191 K/UL (ref 150–450)
PMV BLD AUTO: 11.6 FL (ref 9.2–12.9)
POTASSIUM SERPL-SCNC: 3.7 MMOL/L (ref 3.5–5.1)
PROT SERPL-MCNC: 5.9 G/DL (ref 6–8.4)
RBC # BLD AUTO: 4.41 M/UL (ref 4–5.4)
SODIUM SERPL-SCNC: 142 MMOL/L (ref 136–145)
WBC # BLD AUTO: 6.98 K/UL (ref 3.9–12.7)

## 2024-05-30 PROCEDURE — 80053 COMPREHEN METABOLIC PANEL: CPT

## 2024-05-30 PROCEDURE — 36415 COLL VENOUS BLD VENIPUNCTURE: CPT

## 2024-05-30 PROCEDURE — 84100 ASSAY OF PHOSPHORUS: CPT

## 2024-05-30 PROCEDURE — 85025 COMPLETE CBC W/AUTO DIFF WBC: CPT

## 2024-05-30 PROCEDURE — 83735 ASSAY OF MAGNESIUM: CPT

## 2024-05-31 ENCOUNTER — OFFICE VISIT (OUTPATIENT)
Dept: HEMATOLOGY/ONCOLOGY | Facility: CLINIC | Age: 54
End: 2024-05-31
Payer: COMMERCIAL

## 2024-05-31 ENCOUNTER — INFUSION (OUTPATIENT)
Dept: INFUSION THERAPY | Facility: HOSPITAL | Age: 54
End: 2024-05-31
Attending: RADIOLOGY
Payer: COMMERCIAL

## 2024-05-31 VITALS
RESPIRATION RATE: 16 BRPM | HEART RATE: 64 BPM | OXYGEN SATURATION: 98 % | DIASTOLIC BLOOD PRESSURE: 67 MMHG | SYSTOLIC BLOOD PRESSURE: 110 MMHG | TEMPERATURE: 97 F

## 2024-05-31 VITALS
HEIGHT: 64 IN | TEMPERATURE: 98 F | BODY MASS INDEX: 50.02 KG/M2 | DIASTOLIC BLOOD PRESSURE: 68 MMHG | WEIGHT: 293 LBS | SYSTOLIC BLOOD PRESSURE: 107 MMHG | HEART RATE: 69 BPM | OXYGEN SATURATION: 98 %

## 2024-05-31 DIAGNOSIS — C79.51 SECONDARY MALIGNANT NEOPLASM OF BONE: ICD-10-CM

## 2024-05-31 DIAGNOSIS — C34.32 MALIGNANT NEOPLASM OF LOWER LOBE OF LEFT LUNG: Primary | ICD-10-CM

## 2024-05-31 PROCEDURE — 96413 CHEMO IV INFUSION 1 HR: CPT

## 2024-05-31 PROCEDURE — 99215 OFFICE O/P EST HI 40 MIN: CPT | Mod: S$GLB,,,

## 2024-05-31 PROCEDURE — 63600175 PHARM REV CODE 636 W HCPCS: Mod: JZ,TB

## 2024-05-31 PROCEDURE — 96415 CHEMO IV INFUSION ADDL HR: CPT

## 2024-05-31 PROCEDURE — 99999 PR PBB SHADOW E&M-EST. PATIENT-LVL V: CPT | Mod: PBBFAC,,,

## 2024-05-31 PROCEDURE — 3061F NEG MICROALBUMINURIA REV: CPT | Mod: CPTII,S$GLB,,

## 2024-05-31 PROCEDURE — 3066F NEPHROPATHY DOC TX: CPT | Mod: CPTII,S$GLB,,

## 2024-05-31 PROCEDURE — 3008F BODY MASS INDEX DOCD: CPT | Mod: CPTII,S$GLB,,

## 2024-05-31 PROCEDURE — 96367 TX/PROPH/DG ADDL SEQ IV INF: CPT

## 2024-05-31 PROCEDURE — 3074F SYST BP LT 130 MM HG: CPT | Mod: CPTII,S$GLB,,

## 2024-05-31 PROCEDURE — 1160F RVW MEDS BY RX/DR IN RCRD: CPT | Mod: CPTII,S$GLB,,

## 2024-05-31 PROCEDURE — 25000003 PHARM REV CODE 250

## 2024-05-31 PROCEDURE — 1159F MED LIST DOCD IN RCRD: CPT | Mod: CPTII,S$GLB,,

## 2024-05-31 PROCEDURE — 3052F HG A1C>EQUAL 8.0%<EQUAL 9.0%: CPT | Mod: CPTII,S$GLB,,

## 2024-05-31 PROCEDURE — 96375 TX/PRO/DX INJ NEW DRUG ADDON: CPT

## 2024-05-31 PROCEDURE — 3078F DIAST BP <80 MM HG: CPT | Mod: CPTII,S$GLB,,

## 2024-05-31 RX ORDER — HEPARIN 100 UNIT/ML
500 SYRINGE INTRAVENOUS
Status: DISCONTINUED | OUTPATIENT
Start: 2024-05-31 | End: 2024-05-31 | Stop reason: HOSPADM

## 2024-05-31 RX ORDER — DIPHENHYDRAMINE HYDROCHLORIDE 50 MG/ML
25 INJECTION INTRAMUSCULAR; INTRAVENOUS
Status: COMPLETED | OUTPATIENT
Start: 2024-05-31 | End: 2024-05-31

## 2024-05-31 RX ORDER — HEPARIN 100 UNIT/ML
500 SYRINGE INTRAVENOUS
Status: CANCELLED | OUTPATIENT
Start: 2024-05-31

## 2024-05-31 RX ORDER — EPINEPHRINE 0.3 MG/.3ML
0.3 INJECTION SUBCUTANEOUS ONCE AS NEEDED
Status: CANCELLED | OUTPATIENT
Start: 2024-05-31

## 2024-05-31 RX ORDER — DIPHENHYDRAMINE HYDROCHLORIDE 50 MG/ML
25 INJECTION INTRAMUSCULAR; INTRAVENOUS
Status: CANCELLED
Start: 2024-05-31

## 2024-05-31 RX ORDER — ACETAMINOPHEN 325 MG/1
650 TABLET ORAL
Status: COMPLETED | OUTPATIENT
Start: 2024-05-31 | End: 2024-05-31

## 2024-05-31 RX ORDER — ACETAMINOPHEN 325 MG/1
650 TABLET ORAL
Status: CANCELLED
Start: 2024-05-31

## 2024-05-31 RX ORDER — DIPHENHYDRAMINE HYDROCHLORIDE 50 MG/ML
50 INJECTION INTRAMUSCULAR; INTRAVENOUS ONCE AS NEEDED
Status: CANCELLED | OUTPATIENT
Start: 2024-05-31

## 2024-05-31 RX ORDER — SODIUM CHLORIDE 0.9 % (FLUSH) 0.9 %
10 SYRINGE (ML) INJECTION
Status: CANCELLED | OUTPATIENT
Start: 2024-05-31

## 2024-05-31 RX ORDER — SODIUM CHLORIDE 0.9 % (FLUSH) 0.9 %
10 SYRINGE (ML) INJECTION
Status: DISCONTINUED | OUTPATIENT
Start: 2024-05-31 | End: 2024-05-31 | Stop reason: HOSPADM

## 2024-05-31 RX ADMIN — ACETAMINOPHEN 650 MG: 325 TABLET ORAL at 10:05

## 2024-05-31 RX ADMIN — HEPARIN 500 UNITS: 100 SYRINGE at 12:05

## 2024-05-31 RX ADMIN — AMIVANTAMAB 1400 MG: 350 INJECTION INTRAVENOUS at 11:05

## 2024-05-31 RX ADMIN — DEXAMETHASONE SODIUM PHOSPHATE 0.25 MG: 4 INJECTION, SOLUTION INTRA-ARTICULAR; INTRALESIONAL; INTRAMUSCULAR; INTRAVENOUS; SOFT TISSUE at 10:05

## 2024-05-31 RX ADMIN — DIPHENHYDRAMINE HYDROCHLORIDE 25 MG: 50 INJECTION INTRAMUSCULAR; INTRAVENOUS at 10:05

## 2024-05-31 NOTE — ASSESSMENT & PLAN NOTE
Cancer Staging   Malignant neoplasm of lower lobe of left lung  Staging form: Lung, AJCC 8th Edition  - Clinical stage from 12/9/2022: Stage IV (cT2, cN2, cM1) - Signed by Reese Mcfarlane MD on 12/9/2022    Completed 4 Cycles of Carbo/Alimta 03/31/2023    Repeat CT CAP 04/11/2023:   Detrimental change.  Increase in number and size of numerous sclerotic lesions throughout the cervical and thoracic spine, left humerus and sternum.  There are also multiple large sclerotic lesions throughout the lumbar spine, pelvis and proximal femurs measuring up to 7.1 cm in size.  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  2.   The lateral left upper lobe mass is decreased in size in the interim, currently measuring 2.2 x 1.0 cm.  Negative for new pulmonary masses.  3.  Exophytic nodule along the posterior right hepatic lobe measuring 1.1 cm.  This was not imaged previously.  Etiology uncertain.  Metastasis is not excluded.    --Rybrevant C1D1 04/27/23--    Most recent CT CAP 05/08/24 Stable     Labs reviewed, no concerning cytopenias      -Ok to proceed with C15D1 Rybrevant today      RTC in two weeks with cbc, cmp  C15D15 Rybrevant

## 2024-05-31 NOTE — PROGRESS NOTES
Subjective:     Patient ID:Mateusz Ahmadi is a 54 y.o. female.?? MR#: [id number]   ?   PRIMARY ONCOLOGIST: Dr. Mcfarlane   ?   CHIEF COMPLAINT: Lab review/assessment C1D1 [facility]  ?   ONCOLOGIC DIAGNOSIS: Stage IV (cT2, cN2, cM1), Malignant neoplasm of lower lobe of left lung   ?   CURRENT TREATMENT: OP NSCLC AMIVANTAMAB-VMJW ([facility]) Q4W     PAST TREATMENT: PEMETREXED + CARBOPLATIN (AUC) Q3W      HPI Mrs. Ahmadi is a pleasan 53-renee-old female with metastatic non-small cell lung carcinoma Exon 20 mutation who presents today for lab review and assessment prior to C1D1 [facility]. 11/2022 pt seen with PCP with c/o persistent coughing and wheezing. CXR at that time revealed pulmonary nodules, subsequent CT chest found L Lung mass. L lung biopsy positive for adenocarcinoma , EGFR mutated. Pleural fluid also positive for malignancy. PET showed avid STEPHAN mass, mediastinal nodes, bone mets in C1, T1, T11, L3, sacrum, L ischium, sternum. MRI brain negative for intracranial metastases. Initiated on carbo/ alimta 01/27/23--re imaging after 4 cycles found progressive disease. Pt initiated on Rybrevant 04/27/23.    Interval History: Pt states she is feeling well and voices no c/o today. Denies n/v/d/c, fever, chills, sob, cp, cough, abnormal bleeding. Denies difficulty with appetite or maintaining hydration.       Oncology History   Malignant neoplasm of lower lobe of left lung   12/9/2022 Initial Diagnosis    Malignant neoplasm of lower lobe of left lung       12/9/2022 Cancer Staged    Staging form: Lung, AJCC 8th Edition  - Clinical stage from 12/9/2022: Stage IV (cT2, cN2, cM1)       12/27/2022 - 12/27/2022 Chemotherapy    Treatment Summary   Plan Name: OP PEMBROLIZUMAB 400MG Q6W  Treatment Goal: Control  Status: Inactive  Start Date:   End Date:   Provider: Reese Mcfarlane MD  Chemotherapy: [No matching medication found in this treatment plan]        Genetic Testing    Patient has genetic testing done for  TEmpus peripheral  blood.                                              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     1/18/2023 - 1/18/2023 Chemotherapy    Treatment Summary   Plan Name: OP NSCLC AMIVANTAMAB-VMJW ([facility]) Q4W  Treatment Goal: Palliative  Status: Inactive  Start Date:   End Date:   Provider: Reese Mcfarlane MD  Chemotherapy: amivantamab-vmjw ([facility]BRE[facility]ANT) 350 mg in sodium chloride 0.9% SolP 250 mL chemo infusion, 350 mg (100 % of original dose 350 mg), Intravenous, Clinic/HOD 1 time, 0 of 13 cycles  Dose modification: 350 mg (original dose 350 mg, Cycle 1), 1,050 mg (original dose 1,050 mg, Cycle 1), 1,400 mg (original dose 1,400 mg, Cycle 1)       1/27/2023 - 3/31/2023 Chemotherapy    Treatment Summary   Plan Name: OP NSCLC PEMETREXED + CARBOPLATIN (AUC) Q3W  Treatment Goal: Control  Status: Inactive  Start Date: 1/27/2023  End Date: 3/31/2023  Provider: Reese Mcfarlane MD  Chemotherapy: CARBOplatin (PARAPLATIN) 750 mg in sodium chloride 0.9% 335 mL chemo infusion, 750 mg (100 % of original dose 750 mg), Intravenous, Clinic/HOD 1 time, 4 of 4 cycles  Dose modification:   (original dose 750 mg, Cycle 1, Reason: MD Discretion)  Administration: 750 mg (1/27/2023), 750 mg (2/17/2023), 750 mg (3/31/2023), 750 mg (3/10/2023)  PEMEtrexed disodium (ALIMTA) 1,200 mg in sodium chloride 0.9% SolP 100 mL chemo infusion, 1,250 mg, Intravenous, Clinic/HOD 1 time, 4 of 4 cycles  Administration: 1,200 mg (1/27/2023), 1,200 mg (2/17/2023), 1,200 mg (3/31/2023), 1,200 mg (3/10/2023)       4/27/2023 -  Chemotherapy    Treatment Summary   Plan Name: OP NSCLC AMIVANTAMAB-VMJW ([facility]) Q4W  Treatment Goal: Palliative  Status: Active  Start Date: 4/27/2023 (Planned)  End Date: 6/6/2024 (Planned)  Provider: Reese Mcfarlane MD  Chemotherapy: amivantamab-vmjw (RYBREVANT) 350 mg in sodium chloride 0.9% SolP 250 mL chemo infusion, 350 mg (original dose ), Intravenous, Clinic/HOD 1  time, 0 of 15 cycles  Dose modification: 350 mg (Cycle 1), 1,050 mg (Cycle 1), 1,400 mg (Cycle 1)          Social History     Socioeconomic History    Marital status:    Tobacco Use    Smoking status: Never     Passive exposure: Never    Smokeless tobacco: Never   Substance and Sexual Activity    Alcohol use: Never    Drug use: Never    Sexual activity: Yes     Partners: Male     Birth control/protection: None     Comment: tubal     Social Determinants of Health     Financial Resource Strain: Patient Declined (11/22/2023)    Overall Financial Resource Strain (CARDIA)     Difficulty of Paying Living Expenses: Patient declined   Food Insecurity: Patient Declined (11/22/2023)    Hunger Vital Sign     Worried About Running Out of Food in the Last Year: Patient declined     Ran Out of Food in the Last Year: Patient declined   Transportation Needs: Patient Declined (11/22/2023)    PRAPARE - Transportation     Lack of Transportation (Medical): Patient declined     Lack of Transportation (Non-Medical): Patient declined   Physical Activity: Unknown (11/22/2023)    Exercise Vital Sign     Days of Exercise per Week: 3 days   Stress: Patient Declined (11/22/2023)    [employer] - Occupational Stress Questionnaire     Feeling of Stress : Patient declined   Housing Stability: Unknown (11/22/2023)    Housing Stability Vital Sign     Unable to Pay for Housing in the Last Year: Patient refused     Unstable Housing in the Last Year: Patient refused      Family History   Problem Relation Name Age of Onset    Heart failure Father        Past Surgical History:   Procedure Laterality Date    CATARACT EXTRACTION W/  INTRAOCULAR LENS IMPLANT Right 06/02/2022    FLUOROSCOPY N/A 1/26/2023    Procedure: FLUOROSCOPY/mediport placement;  Surgeon: Marlon Leone MD;  Location: [facility] CATH LAB;  Service: General;  Laterality: N/A;    TUBAL LIGATION      2007--postpartum tubal ligation        Review of Systems    Constitutional:  Negative for activity change, chills, fatigue and fever.   HENT: Negative.     Eyes: Negative.    Respiratory: Negative.     Cardiovascular: Negative.    Gastrointestinal: Negative.    Endocrine: Positive for cold intolerance.   Musculoskeletal:  Negative for arthralgias and myalgias.   Skin:  Negative for rash.   Neurological:  Negative for dizziness, weakness and light-headedness.   Psychiatric/Behavioral:  The patient is not nervous/anxious.        ?   A comprehensive 14-point review of systems was reviewed with patient and was negative other than as specified above.   ?     Objective:      Physical Exam  Vitals reviewed.   Constitutional:       General: She is not in acute distress.     Appearance: Normal appearance. She is not ill-appearing, toxic-appearing or diaphoretic.   HENT:      Head: Normocephalic and atraumatic.   Cardiovascular:      Rate and Rhythm: Normal rate.   Pulmonary:      Effort: Pulmonary effort is normal.   Skin:     General: Skin is warm.      Coloration: Skin is not jaundiced or pale.      Findings: No bruising, erythema, lesion or rash.   Neurological:      Mental Status: She is alert.      Motor: No weakness.      Gait: Gait normal.   Psychiatric:         Mood and Affect: Mood normal.         Behavior: Behavior normal.           ?   Vitals:    05/31/24 0931   BP: 107/68   Pulse: 69   Temp: 97.6 °F (36.4 °C)        ?       ?   Laboratory:  ?   No visits with results within 1 Day(s) from this visit.   Latest known visit with results is:   Lab Visit on 05/30/2024   Component Date Value Ref Range Status    WBC 05/30/2024 6.98  3.90 - 12.70 K/uL Final    RBC 05/30/2024 4.41  4.00 - 5.40 M/uL Final    Hemoglobin 05/30/2024 12.3  12.0 - 16.0 g/dL Final    Hematocrit 05/30/2024 36.8 (L)  37.0 - 48.5 % Final    MCV 05/30/2024 83  82 - 98 fL Final    MCH 05/30/2024 27.9  27.0 - 31.0 pg Final    MCHC 05/30/2024 33.4  32.0 - 36.0 g/dL Final    RDW 05/30/2024 12.8  11.5 - 14.5 %  Final    Platelets 05/30/2024 191  150 - 450 K/uL Final    MPV 05/30/2024 11.6  9.2 - 12.9 fL Final    Immature Granulocytes 05/30/2024 0.3  0.0 - 0.5 % Final    Gran # (ANC) 05/30/2024 4.3  1.8 - 7.7 K/uL Final    Immature Grans (Abs) 05/30/2024 0.02  0.00 - 0.04 K/uL Final    Lymph # 05/30/2024 1.9  1.0 - 4.8 K/uL Final    Mono # 05/30/2024 0.5  0.3 - 1.0 K/uL Final    Eos # 05/30/2024 0.2  0.0 - 0.5 K/uL Final    Baso # 05/30/2024 0.04  0.00 - 0.20 K/uL Final    nRBC 05/30/2024 0  0 /100 WBC Final    Gran % 05/30/2024 62.0  38.0 - 73.0 % Final    Lymph % 05/30/2024 26.9  18.0 - 48.0 % Final    Mono % 05/30/2024 6.9  4.0 - 15.0 % Final    Eosinophil % 05/30/2024 3.3  0.0 - 8.0 % Final    Basophil % 05/30/2024 0.6  0.0 - 1.9 % Final    Differential Method 05/30/2024 Automated   Final    Sodium 05/30/2024 142  136 - 145 mmol/L Final    Potassium 05/30/2024 3.7  3.5 - 5.1 mmol/L Final    Chloride 05/30/2024 105  95 - 110 mmol/L Final    CO2 05/30/2024 27  23 - 29 mmol/L Final    Glucose 05/30/2024 170 (H)  70 - 110 mg/dL Final    BUN 05/30/2024 17  6 - 20 mg/dL Final    Creatinine 05/30/2024 0.8  0.5 - 1.4 mg/dL Final    Calcium 05/30/2024 8.9  8.7 - 10.5 mg/dL Final    Total Protein 05/30/2024 5.9 (L)  6.0 - 8.4 g/dL Final    Albumin 05/30/2024 2.7 (L)  3.5 - 5.2 g/dL Final    Total Bilirubin 05/30/2024 0.3  0.1 - 1.0 mg/dL Final    Alkaline Phosphatase 05/30/2024 94  55 - 135 U/L Final    AST 05/30/2024 14  10 - 40 U/L Final    ALT 05/30/2024 19  10 - 44 U/L Final    eGFR 05/30/2024 >60  >60 mL/min/1.73 m^2 Final    Anion Gap 05/30/2024 10  8 - 16 mmol/L Final    Magnesium 05/30/2024 1.6  1.6 - 2.6 mg/dL Final    Phosphorus 05/30/2024 4.0  2.7 - 4.5 mg/dL Final      ?   Imaging:    No results found. However, due to the size of the patient record, not all encounters were searched. Please check Results Review for a complete set of results.     Results for orders placed or performed during the hospital encounter of  05/08/24 (from the past 2160 hour(s))   CT Chest Abdomen Pelvis With IV Contrast (XPD) Routine Oral Contrast    Narrative    EXAMINATION:  CT CHEST ABDOMEN PELVIS WITH IV CONTRAST (XPD)    CLINICAL HISTORY:  restaging imaging;Secondary malignant neoplasm of bone    TECHNIQUE:  Low-dose CT of the chest abdomen and pelvis with contrast was acquired helically from the lung apices through the ischial tuberosities status post administration of 100 cc of Omnipaque 350. Oral contrast was administered.  Axial and reformatted images were reviewed.    COMPARISON:  01/30/2024    FINDINGS:  CHEST    There are scarring changes seen within the left lower lobe near the lung base and also in the region of the left upper lobe.  The overall appearance is unchanged when compared to the study from 01/30/2024.  No new or enlarging pulmonary nodules.  Large densely calcified granuloma seen within the right lung base.  Calcified right paratracheal subcarinal and right hilar lymph nodes are in present.  A right-sided MediPort catheter is noted.    The aorta is unremarkable in appearance. There is no pericardial effusion.    ABDOMEN    Liver/gallbladder/biliary: Stable appearance of a small soft tissue nodule seen projecting off the posterior segment of the right hepatic lobe best seen on series 3, image 63 that measures approximately 12 mm in size.  The size and appearance is unchanged when compared to the prior exam.  There is a stable 13 mm hypodense lesion within the lateral segment of the left hepatic lobe.  Findings likely represent a cyst.  The gallbladder is present and unremarkable.  No biliary ductal dilation.    Pancreas: The pancreas is unremarkable in appearance.    Spleen: The spleen is not enlarged.    Adrenals: Low density left adrenal gland nodule noted that measures approximately 2 cm in size and is unchanged when compared to the prior exams.  The right adrenal gland is unremarkable.    Kidneys: The kidneys are equally  perfused and demonstrate no solid masses. No nephrolithiasis.    Bowel/Mesentery: There is no evidence of bowel obstruction.  No mesenteric stranding or adenopathy.    Retroperitoneum: No adenopathy.  The aorta demonstrates a normal caliber.    PELVIS    Genitourinary/Reproductive organs: Probable fibroid uterus.    Adenopathy: None    Free Fluid: No free fluid identified.    [place] Structures/Soft tissues: Sclerotic metastatic disease seen scattered throughout the visualized axial and proximal appendicular skeleton.  The overall appearance is unchanged.  No new osseous metastatic disease identified.  No significant soft tissue abnormality.      Impression    1. Stable exam.      Electronically signed by: Christiano Yepez DO  Date:    05/08/2024  Time:    09:47     *Note: Due to a large number of results and/or encounters for the requested time period, some results have not been displayed. A complete set of results can be found in Results Review.        ?   Assessment/Plan:     Problem List Items Addressed This Visit          Oncology    Secondary malignant neoplasm of bone    Relevant Orders    CBC Auto Differential    Comprehensive Metabolic Panel    Phosphorus    Magnesium    Malignant neoplasm of lower lobe of left lung - Primary      Cancer Staging   Malignant neoplasm of lower lobe of left lung  Staging form: Lung, AJCC 8th Edition  - Clinical stage from 12/9/2022: Stage IV (cT2, cN2, cM1) - Signed by Reese Mcfarlane MD on 12/9/2022    Completed 4 Cycles of Carbo/Alimta 03/31/2023    Repeat CT CAP 04/11/2023:   Detrimental change.  Increase in number and size of numerous sclerotic lesions throughout the cervical and thoracic spine, left humerus and sternum.  There are also multiple large sclerotic lesions throughout the lumbar spine, pelvis and proximal femurs measuring up to 7.1 cm in size.  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  2.   The lateral left upper lobe mass is decreased in size in the  interim, currently measuring 2.2 x 1.0 cm.  Negative for new pulmonary masses.  3.  Exophytic nodule along the posterior right hepatic lobe measuring 1.1 cm.  This was not imaged previously.  Etiology uncertain.  Metastasis is not excluded.    --Rybrevant C1D1 04/27/23--    Most recent CT CAP 05/08/24 Stable     Labs reviewed, no concerning cytopenias      -Ok to proceed with C15D1 [facility] today      RTC in two weeks with cbc, cmp  C15D15 [facility]              Relevant Orders    CBC Auto Differential    Comprehensive Metabolic Panel    Phosphorus    Magnesium            Med Onc Chart Routing      Follow up with physician 2 weeks. with labs prior for Rybrevant   Follow up with DOLLY    Infusion scheduling note   Rybrevant   Injection scheduling note    Labs CBC, CMP, phosphorus and magnesium   Scheduling:  Preferred lab:  Lab interval:     Imaging    Pharmacy appointment    Other referrals                     REBEL CastilloP-C  Hematology/Oncology

## 2024-06-11 ENCOUNTER — PATIENT MESSAGE (OUTPATIENT)
Dept: HEMATOLOGY/ONCOLOGY | Facility: CLINIC | Age: 54
End: 2024-06-11
Payer: COMMERCIAL

## 2024-06-13 ENCOUNTER — LAB VISIT (OUTPATIENT)
Dept: LAB | Facility: HOSPITAL | Age: 54
End: 2024-06-13
Payer: COMMERCIAL

## 2024-06-13 DIAGNOSIS — C34.32 MALIGNANT NEOPLASM OF LOWER LOBE OF LEFT LUNG: ICD-10-CM

## 2024-06-13 DIAGNOSIS — C79.51 SECONDARY MALIGNANT NEOPLASM OF BONE: ICD-10-CM

## 2024-06-13 LAB
ALBUMIN SERPL BCP-MCNC: 2.8 G/DL (ref 3.5–5.2)
ALP SERPL-CCNC: 93 U/L (ref 55–135)
ALT SERPL W/O P-5'-P-CCNC: 20 U/L (ref 10–44)
ANION GAP SERPL CALC-SCNC: 9 MMOL/L (ref 8–16)
AST SERPL-CCNC: 16 U/L (ref 10–40)
BASOPHILS # BLD AUTO: 0.04 K/UL (ref 0–0.2)
BASOPHILS NFR BLD: 0.5 % (ref 0–1.9)
BILIRUB SERPL-MCNC: 0.4 MG/DL (ref 0.1–1)
BUN SERPL-MCNC: 13 MG/DL (ref 6–20)
CALCIUM SERPL-MCNC: 8.7 MG/DL (ref 8.7–10.5)
CHLORIDE SERPL-SCNC: 102 MMOL/L (ref 95–110)
CO2 SERPL-SCNC: 28 MMOL/L (ref 23–29)
CREAT SERPL-MCNC: 0.8 MG/DL (ref 0.5–1.4)
DIFFERENTIAL METHOD BLD: NORMAL
EOSINOPHIL # BLD AUTO: 0.2 K/UL (ref 0–0.5)
EOSINOPHIL NFR BLD: 3.1 % (ref 0–8)
ERYTHROCYTE [DISTWIDTH] IN BLOOD BY AUTOMATED COUNT: 12.8 % (ref 11.5–14.5)
EST. GFR  (NO RACE VARIABLE): >60 ML/MIN/1.73 M^2
GLUCOSE SERPL-MCNC: 152 MG/DL (ref 70–110)
HCT VFR BLD AUTO: 37.6 % (ref 37–48.5)
HGB BLD-MCNC: 12.6 G/DL (ref 12–16)
IMM GRANULOCYTES # BLD AUTO: 0.03 K/UL (ref 0–0.04)
IMM GRANULOCYTES NFR BLD AUTO: 0.4 % (ref 0–0.5)
LYMPHOCYTES # BLD AUTO: 2.3 K/UL (ref 1–4.8)
LYMPHOCYTES NFR BLD: 31.3 % (ref 18–48)
MAGNESIUM SERPL-MCNC: 1.6 MG/DL (ref 1.6–2.6)
MCH RBC QN AUTO: 27.9 PG (ref 27–31)
MCHC RBC AUTO-ENTMCNC: 33.5 G/DL (ref 32–36)
MCV RBC AUTO: 83 FL (ref 82–98)
MONOCYTES # BLD AUTO: 0.5 K/UL (ref 0.3–1)
MONOCYTES NFR BLD: 7.1 % (ref 4–15)
NEUTROPHILS # BLD AUTO: 4.2 K/UL (ref 1.8–7.7)
NEUTROPHILS NFR BLD: 57.6 % (ref 38–73)
NRBC BLD-RTO: 0 /100 WBC
PHOSPHATE SERPL-MCNC: 3.5 MG/DL (ref 2.7–4.5)
PLATELET # BLD AUTO: 186 K/UL (ref 150–450)
PMV BLD AUTO: 11.8 FL (ref 9.2–12.9)
POTASSIUM SERPL-SCNC: 3.4 MMOL/L (ref 3.5–5.1)
PROT SERPL-MCNC: 6.1 G/DL (ref 6–8.4)
RBC # BLD AUTO: 4.51 M/UL (ref 4–5.4)
SODIUM SERPL-SCNC: 139 MMOL/L (ref 136–145)
WBC # BLD AUTO: 7.35 K/UL (ref 3.9–12.7)

## 2024-06-13 PROCEDURE — 85025 COMPLETE CBC W/AUTO DIFF WBC: CPT

## 2024-06-13 PROCEDURE — 84100 ASSAY OF PHOSPHORUS: CPT

## 2024-06-13 PROCEDURE — 36415 COLL VENOUS BLD VENIPUNCTURE: CPT

## 2024-06-13 PROCEDURE — 80053 COMPREHEN METABOLIC PANEL: CPT

## 2024-06-13 PROCEDURE — 83735 ASSAY OF MAGNESIUM: CPT

## 2024-06-14 ENCOUNTER — INFUSION (OUTPATIENT)
Dept: INFUSION THERAPY | Facility: HOSPITAL | Age: 54
End: 2024-06-14
Attending: RADIOLOGY
Payer: COMMERCIAL

## 2024-06-14 ENCOUNTER — OFFICE VISIT (OUTPATIENT)
Dept: HEMATOLOGY/ONCOLOGY | Facility: CLINIC | Age: 54
End: 2024-06-14
Payer: COMMERCIAL

## 2024-06-14 VITALS
HEART RATE: 67 BPM | TEMPERATURE: 97 F | HEIGHT: 64 IN | OXYGEN SATURATION: 96 % | WEIGHT: 293 LBS | SYSTOLIC BLOOD PRESSURE: 115 MMHG | DIASTOLIC BLOOD PRESSURE: 73 MMHG | RESPIRATION RATE: 16 BRPM | BODY MASS INDEX: 50.02 KG/M2

## 2024-06-14 DIAGNOSIS — C34.32 MALIGNANT NEOPLASM OF LOWER LOBE OF LEFT LUNG: Primary | ICD-10-CM

## 2024-06-14 DIAGNOSIS — J30.2 SEASONAL ALLERGIES: ICD-10-CM

## 2024-06-14 DIAGNOSIS — C79.51 SECONDARY MALIGNANT NEOPLASM OF BONE: ICD-10-CM

## 2024-06-14 PROCEDURE — 96415 CHEMO IV INFUSION ADDL HR: CPT

## 2024-06-14 PROCEDURE — 99215 OFFICE O/P EST HI 40 MIN: CPT | Mod: 95,,, | Performed by: INTERNAL MEDICINE

## 2024-06-14 PROCEDURE — 63600175 PHARM REV CODE 636 W HCPCS: Performed by: INTERNAL MEDICINE

## 2024-06-14 PROCEDURE — 3052F HG A1C>EQUAL 8.0%<EQUAL 9.0%: CPT | Mod: CPTII,95,, | Performed by: INTERNAL MEDICINE

## 2024-06-14 PROCEDURE — 3061F NEG MICROALBUMINURIA REV: CPT | Mod: CPTII,95,, | Performed by: INTERNAL MEDICINE

## 2024-06-14 PROCEDURE — 96375 TX/PRO/DX INJ NEW DRUG ADDON: CPT

## 2024-06-14 PROCEDURE — 96413 CHEMO IV INFUSION 1 HR: CPT

## 2024-06-14 PROCEDURE — 3066F NEPHROPATHY DOC TX: CPT | Mod: CPTII,95,, | Performed by: INTERNAL MEDICINE

## 2024-06-14 PROCEDURE — 25000003 PHARM REV CODE 250: Performed by: INTERNAL MEDICINE

## 2024-06-14 PROCEDURE — 96367 TX/PROPH/DG ADDL SEQ IV INF: CPT

## 2024-06-14 RX ORDER — DIPHENHYDRAMINE HYDROCHLORIDE 50 MG/ML
25 INJECTION INTRAMUSCULAR; INTRAVENOUS
OUTPATIENT
Start: 2024-06-28

## 2024-06-14 RX ORDER — ACETAMINOPHEN 325 MG/1
650 TABLET ORAL
Status: COMPLETED | OUTPATIENT
Start: 2024-06-14 | End: 2024-06-14

## 2024-06-14 RX ORDER — SODIUM CHLORIDE 0.9 % (FLUSH) 0.9 %
10 SYRINGE (ML) INJECTION
Status: DISCONTINUED | OUTPATIENT
Start: 2024-06-14 | End: 2024-06-14 | Stop reason: HOSPADM

## 2024-06-14 RX ORDER — HEPARIN 100 UNIT/ML
500 SYRINGE INTRAVENOUS
Status: DISCONTINUED | OUTPATIENT
Start: 2024-06-14 | End: 2024-06-14 | Stop reason: HOSPADM

## 2024-06-14 RX ORDER — HEPARIN 100 UNIT/ML
500 SYRINGE INTRAVENOUS
OUTPATIENT
Start: 2024-06-28

## 2024-06-14 RX ORDER — HEPARIN 100 UNIT/ML
500 SYRINGE INTRAVENOUS
Status: CANCELLED | OUTPATIENT
Start: 2024-06-14

## 2024-06-14 RX ORDER — SODIUM CHLORIDE 0.9 % (FLUSH) 0.9 %
10 SYRINGE (ML) INJECTION
OUTPATIENT
Start: 2024-06-28

## 2024-06-14 RX ORDER — DIPHENHYDRAMINE HYDROCHLORIDE 50 MG/ML
50 INJECTION INTRAMUSCULAR; INTRAVENOUS ONCE AS NEEDED
OUTPATIENT
Start: 2024-06-14

## 2024-06-14 RX ORDER — EPINEPHRINE 0.3 MG/.3ML
0.3 INJECTION SUBCUTANEOUS ONCE AS NEEDED
OUTPATIENT
Start: 2024-06-28

## 2024-06-14 RX ORDER — DIPHENHYDRAMINE HYDROCHLORIDE 50 MG/ML
25 INJECTION INTRAMUSCULAR; INTRAVENOUS
Status: CANCELLED
Start: 2024-06-14

## 2024-06-14 RX ORDER — ACETAMINOPHEN 325 MG/1
650 TABLET ORAL
Status: CANCELLED
Start: 2024-06-14

## 2024-06-14 RX ORDER — EPINEPHRINE 0.3 MG/.3ML
0.3 INJECTION SUBCUTANEOUS ONCE AS NEEDED
OUTPATIENT
Start: 2024-06-14

## 2024-06-14 RX ORDER — ACETAMINOPHEN 325 MG/1
650 TABLET ORAL
OUTPATIENT
Start: 2024-06-28

## 2024-06-14 RX ORDER — IPRATROPIUM BROMIDE 21 UG/1
2 SPRAY, METERED NASAL 2 TIMES DAILY
Qty: 30 ML | Refills: 0 | Status: SHIPPED | OUTPATIENT
Start: 2024-06-14

## 2024-06-14 RX ORDER — DIPHENHYDRAMINE HYDROCHLORIDE 50 MG/ML
25 INJECTION INTRAMUSCULAR; INTRAVENOUS
Status: COMPLETED | OUTPATIENT
Start: 2024-06-14 | End: 2024-06-14

## 2024-06-14 RX ORDER — SODIUM CHLORIDE 0.9 % (FLUSH) 0.9 %
10 SYRINGE (ML) INJECTION
Status: CANCELLED | OUTPATIENT
Start: 2024-06-14

## 2024-06-14 RX ORDER — DIPHENHYDRAMINE HYDROCHLORIDE 50 MG/ML
50 INJECTION INTRAMUSCULAR; INTRAVENOUS ONCE AS NEEDED
OUTPATIENT
Start: 2024-06-28

## 2024-06-14 RX ADMIN — DIPHENHYDRAMINE HYDROCHLORIDE 25 MG: 50 INJECTION INTRAMUSCULAR; INTRAVENOUS at 11:06

## 2024-06-14 RX ADMIN — ACETAMINOPHEN 650 MG: 325 TABLET ORAL at 11:06

## 2024-06-14 RX ADMIN — HEPARIN 500 UNITS: 100 SYRINGE at 01:06

## 2024-06-14 RX ADMIN — SODIUM CHLORIDE 1400 MG: 9 INJECTION, SOLUTION INTRAVENOUS at 12:06

## 2024-06-14 RX ADMIN — DEXAMETHASONE SODIUM PHOSPHATE 0.25 MG: 4 INJECTION, SOLUTION INTRA-ARTICULAR; INTRALESIONAL; INTRAMUSCULAR; INTRAVENOUS; SOFT TISSUE at 11:06

## 2024-06-14 RX ADMIN — SODIUM CHLORIDE: 9 INJECTION, SOLUTION INTRAVENOUS at 01:06

## 2024-06-14 NOTE — PROGRESS NOTES
Patient ID: Shaneka Ahmadi   Chief Complaint: Follow-up  MRN:  [id number]     Oncologic Diagnosis:  Stage IV (cT2, cN2, cM1) NSCLC, metastatic to bone  Previous Treatment:    Carbo/Alimta started in 1/2023; stopped due to progression after 4 cycles     Current Treatment:   OP NSCLC AMIVANTAMAB-VMJW ([facility]) Q2W   OP ZOLEDRONIC ACID (ZOMETA) Q4W   THERAPY SHELL - B12     The patient location is: [place]  The chief complaint leading to consultation is: follow up    Visit type: audiovisual    Face to Face time with patient: 15  45 minutes of total time spent on the encounter, which includes face to face time and non-face to face time preparing to see the patient (eg, review of tests), Obtaining and/or reviewing separately obtained history, Documenting clinical information in the electronic or other health record, Independently interpreting results (not separately reported) and communicating results to the patient/family/caregiver, or Care coordination (not separately reported).         Each patient to whom he or she provides medical services by telemedicine is:  (1) informed of the relationship between the physician and patient and the respective role of any other health care provider with respect to management of the patient; and (2) notified that he or she may decline to receive medical services by telemedicine and may withdraw from such care at any time.    Notes:     Subjective   Shaneka Ahmadi is a 54 y.o. female who presents to clinic for follow-up.    She feels well; reports good energy, appetite; rash resolved.  She has no acute complaints.  She has started her dental work.  Labs reviewed and stable for treatment.  Imaging reviewed and stable.    Review of Systems   Constitutional:  Negative for activity change, appetite change, chills, diaphoresis, fatigue, fever and unexpected weight change.   HENT:  Negative for nosebleeds.    Respiratory:  Negative for shortness of breath.    Cardiovascular:   Negative for chest pain.   Gastrointestinal:  Negative for abdominal distention, abdominal pain, anal bleeding, blood in stool, constipation, diarrhea, nausea and vomiting.   Genitourinary:  Negative for difficulty urinating and hematuria.   Musculoskeletal:  Negative for arthralgias, back pain and myalgias.   Skin:  Negative for rash.   Neurological:  Negative for dizziness, weakness, light-headedness and headaches.   Hematological:  Does not bruise/bleed easily.   Psychiatric/Behavioral:  The patient is not nervous/anxious.      History     Oncology History   Malignant neoplasm of lower lobe of left lung   12/9/2022 Initial Diagnosis    Malignant neoplasm of lower lobe of left lung     12/9/2022 Cancer Staged    Staging form: Lung, AJCC 8th Edition  - Clinical stage from 12/9/2022: Stage IV (cT2, cN2, cM1)     12/27/2022 - 12/27/2022 Chemotherapy    Treatment Summary   Plan Name: OP PEMBROLIZUMAB 400MG Q6W  Treatment Goal: Control  Status: Inactive  Start Date:   End Date:   Provider: Reese Mcfarlane MD  Chemotherapy: [No matching medication found in this treatment plan]      Genetic Testing    Patient has genetic testing done for  TEmpus peripheral blood.                                              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     1/18/2023 - 1/18/2023 Chemotherapy    Treatment Summary   Plan Name: OP NSCLC AMIVANTAMAB-VMJW ([facility]) Q4W  Treatment Goal: Palliative  Status: Inactive  Start Date:   End Date:   Provider: Reese Mcfarlane MD  Chemotherapy: amivantamab-vmjw ([facility]) 350 mg in sodium chloride 0.9% SolP 250 mL chemo infusion, 350 mg (100 % of original dose 350 mg), Intravenous, Clinic/HOD 1 time, 0 of 13 cycles  Dose modification: 350 mg (original dose 350 mg, Cycle 1), 1,050 mg (original dose 1,050 mg, Cycle 1), 1,400 mg (original dose 1,400 mg, Cycle 1)     1/27/2023 - 3/31/2023 Chemotherapy    Treatment Summary   Plan Name:  OP NSCLC PEMETREXED + CARBOPLATIN (AUC) Q3W  Treatment Goal: Control  Status: Inactive  Start Date: 1/27/2023  End Date: 3/31/2023  Provider: Reese Mcfarlane MD  Chemotherapy: CARBOplatin (PARAPLATIN) 750 mg in sodium chloride 0.9% 335 mL chemo infusion, 750 mg (100 % of original dose 750 mg), Intravenous, Clinic/HOD 1 time, 4 of 4 cycles  Dose modification:   (original dose 750 mg, Cycle 1, Reason: MD Discretion)  Administration: 750 mg (1/27/2023), 750 mg (2/17/2023), 750 mg (3/31/2023), 750 mg (3/10/2023)  PEMEtrexed disodium (ALIMTA) 1,200 mg in sodium chloride 0.9% SolP 100 mL chemo infusion, 1,250 mg, Intravenous, Clinic/HOD 1 time, 4 of 4 cycles  Administration: 1,200 mg (1/27/2023), 1,200 mg (2/17/2023), 1,200 mg (3/31/2023), 1,200 mg (3/10/2023)     4/27/2023 -  Chemotherapy    Treatment Summary   Plan Name: OP NSCLC AMIVANTAMAB-VMJW ([facility]) Q4W  Treatment Goal: Palliative  Status: Active  Start Date: 4/27/2023  End Date: 11/1/2024 (Planned)  Provider: Reese Mcfarlane MD  Chemotherapy: amivantamab-vmjw ([facility]) 350 mg in sodium chloride 0.9% SolP 250 mL chemo infusion, 350 mg (100 % of original dose 350 mg), Intravenous, Clinic/HOD 1 time, 15 of 20 cycles  Dose modification: 350 mg (original dose 350 mg, Cycle 1), 1,050 mg (original dose 1,050 mg, Cycle 1), 1,400 mg (original dose 1,400 mg, Cycle 1)  Administration: 350 mg (4/27/2023), 1,050 mg (4/28/2023), 1,400 mg (5/4/2023), 1,400 mg (5/11/2023), 1,400 mg (5/18/2023), 1,400 mg (5/25/2023), 1,400 mg (6/8/2023), 1,400 mg (6/22/2023), 1,400 mg (7/21/2023), 1,400 mg (8/4/2023), 1,400 mg (8/18/2023), 1,400 mg (9/1/2023), 1,400 mg (9/15/2023), 1,400 mg (9/29/2023), 1,400 mg (10/13/2023), 1,400 mg (10/27/2023), 1,400 mg (11/10/2023), 1,400 mg (11/24/2023), 1,400 mg (12/8/2023), 1,400 mg (12/22/2023), 1,400 mg (1/5/2024), 1,400 mg (1/19/2024), 1,400 mg (2/2/2024), 1,400 mg (2/16/2024), 1,400 mg (3/1/2024), 1,400 mg (3/15/2024), 1,400 mg (4/5/2024),  1,400 mg (4/19/2024), 1,400 mg (5/3/2024), 1,400 mg (5/17/2024), 1,400 mg (5/31/2024)           Past Medical History:   Diagnosis Date    Diabetes mellitus     Hypertension     Malignant neoplasm of lower lobe of left lung 12/9/2022    Rash, drug 7/6/2023    OP NSCLC AMIVANTAMAB-VMJW ([facility]) Q4W      Secondary malignant neoplasm of bone 12/5/2022       Past Surgical History:   Procedure Laterality Date    CATARACT EXTRACTION W/  INTRAOCULAR LENS IMPLANT Right 06/02/2022    FLUOROSCOPY N/A 1/26/2023    Procedure: FLUOROSCOPY/mediport placement;  Surgeon: Marlon Leone MD;  Location[facility] C[facility] LAB;  Service: General;  Laterality: N/A;    TUBAL LIGATION      2007--postpartum tubal ligation       Family History   Problem Relation Name Age of Onset    Heart failure Father         Review of patient's allergies indicates:   Allergen Reactions    Lisinopril Other (See Comments)     coughing    Amoxicillin        Social History     Tobacco Use    Smoking status: Never     Passive exposure: Never    Smokeless tobacco: Never   Substance Use Topics    Alcohol use: Never    Drug use: Never   National Oncology Conference this month    Physical Exam     ECOG SCORE    0 - Fully active-able to carry on all pre-disease performance without restriction         Labs   Labs:  Lab Visit on 06/13/2024   Component Date Value Ref Range Status    WBC 06/13/2024 7.35  3.90 - 12.70 K/uL Final    RBC 06/13/2024 4.51  4.00 - 5.40 M/uL Final    Hemoglobin 06/13/2024 12.6  12.0 - 16.0 g/dL Final    Hematocrit 06/13/2024 37.6  37.0 - 48.5 % Final    MCV 06/13/2024 83  82 - 98 fL Final    MCH 06/13/2024 27.9  27.0 - 31.0 pg Final    MCHC 06/13/2024 33.5  32.0 - 36.0 g/dL Final    RDW 06/13/2024 12.8  11.5 - 14.5 % Final    Platelets 06/13/2024 186  150 - 450 K/uL Final    MPV 06/13/2024 11.8  9.2 - 12.9 fL Final    Immature Granulocytes 06/13/2024 0.4  0.0 - 0.5 % Final    Gran # (ANC) 06/13/2024 4.2  1.8 - 7.7 K/uL Final    Immature Grans  (Abs) 06/13/2024 0.03  0.00 - 0.04 K/uL Final    Comment: Mild elevation in immature granulocytes is non specific and   can be seen in a variety of conditions including stress response,   acute inflammation, trauma and pregnancy. Correlation with other   laboratory and clinical findings is essential.      Lymph # 06/13/2024 2.3  1.0 - 4.8 K/uL Final    Mono # 06/13/2024 0.5  0.3 - 1.0 K/uL Final    Eos # 06/13/2024 0.2  0.0 - 0.5 K/uL Final    Baso # 06/13/2024 0.04  0.00 - 0.20 K/uL Final    nRBC 06/13/2024 0  0 /100 WBC Final    Gran % 06/13/2024 57.6  38.0 - 73.0 % Final    Lymph % 06/13/2024 31.3  18.0 - 48.0 % Final    Mono % 06/13/2024 7.1  4.0 - 15.0 % Final    Eosinophil % 06/13/2024 3.1  0.0 - 8.0 % Final    Basophil % 06/13/2024 0.5  0.0 - 1.9 % Final    Differential Method 06/13/2024 Automated   Final    Sodium 06/13/2024 139  136 - 145 mmol/L Final    Potassium 06/13/2024 3.4 (L)  3.5 - 5.1 mmol/L Final    Chloride 06/13/2024 102  95 - 110 mmol/L Final    CO2 06/13/2024 28  23 - 29 mmol/L Final    Glucose 06/13/2024 152 (H)  70 - 110 mg/dL Final    BUN 06/13/2024 13  6 - 20 mg/dL Final    Creatinine 06/13/2024 0.8  0.5 - 1.4 mg/dL Final    Calcium 06/13/2024 8.7  8.7 - 10.5 mg/dL Final    Total Protein 06/13/2024 6.1  6.0 - 8.4 g/dL Final    Albumin 06/13/2024 2.8 (L)  3.5 - 5.2 g/dL Final    Total Bilirubin 06/13/2024 0.4  0.1 - 1.0 mg/dL Final    Comment: For infants and newborns, interpretation of results should be based  on gestational age, weight and in agreement with clinical  observations.    Premature Infant recommended reference ranges:  Up to 24 hours.............<8.0 mg/dL  Up to 48 hours............<12.0 mg/dL  3-5 days..................<15.0 mg/dL  6-29 days.................<15.0 mg/dL      Alkaline Phosphatase 06/13/2024 93  55 - 135 U/L Final    AST 06/13/2024 16  10 - 40 U/L Final    ALT 06/13/2024 20  10 - 44 U/L Final    eGFR 06/13/2024 >60  >60 mL/min/1.73 m^2 Final    Anion Gap  06/13/2024 9  8 - 16 mmol/L Final    Phosphorus 06/13/2024 3.5  2.7 - 4.5 mg/dL Final    Magnesium 06/13/2024 1.6  1.6 - 2.6 mg/dL Final        Imaging   CT CHEST ABDOMEN PELVIS WITH CONTRAST (XPD) - 10/19/23     CLINICAL HISTORY:  Metastatic disease evaluation;Malignant neoplasm of lower lobe, left bronchus or lung     TECHNIQUE:  Low dose axial images, sagittal and coronal reformations were obtained from the thoracic inlet to the pubic synthesis following the IV administration of 100 mL of Omnipaque 350.  Oral contrast also administered.  All CT scans at this location are performed using dose modulation techniques as appropriate to a performed exam including the following: Automated exposure control; adjustment of the mA and/or kV  according to patient size.     COMPARISON:  07/11/2023.  12/02/2022     FINDINGS:  Chest:     Thoracic soft tissues: No significant abnormality.     Aorta: Normal in course and caliber, without significant atherosclerotic plaque. There are three branching vessels at the arch.     Heart: Normal in size. No pericardial effusion.  Mild aortic and coronary artery atherosclerotic calcification.     Brooke/Mediastinum: No significant lymphadenopathy .  No measurable hilar lesion.     Lungs: Unchanged left lung base volume loss and bronchovascular crowding secondary to elevation left hemidiaphragm.  No measurable mass lesion.  Right lung base benign calcified granuloma.     Abdomen Pelvis:     Liver: Unchanged 9 mm nodule posterior to the inferior right liver.     Gallbladder: No calcified gallstones.     Bile Ducts: No evidence of dilated ducts.     Pancreas: No mass or peripancreatic fat stranding.     Spleen: Unremarkable.     Adrenals: Unchanged benign-appearing 2.1 cm left adrenal fluid density nodule.     Kidneys/ Ureters: Punctate right upper pole nonobstructing renal stone.  Normal in size and location. Normal concentration and excretion of contrast. No hydronephrosis or nephrolithiasis.  No ureteral dilatation. Left upper pole 12 mm benign angiomyolipoma.     Bladder: No evidence of wall thickening.     Reproductive organs: Fibroid uterus.     GI Tract/Mesentery: No evidence of bowel obstruction or inflammation. Large volume stool throughout the colon     Peritoneal Space: No ascites. No free air.     Retroperitoneum: No significant adenopathy.     Abdominal wall: Unremarkable.     Vasculature: No significant atherosclerosis or aneurysm.     Bones: No acute fracture. Unchanged widely disseminated osteoblastic metastatic disease.  No new lesions.     Impression:  Stable exam compared to 07/11/2023.         CT CHEST ABDOMEN PELVIS WITH IV CONTRAST (XPD) - 01/30/2024  FINDINGS:  CT CHEST:     1.2 x 1.2 cm calcified granuloma right lung base, unchanged.  Otherwise the right lung is clear     minimal linear focal increased density subpleural lateral left upper lobe could represent residual lung mass or residual scarring, measuring 1.9 x 0.5 cm as compared to 2.2 x 1.0 cm on the prior examination.  Moderate consolidation left lower lobe.  No new or enlarging pulmonary nodules or lung masses bilaterally.     Heart normal in size.  Normal caliber thoracic aorta.  Negative for adenopathy.  Calcified right hilar right paratracheal lymph nodes, unchanged since prior examination.     Soft tissues inferior neck are normal.  Trachea and esophagus are midline and are normal.  Chest wall soft tissues are normal.  Degenerative changes the spine.  Blastic metastatic lesions of the thoracic spine and decreased in number.     CT ABDOMEN PELVIS     Incidental small cysts lateral segment left lobe liver, unchanged since prior examination.  Exophytic nodule posterior aspect of the posterior segment right lobe measures 10 mm and is unchanged.  No suspicious hepatic lesion.     Gallbladder, portal vein, spleen, pancreas are normal.     Low-density left adrenal gland compatible with adenomas unchanged.  Right adrenal gland  is normal.  Normal caliber aorta and IVC.  No retroperitoneal adenopathy.  No suspicious renal lesion bilaterally.  No hydronephrosis.  Ureters are nondilated and normal.     Stomach and small intestine normal.  Appendix normal.  Otherwise the bowel is normal.     Mildly distended normal-appearing bladder.  Anteverted uterus.  Negative for adnexal mass.     Abdominal pelvic wall soft tissues are normal.  Degenerative changes of the spine again noted.  Blastic metastatic lesions of the lumbar spine remain unchanged.  Largest is within the L3 vertebral body measuring  2.1 cm in maximal dimension.  Blastic metastatic lesions of the right ilium and proximal bilateral femurs.  Overall osseous metastatic disease within the pelvis and bilateral proximal femurs has significantly improved.        Impression:        1.    Minimal residual linear increased density periphery of the left upper lobe could represent residual left upper lobe lung nodule or residual scarring.  No new or enlarging pulmonary nodules or masses bilaterally.     2.    Nonspecific exophytic soft tissue density nodule projects posteriorly off of the posterior segment right lobe liver.  No change.  No definite evidence for metastatic disease throughout the abdomen or pelvis.     3.    Osseous metastatic disease significantly improved.       Assessment and Plan   Stage IV (cT2, cN2, cM1) NSCLC, Metastatic to Bone  Exon 20 EGFR mutation positive   Previously on Carbo/Alimta started in 1/2023; stopped due to progression after 4 cycles and started on Amivantamab  CT CAP scheduled 01/30/24: stable  Tolerating Amivantamab well; has mild intermittent rash on right side of face but no other notable side effects  CT CAP 05/08/24: stable  Amivantimab today and in 2 weeks     Metastatic Bone Disease  Patient has yet to start bisphosphonate therapy as she has some ongoing dental issues pending completion of dental work and dental clearance  She will obtain dental work  tentatively in the next 4-8 weeks; new dental insurance starts Feb 1 so she will see the dentist after that  Continue Calcium and Vitamin D/Weight Bearing Exercise      Amivantamab Induced Rash  Continue topical clindamycin, and tretinoin   Started on doxycycline per derm  Continue follow up with derm      Chronic Medical Conditions  DM II  Hx of COVID-19        Med Onc Chart Routing      Follow up with physician 2 months.   Follow up with DOLLY 4 weeks.   Infusion scheduling note   Amivantimab today and in 2 weeks   Injection scheduling note    Labs CBC, CMP, phosphorus and magnesium   Scheduling:  Preferred lab:  Lab interval: every 4 weeks     Imaging    Pharmacy appointment    Other referrals                     The patient was seen, interviewed and examined. Pertinent lab and radiologic studies were reviewed. Pt instructed to call should they develop concerning signs/symptoms or have further questions.        Portions of the record may have been created with voice recognition software. Occasional wrong-word or sound-a-like substitutions may have occurred due to the inherent limitations of voice recognition software. Read the chart carefully and recognize, using context, where substitutions have occurred.      Mirtha Wilhelm MD    Hematology/Oncology

## 2024-06-14 NOTE — PLAN OF CARE
Discussed plan of care with pt. Addressed any and ongoing concerns. Pt denies   Problem: Adult Inpatient Plan of Care  Goal: Plan of Care Review  Outcome: Progressing  Goal: Patient-Specific Goal (Individualized)  Outcome: Progressing  Flowsheets (Taken 6/14/2024 1143)  Individualized Care Needs: Reclined position, warm blankets, pillow and water for meds  Anxieties, Fears or Concerns: Denies, feeling good and rash to face has cleared up  Patient/Family-Specific Goals (Include Timeframe): Tolerate treatment  Goal: Absence of Hospital-Acquired Illness or Injury  Outcome: Progressing  Intervention: Identify and Manage Fall Risk  Flowsheets (Taken 6/14/2024 1143)  Safety Promotion/Fall Prevention: in recliner, wheels locked  Intervention: Prevent Infection  Flowsheets (Taken 6/14/2024 1143)  Infection Prevention:   hand hygiene promoted   equipment surfaces disinfected   personal protective equipment utilized  Goal: Optimal Comfort and Wellbeing  Outcome: Progressing  Intervention: Provide Person-Centered Care  Flowsheets (Taken 6/14/2024 1143)  Trust Relationship/Rapport:   care explained   questions encouraged   choices provided   empathic listening provided   questions answered   thoughts/feelings acknowledged

## 2024-06-20 ENCOUNTER — TELEPHONE (OUTPATIENT)
Dept: HEMATOLOGY/ONCOLOGY | Facility: CLINIC | Age: 54
End: 2024-06-20
Payer: COMMERCIAL

## 2024-06-20 NOTE — TELEPHONE ENCOUNTER
YOLANDA called patient on today to introduce herself. Pt states she has been fine since receiving treatment. Pt states she still working from Monday through Thursday. ARNOLR explained oncology psychology to patient. Pt states she does not need any counseling services at this time. ARNOLR also discussed Boost and patient states she would like to try Boost. Pt states she was grateful that YOLANDA called to check on her. She asked for YOLANDA's call back number. YOLANDA verified her email address and reports that she will send her an email with contact information. ARNOLR will remain available.

## 2024-06-26 ENCOUNTER — PATIENT MESSAGE (OUTPATIENT)
Dept: HEMATOLOGY/ONCOLOGY | Facility: CLINIC | Age: 54
End: 2024-06-26
Payer: COMMERCIAL

## 2024-06-28 ENCOUNTER — INFUSION (OUTPATIENT)
Dept: INFUSION THERAPY | Facility: HOSPITAL | Age: 54
End: 2024-06-28
Attending: RADIOLOGY
Payer: COMMERCIAL

## 2024-06-28 VITALS
RESPIRATION RATE: 16 BRPM | BODY MASS INDEX: 50.02 KG/M2 | SYSTOLIC BLOOD PRESSURE: 107 MMHG | HEIGHT: 64 IN | TEMPERATURE: 97 F | HEART RATE: 63 BPM | OXYGEN SATURATION: 98 % | DIASTOLIC BLOOD PRESSURE: 63 MMHG | WEIGHT: 293 LBS

## 2024-06-28 DIAGNOSIS — C34.32 MALIGNANT NEOPLASM OF LOWER LOBE OF LEFT LUNG: Primary | ICD-10-CM

## 2024-06-28 PROCEDURE — 96415 CHEMO IV INFUSION ADDL HR: CPT

## 2024-06-28 PROCEDURE — 63600175 PHARM REV CODE 636 W HCPCS: Performed by: INTERNAL MEDICINE

## 2024-06-28 PROCEDURE — A4216 STERILE WATER/SALINE, 10 ML: HCPCS | Performed by: INTERNAL MEDICINE

## 2024-06-28 PROCEDURE — 96367 TX/PROPH/DG ADDL SEQ IV INF: CPT

## 2024-06-28 PROCEDURE — 25000003 PHARM REV CODE 250: Performed by: INTERNAL MEDICINE

## 2024-06-28 PROCEDURE — 96375 TX/PRO/DX INJ NEW DRUG ADDON: CPT

## 2024-06-28 PROCEDURE — 96413 CHEMO IV INFUSION 1 HR: CPT

## 2024-06-28 RX ORDER — DIPHENHYDRAMINE HYDROCHLORIDE 50 MG/ML
25 INJECTION INTRAMUSCULAR; INTRAVENOUS
Status: COMPLETED | OUTPATIENT
Start: 2024-06-28 | End: 2024-06-28

## 2024-06-28 RX ORDER — HEPARIN 100 UNIT/ML
500 SYRINGE INTRAVENOUS
Status: DISCONTINUED | OUTPATIENT
Start: 2024-06-28 | End: 2024-06-28 | Stop reason: HOSPADM

## 2024-06-28 RX ORDER — ACETAMINOPHEN 325 MG/1
650 TABLET ORAL
Status: COMPLETED | OUTPATIENT
Start: 2024-06-28 | End: 2024-06-28

## 2024-06-28 RX ORDER — SODIUM CHLORIDE 0.9 % (FLUSH) 0.9 %
10 SYRINGE (ML) INJECTION
Status: DISCONTINUED | OUTPATIENT
Start: 2024-06-28 | End: 2024-06-28 | Stop reason: HOSPADM

## 2024-06-28 RX ADMIN — HEPARIN 500 UNITS: 100 SYRINGE at 12:06

## 2024-06-28 RX ADMIN — DIPHENHYDRAMINE HYDROCHLORIDE 25 MG: 50 INJECTION INTRAMUSCULAR; INTRAVENOUS at 10:06

## 2024-06-28 RX ADMIN — ACETAMINOPHEN 650 MG: 325 TABLET ORAL at 10:06

## 2024-06-28 RX ADMIN — SODIUM CHLORIDE 1400 MG: 9 INJECTION, SOLUTION INTRAVENOUS at 10:06

## 2024-06-28 RX ADMIN — Medication 10 ML: at 12:06

## 2024-06-28 RX ADMIN — DEXAMETHASONE SODIUM PHOSPHATE 0.25 MG: 4 INJECTION, SOLUTION INTRA-ARTICULAR; INTRALESIONAL; INTRAMUSCULAR; INTRAVENOUS; SOFT TISSUE at 10:06

## 2024-06-28 NOTE — DISCHARGE INSTRUCTIONS
.[facility]  [address] [address]  [address]  [phone] phone     [phone] fax  Hours of Operation: Monday- Friday 8:00am- 5:00pm  After hours phone  [phone]  Hematology / Oncology Physicians on call    Dr. Denys Sinclair        Nurse Practitioners:    Lorie Blackmon, BRISA Caicedo, BRISA Monteiro, BRISA Kwon, BRISA Trujillo, PA      Please don't hesitate to call if you have any concerns.       .WAYS TO HELP PREVENT INFECTION        WASH YOUR HANDS OFTEN DURING THE DAY, ESPECIALLY BEFORE YOU EAT, AFTER USING THE BATHROOM, AND AFTER TOUCHING ANIMALS    STAY AWAY FROM PEOPLE WHO HAVE ILLNESSES YOU CAN CATCH; SUCH AS COLDS, FLU, CHICKEN POX    TRY TO AVOID CROWDS    STAY AWAY FROM CHILDREN WHO RECENTLY HAVE RECEIVED LIVE VIRUS VACCINES    MAINTAIN GOOD MOUTH CARE    DO NOT SQUEEZE OR SCRATCH PIMPLES    CLEAN CUTS & SCRAPES RIGHT AWAY AND DAILY UNTIL HEALED WITH WARM WATER, SOAP & AN ANTISEPTIC    AVOID CONTACT WITH LITTER BOXES, BIRD CAGES, & FISH TANKS    AVOID STANDING WATER, IE., BIRD BATHS, FLOWER POTS/VASES, OR HUMIDIFIERS    WEAR GLOVES WHEN GARDENING OR CLEANING UP AFTER OTHERS, ESPECIALLY BABIES & SMALL CHILDREN    DO NOT EAT RAW FISH, SEAFOOD, MEAT, OR EGGS

## 2024-06-28 NOTE — PLAN OF CARE
Problem: Adult Inpatient Plan of Care  Goal: Plan of Care Review  Outcome: Progressing  Flowsheets (Taken 6/28/2024 1138)  Plan of Care Reviewed With: patient  Goal: Patient-Specific Goal (Individualized)  Outcome: Progressing  Flowsheets (Taken 6/28/2024 1138)  Individualized Care Needs: patient likes feet elevated, pillow, blanket, and water  Anxieties, Fears or Concerns: None  Patient/Family-Specific Goals (Include Timeframe): patient tolerated treatment well with no adverse reactions.  Goal: Absence of Hospital-Acquired Illness or Injury  Outcome: Progressing  Goal: Optimal Comfort and Wellbeing  Outcome: Progressing     Problem: Chemotherapy Effects  Goal: Absence of Infection  Outcome: Progressing     Problem: Fall Injury Risk  Goal: Absence of Fall and Fall-Related Injury  Outcome: Progressing

## 2024-07-11 ENCOUNTER — LAB VISIT (OUTPATIENT)
Dept: LAB | Facility: HOSPITAL | Age: 54
End: 2024-07-11
Attending: INTERNAL MEDICINE
Payer: COMMERCIAL

## 2024-07-11 DIAGNOSIS — C79.51 SECONDARY MALIGNANT NEOPLASM OF BONE: ICD-10-CM

## 2024-07-11 LAB
ALBUMIN SERPL BCP-MCNC: 2.8 G/DL (ref 3.5–5.2)
ALP SERPL-CCNC: 106 U/L (ref 55–135)
ALT SERPL W/O P-5'-P-CCNC: 20 U/L (ref 10–44)
ANION GAP SERPL CALC-SCNC: 11 MMOL/L (ref 8–16)
AST SERPL-CCNC: 13 U/L (ref 10–40)
BASOPHILS # BLD AUTO: 0.03 K/UL (ref 0–0.2)
BASOPHILS NFR BLD: 0.4 % (ref 0–1.9)
BILIRUB SERPL-MCNC: 0.4 MG/DL (ref 0.1–1)
BUN SERPL-MCNC: 17 MG/DL (ref 6–20)
CALCIUM SERPL-MCNC: 8.9 MG/DL (ref 8.7–10.5)
CHLORIDE SERPL-SCNC: 103 MMOL/L (ref 95–110)
CO2 SERPL-SCNC: 28 MMOL/L (ref 23–29)
CREAT SERPL-MCNC: 1.1 MG/DL (ref 0.5–1.4)
DIFFERENTIAL METHOD BLD: NORMAL
EOSINOPHIL # BLD AUTO: 0.2 K/UL (ref 0–0.5)
EOSINOPHIL NFR BLD: 2.1 % (ref 0–8)
ERYTHROCYTE [DISTWIDTH] IN BLOOD BY AUTOMATED COUNT: 13.2 % (ref 11.5–14.5)
EST. GFR  (NO RACE VARIABLE): 59 ML/MIN/1.73 M^2
GLUCOSE SERPL-MCNC: 165 MG/DL (ref 70–110)
HCT VFR BLD AUTO: 39 % (ref 37–48.5)
HGB BLD-MCNC: 12.7 G/DL (ref 12–16)
IMM GRANULOCYTES # BLD AUTO: 0.03 K/UL (ref 0–0.04)
IMM GRANULOCYTES NFR BLD AUTO: 0.4 % (ref 0–0.5)
LYMPHOCYTES # BLD AUTO: 2.3 K/UL (ref 1–4.8)
LYMPHOCYTES NFR BLD: 26.8 % (ref 18–48)
MAGNESIUM SERPL-MCNC: 1.8 MG/DL (ref 1.6–2.6)
MCH RBC QN AUTO: 27.4 PG (ref 27–31)
MCHC RBC AUTO-ENTMCNC: 32.6 G/DL (ref 32–36)
MCV RBC AUTO: 84 FL (ref 82–98)
MONOCYTES # BLD AUTO: 0.6 K/UL (ref 0.3–1)
MONOCYTES NFR BLD: 6.9 % (ref 4–15)
NEUTROPHILS # BLD AUTO: 5.4 K/UL (ref 1.8–7.7)
NEUTROPHILS NFR BLD: 63.4 % (ref 38–73)
NRBC BLD-RTO: 0 /100 WBC
PHOSPHATE SERPL-MCNC: 4 MG/DL (ref 2.7–4.5)
PLATELET # BLD AUTO: 199 K/UL (ref 150–450)
PMV BLD AUTO: 11.5 FL (ref 9.2–12.9)
POTASSIUM SERPL-SCNC: 4 MMOL/L (ref 3.5–5.1)
PROT SERPL-MCNC: 6.2 G/DL (ref 6–8.4)
RBC # BLD AUTO: 4.64 M/UL (ref 4–5.4)
SODIUM SERPL-SCNC: 142 MMOL/L (ref 136–145)
WBC # BLD AUTO: 8.46 K/UL (ref 3.9–12.7)

## 2024-07-11 PROCEDURE — 36415 COLL VENOUS BLD VENIPUNCTURE: CPT | Performed by: INTERNAL MEDICINE

## 2024-07-11 PROCEDURE — 83735 ASSAY OF MAGNESIUM: CPT | Performed by: INTERNAL MEDICINE

## 2024-07-11 PROCEDURE — 84100 ASSAY OF PHOSPHORUS: CPT | Performed by: INTERNAL MEDICINE

## 2024-07-11 PROCEDURE — 80053 COMPREHEN METABOLIC PANEL: CPT | Performed by: INTERNAL MEDICINE

## 2024-07-11 PROCEDURE — 85025 COMPLETE CBC W/AUTO DIFF WBC: CPT | Performed by: INTERNAL MEDICINE

## 2024-07-12 ENCOUNTER — INFUSION (OUTPATIENT)
Dept: INFUSION THERAPY | Facility: HOSPITAL | Age: 54
End: 2024-07-12
Attending: RADIOLOGY
Payer: COMMERCIAL

## 2024-07-12 ENCOUNTER — OFFICE VISIT (OUTPATIENT)
Dept: HEMATOLOGY/ONCOLOGY | Facility: CLINIC | Age: 54
End: 2024-07-12
Payer: COMMERCIAL

## 2024-07-12 VITALS
WEIGHT: 293 LBS | BODY MASS INDEX: 50.02 KG/M2 | HEIGHT: 64 IN | SYSTOLIC BLOOD PRESSURE: 93 MMHG | OXYGEN SATURATION: 95 % | HEART RATE: 58 BPM | TEMPERATURE: 97 F | DIASTOLIC BLOOD PRESSURE: 59 MMHG | RESPIRATION RATE: 16 BRPM

## 2024-07-12 DIAGNOSIS — C34.32 MALIGNANT NEOPLASM OF LOWER LOBE OF LEFT LUNG: Primary | ICD-10-CM

## 2024-07-12 PROCEDURE — 96415 CHEMO IV INFUSION ADDL HR: CPT

## 2024-07-12 PROCEDURE — 96367 TX/PROPH/DG ADDL SEQ IV INF: CPT

## 2024-07-12 PROCEDURE — 25000003 PHARM REV CODE 250

## 2024-07-12 PROCEDURE — 63600175 PHARM REV CODE 636 W HCPCS

## 2024-07-12 PROCEDURE — 96413 CHEMO IV INFUSION 1 HR: CPT

## 2024-07-12 PROCEDURE — A4216 STERILE WATER/SALINE, 10 ML: HCPCS

## 2024-07-12 PROCEDURE — 96375 TX/PRO/DX INJ NEW DRUG ADDON: CPT

## 2024-07-12 RX ORDER — SODIUM CHLORIDE 0.9 % (FLUSH) 0.9 %
10 SYRINGE (ML) INJECTION
Status: DISCONTINUED | OUTPATIENT
Start: 2024-07-12 | End: 2024-07-12 | Stop reason: HOSPADM

## 2024-07-12 RX ORDER — HEPARIN 100 UNIT/ML
500 SYRINGE INTRAVENOUS
Status: DISCONTINUED | OUTPATIENT
Start: 2024-07-12 | End: 2024-07-12 | Stop reason: HOSPADM

## 2024-07-12 RX ORDER — EPINEPHRINE 0.3 MG/.3ML
0.3 INJECTION SUBCUTANEOUS ONCE AS NEEDED
Status: CANCELLED | OUTPATIENT
Start: 2024-07-12

## 2024-07-12 RX ORDER — ACETAMINOPHEN 325 MG/1
650 TABLET ORAL
Status: CANCELLED
Start: 2024-07-12

## 2024-07-12 RX ORDER — DIPHENHYDRAMINE HYDROCHLORIDE 50 MG/ML
25 INJECTION INTRAMUSCULAR; INTRAVENOUS
Status: CANCELLED
Start: 2024-07-12

## 2024-07-12 RX ORDER — DIPHENHYDRAMINE HYDROCHLORIDE 50 MG/ML
50 INJECTION INTRAMUSCULAR; INTRAVENOUS ONCE AS NEEDED
Status: CANCELLED | OUTPATIENT
Start: 2024-07-12

## 2024-07-12 RX ORDER — SODIUM CHLORIDE 0.9 % (FLUSH) 0.9 %
10 SYRINGE (ML) INJECTION
Status: CANCELLED | OUTPATIENT
Start: 2024-07-12

## 2024-07-12 RX ORDER — DIPHENHYDRAMINE HYDROCHLORIDE 50 MG/ML
25 INJECTION INTRAMUSCULAR; INTRAVENOUS
Status: COMPLETED | OUTPATIENT
Start: 2024-07-12 | End: 2024-07-12

## 2024-07-12 RX ORDER — HEPARIN 100 UNIT/ML
500 SYRINGE INTRAVENOUS
Status: CANCELLED | OUTPATIENT
Start: 2024-07-12

## 2024-07-12 RX ORDER — ACETAMINOPHEN 325 MG/1
650 TABLET ORAL
Status: COMPLETED | OUTPATIENT
Start: 2024-07-12 | End: 2024-07-12

## 2024-07-12 RX ADMIN — Medication 10 ML: at 12:07

## 2024-07-12 RX ADMIN — DIPHENHYDRAMINE HYDROCHLORIDE 25 MG: 50 INJECTION INTRAMUSCULAR; INTRAVENOUS at 10:07

## 2024-07-12 RX ADMIN — SODIUM CHLORIDE 1400 MG: 9 INJECTION, SOLUTION INTRAVENOUS at 10:07

## 2024-07-12 RX ADMIN — DEXAMETHASONE SODIUM PHOSPHATE 0.25 MG: 4 INJECTION, SOLUTION INTRA-ARTICULAR; INTRALESIONAL; INTRAMUSCULAR; INTRAVENOUS; SOFT TISSUE at 10:07

## 2024-07-12 RX ADMIN — ACETAMINOPHEN 650 MG: 325 TABLET ORAL at 10:07

## 2024-07-12 RX ADMIN — HEPARIN 500 UNITS: 100 SYRINGE at 12:07

## 2024-07-12 RX ADMIN — SODIUM CHLORIDE: 9 INJECTION, SOLUTION INTRAVENOUS at 10:07

## 2024-07-12 NOTE — PROGRESS NOTES
Subjective:     Patient ID:Mateusz Ahmadi is a 54 y.o. female.?? MR#: [id number]   ?   PRIMARY ONCOLOGIST: Dr. Mcfarlane   ?   CHIEF COMPLAINT: Lab review/assessment C1D1 [facility]  ?   ONCOLOGIC DIAGNOSIS: Stage IV (cT2, cN2, cM1), Malignant neoplasm of lower lobe of left lung   ?   CURRENT TREATMENT: OP NSCLC AMIVANTAMAB-VMJW ([facility]) Q4W     PAST TREATMENT: PEMETREXED + CARBOPLATIN (AUC) Q3W    The patient location is: [place]  The chief complaint leading to consultation is: follow-up    Visit type: audiovisual    Face to Face time with patient: 10  20 minutes of total time spent on the encounter, which includes face to face time and non-face to face time preparing to see the patient (eg, review of tests), Obtaining and/or reviewing separately obtained history, Documenting clinical information in the electronic or other health record, Independently interpreting results (not separately reported) and communicating results to the patient/family/caregiver, or Care coordination (not separately reported).         Each patient to whom he or she provides medical services by telemedicine is:  (1) informed of the relationship between the physician and patient and the respective role of any other health care provider with respect to management of the patient; and (2) notified that he or she may decline to receive medical services by telemedicine and may withdraw from such care at any time.    Notes:      HPI Mrs. Ahmadi is a pleasan 53-renee-old female with metastatic non-small cell lung carcinoma Exon 20 mutation who presents today for lab review and assessment prior to C1D1 [facility]. 11/2022 pt seen with PCP with c/o persistent coughing and wheezing. CXR at that time revealed pulmonary nodules, subsequent CT chest found L Lung mass. L lung biopsy positive for adenocarcinoma , EGFR mutated. Pleural fluid also positive for malignancy. PET showed avid STEPHAN mass, mediastinal nodes, bone mets in C1, T1, T11, L3, sacrum, L ischium,  sternum. MRI brain negative for intracranial metastases. Initiated on carbo/ alimta 01/27/23--re imaging after 4 cycles found progressive disease. Pt initiated on Rybrevant 04/27/23.    Interval History: Pt states she is feeling well and voices no c/o today. Denies n/v/d/c, fever, chills, sob, cp, cough, abnormal bleeding. Denies difficulty with appetite or maintaining hydration.       Oncology History   Malignant neoplasm of lower lobe of left lung   12/9/2022 Initial Diagnosis    Malignant neoplasm of lower lobe of left lung       12/9/2022 Cancer Staged    Staging form: Lung, AJCC 8th Edition  - Clinical stage from 12/9/2022: Stage IV (cT2, cN2, cM1)       12/27/2022 - 12/27/2022 Chemotherapy    Treatment Summary   Plan Name: OP PEMBROLIZUMAB 400MG Q6W  Treatment Goal: Control  Status: Inactive  Start Date:   End Date:   Provider: Reese Mcfarlane MD  Chemotherapy: [No matching medication found in this treatment plan]        Genetic Testing    Patient has genetic testing done for  TEmpus peripheral blood.                                              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     1/18/2023 - 1/18/2023 Chemotherapy    Treatment Summary   Plan Name: OP NSCLC AMIVANTAMAB-VMJW ([facility]) Q4W  Treatment Goal: Palliative  Status: Inactive  Start Date:   End Date:   Provider: Reese Mcfarlane MD  Chemotherapy: amivantamab-vmjw ([facility]BRE[facility]ANT) 350 mg in sodium chloride 0.9% SolP 250 mL chemo infusion, 350 mg (100 % of original dose 350 mg), Intravenous, Clinic/HOD 1 time, 0 of 13 cycles  Dose modification: 350 mg (original dose 350 mg, Cycle 1), 1,050 mg (original dose 1,050 mg, Cycle 1), 1,400 mg (original dose 1,400 mg, Cycle 1)       1/27/2023 - 3/31/2023 Chemotherapy    Treatment Summary   Plan Name: OP NSCLC PEMETREXED + CARBOPLATIN (AUC) Q3W  Treatment Goal: Control  Status: Inactive  Start Date: 1/27/2023  End Date: 3/31/2023  Provider: Reese RICE  MD Denys  Chemotherapy: CARBOplatin (PARAPLATIN) 750 mg in sodium chloride 0.9% 335 mL chemo infusion, 750 mg (100 % of original dose 750 mg), Intravenous, Clinic/HOD 1 time, 4 of 4 cycles  Dose modification:   (original dose 750 mg, Cycle 1, Reason: MD Discretion)  Administration: 750 mg (1/27/2023), 750 mg (2/17/2023), 750 mg (3/31/2023), 750 mg (3/10/2023)  PEMEtrexed disodium (ALIMTA) 1,200 mg in sodium chloride 0.9% SolP 100 mL chemo infusion, 1,250 mg, Intravenous, Clinic/HOD 1 time, 4 of 4 cycles  Administration: 1,200 mg (1/27/2023), 1,200 mg (2/17/2023), 1,200 mg (3/31/2023), 1,200 mg (3/10/2023)       4/27/2023 -  Chemotherapy    Treatment Summary   Plan Name: OP NSCLC AMIVANTAMAB-VMJW ([facility]) Q4W  Treatment Goal: Palliative  Status: Active  Start Date: 4/27/2023 (Planned)  End Date: 6/6/2024 (Planned)  Provider: Reese Mcfarlane MD  Chemotherapy: amivantamab-vmjw (RYBREVANT) 350 mg in sodium chloride 0.9% SolP 250 mL chemo infusion, 350 mg (original dose ), Intravenous, Clinic/HOD 1 time, 0 of 15 cycles  Dose modification: 350 mg (Cycle 1), 1,050 mg (Cycle 1), 1,400 mg (Cycle 1)          Social History     Socioeconomic History    Marital status:    Tobacco Use    Smoking status: Never     Passive exposure: Never    Smokeless tobacco: Never   Substance and Sexual Activity    Alcohol use: Never    Drug use: Never    Sexual activity: Yes     Partners: Male     Birth control/protection: None     Comment: tubal     Social Determinants of Health     Financial Resource Strain: Low Risk  (6/26/2024)    Received from [facility]    Overall Financial Resource Strain (CARDIA)     Difficulty of Paying Living Expenses: Not hard at all   Food Insecurity: No Food Insecurity (6/26/2024)    Received from [facility]    Hunger Vital Sign     Worried About Running Out of Food in the Last Year: Never true     Ran Out of Food in the Last Year: Never true    Transportation Needs: No Transportation Needs (6/26/2024)    Received from [facility]    PRAPARE - Transportation     Lack of Transportation (Medical): No     Lack of Transportation (Non-Medical): No   Physical Activity: Insufficiently Active (6/26/2024)    Received from [facility]    Exercise Vital Sign     Days of Exercise per Week: 3 days     Minutes of Exercise per Session: 40 min   Stress: No Stress Concern Present (6/26/2024)    Received from [facility]    [employer] - Occupational Stress Questionnaire     Feeling of Stress : Not at all   Housing Stability: Unknown (11/22/2023)    Housing Stability Vital Sign     Unable to Pay for Housing in the Last Year: Patient refused     Unstable Housing in the Last Year: Patient refused      Family History   Problem Relation Name Age of Onset    Heart failure Father        Past Surgical History:   Procedure Laterality Date    CATARACT EXTRACTION W/  INTRAOCULAR LENS IMPLANT Right 06/02/2022    FLUOROSCOPY N/A 1/26/2023    Procedure: FLUOROSCOPY/mediport placement;  Surgeon: Marlon Leone MD;  Location: [facility] CATH LAB;  Service: General;  Laterality: N/A;    TUBAL LIGATION      2007--postpartum tubal ligation        Review of Systems   Constitutional:  Negative for activity change, chills, fatigue and fever.   HENT: Negative.     Eyes: Negative.    Respiratory: Negative.     Cardiovascular: Negative.    Gastrointestinal: Negative.    Endocrine: Positive for cold intolerance.   Musculoskeletal:  Negative for arthralgias and myalgias.   Skin:  Negative for rash.   Neurological:  Negative for dizziness, weakness and light-headedness.   Psychiatric/Behavioral:  The patient is not nervous/anxious.        ?   A comprehensive 14-point review of systems was reviewed with patient and was negative other than as specified above.   ?     Objective:      Physical Exam  Vitals  reviewed.   Constitutional:       General: She is not in acute distress.     Appearance: Normal appearance. She is not ill-appearing, toxic-appearing or diaphoretic.   HENT:      Head: Normocephalic and atraumatic.   Cardiovascular:      Rate and Rhythm: Normal rate.   Pulmonary:      Effort: Pulmonary effort is normal.   Skin:     General: Skin is warm.      Coloration: Skin is not jaundiced or pale.      Findings: No bruising, erythema, lesion or rash.   Neurological:      Mental Status: She is alert.      Motor: No weakness.      Gait: Gait normal.   Psychiatric:         Mood and Affect: Mood normal.         Behavior: Behavior normal.           ?   There were no vitals filed for this visit.       ?       ?   Laboratory:  ?   No visits with results within 1 Day(s) from this visit.   Latest known visit with results is:   Lab Visit on 07/11/2024   Component Date Value Ref Range Status    Phosphorus 07/11/2024 4.0  2.7 - 4.5 mg/dL Final    Magnesium 07/11/2024 1.8  1.6 - 2.6 mg/dL Final    Sodium 07/11/2024 142  136 - 145 mmol/L Final    Potassium 07/11/2024 4.0  3.5 - 5.1 mmol/L Final    Chloride 07/11/2024 103  95 - 110 mmol/L Final    CO2 07/11/2024 28  23 - 29 mmol/L Final    Glucose 07/11/2024 165 (H)  70 - 110 mg/dL Final    BUN 07/11/2024 17  6 - 20 mg/dL Final    Creatinine 07/11/2024 1.1  0.5 - 1.4 mg/dL Final    Calcium 07/11/2024 8.9  8.7 - 10.5 mg/dL Final    Total Protein 07/11/2024 6.2  6.0 - 8.4 g/dL Final    Albumin 07/11/2024 2.8 (L)  3.5 - 5.2 g/dL Final    Total Bilirubin 07/11/2024 0.4  0.1 - 1.0 mg/dL Final    Alkaline Phosphatase 07/11/2024 106  55 - 135 U/L Final    AST 07/11/2024 13  10 - 40 U/L Final    ALT 07/11/2024 20  10 - 44 U/L Final    eGFR 07/11/2024 59 (A)  >60 mL/min/1.73 m^2 Final    Anion Gap 07/11/2024 11  8 - 16 mmol/L Final    WBC 07/11/2024 8.46  3.90 - 12.70 K/uL Final    RBC 07/11/2024 4.64  4.00 - 5.40 M/uL Final    Hemoglobin 07/11/2024 12.7  12.0 - 16.0 g/dL Final     Hematocrit 07/11/2024 39.0  37.0 - 48.5 % Final    MCV 07/11/2024 84  82 - 98 fL Final    MCH 07/11/2024 27.4  27.0 - 31.0 pg Final    MCHC 07/11/2024 32.6  32.0 - 36.0 g/dL Final    RDW 07/11/2024 13.2  11.5 - 14.5 % Final    Platelets 07/11/2024 199  150 - 450 K/uL Final    MPV 07/11/2024 11.5  9.2 - 12.9 fL Final    Immature Granulocytes 07/11/2024 0.4  0.0 - 0.5 % Final    Gran # (ANC) 07/11/2024 5.4  1.8 - 7.7 K/uL Final    Immature Grans (Abs) 07/11/2024 0.03  0.00 - 0.04 K/uL Final    Lymph # 07/11/2024 2.3  1.0 - 4.8 K/uL Final    Mono # 07/11/2024 0.6  0.3 - 1.0 K/uL Final    Eos # 07/11/2024 0.2  0.0 - 0.5 K/uL Final    Baso # 07/11/2024 0.03  0.00 - 0.20 K/uL Final    nRBC 07/11/2024 0  0 /100 WBC Final    Gran % 07/11/2024 63.4  38.0 - 73.0 % Final    Lymph % 07/11/2024 26.8  18.0 - 48.0 % Final    Mono % 07/11/2024 6.9  4.0 - 15.0 % Final    Eosinophil % 07/11/2024 2.1  0.0 - 8.0 % Final    Basophil % 07/11/2024 0.4  0.0 - 1.9 % Final    Differential Method 07/11/2024 Automated   Final      ?   Imaging:    No results found. However, due to the size of the patient record, not all encounters were searched. Please check Results Review for a complete set of results.     Results for orders placed or performed during the hospital encounter of 05/08/24 (from the past 2160 hour(s))   CT Chest Abdomen Pelvis With IV Contrast (XPD) Routine Oral Contrast    Narrative    EXAMINATION:  CT CHEST ABDOMEN PELVIS WITH IV CONTRAST (XPD)    CLINICAL HISTORY:  restaging imaging;Secondary malignant neoplasm of bone    TECHNIQUE:  Low-dose CT of the chest abdomen and pelvis with contrast was acquired helically from the lung apices through the ischial tuberosities status post administration of 100 cc of Omnipaque 350. Oral contrast was administered.  Axial and reformatted images were reviewed.    COMPARISON:  01/30/2024    FINDINGS:  CHEST    There are scarring changes seen within the left lower lobe near the lung base and  also in the region of the left upper lobe.  The overall appearance is unchanged when compared to the study from 01/30/2024.  No new or enlarging pulmonary nodules.  Large densely calcified granuloma seen within the right lung base.  Calcified right paratracheal subcarinal and right hilar lymph nodes are in present.  A right-sided MediPort catheter is noted.    The aorta is unremarkable in appearance. There is no pericardial effusion.    ABDOMEN    Liver/gallbladder/biliary: Stable appearance of a small soft tissue nodule seen projecting off the posterior segment of the right hepatic lobe best seen on series 3, image 63 that measures approximately 12 mm in size.  The size and appearance is unchanged when compared to the prior exam.  There is a stable 13 mm hypodense lesion within the lateral segment of the left hepatic lobe.  Findings likely represent a cyst.  The gallbladder is present and unremarkable.  No biliary ductal dilation.    Pancreas: The pancreas is unremarkable in appearance.    Spleen: The spleen is not enlarged.    Adrenals: Low density left adrenal gland nodule noted that measures approximately 2 cm in size and is unchanged when compared to the prior exams.  The right adrenal gland is unremarkable.    Kidneys: The kidneys are equally perfused and demonstrate no solid masses. No nephrolithiasis.    Bowel/Mesentery: There is no evidence of bowel obstruction.  No mesenteric stranding or adenopathy.    Retroperitoneum: No adenopathy.  The aorta demonstrates a normal caliber.    PELVIS    Genitourinary/Reproductive organs: Probable fibroid uterus.    Adenopathy: None    Free Fluid: No free fluid identified.    Osseus Structures/Soft tissues: Sclerotic metastatic disease seen scattered throughout the visualized axial and proximal appendicular skeleton.  The overall appearance is unchanged.  No new osseous metastatic disease identified.  No significant soft tissue abnormality.      Impression    1. Stable  exam.      Electronically signed by: Christiano Yepez DO  Date:    05/08/2024  Time:    09:47     *Note: Due to a large number of results and/or encounters for the requested time period, some results have not been displayed. A complete set of results can be found in Results Review.        ?   Assessment/Plan:     Problem List Items Addressed This Visit          Oncology    Malignant neoplasm of lower lobe of left lung - Primary      Cancer Staging   Malignant neoplasm of lower lobe of left lung  Staging form: Lung, AJCC 8th Edition  - Clinical stage from 12/9/2022: Stage IV (cT2, cN2, cM1) - Signed by Reese Mcfarlane MD on 12/9/2022    Completed 4 Cycles of Carbo/Alimta 03/31/2023    Repeat CT CAP 04/11/2023:   Detrimental change.  Increase in number and size of numerous sclerotic lesions throughout the cervical and thoracic spine, left humerus and sternum.  There are also multiple large sclerotic lesions throughout the lumbar spine, pelvis and proximal femurs measuring up to 7.1 cm in size.  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  2.   The lateral left upper lobe mass is decreased in size in the interim, currently measuring 2.2 x 1.0 cm.  Negative for new pulmonary masses.  3.  Exophytic nodule along the posterior right hepatic lobe measuring 1.1 cm.  This was not imaged previously.  Etiology uncertain.  Metastasis is not excluded.    --Rybrevant C1D1 04/27/23--    Most recent CT CAP 05/08/24 Stable     Labs reviewed, no concerning cytopenias      -Ok to proceed with C16D15 [facility] today      RTC in two weeks with labs prior for C17D1 [facility]                        Med Onc Chart Routing      Follow up with physician 2 weeks. with repeat labs for rybrevant   Follow up with DOLLY    Infusion scheduling note   rybrevant   Injection scheduling note    Labs CBC, CMP, magnesium and phosphorus   Scheduling:  Preferred lab:  Lab interval:     Imaging    Pharmacy appointment    Other referrals                      JU Monteiro, FNP-C  Hematology/Oncology

## 2024-07-12 NOTE — ASSESSMENT & PLAN NOTE
Cancer Staging   Malignant neoplasm of lower lobe of left lung  Staging form: Lung, AJCC 8th Edition  - Clinical stage from 12/9/2022: Stage IV (cT2, cN2, cM1) - Signed by Reese Mcfarlane MD on 12/9/2022    Completed 4 Cycles of Carbo/Alimta 03/31/2023    Repeat CT CAP 04/11/2023:   Detrimental change.  Increase in number and size of numerous sclerotic lesions throughout the cervical and thoracic spine, left humerus and sternum.  There are also multiple large sclerotic lesions throughout the lumbar spine, pelvis and proximal femurs measuring up to 7.1 cm in size.  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  2.   The lateral left upper lobe mass is decreased in size in the interim, currently measuring 2.2 x 1.0 cm.  Negative for new pulmonary masses.  3.  Exophytic nodule along the posterior right hepatic lobe measuring 1.1 cm.  This was not imaged previously.  Etiology uncertain.  Metastasis is not excluded.    --R[facility]vant C1D1 04/27/23--    Most recent CT CAP 05/08/24 Stable     Labs reviewed, no concerning cytopenias      -Ok to proceed with C16D15 [facility] today      RTC in two weeks with labs prior for C17D1 [facility]

## 2024-07-18 ENCOUNTER — PATIENT MESSAGE (OUTPATIENT)
Dept: INFUSION THERAPY | Facility: HOSPITAL | Age: 54
End: 2024-07-18
Payer: COMMERCIAL

## 2024-07-22 ENCOUNTER — PATIENT MESSAGE (OUTPATIENT)
Dept: HEMATOLOGY/ONCOLOGY | Facility: CLINIC | Age: 54
End: 2024-07-22
Payer: COMMERCIAL

## 2024-07-23 DIAGNOSIS — C34.32 MALIGNANT NEOPLASM OF LOWER LOBE OF LEFT LUNG: Primary | ICD-10-CM

## 2024-07-26 ENCOUNTER — LAB VISIT (OUTPATIENT)
Dept: LAB | Facility: HOSPITAL | Age: 54
End: 2024-07-26
Attending: INTERNAL MEDICINE
Payer: COMMERCIAL

## 2024-07-26 ENCOUNTER — PATIENT MESSAGE (OUTPATIENT)
Dept: HEMATOLOGY/ONCOLOGY | Facility: CLINIC | Age: 54
End: 2024-07-26

## 2024-07-26 ENCOUNTER — OFFICE VISIT (OUTPATIENT)
Dept: HEMATOLOGY/ONCOLOGY | Facility: CLINIC | Age: 54
End: 2024-07-26
Payer: COMMERCIAL

## 2024-07-26 ENCOUNTER — INFUSION (OUTPATIENT)
Dept: INFUSION THERAPY | Facility: HOSPITAL | Age: 54
End: 2024-07-26
Attending: RADIOLOGY
Payer: COMMERCIAL

## 2024-07-26 VITALS
BODY MASS INDEX: 50.02 KG/M2 | WEIGHT: 293 LBS | HEIGHT: 64 IN | DIASTOLIC BLOOD PRESSURE: 55 MMHG | HEART RATE: 71 BPM | SYSTOLIC BLOOD PRESSURE: 101 MMHG | RESPIRATION RATE: 16 BRPM | TEMPERATURE: 98 F | OXYGEN SATURATION: 96 %

## 2024-07-26 DIAGNOSIS — C34.32 MALIGNANT NEOPLASM OF LOWER LOBE OF LEFT LUNG: ICD-10-CM

## 2024-07-26 DIAGNOSIS — C34.32 MALIGNANT NEOPLASM OF LOWER LOBE OF LEFT LUNG: Primary | ICD-10-CM

## 2024-07-26 DIAGNOSIS — T14.8XXA MUSCLE STRAIN: ICD-10-CM

## 2024-07-26 LAB
ALBUMIN SERPL BCP-MCNC: 2.6 G/DL (ref 3.5–5.2)
ALP SERPL-CCNC: 99 U/L (ref 55–135)
ALT SERPL W/O P-5'-P-CCNC: 17 U/L (ref 10–44)
ANION GAP SERPL CALC-SCNC: 10 MMOL/L (ref 8–16)
AST SERPL-CCNC: 15 U/L (ref 10–40)
BASOPHILS # BLD AUTO: 0.03 K/UL (ref 0–0.2)
BASOPHILS NFR BLD: 0.4 % (ref 0–1.9)
BILIRUB SERPL-MCNC: 0.4 MG/DL (ref 0.1–1)
BUN SERPL-MCNC: 12 MG/DL (ref 6–20)
CALCIUM SERPL-MCNC: 8.7 MG/DL (ref 8.7–10.5)
CHLORIDE SERPL-SCNC: 104 MMOL/L (ref 95–110)
CO2 SERPL-SCNC: 28 MMOL/L (ref 23–29)
CREAT SERPL-MCNC: 0.9 MG/DL (ref 0.5–1.4)
DIFFERENTIAL METHOD BLD: NORMAL
EOSINOPHIL # BLD AUTO: 0.2 K/UL (ref 0–0.5)
EOSINOPHIL NFR BLD: 3 % (ref 0–8)
ERYTHROCYTE [DISTWIDTH] IN BLOOD BY AUTOMATED COUNT: 13.1 % (ref 11.5–14.5)
EST. GFR  (NO RACE VARIABLE): >60 ML/MIN/1.73 M^2
GLUCOSE SERPL-MCNC: 179 MG/DL (ref 70–110)
HCT VFR BLD AUTO: 37.9 % (ref 37–48.5)
HGB BLD-MCNC: 12.5 G/DL (ref 12–16)
IMM GRANULOCYTES # BLD AUTO: 0.03 K/UL (ref 0–0.04)
IMM GRANULOCYTES NFR BLD AUTO: 0.4 % (ref 0–0.5)
LYMPHOCYTES # BLD AUTO: 1.7 K/UL (ref 1–4.8)
LYMPHOCYTES NFR BLD: 21.7 % (ref 18–48)
MAGNESIUM SERPL-MCNC: 1.7 MG/DL (ref 1.6–2.6)
MCH RBC QN AUTO: 27.9 PG (ref 27–31)
MCHC RBC AUTO-ENTMCNC: 33 G/DL (ref 32–36)
MCV RBC AUTO: 85 FL (ref 82–98)
MONOCYTES # BLD AUTO: 0.5 K/UL (ref 0.3–1)
MONOCYTES NFR BLD: 5.9 % (ref 4–15)
NEUTROPHILS # BLD AUTO: 5.2 K/UL (ref 1.8–7.7)
NEUTROPHILS NFR BLD: 68.6 % (ref 38–73)
NRBC BLD-RTO: 0 /100 WBC
PHOSPHATE SERPL-MCNC: 3.1 MG/DL (ref 2.7–4.5)
PLATELET # BLD AUTO: 177 K/UL (ref 150–450)
PMV BLD AUTO: 11.7 FL (ref 9.2–12.9)
POTASSIUM SERPL-SCNC: 3.3 MMOL/L (ref 3.5–5.1)
PROT SERPL-MCNC: 5.8 G/DL (ref 6–8.4)
RBC # BLD AUTO: 4.48 M/UL (ref 4–5.4)
SODIUM SERPL-SCNC: 142 MMOL/L (ref 136–145)
WBC # BLD AUTO: 7.61 K/UL (ref 3.9–12.7)

## 2024-07-26 PROCEDURE — 83735 ASSAY OF MAGNESIUM: CPT

## 2024-07-26 PROCEDURE — 96413 CHEMO IV INFUSION 1 HR: CPT

## 2024-07-26 PROCEDURE — 96375 TX/PRO/DX INJ NEW DRUG ADDON: CPT

## 2024-07-26 PROCEDURE — 96367 TX/PROPH/DG ADDL SEQ IV INF: CPT

## 2024-07-26 PROCEDURE — 85025 COMPLETE CBC W/AUTO DIFF WBC: CPT

## 2024-07-26 PROCEDURE — 96415 CHEMO IV INFUSION ADDL HR: CPT

## 2024-07-26 PROCEDURE — 25000003 PHARM REV CODE 250

## 2024-07-26 PROCEDURE — 36415 COLL VENOUS BLD VENIPUNCTURE: CPT

## 2024-07-26 PROCEDURE — 84100 ASSAY OF PHOSPHORUS: CPT

## 2024-07-26 PROCEDURE — 63600175 PHARM REV CODE 636 W HCPCS

## 2024-07-26 PROCEDURE — 80053 COMPREHEN METABOLIC PANEL: CPT

## 2024-07-26 RX ORDER — HEPARIN 100 UNIT/ML
500 SYRINGE INTRAVENOUS
Status: DISCONTINUED | OUTPATIENT
Start: 2024-07-26 | End: 2024-07-26 | Stop reason: HOSPADM

## 2024-07-26 RX ORDER — DIPHENHYDRAMINE HYDROCHLORIDE 50 MG/ML
50 INJECTION INTRAMUSCULAR; INTRAVENOUS ONCE AS NEEDED
Status: CANCELLED | OUTPATIENT
Start: 2024-07-26

## 2024-07-26 RX ORDER — LIDOCAINE 50 MG/G
1 PATCH TOPICAL DAILY
Qty: 3 PATCH | Refills: 0 | Status: SHIPPED | OUTPATIENT
Start: 2024-07-26

## 2024-07-26 RX ORDER — ACETAMINOPHEN 325 MG/1
650 TABLET ORAL
Status: COMPLETED | OUTPATIENT
Start: 2024-07-26 | End: 2024-07-26

## 2024-07-26 RX ORDER — DIPHENHYDRAMINE HYDROCHLORIDE 50 MG/ML
25 INJECTION INTRAMUSCULAR; INTRAVENOUS
Status: COMPLETED | OUTPATIENT
Start: 2024-07-26 | End: 2024-07-26

## 2024-07-26 RX ORDER — ACETAMINOPHEN 325 MG/1
650 TABLET ORAL
Status: CANCELLED | OUTPATIENT
Start: 2024-07-26

## 2024-07-26 RX ORDER — EPINEPHRINE 0.3 MG/.3ML
0.3 INJECTION SUBCUTANEOUS ONCE AS NEEDED
Status: CANCELLED | OUTPATIENT
Start: 2024-07-26

## 2024-07-26 RX ORDER — DIPHENHYDRAMINE HYDROCHLORIDE 50 MG/ML
25 INJECTION INTRAMUSCULAR; INTRAVENOUS
Status: CANCELLED | OUTPATIENT
Start: 2024-07-26

## 2024-07-26 RX ORDER — SODIUM CHLORIDE 0.9 % (FLUSH) 0.9 %
10 SYRINGE (ML) INJECTION
Status: CANCELLED | OUTPATIENT
Start: 2024-07-26

## 2024-07-26 RX ORDER — SODIUM CHLORIDE 0.9 % (FLUSH) 0.9 %
10 SYRINGE (ML) INJECTION
Status: DISCONTINUED | OUTPATIENT
Start: 2024-07-26 | End: 2024-07-26 | Stop reason: HOSPADM

## 2024-07-26 RX ORDER — HEPARIN 100 UNIT/ML
500 SYRINGE INTRAVENOUS
Status: CANCELLED | OUTPATIENT
Start: 2024-07-26

## 2024-07-26 RX ADMIN — SODIUM CHLORIDE: 9 INJECTION, SOLUTION INTRAVENOUS at 01:07

## 2024-07-26 RX ADMIN — DIPHENHYDRAMINE HYDROCHLORIDE 25 MG: 50 INJECTION INTRAMUSCULAR; INTRAVENOUS at 02:07

## 2024-07-26 RX ADMIN — DEXAMETHASONE SODIUM PHOSPHATE 0.25 MG: 4 INJECTION, SOLUTION INTRA-ARTICULAR; INTRALESIONAL; INTRAMUSCULAR; INTRAVENOUS; SOFT TISSUE at 02:07

## 2024-07-26 RX ADMIN — HEPARIN 500 UNITS: 100 SYRINGE at 04:07

## 2024-07-26 RX ADMIN — SODIUM CHLORIDE 1400 MG: 9 INJECTION, SOLUTION INTRAVENOUS at 02:07

## 2024-07-26 RX ADMIN — ACETAMINOPHEN 650 MG: 325 TABLET ORAL at 01:07

## 2024-07-26 NOTE — ASSESSMENT & PLAN NOTE
Cancer Staging   Malignant neoplasm of lower lobe of left lung  Staging form: Lung, AJCC 8th Edition  - Clinical stage from 12/9/2022: Stage IV (cT2, cN2, cM1) - Signed by Reese Mcfarlane MD on 12/9/2022    Completed 4 Cycles of Carbo/Alimta 03/31/2023    Repeat CT CAP 04/11/2023:   Detrimental change.  Increase in number and size of numerous sclerotic lesions throughout the cervical and thoracic spine, left humerus and sternum.  There are also multiple large sclerotic lesions throughout the lumbar spine, pelvis and proximal femurs measuring up to 7.1 cm in size.  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  2.   The lateral left upper lobe mass is decreased in size in the interim, currently measuring 2.2 x 1.0 cm.  Negative for new pulmonary masses.  3.  Exophytic nodule along the posterior right hepatic lobe measuring 1.1 cm.  This was not imaged previously.  Etiology uncertain.  Metastasis is not excluded.    --Rybrevant C1D1 04/27/23--    Most recent CT CAP 05/08/24 Stable     Labs reviewed, no concerning cytopenias      -Ok to proceed with C17D1 [facility] today      RTC in two weeks with labs prior for C17D15 [facility]

## 2024-07-26 NOTE — PROGRESS NOTES
Subjective:     Patient ID:Mateusz Ahmadi is a 54 y.o. female.?? MR#: [id number]   ?   PRIMARY ONCOLOGIST: Dr. Mcfarlane   ?   CHIEF COMPLAINT: Lab review/assessment C1D1 Rybrevant  ?   ONCOLOGIC DIAGNOSIS: Stage IV (cT2, cN2, cM1), Malignant neoplasm of lower lobe of left lung   ?   CURRENT TREATMENT: OP NSCLC AMIVANTAMAB-VMJW (Rybrevant) Q4W     PAST TREATMENT: PEMETREXED + CARBOPLATIN (AUC) Q3W    The patient location is: [place]  The chief complaint leading to consultation is: follow-up    Visit type: audiovisual    Face to Face time with patient: 10  20 minutes of total time spent on the encounter, which includes face to face time and non-face to face time preparing to see the patient (eg, review of tests), Obtaining and/or reviewing separately obtained history, Documenting clinical information in the electronic or other health record, Independently interpreting results (not separately reported) and communicating results to the patient/family/caregiver, or Care coordination (not separately reported).         Each patient to whom he or she provides medical services by telemedicine is:  (1) informed of the relationship between the physician and patient and the respective role of any other health care provider with respect to management of the patient; and (2) notified that he or she may decline to receive medical services by telemedicine and may withdraw from such care at any time.    Notes:      HPI [facility]. [facility] 54[facility] with metastatic non-small cell lung carcinoma Exon 20 mutation who presents today for lab review and assessment prior to C1D1 Rybrevant. 11/2022 pt seen with PCP with c/o persistent coughing and wheezing. CXR at that time revealed pulmonary nodules, subsequent CT chest found L Lung mass. L lung biopsy positive for adenocarcinoma , EGFR mutated. Pleural fluid also positive for malignancy. PET showed avid STEPHAN mass, mediastinal nodes, bone mets in C1, T1, T11, L3, sacrum, L ischium,  sternum. MRI brain negative for intracranial metastases. Initiated on carbo/ alimta 01/27/23--re imaging after 4 cycles found progressive disease. Pt initiated on Rybrevant 04/27/23.    Interval History: Pt states she is feeling well and voices no c/o today. She notes pulled muscle on right side with hard sneeze while laying in awkward position last week. Denies n/v/d/c, fever, chills, sob, cp, cough, abnormal bleeding. Denies difficulty with appetite or maintaining hydration.       Oncology History   Malignant neoplasm of lower lobe of left lung   12/9/2022 Initial Diagnosis    Malignant neoplasm of lower lobe of left lung       12/9/2022 Cancer Staged    Staging form: Lung, AJCC 8th Edition  - Clinical stage from 12/9/2022: Stage IV (cT2, cN2, cM1)       12/27/2022 - 12/27/2022 Chemotherapy    Treatment Summary   Plan Name: OP PEMBROLIZUMAB 400MG Q6W  Treatment Goal: Control  Status: Inactive  Start Date:   End Date:   Provider: Resee Mcfarlane MD  Chemotherapy: [No matching medication found in this treatment plan]        Genetic Testing    Patient has genetic testing done for  TEmpus peripheral blood.                                              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     1/18/2023 - 1/18/2023 Chemotherapy    Treatment Summary   Plan Name: OP NSCLC AMIVANTAMAB-VMJW (Rybrevant) Q4W  Treatment Goal: Palliative  Status: Inactive  Start Date:   End Date:   Provider: Reese Mcfarlane MD  Chemotherapy: amivantamab-vmjw (RYBREVANT) 350 mg in sodium chloride 0.9% SolP 250 mL chemo infusion, 350 mg (100 % of original dose 350 mg), Intravenous, Clinic/HOD 1 time, 0 of 13 cycles  Dose modification: 350 mg (original dose 350 mg, Cycle 1), 1,050 mg (original dose 1,050 mg, Cycle 1), 1,400 mg (original dose 1,400 mg, Cycle 1)       1/27/2023 - 3/31/2023 Chemotherapy    Treatment Summary   Plan Name: OP NSCLC PEMETREXED + CARBOPLATIN (AUC) Q3W  Treatment  Goal: Control  Status: Inactive  Start Date: 1/27/2023  End Date: 3/31/2023  Provider: Reese Mcfarlane MD  Chemotherapy: CARBOplatin (PARAPLATIN) 750 mg in sodium chloride 0.9% 335 mL chemo infusion, 750 mg (100 % of original dose 750 mg), Intravenous, Clinic/HOD 1 time, 4 of 4 cycles  Dose modification:   (original dose 750 mg, Cycle 1, Reason: MD Discretion)  Administration: 750 mg (1/27/2023), 750 mg (2/17/2023), 750 mg (3/31/2023), 750 mg (3/10/2023)  PEMEtrexed disodium (ALIMTA) 1,200 mg in sodium chloride 0.9% SolP 100 mL chemo infusion, 1,250 mg, Intravenous, Clinic/HOD 1 time, 4 of 4 cycles  Administration: 1,200 mg (1/27/2023), 1,200 mg (2/17/2023), 1,200 mg (3/31/2023), 1,200 mg (3/10/2023)       4/27/2023 -  Chemotherapy    Treatment Summary   Plan Name: OP NSCLC AMIVANTAMAB-VMJW (Rybrevant) Q4W  Treatment Goal: Palliative  Status: Active  Start Date: 4/27/2023 (Planned)  End Date: 6/6/2024 (Planned)  Provider: Reese Mcfarlane MD  Chemotherapy: amivantamab-vmjw (RYBREVANT) 350 mg in sodium chloride 0.9% SolP 250 mL chemo infusion, 350 mg (original dose ), Intravenous, Clinic/HOD 1 time, 0 of 15 cycles  Dose modification: 350 mg (Cycle 1), 1,050 mg (Cycle 1), 1,400 mg (Cycle 1)          Social History     Socioeconomic History    Marital status:    Tobacco Use    Smoking status: Never     Passive exposure: Never    Smokeless tobacco: Never   Substance and Sexual Activity    Alcohol use: Never    Drug use: Never    Sexual activity: Yes     Partners: Male     Birth control/protection: None     Comment: tubal     Social Determinants of Health     Financial Resource Strain: Low Risk  (6/26/2024)    Received from [facility]    Overall Financial Resource Strain (CARDIA)     Difficulty of Paying Living Expenses: Not hard at all   Food Insecurity: No Food Insecurity (6/26/2024)    Received from [facility]    Hunger Vital Sign     Worried About  Running Out of Food in the Last Year: Never true     Ran Out of Food in the Last Year: Never true   Transportation Needs: No Transportation Needs (6/26/2024)    Received from [facility]    PRAPARE - Transportation     Lack of Transportation (Medical): No     Lack of Transportation (Non-Medical): No   Physical Activity: Insufficiently Active (6/26/2024)    Received from [facility]    Exercise Vital Sign     Days of Exercise per Week: 3 days     Minutes of Exercise per Session: 40 min   Stress: No Stress Concern Present (6/26/2024)    Received from [facility]    [employer] - Occupational Stress Questionnaire     Feeling of Stress : Not at all   Housing Stability: Unknown (11/22/2023)    Housing Stability Vital Sign     Unable to Pay for Housing in the Last Year: Patient refused     Unstable Housing in the Last Year: Patient refused      Family History   Problem Relation Name Age of Onset    Heart failure Father        Past Surgical History:   Procedure Laterality Date    CATARACT EXTRACTION W/  INTRAOCULAR LENS IMPLANT Right 06/02/2022    FLUOROSCOPY N/A 1/26/2023    Procedure: FLUOROSCOPY/mediport placement;  Surgeon: Marlon Leone MD;  [facility] CATH LAB;  Service: General;  Laterality: N/A;    TUBAL LIGATION      2007--postpartum tubal ligation        Review of Systems   Constitutional:  Negative for activity change, chills, fatigue and fever.   HENT: Negative.     Eyes: Negative.    Respiratory: Negative.     Cardiovascular: Negative.    Gastrointestinal: Negative.    Endocrine: Positive for cold intolerance.   Musculoskeletal:  Negative for arthralgias and myalgias.   Skin:  Negative for rash.   Neurological:  Negative for dizziness, weakness and light-headedness.   Psychiatric/Behavioral:  The patient is not nervous/anxious.        ?   A comprehensive 14-point review of systems was reviewed  with patient and was negative other than as specified above.   ?     Objective:      Physical Exam  Vitals reviewed: virtual visit.   Constitutional:       Appearance: Normal appearance.   Neurological:      Mental Status: She is alert.   Psychiatric:         Mood and Affect: Mood normal.         Behavior: Behavior normal.         ?   There were no vitals filed for this visit.       ?       ?   Laboratory:  ?   Lab Visit on 07/26/2024   Component Date Value Ref Range Status    WBC 07/26/2024 7.61  3.90 - 12.70 K/uL Final    RBC 07/26/2024 4.48  4.00 - 5.40 M/uL Final    Hemoglobin 07/26/2024 12.5  12.0 - 16.0 g/dL Final    Hematocrit 07/26/2024 37.9  37.0 - 48.5 % Final    MCV 07/26/2024 85  82 - 98 fL Final    MCH 07/26/2024 27.9  27.0 - 31.0 pg Final    MCHC 07/26/2024 33.0  32.0 - 36.0 g/dL Final    RDW 07/26/2024 13.1  11.5 - 14.5 % Final    Platelets 07/26/2024 177  150 - 450 K/uL Final    MPV 07/26/2024 11.7  9.2 - 12.9 fL Final    Immature Granulocytes 07/26/2024 0.4  0.0 - 0.5 % Final    Gran # (ANC) 07/26/2024 5.2  1.8 - 7.7 K/uL Final    Immature Grans (Abs) 07/26/2024 0.03  0.00 - 0.04 K/uL Final    Lymph # 07/26/2024 1.7  1.0 - 4.8 K/uL Final    Mono # 07/26/2024 0.5  0.3 - 1.0 K/uL Final    Eos # 07/26/2024 0.2  0.0 - 0.5 K/uL Final    Baso # 07/26/2024 0.03  0.00 - 0.20 K/uL Final    nRBC 07/26/2024 0  0 /100 WBC Final    Gran % 07/26/2024 68.6  38.0 - 73.0 % Final    Lymph % 07/26/2024 21.7  18.0 - 48.0 % Final    Mono % 07/26/2024 5.9  4.0 - 15.0 % Final    Eosinophil % 07/26/2024 3.0  0.0 - 8.0 % Final    Basophil % 07/26/2024 0.4  0.0 - 1.9 % Final    Differential Method 07/26/2024 Automated   Final    Sodium 07/26/2024 142  136 - 145 mmol/L Final    Potassium 07/26/2024 3.3 (L)  3.5 - 5.1 mmol/L Final    Chloride 07/26/2024 104  95 - 110 mmol/L Final    CO2 07/26/2024 28  23 - 29 mmol/L Final    Glucose 07/26/2024 179 (H)  70 - 110 mg/dL Final    BUN 07/26/2024 12  6  - 20 mg/dL Final    Creatinine 07/26/2024 0.9  0.5 - 1.4 mg/dL Final    Calcium 07/26/2024 8.7  8.7 - 10.5 mg/dL Final    Total Protein 07/26/2024 5.8 (L)  6.0 - 8.4 g/dL Final    Albumin 07/26/2024 2.6 (L)  3.5 - 5.2 g/dL Final    Total Bilirubin 07/26/2024 0.4  0.1 - 1.0 mg/dL Final    Alkaline Phosphatase 07/26/2024 99  55 - 135 U/L Final    AST 07/26/2024 15  10 - 40 U/L Final    ALT 07/26/2024 17  10 - 44 U/L Final    eGFR 07/26/2024 >60  >60 mL/min/1.73 m^2 Final    Anion Gap 07/26/2024 10  8 - 16 mmol/L Final    Magnesium 07/26/2024 1.7  1.6 - 2.6 mg/dL Final    Phosphorus 07/26/2024 3.1  2.7 - 4.5 mg/dL Final      ?   Imaging:    No results found. However, due to the size of the patient record, not all encounters were searched. Please check Results Review for a complete set of results.     Results for orders placed or performed during the hospital encounter of 05/08/24 (from the past 2160 hour(s))   CT Chest Abdomen Pelvis With IV Contrast (XPD) Routine Oral Contrast    Narrative    EXAMINATION:  CT CHEST ABDOMEN PELVIS WITH IV CONTRAST (XPD)    CLINICAL HISTORY:  restaging imaging;Secondary malignant neoplasm of bone    TECHNIQUE:  Low-dose CT of the chest abdomen and pelvis with contrast was acquired helically from the lung apices through the ischial tuberosities status post administration of 100 cc of Omnipaque 350. Oral contrast was administered.  Axial and reformatted images were reviewed.    COMPARISON:  01/30/2024    FINDINGS:  CHEST    There are scarring changes seen within the left lower lobe near the lung base and also in the region of the left upper lobe.  The overall appearance is unchanged when compared to the study from 01/30/2024.  No new or enlarging pulmonary nodules.  Large densely calcified granuloma seen within the right lung base.  Calcified right paratracheal subcarinal and right hilar lymph nodes are in present.  A right-sided MediPort catheter is noted.    The aorta is  unremarkable in appearance. There is no pericardial effusion.    ABDOMEN    Liver/gallbladder/biliary: Stable appearance of a small soft tissue nodule seen projecting off the posterior segment of the right hepatic lobe best seen on series 3, image 63 that measures approximately 12 mm in size.  The size and appearance is unchanged when compared to the prior exam.  There is a stable 13 mm hypodense lesion within the lateral segment of the left hepatic lobe.  Findings likely represent a cyst.  The gallbladder is present and unremarkable.  No biliary ductal dilation.    Pancreas: The pancreas is unremarkable in appearance.    Spleen: The spleen is not enlarged.    Adrenals: Low density left adrenal gland nodule noted that measures approximately 2 cm in size and is unchanged when compared to the prior exams.  The right adrenal gland is unremarkable.    Kidneys: The kidneys are equally perfused and demonstrate no solid masses. No nephrolithiasis.    Bowel/Mesentery: There is no evidence of bowel obstruction.  No mesenteric stranding or adenopathy.    Retroperitoneum: No adenopathy.  The aorta demonstrates a normal caliber.    PELVIS    Genitourinary/Reproductive organs: Probable fibroid uterus.    Adenopathy: None    Free Fluid: No free fluid identified.    Osseus Structures/Soft tissues: Sclerotic metastatic disease seen scattered throughout the visualized axial and proximal appendicular skeleton.  The overall appearance is unchanged.  No new osseous metastatic disease identified.  No significant soft tissue abnormality.      Impression    1. Stable exam.      Electronically signed by: Christiano Yepez DO  Date:    05/08/2024  Time:    09:47     *Note: Due to a large number of results and/or encounters for the requested time period, some results have not been displayed. A complete set of results can be found in Results Review.        ?   Assessment/Plan:     Problem List Items Addressed This Visit          Oncology     Malignant neoplasm of lower lobe of left lung      Cancer Staging   Malignant neoplasm of lower lobe of left lung  Staging form: Lung, AJCC 8th Edition  - Clinical stage from 12/9/2022: Stage IV (cT2, cN2, cM1) - Signed by Reese Mcfarlane MD on 12/9/2022    Completed 4 Cycles of Carbo/Alimta 03/31/2023    Repeat CT CAP 04/11/2023:   Detrimental change.  Increase in number and size of numerous sclerotic lesions throughout the cervical and thoracic spine, left humerus and sternum.  There are also multiple large sclerotic lesions throughout the lumbar spine, pelvis and proximal femurs measuring up to 7.1 cm in size.  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  2.   The lateral left upper lobe mass is decreased in size in the interim, currently measuring 2.2 x 1.0 cm.  Negative for new pulmonary masses.  3.  Exophytic nodule along the posterior right hepatic lobe measuring 1.1 cm.  This was not imaged previously.  Etiology uncertain.  Metastasis is not excluded.    --Rybrevant C1D1 04/27/23--    Most recent CT CAP 05/08/24 Stable     Labs reviewed, no concerning cytopenias      -Ok to proceed with C17D1 Rybrevant today      RTC in two weeks with labs prior for C17D15 Rybrevant               Other Visit Diagnoses       Muscle strain    -  Primary    Relevant Medications    LIDOcaine (LIDODERM) 5 %                   Med Onc Chart Routing      Follow up with physician 2 weeks. with labs prior for rybrevant   Follow up with DOLLY    Infusion scheduling note   rybrevant   Injection scheduling note    Labs CBC, CMP, magnesium and phosphorus   Scheduling:  Preferred lab:  Lab interval:     Imaging    Pharmacy appointment    Other referrals                 MILAGROS Castillo  Hematology/Oncology

## 2024-07-26 NOTE — DISCHARGE INSTRUCTIONS
[facility]  [address] [facility] [address]  [address]  [phone] phone     [phone] fax  Hours of Operation: Monday- Friday 8:00am- 5:00pm  After hours phone  [phone]  Hematology / Oncology Physicians on call      SHAINA Nation Dr., NP Phaon Dunbar, NP Khelsea Conley, FNP    Please call with any concerns regarding your appointment today.

## 2024-07-26 NOTE — PLAN OF CARE
Discussed plan of care with pt. Addressed any and ongoing concerns. Pt denies   Problem: Adult Inpatient Plan of Care  Goal: Plan of Care Review  7/26/2024 1415 by Mirtha Ruiz RN  Outcome: Progressing  7/26/2024 1415 by Mirtha Ruiz RN  Outcome: Progressing  Goal: Patient-Specific Goal (Individualized)  7/26/2024 1415 by Mirtha Ruiz RN  Outcome: Progressing  7/26/2024 1415 by Mirtha Ruiz RN  Outcome: Progressing  Flowsheets (Taken 7/26/2024 1413)  Individualized Care Needs: Reclined position, warm blankets x 2 ginger ale and trail mix  Anxieties, Fears or Concerns: none  Patient/Family-Specific Goals (Include Timeframe): Will tolerate infusion  Goal: Absence of Hospital-Acquired Illness or Injury  7/26/2024 1415 by Mirtha Ruiz RN  Outcome: Progressing  7/26/2024 1415 by Mirtha Ruiz RN  Outcome: Progressing  Goal: Optimal Comfort and Wellbeing  7/26/2024 1415 by Mirtha Ruiz RN  Outcome: Progressing  7/26/2024 1415 by Mirtha Ruiz RN  Outcome: Progressing  Intervention: Provide Person-Centered Care  Flowsheets (Taken 7/26/2024 1415)  Trust Relationship/Rapport:   care explained   questions encouraged   empathic listening provided   questions answered   thoughts/feelings acknowledged

## 2024-08-06 ENCOUNTER — LAB VISIT (OUTPATIENT)
Dept: LAB | Facility: HOSPITAL | Age: 54
End: 2024-08-06
Attending: NURSE PRACTITIONER
Payer: COMMERCIAL

## 2024-08-06 ENCOUNTER — OFFICE VISIT (OUTPATIENT)
Dept: INTERNAL MEDICINE | Facility: CLINIC | Age: 54
End: 2024-08-06
Payer: COMMERCIAL

## 2024-08-06 VITALS
DIASTOLIC BLOOD PRESSURE: 70 MMHG | RESPIRATION RATE: 18 BRPM | HEART RATE: 70 BPM | TEMPERATURE: 96 F | OXYGEN SATURATION: 97 % | WEIGHT: 293 LBS | BODY MASS INDEX: 50.02 KG/M2 | SYSTOLIC BLOOD PRESSURE: 110 MMHG | HEIGHT: 64 IN

## 2024-08-06 DIAGNOSIS — C34.32 MALIGNANT NEOPLASM OF LOWER LOBE OF LEFT LUNG: ICD-10-CM

## 2024-08-06 DIAGNOSIS — E11.9 TYPE 2 DIABETES MELLITUS WITHOUT COMPLICATION, WITHOUT LONG-TERM CURRENT USE OF INSULIN: ICD-10-CM

## 2024-08-06 DIAGNOSIS — Z00.00 ROUTINE MEDICAL EXAM: Primary | ICD-10-CM

## 2024-08-06 DIAGNOSIS — R60.9 SWELLING: ICD-10-CM

## 2024-08-06 DIAGNOSIS — I10 ESSENTIAL HYPERTENSION: ICD-10-CM

## 2024-08-06 LAB
ALBUMIN SERPL BCP-MCNC: 2.8 G/DL (ref 3.5–5.2)
ALP SERPL-CCNC: 105 U/L (ref 55–135)
ALT SERPL W/O P-5'-P-CCNC: 19 U/L (ref 10–44)
ANION GAP SERPL CALC-SCNC: 7 MMOL/L (ref 8–16)
AST SERPL-CCNC: 14 U/L (ref 10–40)
BASOPHILS # BLD AUTO: 0.06 K/UL (ref 0–0.2)
BASOPHILS NFR BLD: 0.7 % (ref 0–1.9)
BILIRUB SERPL-MCNC: 0.4 MG/DL (ref 0.1–1)
BUN SERPL-MCNC: 16 MG/DL (ref 6–20)
CALCIUM SERPL-MCNC: 8.7 MG/DL (ref 8.7–10.5)
CHLORIDE SERPL-SCNC: 105 MMOL/L (ref 95–110)
CO2 SERPL-SCNC: 30 MMOL/L (ref 23–29)
CREAT SERPL-MCNC: 0.9 MG/DL (ref 0.5–1.4)
DIFFERENTIAL METHOD BLD: ABNORMAL
EOSINOPHIL # BLD AUTO: 0.3 K/UL (ref 0–0.5)
EOSINOPHIL NFR BLD: 2.8 % (ref 0–8)
ERYTHROCYTE [DISTWIDTH] IN BLOOD BY AUTOMATED COUNT: 13.2 % (ref 11.5–14.5)
EST. GFR  (NO RACE VARIABLE): >60 ML/MIN/1.73 M^2
ESTIMATED AVG GLUCOSE: 146 MG/DL (ref 68–131)
GLUCOSE SERPL-MCNC: 144 MG/DL (ref 70–110)
HBA1C MFR BLD: 6.7 % (ref 4–5.6)
HCT VFR BLD AUTO: 39.1 % (ref 37–48.5)
HGB BLD-MCNC: 12.5 G/DL (ref 12–16)
IMM GRANULOCYTES # BLD AUTO: 0.04 K/UL (ref 0–0.04)
IMM GRANULOCYTES NFR BLD AUTO: 0.4 % (ref 0–0.5)
LYMPHOCYTES # BLD AUTO: 2.8 K/UL (ref 1–4.8)
LYMPHOCYTES NFR BLD: 30.1 % (ref 18–48)
MAGNESIUM SERPL-MCNC: 1.7 MG/DL (ref 1.6–2.6)
MCH RBC QN AUTO: 27.5 PG (ref 27–31)
MCHC RBC AUTO-ENTMCNC: 32 G/DL (ref 32–36)
MCV RBC AUTO: 86 FL (ref 82–98)
MONOCYTES # BLD AUTO: 0.7 K/UL (ref 0.3–1)
MONOCYTES NFR BLD: 7.5 % (ref 4–15)
NEUTROPHILS # BLD AUTO: 5.3 K/UL (ref 1.8–7.7)
NEUTROPHILS NFR BLD: 58.5 % (ref 38–73)
NRBC BLD-RTO: 0 /100 WBC
PHOSPHATE SERPL-MCNC: 3.4 MG/DL (ref 2.7–4.5)
PLATELET # BLD AUTO: 192 K/UL (ref 150–450)
PMV BLD AUTO: 13 FL (ref 9.2–12.9)
POTASSIUM SERPL-SCNC: 3.7 MMOL/L (ref 3.5–5.1)
PROT SERPL-MCNC: 6.1 G/DL (ref 6–8.4)
RBC # BLD AUTO: 4.55 M/UL (ref 4–5.4)
SODIUM SERPL-SCNC: 142 MMOL/L (ref 136–145)
WBC # BLD AUTO: 9.14 K/UL (ref 3.9–12.7)

## 2024-08-06 PROCEDURE — 3066F NEPHROPATHY DOC TX: CPT | Mod: CPTII,S$GLB,, | Performed by: NURSE PRACTITIONER

## 2024-08-06 PROCEDURE — 3008F BODY MASS INDEX DOCD: CPT | Mod: CPTII,S$GLB,, | Performed by: NURSE PRACTITIONER

## 2024-08-06 PROCEDURE — 3074F SYST BP LT 130 MM HG: CPT | Mod: CPTII,S$GLB,, | Performed by: NURSE PRACTITIONER

## 2024-08-06 PROCEDURE — 85025 COMPLETE CBC W/AUTO DIFF WBC: CPT

## 2024-08-06 PROCEDURE — 3061F NEG MICROALBUMINURIA REV: CPT | Mod: CPTII,S$GLB,, | Performed by: NURSE PRACTITIONER

## 2024-08-06 PROCEDURE — 83735 ASSAY OF MAGNESIUM: CPT

## 2024-08-06 PROCEDURE — 3078F DIAST BP <80 MM HG: CPT | Mod: CPTII,S$GLB,, | Performed by: NURSE PRACTITIONER

## 2024-08-06 PROCEDURE — 83036 HEMOGLOBIN GLYCOSYLATED A1C: CPT | Performed by: NURSE PRACTITIONER

## 2024-08-06 PROCEDURE — 1159F MED LIST DOCD IN RCRD: CPT | Mod: CPTII,S$GLB,, | Performed by: NURSE PRACTITIONER

## 2024-08-06 PROCEDURE — 99214 OFFICE O/P EST MOD 30 MIN: CPT | Mod: 25,S$GLB,, | Performed by: NURSE PRACTITIONER

## 2024-08-06 PROCEDURE — 99999 PR PBB SHADOW E&M-EST. PATIENT-LVL V: CPT | Mod: PBBFAC,,, | Performed by: NURSE PRACTITIONER

## 2024-08-06 PROCEDURE — 3044F HG A1C LEVEL LT 7.0%: CPT | Mod: CPTII,S$GLB,, | Performed by: NURSE PRACTITIONER

## 2024-08-06 PROCEDURE — 80053 COMPREHEN METABOLIC PANEL: CPT

## 2024-08-06 PROCEDURE — 36415 COLL VENOUS BLD VENIPUNCTURE: CPT | Mod: PN

## 2024-08-06 PROCEDURE — 84100 ASSAY OF PHOSPHORUS: CPT

## 2024-08-06 PROCEDURE — 1160F RVW MEDS BY RX/DR IN RCRD: CPT | Mod: CPTII,S$GLB,, | Performed by: NURSE PRACTITIONER

## 2024-08-06 RX ORDER — FUROSEMIDE 20 MG/1
20 TABLET ORAL DAILY PRN
Qty: 30 TABLET | Refills: 0 | Status: SHIPPED | OUTPATIENT
Start: 2024-08-06

## 2024-08-06 NOTE — PROGRESS NOTES
Subjective     Patient ID: Shaneka Ahmadi is a 54 y.o. female.    Chief Complaint: Follow-up    Patient presents for 3 month follow up.      Medical history:  Hypertension, type 2 diabetes, COVID, Obesity, Neoplasm of the left lower lobe lung, Hypokalemia, Malignant neoplasm of bone    Doing well.  Chemo every other Friday:  Amivantamab    Glucose readings are elevated.  Has A1C today.     Follow-up  Pertinent negatives include no arthralgias, chest pain, chills, coughing or fever.     Review of Systems   Constitutional:  Negative for chills and fever.   Respiratory:  Negative for cough and shortness of breath.    Cardiovascular:  Negative for chest pain.   Gastrointestinal:  Negative for abdominal distention, anal bleeding, constipation and diarrhea.   Musculoskeletal:  Negative for arthralgias and gait problem.   Psychiatric/Behavioral:  Negative for agitation and confusion.           Objective     Physical Exam  Vitals reviewed.   Constitutional:       Appearance: She is well-developed. She is obese.   HENT:      Head: Normocephalic and atraumatic.      Right Ear: Tympanic membrane normal.      Left Ear: Tympanic membrane normal.   Cardiovascular:      Rate and Rhythm: Normal rate and regular rhythm.   Pulmonary:      Effort: Pulmonary effort is normal.      Breath sounds: Normal breath sounds.   Abdominal:      General: Bowel sounds are normal.   Musculoskeletal:         General: Normal range of motion.   Skin:     General: Skin is warm and dry.   Neurological:      General: No focal deficit present.      Mental Status: She is alert and oriented to person, place, and time.   Psychiatric:         Mood and Affect: Mood normal.         Behavior: Behavior normal. Behavior is cooperative.            Assessment and Plan     1. Routine medical exam    2. Essential hypertension  Comments:  Controlled.  Continue amlodipine 10 mg, losartan-HCTZ 10-25 mg, and atenolol 50 mg.    3. Type 2 diabetes mellitus without  complication, without long-term current use of insulin  Comments:  Lab today.  Continue glipizide-metformin 5-500 mg.    4. Malignant neoplasm of lower lobe of left lung  Comments:  Continue treatment plan per hematology/oncology.  Chemo every other week.    5. Swelling  Comments:  Furosemide 20 mg as needed  Orders:  -     furosemide (LASIX) 20 MG tablet; Take 1 tablet (20 mg total) by mouth daily as needed (swelling).  Dispense: 30 tablet; Refill: 0      [facility] in [place] phone [facility]ner [phone]    Three month follow up           Follow up in about 3 months (around 11/6/2024).

## 2024-08-07 ENCOUNTER — PATIENT OUTREACH (OUTPATIENT)
Dept: ADMINISTRATIVE | Facility: HOSPITAL | Age: 54
End: 2024-08-07
Payer: COMMERCIAL

## 2024-08-08 ENCOUNTER — OFFICE VISIT (OUTPATIENT)
Dept: HEMATOLOGY/ONCOLOGY | Facility: CLINIC | Age: 54
End: 2024-08-08
Payer: COMMERCIAL

## 2024-08-08 ENCOUNTER — PATIENT MESSAGE (OUTPATIENT)
Dept: HEMATOLOGY/ONCOLOGY | Facility: CLINIC | Age: 54
End: 2024-08-08

## 2024-08-08 VITALS
DIASTOLIC BLOOD PRESSURE: 66 MMHG | RESPIRATION RATE: 20 BRPM | HEIGHT: 64 IN | HEART RATE: 64 BPM | TEMPERATURE: 98 F | BODY MASS INDEX: 50.02 KG/M2 | WEIGHT: 293 LBS | SYSTOLIC BLOOD PRESSURE: 113 MMHG | OXYGEN SATURATION: 98 %

## 2024-08-08 DIAGNOSIS — C34.32 MALIGNANT NEOPLASM OF LOWER LOBE OF LEFT LUNG: Primary | ICD-10-CM

## 2024-08-08 DIAGNOSIS — C79.51 SECONDARY MALIGNANT NEOPLASM OF BONE: ICD-10-CM

## 2024-08-08 PROCEDURE — 99999 PR PBB SHADOW E&M-EST. PATIENT-LVL IV: CPT | Mod: PBBFAC,,, | Performed by: INTERNAL MEDICINE

## 2024-08-08 RX ORDER — ACETAMINOPHEN 325 MG/1
650 TABLET ORAL
Status: CANCELLED
Start: 2024-08-09

## 2024-08-08 RX ORDER — EPINEPHRINE 0.3 MG/.3ML
0.3 INJECTION SUBCUTANEOUS ONCE AS NEEDED
Status: CANCELLED | OUTPATIENT
Start: 2024-08-09

## 2024-08-08 RX ORDER — DIPHENHYDRAMINE HYDROCHLORIDE 50 MG/ML
25 INJECTION INTRAMUSCULAR; INTRAVENOUS
Status: CANCELLED
Start: 2024-08-09

## 2024-08-08 RX ORDER — SODIUM CHLORIDE 0.9 % (FLUSH) 0.9 %
10 SYRINGE (ML) INJECTION
Status: CANCELLED | OUTPATIENT
Start: 2024-08-09

## 2024-08-08 RX ORDER — DIPHENHYDRAMINE HYDROCHLORIDE 50 MG/ML
50 INJECTION INTRAMUSCULAR; INTRAVENOUS ONCE AS NEEDED
Status: CANCELLED | OUTPATIENT
Start: 2024-08-09

## 2024-08-08 RX ORDER — HEPARIN 100 UNIT/ML
500 SYRINGE INTRAVENOUS
Status: CANCELLED | OUTPATIENT
Start: 2024-08-09

## 2024-08-09 ENCOUNTER — INFUSION (OUTPATIENT)
Dept: INFUSION THERAPY | Facility: HOSPITAL | Age: 54
End: 2024-08-09
Attending: RADIOLOGY
Payer: COMMERCIAL

## 2024-08-09 VITALS
HEART RATE: 62 BPM | TEMPERATURE: 98 F | DIASTOLIC BLOOD PRESSURE: 63 MMHG | SYSTOLIC BLOOD PRESSURE: 110 MMHG | HEIGHT: 64 IN | BODY MASS INDEX: 50.02 KG/M2 | WEIGHT: 293 LBS | OXYGEN SATURATION: 97 % | RESPIRATION RATE: 18 BRPM

## 2024-08-09 DIAGNOSIS — C34.32 MALIGNANT NEOPLASM OF LOWER LOBE OF LEFT LUNG: Primary | ICD-10-CM

## 2024-08-09 PROCEDURE — 96413 CHEMO IV INFUSION 1 HR: CPT

## 2024-08-09 PROCEDURE — 25000003 PHARM REV CODE 250: Performed by: INTERNAL MEDICINE

## 2024-08-09 PROCEDURE — 96367 TX/PROPH/DG ADDL SEQ IV INF: CPT

## 2024-08-09 PROCEDURE — 96415 CHEMO IV INFUSION ADDL HR: CPT

## 2024-08-09 PROCEDURE — 96375 TX/PRO/DX INJ NEW DRUG ADDON: CPT

## 2024-08-09 PROCEDURE — 63600175 PHARM REV CODE 636 W HCPCS: Performed by: INTERNAL MEDICINE

## 2024-08-09 RX ORDER — HEPARIN 100 UNIT/ML
500 SYRINGE INTRAVENOUS
Status: DISCONTINUED | OUTPATIENT
Start: 2024-08-09 | End: 2024-08-09 | Stop reason: HOSPADM

## 2024-08-09 RX ORDER — DIPHENHYDRAMINE HYDROCHLORIDE 50 MG/ML
25 INJECTION INTRAMUSCULAR; INTRAVENOUS
Status: COMPLETED | OUTPATIENT
Start: 2024-08-09 | End: 2024-08-09

## 2024-08-09 RX ORDER — ACETAMINOPHEN 325 MG/1
650 TABLET ORAL
Status: COMPLETED | OUTPATIENT
Start: 2024-08-09 | End: 2024-08-09

## 2024-08-09 RX ADMIN — DIPHENHYDRAMINE HYDROCHLORIDE 25 MG: 50 INJECTION INTRAMUSCULAR; INTRAVENOUS at 08:08

## 2024-08-09 RX ADMIN — ACETAMINOPHEN 650 MG: 325 TABLET ORAL at 08:08

## 2024-08-09 RX ADMIN — DEXAMETHASONE SODIUM PHOSPHATE 0.25 MG: 4 INJECTION, SOLUTION INTRA-ARTICULAR; INTRALESIONAL; INTRAMUSCULAR; INTRAVENOUS; SOFT TISSUE at 08:08

## 2024-08-09 RX ADMIN — HEPARIN 500 UNITS: 100 SYRINGE at 10:08

## 2024-08-09 RX ADMIN — SODIUM CHLORIDE 1400 MG: 9 INJECTION, SOLUTION INTRAVENOUS at 08:08

## 2024-08-13 ENCOUNTER — OFFICE VISIT (OUTPATIENT)
Dept: DERMATOLOGY | Facility: CLINIC | Age: 54
End: 2024-08-13
Payer: COMMERCIAL

## 2024-08-13 DIAGNOSIS — L70.8 ACNEIFORM RASH: ICD-10-CM

## 2024-08-13 PROCEDURE — G2211 COMPLEX E/M VISIT ADD ON: HCPCS | Mod: S$GLB,,, | Performed by: STUDENT IN AN ORGANIZED HEALTH CARE EDUCATION/TRAINING PROGRAM

## 2024-08-13 PROCEDURE — 1160F RVW MEDS BY RX/DR IN RCRD: CPT | Mod: CPTII,S$GLB,, | Performed by: STUDENT IN AN ORGANIZED HEALTH CARE EDUCATION/TRAINING PROGRAM

## 2024-08-13 PROCEDURE — 1159F MED LIST DOCD IN RCRD: CPT | Mod: CPTII,S$GLB,, | Performed by: STUDENT IN AN ORGANIZED HEALTH CARE EDUCATION/TRAINING PROGRAM

## 2024-08-13 PROCEDURE — 99999 PR PBB SHADOW E&M-EST. PATIENT-LVL III: CPT | Mod: PBBFAC,,, | Performed by: STUDENT IN AN ORGANIZED HEALTH CARE EDUCATION/TRAINING PROGRAM

## 2024-08-13 PROCEDURE — 3066F NEPHROPATHY DOC TX: CPT | Mod: CPTII,S$GLB,, | Performed by: STUDENT IN AN ORGANIZED HEALTH CARE EDUCATION/TRAINING PROGRAM

## 2024-08-13 PROCEDURE — 3061F NEG MICROALBUMINURIA REV: CPT | Mod: CPTII,S$GLB,, | Performed by: STUDENT IN AN ORGANIZED HEALTH CARE EDUCATION/TRAINING PROGRAM

## 2024-08-13 PROCEDURE — 99213 OFFICE O/P EST LOW 20 MIN: CPT | Mod: S$GLB,,, | Performed by: STUDENT IN AN ORGANIZED HEALTH CARE EDUCATION/TRAINING PROGRAM

## 2024-08-13 PROCEDURE — 3044F HG A1C LEVEL LT 7.0%: CPT | Mod: CPTII,S$GLB,, | Performed by: STUDENT IN AN ORGANIZED HEALTH CARE EDUCATION/TRAINING PROGRAM

## 2024-08-13 RX ORDER — DESONIDE 0.5 MG/G
CREAM TOPICAL
Qty: 60 G | Refills: 3 | Status: SHIPPED | OUTPATIENT
Start: 2024-08-13

## 2024-08-13 RX ORDER — CLINDAMYCIN PHOSPHATE 10 MG/ML
SOLUTION TOPICAL
Qty: 60 EACH | Refills: 4 | Status: SHIPPED | OUTPATIENT
Start: 2024-08-13

## 2024-08-13 NOTE — PROGRESS NOTES
Subjective:       Patient ID:  Shaneka Ahmadi is a 54 y.o. female who presents for   Chief Complaint   Patient presents with    Follow-up     Acne follow up improved      History of Present Illness: The patient presents for follow up of acneiform eruption on the face, patient is currently on Rybrevant for non small cell lung carcinomalast seen on on 3/12/24.  At last visit, was in a flare of symptoms, started on doxycycline and continued on topicals. Reports much improvement in symptoms. Has a few bumps, but much better than previous. No other concerning rash or skin lesions.     Follow-up        Review of Systems   Constitutional:  Negative for fever and chills.   Skin:  Negative for itching, rash and dry skin.        Objective:    Physical Exam   Constitutional: She appears well-developed and well-nourished. No distress.   Neurological: She is alert and oriented to person, place, and time. She is not disoriented.   Psychiatric: She has a normal mood and affect.   Skin:   Areas Examined (abnormalities noted in diagram):   Head / Face Inspection Performed  Neck Inspection Performed              Diagram Legend     Erythematous scaling macule/papule c/w actinic keratosis       Vascular papule c/w angioma      Pigmented verrucoid papule/plaque c/w seborrheic keratosis      Yellow umbilicated papule c/w sebaceous hyperplasia      Irregularly shaped tan macule c/w lentigo     1-2 mm smooth white papules consistent with Milia      Movable subcutaneous cyst with punctum c/w epidermal inclusion cyst      Subcutaneous movable cyst c/w pilar cyst      Firm pink to brown papule c/w dermatofibroma      Pedunculated fleshy papule(s) c/w skin tag(s)      Evenly pigmented macule c/w junctional nevus     Mildly variegated pigmented, slightly irregular-bordered macule c/w mildly atypical nevus      Flesh colored to evenly pigmented papule c/w intradermal nevus       Pink pearly papule/plaque c/w basal cell carcinoma       Erythematous hyperkeratotic cursted plaque c/w SCC      Surgical scar with no sign of skin cancer recurrence      Open and closed comedones      Inflammatory papules and pustules      Verrucoid papule consistent consistent with wart     Erythematous eczematous patches and plaques     Dystrophic onycholytic nail with subungual debris c/w onychomycosis     Umbilicated papule    Erythematous-base heme-crusted tan verrucoid plaque consistent with inflamed seborrheic keratosis     Erythematous Silvery Scaling Plaque c/w Psoriasis     See annotation      Assessment / Plan:        Acneiform rash - much improvement in symptoms s/p doxycycline and continued topicals. For now, continue topicals as needed; refills sent. Recommend BPO spot treatment.   -     clindamycin (CLEOCIN T) 1 % Swab; APPLY TO AFFECTED AREA(S) TWO TIMES A DAY AS DIRECTED  Dispense: 60 each; Refill: 4  -     desonide (DESOWEN) 0.05 % cream; APPLY TO AFFECTED AREA(S) TWO TIMES A DAY AS DIRECTED  Dispense: 60 g; Refill: 3  -     Counseled patient on gentle skin care regimen, including need for sensitive soaps/detergents, as well as need for frequent use of sensitize moisturizers.          Follow up in about 6 months (around 2/13/2025).

## 2024-08-22 ENCOUNTER — HOSPITAL ENCOUNTER (OUTPATIENT)
Dept: RADIOLOGY | Facility: HOSPITAL | Age: 54
Discharge: HOME | End: 2024-08-22
Attending: INTERNAL MEDICINE
Payer: COMMERCIAL

## 2024-08-22 ENCOUNTER — OFFICE VISIT (OUTPATIENT)
Dept: HEMATOLOGY/ONCOLOGY | Facility: CLINIC | Age: 54
End: 2024-08-22
Payer: COMMERCIAL

## 2024-08-22 VITALS
OXYGEN SATURATION: 96 % | HEIGHT: 64 IN | DIASTOLIC BLOOD PRESSURE: 74 MMHG | HEART RATE: 71 BPM | TEMPERATURE: 98 F | BODY MASS INDEX: 50.02 KG/M2 | RESPIRATION RATE: 20 BRPM | SYSTOLIC BLOOD PRESSURE: 146 MMHG | WEIGHT: 293 LBS

## 2024-08-22 DIAGNOSIS — C34.32 MALIGNANT NEOPLASM OF LOWER LOBE OF LEFT LUNG: ICD-10-CM

## 2024-08-22 DIAGNOSIS — C34.32 MALIGNANT NEOPLASM OF LOWER LOBE OF LEFT LUNG: Primary | ICD-10-CM

## 2024-08-22 PROCEDURE — 99999 PR PBB SHADOW E&M-EST. PATIENT-LVL IV: CPT | Mod: PBBFAC,,, | Performed by: INTERNAL MEDICINE

## 2024-08-22 PROCEDURE — 74177 CT ABD & PELVIS W/CONTRAST: CPT | Mod: TC

## 2024-08-22 PROCEDURE — 74177 CT ABD & PELVIS W/CONTRAST: CPT | Mod: 26,,, | Performed by: RADIOLOGY

## 2024-08-22 PROCEDURE — 71260 CT THORAX DX C+: CPT | Mod: 26,,, | Performed by: RADIOLOGY

## 2024-08-22 PROCEDURE — 25500020 PHARM REV CODE 255: Performed by: INTERNAL MEDICINE

## 2024-08-22 PROCEDURE — 71260 CT THORAX DX C+: CPT | Mod: TC

## 2024-08-22 PROCEDURE — A9698 NON-RAD CONTRAST MATERIALNOC: HCPCS | Performed by: INTERNAL MEDICINE

## 2024-08-22 RX ORDER — SODIUM CHLORIDE 0.9 % (FLUSH) 0.9 %
10 SYRINGE (ML) INJECTION
Status: CANCELLED | OUTPATIENT
Start: 2024-08-23

## 2024-08-22 RX ORDER — EPINEPHRINE 0.3 MG/.3ML
0.3 INJECTION SUBCUTANEOUS ONCE AS NEEDED
Status: CANCELLED | OUTPATIENT
Start: 2024-08-23

## 2024-08-22 RX ORDER — SODIUM CHLORIDE 0.9 % (FLUSH) 0.9 %
10 SYRINGE (ML) INJECTION
OUTPATIENT
Start: 2024-09-06

## 2024-08-22 RX ORDER — EPINEPHRINE 0.3 MG/.3ML
0.3 INJECTION SUBCUTANEOUS ONCE AS NEEDED
OUTPATIENT
Start: 2024-09-06

## 2024-08-22 RX ORDER — ACETAMINOPHEN 325 MG/1
650 TABLET ORAL
Status: CANCELLED | OUTPATIENT
Start: 2024-08-23

## 2024-08-22 RX ORDER — DIPHENHYDRAMINE HYDROCHLORIDE 50 MG/ML
50 INJECTION INTRAMUSCULAR; INTRAVENOUS ONCE AS NEEDED
OUTPATIENT
Start: 2024-09-06

## 2024-08-22 RX ORDER — HEPARIN 100 UNIT/ML
500 SYRINGE INTRAVENOUS
OUTPATIENT
Start: 2024-09-06

## 2024-08-22 RX ORDER — DIPHENHYDRAMINE HYDROCHLORIDE 50 MG/ML
50 INJECTION INTRAMUSCULAR; INTRAVENOUS ONCE AS NEEDED
Status: CANCELLED | OUTPATIENT
Start: 2024-08-23

## 2024-08-22 RX ORDER — HEPARIN 100 UNIT/ML
500 SYRINGE INTRAVENOUS
Status: CANCELLED | OUTPATIENT
Start: 2024-08-23

## 2024-08-22 RX ORDER — DIPHENHYDRAMINE HYDROCHLORIDE 50 MG/ML
25 INJECTION INTRAMUSCULAR; INTRAVENOUS
Status: CANCELLED | OUTPATIENT
Start: 2024-08-23

## 2024-08-22 RX ORDER — ACETAMINOPHEN 325 MG/1
650 TABLET ORAL
Start: 2024-09-06

## 2024-08-22 RX ORDER — DIPHENHYDRAMINE HYDROCHLORIDE 50 MG/ML
25 INJECTION INTRAMUSCULAR; INTRAVENOUS
Start: 2024-09-06

## 2024-08-22 RX ADMIN — IOHEXOL 100 ML: 350 INJECTION, SOLUTION INTRAVENOUS at 02:08

## 2024-08-22 RX ADMIN — IOHEXOL 1000 ML: 12 SOLUTION ORAL at 12:08

## 2024-08-22 NOTE — PROGRESS NOTES
Patient ID: Shaneka Ahmadi   Chief Complaint: Follow-up  MRN:  [id number]     Oncologic Diagnosis:  Stage IV (cT2, cN2, cM1) NSCLC, metastatic to bone  Previous Treatment:    Carbo/Alimta started in 1/2023; stopped due to progression after 4 cycles     Current Treatment:   OP NSCLC AMIVANTAMAB-VMJW ([facility]) Q2W   OP ZOLEDRONIC ACID (ZOMETA) Q4W   THERAPY SHELL - B12   Subjective   Shaneka Ahmadi is a 54 y.o. female who presents to clinic for follow-up.    She feels well; reports good energy, appetite; rash resolved.  She had knee pain yesterday.  She is to complete dental work August 30 and can hopefully resume Zometa afterwards.  Labs reviewed and stable for treatment. Restaging imaging done today however official results are pending.    Review of Systems   Constitutional:  Negative for activity change, appetite change, chills, diaphoresis, fatigue, fever and unexpected weight change.   HENT:  Negative for nosebleeds.    Respiratory:  Negative for shortness of breath.    Cardiovascular:  Negative for chest pain.   Gastrointestinal:  Negative for abdominal distention, abdominal pain, anal bleeding, blood in stool, constipation, diarrhea, nausea and vomiting.   Genitourinary:  Negative for difficulty urinating and hematuria.   Musculoskeletal:  Negative for arthralgias, back pain and myalgias.   Skin:  Negative for rash.   Neurological:  Negative for dizziness, weakness, light-headedness and headaches.   Hematological:  Does not bruise/bleed easily.   Psychiatric/Behavioral:  The patient is not nervous/anxious.      History     Oncology History   Malignant neoplasm of lower lobe of left lung   12/9/2022 Initial Diagnosis    Malignant neoplasm of lower lobe of left lung     12/9/2022 Cancer Staged    Staging form: Lung, AJCC 8th Edition  - Clinical stage from 12/9/2022: Stage IV (cT2, cN2, cM1)     12/27/2022 - 12/27/2022 Chemotherapy    Treatment Summary   Plan Name: OP PEMBROLIZUMAB 400MG Q6W  Treatment Goal:  Control  Status: Inactive  Start Date:   End Date:   Provider: Reese Mcfarlane MD  Chemotherapy: [No matching medication found in this treatment plan]      Genetic Testing    Patient has genetic testing done for  TEmpus peripheral blood.                                              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     1/18/2023 - 1/18/2023 Chemotherapy    Treatment Summary   Plan Name: OP NSCLC AMIVANTAMAB-VMJW ([facility]) Q4W  Treatment Goal: Palliative  Status: Inactive  Start Date:   End Date:   Provider: Reese Mcfarlane MD  Chemotherapy: amivantamab-vmjw ([facility]) 350 mg in sodium chloride 0.9% SolP 250 mL chemo infusion, 350 mg (100 % of original dose 350 mg), Intravenous, Clinic/HOD 1 time, 0 of 13 cycles  Dose modification: 350 mg (original dose 350 mg, Cycle 1), 1,050 mg (original dose 1,050 mg, Cycle 1), 1,400 mg (original dose 1,400 mg, Cycle 1)     1/27/2023 - 3/31/2023 Chemotherapy    Treatment Summary   Plan Name: OP NSCLC PEMETREXED + CARBOPLATIN (AUC) Q3W  Treatment Goal: Control  Status: Inactive  Start Date: 1/27/2023  End Date: 3/31/2023  Provider: Reese Mcfarlane MD  Chemotherapy: CARBOplatin (PARAPLATIN) 750 mg in sodium chloride 0.9% 335 mL chemo infusion, 750 mg (100 % of original dose 750 mg), Intravenous, Clinic/HOD 1 time, 4 of 4 cycles  Dose modification:   (original dose 750 mg, Cycle 1, Reason: MD Discretion)  Administration: 750 mg (1/27/2023), 750 mg (2/17/2023), 750 mg (3/31/2023), 750 mg (3/10/2023)  PEMEtrexed disodium (ALIMTA) 1,200 mg in sodium chloride 0.9% SolP 100 mL chemo infusion, 1,250 mg, Intravenous, Clinic/HOD 1 time, 4 of 4 cycles  Administration: 1,200 mg (1/27/2023), 1,200 mg (2/17/2023), 1,200 mg (3/31/2023), 1,200 mg (3/10/2023)     4/27/2023 -  Chemotherapy    Treatment Summary   Plan Name: OP NSCLC AMIVANTAMAB-VMJW ([facility]) Q4W  Treatment Goal: Palliative  Status: Active  Start Date:  4/27/2023  End Date: 11/1/2024 (Planned)  Provider: Reese Mcfarlane MD  Chemotherapy: amivantamab-vmjw ([facility]) 350 mg in sodium chloride 0.9% SolP 250 mL chemo infusion, 350 mg (100 % of original dose 350 mg), Intravenous, [facility] 1 time, 17 of 20 cycles  Dose modification: 350 mg (original dose 350 mg, Cycle 1), 1,050 mg (original dose 1,050 mg, Cycle 1), 1,400 mg (original dose 1,400 mg, Cycle 1)  Administration: 350 mg (4/27/2023), 1,050 mg (4/28/2023), 1,400 mg (5/4/2023), 1,400 mg (5/11/2023), 1,400 mg (5/18/2023), 1,400 mg (5/25/2023), 1,400 mg (6/8/2023), 1,400 mg (6/22/2023), 1,400 mg (7/21/2023), 1,400 mg (8/4/2023), 1,400 mg (8/18/2023), 1,400 mg (9/1/2023), 1,400 mg (9/15/2023), 1,400 mg (9/29/2023), 1,400 mg (10/13/2023), 1,400 mg (10/27/2023), 1,400 mg (11/10/2023), 1,400 mg (11/24/2023), 1,400 mg (12/8/2023), 1,400 mg (12/22/2023), 1,400 mg (1/5/2024), 1,400 mg (1/19/2024), 1,400 mg (2/2/2024), 1,400 mg (2/16/2024), 1,400 mg (3/1/2024), 1,400 mg (3/15/2024), 1,400 mg (4/5/2024), 1,400 mg (4/19/2024), 1,400 mg (5/3/2024), 1,400 mg (5/17/2024), 1,400 mg (5/31/2024), 1,400 mg (6/14/2024), 1,400 mg (6/28/2024), 1,400 mg (7/12/2024), 1,400 mg (7/26/2024), 1,400 mg (8/9/2024)           Past Medical History:   Diagnosis Date    Diabetes mellitus     Hypertension     Malignant neoplasm of lower lobe of left lung 12/9/2022    Rash, drug 7/6/2023    OP NSCLC AMIVANTAMAB-VMJW ([facility]) Q4W      Secondary malignant neoplasm of bone 12/5/2022       Past Surgical History:   Procedure Laterality Date    CATARACT EXTRACTION W/  INTRAOCULAR LENS IMPLANT Right 06/02/2022    FLUOROSCOPY N/A 1/26/2023    Procedure: FLUOROSCOPY/mediport placement;  Surgeon: Marlon Leone MD;  Location: [facility] CATH LAB;  Service: General;  Laterality: N/A;    TUBAL LIGATION      2007--postpartum tubal ligation       Family History   Problem Relation Name Age of Onset    Heart failure Father         Review of patient's allergies  indicates:   Allergen Reactions    Lisinopril Other (See Comments)     coughing    Amoxicillin        Social History     Tobacco Use    Smoking status: Never     Passive exposure: Never    Smokeless tobacco: Never   Substance Use Topics    Alcohol use: Never    Drug use: Never   National Oncology Conference this month    Physical Exam     ECOG SCORE             Vitals:    08/22/24 1420   BP: (!) 146/74   Pulse: 71   Resp: 20   Temp: 98.2 °F (36.8 °C)       Physical Exam  Constitutional:       Appearance: She is normal weight.   HENT:      Head: Normocephalic and atraumatic.   Eyes:      Extraocular Movements: Extraocular movements intact.      Conjunctiva/sclera: Conjunctivae normal.   Cardiovascular:      Rate and Rhythm: Normal rate.   Pulmonary:      Effort: Pulmonary effort is normal.   Neurological:      General: No focal deficit present.      Mental Status: She is alert and oriented to person, place, and time. Mental status is at baseline.      Cranial Nerves: No cranial nerve deficit.   Psychiatric:         Mood and Affect: Mood normal.         Behavior: Behavior normal.         Thought Content: Thought content normal.           Labs   Labs:  Lab Visit on 08/22/2024   Component Date Value Ref Range Status    Phosphorus 08/22/2024 3.6  2.7 - 4.5 mg/dL Final    Magnesium 08/22/2024 1.7  1.6 - 2.6 mg/dL Final    Sodium 08/22/2024 143  136 - 145 mmol/L Final    Potassium 08/22/2024 3.9  3.5 - 5.1 mmol/L Final    Chloride 08/22/2024 103  95 - 110 mmol/L Final    CO2 08/22/2024 32 (H)  23 - 29 mmol/L Final    Glucose 08/22/2024 96  70 - 110 mg/dL Final    BUN 08/22/2024 14  6 - 20 mg/dL Final    Creatinine 08/22/2024 0.9  0.5 - 1.4 mg/dL Final    Calcium 08/22/2024 9.3  8.7 - 10.5 mg/dL Final    Total Protein 08/22/2024 6.3  6.0 - 8.4 g/dL Final    Albumin 08/22/2024 2.9 (L)  3.5 - 5.2 g/dL Final    Total Bilirubin 08/22/2024 0.2  0.1 - 1.0 mg/dL Final    Comment: For infants and newborns, interpretation of results  should be based  on gestational age, weight and in agreement with clinical  observations.    Premature Infant recommended reference ranges:  Up to 24 hours.............<8.0 mg/dL  Up to 48 hours............<12.0 mg/dL  3-5 days..................<15.0 mg/dL  6-29 days.................<15.0 mg/dL      Alkaline Phosphatase 08/22/2024 101  55 - 135 U/L Final    AST 08/22/2024 14  10 - 40 U/L Final    ALT 08/22/2024 23  10 - 44 U/L Final    eGFR 08/22/2024 >60  >60 mL/min/1.73 m^2 Final    Anion Gap 08/22/2024 8  8 - 16 mmol/L Final    WBC 08/22/2024 9.36  3.90 - 12.70 K/uL Final    RBC 08/22/2024 4.54  4.00 - 5.40 M/uL Final    Hemoglobin 08/22/2024 12.8  12.0 - 16.0 g/dL Final    Hematocrit 08/22/2024 38.4  37.0 - 48.5 % Final    MCV 08/22/2024 85  82 - 98 fL Final    MCH 08/22/2024 28.2  27.0 - 31.0 pg Final    MCHC 08/22/2024 33.3  32.0 - 36.0 g/dL Final    RDW 08/22/2024 13.3  11.5 - 14.5 % Final    Platelets 08/22/2024 198  150 - 450 K/uL Final    MPV 08/22/2024 11.1  9.2 - 12.9 fL Final    Immature Granulocytes 08/22/2024 0.3  0.0 - 0.5 % Final    Gran # (ANC) 08/22/2024 5.8  1.8 - 7.7 K/uL Final    Immature Grans (Abs) 08/22/2024 0.03  0.00 - 0.04 K/uL Final    Comment: Mild elevation in immature granulocytes is non specific and   can be seen in a variety of conditions including stress response,   acute inflammation, trauma and pregnancy. Correlation with other   laboratory and clinical findings is essential.      Lymph # 08/22/2024 2.7  1.0 - 4.8 K/uL Final    Mono # 08/22/2024 0.6  0.3 - 1.0 K/uL Final    Eos # 08/22/2024 0.2  0.0 - 0.5 K/uL Final    Baso # 08/22/2024 0.04  0.00 - 0.20 K/uL Final    nRBC 08/22/2024 0  0 /100 WBC Final    Gran % 08/22/2024 61.7  38.0 - 73.0 % Final    Lymph % 08/22/2024 29.2  18.0 - 48.0 % Final    Mono % 08/22/2024 6.5  4.0 - 15.0 % Final    Eosinophil % 08/22/2024 1.9  0.0 - 8.0 % Final    Basophil % 08/22/2024 0.4  0.0 - 1.9 % Final    Differential Method 08/22/2024 Automated    Final        Imaging   CT CHEST ABDOMEN PELVIS WITH CONTRAST (XPD) - 10/19/23     CLINICAL HISTORY:  Metastatic disease evaluation;Malignant neoplasm of lower lobe, left bronchus or lung     TECHNIQUE:  Low dose axial images, sagittal and coronal reformations were obtained from the thoracic inlet to the pubic synthesis following the IV administration of 100 mL of Omnipaque 350.  Oral contrast also administered.  All CT scans at this location are performed using dose modulation techniques as appropriate to a performed exam including the following: Automated exposure control; adjustment of the mA and/or kV  according to patient size.     COMPARISON:  07/11/2023.  12/02/2022     FINDINGS:  Chest:     Thoracic soft tissues: No significant abnormality.     Aorta: Normal in course and caliber, without significant atherosclerotic plaque. There are three branching vessels at the arch.     Heart: Normal in size. No pericardial effusion.  Mild aortic and coronary artery atherosclerotic calcification.     Brooke/Mediastinum: No significant lymphadenopathy .  No measurable hilar lesion.     Lungs: Unchanged left lung base volume loss and bronchovascular crowding secondary to elevation left hemidiaphragm.  No measurable mass lesion.  Right lung base benign calcified granuloma.     Abdomen Pelvis:     Liver: Unchanged 9 mm nodule posterior to the inferior right liver.     Gallbladder: No calcified gallstones.     Bile Ducts: No evidence of dilated ducts.     Pancreas: No mass or peripancreatic fat stranding.     Spleen: Unremarkable.     Adrenals: Unchanged benign-appearing 2.1 cm left adrenal fluid density nodule.     Kidneys/ Ureters: Punctate right upper pole nonobstructing renal stone.  Normal in size and location. Normal concentration and excretion of contrast. No hydronephrosis or nephrolithiasis. No ureteral dilatation. Left upper pole 12 mm benign angiomyolipoma.     Bladder: No evidence of wall thickening.      Reproductive organs: Fibroid uterus.     GI Tract/Mesentery: No evidence of bowel obstruction or inflammation. Large volume stool throughout the colon     Peritoneal Space: No ascites. No free air.     Retroperitoneum: No significant adenopathy.     Abdominal wall: Unremarkable.     Vasculature: No significant atherosclerosis or aneurysm.     Bones: No acute fracture. Unchanged widely disseminated osteoblastic metastatic disease.  No new lesions.     Impression:  Stable exam compared to 07/11/2023.         CT CHEST ABDOMEN PELVIS WITH IV CONTRAST (XPD) - 01/30/2024  FINDINGS:  CT CHEST:     1.2 x 1.2 cm calcified granuloma right lung base, unchanged.  Otherwise the right lung is clear     minimal linear focal increased density subpleural lateral left upper lobe could represent residual lung mass or residual scarring, measuring 1.9 x 0.5 cm as compared to 2.2 x 1.0 cm on the prior examination.  Moderate consolidation left lower lobe.  No new or enlarging pulmonary nodules or lung masses bilaterally.     Heart normal in size.  Normal caliber thoracic aorta.  Negative for adenopathy.  Calcified right hilar right paratracheal lymph nodes, unchanged since prior examination.     Soft tissues inferior neck are normal.  Trachea and esophagus are midline and are normal.  Chest wall soft tissues are normal.  Degenerative changes the spine.  Blastic metastatic lesions of the thoracic spine and decreased in number.     CT ABDOMEN PELVIS     Incidental small cysts lateral segment left lobe liver, unchanged since prior examination.  Exophytic nodule posterior aspect of the posterior segment right lobe measures 10 mm and is unchanged.  No suspicious hepatic lesion.     Gallbladder, portal vein, spleen, pancreas are normal.     Low-density left adrenal gland compatible with adenomas unchanged.  Right adrenal gland is normal.  Normal caliber aorta and IVC.  No retroperitoneal adenopathy.  No suspicious renal lesion bilaterally.   No hydronephrosis.  Ureters are nondilated and normal.     Stomach and small intestine normal.  Appendix normal.  Otherwise the bowel is normal.     Mildly distended normal-appearing bladder.  Anteverted uterus.  Negative for adnexal mass.     Abdominal pelvic wall soft tissues are normal.  Degenerative changes of the spine again noted.  Blastic metastatic lesions of the lumbar spine remain unchanged.  Largest is within the L3 vertebral body measuring  2.1 cm in maximal dimension.  Blastic metastatic lesions of the right ilium and proximal bilateral femurs.  Overall osseous metastatic disease within the pelvis and bilateral proximal femurs has significantly improved.        Impression:        1.    Minimal residual linear increased density periphery of the left upper lobe could represent residual left upper lobe lung nodule or residual scarring.  No new or enlarging pulmonary nodules or masses bilaterally.     2.    Nonspecific exophytic soft tissue density nodule projects posteriorly off of the posterior segment right lobe liver.  No change.  No definite evidence for metastatic disease throughout the abdomen or pelvis.     3.    Osseous metastatic disease significantly improved.       Assessment and Plan   Stage IV (cT2, cN2, cM1) NSCLC, Metastatic to Bone  Exon 20 EGFR mutation positive   Previously on Carbo/Alimta started in 1/2023; stopped due to progression after 4 cycles and started on Amivantamab  CT CAP scheduled 01/30/24: stable  Tolerating Amivantamab well; has mild intermittent rash on right side of face but no other notable side effects  CT CAP 05/08/24: stable  Amivantimab tomorrow and in 2 weeks   CT CAP completed 08/22/24 - results pending    Metastatic Bone Disease  Patient has yet to start bisphosphonate therapy as she has some ongoing dental issues pending completion of dental work and dental clearance  She will obtain dental work tentatively in the next 4-8 weeks; new dental insurance starts Feb 1  so she will see the dentist after that  Continue Calcium and Vitamin D/Weight Bearing Exercise      Amivantamab Induced Rash  Continue topical clindamycin, and tretinoin   Started on doxycycline per derm  Continue follow up with derm      Chronic Medical Conditions  DM II  Hx of COVID-19        Med Onc Chart Routing      Follow up with physician    Follow up with DOLLY 2 weeks and 4 weeks.   Infusion scheduling note   Amivantimab tomorrow and in 2 weeks   Injection scheduling note    Labs CBC and CMP   Scheduling:  Preferred lab:  Lab interval:     Imaging    Pharmacy appointment    Other referrals                     The patient was seen, interviewed and examined. Pertinent lab and radiologic studies were reviewed. Pt instructed to call should they develop concerning signs/symptoms or have further questions.        Portions of the record may have been created with voice recognition software. Occasional wrong-word or sound-a-like substitutions may have occurred due to the inherent limitations of voice recognition software. Read the chart carefully and recognize, using context, where substitutions have occurred.      Mirtha Wilhelm MD    Hematology/Oncology

## 2024-08-23 ENCOUNTER — INFUSION (OUTPATIENT)
Dept: INFUSION THERAPY | Facility: HOSPITAL | Age: 54
End: 2024-08-23
Attending: RADIOLOGY
Payer: COMMERCIAL

## 2024-08-23 VITALS
WEIGHT: 293 LBS | HEART RATE: 66 BPM | HEIGHT: 64 IN | RESPIRATION RATE: 16 BRPM | OXYGEN SATURATION: 98 % | DIASTOLIC BLOOD PRESSURE: 68 MMHG | TEMPERATURE: 97 F | SYSTOLIC BLOOD PRESSURE: 112 MMHG | BODY MASS INDEX: 50.02 KG/M2

## 2024-08-23 DIAGNOSIS — C34.32 MALIGNANT NEOPLASM OF LOWER LOBE OF LEFT LUNG: Primary | ICD-10-CM

## 2024-08-23 PROCEDURE — 63600175 PHARM REV CODE 636 W HCPCS: Performed by: INTERNAL MEDICINE

## 2024-08-23 PROCEDURE — 96367 TX/PROPH/DG ADDL SEQ IV INF: CPT

## 2024-08-23 PROCEDURE — 96375 TX/PRO/DX INJ NEW DRUG ADDON: CPT

## 2024-08-23 PROCEDURE — 25000003 PHARM REV CODE 250: Performed by: INTERNAL MEDICINE

## 2024-08-23 PROCEDURE — 96415 CHEMO IV INFUSION ADDL HR: CPT

## 2024-08-23 PROCEDURE — 96413 CHEMO IV INFUSION 1 HR: CPT

## 2024-08-23 PROCEDURE — A4216 STERILE WATER/SALINE, 10 ML: HCPCS | Performed by: INTERNAL MEDICINE

## 2024-08-23 RX ORDER — ACETAMINOPHEN 325 MG/1
650 TABLET ORAL
Status: COMPLETED | OUTPATIENT
Start: 2024-08-23 | End: 2024-08-23

## 2024-08-23 RX ORDER — HEPARIN 100 UNIT/ML
500 SYRINGE INTRAVENOUS
Status: DISCONTINUED | OUTPATIENT
Start: 2024-08-23 | End: 2024-08-23 | Stop reason: HOSPADM

## 2024-08-23 RX ORDER — SODIUM CHLORIDE 0.9 % (FLUSH) 0.9 %
10 SYRINGE (ML) INJECTION
Status: DISCONTINUED | OUTPATIENT
Start: 2024-08-23 | End: 2024-08-23 | Stop reason: HOSPADM

## 2024-08-23 RX ORDER — DIPHENHYDRAMINE HYDROCHLORIDE 50 MG/ML
25 INJECTION INTRAMUSCULAR; INTRAVENOUS
Status: COMPLETED | OUTPATIENT
Start: 2024-08-23 | End: 2024-08-23

## 2024-08-23 RX ADMIN — DIPHENHYDRAMINE HYDROCHLORIDE 25 MG: 50 INJECTION INTRAMUSCULAR; INTRAVENOUS at 09:08

## 2024-08-23 RX ADMIN — ACETAMINOPHEN 650 MG: 325 TABLET ORAL at 09:08

## 2024-08-23 RX ADMIN — HEPARIN 500 UNITS: 100 SYRINGE at 11:08

## 2024-08-23 RX ADMIN — Medication 10 ML: at 11:08

## 2024-08-23 RX ADMIN — SODIUM CHLORIDE: 9 INJECTION, SOLUTION INTRAVENOUS at 09:08

## 2024-08-23 RX ADMIN — DEXAMETHASONE SODIUM PHOSPHATE 0.25 MG: 4 INJECTION, SOLUTION INTRA-ARTICULAR; INTRALESIONAL; INTRAMUSCULAR; INTRAVENOUS; SOFT TISSUE at 09:08

## 2024-08-23 RX ADMIN — SODIUM CHLORIDE 1400 MG: 9 INJECTION, SOLUTION INTRAVENOUS at 10:08

## 2024-08-23 NOTE — DISCHARGE INSTRUCTIONS
.[facility]  [id number] [facility]  [address]  [phone] phone     [phone] fax  Hours of Operation: Monday- Friday 8:00am- 5:00pm  After hours phone  [phone]  Hematology / Oncology Physicians on call    Dr. Denys Sinclair        Nurse Practitioners:    Lorie Blackmon, BRISA Caicedo, BRISA Monteiro, BRISA Kwon, BRISA Trujillo, PA      Please don't hesitate to call if you have any concerns.       .WAYS TO HELP PREVENT INFECTION        WASH YOUR HANDS OFTEN DURING THE DAY, ESPECIALLY BEFORE YOU EAT, AFTER USING THE BATHROOM, AND AFTER TOUCHING ANIMALS    STAY AWAY FROM PEOPLE WHO HAVE ILLNESSES YOU CAN CATCH; SUCH AS COLDS, FLU, CHICKEN POX    TRY TO AVOID CROWDS    STAY AWAY FROM CHILDREN WHO RECENTLY HAVE RECEIVED LIVE VIRUS VACCINES    MAINTAIN GOOD MOUTH CARE    DO NOT SQUEEZE OR SCRATCH PIMPLES    CLEAN CUTS & SCRAPES RIGHT AWAY AND DAILY UNTIL HEALED WITH WARM WATER, SOAP & AN ANTISEPTIC    AVOID CONTACT WITH LITTER BOXES, BIRD CAGES, & FISH TANKS    AVOID STANDING WATER, IE., BIRD BATHS, FLOWER POTS/VASES, OR HUMIDIFIERS    WEAR GLOVES WHEN GARDENING OR CLEANING UP AFTER OTHERS, ESPECIALLY BABIES & SMALL CHILDREN    DO NOT EAT RAW FISH, SEAFOOD, MEAT, OR EGGS

## 2024-08-23 NOTE — PLAN OF CARE
Problem: Adult Inpatient Plan of Care  Goal: Plan of Care Review  Outcome: Progressing  Flowsheets (Taken 8/23/2024 1011)  Plan of Care Reviewed With: patient  Goal: Optimal Comfort and Wellbeing  Outcome: Progressing     Problem: Chemotherapy Effects  Goal: Absence of Infection  Outcome: Progressing     Problem: Fall Injury Risk  Goal: Absence of Fall and Fall-Related Injury  Outcome: Progressing

## 2024-08-27 ENCOUNTER — PATIENT MESSAGE (OUTPATIENT)
Dept: DERMATOLOGY | Facility: CLINIC | Age: 54
End: 2024-08-27
Payer: COMMERCIAL

## 2024-08-30 RX ORDER — DOXYCYCLINE HYCLATE 100 MG
100 TABLET ORAL DAILY
Qty: 30 TABLET | Refills: 1 | Status: SHIPPED | OUTPATIENT
Start: 2024-08-30

## 2024-09-05 ENCOUNTER — OFFICE VISIT (OUTPATIENT)
Dept: HEMATOLOGY/ONCOLOGY | Facility: CLINIC | Age: 54
End: 2024-09-05
Payer: COMMERCIAL

## 2024-09-05 ENCOUNTER — LAB VISIT (OUTPATIENT)
Dept: LAB | Facility: HOSPITAL | Age: 54
End: 2024-09-05
Attending: INTERNAL MEDICINE
Payer: COMMERCIAL

## 2024-09-05 DIAGNOSIS — C79.51 SECONDARY MALIGNANT NEOPLASM OF BONE: ICD-10-CM

## 2024-09-05 DIAGNOSIS — C34.32 MALIGNANT NEOPLASM OF LOWER LOBE OF LEFT LUNG: Primary | ICD-10-CM

## 2024-09-05 LAB
ALBUMIN SERPL BCP-MCNC: 2.7 G/DL (ref 3.5–5.2)
ALP SERPL-CCNC: 107 U/L (ref 55–135)
ALT SERPL W/O P-5'-P-CCNC: 22 U/L (ref 10–44)
ANION GAP SERPL CALC-SCNC: 10 MMOL/L (ref 8–16)
AST SERPL-CCNC: 14 U/L (ref 10–40)
BASOPHILS # BLD AUTO: 0.04 K/UL (ref 0–0.2)
BASOPHILS NFR BLD: 0.5 % (ref 0–1.9)
BILIRUB SERPL-MCNC: 0.3 MG/DL (ref 0.1–1)
BUN SERPL-MCNC: 19 MG/DL (ref 6–20)
CALCIUM SERPL-MCNC: 8.6 MG/DL (ref 8.7–10.5)
CHLORIDE SERPL-SCNC: 104 MMOL/L (ref 95–110)
CO2 SERPL-SCNC: 25 MMOL/L (ref 23–29)
CREAT SERPL-MCNC: 1 MG/DL (ref 0.5–1.4)
DIFFERENTIAL METHOD BLD: NORMAL
EOSINOPHIL # BLD AUTO: 0.3 K/UL (ref 0–0.5)
EOSINOPHIL NFR BLD: 2.9 % (ref 0–8)
ERYTHROCYTE [DISTWIDTH] IN BLOOD BY AUTOMATED COUNT: 13.3 % (ref 11.5–14.5)
EST. GFR  (NO RACE VARIABLE): >60 ML/MIN/1.73 M^2
GLUCOSE SERPL-MCNC: 192 MG/DL (ref 70–110)
HCT VFR BLD AUTO: 37.4 % (ref 37–48.5)
HGB BLD-MCNC: 12.3 G/DL (ref 12–16)
IMM GRANULOCYTES # BLD AUTO: 0.03 K/UL (ref 0–0.04)
IMM GRANULOCYTES NFR BLD AUTO: 0.4 % (ref 0–0.5)
LYMPHOCYTES # BLD AUTO: 2 K/UL (ref 1–4.8)
LYMPHOCYTES NFR BLD: 23.8 % (ref 18–48)
MAGNESIUM SERPL-MCNC: 1.7 MG/DL (ref 1.6–2.6)
MCH RBC QN AUTO: 27.8 PG (ref 27–31)
MCHC RBC AUTO-ENTMCNC: 32.9 G/DL (ref 32–36)
MCV RBC AUTO: 84 FL (ref 82–98)
MONOCYTES # BLD AUTO: 0.6 K/UL (ref 0.3–1)
MONOCYTES NFR BLD: 7.4 % (ref 4–15)
NEUTROPHILS # BLD AUTO: 5.5 K/UL (ref 1.8–7.7)
NEUTROPHILS NFR BLD: 65 % (ref 38–73)
NRBC BLD-RTO: 0 /100 WBC
PHOSPHATE SERPL-MCNC: 3.3 MG/DL (ref 2.7–4.5)
PLATELET # BLD AUTO: 177 K/UL (ref 150–450)
PMV BLD AUTO: 11.7 FL (ref 9.2–12.9)
POTASSIUM SERPL-SCNC: 3.6 MMOL/L (ref 3.5–5.1)
PROT SERPL-MCNC: 6 G/DL (ref 6–8.4)
RBC # BLD AUTO: 4.43 M/UL (ref 4–5.4)
SODIUM SERPL-SCNC: 139 MMOL/L (ref 136–145)
WBC # BLD AUTO: 8.52 K/UL (ref 3.9–12.7)

## 2024-09-05 PROCEDURE — 99215 OFFICE O/P EST HI 40 MIN: CPT | Mod: 95,,,

## 2024-09-05 PROCEDURE — G2211 COMPLEX E/M VISIT ADD ON: HCPCS | Mod: 95,,,

## 2024-09-05 PROCEDURE — 1160F RVW MEDS BY RX/DR IN RCRD: CPT | Mod: CPTII,95,,

## 2024-09-05 PROCEDURE — 84100 ASSAY OF PHOSPHORUS: CPT | Performed by: INTERNAL MEDICINE

## 2024-09-05 PROCEDURE — 3066F NEPHROPATHY DOC TX: CPT | Mod: CPTII,95,,

## 2024-09-05 PROCEDURE — 1159F MED LIST DOCD IN RCRD: CPT | Mod: CPTII,95,,

## 2024-09-05 PROCEDURE — 83735 ASSAY OF MAGNESIUM: CPT | Performed by: INTERNAL MEDICINE

## 2024-09-05 PROCEDURE — 36415 COLL VENOUS BLD VENIPUNCTURE: CPT | Performed by: INTERNAL MEDICINE

## 2024-09-05 PROCEDURE — 80053 COMPREHEN METABOLIC PANEL: CPT | Performed by: INTERNAL MEDICINE

## 2024-09-05 PROCEDURE — 3044F HG A1C LEVEL LT 7.0%: CPT | Mod: CPTII,95,,

## 2024-09-05 PROCEDURE — 3061F NEG MICROALBUMINURIA REV: CPT | Mod: CPTII,95,,

## 2024-09-05 PROCEDURE — 85025 COMPLETE CBC W/AUTO DIFF WBC: CPT | Performed by: INTERNAL MEDICINE

## 2024-09-05 NOTE — PROGRESS NOTES
Subjective:     Patient ID:Mateusz Ahmadi is a 54 y.o. female.?? MR#: [id number]   ?   PRIMARY ONCOLOGIST: Dr. Mcfarlane   ?   CHIEF COMPLAINT: Lab review/assessment C1D1 [facility]  ?   ONCOLOGIC DIAGNOSIS: Stage IV (cT2, cN2, cM1), Malignant neoplasm of lower lobe of left lung   ?   CURRENT TREATMENT: OP NSCLC AMIVANTAMAB-VMJW ([facility]vant) Q4W     PAST TREATMENT: PEMETREXED + CARBOPLATIN (AUC) Q3W    The patient location is: [place]  The chief complaint leading to consultation is: follow-up    Visit type: audio only    Face to Face time with patient: 10  20 minutes of total time spent on the encounter, which includes face to face time and non-face to face time preparing to see the patient (eg, review of tests), Obtaining and/or reviewing separately obtained history, Documenting clinical information in the electronic or other health record, Independently interpreting results (not separately reported) and communicating results to the patient/family/caregiver, or Care coordination (not separately reported).         Each patient to whom he or she provides medical services by telemedicine is:  (1) informed of the relationship between the physician and patient and the respective role of any other health care provider with respect to management of the patient; and (2) notified that he or she may decline to receive medical services by telemedicine and may withdraw from such care at any time.    Notes:      HPI Mrs. [facility]hmadi is a [facility] 54[facility] with metastatic non-small cell lung carcinoma Exon 20 mutation who presents today for lab review and assessment prior to C1D1 [facility]. 11/2022 pt seen with PCP with c/o persistent coughing and wheezing. CXR at that time revealed pulmonary nodules, subsequent CT chest found L Lung mass. L lung biopsy positive for adenocarcinoma , EGFR mutated. Pleural fluid also positive for malignancy. PET showed avid STEPHAN mass, mediastinal nodes, bone mets in C1, T1, T11, L3, sacrum, L ischium,  sternum. MRI brain negative for intracranial metastases. Initiated on carbo/ alimta 01/27/23--re imaging after 4 cycles found progressive disease. Pt initiated on Rybrevant 04/27/23.    Interval History: Pt states she is feeling well and voices no c/o today. She notes recent left knee pain since Monday which she attributes to over exertion--recommend alternating heat and ice--pt notes she has been applying pain cream.  Denies n/v/d/c, fever, chills, sob, cp, cough, abnormal bleeding. Denies difficulty with appetite or maintaining hydration.       Oncology History   Malignant neoplasm of lower lobe of left lung   12/9/2022 Initial Diagnosis    Malignant neoplasm of lower lobe of left lung       12/9/2022 Cancer Staged    Staging form: Lung, AJCC 8th Edition  - Clinical stage from 12/9/2022: Stage IV (cT2, cN2, cM1)       12/27/2022 - 12/27/2022 Chemotherapy    Treatment Summary   Plan Name: OP PEMBROLIZUMAB 400MG Q6W  Treatment Goal: Control  Status: Inactive  Start Date:   End Date:   Provider: Reese Mcfarlane MD  Chemotherapy: [No matching medication found in this treatment plan]        Genetic Testing    Patient has genetic testing done for  TEmpus peripheral blood.                                              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     1/18/2023 - 1/18/2023 Chemotherapy    Treatment Summary   Plan Name: OP NSCLC AMIVANTAMAB-VMJW ([facility]brevant) Q4W  Treatment Goal: Palliative  Status: Inactive  Start Date:   End Date:   Provider: Reese Mcfarlane MD  Chemotherapy: amivantamab-vmjw (RYBREVANT) 350 mg in sodium chloride 0.9% SolP 250 mL chemo infusion, 350 mg (100 % of original dose 350 mg), Intravenous, Clinic/HOD 1 time, 0 of 13 cycles  Dose modification: 350 mg (original dose 350 mg, Cycle 1), 1,050 mg (original dose 1,050 mg, Cycle 1), 1,400 mg (original dose 1,400 mg, Cycle 1)       1/27/2023 - 3/31/2023 Chemotherapy    Treatment Summary    Plan Name: OP NSCLC PEMETREXED + CARBOPLATIN (AUC) Q3W  Treatment Goal: Control  Status: Inactive  Start Date: 1/27/2023  End Date: 3/31/2023  Provider: Reese Mcfarlane MD  Chemotherapy: CARBOplatin (PARAPLATIN) 750 mg in sodium chloride 0.9% 335 mL chemo infusion, 750 mg (100 % of original dose 750 mg), Intravenous, Clinic/HOD 1 time, 4 of 4 cycles  Dose modification:   (original dose 750 mg, Cycle 1, Reason: MD Discretion)  Administration: 750 mg (1/27/2023), 750 mg (2/17/2023), 750 mg (3/31/2023), 750 mg (3/10/2023)  PEMEtrexed disodium (ALIMTA) 1,200 mg in sodium chloride 0.9% SolP 100 mL chemo infusion, 1,250 mg, Intravenous, Clinic/HOD 1 time, 4 of 4 cycles  Administration: 1,200 mg (1/27/2023), 1,200 mg (2/17/2023), 1,200 mg (3/31/2023), 1,200 mg (3/10/2023)       4/27/2023 -  Chemotherapy    Treatment Summary   Plan Name: OP NSCLC AMIVANTAMAB-VMJW (R[facility]brevant) Q4W  Treatment Goal: Palliative  Status: Active  Start Date: 4/27/2023 (Planned)  End Date: 6/6/2024 (Planned)  Provider: Reese Mcfarlane MD  Chemotherapy: amivantamab-vmjw (RYBREVANT) 350 mg in sodium chloride 0.9% SolP 250 mL chemo infusion, 350 mg (original dose ), Intravenous, Clinic/HOD 1 time, 0 of 15 cycles  Dose modification: 350 mg (Cycle 1), 1,050 mg (Cycle 1), 1,400 mg (Cycle 1)          Social History     Socioeconomic History    Marital status:    Tobacco Use    Smoking status: Never     Passive exposure: Never    Smokeless tobacco: Never   Substance and Sexual Activity    Alcohol use: Never    Drug use: Never    Sexual activity: Yes     Partners: Male     Birth control/protection: None     Comment: tubal     Social Determinants of Health     Financial Resource Strain: Low Risk  (6/26/2024)    Received from [facility]    Overall Financial Resource Strain (CARDIA)     Difficulty of Paying Living Expenses: Not hard at all   Food Insecurity: No Food Insecurity (6/26/2024)    Received from [facility]    Hunger Vital Sign     Worried About Running Out of Food in the Last Year: Never true     Ran Out of Food in the Last Year: Never true   Transportation Needs: No Transportation Needs (6/26/2024)    Received from [facility]    PRAPARE - Transportation     Lack of Transportation (Medical): No     Lack of Transportation (Non-Medical): No   Physical Activity: Insufficiently Active (6/26/2024)    Received from [facility]    Exercise Vital Sign     Days of Exercise per Week: 3 days     Minutes of Exercise per Session: 40 min   Stress: No Stress Concern Present (6/26/2024)    Received from [facility]    [employer] - Occupational Stress Questionnaire     Feeling of Stress : Not at all   Housing Stability: Unknown (11/22/2023)    Housing Stability Vital Sign     Unable to Pay for Housing in the Last Year: Patient refused     Unstable Housing in the Last Year: Patient refused      Family History   Problem Relation Name Age of Onset    Heart failure Father        Past Surgical History:   Procedure Laterality Date    CATARACT EXTRACTION W/  INTRAOCULAR LENS IMPLANT Right 06/02/2022    FLUOROSCOPY N/A 1/26/2023    Procedure: FLUOROSCOPY/mediport placement;  Surgeon: Marlon Leone MD;  Location: [facility] CATH LAB;  Service: General;  Laterality: N/A;    TUBAL LIGATION      2007--postpartum tubal ligation        Review of Systems   Constitutional:  Negative for activity change, chills, fatigue and fever.   HENT: Negative.     Eyes: Negative.    Respiratory: Negative.     Cardiovascular: Negative.    Gastrointestinal: Negative.    Endocrine: Positive for cold intolerance.   Musculoskeletal:  Negative for arthralgias and myalgias.   Skin:  Negative for rash.   Neurological:  Negative for dizziness, weakness and light-headedness.   Psychiatric/Behavioral:  The patient is not nervous/anxious.         ?   A comprehensive 14-point review of systems was reviewed with patient and was negative other than as specified above.   ?     Objective:      Physical Exam  Vitals reviewed: virtual visit.   Constitutional:       Appearance: Normal appearance.   Neurological:      Mental Status: She is alert.   Psychiatric:         Mood and Affect: Mood normal.         Behavior: Behavior normal.         ?   There were no vitals filed for this visit.       ?       ?   Laboratory:  ?   Lab Visit on 09/05/2024   Component Date Value Ref Range Status    Phosphorus 09/05/2024 3.3  2.7 - 4.5 mg/dL Final    Magnesium 09/05/2024 1.7  1.6 - 2.6 mg/dL Final    Sodium 09/05/2024 139  136 - 145 mmol/L Final    Potassium 09/05/2024 3.6  3.5 - 5.1 mmol/L Final    Chloride 09/05/2024 104  95 - 110 mmol/L Final    CO2 09/05/2024 25  23 - 29 mmol/L Final    Glucose 09/05/2024 192 (H)  70 - 110 mg/dL Final    BUN 09/05/2024 19  6 - 20 mg/dL Final    Creatinine 09/05/2024 1.0  0.5 - 1.4 mg/dL Final    Calcium 09/05/2024 8.6 (L)  8.7 - 10.5 mg/dL Final    Total Protein 09/05/2024 6.0  6.0 - 8.4 g/dL Final    Albumin 09/05/2024 2.7 (L)  3.5 - 5.2 g/dL Final    Total Bilirubin 09/05/2024 0.3  0.1 - 1.0 mg/dL Final    Alkaline Phosphatase 09/05/2024 107  55 - 135 U/L Final    AST 09/05/2024 14  10 - 40 U/L Final    ALT 09/05/2024 22  10 - 44 U/L Final    eGFR 09/05/2024 >60  >60 mL/min/1.73 m^2 Final    Anion Gap 09/05/2024 10  8 - 16 mmol/L Final    WBC 09/05/2024 8.52  3.90 - 12.70 K/uL Final    RBC 09/05/2024 4.43  4.00 - 5.40 M/uL Final    Hemoglobin 09/05/2024 12.3  12.0 - 16.0 g/dL Final    Hematocrit 09/05/2024 37.4  37.0 - 48.5 % Final    MCV 09/05/2024 84  82 - 98 fL Final    MCH 09/05/2024 27.8  27.0 - 31.0 pg Final    MCHC 09/05/2024 32.9  32.0 - 36.0 g/dL Final    RDW 09/05/2024 13.3  11.5 - 14.5 % Final    Platelets 09/05/2024 177  150 - 450 K/uL Final    MPV 09/05/2024 11.7  9.2 - 12.9 fL Final     Immature Granulocytes 09/05/2024 0.4  0.0 - 0.5 % Final    Gran # (ANC) 09/05/2024 5.5  1.8 - 7.7 K/uL Final    Immature Grans (Abs) 09/05/2024 0.03  0.00 - 0.04 K/uL Final    Lymph # 09/05/2024 2.0  1.0 - 4.8 K/uL Final    Mono # 09/05/2024 0.6  0.3 - 1.0 K/uL Final    Eos # 09/05/2024 0.3  0.0 - 0.5 K/uL Final    Baso # 09/05/2024 0.04  0.00 - 0.20 K/uL Final    nRBC 09/05/2024 0  0 /100 WBC Final    Gran % 09/05/2024 65.0  38.0 - 73.0 % Final    Lymph % 09/05/2024 23.8  18.0 - 48.0 % Final    Mono % 09/05/2024 7.4  4.0 - 15.0 % Final    Eosinophil % 09/05/2024 2.9  0.0 - 8.0 % Final    Basophil % 09/05/2024 0.5  0.0 - 1.9 % Final    Differential Method 09/05/2024 Automated   Final      ?   Imaging:    No results found. However, due to the size of the patient record, not all encounters were searched. Please check Results Review for a complete set of results.     Results for orders placed or performed during the hospital encounter of 08/22/24 (from the past 2160 hour(s))   CT Chest Abdomen Pelvis With IV Contrast (XPD) NO Oral Contrast    Narrative     EXAM: CT CHEST ABDOMEN PELVIS WITH IV CONTRAST (XPD)    CLINICAL HISTORY: Follow-up lung carcinoma    COMPARISON: 05/08/2024    TECHNIQUE: Standard thin-section axial images, with reformatted sagittal and coronal images.    FINDINGS: CT of chest:    There is stable appearance of a calcified granuloma in the right lower lobe at the right lung base.  The right lung is otherwise clear.  Opacity in the left lower lobe at the left lung base consistent with scarring and atelectasis has slightly decreased since 05/08/2024.  No pleural effusions are visible.  No pathologic lymph node enlargement is identified in the pulmonary isabelle, mediastinum or axilla bilaterally.  Dense calcification associated with granulomatous infection is incidentally visible in lymph nodes in the right hilum and mediastinum.  Heart size is normal.  No chest wall abnormalities are visible  except for a Mediport entering the right internal jugular vein.  Sclerotic bone lesions consistent with metastasis are visible in the T1 vertebral body, left humeral head and glenoid process of the right scapula      CT of abdomen and pelvis:    The liver, spleen, pancreas, gallbladder and right adrenal appear normal.  There is a stable low attenuation mass consistent with adrenal adenoma identified posteriorly in the left adrenal measuring 2.1 x 2.1 x 1.7 cm.  The kidneys opacify symmetrically with intravenous contrast demonstrating no evidence of urinary obstruction.  There is a punctate nonobstructing 1 to 2 mm calculus identified in the collecting system of the mid right kidney.  A small fat-containing lesion consistent with a benign angiomyolipoma is visible in the upper to mid left kidney.  No free intraperitoneal fluid or lymph node enlargement is identified.  Opacified loops of small bowel appear normal.  A normal appendix is seen in the right lower quadrant.    Images obtained through the pelvis demonstrate no abnormality in the urinary bladder.  The uterus and ovaries appear normal for patient age.    Sclerotic bone lesions consistent with bone metastases are identified in the T11, T12 and L3 vertebral bodies, multiple foci in the bony pelvis and left femoral neck.          Impression    1.  Decreasing scarring/atelectasis in the left lower lung zone compared to 05/08/2024.  No acute pulmonary disease, enlarging pulmonary nodules or adenopathy are identified in the chest.  2.  Incidental left adrenal adenoma.  3.  Small nonobstructing intrarenal calculus on the right.  4.  Sclerotic bone metastases in the right scapula, left femoral head, thoracic and lumbar spine, bony pelvis and left femoral neck.    All CT scans at [this location] are performed using dose modulation techniques as appropriate to a performed exam including the following:  Automated exposure control; adjustment of the mA and/or kV  according to patient size (this includes techniques or standardized protocols for targeted exams where dose is matched to indication / reason for exam; i.e. extremities or head); use of iterative reconstruction technique.    Finalized on: 8/22/2024 4:32 PM By:  Jackson Ruffin  [facility]# [id number]      2024-08-22 16:34:[id number]    [facility]     *Note: Due to a large number of results and/or encounters for the requested time period, some results have not been displayed. A complete set of results can be found in Results Review.        ?   Assessment/Plan:     Problem List Items Addressed This Visit          Oncology    Malignant neoplasm of lower lobe of left lung - Primary      Cancer Staging   Malignant neoplasm of lower lobe of left lung  Staging form: Lung, AJCC 8th Edition  - Clinical stage from 12/9/2022: Stage IV (cT2, cN2, cM1) - Signed by Reese Mcfarlane MD on 12/9/2022    Completed 4 Cycles of Carbo/Alimta 03/31/2023    Repeat CT CAP 04/11/2023:   Detrimental change.  Increase in number and size of numerous sclerotic lesions throughout the cervical and thoracic spine, left humerus and sternum.  There are also multiple large sclerotic lesions throughout the lumbar spine, pelvis and proximal femurs measuring up to 7.1 cm in size.  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  2.   The lateral left upper lobe mass is decreased in size in the interim, currently measuring 2.2 x 1.0 cm.  Negative for new pulmonary masses.  3.  Exophytic nodule along the posterior right hepatic lobe measuring 1.1 cm.  This was not imaged previously.  Etiology uncertain.  Metastasis is not excluded.    --Rybrevant C1D1 04/27/23--    Most recent CT CAP 08/22/24 Stable     Labs reviewed, no concerning cytopenias      -Ok to proceed with C18D15 Rybrevant today    RTC in two weeks with labs prior for C19D1 [facility]brevant                          Med Onc Chart Routing      Follow up with physician 2 weeks. with repeat labs prior for RYbrevant    Follow up with DOLLY    Infusion scheduling note   Rybrevant   Injection scheduling note    Labs CBC, CMP, magnesium and phosphorus   Scheduling:  Preferred lab:  Lab interval:     Imaging    Pharmacy appointment    Other referrals                 REBEL CastilloP-C  Hematology/Oncology

## 2024-09-05 NOTE — ASSESSMENT & PLAN NOTE
Cancer Staging   Malignant neoplasm of lower lobe of left lung  Staging form: Lung, AJCC 8th Edition  - Clinical stage from 12/9/2022: Stage IV (cT2, cN2, cM1) - Signed by Reese Mcfarlane MD on 12/9/2022    Completed 4 Cycles of Carbo/Alimta 03/31/2023    Repeat CT CAP 04/11/2023:   Detrimental change.  Increase in number and size of numerous sclerotic lesions throughout the cervical and thoracic spine, left humerus and sternum.  There are also multiple large sclerotic lesions throughout the lumbar spine, pelvis and proximal femurs measuring up to 7.1 cm in size.  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  2.   The lateral left upper lobe mass is decreased in size in the interim, currently measuring 2.2 x 1.0 cm.  Negative for new pulmonary masses.  3.  Exophytic nodule along the posterior right hepatic lobe measuring 1.1 cm.  This was not imaged previously.  Etiology uncertain.  Metastasis is not excluded.    --Rybrevant C1D1 04/27/23--    Most recent CT CAP 08/22/24 Stable     Labs reviewed, no concerning cytopenias      -Ok to proceed with C18D15 Rybrevant today    RTC in two weeks with labs prior for C19D1 Rybrevant

## 2024-09-06 ENCOUNTER — INFUSION (OUTPATIENT)
Dept: INFUSION THERAPY | Facility: HOSPITAL | Age: 54
End: 2024-09-06
Attending: RADIOLOGY
Payer: COMMERCIAL

## 2024-09-06 VITALS
OXYGEN SATURATION: 97 % | HEIGHT: 64 IN | BODY MASS INDEX: 50.02 KG/M2 | DIASTOLIC BLOOD PRESSURE: 65 MMHG | HEART RATE: 58 BPM | RESPIRATION RATE: 16 BRPM | TEMPERATURE: 97 F | SYSTOLIC BLOOD PRESSURE: 106 MMHG | WEIGHT: 293 LBS

## 2024-09-06 DIAGNOSIS — C34.32 MALIGNANT NEOPLASM OF LOWER LOBE OF LEFT LUNG: Primary | ICD-10-CM

## 2024-09-06 PROCEDURE — 96367 TX/PROPH/DG ADDL SEQ IV INF: CPT

## 2024-09-06 PROCEDURE — 63600175 PHARM REV CODE 636 W HCPCS: Performed by: INTERNAL MEDICINE

## 2024-09-06 PROCEDURE — 96413 CHEMO IV INFUSION 1 HR: CPT

## 2024-09-06 PROCEDURE — 25000003 PHARM REV CODE 250: Performed by: INTERNAL MEDICINE

## 2024-09-06 PROCEDURE — 96375 TX/PRO/DX INJ NEW DRUG ADDON: CPT

## 2024-09-06 PROCEDURE — 96415 CHEMO IV INFUSION ADDL HR: CPT

## 2024-09-06 RX ORDER — HEPARIN 100 UNIT/ML
500 SYRINGE INTRAVENOUS
Status: DISCONTINUED | OUTPATIENT
Start: 2024-09-06 | End: 2024-09-06 | Stop reason: HOSPADM

## 2024-09-06 RX ORDER — ACETAMINOPHEN 325 MG/1
650 TABLET ORAL
Status: COMPLETED | OUTPATIENT
Start: 2024-09-06 | End: 2024-09-06

## 2024-09-06 RX ORDER — DIPHENHYDRAMINE HYDROCHLORIDE 50 MG/ML
25 INJECTION INTRAMUSCULAR; INTRAVENOUS
Status: COMPLETED | OUTPATIENT
Start: 2024-09-06 | End: 2024-09-06

## 2024-09-06 RX ORDER — DIPHENHYDRAMINE HYDROCHLORIDE 50 MG/ML
50 INJECTION INTRAMUSCULAR; INTRAVENOUS ONCE AS NEEDED
Status: DISCONTINUED | OUTPATIENT
Start: 2024-09-06 | End: 2024-09-06 | Stop reason: HOSPADM

## 2024-09-06 RX ORDER — EPINEPHRINE 0.3 MG/.3ML
0.3 INJECTION SUBCUTANEOUS ONCE AS NEEDED
Status: DISCONTINUED | OUTPATIENT
Start: 2024-09-06 | End: 2024-09-06 | Stop reason: HOSPADM

## 2024-09-06 RX ADMIN — DIPHENHYDRAMINE HYDROCHLORIDE 25 MG: 50 INJECTION, SOLUTION INTRAMUSCULAR; INTRAVENOUS at 09:09

## 2024-09-06 RX ADMIN — DEXAMETHASONE SODIUM PHOSPHATE 0.25 MG: 4 INJECTION, SOLUTION INTRA-ARTICULAR; INTRALESIONAL; INTRAMUSCULAR; INTRAVENOUS; SOFT TISSUE at 09:09

## 2024-09-06 RX ADMIN — SODIUM CHLORIDE 1400 MG: 9 INJECTION, SOLUTION INTRAVENOUS at 10:09

## 2024-09-06 RX ADMIN — ACETAMINOPHEN 650 MG: 325 TABLET ORAL at 09:09

## 2024-09-06 RX ADMIN — HEPARIN 500 UNITS: 100 SYRINGE at 12:09

## 2024-09-06 NOTE — PLAN OF CARE
Problem: Adult Inpatient Plan of Care  Goal: Plan of Care Review  Outcome: Progressing  Flowsheets (Taken 9/6/2024 0922)  Plan of Care Reviewed With: patient  Goal: Optimal Comfort and Wellbeing  Outcome: Progressing  Intervention: Provide Person-Centered Care  Flowsheets (Taken 9/6/2024 0922)  Trust Relationship/Rapport:   care explained   choices provided   emotional support provided   empathic listening provided   questions answered   questions encouraged   reassurance provided   thoughts/feelings acknowledged     Problem: Chemotherapy Effects  Goal: Absence of Infection  Outcome: Progressing  Intervention: Prevent Infection and Maximize Resistance  Flowsheets (Taken 9/6/2024 0922)  Infection Prevention:   equipment surfaces disinfected   hand hygiene promoted   personal protective equipment utilized   rest/sleep promoted

## 2024-09-12 ENCOUNTER — TELEPHONE (OUTPATIENT)
Dept: PHARMACY | Facility: CLINIC | Age: 54
End: 2024-09-12
Payer: COMMERCIAL

## 2024-09-13 NOTE — TELEPHONE ENCOUNTER
[facility]/Population Health Chart Review & Patient Outreach Details For Medication Adherence Project    Reason for Outreach Encounter: 3rd Party payor non-compliance report ([employer], [employer], C, etc)  2.  Patient Outreach Method: Reviewed patient chart   3.   Medication in question: metformin   LAST FILLED: n/a  Diabetes Medications               glipizide-metformin (METAGLIP) 5-500 mg per tablet Take 1 tablet by mouth 2 (two) times daily before meals.              4.  Reviewed and or Updates Made To: Patient Chart  5. Outreach Outcomes and/or actions taken: Medication discontinued    Additional Notes:     Alternate therapy

## 2024-09-19 ENCOUNTER — OFFICE VISIT (OUTPATIENT)
Dept: HEMATOLOGY/ONCOLOGY | Facility: CLINIC | Age: 54
End: 2024-09-19
Payer: COMMERCIAL

## 2024-09-19 ENCOUNTER — LAB VISIT (OUTPATIENT)
Dept: LAB | Facility: HOSPITAL | Age: 54
End: 2024-09-19
Attending: INTERNAL MEDICINE
Payer: COMMERCIAL

## 2024-09-19 VITALS
HEART RATE: 68 BPM | OXYGEN SATURATION: 98 % | RESPIRATION RATE: 20 BRPM | SYSTOLIC BLOOD PRESSURE: 108 MMHG | HEIGHT: 64 IN | DIASTOLIC BLOOD PRESSURE: 65 MMHG | WEIGHT: 293 LBS | BODY MASS INDEX: 50.02 KG/M2 | TEMPERATURE: 98 F

## 2024-09-19 DIAGNOSIS — C79.51 SECONDARY MALIGNANT NEOPLASM OF BONE: ICD-10-CM

## 2024-09-19 DIAGNOSIS — C34.32 MALIGNANT NEOPLASM OF LOWER LOBE OF LEFT LUNG: Primary | ICD-10-CM

## 2024-09-19 LAB
ALBUMIN SERPL BCP-MCNC: 2.8 G/DL (ref 3.5–5.2)
ALP SERPL-CCNC: 114 U/L (ref 55–135)
ALT SERPL W/O P-5'-P-CCNC: 21 U/L (ref 10–44)
ANION GAP SERPL CALC-SCNC: 9 MMOL/L (ref 8–16)
AST SERPL-CCNC: 15 U/L (ref 10–40)
BASOPHILS # BLD AUTO: 0.04 K/UL (ref 0–0.2)
BASOPHILS NFR BLD: 0.5 % (ref 0–1.9)
BILIRUB SERPL-MCNC: 0.4 MG/DL (ref 0.1–1)
BUN SERPL-MCNC: 14 MG/DL (ref 6–20)
CALCIUM SERPL-MCNC: 9 MG/DL (ref 8.7–10.5)
CHLORIDE SERPL-SCNC: 104 MMOL/L (ref 95–110)
CO2 SERPL-SCNC: 30 MMOL/L (ref 23–29)
CREAT SERPL-MCNC: 0.8 MG/DL (ref 0.5–1.4)
DIFFERENTIAL METHOD BLD: NORMAL
EOSINOPHIL # BLD AUTO: 0.2 K/UL (ref 0–0.5)
EOSINOPHIL NFR BLD: 2.5 % (ref 0–8)
ERYTHROCYTE [DISTWIDTH] IN BLOOD BY AUTOMATED COUNT: 13 % (ref 11.5–14.5)
EST. GFR  (NO RACE VARIABLE): >60 ML/MIN/1.73 M^2
GLUCOSE SERPL-MCNC: 113 MG/DL (ref 70–110)
HCT VFR BLD AUTO: 37.8 % (ref 37–48.5)
HGB BLD-MCNC: 12.6 G/DL (ref 12–16)
IMM GRANULOCYTES # BLD AUTO: 0.02 K/UL (ref 0–0.04)
IMM GRANULOCYTES NFR BLD AUTO: 0.3 % (ref 0–0.5)
LYMPHOCYTES # BLD AUTO: 2.6 K/UL (ref 1–4.8)
LYMPHOCYTES NFR BLD: 33.6 % (ref 18–48)
MAGNESIUM SERPL-MCNC: 1.7 MG/DL (ref 1.6–2.6)
MCH RBC QN AUTO: 27.9 PG (ref 27–31)
MCHC RBC AUTO-ENTMCNC: 33.3 G/DL (ref 32–36)
MCV RBC AUTO: 84 FL (ref 82–98)
MONOCYTES # BLD AUTO: 0.6 K/UL (ref 0.3–1)
MONOCYTES NFR BLD: 8 % (ref 4–15)
NEUTROPHILS # BLD AUTO: 4.2 K/UL (ref 1.8–7.7)
NEUTROPHILS NFR BLD: 55.1 % (ref 38–73)
NRBC BLD-RTO: 0 /100 WBC
PHOSPHATE SERPL-MCNC: 3.4 MG/DL (ref 2.7–4.5)
PLATELET # BLD AUTO: 200 K/UL (ref 150–450)
PMV BLD AUTO: 11.3 FL (ref 9.2–12.9)
POTASSIUM SERPL-SCNC: 3.8 MMOL/L (ref 3.5–5.1)
PROT SERPL-MCNC: 6.3 G/DL (ref 6–8.4)
RBC # BLD AUTO: 4.51 M/UL (ref 4–5.4)
SODIUM SERPL-SCNC: 143 MMOL/L (ref 136–145)
WBC # BLD AUTO: 7.61 K/UL (ref 3.9–12.7)

## 2024-09-19 PROCEDURE — 80053 COMPREHEN METABOLIC PANEL: CPT | Performed by: INTERNAL MEDICINE

## 2024-09-19 PROCEDURE — 84100 ASSAY OF PHOSPHORUS: CPT | Performed by: INTERNAL MEDICINE

## 2024-09-19 PROCEDURE — 83735 ASSAY OF MAGNESIUM: CPT | Performed by: INTERNAL MEDICINE

## 2024-09-19 PROCEDURE — 99999 PR PBB SHADOW E&M-EST. PATIENT-LVL V: CPT | Mod: PBBFAC,,, | Performed by: INTERNAL MEDICINE

## 2024-09-19 PROCEDURE — 36415 COLL VENOUS BLD VENIPUNCTURE: CPT | Performed by: INTERNAL MEDICINE

## 2024-09-19 PROCEDURE — 85025 COMPLETE CBC W/AUTO DIFF WBC: CPT | Performed by: INTERNAL MEDICINE

## 2024-09-19 RX ORDER — ACETAMINOPHEN 325 MG/1
650 TABLET ORAL
Status: CANCELLED | OUTPATIENT
Start: 2024-09-20

## 2024-09-19 RX ORDER — SODIUM CHLORIDE 0.9 % (FLUSH) 0.9 %
10 SYRINGE (ML) INJECTION
Status: CANCELLED | OUTPATIENT
Start: 2024-09-20

## 2024-09-19 RX ORDER — HEPARIN 100 UNIT/ML
500 SYRINGE INTRAVENOUS
OUTPATIENT
Start: 2024-10-04

## 2024-09-19 RX ORDER — DIPHENHYDRAMINE HYDROCHLORIDE 50 MG/ML
25 INJECTION INTRAMUSCULAR; INTRAVENOUS
Status: CANCELLED | OUTPATIENT
Start: 2024-09-20

## 2024-09-19 RX ORDER — HEPARIN 100 UNIT/ML
500 SYRINGE INTRAVENOUS
Status: CANCELLED | OUTPATIENT
Start: 2024-09-20

## 2024-09-19 RX ORDER — DIPHENHYDRAMINE HYDROCHLORIDE 50 MG/ML
50 INJECTION INTRAMUSCULAR; INTRAVENOUS ONCE AS NEEDED
OUTPATIENT
Start: 2024-10-04

## 2024-09-19 RX ORDER — ACETAMINOPHEN 325 MG/1
650 TABLET ORAL
Start: 2024-10-04

## 2024-09-19 RX ORDER — POTASSIUM CHLORIDE 750 MG/1
10 TABLET, EXTENDED RELEASE ORAL 4 TIMES DAILY
COMMUNITY
Start: 2024-07-24

## 2024-09-19 RX ORDER — EPINEPHRINE 0.3 MG/.3ML
0.3 INJECTION SUBCUTANEOUS ONCE AS NEEDED
Status: CANCELLED | OUTPATIENT
Start: 2024-09-20

## 2024-09-19 RX ORDER — SODIUM CHLORIDE 0.9 % (FLUSH) 0.9 %
10 SYRINGE (ML) INJECTION
OUTPATIENT
Start: 2024-10-04

## 2024-09-19 RX ORDER — DIPHENHYDRAMINE HYDROCHLORIDE 50 MG/ML
50 INJECTION INTRAMUSCULAR; INTRAVENOUS ONCE AS NEEDED
Status: CANCELLED | OUTPATIENT
Start: 2024-09-20

## 2024-09-19 RX ORDER — DIPHENHYDRAMINE HYDROCHLORIDE 50 MG/ML
25 INJECTION INTRAMUSCULAR; INTRAVENOUS
Start: 2024-10-04

## 2024-09-19 RX ORDER — EPINEPHRINE 0.3 MG/.3ML
0.3 INJECTION SUBCUTANEOUS ONCE AS NEEDED
OUTPATIENT
Start: 2024-10-04

## 2024-09-19 NOTE — PROGRESS NOTES
Patient ID: Shaneka Ahmadi   Chief Complaint: Follow-up  MRN:  [id number]     Oncologic Diagnosis:  Stage IV (cT2, cN2, cM1) NSCLC, metastatic to bone  Previous Treatment:    Carbo/Alimta started in 1/2023; stopped due to progression after 4 cycles     Current Treatment:   OP NSCLC AMIVANTAMAB-VMJW ([facility]) Q2W   OP ZOLEDRONIC ACID (ZOMETA) Q4W   THERAPY SHELL - B12   Subjective   Shaneka Ahmadi is a 54 y.o. female who presents to clinic for follow-up.    She feels well; reports good energy, appetite; rash resolved. She has no acute complaints.     Review of Systems   Constitutional:  Negative for activity change, appetite change, chills, diaphoresis, fatigue, fever and unexpected weight change.   HENT:  Negative for nosebleeds.    Respiratory:  Negative for shortness of breath.    Cardiovascular:  Negative for chest pain.   Gastrointestinal:  Negative for abdominal distention, abdominal pain, anal bleeding, blood in stool, constipation, diarrhea, nausea and vomiting.   Genitourinary:  Negative for difficulty urinating and hematuria.   Musculoskeletal:  Negative for arthralgias, back pain and myalgias.   Skin:  Negative for rash.   Neurological:  Negative for dizziness, weakness, light-headedness and headaches.   Hematological:  Does not bruise/bleed easily.   Psychiatric/Behavioral:  The patient is not nervous/anxious.      History     Oncology History   Malignant neoplasm of lower lobe of left lung   12/9/2022 Initial Diagnosis    Malignant neoplasm of lower lobe of left lung     12/9/2022 Cancer Staged    Staging form: Lung, AJCC 8th Edition  - Clinical stage from 12/9/2022: Stage IV (cT2, cN2, cM1)     12/27/2022 - 12/27/2022 Chemotherapy    Treatment Summary   Plan Name: OP PEMBROLIZUMAB 400MG Q6W  Treatment Goal: Control  Status: Inactive  Start Date:   End Date:   Provider: Reese Mcfarlane MD  Chemotherapy: [No matching medication found in this treatment plan]      Genetic Testing    Patient has  genetic testing done for  TEmpus peripheral blood.                                              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     1/18/2023 - 1/18/2023 Chemotherapy    Treatment Summary   Plan Name: OP NSCLC AMIVANTAMAB-VMJW ([facility]) Q4W  Treatment Goal: Palliative  Status: Inactive  Start Date:   End Date:   Provider: Reese Mcfarlane MD  Chemotherapy: amivantamab-vmjw ([facility]) 350 mg in sodium chloride 0.9% SolP 250 mL chemo infusion, 350 mg (100 % of original dose 350 mg), Intravenous, Clinic/HOD 1 time, 0 of 13 cycles  Dose modification: 350 mg (original dose 350 mg, Cycle 1), 1,050 mg (original dose 1,050 mg, Cycle 1), 1,400 mg (original dose 1,400 mg, Cycle 1)     1/27/2023 - 3/31/2023 Chemotherapy    Treatment Summary   Plan Name: OP NSCLC PEMETREXED + CARBOPLATIN (AUC) Q3W  Treatment Goal: Control  Status: Inactive  Start Date: 1/27/2023  End Date: 3/31/2023  Provider: Reese Mcfarlane MD  Chemotherapy: CARBOplatin (PARAPLATIN) 750 mg in sodium chloride 0.9% 335 mL chemo infusion, 750 mg (100 % of original dose 750 mg), Intravenous, Clinic/HOD 1 time, 4 of 4 cycles  Dose modification:   (original dose 750 mg, Cycle 1, Reason: MD Discretion)  Administration: 750 mg (1/27/2023), 750 mg (2/17/2023), 750 mg (3/31/2023), 750 mg (3/10/2023)  PEMEtrexed disodium (ALIMTA) 1,200 mg in sodium chloride 0.9% SolP 100 mL chemo infusion, 1,250 mg, Intravenous, Clinic/HOD 1 time, 4 of 4 cycles  Administration: 1,200 mg (1/27/2023), 1,200 mg (2/17/2023), 1,200 mg (3/31/2023), 1,200 mg (3/10/2023)     4/27/2023 -  Chemotherapy    Treatment Summary   Plan Name: OP NSCLC AMIVANTAMAB-VMJW ([facility]) Q4W  Treatment Goal: Palliative  Status: Active  Start Date: 4/27/2023  End Date: 7/11/2025 (Planned)  Provider: Reese Mcfarlane MD  Chemotherapy: amivantamab-vmjw ([facility]) 350 mg in sodium chloride 0.9% SolP 250 mL chemo infusion, 350 mg (100 % of  original dose 350 mg), Intravenous, [facility] 1 time, 18 of 29 cycles  Dose modification: 350 mg (original dose 350 mg, Cycle 1), 1,050 mg (original dose 1,050 mg, Cycle 1), 1,400 mg (original dose 1,400 mg, Cycle 1)  Administration: 350 mg (4/27/2023), 1,050 mg (4/28/2023), 1,400 mg (5/4/2023), 1,400 mg (5/11/2023), 1,400 mg (5/18/2023), 1,400 mg (5/25/2023), 1,400 mg (6/8/2023), 1,400 mg (6/22/2023), 1,400 mg (7/21/2023), 1,400 mg (8/4/2023), 1,400 mg (8/18/2023), 1,400 mg (9/1/2023), 1,400 mg (9/15/2023), 1,400 mg (9/29/2023), 1,400 mg (10/13/2023), 1,400 mg (10/27/2023), 1,400 mg (11/10/2023), 1,400 mg (11/24/2023), 1,400 mg (12/8/2023), 1,400 mg (12/22/2023), 1,400 mg (1/5/2024), 1,400 mg (1/19/2024), 1,400 mg (2/2/2024), 1,400 mg (2/16/2024), 1,400 mg (3/1/2024), 1,400 mg (3/15/2024), 1,400 mg (4/5/2024), 1,400 mg (4/19/2024), 1,400 mg (5/3/2024), 1,400 mg (5/17/2024), 1,400 mg (5/31/2024), 1,400 mg (6/14/2024), 1,400 mg (6/28/2024), 1,400 mg (7/12/2024), 1,400 mg (7/26/2024), 1,400 mg (8/9/2024), 1,400 mg (8/23/2024), 1,400 mg (9/6/2024)           Past Medical History:   Diagnosis Date    Diabetes mellitus     Hypertension     Malignant neoplasm of lower lobe of left lung 12/9/2022    Rash, drug 7/6/2023    OP NSCLC AMIVANTAMAB-VMJW ([facility]) Q4W      Secondary malignant neoplasm of bone 12/5/2022       Past Surgical History:   Procedure Laterality Date    CATARACT EXTRACTION W/  INTRAOCULAR LENS IMPLANT Right 06/02/2022    FLUOROSCOPY N/A 1/26/2023    Procedure: FLUOROSCOPY/mediport placement;  Surgeon: Marlon Leone MD;  Location: [facility] CATH LAB;  Service: General;  Laterality: N/A;    TUBAL LIGATION      2007--postpartum tubal ligation       Family History   Problem Relation Name Age of Onset    Heart failure Father         Review of patient's allergies indicates:   Allergen Reactions    Lisinopril Other (See Comments)     coughing    Amoxicillin        Social History     Tobacco Use    Smoking status:  Never     Passive exposure: Never    Smokeless tobacco: Never   Substance Use Topics    Alcohol use: Never    Drug use: Never   National Oncology Conference this month    Physical Exam     ECOG SCORE    0 - Fully active-able to carry on all pre-disease performance without restriction         Vitals:    09/19/24 1259   BP: 108/65   Pulse: 68   Resp: 20   Temp: 97.6 °F (36.4 °C)       Physical Exam  Constitutional:       Appearance: She is normal weight.   HENT:      Head: Normocephalic and atraumatic.   Eyes:      Extraocular Movements: Extraocular movements intact.      Conjunctiva/sclera: Conjunctivae normal.   Cardiovascular:      Rate and Rhythm: Normal rate.   Pulmonary:      Effort: Pulmonary effort is normal.   Neurological:      General: No focal deficit present.      Mental Status: She is alert and oriented to person, place, and time. Mental status is at baseline.      Cranial Nerves: No cranial nerve deficit.   Psychiatric:         Mood and Affect: Mood normal.         Behavior: Behavior normal.         Thought Content: Thought content normal.           Labs   Labs:  Lab Visit on 09/19/2024   Component Date Value Ref Range Status    Phosphorus 09/19/2024 3.4  2.7 - 4.5 mg/dL Final    Magnesium 09/19/2024 1.7  1.6 - 2.6 mg/dL Final    Sodium 09/19/2024 143  136 - 145 mmol/L Final    Potassium 09/19/2024 3.8  3.5 - 5.1 mmol/L Final    Chloride 09/19/2024 104  95 - 110 mmol/L Final    CO2 09/19/2024 30 (H)  23 - 29 mmol/L Final    Glucose 09/19/2024 113 (H)  70 - 110 mg/dL Final    BUN 09/19/2024 14  6 - 20 mg/dL Final    Creatinine 09/19/2024 0.8  0.5 - 1.4 mg/dL Final    Calcium 09/19/2024 9.0  8.7 - 10.5 mg/dL Final    Total Protein 09/19/2024 6.3  6.0 - 8.4 g/dL Final    Albumin 09/19/2024 2.8 (L)  3.5 - 5.2 g/dL Final    Total Bilirubin 09/19/2024 0.4  0.1 - 1.0 mg/dL Final    Comment: For infants and newborns, interpretation of results should be based  on gestational age, weight and in agreement with  clinical  observations.    Premature Infant recommended reference ranges:  Up to 24 hours.............<8.0 mg/dL  Up to 48 hours............<12.0 mg/dL  3-5 days..................<15.0 mg/dL  6-29 days.................<15.0 mg/dL      Alkaline Phosphatase 09/19/2024 114  55 - 135 U/L Final    AST 09/19/2024 15  10 - 40 U/L Final    ALT 09/19/2024 21  10 - 44 U/L Final    eGFR 09/19/2024 >60  >60 mL/min/1.73 m^2 Final    Anion Gap 09/19/2024 9  8 - 16 mmol/L Final    WBC 09/19/2024 7.61  3.90 - 12.70 K/uL Final    RBC 09/19/2024 4.51  4.00 - 5.40 M/uL Final    Hemoglobin 09/19/2024 12.6  12.0 - 16.0 g/dL Final    Hematocrit 09/19/2024 37.8  37.0 - 48.5 % Final    MCV 09/19/2024 84  82 - 98 fL Final    MCH 09/19/2024 27.9  27.0 - 31.0 pg Final    MCHC 09/19/2024 33.3  32.0 - 36.0 g/dL Final    RDW 09/19/2024 13.0  11.5 - 14.5 % Final    Platelets 09/19/2024 200  150 - 450 K/uL Final    MPV 09/19/2024 11.3  9.2 - 12.9 fL Final    Immature Granulocytes 09/19/2024 0.3  0.0 - 0.5 % Final    Gran # (ANC) 09/19/2024 4.2  1.8 - 7.7 K/uL Final    Immature Grans (Abs) 09/19/2024 0.02  0.00 - 0.04 K/uL Final    Comment: Mild elevation in immature granulocytes is non specific and   can be seen in a variety of conditions including stress response,   acute inflammation, trauma and pregnancy. Correlation with other   laboratory and clinical findings is essential.      Lymph # 09/19/2024 2.6  1.0 - 4.8 K/uL Final    Mono # 09/19/2024 0.6  0.3 - 1.0 K/uL Final    Eos # 09/19/2024 0.2  0.0 - 0.5 K/uL Final    Baso # 09/19/2024 0.04  0.00 - 0.20 K/uL Final    nRBC 09/19/2024 0  0 /100 WBC Final    Gran % 09/19/2024 55.1  38.0 - 73.0 % Final    Lymph % 09/19/2024 33.6  18.0 - 48.0 % Final    Mono % 09/19/2024 8.0  4.0 - 15.0 % Final    Eosinophil % 09/19/2024 2.5  0.0 - 8.0 % Final    Basophil % 09/19/2024 0.5  0.0 - 1.9 % Final    Differential Method 09/19/2024 Automated   Final        Imaging   CT CHEST ABDOMEN PELVIS WITH CONTRAST  (XPD) - 10/19/23     CLINICAL HISTORY:  Metastatic disease evaluation;Malignant neoplasm of lower lobe, left bronchus or lung     TECHNIQUE:  Low dose axial images, sagittal and coronal reformations were obtained from the thoracic inlet to the pubic synthesis following the IV administration of 100 mL of Omnipaque 350.  Oral contrast also administered.  All CT scans at this location are performed using dose modulation techniques as appropriate to a performed exam including the following: Automated exposure control; adjustment of the mA and/or kV  according to patient size.     COMPARISON:  07/11/2023.  12/02/2022     FINDINGS:  Chest:     Thoracic soft tissues: No significant abnormality.     Aorta: Normal in course and caliber, without significant atherosclerotic plaque. There are three branching vessels at the arch.     Heart: Normal in size. No pericardial effusion.  Mild aortic and coronary artery atherosclerotic calcification.     Brooke/Mediastinum: No significant lymphadenopathy .  No measurable hilar lesion.     Lungs: Unchanged left lung base volume loss and bronchovascular crowding secondary to elevation left hemidiaphragm.  No measurable mass lesion.  Right lung base benign calcified granuloma.     Abdomen Pelvis:     Liver: Unchanged 9 mm nodule posterior to the inferior right liver.     Gallbladder: No calcified gallstones.     Bile Ducts: No evidence of dilated ducts.     Pancreas: No mass or peripancreatic fat stranding.     Spleen: Unremarkable.     Adrenals: Unchanged benign-appearing 2.1 cm left adrenal fluid density nodule.     Kidneys/ Ureters: Punctate right upper pole nonobstructing renal stone.  Normal in size and location. Normal concentration and excretion of contrast. No hydronephrosis or nephrolithiasis. No ureteral dilatation. Left upper pole 12 mm benign angiomyolipoma.     Bladder: No evidence of wall thickening.     Reproductive organs: Fibroid uterus.     GI Tract/Mesentery: No evidence  of bowel obstruction or inflammation. Large volume stool throughout the colon     Peritoneal Space: No ascites. No free air.     Retroperitoneum: No significant adenopathy.     Abdominal wall: Unremarkable.     Vasculature: No significant atherosclerosis or aneurysm.     Bones: No acute fracture. Unchanged widely disseminated osteoblastic metastatic disease.  No new lesions.     Impression:  Stable exam compared to 07/11/2023.         CT CHEST ABDOMEN PELVIS WITH IV CONTRAST (XPD) - 01/30/2024  FINDINGS:  CT CHEST:     1.2 x 1.2 cm calcified granuloma right lung base, unchanged.  Otherwise the right lung is clear     minimal linear focal increased density subpleural lateral left upper lobe could represent residual lung mass or residual scarring, measuring 1.9 x 0.5 cm as compared to 2.2 x 1.0 cm on the prior examination.  Moderate consolidation left lower lobe.  No new or enlarging pulmonary nodules or lung masses bilaterally.     Heart normal in size.  Normal caliber thoracic aorta.  Negative for adenopathy.  Calcified right hilar right paratracheal lymph nodes, unchanged since prior examination.     Soft tissues inferior neck are normal.  Trachea and esophagus are midline and are normal.  Chest wall soft tissues are normal.  Degenerative changes the spine.  Blastic metastatic lesions of the thoracic spine and decreased in number.     CT ABDOMEN PELVIS     Incidental small cysts lateral segment left lobe liver, unchanged since prior examination.  Exophytic nodule posterior aspect of the posterior segment right lobe measures 10 mm and is unchanged.  No suspicious hepatic lesion.     Gallbladder, portal vein, spleen, pancreas are normal.     Low-density left adrenal gland compatible with adenomas unchanged.  Right adrenal gland is normal.  Normal caliber aorta and IVC.  No retroperitoneal adenopathy.  No suspicious renal lesion bilaterally.  No hydronephrosis.  Ureters are nondilated and normal.     Stomach and small  intestine normal.  Appendix normal.  Otherwise the bowel is normal.     Mildly distended normal-appearing bladder.  Anteverted uterus.  Negative for adnexal mass.     Abdominal pelvic wall soft tissues are normal.  Degenerative changes of the spine again noted.  Blastic metastatic lesions of the lumbar spine remain unchanged.  Largest is within the L3 vertebral body measuring  2.1 cm in maximal dimension.  Blastic metastatic lesions of the right ilium and proximal bilateral femurs.  Overall osseous metastatic disease within the pelvis and bilateral proximal femurs has significantly improved.        Impression:        1.    Minimal residual linear increased density periphery of the left upper lobe could represent residual left upper lobe lung nodule or residual scarring.  No new or enlarging pulmonary nodules or masses bilaterally.     2.    Nonspecific exophytic soft tissue density nodule projects posteriorly off of the posterior segment right lobe liver.  No change.  No definite evidence for metastatic disease throughout the abdomen or pelvis.     3.    Osseous metastatic disease significantly improved.       Assessment and Plan   Stage IV (cT2, cN2, cM1) NSCLC, Metastatic to Bone  Exon 20 EGFR mutation positive   Previously on Carbo/Alimta started in 1/2023; stopped due to progression after 4 cycles and started on Amivantamab  CT CAP scheduled 01/30/24: stable  Tolerating Amivantamab well; has mild intermittent rash on right side of face but no other notable side effects  CT CAP completed 08/22/24 - stable  Amivatimab today and q2w (pt to get infusion and lab q2w and provider visit q4w)     Metastatic Bone Disease  Patient has yet to start bisphosphonate therapy as she has some ongoing dental issues pending completion of dental work and dental clearance  Patient reports that she has to have 3 fillings and then dental work will be complete and we can resume Zometa  Continue Calcium and Vitamin D/Weight Bearing  Exercise      Amivantamab Induced Rash  Continue topical clindamycin, and tretinoin   Started on doxycycline per derm  Continue follow up with derm      Chronic Medical Conditions  DM II  Hx of COVID-19        Med Onc Chart Routing      Follow up with physician 3 months. review imaging   Follow up with DOLLY 4 weeks and 2 months.   Infusion scheduling note   Amivatimab tomorrow and q2w (pt to get infusion and lab q2w and provider visit q4w)   Injection scheduling note    Labs CBC, CMP, magnesium and phosphorus   Scheduling:  Preferred lab:  Lab interval:     Imaging    Pharmacy appointment    Other referrals                 The patient was seen, interviewed and examined. Pertinent lab and radiologic studies were reviewed. Pt instructed to call should they develop concerning signs/symptoms or have further questions.        Portions of the record may have been created with voice recognition software. Occasional wrong-word or sound-a-like substitutions may have occurred due to the inherent limitations of voice recognition software. Read the chart carefully and recognize, using context, where substitutions have occurred.      Mirtha Wilhelm MD    Hematology/Oncology

## 2024-09-20 ENCOUNTER — INFUSION (OUTPATIENT)
Dept: INFUSION THERAPY | Facility: HOSPITAL | Age: 54
End: 2024-09-20
Attending: RADIOLOGY
Payer: COMMERCIAL

## 2024-09-20 VITALS
OXYGEN SATURATION: 98 % | SYSTOLIC BLOOD PRESSURE: 102 MMHG | TEMPERATURE: 97 F | HEIGHT: 64 IN | HEART RATE: 60 BPM | RESPIRATION RATE: 16 BRPM | WEIGHT: 293 LBS | DIASTOLIC BLOOD PRESSURE: 59 MMHG | BODY MASS INDEX: 50.02 KG/M2

## 2024-09-20 DIAGNOSIS — C34.32 MALIGNANT NEOPLASM OF LOWER LOBE OF LEFT LUNG: Primary | ICD-10-CM

## 2024-09-20 PROCEDURE — 63600175 PHARM REV CODE 636 W HCPCS: Performed by: INTERNAL MEDICINE

## 2024-09-20 PROCEDURE — 96413 CHEMO IV INFUSION 1 HR: CPT

## 2024-09-20 PROCEDURE — 96367 TX/PROPH/DG ADDL SEQ IV INF: CPT

## 2024-09-20 PROCEDURE — 96375 TX/PRO/DX INJ NEW DRUG ADDON: CPT

## 2024-09-20 PROCEDURE — A4216 STERILE WATER/SALINE, 10 ML: HCPCS | Performed by: INTERNAL MEDICINE

## 2024-09-20 PROCEDURE — 25000003 PHARM REV CODE 250: Performed by: INTERNAL MEDICINE

## 2024-09-20 RX ORDER — HEPARIN 100 UNIT/ML
500 SYRINGE INTRAVENOUS
Status: DISCONTINUED | OUTPATIENT
Start: 2024-09-20 | End: 2024-09-20 | Stop reason: HOSPADM

## 2024-09-20 RX ORDER — ACETAMINOPHEN 325 MG/1
650 TABLET ORAL
Status: COMPLETED | OUTPATIENT
Start: 2024-09-20 | End: 2024-09-20

## 2024-09-20 RX ORDER — SODIUM CHLORIDE 0.9 % (FLUSH) 0.9 %
10 SYRINGE (ML) INJECTION
Status: DISCONTINUED | OUTPATIENT
Start: 2024-09-20 | End: 2024-09-20 | Stop reason: HOSPADM

## 2024-09-20 RX ORDER — DIPHENHYDRAMINE HYDROCHLORIDE 50 MG/ML
25 INJECTION INTRAMUSCULAR; INTRAVENOUS
Status: COMPLETED | OUTPATIENT
Start: 2024-09-20 | End: 2024-09-20

## 2024-09-20 RX ADMIN — AMIVANTAMAB 1400 MG: 350 INJECTION INTRAVENOUS at 09:09

## 2024-09-20 RX ADMIN — DIPHENHYDRAMINE HYDROCHLORIDE 25 MG: 50 INJECTION, SOLUTION INTRAMUSCULAR; INTRAVENOUS at 09:09

## 2024-09-20 RX ADMIN — ACETAMINOPHEN 650 MG: 325 TABLET ORAL at 09:09

## 2024-09-20 RX ADMIN — Medication 10 ML: at 11:09

## 2024-09-20 RX ADMIN — SODIUM CHLORIDE: 9 INJECTION, SOLUTION INTRAVENOUS at 09:09

## 2024-09-20 RX ADMIN — HEPARIN 500 UNITS: 100 SYRINGE at 11:09

## 2024-09-20 RX ADMIN — SODIUM CHLORIDE 0.25 MG: 9 INJECTION, SOLUTION INTRAVENOUS at 09:09

## 2024-09-20 NOTE — DISCHARGE INSTRUCTIONS
.[facility]  [address] [address]  [address]  [phone] phone     [phone] fax  Hours of Operation: Monday- Friday 8:00am- 5:00pm  After hours phone  [phone]  Hematology / Oncology Physicians on call    Dr. Denys Sinclair        Nurse Practitioners:    Lorie Blackmon, BRISA Caicedo, BRISA Monteiro, BRISA Kwon, BRISA Trujillo, PA      Please don't hesitate to call if you have any concerns.       .WAYS TO HELP PREVENT INFECTION        WASH YOUR HANDS OFTEN DURING THE DAY, ESPECIALLY BEFORE YOU EAT, AFTER USING THE BATHROOM, AND AFTER TOUCHING ANIMALS    STAY AWAY FROM PEOPLE WHO HAVE ILLNESSES YOU CAN CATCH; SUCH AS COLDS, FLU, CHICKEN POX    TRY TO AVOID CROWDS    STAY AWAY FROM CHILDREN WHO RECENTLY HAVE RECEIVED LIVE VIRUS VACCINES    MAINTAIN GOOD MOUTH CARE    DO NOT SQUEEZE OR SCRATCH PIMPLES    CLEAN CUTS & SCRAPES RIGHT AWAY AND DAILY UNTIL HEALED WITH WARM WATER, SOAP & AN ANTISEPTIC    AVOID CONTACT WITH LITTER BOXES, BIRD CAGES, & FISH TANKS    AVOID STANDING WATER, IE., BIRD BATHS, FLOWER POTS/VASES, OR HUMIDIFIERS    WEAR GLOVES WHEN GARDENING OR CLEANING UP AFTER OTHERS, ESPECIALLY BABIES & SMALL CHILDREN    DO NOT EAT RAW FISH, SEAFOOD, MEAT, OR EGGS

## 2024-09-20 NOTE — PLAN OF CARE
Problem: Adult Inpatient Plan of Care  Goal: Plan of Care Review  Outcome: Progressing  Flowsheets (Taken 9/20/2024 1105)  Plan of Care Reviewed With: patient  Goal: Optimal Comfort and Wellbeing  Outcome: Progressing     Problem: Chemotherapy Effects  Goal: Absence of Infection  Outcome: Progressing     Problem: Fall Injury Risk  Goal: Absence of Fall and Fall-Related Injury  Outcome: Progressing

## 2024-10-03 ENCOUNTER — LAB VISIT (OUTPATIENT)
Dept: LAB | Facility: HOSPITAL | Age: 54
End: 2024-10-03
Attending: INTERNAL MEDICINE
Payer: COMMERCIAL

## 2024-10-03 DIAGNOSIS — C79.51 SECONDARY MALIGNANT NEOPLASM OF BONE: ICD-10-CM

## 2024-10-03 LAB
ALBUMIN SERPL BCP-MCNC: 2.8 G/DL (ref 3.5–5.2)
ALP SERPL-CCNC: 111 U/L (ref 55–135)
ALT SERPL W/O P-5'-P-CCNC: 19 U/L (ref 10–44)
ANION GAP SERPL CALC-SCNC: 12 MMOL/L (ref 8–16)
AST SERPL-CCNC: 15 U/L (ref 10–40)
BASOPHILS # BLD AUTO: 0.04 K/UL (ref 0–0.2)
BASOPHILS NFR BLD: 0.6 % (ref 0–1.9)
BILIRUB SERPL-MCNC: 0.5 MG/DL (ref 0.1–1)
BUN SERPL-MCNC: 14 MG/DL (ref 6–20)
CALCIUM SERPL-MCNC: 8.7 MG/DL (ref 8.7–10.5)
CHLORIDE SERPL-SCNC: 102 MMOL/L (ref 95–110)
CO2 SERPL-SCNC: 25 MMOL/L (ref 23–29)
CREAT SERPL-MCNC: 0.8 MG/DL (ref 0.5–1.4)
DIFFERENTIAL METHOD BLD: NORMAL
EOSINOPHIL # BLD AUTO: 0.2 K/UL (ref 0–0.5)
EOSINOPHIL NFR BLD: 2.8 % (ref 0–8)
ERYTHROCYTE [DISTWIDTH] IN BLOOD BY AUTOMATED COUNT: 13 % (ref 11.5–14.5)
EST. GFR  (NO RACE VARIABLE): >60 ML/MIN/1.73 M^2
GLUCOSE SERPL-MCNC: 162 MG/DL (ref 70–110)
HCT VFR BLD AUTO: 38.1 % (ref 37–48.5)
HGB BLD-MCNC: 12.8 G/DL (ref 12–16)
IMM GRANULOCYTES # BLD AUTO: 0.01 K/UL (ref 0–0.04)
IMM GRANULOCYTES NFR BLD AUTO: 0.2 % (ref 0–0.5)
LYMPHOCYTES # BLD AUTO: 1.8 K/UL (ref 1–4.8)
LYMPHOCYTES NFR BLD: 27.7 % (ref 18–48)
MAGNESIUM SERPL-MCNC: 1.7 MG/DL (ref 1.6–2.6)
MCH RBC QN AUTO: 28 PG (ref 27–31)
MCHC RBC AUTO-ENTMCNC: 33.6 G/DL (ref 32–36)
MCV RBC AUTO: 83 FL (ref 82–98)
MONOCYTES # BLD AUTO: 0.5 K/UL (ref 0.3–1)
MONOCYTES NFR BLD: 7.2 % (ref 4–15)
NEUTROPHILS # BLD AUTO: 3.9 K/UL (ref 1.8–7.7)
NEUTROPHILS NFR BLD: 61.5 % (ref 38–73)
NRBC BLD-RTO: 0 /100 WBC
PHOSPHATE SERPL-MCNC: 3.5 MG/DL (ref 2.7–4.5)
PLATELET # BLD AUTO: 182 K/UL (ref 150–450)
PMV BLD AUTO: 11.7 FL (ref 9.2–12.9)
POTASSIUM SERPL-SCNC: 3.1 MMOL/L (ref 3.5–5.1)
PROT SERPL-MCNC: 6.1 G/DL (ref 6–8.4)
RBC # BLD AUTO: 4.57 M/UL (ref 4–5.4)
SODIUM SERPL-SCNC: 139 MMOL/L (ref 136–145)
WBC # BLD AUTO: 6.38 K/UL (ref 3.9–12.7)

## 2024-10-03 PROCEDURE — 83735 ASSAY OF MAGNESIUM: CPT | Performed by: INTERNAL MEDICINE

## 2024-10-03 PROCEDURE — 36415 COLL VENOUS BLD VENIPUNCTURE: CPT | Performed by: INTERNAL MEDICINE

## 2024-10-03 PROCEDURE — 80053 COMPREHEN METABOLIC PANEL: CPT | Performed by: INTERNAL MEDICINE

## 2024-10-03 PROCEDURE — 84100 ASSAY OF PHOSPHORUS: CPT | Performed by: INTERNAL MEDICINE

## 2024-10-03 PROCEDURE — 85025 COMPLETE CBC W/AUTO DIFF WBC: CPT | Performed by: INTERNAL MEDICINE

## 2024-10-04 ENCOUNTER — INFUSION (OUTPATIENT)
Dept: INFUSION THERAPY | Facility: HOSPITAL | Age: 54
End: 2024-10-04
Attending: RADIOLOGY
Payer: COMMERCIAL

## 2024-10-04 VITALS
DIASTOLIC BLOOD PRESSURE: 54 MMHG | SYSTOLIC BLOOD PRESSURE: 92 MMHG | OXYGEN SATURATION: 98 % | BODY MASS INDEX: 50.02 KG/M2 | WEIGHT: 293 LBS | RESPIRATION RATE: 18 BRPM | HEIGHT: 64 IN | HEART RATE: 61 BPM | TEMPERATURE: 98 F

## 2024-10-04 DIAGNOSIS — C34.32 MALIGNANT NEOPLASM OF LOWER LOBE OF LEFT LUNG: Primary | ICD-10-CM

## 2024-10-04 PROCEDURE — 25000003 PHARM REV CODE 250: Performed by: INTERNAL MEDICINE

## 2024-10-04 PROCEDURE — 63600175 PHARM REV CODE 636 W HCPCS: Performed by: INTERNAL MEDICINE

## 2024-10-04 RX ORDER — DIPHENHYDRAMINE HYDROCHLORIDE 50 MG/ML
50 INJECTION INTRAMUSCULAR; INTRAVENOUS ONCE AS NEEDED
Status: DISCONTINUED | OUTPATIENT
Start: 2024-10-04 | End: 2024-10-04 | Stop reason: HOSPADM

## 2024-10-04 RX ORDER — DIPHENHYDRAMINE HYDROCHLORIDE 50 MG/ML
25 INJECTION INTRAMUSCULAR; INTRAVENOUS
Status: COMPLETED | OUTPATIENT
Start: 2024-10-04 | End: 2024-10-04

## 2024-10-04 RX ORDER — ACETAMINOPHEN 325 MG/1
650 TABLET ORAL
Status: COMPLETED | OUTPATIENT
Start: 2024-10-04 | End: 2024-10-04

## 2024-10-04 RX ORDER — HEPARIN 100 UNIT/ML
500 SYRINGE INTRAVENOUS
Status: DISCONTINUED | OUTPATIENT
Start: 2024-10-04 | End: 2024-10-04 | Stop reason: HOSPADM

## 2024-10-04 RX ORDER — EPINEPHRINE 0.3 MG/.3ML
0.3 INJECTION SUBCUTANEOUS ONCE AS NEEDED
Status: DISCONTINUED | OUTPATIENT
Start: 2024-10-04 | End: 2024-10-04 | Stop reason: HOSPADM

## 2024-10-04 RX ADMIN — DIPHENHYDRAMINE HYDROCHLORIDE 25 MG: 50 INJECTION, SOLUTION INTRAMUSCULAR; INTRAVENOUS at 09:10

## 2024-10-04 RX ADMIN — ACETAMINOPHEN 650 MG: 325 TABLET ORAL at 09:10

## 2024-10-04 RX ADMIN — HEPARIN 500 UNITS: 100 SYRINGE at 11:10

## 2024-10-04 RX ADMIN — SODIUM CHLORIDE 1400 MG: 9 INJECTION, SOLUTION INTRAVENOUS at 10:10

## 2024-10-04 RX ADMIN — DEXAMETHASONE SODIUM PHOSPHATE 0.25 MG: 4 INJECTION, SOLUTION INTRA-ARTICULAR; INTRALESIONAL; INTRAMUSCULAR; INTRAVENOUS; SOFT TISSUE at 09:10

## 2024-10-04 NOTE — PLAN OF CARE
Problem: Adult Inpatient Plan of Care  Goal: Plan of Care Review  Outcome: Progressing  Flowsheets (Taken 10/4/2024 0916)  Plan of Care Reviewed With: patient  Goal: Optimal Comfort and Wellbeing  Outcome: Progressing  Intervention: Provide Person-Centered Care  Flowsheets (Taken 10/4/2024 0916)  Trust Relationship/Rapport:   care explained   choices provided   emotional support provided   empathic listening provided   questions answered   questions encouraged   reassurance provided   thoughts/feelings acknowledged     Problem: Chemotherapy Effects  Goal: Absence of Infection  Outcome: Progressing  Intervention: Prevent Infection and Maximize Resistance  Flowsheets (Taken 10/4/2024 0916)  Infection Prevention:   equipment surfaces disinfected   hand hygiene promoted   personal protective equipment utilized   rest/sleep promoted

## 2024-10-10 DIAGNOSIS — J30.2 SEASONAL ALLERGIES: ICD-10-CM

## 2024-10-10 DIAGNOSIS — E83.42 HYPOMAGNESEMIA: ICD-10-CM

## 2024-10-10 DIAGNOSIS — L70.8 ACNEIFORM RASH: ICD-10-CM

## 2024-10-10 RX ORDER — DOXYCYCLINE HYCLATE 100 MG
100 TABLET ORAL DAILY
Qty: 30 TABLET | Refills: 1 | Status: SHIPPED | OUTPATIENT
Start: 2024-10-10

## 2024-10-10 RX ORDER — DESONIDE 0.5 MG/G
CREAM TOPICAL
Qty: 60 G | Refills: 3 | Status: SHIPPED | OUTPATIENT
Start: 2024-10-10

## 2024-10-11 RX ORDER — IPRATROPIUM BROMIDE 21 UG/1
2 SPRAY, METERED NASAL 2 TIMES DAILY
Qty: 30 ML | Refills: 0 | Status: SHIPPED | OUTPATIENT
Start: 2024-10-11

## 2024-10-11 RX ORDER — LANOLIN ALCOHOL/MO/W.PET/CERES
400 CREAM (GRAM) TOPICAL DAILY
Qty: 200 TABLET | Refills: 1 | Status: SHIPPED | OUTPATIENT
Start: 2024-10-11 | End: 2025-11-15

## 2024-10-15 DIAGNOSIS — J30.2 SEASONAL ALLERGIES: ICD-10-CM

## 2024-10-16 RX ORDER — IPRATROPIUM BROMIDE 21 UG/1
2 SPRAY, METERED NASAL 2 TIMES DAILY
Qty: 30 ML | Refills: 0 | Status: SHIPPED | OUTPATIENT
Start: 2024-10-16 | End: 2024-10-17 | Stop reason: SDUPTHER

## 2024-10-17 ENCOUNTER — OFFICE VISIT (OUTPATIENT)
Dept: HEMATOLOGY/ONCOLOGY | Facility: CLINIC | Age: 54
End: 2024-10-17
Payer: COMMERCIAL

## 2024-10-17 ENCOUNTER — LAB VISIT (OUTPATIENT)
Dept: LAB | Facility: HOSPITAL | Age: 54
End: 2024-10-17
Attending: INTERNAL MEDICINE
Payer: COMMERCIAL

## 2024-10-17 VITALS
DIASTOLIC BLOOD PRESSURE: 77 MMHG | TEMPERATURE: 98 F | SYSTOLIC BLOOD PRESSURE: 116 MMHG | BODY MASS INDEX: 50.02 KG/M2 | HEART RATE: 67 BPM | WEIGHT: 293 LBS | HEIGHT: 64 IN

## 2024-10-17 DIAGNOSIS — J30.2 SEASONAL ALLERGIES: ICD-10-CM

## 2024-10-17 DIAGNOSIS — C34.32 MALIGNANT NEOPLASM OF LOWER LOBE OF LEFT LUNG: Primary | ICD-10-CM

## 2024-10-17 DIAGNOSIS — C79.51 SECONDARY MALIGNANT NEOPLASM OF BONE: ICD-10-CM

## 2024-10-17 LAB
ALBUMIN SERPL BCP-MCNC: 2.7 G/DL (ref 3.5–5.2)
ALP SERPL-CCNC: 109 U/L (ref 55–135)
ALT SERPL W/O P-5'-P-CCNC: 18 U/L (ref 10–44)
ANION GAP SERPL CALC-SCNC: 12 MMOL/L (ref 8–16)
AST SERPL-CCNC: 14 U/L (ref 10–40)
BASOPHILS # BLD AUTO: 0.04 K/UL (ref 0–0.2)
BASOPHILS NFR BLD: 0.5 % (ref 0–1.9)
BILIRUB SERPL-MCNC: 0.3 MG/DL (ref 0.1–1)
BUN SERPL-MCNC: 13 MG/DL (ref 6–20)
CALCIUM SERPL-MCNC: 8.9 MG/DL (ref 8.7–10.5)
CHLORIDE SERPL-SCNC: 104 MMOL/L (ref 95–110)
CO2 SERPL-SCNC: 26 MMOL/L (ref 23–29)
CREAT SERPL-MCNC: 1 MG/DL (ref 0.5–1.4)
DIFFERENTIAL METHOD BLD: NORMAL
EOSINOPHIL # BLD AUTO: 0.3 K/UL (ref 0–0.5)
EOSINOPHIL NFR BLD: 3.5 % (ref 0–8)
ERYTHROCYTE [DISTWIDTH] IN BLOOD BY AUTOMATED COUNT: 13 % (ref 11.5–14.5)
EST. GFR  (NO RACE VARIABLE): >60 ML/MIN/1.73 M^2
GLUCOSE SERPL-MCNC: 168 MG/DL (ref 70–110)
HCT VFR BLD AUTO: 39.2 % (ref 37–48.5)
HGB BLD-MCNC: 12.9 G/DL (ref 12–16)
IMM GRANULOCYTES # BLD AUTO: 0.02 K/UL (ref 0–0.04)
IMM GRANULOCYTES NFR BLD AUTO: 0.3 % (ref 0–0.5)
LYMPHOCYTES # BLD AUTO: 1.9 K/UL (ref 1–4.8)
LYMPHOCYTES NFR BLD: 25.8 % (ref 18–48)
MAGNESIUM SERPL-MCNC: 1.6 MG/DL (ref 1.6–2.6)
MCH RBC QN AUTO: 28 PG (ref 27–31)
MCHC RBC AUTO-ENTMCNC: 32.9 G/DL (ref 32–36)
MCV RBC AUTO: 85 FL (ref 82–98)
MONOCYTES # BLD AUTO: 0.6 K/UL (ref 0.3–1)
MONOCYTES NFR BLD: 8.1 % (ref 4–15)
NEUTROPHILS # BLD AUTO: 4.6 K/UL (ref 1.8–7.7)
NEUTROPHILS NFR BLD: 61.8 % (ref 38–73)
NRBC BLD-RTO: 0 /100 WBC
PHOSPHATE SERPL-MCNC: 3.8 MG/DL (ref 2.7–4.5)
PLATELET # BLD AUTO: 180 K/UL (ref 150–450)
PMV BLD AUTO: 10.9 FL (ref 9.2–12.9)
POTASSIUM SERPL-SCNC: 3.8 MMOL/L (ref 3.5–5.1)
PROT SERPL-MCNC: 5.9 G/DL (ref 6–8.4)
RBC # BLD AUTO: 4.6 M/UL (ref 4–5.4)
SODIUM SERPL-SCNC: 142 MMOL/L (ref 136–145)
WBC # BLD AUTO: 7.51 K/UL (ref 3.9–12.7)

## 2024-10-17 PROCEDURE — 3066F NEPHROPATHY DOC TX: CPT | Mod: CPTII,S$GLB,,

## 2024-10-17 PROCEDURE — 3044F HG A1C LEVEL LT 7.0%: CPT | Mod: CPTII,S$GLB,,

## 2024-10-17 PROCEDURE — 83735 ASSAY OF MAGNESIUM: CPT | Performed by: INTERNAL MEDICINE

## 2024-10-17 PROCEDURE — 85025 COMPLETE CBC W/AUTO DIFF WBC: CPT | Performed by: INTERNAL MEDICINE

## 2024-10-17 PROCEDURE — 3061F NEG MICROALBUMINURIA REV: CPT | Mod: CPTII,S$GLB,,

## 2024-10-17 PROCEDURE — 3074F SYST BP LT 130 MM HG: CPT | Mod: CPTII,S$GLB,,

## 2024-10-17 PROCEDURE — 3078F DIAST BP <80 MM HG: CPT | Mod: CPTII,S$GLB,,

## 2024-10-17 PROCEDURE — 80053 COMPREHEN METABOLIC PANEL: CPT | Performed by: INTERNAL MEDICINE

## 2024-10-17 PROCEDURE — 3008F BODY MASS INDEX DOCD: CPT | Mod: CPTII,S$GLB,,

## 2024-10-17 PROCEDURE — G2211 COMPLEX E/M VISIT ADD ON: HCPCS | Mod: S$GLB,,,

## 2024-10-17 PROCEDURE — 36415 COLL VENOUS BLD VENIPUNCTURE: CPT | Performed by: INTERNAL MEDICINE

## 2024-10-17 PROCEDURE — 99999 PR PBB SHADOW E&M-EST. PATIENT-LVL IV: CPT | Mod: PBBFAC,,,

## 2024-10-17 PROCEDURE — 84100 ASSAY OF PHOSPHORUS: CPT | Performed by: INTERNAL MEDICINE

## 2024-10-17 PROCEDURE — 99215 OFFICE O/P EST HI 40 MIN: CPT | Mod: S$GLB,,,

## 2024-10-17 RX ORDER — IPRATROPIUM BROMIDE 21 UG/1
2 SPRAY, METERED NASAL 2 TIMES DAILY
Qty: 30 ML | Refills: 1 | Status: SHIPPED | OUTPATIENT
Start: 2024-10-17

## 2024-10-17 RX ORDER — SODIUM CHLORIDE 0.9 % (FLUSH) 0.9 %
10 SYRINGE (ML) INJECTION
Status: CANCELLED | OUTPATIENT
Start: 2024-10-18

## 2024-10-17 RX ORDER — DIPHENHYDRAMINE HYDROCHLORIDE 50 MG/ML
25 INJECTION INTRAMUSCULAR; INTRAVENOUS
Status: CANCELLED | OUTPATIENT
Start: 2024-10-18

## 2024-10-17 RX ORDER — AMOXICILLIN 500 MG/1
CAPSULE ORAL
COMMUNITY
Start: 2024-10-11

## 2024-10-17 RX ORDER — HEPARIN 100 UNIT/ML
500 SYRINGE INTRAVENOUS
Status: CANCELLED | OUTPATIENT
Start: 2024-10-18

## 2024-10-17 RX ORDER — ACETAMINOPHEN 325 MG/1
650 TABLET ORAL
Status: CANCELLED | OUTPATIENT
Start: 2024-10-18

## 2024-10-17 RX ORDER — DIPHENHYDRAMINE HYDROCHLORIDE 50 MG/ML
50 INJECTION INTRAMUSCULAR; INTRAVENOUS ONCE AS NEEDED
Status: CANCELLED | OUTPATIENT
Start: 2024-10-18

## 2024-10-17 RX ORDER — EPINEPHRINE 0.3 MG/.3ML
0.3 INJECTION SUBCUTANEOUS ONCE AS NEEDED
Status: CANCELLED | OUTPATIENT
Start: 2024-10-18

## 2024-10-17 NOTE — PROGRESS NOTES
Subjective:     Patient ID:Mateusz Ahmadi is a 54 y.o. female.?? MR#: [id number]   ?   PRIMARY ONCOLOGIST: Dr. Mcfarlane-->Dr. Cartwright   ?   CHIEF COMPLAINT: Lab review/assessment C1D1 Rybrevant  ?   ONCOLOGIC DIAGNOSIS: Stage IV (cT2, cN2, cM1), Malignant neoplasm of lower lobe of left lung   ?   CURRENT TREATMENT: OP NSCLC AMIVANTAMAB-VMJW (Rybrevant) Q4W     PAST TREATMENT: PEMETREXED + CARBOPLATIN (AUC) Q3W    The patient location is: [place]  The chief complaint leading to consultation is: follow-up    Visit type: audio only    Face to Face time with patient: 10  20 minutes of total time spent on the encounter, which includes face to face time and non-face to face time preparing to see the patient (eg, review of tests), Obtaining and/or reviewing separately obtained history, Documenting clinical information in the electronic or other health record, Independently interpreting results (not separately reported) and communicating results to the patient/family/caregiver, or Care coordination (not separately reported).         Each patient to whom he or she provides medical services by telemedicine is:  (1) informed of the relationship between the physician and patient and the respective role of any other health care provider with respect to management of the patient; and (2) notified that he or she may decline to receive medical services by telemedicine and may withdraw from such care at any time.    Notes:      HPI Mrs. Ahmadi is a [facility] 54-renee-old female with metastatic non-small cell lung carcinoma Exon 20 mutation who presents today for lab review and assessment prior to C1D1 Rybrevant. 11/2022 pt seen with PCP with c/o persistent coughing and wheezing. CXR at that time revealed pulmonary nodules, subsequent CT chest found L Lung mass. L lung biopsy positive for adenocarcinoma , EGFR mutated. Pleural fluid also positive for malignancy. PET showed avid STEPHAN mass, mediastinal nodes, bone mets in C1, T1, T11, L3, sacrum, L  ischium, sternum. MRI brain negative for intracranial metastases. Initiated on carbo/ alimta 01/27/23--re imaging after 4 cycles found progressive disease. Pt initiated on Rybrevant 04/27/23.    Interval History: Pt states she is feeling well and voices no c/o today. Denies n/v/d/c, fever, chills, sob, cp, cough, abnormal bleeding. Denies difficulty with appetite or maintaining hydration.       Oncology History   Malignant neoplasm of lower lobe of left lung   12/9/2022 Initial Diagnosis    Malignant neoplasm of lower lobe of left lung       12/9/2022 Cancer Staged    Staging form: Lung, AJCC 8th Edition  - Clinical stage from 12/9/2022: Stage IV (cT2, cN2, cM1)       12/27/2022 - 12/27/2022 Chemotherapy    Treatment Summary   Plan Name: OP PEMBROLIZUMAB 400MG Q6W  Treatment Goal: Control  Status: Inactive  Start Date:   End Date:   Provider: Reese Mcfarlane MD  Chemotherapy: [No matching medication found in this treatment plan]        Genetic Testing    Patient has genetic testing done for  TEmpus peripheral blood.                                              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     1/18/2023 - 1/18/2023 Chemotherapy    Treatment Summary   Plan Name: OP NSCLC AMIVANTAMAB-VMJW (Rybrevant) Q4W  Treatment Goal: Palliative  Status: Inactive  Start Date:   End Date:   Provider: Reese Mcfarlane MD  Chemotherapy: amivantamab-vmjw (RYBREVANT) 350 mg in sodium chloride 0.9% SolP 250 mL chemo infusion, 350 mg (100 % of original dose 350 mg), Intravenous, Clinic/HOD 1 time, 0 of 13 cycles  Dose modification: 350 mg (original dose 350 mg, Cycle 1), 1,050 mg (original dose 1,050 mg, Cycle 1), 1,400 mg (original dose 1,400 mg, Cycle 1)       1/27/2023 - 3/31/2023 Chemotherapy    Treatment Summary   Plan Name: OP NSCLC PEMETREXED + CARBOPLATIN (AUC) Q3W  Treatment Goal: Control  Status: Inactive  Start Date: 1/27/2023  End Date: 3/31/2023  Provider:  Reese Mcfarlane MD  Chemotherapy: CARBOplatin (PARAPLATIN) 750 mg in sodium chloride 0.9% 335 mL chemo infusion, 750 mg (100 % of original dose 750 mg), Intravenous, Clinic/HOD 1 time, 4 of 4 cycles  Dose modification:   (original dose 750 mg, Cycle 1, Reason: MD Discretion)  Administration: 750 mg (1/27/2023), 750 mg (2/17/2023), 750 mg (3/31/2023), 750 mg (3/10/2023)  PEMEtrexed disodium (ALIMTA) 1,200 mg in sodium chloride 0.9% SolP 100 mL chemo infusion, 1,250 mg, Intravenous, Clinic/HOD 1 time, 4 of 4 cycles  Administration: 1,200 mg (1/27/2023), 1,200 mg (2/17/2023), 1,200 mg (3/31/2023), 1,200 mg (3/10/2023)       4/27/2023 -  Chemotherapy    Treatment Summary   Plan Name: OP NSCLC AMIVANTAMAB-VMJW (Rybrevant) Q4W  Treatment Goal: Palliative  Status: Active  Start Date: 4/27/2023 (Planned)  End Date: 6/6/2024 (Planned)  Provider: Reese Mcfarlane MD  Chemotherapy: amivantamab-vmjw (RYBREVANT) 350 mg in sodium chloride 0.9% SolP 250 mL chemo infusion, 350 mg (original dose ), Intravenous, Clinic/HOD 1 time, 0 of 15 cycles  Dose modification: 350 mg (Cycle 1), 1,050 mg (Cycle 1), 1,400 mg (Cycle 1)          Social History     Socioeconomic History    Marital status:    Tobacco Use    Smoking status: Never     Passive exposure: Never    Smokeless tobacco: Never   Substance and Sexual Activity    Alcohol use: Never    Drug use: Never    Sexual activity: Yes     Partners: Male     Birth control/protection: None     Comment: tubal     Social Drivers of Health     Financial Resource Strain: Low Risk  (6/26/2024)    Received from [facility]    Overall Financial Resource Strain (CARDIA)     Difficulty of Paying Living Expenses: Not hard at all   Food Insecurity: No Food Insecurity (6/26/2024)    Received from [facility]    Hunger Vital Sign     Worried About Running Out of Food in the Last Year: Never true     Ran Out of Food in the Last Year: Never true    Transportation Needs: No Transportation Needs (6/26/2024)    Received from [facility]    PRAPARE - Transportation     Lack of Transportation (Medical): No     Lack of Transportation (Non-Medical): No   Physical Activity: Insufficiently Active (6/26/2024)    Received from [facility]    Exercise Vital Sign     Days of Exercise per Week: 3 days     Minutes of Exercise per Session: 40 min   Stress: No Stress Concern Present (6/26/2024)    Received from [facility]    [employer] - Occupational Stress Questionnaire     Feeling of Stress : Not at all   Housing Stability: Low Risk  (6/26/2024)    Received from [facility]    Housing Stability Vital Sign     Unable to Pay for Housing in the Last Year: No     Number of Times Moved in the Last Year: 0     Homeless in the Last Year: No      Family History   Problem Relation Name Age of Onset    Heart failure Father        Past Surgical History:   Procedure Laterality Date    CATARACT EXTRACTION W/  INTRAOCULAR LENS IMPLANT Right 06/02/2022    EXTRACTION OF TOOTH      FLUOROSCOPY N/A 01/26/2023    Procedure: FLUOROSCOPY/mediport placement;  Surgeon: Marlon Leone MD;  Location: [facility] CATH LAB;  Service: General;  Laterality: N/A;    TUBAL LIGATION      2007--postpartum tubal ligation        Review of Systems   Constitutional:  Negative for activity change, chills, fatigue and fever.   HENT: Negative.     Eyes: Negative.    Respiratory: Negative.     Cardiovascular: Negative.    Gastrointestinal: Negative.    Endocrine: Positive for cold intolerance.   Musculoskeletal:  Negative for arthralgias and myalgias.   Skin:  Negative for rash.   Neurological:  Negative for dizziness, weakness and light-headedness.   Psychiatric/Behavioral:  The patient is not nervous/anxious.        ?   A comprehensive 14-point review of systems was reviewed with patient and  was negative other than as specified above.   ?     Objective:      Physical Exam  Vitals reviewed: virtual visit.   Constitutional:       Appearance: Normal appearance.   Neurological:      Mental Status: She is alert.   Psychiatric:         Mood and Affect: Mood normal.         Behavior: Behavior normal.           ?   Vitals:    10/17/24 0748   BP: 116/77   Pulse: 67   Temp: 97.7 °F (36.5 °C)          ?       ?   Laboratory:  ?   Lab Visit on 10/17/2024   Component Date Value Ref Range Status    Phosphorus 10/17/2024 3.8  2.7 - 4.5 mg/dL Final    Magnesium 10/17/2024 1.6  1.6 - 2.6 mg/dL Final    Sodium 10/17/2024 142  136 - 145 mmol/L Final    Potassium 10/17/2024 3.8  3.5 - 5.1 mmol/L Final    Chloride 10/17/2024 104  95 - 110 mmol/L Final    CO2 10/17/2024 26  23 - 29 mmol/L Final    Glucose 10/17/2024 168 (H)  70 - 110 mg/dL Final    BUN 10/17/2024 13  6 - 20 mg/dL Final    Creatinine 10/17/2024 1.0  0.5 - 1.4 mg/dL Final    Calcium 10/17/2024 8.9  8.7 - 10.5 mg/dL Final    Total Protein 10/17/2024 5.9 (L)  6.0 - 8.4 g/dL Final    Albumin 10/17/2024 2.7 (L)  3.5 - 5.2 g/dL Final    Total Bilirubin 10/17/2024 0.3  0.1 - 1.0 mg/dL Final    Alkaline Phosphatase 10/17/2024 109  55 - 135 U/L Final    AST 10/17/2024 14  10 - 40 U/L Final    ALT 10/17/2024 18  10 - 44 U/L Final    eGFR 10/17/2024 >60  >60 mL/min/1.73 m^2 Final    Anion Gap 10/17/2024 12  8 - 16 mmol/L Final    WBC 10/17/2024 7.51  3.90 - 12.70 K/uL Final    RBC 10/17/2024 4.60  4.00 - 5.40 M/uL Final    Hemoglobin 10/17/2024 12.9  12.0 - 16.0 g/dL Final    Hematocrit 10/17/2024 39.2  37.0 - 48.5 % Final    MCV 10/17/2024 85  82 - 98 fL Final    MCH 10/17/2024 28.0  27.0 - 31.0 pg Final    MCHC 10/17/2024 32.9  32.0 - 36.0 g/dL Final    RDW 10/17/2024 13.0  11.5 - 14.5 % Final    Platelets 10/17/2024 180  150 - 450 K/uL Final    MPV 10/17/2024 10.9  9.2 - 12.9 fL Final    Immature Granulocytes 10/17/2024 0.3  0.0 - 0.5 % Final    Gran # (ANC)  10/17/2024 4.6  1.8 - 7.7 K/uL Final    Immature Grans (Abs) 10/17/2024 0.02  0.00 - 0.04 K/uL Final    Lymph # 10/17/2024 1.9  1.0 - 4.8 K/uL Final    Mono # 10/17/2024 0.6  0.3 - 1.0 K/uL Final    Eos # 10/17/2024 0.3  0.0 - 0.5 K/uL Final    Baso # 10/17/2024 0.04  0.00 - 0.20 K/uL Final    nRBC 10/17/2024 0  0 /100 WBC Final    Gran % 10/17/2024 61.8  38.0 - 73.0 % Final    Lymph % 10/17/2024 25.8  18.0 - 48.0 % Final    Mono % 10/17/2024 8.1  4.0 - 15.0 % Final    Eosinophil % 10/17/2024 3.5  0.0 - 8.0 % Final    Basophil % 10/17/2024 0.5  0.0 - 1.9 % Final    Differential Method 10/17/2024 Automated   Final      ?   Imaging:    No results found. However, due to the size of the patient record, not all encounters were searched. Please check Results Review for a complete set of results.     Results for orders placed or performed during the hospital encounter of 08/22/24 (from the past 2160 hours)   CT Chest Abdomen Pelvis With IV Contrast (XPD) NO Oral Contrast    Narrative     EXAM: CT CHEST ABDOMEN PELVIS WITH IV CONTRAST (XPD)    CLINICAL HISTORY: Follow-up lung carcinoma    COMPARISON: 05/08/2024    TECHNIQUE: Standard thin-section axial images, with reformatted sagittal and coronal images.    FINDINGS: CT of chest:    There is stable appearance of a calcified granuloma in the right lower lobe at the right lung base.  The right lung is otherwise clear.  Opacity in the left lower lobe at the left lung base consistent with scarring and atelectasis has slightly decreased since 05/08/2024.  No pleural effusions are visible.  No pathologic lymph node enlargement is identified in the pulmonary isabelle, mediastinum or axilla bilaterally.  Dense calcification associated with granulomatous infection is incidentally visible in lymph nodes in the right hilum and mediastinum.  Heart size is normal.  No chest wall abnormalities are visible except for a Mediport entering the right internal jugular vein.  Sclerotic bone  lesions consistent with metastasis are visible in the T1 vertebral body, left humeral head and glenoid process of the right scapula      CT of abdomen and pelvis:    The liver, spleen, pancreas, gallbladder and right adrenal appear normal.  There is a stable low attenuation mass consistent with adrenal adenoma identified posteriorly in the left adrenal measuring 2.1 x 2.1 x 1.7 cm.  The kidneys opacify symmetrically with intravenous contrast demonstrating no evidence of urinary obstruction.  There is a punctate nonobstructing 1 to 2 mm calculus identified in the collecting system of the mid right kidney.  A small fat-containing lesion consistent with a benign angiomyolipoma is visible in the upper to mid left kidney.  No free intraperitoneal fluid or lymph node enlargement is identified.  Opacified loops of small bowel appear normal.  A normal appendix is seen in the right lower quadrant.    Images obtained through the pelvis demonstrate no abnormality in the urinary bladder.  The uterus and ovaries appear normal for patient age.    Sclerotic bone lesions consistent with bone metastases are identified in the T11, T12 and L3 vertebral bodies, multiple foci in the bony pelvis and left femoral neck.          Impression    1.  Decreasing scarring/atelectasis in the left lower lung zone compared to 05/08/2024.  No acute pulmonary disease, enlarging pulmonary nodules or adenopathy are identified in the chest.  2.  Incidental left adrenal adenoma.  3.  Small nonobstructing intrarenal calculus on the right.  4.  Sclerotic bone metastases in the right scapula, left femoral head, thoracic and lumbar spine, bony pelvis and left femoral neck.    All CT scans at [this location] are performed using dose modulation techniques as appropriate to a performed exam including the following:  Automated exposure control; adjustment of the mA and/or kV according to patient size (this includes techniques or standardized protocols for  targeted exams where dose is matched to indication / reason for exam; i.e. extremities or head); use of iterative reconstruction technique.    Finalized on: 8/22/2024 4:32 PM By:  Jackson Ruffin  BRR# [id number]      2024-08-22 16:34:[id number]    [facility]     *Note: Due to a large number of results and/or encounters for the requested time period, some results have not been displayed. A complete set of results can be found in Results Review.        ?   Assessment/Plan:     Problem List Items Addressed This Visit       Malignant neoplasm of lower lobe of left lung - Primary      Cancer Staging   Malignant neoplasm of lower lobe of left lung  Staging form: Lung, AJCC 8th Edition  - Clinical stage from 12/9/2022: Stage IV (cT2, cN2, cM1) - Signed by Reese Mcfarlane MD on 12/9/2022    Completed 4 Cycles of Carbo/Alimta 03/31/2023    Repeat CT CAP 04/11/2023:   Detrimental change.  Increase in number and size of numerous sclerotic lesions throughout the cervical and thoracic spine, left humerus and sternum.  There are also multiple large sclerotic lesions throughout the lumbar spine, pelvis and proximal femurs measuring up to 7.1 cm in size.  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  2.   The lateral left upper lobe mass is decreased in size in the interim, currently measuring 2.2 x 1.0 cm.  Negative for new pulmonary masses.  3.  Exophytic nodule along the posterior right hepatic lobe measuring 1.1 cm.  This was not imaged previously.  Etiology uncertain.  Metastasis is not excluded.    --Rybrevant C1D1 04/27/23--    Most recent CT CAP 08/22/24 Stable   Plan for repeat imaging in 3 months; approx 11/22/24    Labs reviewed, no concerning cytopenias  -Ok to proceed with C20D1 Rybrevant tomorrow     RTC in two weeks with labs prior for C20D1 Rybrevant OW  F/u in 4 weeks with hank/md and repeat labs prior for Rybrevant              Other Visit Diagnoses       Seasonal allergies        Relevant Medications    ipratropium  (ATROVENT) 21 mcg (0.03 %) nasal spray                     Med Onc Chart Routing      Follow up with physician    Follow up with HANK 4 weeks and other. RTC in two weeks with labs prior for C20D15 Rybrevant OW;  F/u in 4 weeks with hank/md and repeat labs prior for Rybrevant   Infusion scheduling note   Rybrevant   Injection scheduling note    Labs CBC, CMP, magnesium and phosphorus   Scheduling:  Preferred lab:  Lab interval:     Imaging    Pharmacy appointment    Other referrals                     REBEL CastilloP-C  Hematology/Oncology

## 2024-10-17 NOTE — ASSESSMENT & PLAN NOTE
Cancer Staging   Malignant neoplasm of lower lobe of left lung  Staging form: Lung, AJCC 8th Edition  - Clinical stage from 12/9/2022: Stage IV (cT2, cN2, cM1) - Signed by Reese Mcfarlane MD on 12/9/2022    Completed 4 Cycles of Carbo/Alimta 03/31/2023    Repeat CT CAP 04/11/2023:   Detrimental change.  Increase in number and size of numerous sclerotic lesions throughout the cervical and thoracic spine, left humerus and sternum.  There are also multiple large sclerotic lesions throughout the lumbar spine, pelvis and proximal femurs measuring up to 7.1 cm in size.  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  2.   The lateral left upper lobe mass is decreased in size in the interim, currently measuring 2.2 x 1.0 cm.  Negative for new pulmonary masses.  3.  Exophytic nodule along the posterior right hepatic lobe measuring 1.1 cm.  This was not imaged previously.  Etiology uncertain.  Metastasis is not excluded.    --Rybrevant C1D1 04/27/23--    Most recent CT CAP 08/22/24 Stable   Plan for repeat imaging in 3 months; approx 11/22/24    Labs reviewed, no concerning cytopenias  -Ok to proceed with C20D1 Rybrevant tomorrow     RTC in two weeks with labs prior for C20D1 Rybrevant OW  F/u in 4 weeks with hank/md and repeat labs prior for Rybrevant

## 2024-10-18 ENCOUNTER — INFUSION (OUTPATIENT)
Dept: INFUSION THERAPY | Facility: HOSPITAL | Age: 54
End: 2024-10-18
Attending: RADIOLOGY
Payer: COMMERCIAL

## 2024-10-18 VITALS
OXYGEN SATURATION: 99 % | HEART RATE: 63 BPM | SYSTOLIC BLOOD PRESSURE: 115 MMHG | RESPIRATION RATE: 18 BRPM | HEIGHT: 64 IN | BODY MASS INDEX: 50.02 KG/M2 | TEMPERATURE: 98 F | WEIGHT: 293 LBS | DIASTOLIC BLOOD PRESSURE: 66 MMHG

## 2024-10-18 DIAGNOSIS — C34.32 MALIGNANT NEOPLASM OF LOWER LOBE OF LEFT LUNG: Primary | ICD-10-CM

## 2024-10-18 PROCEDURE — 63600175 PHARM REV CODE 636 W HCPCS

## 2024-10-18 PROCEDURE — 96415 CHEMO IV INFUSION ADDL HR: CPT

## 2024-10-18 PROCEDURE — 96413 CHEMO IV INFUSION 1 HR: CPT

## 2024-10-18 PROCEDURE — 96375 TX/PRO/DX INJ NEW DRUG ADDON: CPT

## 2024-10-18 PROCEDURE — 25000003 PHARM REV CODE 250

## 2024-10-18 PROCEDURE — 96367 TX/PROPH/DG ADDL SEQ IV INF: CPT

## 2024-10-18 RX ORDER — DIPHENHYDRAMINE HYDROCHLORIDE 50 MG/ML
25 INJECTION INTRAMUSCULAR; INTRAVENOUS
Status: COMPLETED | OUTPATIENT
Start: 2024-10-18 | End: 2024-10-18

## 2024-10-18 RX ORDER — SODIUM CHLORIDE 0.9 % (FLUSH) 0.9 %
10 SYRINGE (ML) INJECTION
Status: DISCONTINUED | OUTPATIENT
Start: 2024-10-18 | End: 2024-10-18 | Stop reason: HOSPADM

## 2024-10-18 RX ORDER — ACETAMINOPHEN 325 MG/1
650 TABLET ORAL
Status: COMPLETED | OUTPATIENT
Start: 2024-10-18 | End: 2024-10-18

## 2024-10-18 RX ORDER — HEPARIN 100 UNIT/ML
500 SYRINGE INTRAVENOUS
Status: DISCONTINUED | OUTPATIENT
Start: 2024-10-18 | End: 2024-10-18 | Stop reason: HOSPADM

## 2024-10-18 RX ADMIN — ACETAMINOPHEN 650 MG: 325 TABLET ORAL at 08:10

## 2024-10-18 RX ADMIN — DIPHENHYDRAMINE HYDROCHLORIDE 25 MG: 50 INJECTION, SOLUTION INTRAMUSCULAR; INTRAVENOUS at 08:10

## 2024-10-18 RX ADMIN — HEPARIN 500 UNITS: 100 SYRINGE at 10:10

## 2024-10-18 RX ADMIN — DEXAMETHASONE SODIUM PHOSPHATE 0.25 MG: 4 INJECTION, SOLUTION INTRA-ARTICULAR; INTRALESIONAL; INTRAMUSCULAR; INTRAVENOUS; SOFT TISSUE at 08:10

## 2024-10-18 RX ADMIN — SODIUM CHLORIDE 1400 MG: 9 INJECTION, SOLUTION INTRAVENOUS at 08:10

## 2024-10-18 NOTE — DISCHARGE INSTRUCTIONS
[facility]  [address] [address]  [address]  [phone] phone     [phone] fax  Hours of Operation: Monday- Friday 8:00am- 5:00pm  After hours phone  [phone]  Hematology / Oncology Physicians on call      SHAINA Nation Dr., NP Phaon Dunbar, NP Khelsea Conley, FNP    Please call with any concerns regarding your appointment today.

## 2024-10-18 NOTE — PLAN OF CARE
Discussed plan of care with pt. Addressed any and ongoing concerns. Pt denies   Problem: Adult Inpatient Plan of Care  Goal: Plan of Care Review  Outcome: Progressing  Goal: Patient-Specific Goal (Individualized)  Outcome: Progressing  Flowsheets (Taken 10/18/2024 0937)  Individualized Care Needs: Pt in reclined position with warm blanket and apple juice  Anxieties, Fears or Concerns: Feels good today no complaints  Patient/Family-Specific Goals (Include Timeframe): Will tolerate treatment with no adverse reactions  Goal: Absence of Hospital-Acquired Illness or Injury  Outcome: Progressing  Intervention: Identify and Manage Fall Risk  Flowsheets (Taken 10/18/2024 0937)  Safety Promotion/Fall Prevention: in recliner, wheels locked  Intervention: Prevent Infection  Flowsheets (Taken 10/18/2024 0937)  Infection Prevention:   equipment surfaces disinfected   hand hygiene promoted   personal protective equipment utilized  Goal: Optimal Comfort and Wellbeing  Outcome: Progressing  Intervention: Provide Person-Centered Care  Flowsheets (Taken 10/18/2024 0937)  Trust Relationship/Rapport:   care explained   questions encouraged   choices provided   reassurance provided   emotional support provided   thoughts/feelings acknowledged   empathic listening provided   questions answered

## 2024-10-31 ENCOUNTER — LAB VISIT (OUTPATIENT)
Dept: LAB | Facility: HOSPITAL | Age: 54
End: 2024-10-31
Payer: COMMERCIAL

## 2024-10-31 DIAGNOSIS — C34.32 MALIGNANT NEOPLASM OF LOWER LOBE OF LEFT LUNG: ICD-10-CM

## 2024-10-31 LAB
ALBUMIN SERPL BCP-MCNC: 2.7 G/DL (ref 3.5–5.2)
ALP SERPL-CCNC: 112 U/L (ref 40–150)
ALT SERPL W/O P-5'-P-CCNC: 23 U/L (ref 10–44)
ANION GAP SERPL CALC-SCNC: 13 MMOL/L (ref 8–16)
AST SERPL-CCNC: 15 U/L (ref 10–40)
BASOPHILS # BLD AUTO: 0.04 K/UL (ref 0–0.2)
BASOPHILS NFR BLD: 0.6 % (ref 0–1.9)
BILIRUB SERPL-MCNC: 0.3 MG/DL (ref 0.1–1)
BUN SERPL-MCNC: 15 MG/DL (ref 6–20)
CALCIUM SERPL-MCNC: 8.8 MG/DL (ref 8.7–10.5)
CHLORIDE SERPL-SCNC: 103 MMOL/L (ref 95–110)
CO2 SERPL-SCNC: 25 MMOL/L (ref 23–29)
CREAT SERPL-MCNC: 0.9 MG/DL (ref 0.5–1.4)
DIFFERENTIAL METHOD BLD: NORMAL
EOSINOPHIL # BLD AUTO: 0.2 K/UL (ref 0–0.5)
EOSINOPHIL NFR BLD: 3.2 % (ref 0–8)
ERYTHROCYTE [DISTWIDTH] IN BLOOD BY AUTOMATED COUNT: 12.8 % (ref 11.5–14.5)
EST. GFR  (NO RACE VARIABLE): >60 ML/MIN/1.73 M^2
GLUCOSE SERPL-MCNC: 153 MG/DL (ref 70–110)
HCT VFR BLD AUTO: 38.1 % (ref 37–48.5)
HGB BLD-MCNC: 12.4 G/DL (ref 12–16)
IMM GRANULOCYTES # BLD AUTO: 0.02 K/UL (ref 0–0.04)
IMM GRANULOCYTES NFR BLD AUTO: 0.3 % (ref 0–0.5)
LYMPHOCYTES # BLD AUTO: 2 K/UL (ref 1–4.8)
LYMPHOCYTES NFR BLD: 27.5 % (ref 18–48)
MAGNESIUM SERPL-MCNC: 1.6 MG/DL (ref 1.6–2.6)
MCH RBC QN AUTO: 27.5 PG (ref 27–31)
MCHC RBC AUTO-ENTMCNC: 32.5 G/DL (ref 32–36)
MCV RBC AUTO: 85 FL (ref 82–98)
MONOCYTES # BLD AUTO: 0.5 K/UL (ref 0.3–1)
MONOCYTES NFR BLD: 7.1 % (ref 4–15)
NEUTROPHILS # BLD AUTO: 4.4 K/UL (ref 1.8–7.7)
NEUTROPHILS NFR BLD: 61.3 % (ref 38–73)
NRBC BLD-RTO: 0 /100 WBC
PHOSPHATE SERPL-MCNC: 3.6 MG/DL (ref 2.7–4.5)
PLATELET # BLD AUTO: 228 K/UL (ref 150–450)
PMV BLD AUTO: 10.4 FL (ref 9.2–12.9)
POTASSIUM SERPL-SCNC: 4 MMOL/L (ref 3.5–5.1)
PROT SERPL-MCNC: 6.1 G/DL (ref 6–8.4)
RBC # BLD AUTO: 4.51 M/UL (ref 4–5.4)
SODIUM SERPL-SCNC: 141 MMOL/L (ref 136–145)
WBC # BLD AUTO: 7.09 K/UL (ref 3.9–12.7)

## 2024-10-31 PROCEDURE — 83735 ASSAY OF MAGNESIUM: CPT

## 2024-10-31 PROCEDURE — 84100 ASSAY OF PHOSPHORUS: CPT

## 2024-10-31 PROCEDURE — 36415 COLL VENOUS BLD VENIPUNCTURE: CPT

## 2024-10-31 PROCEDURE — 85025 COMPLETE CBC W/AUTO DIFF WBC: CPT

## 2024-10-31 PROCEDURE — 80053 COMPREHEN METABOLIC PANEL: CPT

## 2024-10-31 RX ORDER — ACETAMINOPHEN 325 MG/1
650 TABLET ORAL
Status: CANCELLED
Start: 2024-11-01

## 2024-10-31 RX ORDER — DIPHENHYDRAMINE HYDROCHLORIDE 50 MG/ML
25 INJECTION INTRAMUSCULAR; INTRAVENOUS
Status: CANCELLED
Start: 2024-11-01

## 2024-10-31 RX ORDER — SODIUM CHLORIDE 0.9 % (FLUSH) 0.9 %
10 SYRINGE (ML) INJECTION
Status: CANCELLED | OUTPATIENT
Start: 2024-11-01

## 2024-10-31 RX ORDER — HEPARIN 100 UNIT/ML
500 SYRINGE INTRAVENOUS
Status: CANCELLED | OUTPATIENT
Start: 2024-11-01

## 2024-10-31 RX ORDER — DIPHENHYDRAMINE HYDROCHLORIDE 50 MG/ML
50 INJECTION INTRAMUSCULAR; INTRAVENOUS ONCE AS NEEDED
Status: CANCELLED | OUTPATIENT
Start: 2024-11-01

## 2024-10-31 RX ORDER — EPINEPHRINE 0.3 MG/.3ML
0.3 INJECTION SUBCUTANEOUS ONCE AS NEEDED
Status: CANCELLED | OUTPATIENT
Start: 2024-11-01

## 2024-11-01 ENCOUNTER — INFUSION (OUTPATIENT)
Dept: INFUSION THERAPY | Facility: HOSPITAL | Age: 54
End: 2024-11-01
Attending: RADIOLOGY
Payer: COMMERCIAL

## 2024-11-01 VITALS
RESPIRATION RATE: 16 BRPM | HEIGHT: 64 IN | HEART RATE: 58 BPM | OXYGEN SATURATION: 97 % | BODY MASS INDEX: 50.02 KG/M2 | DIASTOLIC BLOOD PRESSURE: 63 MMHG | WEIGHT: 293 LBS | SYSTOLIC BLOOD PRESSURE: 113 MMHG | TEMPERATURE: 97 F

## 2024-11-01 DIAGNOSIS — C34.32 MALIGNANT NEOPLASM OF LOWER LOBE OF LEFT LUNG: Primary | ICD-10-CM

## 2024-11-01 PROCEDURE — 25000003 PHARM REV CODE 250

## 2024-11-01 PROCEDURE — A4216 STERILE WATER/SALINE, 10 ML: HCPCS

## 2024-11-01 PROCEDURE — 96415 CHEMO IV INFUSION ADDL HR: CPT

## 2024-11-01 PROCEDURE — 96413 CHEMO IV INFUSION 1 HR: CPT

## 2024-11-01 PROCEDURE — 63600175 PHARM REV CODE 636 W HCPCS

## 2024-11-01 PROCEDURE — 96367 TX/PROPH/DG ADDL SEQ IV INF: CPT

## 2024-11-01 PROCEDURE — 96375 TX/PRO/DX INJ NEW DRUG ADDON: CPT

## 2024-11-01 RX ORDER — SODIUM CHLORIDE 0.9 % (FLUSH) 0.9 %
10 SYRINGE (ML) INJECTION
Status: DISCONTINUED | OUTPATIENT
Start: 2024-11-01 | End: 2024-11-01 | Stop reason: HOSPADM

## 2024-11-01 RX ORDER — DIPHENHYDRAMINE HYDROCHLORIDE 50 MG/ML
25 INJECTION INTRAMUSCULAR; INTRAVENOUS
Status: COMPLETED | OUTPATIENT
Start: 2024-11-01 | End: 2024-11-01

## 2024-11-01 RX ORDER — ACETAMINOPHEN 325 MG/1
650 TABLET ORAL
Status: COMPLETED | OUTPATIENT
Start: 2024-11-01 | End: 2024-11-01

## 2024-11-01 RX ORDER — HEPARIN 100 UNIT/ML
500 SYRINGE INTRAVENOUS
Status: DISCONTINUED | OUTPATIENT
Start: 2024-11-01 | End: 2024-11-01 | Stop reason: HOSPADM

## 2024-11-01 RX ADMIN — SODIUM CHLORIDE 1400 MG: 9 INJECTION, SOLUTION INTRAVENOUS at 11:11

## 2024-11-01 RX ADMIN — ACETAMINOPHEN 650 MG: 325 TABLET ORAL at 10:11

## 2024-11-01 RX ADMIN — HEPARIN 500 UNITS: 100 SYRINGE at 01:11

## 2024-11-01 RX ADMIN — DEXAMETHASONE SODIUM PHOSPHATE 0.25 MG: 4 INJECTION, SOLUTION INTRA-ARTICULAR; INTRALESIONAL; INTRAMUSCULAR; INTRAVENOUS; SOFT TISSUE at 10:11

## 2024-11-01 RX ADMIN — DIPHENHYDRAMINE HYDROCHLORIDE 25 MG: 50 INJECTION INTRAMUSCULAR; INTRAVENOUS at 10:11

## 2024-11-01 RX ADMIN — Medication 10 ML: at 01:11

## 2024-11-12 RX ORDER — DIPHENHYDRAMINE HYDROCHLORIDE 50 MG/ML
25 INJECTION INTRAMUSCULAR; INTRAVENOUS
Status: CANCELLED | OUTPATIENT
Start: 2024-11-15

## 2024-11-12 RX ORDER — DIPHENHYDRAMINE HYDROCHLORIDE 50 MG/ML
50 INJECTION INTRAMUSCULAR; INTRAVENOUS ONCE AS NEEDED
Status: CANCELLED | OUTPATIENT
Start: 2024-11-15

## 2024-11-12 RX ORDER — SODIUM CHLORIDE 0.9 % (FLUSH) 0.9 %
10 SYRINGE (ML) INJECTION
Status: CANCELLED | OUTPATIENT
Start: 2024-11-15

## 2024-11-12 RX ORDER — HEPARIN 100 UNIT/ML
500 SYRINGE INTRAVENOUS
Status: CANCELLED | OUTPATIENT
Start: 2024-11-15

## 2024-11-12 RX ORDER — EPINEPHRINE 0.3 MG/.3ML
0.3 INJECTION SUBCUTANEOUS ONCE AS NEEDED
Status: CANCELLED | OUTPATIENT
Start: 2024-11-15

## 2024-11-12 RX ORDER — ACETAMINOPHEN 325 MG/1
650 TABLET ORAL
Status: CANCELLED | OUTPATIENT
Start: 2024-11-15

## 2024-11-14 ENCOUNTER — LAB VISIT (OUTPATIENT)
Dept: LAB | Facility: HOSPITAL | Age: 54
End: 2024-11-14
Payer: COMMERCIAL

## 2024-11-14 DIAGNOSIS — C34.32 MALIGNANT NEOPLASM OF LOWER LOBE OF LEFT LUNG: ICD-10-CM

## 2024-11-14 LAB
ALBUMIN SERPL BCP-MCNC: 2.8 G/DL (ref 3.5–5.2)
ALP SERPL-CCNC: 124 U/L (ref 40–150)
ALT SERPL W/O P-5'-P-CCNC: 22 U/L (ref 10–44)
ANION GAP SERPL CALC-SCNC: 12 MMOL/L (ref 8–16)
AST SERPL-CCNC: 15 U/L (ref 10–40)
BASOPHILS # BLD AUTO: 0.05 K/UL (ref 0–0.2)
BASOPHILS NFR BLD: 0.6 % (ref 0–1.9)
BILIRUB SERPL-MCNC: 0.3 MG/DL (ref 0.1–1)
BUN SERPL-MCNC: 15 MG/DL (ref 6–20)
CALCIUM SERPL-MCNC: 9.1 MG/DL (ref 8.7–10.5)
CHLORIDE SERPL-SCNC: 105 MMOL/L (ref 95–110)
CO2 SERPL-SCNC: 26 MMOL/L (ref 23–29)
CREAT SERPL-MCNC: 1 MG/DL (ref 0.5–1.4)
DIFFERENTIAL METHOD BLD: NORMAL
EOSINOPHIL # BLD AUTO: 0.2 K/UL (ref 0–0.5)
EOSINOPHIL NFR BLD: 2.2 % (ref 0–8)
ERYTHROCYTE [DISTWIDTH] IN BLOOD BY AUTOMATED COUNT: 13.3 % (ref 11.5–14.5)
EST. GFR  (NO RACE VARIABLE): >60 ML/MIN/1.73 M^2
GLUCOSE SERPL-MCNC: 159 MG/DL (ref 70–110)
HCT VFR BLD AUTO: 38.8 % (ref 37–48.5)
HGB BLD-MCNC: 12.8 G/DL (ref 12–16)
IMM GRANULOCYTES # BLD AUTO: 0.03 K/UL (ref 0–0.04)
IMM GRANULOCYTES NFR BLD AUTO: 0.4 % (ref 0–0.5)
LYMPHOCYTES # BLD AUTO: 2.1 K/UL (ref 1–4.8)
LYMPHOCYTES NFR BLD: 25.9 % (ref 18–48)
MAGNESIUM SERPL-MCNC: 1.7 MG/DL (ref 1.6–2.6)
MCH RBC QN AUTO: 27.9 PG (ref 27–31)
MCHC RBC AUTO-ENTMCNC: 33 G/DL (ref 32–36)
MCV RBC AUTO: 85 FL (ref 82–98)
MONOCYTES # BLD AUTO: 0.6 K/UL (ref 0.3–1)
MONOCYTES NFR BLD: 7 % (ref 4–15)
NEUTROPHILS # BLD AUTO: 5.2 K/UL (ref 1.8–7.7)
NEUTROPHILS NFR BLD: 63.9 % (ref 38–73)
NRBC BLD-RTO: 0 /100 WBC
PHOSPHATE SERPL-MCNC: 4.4 MG/DL (ref 2.7–4.5)
PLATELET # BLD AUTO: 180 K/UL (ref 150–450)
PMV BLD AUTO: 10.9 FL (ref 9.2–12.9)
POTASSIUM SERPL-SCNC: 4 MMOL/L (ref 3.5–5.1)
PROT SERPL-MCNC: 6.1 G/DL (ref 6–8.4)
RBC # BLD AUTO: 4.58 M/UL (ref 4–5.4)
SODIUM SERPL-SCNC: 143 MMOL/L (ref 136–145)
WBC # BLD AUTO: 8.15 K/UL (ref 3.9–12.7)

## 2024-11-14 PROCEDURE — 36415 COLL VENOUS BLD VENIPUNCTURE: CPT

## 2024-11-14 PROCEDURE — 80053 COMPREHEN METABOLIC PANEL: CPT

## 2024-11-14 PROCEDURE — 83735 ASSAY OF MAGNESIUM: CPT

## 2024-11-14 PROCEDURE — 85025 COMPLETE CBC W/AUTO DIFF WBC: CPT

## 2024-11-14 PROCEDURE — 84100 ASSAY OF PHOSPHORUS: CPT

## 2024-11-15 ENCOUNTER — INFUSION (OUTPATIENT)
Dept: INFUSION THERAPY | Facility: HOSPITAL | Age: 54
End: 2024-11-15
Attending: RADIOLOGY
Payer: COMMERCIAL

## 2024-11-15 VITALS
SYSTOLIC BLOOD PRESSURE: 107 MMHG | OXYGEN SATURATION: 98 % | WEIGHT: 293 LBS | RESPIRATION RATE: 16 BRPM | HEIGHT: 64 IN | HEART RATE: 60 BPM | TEMPERATURE: 98 F | BODY MASS INDEX: 50.02 KG/M2 | DIASTOLIC BLOOD PRESSURE: 57 MMHG

## 2024-11-15 DIAGNOSIS — C34.32 MALIGNANT NEOPLASM OF LOWER LOBE OF LEFT LUNG: Primary | ICD-10-CM

## 2024-11-15 PROCEDURE — 96375 TX/PRO/DX INJ NEW DRUG ADDON: CPT

## 2024-11-15 PROCEDURE — 96413 CHEMO IV INFUSION 1 HR: CPT

## 2024-11-15 PROCEDURE — A4216 STERILE WATER/SALINE, 10 ML: HCPCS

## 2024-11-15 PROCEDURE — 25000003 PHARM REV CODE 250

## 2024-11-15 PROCEDURE — 96415 CHEMO IV INFUSION ADDL HR: CPT

## 2024-11-15 PROCEDURE — 96367 TX/PROPH/DG ADDL SEQ IV INF: CPT

## 2024-11-15 PROCEDURE — 63600175 PHARM REV CODE 636 W HCPCS

## 2024-11-15 RX ORDER — SODIUM CHLORIDE 0.9 % (FLUSH) 0.9 %
10 SYRINGE (ML) INJECTION
Status: DISCONTINUED | OUTPATIENT
Start: 2024-11-15 | End: 2024-11-15 | Stop reason: HOSPADM

## 2024-11-15 RX ORDER — HEPARIN 100 UNIT/ML
500 SYRINGE INTRAVENOUS
Status: DISCONTINUED | OUTPATIENT
Start: 2024-11-15 | End: 2024-11-15 | Stop reason: HOSPADM

## 2024-11-15 RX ORDER — DIPHENHYDRAMINE HYDROCHLORIDE 50 MG/ML
25 INJECTION INTRAMUSCULAR; INTRAVENOUS
Status: COMPLETED | OUTPATIENT
Start: 2024-11-15 | End: 2024-11-15

## 2024-11-15 RX ORDER — ACETAMINOPHEN 325 MG/1
650 TABLET ORAL
Status: COMPLETED | OUTPATIENT
Start: 2024-11-15 | End: 2024-11-15

## 2024-11-15 RX ADMIN — AMIVANTAMAB 1400 MG: 350 INJECTION INTRAVENOUS at 09:11

## 2024-11-15 RX ADMIN — HEPARIN 500 UNITS: 100 SYRINGE at 10:11

## 2024-11-15 RX ADMIN — Medication 10 ML: at 10:11

## 2024-11-15 RX ADMIN — ACETAMINOPHEN 650 MG: 325 TABLET ORAL at 08:11

## 2024-11-15 RX ADMIN — DEXAMETHASONE SODIUM PHOSPHATE 0.25 MG: 4 INJECTION, SOLUTION INTRA-ARTICULAR; INTRALESIONAL; INTRAMUSCULAR; INTRAVENOUS; SOFT TISSUE at 08:11

## 2024-11-15 RX ADMIN — DIPHENHYDRAMINE HYDROCHLORIDE 25 MG: 50 INJECTION INTRAMUSCULAR; INTRAVENOUS at 08:11

## 2024-11-15 NOTE — DISCHARGE INSTRUCTIONS
.[facility]  [address] [address]  [address]  [phone] phone     [phone] fax  Hours of Operation: Monday- Friday 8:00am- 5:00pm  After hours phone  [phone]  Hematology / Oncology Physicians on call    Dr. Denys Mendez           Nurse Practitioners:     Nanci Hawley, BRISA Caicedo, TATIANNA Islas, BRISA Kwon, BRISA Blackmon, NP    Please don't hesitate to call if you have any concerns.      FALL PREVENTION   Falls often occur due to slipping, tripping or losing your balance. Here are ways to reduce your risk of falling again.   Was there anything that caused your fall that can be fixed, removed or replaced?   Make your home safe by keeping walkways clear of objects you may trip over.   Use non-slip pads under rugs.   Do not walk in poorly lit areas.   Do not stand on chairs or wobbly ladders.   Use caution when reaching overhead or looking upward. This position can cause a loss of balance.   Be sure your shoes fit properly, have non-slip bottoms and are in good condition.   Be cautious when going up and down stairs, curbs, and when walking on uneven sidewalks.   If your balance is poor, consider using a cane or walker.   If your fall was related to alcohol use, stop or limit alcohol intake.   If your fall was related to use of sleeping medicines, talk to your doctor about this. You may need to reduce your dosage at bedtime if you awaken during the night to go to the bathroom.   To reduce the need for nighttime bathroom trips:   Avoid drinking fluids for several hours before going to bed   Empty your bladder before going to bed   Men can keep a urinal at the bedside   © 7123-7900 [employer], [address] [place] [address], [place] [address]. All rights reserved. This information is not intended as a substitute for professional medical care. Always follow your healthcare  professional's instructions.    WAYS TO HELP PREVENT INFECTION        WASH YOUR HANDS OFTEN DURING THE DAY, ESPECIALLY BEFORE YOU EAT, AFTER USING THE BATHROOM, AND AFTER TOUCHING ANIMALS    STAY AWAY FROM PEOPLE WHO HAVE ILLNESSES YOU CAN CATCH; SUCH AS COLDS, FLU, CHICKEN POX    TRY TO AVOID CROWDS    STAY AWAY FROM CHILDREN WHO RECENTLY HAVE RECEIVED LIVE VIRUS VACCINES    MAINTAIN GOOD MOUTH CARE    DO NOT SQUEEZE OR SCRATCH PIMPLES    CLEAN CUTS & SCRAPES RIGHT AWAY AND DAILY UNTIL HEALED WITH WARM WATER, SOAP & AN ANTISEPTIC    AVOID CONTACT WITH LITTER BOXES, BIRD CAGES, & FISH TANKS    AVOID STANDING WATER, IE., BIRD BATHS, FLOWER POTS/VASES, OR HUMIDIFIERS    WEAR GLOVES WHEN GARDENING OR CLEANING UP AFTER OTHERS, ESPECIALLY BABIES & SMALL CHILDREN    DO NOT EAT RAW FISH, SEAFOOD, MEAT, OR EGGS

## 2024-11-22 ENCOUNTER — LAB VISIT (OUTPATIENT)
Dept: LAB | Facility: HOSPITAL | Age: 54
End: 2024-11-22
Attending: NURSE PRACTITIONER
Payer: COMMERCIAL

## 2024-11-22 ENCOUNTER — OFFICE VISIT (OUTPATIENT)
Dept: INTERNAL MEDICINE | Facility: CLINIC | Age: 54
End: 2024-11-22
Payer: COMMERCIAL

## 2024-11-22 VITALS
RESPIRATION RATE: 18 BRPM | TEMPERATURE: 96 F | HEIGHT: 64 IN | SYSTOLIC BLOOD PRESSURE: 106 MMHG | OXYGEN SATURATION: 99 % | WEIGHT: 293 LBS | HEART RATE: 91 BPM | BODY MASS INDEX: 50.02 KG/M2 | DIASTOLIC BLOOD PRESSURE: 76 MMHG

## 2024-11-22 DIAGNOSIS — E11.9 TYPE 2 DIABETES MELLITUS WITHOUT COMPLICATION, WITHOUT LONG-TERM CURRENT USE OF INSULIN: ICD-10-CM

## 2024-11-22 DIAGNOSIS — I10 ESSENTIAL HYPERTENSION: ICD-10-CM

## 2024-11-22 DIAGNOSIS — J30.2 SEASONAL ALLERGIES: ICD-10-CM

## 2024-11-22 DIAGNOSIS — Z12.31 BREAST CANCER SCREENING BY MAMMOGRAM: ICD-10-CM

## 2024-11-22 DIAGNOSIS — C34.32 MALIGNANT NEOPLASM OF LOWER LOBE OF LEFT LUNG: ICD-10-CM

## 2024-11-22 DIAGNOSIS — R60.9 SWELLING: ICD-10-CM

## 2024-11-22 DIAGNOSIS — M25.562 LEFT KNEE PAIN, UNSPECIFIED CHRONICITY: ICD-10-CM

## 2024-11-22 DIAGNOSIS — Z00.00 ROUTINE MEDICAL EXAM: Primary | ICD-10-CM

## 2024-11-22 LAB
ESTIMATED AVG GLUCOSE: 137 MG/DL (ref 68–131)
HBA1C MFR BLD: 6.4 % (ref 4–5.6)

## 2024-11-22 PROCEDURE — 83036 HEMOGLOBIN GLYCOSYLATED A1C: CPT | Performed by: NURSE PRACTITIONER

## 2024-11-22 PROCEDURE — 99999 PR PBB SHADOW E&M-EST. PATIENT-LVL V: CPT | Mod: PBBFAC,,, | Performed by: NURSE PRACTITIONER

## 2024-11-22 PROCEDURE — 36415 COLL VENOUS BLD VENIPUNCTURE: CPT | Mod: PN | Performed by: NURSE PRACTITIONER

## 2024-11-22 RX ORDER — MONTELUKAST SODIUM 10 MG/1
10 TABLET ORAL NIGHTLY
Qty: 30 TABLET | Refills: 11 | Status: SHIPPED | OUTPATIENT
Start: 2024-11-22

## 2024-11-22 RX ORDER — AMLODIPINE BESYLATE 10 MG/1
10 TABLET ORAL DAILY
Qty: 90 TABLET | Refills: 3 | Status: SHIPPED | OUTPATIENT
Start: 2024-11-22

## 2024-11-22 RX ORDER — LOSARTAN POTASSIUM AND HYDROCHLOROTHIAZIDE 25; 100 MG/1; MG/1
1 TABLET ORAL DAILY
Qty: 90 TABLET | Refills: 3 | Status: SHIPPED | OUTPATIENT
Start: 2024-11-22

## 2024-11-22 RX ORDER — FUROSEMIDE 20 MG/1
20 TABLET ORAL DAILY PRN
Qty: 30 TABLET | Refills: 5 | Status: SHIPPED | OUTPATIENT
Start: 2024-11-22

## 2024-11-22 RX ORDER — ATENOLOL 50 MG/1
TABLET ORAL
Qty: 180 TABLET | Refills: 3 | Status: SHIPPED | OUTPATIENT
Start: 2024-11-22

## 2024-11-22 RX ORDER — FOLIC ACID 1 MG/1
1 TABLET ORAL DAILY
Qty: 30 TABLET | Refills: 11 | Status: SHIPPED | OUTPATIENT
Start: 2024-11-22

## 2024-11-22 RX ORDER — ROSUVASTATIN CALCIUM 5 MG/1
5 TABLET, COATED ORAL DAILY
Qty: 90 TABLET | Refills: 3 | Status: SHIPPED | OUTPATIENT
Start: 2024-11-22 | End: 2025-11-22

## 2024-11-22 RX ORDER — GLIPIZIDE AND METFORMIN HCL 5; 500 MG/1; MG/1
1 TABLET, FILM COATED ORAL
Qty: 180 TABLET | Refills: 3 | Status: SHIPPED | OUTPATIENT
Start: 2024-11-22 | End: 2025-11-22

## 2024-11-22 NOTE — PROGRESS NOTES
Patient ID: Shaneka Ahmadi is a 54 y.o. female.    Chief Complaint: Follow-up (3 mo)    History of Present Illness    CHIEF COMPLAINT:  Ms. Ahmadi presents today for follow-up.    DIABETES:  She presents for A1C follow-up. Her last A1C in August was controlled at 6.7. She is not routinely checking her glucose levels at home but reports compliance with her diabetes medications.    KNEE PAIN:  She reports intermittent left knee pain, primarily localized to the patella. Symptoms are exacerbated by frequent standing and sitting at work. Wearing supportive shoes provides some relief. She denies swelling in the affected knee.    CANCER TREATMENT:  She receives chemotherapy infusions every other week and is tolerating the treatment well. She denies any significant side effects or complications from her cancer treatment regimen.    EDEMA:  She reports intermittent swelling in her legs, for which she takes furosemide 20 mg as needed.    PREVENTIVE CARE:  She is aware of an upcoming mammogram scheduled for January. The order has been placed, but she will schedule the appointment herself.    MEDICAL HISTORY:  Her medical history includes hypertension, type 2 diabetes, COVID, obesity, neoplasm of the left lower ann, hypokalemia, and malignant neoplasm of the bone.    OVERALL HEALTH:  She reports overall doing well and is complying with her medications.      ROS:  Constitutional: negative diminished activity, negative appetite change, negative fatigue, negative fever  HENT: negative ear discharge, negative ear pain, negative mouth sores, negative nosebleeds, negative sore throat, negative tinnitus  Respiratory: negative apnea, negative cough, negative shortness of breath  Cardiovascular: negative chest pain, negative leg swelling  Gastrointestinal: negative abdominal distention, negative abdominal pain, negative blood in stool, negative constipation, negative diarrhea, negative nausea, negative vomiting  Endocrine: negative  polydipsia, negative polyphagia, negative polyuria  Musculoskeletal: negative back pain, negative abnormal gait, negative neck pain, negative neck stiffness, POSITIVE JOINT PAIN, negative muscle pain  Skin: negative rash, negative wound, negative abnormal moles  Neurological: negative dizziness, negative seizures, negative numbness, negative tingling, negative headaches, negative balance issues  Psychiatric: negative agitation, negative confusion, negative hallucinations, negative sleep difficulty, negative suicidal ideation  Breasts: negative swelling         Physical Exam    Vital Signs: Reviewed.  Constitutional: Well-appearing. Well-developed. No acute distress.  HENT: Normocephalic. Tympanic membranes normal bilaterally. Nares patent. Oropharynx clear. No erythema. No exudate.  Cardiovascular: Normal rate and regular rhythm. Normal heart sounds. Pedal pulses: +2 bilaterally.  Pulmonary: Pulmonary effort is normal. Normal breath sounds.  Abdominal: Bowel sounds are normal. Abdomen is soft. No tenderness.  Musculoskeletal: No muscle tenderness. No joint tenderness. Normal range of motion.  Skin: Warm. Dry.  Neurological: No focal deficit is present. Alert and oriented to person, place, and time. Microfilament sensory test: 6/6.  Psychiatric: Mood is normal. Behavior is normal. Behavior is cooperative.  MSK: Knee - Left: TENDERNESS TO PALPATION AT THE PATELLA. CREPITUS NOTED WITH RANGE OF MOTION. No swelling.  Vitals: BLOOD PRESSURE: 106/76.         Assessment & Plan    C34.32 Malignant neoplasm of lower lobe, left bronchus or lung  C79.51 Secondary malignant neoplasm of bone  I10 Essential (primary) hypertension  E11.42 Type 2 diabetes mellitus with diabetic polyneuropathy  E66.9 Obesity, unspecified  E87.6 Hypokalemia  M25.561 Pain in right knee  M25.562 Pain in left knee  M25.462 Effusion, left knee  Z12.31 Encounter for screening mammogram for malignant neoplasm of breast    METASTATIC LUNG CANCER:  -  Continued current treatment plan for metastatic lung cancer as per oncology team.  - Follow up to continue chemo infusions every other week as per oncology team's plan.    KNEE PAIN:  - Evaluated left knee pain, suspecting patellofemoral issues.  - Ms. Ribeirose to wear supportive shoes to help with knee pain.  - X-ray of left knee ordered.    DIABETES:  - Monitored diabetes management.  - A1C test performed today.  -Foot exam performed    HYPERTENSION AND LEG SWELLING:  - Assessed patient's overall condition, noting stable blood pressure and controlled diabetes.  - Addressed intermittent leg swelling, maintaining PRN furosemide regimen.  - Continued furosemide 20 mg as needed for intermittent leg swelling.    GENERAL:  - Refilled current medications.    BREAST CANCER SCREENING:  - Ms. Ahmadi to schedule mammogram in January (order already placed).              Follow up in about 6 months (around 5/22/2025).    This note was generated with the assistance of ambient listening technology. Verbal consent was obtained by the patient and accompanying visitor(s) for the recording of patient appointment to facilitate this note. I attest to having reviewed and edited the generated note for accuracy, though some syntax or spelling errors may persist. Please contact the author of this note for any clarification.

## 2024-11-27 ENCOUNTER — OFFICE VISIT (OUTPATIENT)
Dept: HEMATOLOGY/ONCOLOGY | Facility: CLINIC | Age: 54
End: 2024-11-27
Payer: COMMERCIAL

## 2024-11-27 ENCOUNTER — LAB VISIT (OUTPATIENT)
Dept: LAB | Facility: HOSPITAL | Age: 54
End: 2024-11-27
Payer: COMMERCIAL

## 2024-11-27 VITALS
BODY MASS INDEX: 50.02 KG/M2 | SYSTOLIC BLOOD PRESSURE: 123 MMHG | DIASTOLIC BLOOD PRESSURE: 67 MMHG | HEIGHT: 64 IN | TEMPERATURE: 98 F | HEART RATE: 60 BPM | WEIGHT: 293 LBS

## 2024-11-27 DIAGNOSIS — C34.32 MALIGNANT NEOPLASM OF LOWER LOBE OF LEFT LUNG: ICD-10-CM

## 2024-11-27 DIAGNOSIS — M25.562 LEFT KNEE PAIN, UNSPECIFIED CHRONICITY: Primary | ICD-10-CM

## 2024-11-27 LAB
ALBUMIN SERPL BCP-MCNC: 2.8 G/DL (ref 3.5–5.2)
ALP SERPL-CCNC: 121 U/L (ref 40–150)
ALT SERPL W/O P-5'-P-CCNC: 20 U/L (ref 10–44)
ANION GAP SERPL CALC-SCNC: 9 MMOL/L (ref 8–16)
AST SERPL-CCNC: 16 U/L (ref 10–40)
BASOPHILS # BLD AUTO: 0.04 K/UL (ref 0–0.2)
BASOPHILS NFR BLD: 0.5 % (ref 0–1.9)
BILIRUB SERPL-MCNC: 0.4 MG/DL (ref 0.1–1)
BUN SERPL-MCNC: 14 MG/DL (ref 6–20)
CALCIUM SERPL-MCNC: 8.9 MG/DL (ref 8.7–10.5)
CHLORIDE SERPL-SCNC: 106 MMOL/L (ref 95–110)
CO2 SERPL-SCNC: 28 MMOL/L (ref 23–29)
CREAT SERPL-MCNC: 0.9 MG/DL (ref 0.5–1.4)
DIFFERENTIAL METHOD BLD: NORMAL
EOSINOPHIL # BLD AUTO: 0.3 K/UL (ref 0–0.5)
EOSINOPHIL NFR BLD: 3.3 % (ref 0–8)
ERYTHROCYTE [DISTWIDTH] IN BLOOD BY AUTOMATED COUNT: 13.1 % (ref 11.5–14.5)
EST. GFR  (NO RACE VARIABLE): >60 ML/MIN/1.73 M^2
GLUCOSE SERPL-MCNC: 159 MG/DL (ref 70–110)
HCT VFR BLD AUTO: 38.4 % (ref 37–48.5)
HGB BLD-MCNC: 12.8 G/DL (ref 12–16)
IMM GRANULOCYTES # BLD AUTO: 0.01 K/UL (ref 0–0.04)
IMM GRANULOCYTES NFR BLD AUTO: 0.1 % (ref 0–0.5)
LYMPHOCYTES # BLD AUTO: 2.6 K/UL (ref 1–4.8)
LYMPHOCYTES NFR BLD: 31.9 % (ref 18–48)
MAGNESIUM SERPL-MCNC: 1.7 MG/DL (ref 1.6–2.6)
MCH RBC QN AUTO: 28.3 PG (ref 27–31)
MCHC RBC AUTO-ENTMCNC: 33.3 G/DL (ref 32–36)
MCV RBC AUTO: 85 FL (ref 82–98)
MONOCYTES # BLD AUTO: 0.7 K/UL (ref 0.3–1)
MONOCYTES NFR BLD: 8.3 % (ref 4–15)
NEUTROPHILS # BLD AUTO: 4.5 K/UL (ref 1.8–7.7)
NEUTROPHILS NFR BLD: 55.9 % (ref 38–73)
NRBC BLD-RTO: 0 /100 WBC
PHOSPHATE SERPL-MCNC: 4 MG/DL (ref 2.7–4.5)
PLATELET # BLD AUTO: 185 K/UL (ref 150–450)
PMV BLD AUTO: 11.5 FL (ref 9.2–12.9)
POTASSIUM SERPL-SCNC: 3.9 MMOL/L (ref 3.5–5.1)
PROT SERPL-MCNC: 5.8 G/DL (ref 6–8.4)
RBC # BLD AUTO: 4.53 M/UL (ref 4–5.4)
SODIUM SERPL-SCNC: 143 MMOL/L (ref 136–145)
WBC # BLD AUTO: 8.12 K/UL (ref 3.9–12.7)

## 2024-11-27 PROCEDURE — 85025 COMPLETE CBC W/AUTO DIFF WBC: CPT

## 2024-11-27 PROCEDURE — 3066F NEPHROPATHY DOC TX: CPT | Mod: CPTII,S$GLB,,

## 2024-11-27 PROCEDURE — 3044F HG A1C LEVEL LT 7.0%: CPT | Mod: CPTII,S$GLB,,

## 2024-11-27 PROCEDURE — 1159F MED LIST DOCD IN RCRD: CPT | Mod: CPTII,S$GLB,,

## 2024-11-27 PROCEDURE — 99999 PR PBB SHADOW E&M-EST. PATIENT-LVL V: CPT | Mod: PBBFAC,,,

## 2024-11-27 PROCEDURE — G2211 COMPLEX E/M VISIT ADD ON: HCPCS | Mod: S$GLB,,,

## 2024-11-27 PROCEDURE — 3061F NEG MICROALBUMINURIA REV: CPT | Mod: CPTII,S$GLB,,

## 2024-11-27 PROCEDURE — 84100 ASSAY OF PHOSPHORUS: CPT

## 2024-11-27 PROCEDURE — 99215 OFFICE O/P EST HI 40 MIN: CPT | Mod: S$GLB,,,

## 2024-11-27 PROCEDURE — 1160F RVW MEDS BY RX/DR IN RCRD: CPT | Mod: CPTII,S$GLB,,

## 2024-11-27 PROCEDURE — 3078F DIAST BP <80 MM HG: CPT | Mod: CPTII,S$GLB,,

## 2024-11-27 PROCEDURE — 3008F BODY MASS INDEX DOCD: CPT | Mod: CPTII,S$GLB,,

## 2024-11-27 PROCEDURE — 83735 ASSAY OF MAGNESIUM: CPT

## 2024-11-27 PROCEDURE — 3074F SYST BP LT 130 MM HG: CPT | Mod: CPTII,S$GLB,,

## 2024-11-27 PROCEDURE — 36415 COLL VENOUS BLD VENIPUNCTURE: CPT

## 2024-11-27 PROCEDURE — 80053 COMPREHEN METABOLIC PANEL: CPT

## 2024-11-27 RX ORDER — HYDROCODONE BITARTRATE AND ACETAMINOPHEN 10; 325 MG/1; MG/1
1 TABLET ORAL EVERY 6 HOURS PRN
Qty: 28 TABLET | Refills: 0 | Status: SHIPPED | OUTPATIENT
Start: 2024-11-27

## 2024-11-29 ENCOUNTER — INFUSION (OUTPATIENT)
Dept: INFUSION THERAPY | Facility: HOSPITAL | Age: 54
End: 2024-11-29
Attending: RADIOLOGY
Payer: COMMERCIAL

## 2024-11-29 VITALS
HEART RATE: 58 BPM | BODY MASS INDEX: 50.02 KG/M2 | RESPIRATION RATE: 16 BRPM | SYSTOLIC BLOOD PRESSURE: 104 MMHG | WEIGHT: 293 LBS | HEIGHT: 64 IN | TEMPERATURE: 97 F | DIASTOLIC BLOOD PRESSURE: 60 MMHG | OXYGEN SATURATION: 98 %

## 2024-11-29 DIAGNOSIS — C34.32 MALIGNANT NEOPLASM OF LOWER LOBE OF LEFT LUNG: Primary | ICD-10-CM

## 2024-11-29 PROCEDURE — 96375 TX/PRO/DX INJ NEW DRUG ADDON: CPT

## 2024-11-29 PROCEDURE — 25000003 PHARM REV CODE 250

## 2024-11-29 PROCEDURE — 63600175 PHARM REV CODE 636 W HCPCS

## 2024-11-29 PROCEDURE — 96367 TX/PROPH/DG ADDL SEQ IV INF: CPT

## 2024-11-29 PROCEDURE — A4216 STERILE WATER/SALINE, 10 ML: HCPCS

## 2024-11-29 PROCEDURE — 96413 CHEMO IV INFUSION 1 HR: CPT

## 2024-11-29 PROCEDURE — 96415 CHEMO IV INFUSION ADDL HR: CPT

## 2024-11-29 RX ORDER — DIPHENHYDRAMINE HYDROCHLORIDE 50 MG/ML
25 INJECTION INTRAMUSCULAR; INTRAVENOUS
Status: COMPLETED | OUTPATIENT
Start: 2024-11-29 | End: 2024-11-29

## 2024-11-29 RX ORDER — SODIUM CHLORIDE 0.9 % (FLUSH) 0.9 %
10 SYRINGE (ML) INJECTION
Status: CANCELLED | OUTPATIENT
Start: 2024-11-29

## 2024-11-29 RX ORDER — HEPARIN 100 UNIT/ML
500 SYRINGE INTRAVENOUS
Status: CANCELLED | OUTPATIENT
Start: 2024-11-29

## 2024-11-29 RX ORDER — DIPHENHYDRAMINE HYDROCHLORIDE 50 MG/ML
50 INJECTION INTRAMUSCULAR; INTRAVENOUS ONCE AS NEEDED
Status: CANCELLED | OUTPATIENT
Start: 2024-11-29

## 2024-11-29 RX ORDER — HEPARIN 100 UNIT/ML
500 SYRINGE INTRAVENOUS
Status: DISCONTINUED | OUTPATIENT
Start: 2024-11-29 | End: 2024-11-29 | Stop reason: HOSPADM

## 2024-11-29 RX ORDER — DIPHENHYDRAMINE HYDROCHLORIDE 50 MG/ML
25 INJECTION INTRAMUSCULAR; INTRAVENOUS
Status: CANCELLED
Start: 2024-11-29

## 2024-11-29 RX ORDER — EPINEPHRINE 0.3 MG/.3ML
0.3 INJECTION SUBCUTANEOUS ONCE AS NEEDED
Status: CANCELLED | OUTPATIENT
Start: 2024-11-29

## 2024-11-29 RX ORDER — SODIUM CHLORIDE 0.9 % (FLUSH) 0.9 %
10 SYRINGE (ML) INJECTION
Status: DISCONTINUED | OUTPATIENT
Start: 2024-11-29 | End: 2024-11-29 | Stop reason: HOSPADM

## 2024-11-29 RX ORDER — ACETAMINOPHEN 325 MG/1
650 TABLET ORAL
Status: COMPLETED | OUTPATIENT
Start: 2024-11-29 | End: 2024-11-29

## 2024-11-29 RX ORDER — ACETAMINOPHEN 325 MG/1
650 TABLET ORAL
Status: CANCELLED
Start: 2024-11-29

## 2024-11-29 RX ADMIN — Medication 10 ML: at 11:11

## 2024-11-29 RX ADMIN — DIPHENHYDRAMINE HYDROCHLORIDE 25 MG: 50 INJECTION, SOLUTION INTRAMUSCULAR; INTRAVENOUS at 08:11

## 2024-11-29 RX ADMIN — ACETAMINOPHEN 650 MG: 325 TABLET ORAL at 08:11

## 2024-11-29 RX ADMIN — AMIVANTAMAB 1400 MG: 350 INJECTION INTRAVENOUS at 09:11

## 2024-11-29 RX ADMIN — SODIUM CHLORIDE 0.25 MG: 9 INJECTION, SOLUTION INTRAVENOUS at 08:11

## 2024-11-29 RX ADMIN — HEPARIN 500 UNITS: 100 SYRINGE at 11:11

## 2024-11-29 NOTE — DISCHARGE INSTRUCTIONS
.[facility]  [address] [address]  [address]  [phone] phone     [phone] fax  Hours of Operation: Monday- Friday 8:00am- 5:00pm  After hours phone  [phone]  Hematology / Oncology Physicians on call    Dr. Denys Mendez           Nurse Practitioners:     Nanci Hawley, BRISA Caicedo, TATIANNA Islas, BRISA Kwon, BRISA Blackmon, NP    Please don't hesitate to call if you have any concerns.      FALL PREVENTION   Falls often occur due to slipping, tripping or losing your balance. Here are ways to reduce your risk of falling again.   Was there anything that caused your fall that can be fixed, removed or replaced?   Make your home safe by keeping walkways clear of objects you may trip over.   Use non-slip pads under rugs.   Do not walk in poorly lit areas.   Do not stand on chairs or wobbly ladders.   Use caution when reaching overhead or looking upward. This position can cause a loss of balance.   Be sure your shoes fit properly, have non-slip bottoms and are in good condition.   Be cautious when going up and down stairs, curbs, and when walking on uneven sidewalks.   If your balance is poor, consider using a cane or walker.   If your fall was related to alcohol use, stop or limit alcohol intake.   If your fall was related to use of sleeping medicines, talk to your doctor about this. You may need to reduce your dosage at bedtime if you awaken during the night to go to the bathroom.   To reduce the need for nighttime bathroom trips:   Avoid drinking fluids for several hours before going to bed   Empty your bladder before going to bed   Men can keep a urinal at the bedside   © 1964-0625 [employer] Thurston, [address] [place] [address], [place] [address]. All rights reserved. This information is not intended as a substitute for professional medical care. Always follow your healthcare  professional's instructions.    WAYS TO HELP PREVENT INFECTION        WASH YOUR HANDS OFTEN DURING THE DAY, ESPECIALLY BEFORE YOU EAT, AFTER USING THE BATHROOM, AND AFTER TOUCHING ANIMALS    STAY AWAY FROM PEOPLE WHO HAVE ILLNESSES YOU CAN CATCH; SUCH AS COLDS, FLU, CHICKEN POX    TRY TO AVOID CROWDS    STAY AWAY FROM CHILDREN WHO RECENTLY HAVE RECEIVED LIVE VIRUS VACCINES    MAINTAIN GOOD MOUTH CARE    DO NOT SQUEEZE OR SCRATCH PIMPLES    CLEAN CUTS & SCRAPES RIGHT AWAY AND DAILY UNTIL HEALED WITH WARM WATER, SOAP & AN ANTISEPTIC    AVOID CONTACT WITH LITTER BOXES, BIRD CAGES, & FISH TANKS    AVOID STANDING WATER, IE., BIRD BATHS, FLOWER POTS/VASES, OR HUMIDIFIERS    WEAR GLOVES WHEN GARDENING OR CLEANING UP AFTER OTHERS, ESPECIALLY BABIES & SMALL CHILDREN    DO NOT EAT RAW FISH, SEAFOOD, MEAT, OR EGGS

## 2024-11-29 NOTE — PROGRESS NOTES
Subjective:     Patient ID:?Shaneka Ahmadi is a 54 y.o. female.?? MR#: [id number]   ?   PRIMARY ONCOLOGIST: Dr. Mcfarlane-->Dr. Cartwright   ?   CHIEF COMPLAINT: Lab review/assessment C1D1 Rybrevant  ?   ONCOLOGIC DIAGNOSIS: Stage IV (cT2, cN2, cM1), Malignant neoplasm of lower lobe of left lung   ?   CURRENT TREATMENT: OP NSCLC AMIVANTAMAB-VMJW (R[facility]brevant) Q4W     PAST TREATMENT: PEMETREXED + CARBOPLATIN (AUC) Q3W      HPI Mrs. Ahmadi is a pleasan 54-renee-old female with metastatic non-small cell lung carcinoma Exon 20 mutation who presents today for lab review and assessment prior to C1D1 Rybrevant. 11/2022 pt seen with PCP with c/o persistent coughing and wheezing. CXR at that time revealed pulmonary nodules, subsequent CT chest found L Lung mass. L lung biopsy positive for adenocarcinoma , EGFR mutated. Pleural fluid also positive for malignancy. PET showed avid STEPHAN mass, mediastinal nodes, bone mets in C1, T1, T11, L3, sacrum, L ischium, sternum. MRI brain negative for intracranial metastases. Initiated on carbo/ alimta 01/27/23--re imaging after 4 cycles found progressive disease. Pt initiated on Rybrevant 04/27/23.    Interval History: Pt states she is feeling well. She does note recurrent knee pain.  Denies n/v/d/c, fever, chills, sob, cp, cough, abnormal bleeding. Denies difficulty with appetite or maintaining hydration.       Oncology History   Malignant neoplasm of lower lobe of left lung   12/9/2022 Initial Diagnosis    Malignant neoplasm of lower lobe of left lung       12/9/2022 Cancer Staged    Staging form: Lung, AJCC 8th Edition  - Clinical stage from 12/9/2022: Stage IV (cT2, cN2, cM1)       12/27/2022 - 12/27/2022 Chemotherapy    Treatment Summary   Plan Name: OP PEMBROLIZUMAB 400MG Q6W  Treatment Goal: Control  Status: Inactive  Start Date:   End Date:   Provider: Reese Mcfarlane MD  Chemotherapy: [No matching medication found in this treatment plan]        Genetic Testing    Patient has genetic testing  done for  TEmpus peripheral blood.                                              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     1/18/2023 - 1/18/2023 Chemotherapy    Treatment Summary   Plan Name: OP NSCLC AMIVANTAMAB-VMJW (Rybrevant) Q4W  Treatment Goal: Palliative  Status: Inactive  Start Date:   End Date:   Provider: Reese Mcfarlane MD  Chemotherapy: amivantamab-vmjw (RYBREVANT) 350 mg in sodium chloride 0.9% SolP 250 mL chemo infusion, 350 mg (100 % of original dose 350 mg), Intravenous, Clinic/HOD 1 time, 0 of 13 cycles  Dose modification: 350 mg (original dose 350 mg, Cycle 1), 1,050 mg (original dose 1,050 mg, Cycle 1), 1,400 mg (original dose 1,400 mg, Cycle 1)       1/27/2023 - 3/31/2023 Chemotherapy    Treatment Summary   Plan Name: OP NSCLC PEMETREXED + CARBOPLATIN (AUC) Q3W  Treatment Goal: Control  Status: Inactive  Start Date: 1/27/2023  End Date: 3/31/2023  Provider: Reese Mcfarlane MD  Chemotherapy: CARBOplatin (PARAPLATIN) 750 mg in sodium chloride 0.9% 335 mL chemo infusion, 750 mg (100 % of original dose 750 mg), Intravenous, Clinic/HOD 1 time, 4 of 4 cycles  Dose modification:   (original dose 750 mg, Cycle 1, Reason: MD Discretion)  Administration: 750 mg (1/27/2023), 750 mg (2/17/2023), 750 mg (3/31/2023), 750 mg (3/10/2023)  PEMEtrexed disodium (ALIMTA) 1,200 mg in sodium chloride 0.9% SolP 100 mL chemo infusion, 1,250 mg, Intravenous, Clinic/HOD 1 time, 4 of 4 cycles  Administration: 1,200 mg (1/27/2023), 1,200 mg (2/17/2023), 1,200 mg (3/31/2023), 1,200 mg (3/10/2023)       4/27/2023 -  Chemotherapy    Treatment Summary   Plan Name: OP NSCLC AMIVANTAMAB-VMJW (Rybrevant) Q4W  Treatment Goal: Palliative  Status: Active  Start Date: 4/27/2023 (Planned)  End Date: 6/6/2024 (Planned)  Provider: Reese Mcfarlane MD  Chemotherapy: amivantamab-vmjw (RYBREVANT) 350 mg in sodium chloride 0.9% SolP 250 mL chemo infusion, 350 mg (original dose ),  Intravenous, Clinic/HOD 1 time, 0 of 15 cycles  Dose modification: 350 mg (Cycle 1), 1,050 mg (Cycle 1), 1,400 mg (Cycle 1)          Social History     Socioeconomic History    Marital status:    Tobacco Use    Smoking status: Never     Passive exposure: Never    Smokeless tobacco: Never   Substance and Sexual Activity    Alcohol use: Never    Drug use: Never    Sexual activity: Yes     Partners: Male     Birth control/protection: None     Comment: tubal     Social Drivers of Health     Financial Resource Strain: Low Risk  (6/26/2024)    Received from [facility]    Overall Financial Resource Strain (CARDIA)     Difficulty of Paying Living Expenses: Not hard at all   Food Insecurity: No Food Insecurity (6/26/2024)    Received from [facility]    Hunger Vital Sign     Worried About Running Out of Food in the Last Year: Never true     Ran Out of Food in the Last Year: Never true   Transportation Needs: No Transportation Needs (6/26/2024)    Received from [facility]    PRAPARE - Transportation     Lack of Transportation (Medical): No     Lack of Transportation (Non-Medical): No   Physical Activity: Insufficiently Active (6/26/2024)    Received from [facility]    Exercise Vital Sign     Days of Exercise per Week: 3 days     Minutes of Exercise per Session: 40 min   Stress: No Stress Concern Present (6/26/2024)    Received from [facility]    [employer] - Occupational Stress Questionnaire     Feeling of Stress : Not at all   Housing Stability: Low Risk  (6/26/2024)    Received from [facility]    Housing Stability Vital Sign     Unable to Pay for Housing in the Last Year: No     Number of Times Moved in the Last Year: 0     Homeless in the Last Year: No      Family History   Problem Relation Name Age of Onset    Heart failure Father         Past Surgical History:   Procedure Laterality Date    CATARACT EXTRACTION W/  INTRAOCULAR LENS IMPLANT Right 06/02/2022    EXTRACTION OF TOOTH      FLUOROSCOPY N/A 01/26/2023    Procedure: FLUOROSCOPY/mediport placement;  Surgeon: Marlon Leone MD;  Location: [facility] CATH LAB;  Service: General;  Laterality: N/A;    TUBAL LIGATION      2007--postpartum tubal ligation        Review of Systems   Constitutional:  Negative for activity change, chills, fatigue and fever.   HENT: Negative.     Eyes: Negative.    Respiratory: Negative.     Cardiovascular: Negative.    Gastrointestinal: Negative.    Endocrine: Positive for cold intolerance.   Musculoskeletal:  Negative for arthralgias and myalgias.   Skin:  Negative for rash.   Neurological:  Negative for dizziness, weakness and light-headedness.   Psychiatric/Behavioral:  The patient is not nervous/anxious.        ?   A comprehensive 14-point review of systems was reviewed with patient and was negative other than as specified above.   ?     Objective:      Physical Exam  Vitals reviewed: virtual visit.   Constitutional:       Appearance: Normal appearance.   Neurological:      Mental Status: She is alert.   Psychiatric:         Mood and Affect: Mood normal.         Behavior: Behavior normal.           ?   Vitals:    11/27/24 0800   BP: 123/67   Pulse: 60   Temp: 97.7 °F (36.5 °C)          ?       ?   Laboratory:  ?   Lab Visit on 11/27/2024   Component Date Value Ref Range Status    WBC 11/27/2024 8.12  3.90 - 12.70 K/uL Final    RBC 11/27/2024 4.53  4.00 - 5.40 M/uL Final    Hemoglobin 11/27/2024 12.8  12.0 - 16.0 g/dL Final    Hematocrit 11/27/2024 38.4  37.0 - 48.5 % Final    MCV 11/27/2024 85  82 - 98 fL Final    MCH 11/27/2024 28.3  27.0 - 31.0 pg Final    MCHC 11/27/2024 33.3  32.0 - 36.0 g/dL Final    RDW 11/27/2024 13.1  11.5 - 14.5 % Final    Platelets 11/27/2024 185  150 - 450 K/uL Final    MPV 11/27/2024 11.5  9.2 - 12.9 fL Final    Immature Granulocytes  11/27/2024 0.1  0.0 - 0.5 % Final    Gran # (ANC) 11/27/2024 4.5  1.8 - 7.7 K/uL Final    Immature Grans (Abs) 11/27/2024 0.01  0.00 - 0.04 K/uL Final    Lymph # 11/27/2024 2.6  1.0 - 4.8 K/uL Final    Mono # 11/27/2024 0.7  0.3 - 1.0 K/uL Final    Eos # 11/27/2024 0.3  0.0 - 0.5 K/uL Final    Baso # 11/27/2024 0.04  0.00 - 0.20 K/uL Final    nRBC 11/27/2024 0  0 /100 WBC Final    Gran % 11/27/2024 55.9  38.0 - 73.0 % Final    Lymph % 11/27/2024 31.9  18.0 - 48.0 % Final    Mono % 11/27/2024 8.3  4.0 - 15.0 % Final    Eosinophil % 11/27/2024 3.3  0.0 - 8.0 % Final    Basophil % 11/27/2024 0.5  0.0 - 1.9 % Final    Differential Method 11/27/2024 Automated   Final    Sodium 11/27/2024 143  136 - 145 mmol/L Final    Potassium 11/27/2024 3.9  3.5 - 5.1 mmol/L Final    Chloride 11/27/2024 106  95 - 110 mmol/L Final    CO2 11/27/2024 28  23 - 29 mmol/L Final    Glucose 11/27/2024 159 (H)  70 - 110 mg/dL Final    BUN 11/27/2024 14  6 - 20 mg/dL Final    Creatinine 11/27/2024 0.9  0.5 - 1.4 mg/dL Final    Calcium 11/27/2024 8.9  8.7 - 10.5 mg/dL Final    Total Protein 11/27/2024 5.8 (L)  6.0 - 8.4 g/dL Final    Albumin 11/27/2024 2.8 (L)  3.5 - 5.2 g/dL Final    Total Bilirubin 11/27/2024 0.4  0.1 - 1.0 mg/dL Final    Alkaline Phosphatase 11/27/2024 121  40 - 150 U/L Final    AST 11/27/2024 16  10 - 40 U/L Final    ALT 11/27/2024 20  10 - 44 U/L Final    eGFR 11/27/2024 >60  >60 mL/min/1.73 m^2 Final    Anion Gap 11/27/2024 9  8 - 16 mmol/L Final    Magnesium 11/27/2024 1.7  1.6 - 2.6 mg/dL Final    Phosphorus 11/27/2024 4.0  2.7 - 4.5 mg/dL Final      ?   Imaging:    No results found. However, due to the size of the patient record, not all encounters were searched. Please check Results Review for a complete set of results.     No results found. However, due to the size of the patient record, not all encounters were searched. Please check Results Review for a complete set of results.       ?   Assessment/Plan:     Problem  List Items Addressed This Visit       Malignant neoplasm of lower lobe of left lung      Cancer Staging   Malignant neoplasm of lower lobe of left lung  Staging form: Lung, AJCC 8th Edition  - Clinical stage from 12/9/2022: Stage IV (cT2, cN2, cM1) - Signed by Reese Mcfarlane MD on 12/9/2022    Completed 4 Cycles of Carbo/Alimta 03/31/2023    Repeat CT CAP 04/11/2023:   Detrimental change.  Increase in number and size of numerous sclerotic lesions throughout the cervical and thoracic spine, left humerus and sternum.  There are also multiple large sclerotic lesions throughout the lumbar spine, pelvis and proximal femurs measuring up to 7.1 cm in size.  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  2.   The lateral left upper lobe mass is decreased in size in the interim, currently measuring 2.2 x 1.0 cm.  Negative for new pulmonary masses.  3.  Exophytic nodule along the posterior right hepatic lobe measuring 1.1 cm.  This was not imaged previously.  Etiology uncertain.  Metastasis is not excluded.    --Rybrevant C1D1 04/27/23--    Most recent CT CAP 08/22/24 Stable   Plan for repeat imaging in 3 months; approx 11/22/24    Labs reviewed, no concerning cytopenias  -Ok to proceed with Rybrevant tomorrow     RTC in two weeks with labs prior for C22D1 Rybrevant OW  F/u in 4 weeks with md and repeat labs/imaging prior for Rybrevant             Relevant Orders    CT Chest Abdomen Pelvis With IV Contrast (XPD) NO Oral Contrast     Other Visit Diagnoses       Left knee pain, unspecified chronicity    -  Primary    Relevant Medications    HYDROcodone-acetaminophen (NORCO)  mg per tablet    Other Relevant Orders    Ambulatory referral/consult to Sports Medicine                     Med Onc Chart Routing      Follow up with physician 4 weeks. with labs imaging prior for rybrevant   Follow up with DOLLY    Infusion scheduling note   Rybrevant-->2 weeks OW with CBC and CMP prior; 4 weeks after MD   Injection scheduling note     Labs CBC, CMP, magnesium and phosphorus   Scheduling:  Preferred lab:  Lab interval:     Imaging    Pharmacy appointment    Other referrals                     REBEL CastilloP-C  Hematology/Oncology

## 2024-11-29 NOTE — PLAN OF CARE
Problem: Adult Inpatient Plan of Care  Goal: Plan of Care Review  Outcome: Progressing  Flowsheets (Taken 11/29/2024 1021)  Plan of Care Reviewed With:   patient   spouse  Goal: Patient-Specific Goal (Individualized)  Outcome: Progressing  Flowsheets (Taken 11/29/2024 1021)  Individualized Care Needs: Reclined, warm blanket and pillow provided. Apple juice given as well  Anxieties, Fears or Concerns: No concerns expressed  Patient/Family-Specific Goals (Include Timeframe): To tolerate treatment today  Goal: Optimal Comfort and Wellbeing  Outcome: Progressing  Intervention: Monitor Pain and Promote Comfort  Flowsheets (Taken 11/29/2024 1021)  Pain Management Interventions:   warm blanket provided   quiet environment facilitated   pillow support provided  Intervention: Provide Person-Centered Care  Flowsheets (Taken 11/29/2024 1021)  Trust Relationship/Rapport:   care explained   reassurance provided   choices provided   thoughts/feelings acknowledged   emotional support provided   empathic listening provided   questions answered   questions encouraged     Problem: Fall Injury Risk  Goal: Absence of Fall and Fall-Related Injury  Outcome: Progressing  Intervention: Identify and Manage Contributors  Flowsheets (Taken 11/29/2024 1021)  Self-Care Promotion: BADL personal objects within reach  Medication Review/Management:   medications reviewed   infusion initiated  Intervention: Promote Injury-Free Environment  Flowsheets (Taken 11/29/2024 1021)  Safety Promotion/Fall Prevention:   in recliner, wheels locked   instructed to call staff for mobility   lighting adjusted   medications reviewed     Problem: Chemotherapy Effects  Goal: Nausea and Vomiting Relief  Outcome: Progressing  Intervention: Prevent or Manage Nausea and Vomiting  Flowsheets (Taken 11/29/2024 1021)  Environmental Support: calm environment promoted

## 2024-12-08 NOTE — ASSESSMENT & PLAN NOTE
Cancer Staging   Malignant neoplasm of lower lobe of left lung  Staging form: Lung, AJCC 8th Edition  - Clinical stage from 12/9/2022: Stage IV (cT2, cN2, cM1) - Signed by Reese Mcfarlane MD on 12/9/2022    Completed 4 Cycles of Carbo/Alimta 03/31/2023    Repeat CT CAP 04/11/2023:   Detrimental change.  Increase in number and size of numerous sclerotic lesions throughout the cervical and thoracic spine, left humerus and sternum.  There are also multiple large sclerotic lesions throughout the lumbar spine, pelvis and proximal femurs measuring up to 7.1 cm in size.  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  2.   The lateral left upper lobe mass is decreased in size in the interim, currently measuring 2.2 x 1.0 cm.  Negative for new pulmonary masses.  3.  Exophytic nodule along the posterior right hepatic lobe measuring 1.1 cm.  This was not imaged previously.  Etiology uncertain.  Metastasis is not excluded.    --Rybrevant C1D1 04/27/23--    Most recent CT CAP 08/22/24 Stable   Plan for repeat imaging in 3 months; approx 11/22/24    Labs reviewed, no concerning cytopenias  -Ok to proceed with Rybrevant tomorrow     RTC in two weeks with labs prior for C22D1 Rybrevant OW  F/u in 4 weeks with md and repeat labs/imaging prior for Rybrevant

## 2024-12-12 ENCOUNTER — LAB VISIT (OUTPATIENT)
Dept: LAB | Facility: HOSPITAL | Age: 54
End: 2024-12-12
Payer: COMMERCIAL

## 2024-12-12 DIAGNOSIS — C34.32 MALIGNANT NEOPLASM OF LOWER LOBE OF LEFT LUNG: ICD-10-CM

## 2024-12-12 LAB
ALBUMIN SERPL BCP-MCNC: 2.7 G/DL (ref 3.5–5.2)
ALP SERPL-CCNC: 125 U/L (ref 40–150)
ALT SERPL W/O P-5'-P-CCNC: 23 U/L (ref 10–44)
ANION GAP SERPL CALC-SCNC: 9 MMOL/L (ref 8–16)
AST SERPL-CCNC: 13 U/L (ref 10–40)
BASOPHILS # BLD AUTO: 0.04 K/UL (ref 0–0.2)
BASOPHILS NFR BLD: 0.6 % (ref 0–1.9)
BILIRUB SERPL-MCNC: 0.5 MG/DL (ref 0.1–1)
BUN SERPL-MCNC: 13 MG/DL (ref 6–20)
CALCIUM SERPL-MCNC: 8.7 MG/DL (ref 8.7–10.5)
CHLORIDE SERPL-SCNC: 102 MMOL/L (ref 95–110)
CO2 SERPL-SCNC: 30 MMOL/L (ref 23–29)
CREAT SERPL-MCNC: 0.8 MG/DL (ref 0.5–1.4)
DIFFERENTIAL METHOD BLD: NORMAL
EOSINOPHIL # BLD AUTO: 0.2 K/UL (ref 0–0.5)
EOSINOPHIL NFR BLD: 2.8 % (ref 0–8)
ERYTHROCYTE [DISTWIDTH] IN BLOOD BY AUTOMATED COUNT: 13.2 % (ref 11.5–14.5)
EST. GFR  (NO RACE VARIABLE): >60 ML/MIN/1.73 M^2
GLUCOSE SERPL-MCNC: 178 MG/DL (ref 70–110)
HCT VFR BLD AUTO: 38.9 % (ref 37–48.5)
HGB BLD-MCNC: 12.6 G/DL (ref 12–16)
IMM GRANULOCYTES # BLD AUTO: 0.02 K/UL (ref 0–0.04)
IMM GRANULOCYTES NFR BLD AUTO: 0.3 % (ref 0–0.5)
LYMPHOCYTES # BLD AUTO: 1.7 K/UL (ref 1–4.8)
LYMPHOCYTES NFR BLD: 23.6 % (ref 18–48)
MCH RBC QN AUTO: 27 PG (ref 27–31)
MCHC RBC AUTO-ENTMCNC: 32.4 G/DL (ref 32–36)
MCV RBC AUTO: 83 FL (ref 82–98)
MONOCYTES # BLD AUTO: 0.5 K/UL (ref 0.3–1)
MONOCYTES NFR BLD: 6.8 % (ref 4–15)
NEUTROPHILS # BLD AUTO: 4.8 K/UL (ref 1.8–7.7)
NEUTROPHILS NFR BLD: 65.9 % (ref 38–73)
NRBC BLD-RTO: 0 /100 WBC
PLATELET # BLD AUTO: 187 K/UL (ref 150–450)
PMV BLD AUTO: 11.3 FL (ref 9.2–12.9)
POTASSIUM SERPL-SCNC: 3.4 MMOL/L (ref 3.5–5.1)
PROT SERPL-MCNC: 5.8 G/DL (ref 6–8.4)
RBC # BLD AUTO: 4.67 M/UL (ref 4–5.4)
SODIUM SERPL-SCNC: 141 MMOL/L (ref 136–145)
WBC # BLD AUTO: 7.24 K/UL (ref 3.9–12.7)

## 2024-12-12 PROCEDURE — 85025 COMPLETE CBC W/AUTO DIFF WBC: CPT

## 2024-12-12 PROCEDURE — 36415 COLL VENOUS BLD VENIPUNCTURE: CPT

## 2024-12-12 PROCEDURE — 80053 COMPREHEN METABOLIC PANEL: CPT

## 2024-12-12 RX ORDER — DIPHENHYDRAMINE HYDROCHLORIDE 50 MG/ML
50 INJECTION INTRAMUSCULAR; INTRAVENOUS ONCE AS NEEDED
Status: CANCELLED | OUTPATIENT
Start: 2024-12-13

## 2024-12-12 RX ORDER — ACETAMINOPHEN 325 MG/1
650 TABLET ORAL
Status: CANCELLED | OUTPATIENT
Start: 2024-12-13

## 2024-12-12 RX ORDER — EPINEPHRINE 0.3 MG/.3ML
0.3 INJECTION SUBCUTANEOUS ONCE AS NEEDED
Status: CANCELLED | OUTPATIENT
Start: 2024-12-13

## 2024-12-12 RX ORDER — DIPHENHYDRAMINE HYDROCHLORIDE 50 MG/ML
25 INJECTION INTRAMUSCULAR; INTRAVENOUS
Status: CANCELLED | OUTPATIENT
Start: 2024-12-13

## 2024-12-12 RX ORDER — SODIUM CHLORIDE 0.9 % (FLUSH) 0.9 %
10 SYRINGE (ML) INJECTION
Status: CANCELLED | OUTPATIENT
Start: 2024-12-13

## 2024-12-12 RX ORDER — HEPARIN 100 UNIT/ML
500 SYRINGE INTRAVENOUS
Status: CANCELLED | OUTPATIENT
Start: 2024-12-13

## 2024-12-13 ENCOUNTER — INFUSION (OUTPATIENT)
Dept: INFUSION THERAPY | Facility: HOSPITAL | Age: 54
End: 2024-12-13
Attending: RADIOLOGY
Payer: COMMERCIAL

## 2024-12-13 VITALS
HEIGHT: 64 IN | DIASTOLIC BLOOD PRESSURE: 60 MMHG | TEMPERATURE: 98 F | BODY MASS INDEX: 50.02 KG/M2 | HEART RATE: 58 BPM | OXYGEN SATURATION: 98 % | RESPIRATION RATE: 18 BRPM | SYSTOLIC BLOOD PRESSURE: 100 MMHG | WEIGHT: 293 LBS

## 2024-12-13 DIAGNOSIS — C34.32 MALIGNANT NEOPLASM OF LOWER LOBE OF LEFT LUNG: Primary | ICD-10-CM

## 2024-12-13 PROCEDURE — 25000003 PHARM REV CODE 250

## 2024-12-13 PROCEDURE — 96413 CHEMO IV INFUSION 1 HR: CPT

## 2024-12-13 PROCEDURE — 96367 TX/PROPH/DG ADDL SEQ IV INF: CPT

## 2024-12-13 PROCEDURE — 96415 CHEMO IV INFUSION ADDL HR: CPT

## 2024-12-13 PROCEDURE — 96375 TX/PRO/DX INJ NEW DRUG ADDON: CPT

## 2024-12-13 PROCEDURE — 63600175 PHARM REV CODE 636 W HCPCS

## 2024-12-13 RX ORDER — ACETAMINOPHEN 325 MG/1
650 TABLET ORAL
Status: COMPLETED | OUTPATIENT
Start: 2024-12-13 | End: 2024-12-13

## 2024-12-13 RX ORDER — SODIUM CHLORIDE 0.9 % (FLUSH) 0.9 %
10 SYRINGE (ML) INJECTION
Status: DISCONTINUED | OUTPATIENT
Start: 2024-12-13 | End: 2024-12-13 | Stop reason: HOSPADM

## 2024-12-13 RX ORDER — DIPHENHYDRAMINE HYDROCHLORIDE 50 MG/ML
25 INJECTION INTRAMUSCULAR; INTRAVENOUS
Status: COMPLETED | OUTPATIENT
Start: 2024-12-13 | End: 2024-12-13

## 2024-12-13 RX ORDER — HEPARIN 100 UNIT/ML
500 SYRINGE INTRAVENOUS
Status: DISCONTINUED | OUTPATIENT
Start: 2024-12-13 | End: 2024-12-13 | Stop reason: HOSPADM

## 2024-12-13 RX ADMIN — SODIUM CHLORIDE 0.25 MG: 9 INJECTION, SOLUTION INTRAVENOUS at 08:12

## 2024-12-13 RX ADMIN — AMIVANTAMAB 1400 MG: 350 INJECTION INTRAVENOUS at 08:12

## 2024-12-13 RX ADMIN — ACETAMINOPHEN 650 MG: 325 TABLET ORAL at 08:12

## 2024-12-13 RX ADMIN — DIPHENHYDRAMINE HYDROCHLORIDE 25 MG: 50 INJECTION INTRAMUSCULAR; INTRAVENOUS at 08:12

## 2024-12-13 RX ADMIN — HEPARIN 500 UNITS: 100 SYRINGE at 10:12

## 2024-12-13 NOTE — PLAN OF CARE
"Discussed plan of care with pt. Addressed any and ongoing concerns. Pt denies   Problem: Adult Inpatient Plan of Care  Goal: Plan of Care Review  Outcome: Progressing  Goal: Patient-Specific Goal (Individualized)  Outcome: Progressing  Flowsheets (Taken 12/13/2024 0924)  Individualized Care Needs: Reclined position, warm blanket and pillow provided with apple juice  Anxieties, Fears or Concerns: none states " I feel great"  Patient/Family-Specific Goals (Include Timeframe): Pt will tolerate treatment with no adverse affects  Goal: Absence of Hospital-Acquired Illness or Injury  Outcome: Progressing  Intervention: Identify and Manage Fall Risk  Flowsheets (Taken 12/13/2024 0924)  Safety Promotion/Fall Prevention: in recliner, wheels locked  Intervention: Prevent Infection  Flowsheets (Taken 12/13/2024 0924)  Infection Prevention:   hand hygiene promoted   equipment surfaces disinfected   personal protective equipment utilized  Goal: Optimal Comfort and Wellbeing  Outcome: Progressing  Intervention: Provide Person-Centered Care  Flowsheets (Taken 12/13/2024 0924)  Trust Relationship/Rapport:   questions encouraged   care explained   choices provided   reassurance provided   emotional support provided   thoughts/feelings acknowledged   empathic listening provided   questions answered     "

## 2024-12-13 NOTE — DISCHARGE INSTRUCTIONS
[facility]  [id number] [facility]  [address]  [phone] phone     [phone] fax  Hours of Operation: Monday- Friday 8:00am- 5:00pm  After hours phone  [phone]  Hematology / Oncology Physicians on call      SHAINA Nation Dr., NP Phaon Dunbar, NP Khelsea Conley, FNP    Please call with any concerns regarding your appointment today.

## 2024-12-23 ENCOUNTER — OFFICE VISIT (OUTPATIENT)
Dept: INTERNAL MEDICINE | Facility: CLINIC | Age: 54
End: 2024-12-23
Payer: COMMERCIAL

## 2024-12-23 VITALS
OXYGEN SATURATION: 97 % | HEART RATE: 78 BPM | BODY MASS INDEX: 50.02 KG/M2 | HEIGHT: 64 IN | DIASTOLIC BLOOD PRESSURE: 60 MMHG | TEMPERATURE: 96 F | SYSTOLIC BLOOD PRESSURE: 108 MMHG | RESPIRATION RATE: 18 BRPM | WEIGHT: 293 LBS

## 2024-12-23 DIAGNOSIS — R06.2 WHEEZING: ICD-10-CM

## 2024-12-23 DIAGNOSIS — J01.40 ACUTE PANSINUSITIS, RECURRENCE NOT SPECIFIED: Primary | ICD-10-CM

## 2024-12-23 DIAGNOSIS — C34.32 MALIGNANT NEOPLASM OF LOWER LOBE OF LEFT LUNG: ICD-10-CM

## 2024-12-23 PROCEDURE — 3074F SYST BP LT 130 MM HG: CPT | Mod: CPTII,S$GLB,, | Performed by: NURSE PRACTITIONER

## 2024-12-23 PROCEDURE — 87635 SARS-COV-2 COVID-19 AMP PRB: CPT | Mod: QW,S$GLB,, | Performed by: NURSE PRACTITIONER

## 2024-12-23 PROCEDURE — 3078F DIAST BP <80 MM HG: CPT | Mod: CPTII,S$GLB,, | Performed by: NURSE PRACTITIONER

## 2024-12-23 PROCEDURE — 3044F HG A1C LEVEL LT 7.0%: CPT | Mod: CPTII,S$GLB,, | Performed by: NURSE PRACTITIONER

## 2024-12-23 PROCEDURE — 1160F RVW MEDS BY RX/DR IN RCRD: CPT | Mod: CPTII,S$GLB,, | Performed by: NURSE PRACTITIONER

## 2024-12-23 PROCEDURE — 3008F BODY MASS INDEX DOCD: CPT | Mod: CPTII,S$GLB,, | Performed by: NURSE PRACTITIONER

## 2024-12-23 PROCEDURE — 1159F MED LIST DOCD IN RCRD: CPT | Mod: CPTII,S$GLB,, | Performed by: NURSE PRACTITIONER

## 2024-12-23 PROCEDURE — 99214 OFFICE O/P EST MOD 30 MIN: CPT | Mod: S$GLB,,, | Performed by: NURSE PRACTITIONER

## 2024-12-23 PROCEDURE — 87502 INFLUENZA DNA AMP PROBE: CPT | Mod: QW,S$GLB,, | Performed by: NURSE PRACTITIONER

## 2024-12-23 PROCEDURE — 3061F NEG MICROALBUMINURIA REV: CPT | Mod: CPTII,S$GLB,, | Performed by: NURSE PRACTITIONER

## 2024-12-23 PROCEDURE — 3066F NEPHROPATHY DOC TX: CPT | Mod: CPTII,S$GLB,, | Performed by: NURSE PRACTITIONER

## 2024-12-23 PROCEDURE — 2023F DILAT RTA XM W/O RTNOPTHY: CPT | Mod: CPTII,S$GLB,, | Performed by: NURSE PRACTITIONER

## 2024-12-23 PROCEDURE — 99999 PR PBB SHADOW E&M-EST. PATIENT-LVL V: CPT | Mod: PBBFAC,,, | Performed by: NURSE PRACTITIONER

## 2024-12-23 RX ORDER — HYDROCODONE BITARTRATE AND HOMATROPINE METHYLBROMIDE ORAL SOLUTION 5; 1.5 MG/5ML; MG/5ML
5 LIQUID ORAL
Qty: 180 ML | Refills: 0 | Status: SHIPPED | OUTPATIENT
Start: 2024-12-23

## 2024-12-23 RX ORDER — ALBUTEROL SULFATE 90 UG/1
1-2 AEROSOL, METERED RESPIRATORY (INHALATION)
Qty: 18 G | Refills: 11 | Status: SHIPPED | OUTPATIENT
Start: 2024-12-23 | End: 2025-12-23

## 2024-12-23 RX ORDER — AZITHROMYCIN 250 MG/1
250 TABLET, FILM COATED ORAL DAILY
Qty: 6 TABLET | Refills: 0 | Status: SHIPPED | OUTPATIENT
Start: 2024-12-23

## 2024-12-23 NOTE — PROGRESS NOTES
Patient ID: Shaneka Ahmadi is a 54 y.o. female.    Chief Complaint: Cough and Nasal Congestion    History of Present Illness    CHIEF COMPLAINT:  Ms. Ahmadi presents today with cough, sinus pressure, and ear pressure.    HISTORY OF PRESENT ILLNESS:  She reports cough and sinus symptoms that started over a week ago. She experiences sinus pressure in the frontal and maxillary areas, accompanied by a productive cough with thick, discolored sputum. Her symptoms have not significantly improved despite self-treatment with inhaler, old cough medicine, and allergy medication. She has been increasing fluid intake.      ROS:  Constitutional: negative diminished activity, negative appetite change, negative fatigue, negative fever  HENT: negative ear discharge, negative ear pain, negative mouth sores, negative nosebleeds, negative sore throat, negative tinnitus, POSITIVE SINUS PRESSURE  Respiratory: negative apnea, POSITIVE COUGH, negative shortness of breath  Cardiovascular: negative chest pain, negative leg swelling  Gastrointestinal: negative abdominal distention, negative abdominal pain, negative blood in stool, negative constipation, negative diarrhea, negative nausea, negative vomiting  Musculoskeletal: negative back pain, negative abnormal gait, negative neck pain, negative neck stiffness, negative joint pain, negative muscle pain  Skin: negative rash, negative wound, negative abnormal moles  Allergic/Immunologic: negative seasonal allergies, negative food allergies  Neurological: negative dizziness, negative seizures, negative numbness, negative tingling, negative headaches, negative balance issues  Psychiatric: negative agitation, negative confusion, negative hallucinations, negative sleep difficulty, negative suicidal ideation         Physical Exam    Vital Signs: Reviewed.  Constitutional: Well-appearing. Well-developed. No acute distress.  HENT: Normocephalic. Tympanic membranes normal bilaterally. Nares patent.  Oropharynx clear. No erythema. No exudate. NASAL MUCOSA SWOLLEN. FRONTAL SINUS TENDERNESS. MAXILLARY SINUS TENDERNESS. LIGHT THROAT IRRITATION.  Cardiovascular: Normal rate and regular rhythm. Normal heart sounds.  Pulmonary: Pulmonary effort is normal. Normal breath sounds.  Abdominal: Bowel sounds are normal. Abdomen is soft. No tenderness.  Musculoskeletal: No muscle tenderness. No joint tenderness. Normal range of motion.  Skin: Warm. Dry.  Neurological: No focal deficit is present. Alert and oriented to person, place, and time.  Psychiatric: Mood is normal. Behavior is normal. Behavior is cooperative.         Assessment & Plan    J01.41 Acute recurrent pansinusitis  J45.909 Unspecified asthma, uncomplicated  R05.1 Acute cough  R06.2 Wheezing  R09.81 Nasal congestion    ACUTE SINUSITIS:  - Assessed patient with symptoms consistent with upper respiratory infection and sinusitis.  - Diagnosed acute sinusitis based on symptoms and physical exam findings.  - Determined need for antibiotic therapy, chose Z-Juan.  - Started Z-Juan for acute sinusitis.  - Started Flonase nasal spray for sinusitis.  - Recommend nasal steroid for sinus inflammation.    ASTHMA AND WHEEZING:  - Addressed respiratory symptoms with albuterol and hydrocodone cough syrup.  - Refilled albuterol inhaler for wheezing.    ACUTE COUGH:  - Refilled Hycodan cough syrup.    NASAL CONGESTION:  - Recommend nasal steroid for sinus inflammation.  - Started Flonase nasal spray for sinusitis.    GENERAL CARE:  - Ms. Ahmadi to hydrate.  - Ms. Ahmadi to rest.  - Performed point of care flu test.  - Performed point of care COVID test.    FOLLOW-UP:  - Follow up if no improvement.              Follow up if symptoms worsen or fail to improve.    This note was generated with the assistance of ambient listening technology. Verbal consent was obtained by the patient and accompanying visitor(s) for the recording of patient appointment to facilitate this note. I attest  to having reviewed and edited the generated note for accuracy, though some syntax or spelling errors may persist. Please contact the author of this note for any clarification.

## 2024-12-26 ENCOUNTER — LAB VISIT (OUTPATIENT)
Dept: LAB | Facility: HOSPITAL | Age: 54
End: 2024-12-26
Payer: COMMERCIAL

## 2024-12-26 ENCOUNTER — TELEPHONE (OUTPATIENT)
Dept: INTERNAL MEDICINE | Facility: CLINIC | Age: 54
End: 2024-12-26
Payer: COMMERCIAL

## 2024-12-26 DIAGNOSIS — C34.32 MALIGNANT NEOPLASM OF LOWER LOBE OF LEFT LUNG: ICD-10-CM

## 2024-12-26 LAB
ALBUMIN SERPL BCP-MCNC: 2.6 G/DL (ref 3.5–5.2)
ALP SERPL-CCNC: 132 U/L (ref 40–150)
ALT SERPL W/O P-5'-P-CCNC: 28 U/L (ref 10–44)
ANION GAP SERPL CALC-SCNC: 11 MMOL/L (ref 8–16)
AST SERPL-CCNC: 15 U/L (ref 10–40)
BASOPHILS # BLD AUTO: 0.05 K/UL (ref 0–0.2)
BASOPHILS NFR BLD: 0.4 % (ref 0–1.9)
BILIRUB SERPL-MCNC: 0.4 MG/DL (ref 0.1–1)
BUN SERPL-MCNC: 15 MG/DL (ref 6–20)
CALCIUM SERPL-MCNC: 8.5 MG/DL (ref 8.7–10.5)
CHLORIDE SERPL-SCNC: 105 MMOL/L (ref 95–110)
CO2 SERPL-SCNC: 25 MMOL/L (ref 23–29)
CREAT SERPL-MCNC: 0.8 MG/DL (ref 0.5–1.4)
DIFFERENTIAL METHOD BLD: ABNORMAL
EOSINOPHIL # BLD AUTO: 0.4 K/UL (ref 0–0.5)
EOSINOPHIL NFR BLD: 3.8 % (ref 0–8)
ERYTHROCYTE [DISTWIDTH] IN BLOOD BY AUTOMATED COUNT: 13.4 % (ref 11.5–14.5)
EST. GFR  (NO RACE VARIABLE): >60 ML/MIN/1.73 M^2
GLUCOSE SERPL-MCNC: 150 MG/DL (ref 70–110)
HCT VFR BLD AUTO: 38.7 % (ref 37–48.5)
HGB BLD-MCNC: 12.3 G/DL (ref 12–16)
IMM GRANULOCYTES # BLD AUTO: 0.09 K/UL (ref 0–0.04)
IMM GRANULOCYTES NFR BLD AUTO: 0.8 % (ref 0–0.5)
LYMPHOCYTES # BLD AUTO: 3.2 K/UL (ref 1–4.8)
LYMPHOCYTES NFR BLD: 28.4 % (ref 18–48)
MAGNESIUM SERPL-MCNC: 1.7 MG/DL (ref 1.6–2.6)
MCH RBC QN AUTO: 27.5 PG (ref 27–31)
MCHC RBC AUTO-ENTMCNC: 31.8 G/DL (ref 32–36)
MCV RBC AUTO: 87 FL (ref 82–98)
MONOCYTES # BLD AUTO: 0.6 K/UL (ref 0.3–1)
MONOCYTES NFR BLD: 4.9 % (ref 4–15)
NEUTROPHILS # BLD AUTO: 7 K/UL (ref 1.8–7.7)
NEUTROPHILS NFR BLD: 61.7 % (ref 38–73)
NRBC BLD-RTO: 0 /100 WBC
PHOSPHATE SERPL-MCNC: 3.1 MG/DL (ref 2.7–4.5)
PLATELET # BLD AUTO: 177 K/UL (ref 150–450)
PMV BLD AUTO: 11.7 FL (ref 9.2–12.9)
POTASSIUM SERPL-SCNC: 3.9 MMOL/L (ref 3.5–5.1)
PROT SERPL-MCNC: 5.7 G/DL (ref 6–8.4)
RBC # BLD AUTO: 4.47 M/UL (ref 4–5.4)
SODIUM SERPL-SCNC: 141 MMOL/L (ref 136–145)
WBC # BLD AUTO: 11.39 K/UL (ref 3.9–12.7)

## 2024-12-26 PROCEDURE — 80053 COMPREHEN METABOLIC PANEL: CPT

## 2024-12-26 PROCEDURE — 85025 COMPLETE CBC W/AUTO DIFF WBC: CPT

## 2024-12-26 PROCEDURE — 36415 COLL VENOUS BLD VENIPUNCTURE: CPT | Mod: PN

## 2024-12-26 PROCEDURE — 84100 ASSAY OF PHOSPHORUS: CPT

## 2024-12-26 PROCEDURE — 83735 ASSAY OF MAGNESIUM: CPT

## 2024-12-26 NOTE — TELEPHONE ENCOUNTER
I SUBMITTED A PRIOR AUTHORIZATION FOR THE PT'S MEDICATION HYDROCODONE BIT-HOMATRP COUGH SYRUP ON 12/26/24. THIS WAS THE INFORMATION BACK . [employer] is reviewing your PA request and will respond within 24 hours for Medicaid or up to 72 hours for non-Medicaid plans, based on the required timeframe determined by state or federal regulations. To check for an update later, open this request from your dashboard. Fyi //[email or web address]

## 2024-12-27 ENCOUNTER — OFFICE VISIT (OUTPATIENT)
Dept: HEMATOLOGY/ONCOLOGY | Facility: CLINIC | Age: 54
End: 2024-12-27
Payer: COMMERCIAL

## 2024-12-27 DIAGNOSIS — C34.32 MALIGNANT NEOPLASM OF LOWER LOBE OF LEFT LUNG: Primary | ICD-10-CM

## 2024-12-27 NOTE — PROGRESS NOTES
Subjective:     Patient ID:Mateusz Ahmadi is a 54 y.o. female.?? MR#: [id number]   ?   PRIMARY ONCOLOGIST: Dr. Mcfarlane-->Dr. Cartwright   ?   CHIEF COMPLAINT: Lab review/assessment C1D1 Rybrevant  ?   ONCOLOGIC DIAGNOSIS: Stage IV (cT2, cN2, cM1), Malignant neoplasm of lower lobe of left lung   ?   CURRENT TREATMENT: OP NSCLC AMIVANTAMAB-VMJW (Rybrevant) Q4W     PAST TREATMENT: PEMETREXED + CARBOPLATIN (AUC) Q3W    The patient location is: [place]  The chief complaint leading to consultation is: follow-up    Visit type: audiovisual    Face to Face time with patient: 10  20 minutes of total time spent on the encounter, which includes face to face time and non-face to face time preparing to see the patient (eg, review of tests), Obtaining and/or reviewing separately obtained history, Documenting clinical information in the electronic or other health record, Independently interpreting results (not separately reported) and communicating results to the patient/family/caregiver, or Care coordination (not separately reported).         Each patient to whom he or she provides medical services by telemedicine is:  (1) informed of the relationship between the physician and patient and the respective role of any other health care provider with respect to management of the patient; and (2) notified that he or she may decline to receive medical services by telemedicine and may withdraw from such care at any time.    Notes:    HPI Mrs. Ahmadi is a [facility] 54-renee-old female with metastatic non-small cell lung carcinoma Exon 20 mutation who presents today for lab review and assessment prior to C1D1 Rybrevant. 11/2022 pt seen with PCP with c/o persistent coughing and wheezing. CXR at that time revealed pulmonary nodules, subsequent CT chest found L Lung mass. L lung biopsy positive for adenocarcinoma , EGFR mutated. Pleural fluid also positive for malignancy. PET showed avid STEPHAN mass, mediastinal nodes, bone mets in C1, T1, T11, L3, sacrum, L  ischium, sternum. MRI brain negative for intracranial metastases. Initiated on carbo/ alimta 01/27/23--re imaging after 4 cycles found progressive disease. Pt initiated on Rybrevant 04/27/23.    Interval History: Pt states she is currently under the weather--seen by PCP 12/23 currently on abx for sinusitis--pt notes continued cough and congestion, however, it has improved since starting abx.  Denies n/v/d/c, fever, chills, sob, cp,  abnormal bleeding. Denies difficulty with appetite or maintaining hydration.       Oncology History   Malignant neoplasm of lower lobe of left lung   12/9/2022 Initial Diagnosis    Malignant neoplasm of lower lobe of left lung       12/9/2022 Cancer Staged    Staging form: Lung, AJCC 8th Edition  - Clinical stage from 12/9/2022: Stage IV (cT2, cN2, cM1)       12/27/2022 - 12/27/2022 Chemotherapy    Treatment Summary   Plan Name: OP PEMBROLIZUMAB 400MG Q6W  Treatment Goal: Control  Status: Inactive  Start Date:   End Date:   Provider: Reese Mcfarlane MD  Chemotherapy: [No matching medication found in this treatment plan]        Genetic Testing    Patient has genetic testing done for  TEmpus peripheral blood.                                              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     1/18/2023 - 1/18/2023 Chemotherapy    Treatment Summary   Plan Name: OP NSCLC AMIVANTAMAB-VMJW (Rybrevant) Q4W  Treatment Goal: Palliative  Status: Inactive  Start Date:   End Date:   Provider: Reese Mcfarlane MD  Chemotherapy: amivantamab-vmjw (RYBREVANT) 350 mg in sodium chloride 0.9% SolP 250 mL chemo infusion, 350 mg (100 % of original dose 350 mg), Intravenous, Clinic/HOD 1 time, 0 of 13 cycles  Dose modification: 350 mg (original dose 350 mg, Cycle 1), 1,050 mg (original dose 1,050 mg, Cycle 1), 1,400 mg (original dose 1,400 mg, Cycle 1)       1/27/2023 - 3/31/2023 Chemotherapy    Treatment Summary   Plan Name: OP NSCLC PEMETREXED +  CARBOPLATIN (AUC) Q3W  Treatment Goal: Control  Status: Inactive  Start Date: 1/27/2023  End Date: 3/31/2023  Provider: Reese Mcfarlane MD  Chemotherapy: CARBOplatin (PARAPLATIN) 750 mg in sodium chloride 0.9% 335 mL chemo infusion, 750 mg (100 % of original dose 750 mg), Intravenous, Clinic/HOD 1 time, 4 of 4 cycles  Dose modification:   (original dose 750 mg, Cycle 1, Reason: MD Discretion)  Administration: 750 mg (1/27/2023), 750 mg (2/17/2023), 750 mg (3/31/2023), 750 mg (3/10/2023)  PEMEtrexed disodium (ALIMTA) 1,200 mg in sodium chloride 0.9% SolP 100 mL chemo infusion, 1,250 mg, Intravenous, Clinic/HOD 1 time, 4 of 4 cycles  Administration: 1,200 mg (1/27/2023), 1,200 mg (2/17/2023), 1,200 mg (3/31/2023), 1,200 mg (3/10/2023)       4/27/2023 -  Chemotherapy    Treatment Summary   Plan Name: OP NSCLC AMIVANTAMAB-VMJW (Rybrevant) Q4W  Treatment Goal: Palliative  Status: Active  Start Date: 4/27/2023 (Planned)  End Date: 6/6/2024 (Planned)  Provider: Reese Mcfarlane MD  Chemotherapy: amivantamab-vmjw (RYBREVANT) 350 mg in sodium chloride 0.9% SolP 250 mL chemo infusion, 350 mg (original dose ), Intravenous, Clinic/HOD 1 time, 0 of 15 cycles  Dose modification: 350 mg (Cycle 1), 1,050 mg (Cycle 1), 1,400 mg (Cycle 1)          Social History     Socioeconomic History    Marital status:    Tobacco Use    Smoking status: Never     Passive exposure: Never    Smokeless tobacco: Never   Substance and Sexual Activity    Alcohol use: Never    Drug use: Never    Sexual activity: Yes     Partners: Male     Birth control/protection: None     Comment: tubal     Social Drivers of Health     Financial Resource Strain: Low Risk  (12/9/2024)    Overall Financial Resource Strain (CARDIA)     Difficulty of Paying Living Expenses: Not hard at all   Food Insecurity: No Food Insecurity (12/9/2024)    Hunger Vital Sign     Worried About Running Out of Food in the Last Year: Never true     Ran Out of Food in the Last Year:  Never true   Transportation Needs: No Transportation Needs (6/26/2024)    Received from [facility]    PRAPARE - Transportation     Lack of Transportation (Medical): No     Lack of Transportation (Non-Medical): No   Physical Activity: Insufficiently Active (12/9/2024)    Exercise Vital Sign     Days of Exercise per Week: 2 days     Minutes of Exercise per Session: 30 min   Stress: No Stress Concern Present (12/9/2024)    [employer] - Occupational Stress Questionnaire     Feeling of Stress : Not at all   Housing Stability: Unknown (12/9/2024)    Housing Stability Vital Sign     Unable to Pay for Housing in the Last Year: No      Family History   Problem Relation Name Age of Onset    Heart failure Father        Past Surgical History:   Procedure Laterality Date    CATARACT EXTRACTION W/  INTRAOCULAR LENS IMPLANT Right 06/02/2022    EXTRACTION OF TOOTH      FLUOROSCOPY N/A 01/26/2023    Procedure: FLUOROSCOPY/mediport placement;  Surgeon: Marlon Leone MD;  Location: [facility] CATH LAB;  Service: General;  Laterality: N/A;    TUBAL LIGATION      2007--postpartum tubal ligation        Review of Systems   Constitutional:  Negative for activity change, chills, fatigue and fever.   HENT: Negative.     Eyes: Negative.    Respiratory: Negative.     Cardiovascular: Negative.    Gastrointestinal: Negative.    Endocrine: Positive for cold intolerance.   Musculoskeletal:  Negative for arthralgias and myalgias.   Skin:  Negative for rash.   Neurological:  Negative for dizziness, weakness and light-headedness.   Psychiatric/Behavioral:  The patient is not nervous/anxious.        ?   A comprehensive 14-point review of systems was reviewed with patient and was negative other than as specified above.   ?     Objective:      Physical Exam  Vitals reviewed: virtual visit.   Constitutional:       Appearance: Normal appearance.   Neurological:      Mental Status: She is alert.    Psychiatric:         Mood and Affect: Mood normal.         Behavior: Behavior normal.           ?   There were no vitals filed for this visit.         ?       ?   Laboratory:  ?   No visits with results within 1 Day(s) from this visit.   Latest known visit with results is:   Lab Visit on 12/26/2024   Component Date Value Ref Range Status    WBC 12/26/2024 11.39  3.90 - 12.70 K/uL Final    RBC 12/26/2024 4.47  4.00 - 5.40 M/uL Final    Hemoglobin 12/26/2024 12.3  12.0 - 16.0 g/dL Final    Hematocrit 12/26/2024 38.7  37.0 - 48.5 % Final    MCV 12/26/2024 87  82 - 98 fL Final    MCH 12/26/2024 27.5  27.0 - 31.0 pg Final    MCHC 12/26/2024 31.8 (L)  32.0 - 36.0 g/dL Final    RDW 12/26/2024 13.4  11.5 - 14.5 % Final    Platelets 12/26/2024 177  150 - 450 K/uL Final    MPV 12/26/2024 11.7  9.2 - 12.9 fL Final    Immature Granulocytes 12/26/2024 0.8 (H)  0.0 - 0.5 % Final    Gran # (ANC) 12/26/2024 7.0  1.8 - 7.7 K/uL Final    Immature Grans (Abs) 12/26/2024 0.09 (H)  0.00 - 0.04 K/uL Final    Lymph # 12/26/2024 3.2  1.0 - 4.8 K/uL Final    Mono # 12/26/2024 0.6  0.3 - 1.0 K/uL Final    Eos # 12/26/2024 0.4  0.0 - 0.5 K/uL Final    Baso # 12/26/2024 0.05  0.00 - 0.20 K/uL Final    nRBC 12/26/2024 0  0 /100 WBC Final    Gran % 12/26/2024 61.7  38.0 - 73.0 % Final    Lymph % 12/26/2024 28.4  18.0 - 48.0 % Final    Mono % 12/26/2024 4.9  4.0 - 15.0 % Final    Eosinophil % 12/26/2024 3.8  0.0 - 8.0 % Final    Basophil % 12/26/2024 0.4  0.0 - 1.9 % Final    Differential Method 12/26/2024 Automated   Final    Sodium 12/26/2024 141  136 - 145 mmol/L Final    Potassium 12/26/2024 3.9  3.5 - 5.1 mmol/L Final    Chloride 12/26/2024 105  95 - 110 mmol/L Final    CO2 12/26/2024 25  23 - 29 mmol/L Final    Glucose 12/26/2024 150 (H)  70 - 110 mg/dL Final    BUN 12/26/2024 15  6 - 20 mg/dL Final    Creatinine 12/26/2024 0.8  0.5 - 1.4 mg/dL Final    Calcium 12/26/2024 8.5 (L)  8.7 - 10.5 mg/dL Final    Total Protein 12/26/2024 5.7 (L)   6.0 - 8.4 g/dL Final    Albumin 12/26/2024 2.6 (L)  3.5 - 5.2 g/dL Final    Total Bilirubin 12/26/2024 0.4  0.1 - 1.0 mg/dL Final    Alkaline Phosphatase 12/26/2024 132  40 - 150 U/L Final    AST 12/26/2024 15  10 - 40 U/L Final    ALT 12/26/2024 28  10 - 44 U/L Final    eGFR 12/26/2024 >60  >60 mL/min/1.73 m^2 Final    Anion Gap 12/26/2024 11  8 - 16 mmol/L Final    Magnesium 12/26/2024 1.7  1.6 - 2.6 mg/dL Final    Phosphorus 12/26/2024 3.1  2.7 - 4.5 mg/dL Final      ?   Imaging:    No results found. However, due to the size of the patient record, not all encounters were searched. Please check Results Review for a complete set of results.     No results found. However, due to the size of the patient record, not all encounters were searched. Please check Results Review for a complete set of results.       ?   Assessment/Plan:     Problem List Items Addressed This Visit       Malignant neoplasm of lower lobe of left lung - Primary      Cancer Staging   Malignant neoplasm of lower lobe of left lung  Staging form: Lung, AJCC 8th Edition  - Clinical stage from 12/9/2022: Stage IV (cT2, cN2, cM1) - Signed by Reese Mcfarlane MD on 12/9/2022    Completed 4 Cycles of Carbo/Alimta 03/31/2023    Repeat CT CAP 04/11/2023:   Detrimental change.  Increase in number and size of numerous sclerotic lesions throughout the cervical and thoracic spine, left humerus and sternum.  There are also multiple large sclerotic lesions throughout the lumbar spine, pelvis and proximal femurs measuring up to 7.1 cm in size.  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  2.   The lateral left upper lobe mass is decreased in size in the interim, currently measuring 2.2 x 1.0 cm.  Negative for new pulmonary masses.  3.  Exophytic nodule along the posterior right hepatic lobe measuring 1.1 cm.  This was not imaged previously.  Etiology uncertain.  Metastasis is not excluded.    --Rybrevant C1D1 04/27/23--    Most recent CT CAP 08/22/24 Stable    Plan for repeat imaging in 3 months; approx 11/22/24--scheduled for 01/2025    Labs reviewed, no concerning cytopenias  -Hold Rybrevant tomorrow--currently on abx therapy for sinusitis   -f/u in one week for reconsideration of rybrevant    RTC in one week with labs prior for rybrevant           Relevant Orders    CBC Auto Differential    Comprehensive Metabolic Panel    Magnesium    Phosphorus                  Med Onc Chart Routing      Follow up with physician    Follow up with DOLLY 1 week. with repeat labs for rybrevant   Infusion scheduling note   rybrevant   Injection scheduling note    Labs CBC, CMP, magnesium and phosphorus   Scheduling:  Preferred lab:  Lab interval:     Imaging    Pharmacy appointment    Other referrals                     REBEL CastilloP-C  Hematology/Oncology

## 2024-12-27 NOTE — ASSESSMENT & PLAN NOTE
Cancer Staging   Malignant neoplasm of lower lobe of left lung  Staging form: Lung, AJCC 8th Edition  - Clinical stage from 12/9/2022: Stage IV (cT2, cN2, cM1) - Signed by Reese Mcfarlane MD on 12/9/2022    Completed 4 Cycles of Carbo/Alimta 03/31/2023    Repeat CT CAP 04/11/2023:   Detrimental change.  Increase in number and size of numerous sclerotic lesions throughout the cervical and thoracic spine, left humerus and sternum.  There are also multiple large sclerotic lesions throughout the lumbar spine, pelvis and proximal femurs measuring up to 7.1 cm in size.  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  2.   The lateral left upper lobe mass is decreased in size in the interim, currently measuring 2.2 x 1.0 cm.  Negative for new pulmonary masses.  3.  Exophytic nodule along the posterior right hepatic lobe measuring 1.1 cm.  This was not imaged previously.  Etiology uncertain.  Metastasis is not excluded.    --Rybrevant C1D1 04/27/23--    Most recent CT CAP 08/22/24 Stable   Plan for repeat imaging in 3 months; approx 11/22/24--scheduled for 01/2025    Labs reviewed, no concerning cytopenias  -Hold Rybrevant tomorrow--currently on abx therapy for sinusitis   -f/u in one week for reconsideration of rybrevant    RTC in one week with labs prior for rybrevant     show

## 2024-12-31 ENCOUNTER — LAB VISIT (OUTPATIENT)
Dept: LAB | Facility: HOSPITAL | Age: 54
End: 2024-12-31
Payer: COMMERCIAL

## 2024-12-31 DIAGNOSIS — C34.32 MALIGNANT NEOPLASM OF LOWER LOBE OF LEFT LUNG: ICD-10-CM

## 2024-12-31 LAB
ALBUMIN SERPL BCP-MCNC: 2.5 G/DL (ref 3.5–5.2)
ALP SERPL-CCNC: 135 U/L (ref 40–150)
ALT SERPL W/O P-5'-P-CCNC: 41 U/L (ref 10–44)
ANION GAP SERPL CALC-SCNC: 12 MMOL/L (ref 8–16)
AST SERPL-CCNC: 18 U/L (ref 10–40)
BASOPHILS # BLD AUTO: 0.05 K/UL (ref 0–0.2)
BASOPHILS NFR BLD: 0.5 % (ref 0–1.9)
BILIRUB SERPL-MCNC: 0.4 MG/DL (ref 0.1–1)
BUN SERPL-MCNC: 12 MG/DL (ref 6–20)
CALCIUM SERPL-MCNC: 8.5 MG/DL (ref 8.7–10.5)
CHLORIDE SERPL-SCNC: 104 MMOL/L (ref 95–110)
CO2 SERPL-SCNC: 25 MMOL/L (ref 23–29)
CREAT SERPL-MCNC: 0.9 MG/DL (ref 0.5–1.4)
DIFFERENTIAL METHOD BLD: ABNORMAL
EOSINOPHIL # BLD AUTO: 0.2 K/UL (ref 0–0.5)
EOSINOPHIL NFR BLD: 2.6 % (ref 0–8)
ERYTHROCYTE [DISTWIDTH] IN BLOOD BY AUTOMATED COUNT: 13.2 % (ref 11.5–14.5)
EST. GFR  (NO RACE VARIABLE): >60 ML/MIN/1.73 M^2
GLUCOSE SERPL-MCNC: 242 MG/DL (ref 70–110)
HCT VFR BLD AUTO: 36.4 % (ref 37–48.5)
HGB BLD-MCNC: 12.2 G/DL (ref 12–16)
IMM GRANULOCYTES # BLD AUTO: 0.05 K/UL (ref 0–0.04)
IMM GRANULOCYTES NFR BLD AUTO: 0.5 % (ref 0–0.5)
LYMPHOCYTES # BLD AUTO: 2.5 K/UL (ref 1–4.8)
LYMPHOCYTES NFR BLD: 26.8 % (ref 18–48)
MAGNESIUM SERPL-MCNC: 1.6 MG/DL (ref 1.6–2.6)
MCH RBC QN AUTO: 27.9 PG (ref 27–31)
MCHC RBC AUTO-ENTMCNC: 33.5 G/DL (ref 32–36)
MCV RBC AUTO: 83 FL (ref 82–98)
MONOCYTES # BLD AUTO: 0.7 K/UL (ref 0.3–1)
MONOCYTES NFR BLD: 7.4 % (ref 4–15)
NEUTROPHILS # BLD AUTO: 5.8 K/UL (ref 1.8–7.7)
NEUTROPHILS NFR BLD: 62.2 % (ref 38–73)
NRBC BLD-RTO: 0 /100 WBC
PHOSPHATE SERPL-MCNC: 3.5 MG/DL (ref 2.7–4.5)
PLATELET # BLD AUTO: 171 K/UL (ref 150–450)
PMV BLD AUTO: 11.5 FL (ref 9.2–12.9)
POTASSIUM SERPL-SCNC: 3.6 MMOL/L (ref 3.5–5.1)
PROT SERPL-MCNC: 5.7 G/DL (ref 6–8.4)
RBC # BLD AUTO: 4.38 M/UL (ref 4–5.4)
SODIUM SERPL-SCNC: 141 MMOL/L (ref 136–145)
WBC # BLD AUTO: 9.27 K/UL (ref 3.9–12.7)

## 2024-12-31 PROCEDURE — 80053 COMPREHEN METABOLIC PANEL: CPT

## 2024-12-31 PROCEDURE — 85025 COMPLETE CBC W/AUTO DIFF WBC: CPT

## 2024-12-31 PROCEDURE — 83735 ASSAY OF MAGNESIUM: CPT

## 2024-12-31 PROCEDURE — 36415 COLL VENOUS BLD VENIPUNCTURE: CPT

## 2024-12-31 PROCEDURE — 84100 ASSAY OF PHOSPHORUS: CPT

## 2024-12-31 NOTE — PROGRESS NOTES
Subjective:       Patient ID: Shaneka Ahmadi is a 54 y.o. female.    Chief Complaint:   1. Malignant neoplasm of lower lobe of left lung  Stage IV (cT2, cN2, cM1)       2. Secondary malignant neoplasm of bone          Current Treatment:  OP NSCLC AMIVANTAMAB-VMJW (Rybrevant) Q4W; changed to every 2 weeks    OP ZOLEDRONIC ACID (ZOMETA) Q4W     THERAPY SHELL - B12     Treatment History:  Carbo/Alimta started in 1/2023; stopped due to progression after 4 cycles     HPI: This is a 53 year old  woman with diabetes and HTN who is seen in Hem/Onc for metastatic non small cell lung cancer. She noted a cough and SOB; CXR and CT chest showed a left lung mass. Biopsy was positive for adenocarcinoma, EGFR mutated. Pleural fluid was also positive for malignancy. PET showed an avid left upper lobe mass, mediastinal nodes, bone mets in C1, T1, T11, L3, sacrum, L ischium, sternum. MRI of the brain was negative for intracranial metastases. She was started on Carbo/Alimta on 1/27/23. Restaging scans after 4 cycles found progressive disease.      She is currently on amivantamab (Rybrevant) which she receives on days 1 and 15 every 28 days.    Her primary Hematologist/Oncologist is Dr. Cartwright.    Interval History: Patient presents for follow up on Rybrevant; She is scheduled to receive C22D15 tomorrow. She presents alone and reports having sinus congestion and cough last week. She still has residual cough with clear sputum but feels much better. CBC with mildly low Hct; no other cytopenias. CMP with normal lytes, kidney function, and LFTs. BG elevated; she admits to having drank cranberry juice and orange juice prior to blood draw. She states she will not do that again. Will proceed with treatment tomorrow.     Reviewed labs with patient:   CBC:   Recent Labs   Lab 12/31/24  0718   WBC 9.27   RBC 4.38   Hemoglobin 12.2   Hematocrit 36.4 L   Platelets 171   MCV 83   MCH 27.9   MCHC 33.5     CMP:  Recent Labs   Lab  12/26/24  1040   Glucose 150 H   Calcium 8.5 L   Albumin 2.6 L   Total Protein 5.7 L   Sodium 141   Potassium 3.9   CO2 25   Chloride 105   BUN 15   Creatinine 0.8   Alkaline Phosphatase 132   ALT 28   AST 15   Total Bilirubin 0.4     The patient location is: [place]  The chief complaint leading to consultation is: lung cancer    Visit type: audiovisual    Face to Face time with patient: 6 minutes  16 minutes of total time spent on the encounter, which includes face to face time and non-face to face time preparing to see the patient (eg, review of tests), Obtaining and/or reviewing separately obtained history, Documenting clinical information in the electronic or other health record, Independently interpreting results (not separately reported) and communicating results to the patient/family/caregiver, or Care coordination (not separately reported).     Each patient to whom he or she provides medical services by telemedicine is:  (1) informed of the relationship between the physician and patient and the respective role of any other health care provider with respect to management of the patient; and (2) notified that he or she may decline to receive medical services by telemedicine and may withdraw from such care at any time.    Social History     Socioeconomic History    Marital status:    Tobacco Use    Smoking status: Never     Passive exposure: Never    Smokeless tobacco: Never   Substance and Sexual Activity    Alcohol use: Never    Drug use: Never    Sexual activity: Yes     Partners: Male     Birth control/protection: None     Comment: tubal     Social Drivers of Health     Financial Resource Strain: Low Risk  (12/9/2024)    Overall Financial Resource Strain (CARDIA)     Difficulty of Paying Living Expenses: Not hard at all   Food Insecurity: No Food Insecurity (12/9/2024)    Hunger Vital Sign     Worried About Running Out of Food in the Last Year: Never true     Ran Out of Food in the Last Year: Never  true   Transportation Needs: No Transportation Needs (6/26/2024)    Received from [facility]    PRAPARE - Transportation     Lack of Transportation (Medical): No     Lack of Transportation (Non-Medical): No   Physical Activity: Insufficiently Active (12/9/2024)    Exercise Vital Sign     Days of Exercise per Week: 2 days     Minutes of Exercise per Session: 30 min   Stress: No Stress Concern Present (12/9/2024)    [employer] [place] [employer] - Occupational Stress Questionnaire     Feeling of Stress : Not at all   Housing Stability: Unknown (12/9/2024)    Housing Stability Vital Sign     Unable to Pay for Housing in the Last Year: No     Past Medical History:   Diagnosis Date    Diabetes mellitus     Hypertension     Malignant neoplasm of lower lobe of left lung 12/9/2022    Rash, drug 7/6/2023    OP NSCLC AMIVANTAMAB-VMJW (Rybrevant) Q4W      Secondary malignant neoplasm of bone 12/5/2022     Family History   Problem Relation Name Age of Onset    Heart failure Father       Past Surgical History:   Procedure Laterality Date    CATARACT EXTRACTION W/  INTRAOCULAR LENS IMPLANT Right 06/02/2022    EXTRACTION OF TOOTH      FLUOROSCOPY N/A 01/26/2023    Procedure: FLUOROSCOPY/mediport placement;  Surgeon: Marlon Leone MD;  Location: [facility] CATH LAB;  Service: General;  Laterality: N/A;    TUBAL LIGATION      2007--postpartum tubal ligation     Review of Systems   Constitutional:  Negative for appetite change and fatigue.   HENT:  Negative for mouth sores, rhinorrhea and sore throat.    Eyes: Negative.    Respiratory:  Positive for cough (residual from sinus problems; clear mucus).    Cardiovascular: Negative.    Gastrointestinal:  Negative for constipation, diarrhea, nausea and vomiting.   Endocrine: Negative.    Genitourinary: Negative.    Musculoskeletal: Negative.    Integumentary:  Negative.   Allergic/Immunologic: Negative.    Neurological:  Negative for weakness and numbness.    Hematological: Negative.    Psychiatric/Behavioral: Negative.         Medication List with Changes/Refills   Current Medications    ALBUTEROL (VENTOLIN HFA) 90 MCG/ACTUATION INHALER    Inhale 1-2 puffs into the lungs every 4 to 6 hours as needed for Wheezing. Rescue    AMLODIPINE (NORVASC) 10 MG TABLET    Take 1 tablet (10 mg total) by mouth once daily.    ATENOLOL (TENORMIN) 50 MG TABLET    Take 1 tab by mouth twice a day    AZITHROMYCIN (Z-GLADYS) 250 MG TABLET    Take 1 tablet (250 mg total) by mouth once daily. Take 2 tablets by mouth on day 1, then one tablet daily on days 2-5.    BLOOD SUGAR DIAGNOSTIC (CONTOUR NEXT TEST STRIPS) STRP    1 each by Misc.(Non-Drug; Combo Route) route 2 (two) times a day.    BLOOD-GLUCOSE METER KIT    To check BG 2 times daily, to use with insurance preferred meter    CLINDAMYCIN (CLEOCIN T) 1 % SWAB    APPLY TO AFFECTED AREA(S) TWO TIMES A DAY AS DIRECTED    DESONIDE (DESOWEN) 0.05 % CREAM    APPLY TO AFFECTED AREA(S) TWO TIMES A DAY AS DIRECTED    DOXYCYCLINE (VIBRA-TABS) 100 MG TABLET    Take 1 tablet (100 mg total) by mouth once daily.    FOLIC ACID (FOLVITE) 1 MG TABLET    Take 1 tablet (1 mg total) by mouth once daily.    FUROSEMIDE (LASIX) 20 MG TABLET    Take 1 tablet (20 mg total) by mouth daily as needed (swelling).    GLIPIZIDE-METFORMIN (METAGLIP) 5-500 MG PER TABLET    Take 1 tablet by mouth 2 (two) times daily before meals.    HYDROCODONE-HOMATROPINE 5-1.5 MG/5 ML (HYCODAN) 5-1.5 MG/5 ML SYRP    Take 5 mLs by mouth every 4 to 6 hours as needed (cough).    IPRATROPIUM (ATROVENT) 21 MCG (0.03 %) NASAL SPRAY    2 sprays by Each Nostril route 2 (two) times daily.    LANCETS MISC    To check BG 2 times daily, to use with insurance preferred meter    LIDOCAINE (LIDODERM) 5 %    Place 1 patch onto the skin once daily. Remove & Discard patch within 12 hours or as directed by MD    LOSARTAN-HYDROCHLOROTHIAZIDE 100-25 MG (HYZAAR) 100-25 MG PER TABLET    Take 1 tablet by mouth  once daily.    MAGNESIUM OXIDE (MAGOX) 400 MG (241.3 MG MAGNESIUM) TABLET    Take 1 tablet (400 mg total) by mouth once daily.    MONTELUKAST (SINGULAIR) 10 MG TABLET    Take 1 tablet (10 mg total) by mouth every evening. allergies    MUPIROCIN (BACTROBAN) 2 % OINTMENT    Apply topically once daily.    ONDANSETRON (ZOFRAN-ODT) 8 MG TBDL    Take 1 tablet (8 mg total) by mouth every 8 (eight) hours as needed. Starting with cycle 1 of chemotherapy    POTASSIUM CHLORIDE SA (K-DUR,KLOR-CON M) 10 MEQ TABLET    Take 2 tablets (20 mEq total) by mouth once daily.    ROSUVASTATIN (CRESTOR) 5 MG TABLET    Take 1 tablet (5 mg total) by mouth once daily.     Objective:   There were no vitals filed for this visit.  Physical Exam     Unable to assess due to virtual visit.    (1) Restricted in physically strenuous activity, ambulatory and able to do work of light nature  Assessment:     Problem List Items Addressed This Visit          Oncology    Secondary malignant neoplasm of bone     Awaiting completion of dental work to start Zometa.          Malignant neoplasm of lower lobe of left lung - Primary     Currently on Rybrevant on days 1 & 15 every 28 days.          Plan:     Malignant neoplasm of lower lobe of left lung    Secondary malignant neoplasm of bone    Labs reviewed.   Ok to proceed with C22D15 of Rybrevant tomorrow.  Continue labs and Rybrevant every 2 weeks.  Follow up in 4 weeks with Mg, Phos, CBC, and Comprehensive Metabolic Panel prior to C23D15.    Route Chart for Scheduling    Med Onc Chart Routing      Follow up with physician    Follow up with DOLLY 4 weeks.   Infusion scheduling note   Rybrevant every 2 weeks   Injection scheduling note    Labs CBC, CMP, magnesium and phosphorus   Scheduling:  Preferred lab:  Lab interval:  in 2 weeks & in 4 weeks   Imaging None      Pharmacy appointment No pharmacy appointment needed      Other referrals       No additional referrals needed             I will review  assessment/plan with collaborating physician.      MARCIO Pena

## 2025-01-02 ENCOUNTER — OFFICE VISIT (OUTPATIENT)
Dept: HEMATOLOGY/ONCOLOGY | Facility: CLINIC | Age: 55
End: 2025-01-02
Payer: COMMERCIAL

## 2025-01-02 DIAGNOSIS — C34.32 MALIGNANT NEOPLASM OF LOWER LOBE OF LEFT LUNG: Primary | ICD-10-CM

## 2025-01-02 DIAGNOSIS — C79.51 SECONDARY MALIGNANT NEOPLASM OF BONE: ICD-10-CM

## 2025-01-02 RX ORDER — SODIUM CHLORIDE 0.9 % (FLUSH) 0.9 %
10 SYRINGE (ML) INJECTION
Status: CANCELLED | OUTPATIENT
Start: 2025-01-02

## 2025-01-02 RX ORDER — EPINEPHRINE 0.3 MG/.3ML
0.3 INJECTION SUBCUTANEOUS ONCE AS NEEDED
Status: CANCELLED | OUTPATIENT
Start: 2025-01-02

## 2025-01-02 RX ORDER — ACETAMINOPHEN 325 MG/1
650 TABLET ORAL
Status: CANCELLED
Start: 2025-01-02

## 2025-01-02 RX ORDER — DIPHENHYDRAMINE HYDROCHLORIDE 50 MG/ML
50 INJECTION INTRAMUSCULAR; INTRAVENOUS ONCE AS NEEDED
Status: CANCELLED | OUTPATIENT
Start: 2025-01-02

## 2025-01-02 RX ORDER — DIPHENHYDRAMINE HYDROCHLORIDE 50 MG/ML
25 INJECTION INTRAMUSCULAR; INTRAVENOUS
Status: CANCELLED
Start: 2025-01-02

## 2025-01-02 RX ORDER — HEPARIN 100 UNIT/ML
500 SYRINGE INTRAVENOUS
Status: CANCELLED | OUTPATIENT
Start: 2025-01-02

## 2025-01-03 ENCOUNTER — INFUSION (OUTPATIENT)
Dept: INFUSION THERAPY | Facility: HOSPITAL | Age: 55
End: 2025-01-03
Attending: RADIOLOGY
Payer: COMMERCIAL

## 2025-01-03 VITALS
SYSTOLIC BLOOD PRESSURE: 103 MMHG | TEMPERATURE: 98 F | HEIGHT: 64 IN | WEIGHT: 293 LBS | HEART RATE: 63 BPM | DIASTOLIC BLOOD PRESSURE: 58 MMHG | RESPIRATION RATE: 16 BRPM | BODY MASS INDEX: 50.02 KG/M2 | OXYGEN SATURATION: 95 %

## 2025-01-03 DIAGNOSIS — C34.32 MALIGNANT NEOPLASM OF LOWER LOBE OF LEFT LUNG: Primary | ICD-10-CM

## 2025-01-03 PROCEDURE — 96413 CHEMO IV INFUSION 1 HR: CPT

## 2025-01-03 PROCEDURE — 96367 TX/PROPH/DG ADDL SEQ IV INF: CPT

## 2025-01-03 PROCEDURE — 96375 TX/PRO/DX INJ NEW DRUG ADDON: CPT

## 2025-01-03 PROCEDURE — 63600175 PHARM REV CODE 636 W HCPCS: Performed by: NURSE PRACTITIONER

## 2025-01-03 PROCEDURE — 96415 CHEMO IV INFUSION ADDL HR: CPT

## 2025-01-03 PROCEDURE — 25000003 PHARM REV CODE 250: Performed by: NURSE PRACTITIONER

## 2025-01-03 RX ORDER — ACETAMINOPHEN 325 MG/1
650 TABLET ORAL
Status: COMPLETED | OUTPATIENT
Start: 2025-01-03 | End: 2025-01-03

## 2025-01-03 RX ORDER — DIPHENHYDRAMINE HYDROCHLORIDE 50 MG/ML
25 INJECTION INTRAMUSCULAR; INTRAVENOUS
Status: COMPLETED | OUTPATIENT
Start: 2025-01-03 | End: 2025-01-03

## 2025-01-03 RX ORDER — HEPARIN 100 UNIT/ML
500 SYRINGE INTRAVENOUS
Status: DISCONTINUED | OUTPATIENT
Start: 2025-01-03 | End: 2025-01-03 | Stop reason: HOSPADM

## 2025-01-03 RX ADMIN — HEPARIN 500 UNITS: 100 SYRINGE at 03:01

## 2025-01-03 RX ADMIN — SODIUM CHLORIDE 0.25 MG: 9 INJECTION, SOLUTION INTRAVENOUS at 01:01

## 2025-01-03 RX ADMIN — AMIVANTAMAB 1400 MG: 350 INJECTION INTRAVENOUS at 01:01

## 2025-01-03 RX ADMIN — DIPHENHYDRAMINE HYDROCHLORIDE 25 MG: 50 INJECTION, SOLUTION INTRAMUSCULAR; INTRAVENOUS at 01:01

## 2025-01-03 RX ADMIN — ACETAMINOPHEN 650 MG: 325 TABLET ORAL at 01:01

## 2025-01-03 NOTE — NURSING
Pt tolerated Rybrevant well. No adverse reaction noted. Pt education reinforced on chemo regimen, side effects, what to expect, and when to call her provider. Pt verbalized understanding. I reviewed pt calendar w/ pt and understanding verbalized. 10ml of blood withdrawn and discarded from PAC before deaccessed. Flushed with NS and heparin per protocol.

## 2025-01-03 NOTE — PLAN OF CARE
Problem: Adult Inpatient Plan of Care  Goal: Plan of Care Review  Outcome: Progressing  Flowsheets (Taken 1/3/2025 1340)  Plan of Care Reviewed With:   patient   spouse  Goal: Patient-Specific Goal (Individualized)  Outcome: Progressing  Flowsheets (Taken 1/3/2025 1340)  Individualized Care Needs: recline with pillow and blanket, ginger ale  Anxieties, Fears or Concerns: none today  Patient/Family-Specific Goals (Include Timeframe): tolerate tx today  Goal: Absence of Hospital-Acquired Illness or Injury  Outcome: Progressing  Intervention: Prevent Infection  Flowsheets (Taken 1/3/2025 1340)  Infection Prevention:   equipment surfaces disinfected   hand hygiene promoted   personal protective equipment utilized   rest/sleep promoted  Goal: Optimal Comfort and Wellbeing  Outcome: Progressing  Intervention: Provide Person-Centered Care  Flowsheets (Taken 1/3/2025 1340)  Trust Relationship/Rapport:   care explained   thoughts/feelings acknowledged   choices provided   emotional support provided   empathic listening provided   questions answered   questions encouraged   reassurance provided     Problem: Chemotherapy Effects  Goal: Safety Maintained  Outcome: Progressing  Intervention: Promote Safe Chemotherapy Delivery  Flowsheets (Taken 1/3/2025 1340)  Infection Prevention:   equipment surfaces disinfected   hand hygiene promoted   personal protective equipment utilized   rest/sleep promoted  Chemotherapy Environmental Safety:   chemotherapy waste containers in room   protective environment maintained   meticulous hand hygiene promoted   patient environment monitored for safety   personal protective equipment utilized  Goal: Absence of Infection  Outcome: Progressing  Intervention: Prevent Infection and Maximize Resistance  Flowsheets (Taken 1/3/2025 1340)  Infection Prevention:   equipment surfaces disinfected   hand hygiene promoted   personal protective equipment utilized   rest/sleep promoted     Problem: Fall  Injury Risk  Goal: Absence of Fall and Fall-Related Injury  Outcome: Progressing  Intervention: Promote Injury-Free Environment  Flowsheets (Taken 1/3/2025 1343)  Safety Promotion/Fall Prevention:   assistive device/personal item within reach   patient expresses understanding of fall risk and prevention   nonskid shoes/socks when out of bed   instructed to call staff for mobility   in recliner, wheels locked   medications reviewed

## 2025-01-10 ENCOUNTER — HOSPITAL ENCOUNTER (OUTPATIENT)
Dept: RADIOLOGY | Facility: HOSPITAL | Age: 55
Discharge: HOME | End: 2025-01-10
Payer: COMMERCIAL

## 2025-01-10 DIAGNOSIS — C34.32 MALIGNANT NEOPLASM OF LOWER LOBE OF LEFT LUNG: ICD-10-CM

## 2025-01-10 PROCEDURE — 25500020 PHARM REV CODE 255

## 2025-01-10 PROCEDURE — 74177 CT ABD & PELVIS W/CONTRAST: CPT | Mod: TC

## 2025-01-10 PROCEDURE — 74177 CT ABD & PELVIS W/CONTRAST: CPT | Mod: 26,,, | Performed by: RADIOLOGY

## 2025-01-10 PROCEDURE — 71260 CT THORAX DX C+: CPT | Mod: 26,,, | Performed by: RADIOLOGY

## 2025-01-10 RX ADMIN — IOHEXOL 100 ML: 350 INJECTION, SOLUTION INTRAVENOUS at 09:01

## 2025-01-10 RX ADMIN — IOHEXOL 30 ML: 350 INJECTION, SOLUTION INTRAVENOUS at 08:01

## 2025-01-17 ENCOUNTER — INFUSION (OUTPATIENT)
Dept: INFUSION THERAPY | Facility: HOSPITAL | Age: 55
End: 2025-01-17
Attending: RADIOLOGY
Payer: COMMERCIAL

## 2025-01-17 ENCOUNTER — LAB VISIT (OUTPATIENT)
Dept: LAB | Facility: HOSPITAL | Age: 55
End: 2025-01-17
Attending: NURSE PRACTITIONER
Payer: COMMERCIAL

## 2025-01-17 VITALS
DIASTOLIC BLOOD PRESSURE: 59 MMHG | SYSTOLIC BLOOD PRESSURE: 93 MMHG | TEMPERATURE: 98 F | BODY MASS INDEX: 50.02 KG/M2 | HEIGHT: 64 IN | RESPIRATION RATE: 16 BRPM | HEART RATE: 60 BPM | OXYGEN SATURATION: 96 % | WEIGHT: 293 LBS

## 2025-01-17 DIAGNOSIS — C34.32 MALIGNANT NEOPLASM OF LOWER LOBE OF LEFT LUNG: ICD-10-CM

## 2025-01-17 DIAGNOSIS — C79.51 SECONDARY MALIGNANT NEOPLASM OF BONE: ICD-10-CM

## 2025-01-17 DIAGNOSIS — C34.32 MALIGNANT NEOPLASM OF LOWER LOBE OF LEFT LUNG: Primary | ICD-10-CM

## 2025-01-17 LAB
ALBUMIN SERPL BCP-MCNC: 2.7 G/DL (ref 3.5–5.2)
ALP SERPL-CCNC: 146 U/L (ref 40–150)
ALT SERPL W/O P-5'-P-CCNC: 22 U/L (ref 10–44)
ANION GAP SERPL CALC-SCNC: 9 MMOL/L (ref 8–16)
AST SERPL-CCNC: 17 U/L (ref 10–40)
BASOPHILS # BLD AUTO: 0.06 K/UL (ref 0–0.2)
BASOPHILS NFR BLD: 0.6 % (ref 0–1.9)
BILIRUB SERPL-MCNC: 0.5 MG/DL (ref 0.1–1)
BUN SERPL-MCNC: 13 MG/DL (ref 6–20)
CALCIUM SERPL-MCNC: 8.7 MG/DL (ref 8.7–10.5)
CHLORIDE SERPL-SCNC: 107 MMOL/L (ref 95–110)
CO2 SERPL-SCNC: 28 MMOL/L (ref 23–29)
CREAT SERPL-MCNC: 0.9 MG/DL (ref 0.5–1.4)
DIFFERENTIAL METHOD BLD: ABNORMAL
EOSINOPHIL # BLD AUTO: 0.2 K/UL (ref 0–0.5)
EOSINOPHIL NFR BLD: 1.7 % (ref 0–8)
ERYTHROCYTE [DISTWIDTH] IN BLOOD BY AUTOMATED COUNT: 13.1 % (ref 11.5–14.5)
EST. GFR  (NO RACE VARIABLE): >60 ML/MIN/1.73 M^2
GLUCOSE SERPL-MCNC: 158 MG/DL (ref 70–110)
HCT VFR BLD AUTO: 38.8 % (ref 37–48.5)
HGB BLD-MCNC: 12.6 G/DL (ref 12–16)
IMM GRANULOCYTES # BLD AUTO: 0.05 K/UL (ref 0–0.04)
IMM GRANULOCYTES NFR BLD AUTO: 0.5 % (ref 0–0.5)
LYMPHOCYTES # BLD AUTO: 2.2 K/UL (ref 1–4.8)
LYMPHOCYTES NFR BLD: 22.4 % (ref 18–48)
MAGNESIUM SERPL-MCNC: 1.9 MG/DL (ref 1.6–2.6)
MCH RBC QN AUTO: 27.7 PG (ref 27–31)
MCHC RBC AUTO-ENTMCNC: 32.5 G/DL (ref 32–36)
MCV RBC AUTO: 85 FL (ref 82–98)
MONOCYTES # BLD AUTO: 0.7 K/UL (ref 0.3–1)
MONOCYTES NFR BLD: 6.8 % (ref 4–15)
NEUTROPHILS # BLD AUTO: 6.8 K/UL (ref 1.8–7.7)
NEUTROPHILS NFR BLD: 68 % (ref 38–73)
NRBC BLD-RTO: 0 /100 WBC
PHOSPHATE SERPL-MCNC: 3.4 MG/DL (ref 2.7–4.5)
PLATELET # BLD AUTO: 227 K/UL (ref 150–450)
PMV BLD AUTO: 11.2 FL (ref 9.2–12.9)
POTASSIUM SERPL-SCNC: 3.9 MMOL/L (ref 3.5–5.1)
PROT SERPL-MCNC: 5.8 G/DL (ref 6–8.4)
RBC # BLD AUTO: 4.55 M/UL (ref 4–5.4)
SODIUM SERPL-SCNC: 144 MMOL/L (ref 136–145)
WBC # BLD AUTO: 10.01 K/UL (ref 3.9–12.7)

## 2025-01-17 PROCEDURE — 96367 TX/PROPH/DG ADDL SEQ IV INF: CPT

## 2025-01-17 PROCEDURE — 96415 CHEMO IV INFUSION ADDL HR: CPT

## 2025-01-17 PROCEDURE — 96413 CHEMO IV INFUSION 1 HR: CPT

## 2025-01-17 PROCEDURE — 84100 ASSAY OF PHOSPHORUS: CPT | Performed by: NURSE PRACTITIONER

## 2025-01-17 PROCEDURE — 25000003 PHARM REV CODE 250: Performed by: NURSE PRACTITIONER

## 2025-01-17 PROCEDURE — 36415 COLL VENOUS BLD VENIPUNCTURE: CPT | Performed by: NURSE PRACTITIONER

## 2025-01-17 PROCEDURE — 80053 COMPREHEN METABOLIC PANEL: CPT | Performed by: NURSE PRACTITIONER

## 2025-01-17 PROCEDURE — 85025 COMPLETE CBC W/AUTO DIFF WBC: CPT | Performed by: NURSE PRACTITIONER

## 2025-01-17 PROCEDURE — 96375 TX/PRO/DX INJ NEW DRUG ADDON: CPT

## 2025-01-17 PROCEDURE — 63600175 PHARM REV CODE 636 W HCPCS: Performed by: NURSE PRACTITIONER

## 2025-01-17 PROCEDURE — 83735 ASSAY OF MAGNESIUM: CPT | Performed by: NURSE PRACTITIONER

## 2025-01-17 RX ORDER — DIPHENHYDRAMINE HYDROCHLORIDE 50 MG/ML
50 INJECTION INTRAMUSCULAR; INTRAVENOUS ONCE AS NEEDED
Status: CANCELLED | OUTPATIENT
Start: 2025-01-31

## 2025-01-17 RX ORDER — SODIUM CHLORIDE 0.9 % (FLUSH) 0.9 %
10 SYRINGE (ML) INJECTION
Status: CANCELLED | OUTPATIENT
Start: 2025-01-31

## 2025-01-17 RX ORDER — EPINEPHRINE 0.3 MG/.3ML
0.3 INJECTION SUBCUTANEOUS ONCE AS NEEDED
Status: CANCELLED | OUTPATIENT
Start: 2025-01-31

## 2025-01-17 RX ORDER — HEPARIN 100 UNIT/ML
500 SYRINGE INTRAVENOUS
Status: CANCELLED | OUTPATIENT
Start: 2025-01-17

## 2025-01-17 RX ORDER — EPINEPHRINE 0.3 MG/.3ML
0.3 INJECTION SUBCUTANEOUS ONCE AS NEEDED
Status: CANCELLED | OUTPATIENT
Start: 2025-01-17

## 2025-01-17 RX ORDER — SODIUM CHLORIDE 0.9 % (FLUSH) 0.9 %
10 SYRINGE (ML) INJECTION
Status: CANCELLED | OUTPATIENT
Start: 2025-01-17

## 2025-01-17 RX ORDER — ACETAMINOPHEN 325 MG/1
650 TABLET ORAL
Status: CANCELLED | OUTPATIENT
Start: 2025-01-17

## 2025-01-17 RX ORDER — DIPHENHYDRAMINE HYDROCHLORIDE 50 MG/ML
25 INJECTION INTRAMUSCULAR; INTRAVENOUS
Status: CANCELLED
Start: 2025-01-31

## 2025-01-17 RX ORDER — DIPHENHYDRAMINE HYDROCHLORIDE 50 MG/ML
25 INJECTION INTRAMUSCULAR; INTRAVENOUS
Status: COMPLETED | OUTPATIENT
Start: 2025-01-17 | End: 2025-01-17

## 2025-01-17 RX ORDER — ACETAMINOPHEN 325 MG/1
650 TABLET ORAL
Status: CANCELLED
Start: 2025-01-31

## 2025-01-17 RX ORDER — ACETAMINOPHEN 325 MG/1
650 TABLET ORAL
Status: COMPLETED | OUTPATIENT
Start: 2025-01-17 | End: 2025-01-17

## 2025-01-17 RX ORDER — HEPARIN 100 UNIT/ML
500 SYRINGE INTRAVENOUS
Status: CANCELLED | OUTPATIENT
Start: 2025-01-31

## 2025-01-17 RX ORDER — HEPARIN 100 UNIT/ML
500 SYRINGE INTRAVENOUS
Status: DISCONTINUED | OUTPATIENT
Start: 2025-01-17 | End: 2025-01-17 | Stop reason: HOSPADM

## 2025-01-17 RX ORDER — DIPHENHYDRAMINE HYDROCHLORIDE 50 MG/ML
50 INJECTION INTRAMUSCULAR; INTRAVENOUS ONCE AS NEEDED
Status: CANCELLED | OUTPATIENT
Start: 2025-01-17

## 2025-01-17 RX ORDER — DIPHENHYDRAMINE HYDROCHLORIDE 50 MG/ML
25 INJECTION INTRAMUSCULAR; INTRAVENOUS
Status: CANCELLED | OUTPATIENT
Start: 2025-01-17

## 2025-01-17 RX ADMIN — HEPARIN 500 UNITS: 100 SYRINGE at 01:01

## 2025-01-17 RX ADMIN — SODIUM CHLORIDE 0.25 MG: 9 INJECTION, SOLUTION INTRAVENOUS at 11:01

## 2025-01-17 RX ADMIN — ACETAMINOPHEN 650 MG: 325 TABLET ORAL at 11:01

## 2025-01-17 RX ADMIN — DIPHENHYDRAMINE HYDROCHLORIDE 25 MG: 50 INJECTION INTRAMUSCULAR; INTRAVENOUS at 11:01

## 2025-01-17 RX ADMIN — AMIVANTAMAB 1400 MG: 350 INJECTION INTRAVENOUS at 12:01

## 2025-01-17 NOTE — NURSING
Pt tolerated Rybrevant well. No adverse reaction noted. Pt education reinforced on chemo regimen, side effects, what to expect, and when to call her provider. Pt verbalized understanding. I reviewed pt calendar w/ pt and understanding verbalized. PAC deaccessed and flushed w/ NS and heparin per protocol.

## 2025-01-17 NOTE — PLAN OF CARE
Problem: Adult Inpatient Plan of Care  Goal: Plan of Care Review  Outcome: Progressing  Flowsheets (Taken 1/17/2025 1238)  Plan of Care Reviewed With: patient  Goal: Patient-Specific Goal (Individualized)  Outcome: Progressing  Flowsheets (Taken 1/17/2025 1238)  Individualized Care Needs: recline with pillow and blanket, refreshments  Anxieties, Fears or Concerns: none today  Patient/Family-Specific Goals (Include Timeframe): tolerate tx today  Goal: Absence of Hospital-Acquired Illness or Injury  Outcome: Progressing  Intervention: Prevent Infection  Flowsheets (Taken 1/17/2025 1238)  Infection Prevention:   equipment surfaces disinfected   hand hygiene promoted   rest/sleep promoted   personal protective equipment utilized  Goal: Optimal Comfort and Wellbeing  Outcome: Progressing  Intervention: Provide Person-Centered Care  Flowsheets (Taken 1/17/2025 1238)  Trust Relationship/Rapport:   care explained   thoughts/feelings acknowledged   choices provided   emotional support provided   empathic listening provided   questions answered   questions encouraged   reassurance provided     Problem: Chemotherapy Effects  Goal: Safety Maintained  Outcome: Progressing  Intervention: Promote Safe Chemotherapy Delivery  Flowsheets (Taken 1/17/2025 1238)  Infection Prevention:   equipment surfaces disinfected   hand hygiene promoted   rest/sleep promoted   personal protective equipment utilized  Chemotherapy Environmental Safety:   chemotherapy waste containers in room   protective environment maintained   meticulous hand hygiene promoted   patient environment monitored for safety   personal protective equipment utilized  Goal: Absence of Infection  Outcome: Progressing  Intervention: Prevent Infection and Maximize Resistance  Flowsheets (Taken 1/17/2025 1238)  Infection Prevention:   equipment surfaces disinfected   hand hygiene promoted   rest/sleep promoted   personal protective equipment utilized

## 2025-01-26 ENCOUNTER — TELEPHONE (OUTPATIENT)
Dept: PHARMACY | Facility: CLINIC | Age: 55
End: 2025-01-26
Payer: COMMERCIAL

## 2025-01-26 NOTE — TELEPHONE ENCOUNTER
[facility]/Population Health Chart Review & Patient Outreach Details For Medication Adherence Project    Reason for Outreach Encounter: 3rd Party payor non-compliance report ([employer], [employer], C, etc)  2.  Patient Outreach Method: Reviewed Patient Chart  3.   Medication in question: metformin er   LAST FILLED: n/a  Diabetes Medications               glipizide-metformin (METAGLIP) 5-500 mg per tablet Take 1 tablet by mouth 2 (two) times daily before meals.              4.  Reviewed and or Updates Made To: Patient Chart  5. Outreach Outcomes and/or actions taken: Medication discontinued    Additional Notes:

## 2025-01-29 DIAGNOSIS — E11.9 TYPE 2 DIABETES MELLITUS WITHOUT COMPLICATION: ICD-10-CM

## 2025-01-30 ENCOUNTER — LAB VISIT (OUTPATIENT)
Dept: LAB | Facility: HOSPITAL | Age: 55
End: 2025-01-30
Attending: NURSE PRACTITIONER
Payer: COMMERCIAL

## 2025-01-30 ENCOUNTER — TELEPHONE (OUTPATIENT)
Dept: HEMATOLOGY/ONCOLOGY | Facility: CLINIC | Age: 55
End: 2025-01-30
Payer: COMMERCIAL

## 2025-01-30 DIAGNOSIS — C34.32 MALIGNANT NEOPLASM OF LOWER LOBE OF LEFT LUNG: ICD-10-CM

## 2025-01-30 DIAGNOSIS — C79.51 SECONDARY MALIGNANT NEOPLASM OF BONE: ICD-10-CM

## 2025-01-30 LAB
ALBUMIN SERPL BCP-MCNC: 2.8 G/DL (ref 3.5–5.2)
ALP SERPL-CCNC: 136 U/L (ref 40–150)
ALT SERPL W/O P-5'-P-CCNC: 24 U/L (ref 10–44)
ANION GAP SERPL CALC-SCNC: 12 MMOL/L (ref 8–16)
AST SERPL-CCNC: 16 U/L (ref 10–40)
BASOPHILS # BLD AUTO: 0.07 K/UL (ref 0–0.2)
BASOPHILS NFR BLD: 0.6 % (ref 0–1.9)
BILIRUB SERPL-MCNC: 0.3 MG/DL (ref 0.1–1)
BUN SERPL-MCNC: 16 MG/DL (ref 6–20)
CALCIUM SERPL-MCNC: 9 MG/DL (ref 8.7–10.5)
CHLORIDE SERPL-SCNC: 104 MMOL/L (ref 95–110)
CO2 SERPL-SCNC: 26 MMOL/L (ref 23–29)
CREAT SERPL-MCNC: 1 MG/DL (ref 0.5–1.4)
DIFFERENTIAL METHOD BLD: NORMAL
EOSINOPHIL # BLD AUTO: 0.3 K/UL (ref 0–0.5)
EOSINOPHIL NFR BLD: 2.5 % (ref 0–8)
ERYTHROCYTE [DISTWIDTH] IN BLOOD BY AUTOMATED COUNT: 13.2 % (ref 11.5–14.5)
EST. GFR  (NO RACE VARIABLE): >60 ML/MIN/1.73 M^2
GLUCOSE SERPL-MCNC: 81 MG/DL (ref 70–110)
HCT VFR BLD AUTO: 40.9 % (ref 37–48.5)
HGB BLD-MCNC: 13.3 G/DL (ref 12–16)
IMM GRANULOCYTES # BLD AUTO: 0.03 K/UL (ref 0–0.04)
IMM GRANULOCYTES NFR BLD AUTO: 0.3 % (ref 0–0.5)
LYMPHOCYTES # BLD AUTO: 3.1 K/UL (ref 1–4.8)
LYMPHOCYTES NFR BLD: 28.3 % (ref 18–48)
MAGNESIUM SERPL-MCNC: 1.8 MG/DL (ref 1.6–2.6)
MCH RBC QN AUTO: 27.6 PG (ref 27–31)
MCHC RBC AUTO-ENTMCNC: 32.5 G/DL (ref 32–36)
MCV RBC AUTO: 85 FL (ref 82–98)
MONOCYTES # BLD AUTO: 0.8 K/UL (ref 0.3–1)
MONOCYTES NFR BLD: 7.6 % (ref 4–15)
NEUTROPHILS # BLD AUTO: 6.6 K/UL (ref 1.8–7.7)
NEUTROPHILS NFR BLD: 60.7 % (ref 38–73)
NRBC BLD-RTO: 0 /100 WBC
PHOSPHATE SERPL-MCNC: 3.6 MG/DL (ref 2.7–4.5)
PLATELET # BLD AUTO: 201 K/UL (ref 150–450)
PMV BLD AUTO: 11.2 FL (ref 9.2–12.9)
POTASSIUM SERPL-SCNC: 4 MMOL/L (ref 3.5–5.1)
PROT SERPL-MCNC: 6.3 G/DL (ref 6–8.4)
RBC # BLD AUTO: 4.82 M/UL (ref 4–5.4)
SODIUM SERPL-SCNC: 142 MMOL/L (ref 136–145)
WBC # BLD AUTO: 10.8 K/UL (ref 3.9–12.7)

## 2025-01-30 PROCEDURE — 84100 ASSAY OF PHOSPHORUS: CPT | Performed by: NURSE PRACTITIONER

## 2025-01-30 PROCEDURE — 83735 ASSAY OF MAGNESIUM: CPT | Performed by: NURSE PRACTITIONER

## 2025-01-30 PROCEDURE — 85025 COMPLETE CBC W/AUTO DIFF WBC: CPT | Performed by: NURSE PRACTITIONER

## 2025-01-30 PROCEDURE — 36415 COLL VENOUS BLD VENIPUNCTURE: CPT | Performed by: NURSE PRACTITIONER

## 2025-01-30 PROCEDURE — 80053 COMPREHEN METABOLIC PANEL: CPT | Performed by: NURSE PRACTITIONER

## 2025-01-31 ENCOUNTER — INFUSION (OUTPATIENT)
Dept: INFUSION THERAPY | Facility: HOSPITAL | Age: 55
End: 2025-01-31
Attending: RADIOLOGY
Payer: COMMERCIAL

## 2025-01-31 ENCOUNTER — TELEPHONE (OUTPATIENT)
Dept: HEMATOLOGY/ONCOLOGY | Facility: CLINIC | Age: 55
End: 2025-01-31
Payer: COMMERCIAL

## 2025-01-31 ENCOUNTER — OFFICE VISIT (OUTPATIENT)
Dept: HEMATOLOGY/ONCOLOGY | Facility: CLINIC | Age: 55
End: 2025-01-31
Payer: COMMERCIAL

## 2025-01-31 VITALS
RESPIRATION RATE: 18 BRPM | DIASTOLIC BLOOD PRESSURE: 72 MMHG | HEIGHT: 64 IN | HEART RATE: 65 BPM | SYSTOLIC BLOOD PRESSURE: 125 MMHG | OXYGEN SATURATION: 98 % | BODY MASS INDEX: 50.02 KG/M2 | WEIGHT: 293 LBS | TEMPERATURE: 98 F

## 2025-01-31 VITALS
TEMPERATURE: 98 F | SYSTOLIC BLOOD PRESSURE: 104 MMHG | OXYGEN SATURATION: 98 % | DIASTOLIC BLOOD PRESSURE: 63 MMHG | BODY MASS INDEX: 50.02 KG/M2 | HEART RATE: 58 BPM | WEIGHT: 293 LBS | HEIGHT: 64 IN | RESPIRATION RATE: 16 BRPM

## 2025-01-31 DIAGNOSIS — C34.32 MALIGNANT NEOPLASM OF LOWER LOBE OF LEFT LUNG: Primary | ICD-10-CM

## 2025-01-31 DIAGNOSIS — E66.01 MORBID OBESITY: ICD-10-CM

## 2025-01-31 DIAGNOSIS — Z79.899 IMMUNODEFICIENCY DUE TO CHEMOTHERAPY: ICD-10-CM

## 2025-01-31 DIAGNOSIS — E11.9 TYPE 2 DIABETES MELLITUS WITHOUT COMPLICATION, WITHOUT LONG-TERM CURRENT USE OF INSULIN: ICD-10-CM

## 2025-01-31 DIAGNOSIS — D84.821 IMMUNODEFICIENCY DUE TO CHEMOTHERAPY: ICD-10-CM

## 2025-01-31 DIAGNOSIS — T45.1X5A IMMUNODEFICIENCY DUE TO CHEMOTHERAPY: ICD-10-CM

## 2025-01-31 DIAGNOSIS — C79.51 SECONDARY MALIGNANT NEOPLASM OF BONE: ICD-10-CM

## 2025-01-31 DIAGNOSIS — I10 ESSENTIAL HYPERTENSION: ICD-10-CM

## 2025-01-31 PROCEDURE — 99215 OFFICE O/P EST HI 40 MIN: CPT | Mod: 25,S$GLB,, | Performed by: INTERNAL MEDICINE

## 2025-01-31 PROCEDURE — 1160F RVW MEDS BY RX/DR IN RCRD: CPT | Mod: CPTII,S$GLB,, | Performed by: INTERNAL MEDICINE

## 2025-01-31 PROCEDURE — 99999 PR PBB SHADOW E&M-EST. PATIENT-LVL V: CPT | Mod: PBBFAC,,, | Performed by: INTERNAL MEDICINE

## 2025-01-31 PROCEDURE — 3074F SYST BP LT 130 MM HG: CPT | Mod: CPTII,S$GLB,, | Performed by: INTERNAL MEDICINE

## 2025-01-31 PROCEDURE — 96415 CHEMO IV INFUSION ADDL HR: CPT

## 2025-01-31 PROCEDURE — 25000003 PHARM REV CODE 250: Performed by: NURSE PRACTITIONER

## 2025-01-31 PROCEDURE — 96413 CHEMO IV INFUSION 1 HR: CPT

## 2025-01-31 PROCEDURE — 3008F BODY MASS INDEX DOCD: CPT | Mod: CPTII,S$GLB,, | Performed by: INTERNAL MEDICINE

## 2025-01-31 PROCEDURE — 3078F DIAST BP <80 MM HG: CPT | Mod: CPTII,S$GLB,, | Performed by: INTERNAL MEDICINE

## 2025-01-31 PROCEDURE — 96375 TX/PRO/DX INJ NEW DRUG ADDON: CPT

## 2025-01-31 PROCEDURE — 63600175 PHARM REV CODE 636 W HCPCS: Performed by: NURSE PRACTITIONER

## 2025-01-31 PROCEDURE — 1159F MED LIST DOCD IN RCRD: CPT | Mod: CPTII,S$GLB,, | Performed by: INTERNAL MEDICINE

## 2025-01-31 PROCEDURE — 96367 TX/PROPH/DG ADDL SEQ IV INF: CPT

## 2025-01-31 RX ORDER — DIPHENHYDRAMINE HYDROCHLORIDE 50 MG/ML
50 INJECTION INTRAMUSCULAR; INTRAVENOUS ONCE AS NEEDED
Status: DISCONTINUED | OUTPATIENT
Start: 2025-01-31 | End: 2025-01-31 | Stop reason: HOSPADM

## 2025-01-31 RX ORDER — HEPARIN 100 UNIT/ML
500 SYRINGE INTRAVENOUS
Status: DISCONTINUED | OUTPATIENT
Start: 2025-01-31 | End: 2025-01-31 | Stop reason: HOSPADM

## 2025-01-31 RX ORDER — DIPHENHYDRAMINE HYDROCHLORIDE 50 MG/ML
25 INJECTION INTRAMUSCULAR; INTRAVENOUS
Status: COMPLETED | OUTPATIENT
Start: 2025-01-31 | End: 2025-01-31

## 2025-01-31 RX ORDER — ACETAMINOPHEN 325 MG/1
650 TABLET ORAL
Status: COMPLETED | OUTPATIENT
Start: 2025-01-31 | End: 2025-01-31

## 2025-01-31 RX ORDER — EPINEPHRINE 0.3 MG/.3ML
0.3 INJECTION SUBCUTANEOUS ONCE AS NEEDED
Status: DISCONTINUED | OUTPATIENT
Start: 2025-01-31 | End: 2025-01-31 | Stop reason: HOSPADM

## 2025-01-31 RX ADMIN — ACETAMINOPHEN 650 MG: 325 TABLET ORAL at 09:01

## 2025-01-31 RX ADMIN — SODIUM CHLORIDE 1400 MG: 9 INJECTION, SOLUTION INTRAVENOUS at 09:01

## 2025-01-31 RX ADMIN — HEPARIN 500 UNITS: 100 SYRINGE at 11:01

## 2025-01-31 RX ADMIN — DIPHENHYDRAMINE HYDROCHLORIDE 25 MG: 50 INJECTION INTRAMUSCULAR; INTRAVENOUS at 09:01

## 2025-01-31 RX ADMIN — SODIUM CHLORIDE 0.25 MG: 9 INJECTION, SOLUTION INTRAVENOUS at 08:01

## 2025-01-31 NOTE — TELEPHONE ENCOUNTER
LETICIA signed pt up for the waitlists for PAN, PAF, and [employer] grants. Informed pt via email of waitlists and instructed pt on how to sign up for [employer] Cop[employer] Assistance since pt or fmaily member has to complete application. LETICIA also encouraged pt to contact SW to complete JOSE application at her convince. SW will remain available.    Pt contacted SW and together completed JOSE online application. LETICIA reitterated to pt that [employer] application needed to be completed by pt.

## 2025-01-31 NOTE — PLAN OF CARE
Problem: Adult Inpatient Plan of Care  Goal: Plan of Care Review  Outcome: Progressing  Flowsheets (Taken 1/31/2025 0835)  Plan of Care Reviewed With: patient  Goal: Optimal Comfort and Wellbeing  Outcome: Progressing  Intervention: Provide Person-Centered Care  Flowsheets (Taken 1/31/2025 0835)  Trust Relationship/Rapport:   care explained   choices provided   emotional support provided   empathic listening provided   questions answered   questions encouraged   reassurance provided   thoughts/feelings acknowledged

## 2025-01-31 NOTE — PROGRESS NOTES
Subjective:       Patient ID: Shaneka Ahmadi is a 55 y.o. female.    Chief Complaint: Results, Chemotherapy, and Lung Cancer    HPI:  55-year-old female returns history of metastatic non-small cell lung carcinoma EGD RF positive patient currently remains on day 1 and 15OP NSCLC AMIVANTAMAB-VMJW (Rybrevant) Q4W tolerating therapy well most recent CT chest abdomen pelvis demonstrates no clear real evidence of progression ECOG status 1    Past Medical History:   Diagnosis Date    Diabetes mellitus     Hypertension     Malignant neoplasm of lower lobe of left lung 12/9/2022    Rash, drug 7/6/2023    OP NSCLC AMIVANTAMAB-VMJW (Rybrevant) Q4W      Secondary malignant neoplasm of bone 12/5/2022     Family History   Problem Relation Name Age of Onset    Heart failure Father       Social History     Socioeconomic History    Marital status:    Tobacco Use    Smoking status: Never     Passive exposure: Never    Smokeless tobacco: Never   Substance and Sexual Activity    Alcohol use: Never    Drug use: Never    Sexual activity: Yes     Partners: Male     Birth control/protection: None     Comment: tubal     Social Drivers of Health     Financial Resource Strain: Low Risk  (12/9/2024)    Overall Financial Resource Strain (CARDIA)     Difficulty of Paying Living Expenses: Not hard at all   Food Insecurity: No Food Insecurity (12/9/2024)    Hunger Vital Sign     Worried About Running Out of Food in the Last Year: Never true     Ran Out of Food in the Last Year: Never true   Transportation Needs: No Transportation Needs (6/26/2024)    Received from [facility]     Lack of Transportation (Medical): No     Lack of Transportation (Non-Medical): No   Physical Activity: Insufficiently Active (12/9/2024)    Exercise Vital Sign     Days of Exercise per Week: 2 days     Minutes of Exercise per Session: 30 min   Stress: No Stress Concern Present (12/9/2024)    [place] of  "Occupational Health - Occupational Stress Questionnaire     Feeling of Stress : Not at all   Housing Stability: Unknown (12/9/2024)    Housing Stability Vital Sign     Unable to Pay for Housing in the Last Year: No     Past Surgical History:   Procedure Laterality Date    CATARACT EXTRACTION W/  INTRAOCULAR LENS IMPLANT Right 06/02/2022    EXTRACTION OF TOOTH      FLUOROSCOPY N/A 01/26/2023    Procedure: FLUOROSCOPY/mediport placement;  Surgeon: Marlon Leone MD;  Location: [facility] CATH LAB;  Service: General;  Laterality: N/A;    TUBAL LIGATION      2007--postpartum tubal ligation       Labs:  Lab Results   Component Value Date    WBC 10.80 01/30/2025    HGB 13.3 01/30/2025    HCT 40.9 01/30/2025    MCV 85 01/30/2025     01/30/2025     BMP  Lab Results   Component Value Date     01/30/2025    K 4.0 01/30/2025     01/30/2025    CO2 26 01/30/2025    BUN 16 01/30/2025    CREATININE 1.0 01/30/2025    CALCIUM 9.0 01/30/2025    ANIONGAP 12 01/30/2025    ESTGFRAFRICA >60.0 07/28/2022    EGFRNONAA >60.0 07/28/2022     Lab Results   Component Value Date    ALT 24 01/30/2025    AST 16 01/30/2025    ALKPHOS 136 01/30/2025    BILITOT 0.3 01/30/2025       Lab Results   Component Value Date    IRON 71 03/09/2023    TIBC 297 03/09/2023    FERRITIN 646 (H) 03/09/2023     Lab Results   Component Value Date    EJOKPUGI02 1057 (H) 12/21/2023     No results found for: "FOLATE"  Lab Results   Component Value Date    TSH 1.742 04/08/2022         Review of Systems   Constitutional:  Negative for activity change, appetite change, chills, diaphoresis, fatigue, fever and unexpected weight change.   HENT:  Negative for congestion, dental problem, drooling, ear discharge, ear pain, facial swelling, hearing loss, mouth sores, nosebleeds, postnasal drip, rhinorrhea, sinus pressure, sneezing, sore throat, tinnitus, trouble swallowing and voice change.    Eyes:  Negative for photophobia, pain, discharge, redness, itching and " visual disturbance.   Respiratory:  Negative for cough, choking, chest tightness, shortness of breath, wheezing and stridor.    Cardiovascular:  Negative for chest pain, palpitations and leg swelling.   Gastrointestinal:  Negative for abdominal distention, abdominal pain, anal bleeding, blood in stool, constipation, diarrhea, nausea, rectal pain and vomiting.   Endocrine: Negative for cold intolerance, heat intolerance, polydipsia, polyphagia and polyuria.   Genitourinary:  Negative for decreased urine volume, difficulty urinating, dyspareunia, dysuria, enuresis, flank pain, frequency, genital sores, hematuria, menstrual problem, pelvic pain, urgency, vaginal bleeding, vaginal discharge and vaginal pain.   Musculoskeletal:  Negative for arthralgias, back pain, gait problem, joint swelling, myalgias, neck pain and neck stiffness.   Skin:  Negative for color change, pallor and rash.   Allergic/Immunologic: Negative for environmental allergies, food allergies and immunocompromised state.   Neurological:  Negative for dizziness, tremors, seizures, syncope, facial asymmetry, speech difficulty, weakness, light-headedness, numbness and headaches.   Hematological:  Negative for adenopathy. Does not bruise/bleed easily.   Psychiatric/Behavioral:  Negative for agitation, behavioral problems, confusion, decreased concentration, dysphoric mood, hallucinations, self-injury, sleep disturbance and suicidal ideas. The patient is not nervous/anxious and is not hyperactive.        Objective:      Physical Exam  Vitals reviewed.   Constitutional:       General: She is not in acute distress.     Appearance: She is well-developed. She is not diaphoretic.   HENT:      Head: Normocephalic and atraumatic.      Right Ear: External ear normal.      Left Ear: External ear normal.      Nose: Nose normal.      Right Sinus: No maxillary sinus tenderness or frontal sinus tenderness.      Left Sinus: No maxillary sinus tenderness or frontal sinus  tenderness.      Mouth/Throat:      Pharynx: No oropharyngeal exudate.   Eyes:      General: Lids are normal. No scleral icterus.        Right eye: No discharge.         Left eye: No discharge.      Conjunctiva/sclera: Conjunctivae normal.      Right eye: Right conjunctiva is not injected. No hemorrhage.     Left eye: Left conjunctiva is not injected. No hemorrhage.     Pupils: Pupils are equal, round, and reactive to light.   Neck:      Thyroid: No thyromegaly.      Vascular: No JVD.      Trachea: No tracheal deviation.   Cardiovascular:      Rate and Rhythm: Normal rate.   Pulmonary:      Effort: Pulmonary effort is normal. No respiratory distress.      Breath sounds: No stridor.   Chest:      Chest wall: No tenderness.   Abdominal:      General: Bowel sounds are normal. There is no distension.      Palpations: Abdomen is soft. There is no hepatomegaly, splenomegaly or mass.      Tenderness: There is no abdominal tenderness. There is no rebound.   Musculoskeletal:         General: No tenderness. Normal range of motion.      Cervical back: Normal range of motion and neck supple.   Lymphadenopathy:      Cervical: No cervical adenopathy.      Upper Body:      Right upper body: No supraclavicular adenopathy.      Left upper body: No supraclavicular adenopathy.   Skin:     General: Skin is dry.      Findings: No erythema or rash.   Neurological:      Mental Status: She is alert and oriented to person, place, and time.      Cranial Nerves: No cranial nerve deficit.      Coordination: Coordination normal.   Psychiatric:         Behavior: Behavior normal.         Thought Content: Thought content normal.         Judgment: Judgment normal.             Assessment:      1. Malignant neoplasm of lower lobe of left lung    2. Secondary malignant neoplasm of bone    3. Immunodeficiency due to chemotherapy    4. Essential hypertension    5. Morbid obesity    6. Type 2 diabetes mellitus without complication, without long-term  current use of insulin           Med Onc Chart Routing      Follow up with physician . Return to clinic to see me in proximally 2 months   Follow up with DOLLY . APAP can see the next 3 dosing   Infusion scheduling note    Injection scheduling note    Labs   Scheduling:  Preferred lab:  Lab interval:  Standing labs CBC CMP   Imaging    Pharmacy appointment    Other referrals                   Plan:      reviewed most recent CT demonstrating no real clear evidence of progression small area of sclerosis in bone.  Reassurance given continue with current follow-up scheduled to be seen by orthopedics for because of knee pain.  Felt not secondary to malignancy.  Patient obviously has treatable but not curable malignancy continue with current plan course of action every 2 weeks can alternate with MD CAROLINA Mcfarlane Jr, MD FACP

## 2025-02-01 DIAGNOSIS — L70.8 ACNEIFORM RASH: ICD-10-CM

## 2025-02-04 ENCOUNTER — TELEPHONE (OUTPATIENT)
Dept: HEMATOLOGY/ONCOLOGY | Facility: CLINIC | Age: 55
End: 2025-02-04
Payer: COMMERCIAL

## 2025-02-04 RX ORDER — CLINDAMYCIN PHOSPHATE 10 MG/ML
SOLUTION TOPICAL
Qty: 60 EACH | Refills: 2 | Status: SHIPPED | OUTPATIENT
Start: 2025-02-04 | End: 2025-02-27 | Stop reason: SDUPTHER

## 2025-02-04 NOTE — TELEPHONE ENCOUNTER
SW received communication from pt requesting information on how to apply for Medicare. SW explained how pt could apply over the phone, in person, or online. SW encouraged pt to contact SW if she has any questions. SW will remain available.

## 2025-02-12 ENCOUNTER — PATIENT MESSAGE (OUTPATIENT)
Dept: HEMATOLOGY/ONCOLOGY | Facility: CLINIC | Age: 55
End: 2025-02-12
Payer: COMMERCIAL

## 2025-02-13 ENCOUNTER — LAB VISIT (OUTPATIENT)
Dept: LAB | Facility: HOSPITAL | Age: 55
End: 2025-02-13
Payer: COMMERCIAL

## 2025-02-13 DIAGNOSIS — C34.32 MALIGNANT NEOPLASM OF LOWER LOBE OF LEFT LUNG: ICD-10-CM

## 2025-02-13 LAB
ALBUMIN SERPL BCP-MCNC: 2.5 G/DL (ref 3.5–5.2)
ALP SERPL-CCNC: 130 U/L (ref 40–150)
ALT SERPL W/O P-5'-P-CCNC: 16 U/L (ref 10–44)
ANION GAP SERPL CALC-SCNC: 10 MMOL/L (ref 8–16)
AST SERPL-CCNC: 12 U/L (ref 10–40)
BASOPHILS # BLD AUTO: 0.04 K/UL (ref 0–0.2)
BASOPHILS NFR BLD: 0.5 % (ref 0–1.9)
BILIRUB SERPL-MCNC: 0.2 MG/DL (ref 0.1–1)
BUN SERPL-MCNC: 15 MG/DL (ref 6–20)
CALCIUM SERPL-MCNC: 8.6 MG/DL (ref 8.7–10.5)
CHLORIDE SERPL-SCNC: 106 MMOL/L (ref 95–110)
CO2 SERPL-SCNC: 25 MMOL/L (ref 23–29)
CREAT SERPL-MCNC: 0.9 MG/DL (ref 0.5–1.4)
DIFFERENTIAL METHOD BLD: NORMAL
EOSINOPHIL # BLD AUTO: 0.3 K/UL (ref 0–0.5)
EOSINOPHIL NFR BLD: 3.3 % (ref 0–8)
ERYTHROCYTE [DISTWIDTH] IN BLOOD BY AUTOMATED COUNT: 13 % (ref 11.5–14.5)
EST. GFR  (NO RACE VARIABLE): >60 ML/MIN/1.73 M^2
GLUCOSE SERPL-MCNC: 153 MG/DL (ref 70–110)
HCT VFR BLD AUTO: 37.4 % (ref 37–48.5)
HGB BLD-MCNC: 12.2 G/DL (ref 12–16)
IMM GRANULOCYTES # BLD AUTO: 0.01 K/UL (ref 0–0.04)
IMM GRANULOCYTES NFR BLD AUTO: 0.1 % (ref 0–0.5)
LYMPHOCYTES # BLD AUTO: 2.4 K/UL (ref 1–4.8)
LYMPHOCYTES NFR BLD: 31.7 % (ref 18–48)
MCH RBC QN AUTO: 27.7 PG (ref 27–31)
MCHC RBC AUTO-ENTMCNC: 32.6 G/DL (ref 32–36)
MCV RBC AUTO: 85 FL (ref 82–98)
MONOCYTES # BLD AUTO: 0.5 K/UL (ref 0.3–1)
MONOCYTES NFR BLD: 7.2 % (ref 4–15)
NEUTROPHILS # BLD AUTO: 4.3 K/UL (ref 1.8–7.7)
NEUTROPHILS NFR BLD: 57.2 % (ref 38–73)
NRBC BLD-RTO: 0 /100 WBC
PLATELET # BLD AUTO: 233 K/UL (ref 150–450)
PMV BLD AUTO: 11.3 FL (ref 9.2–12.9)
POTASSIUM SERPL-SCNC: 3.8 MMOL/L (ref 3.5–5.1)
PROT SERPL-MCNC: 5.8 G/DL (ref 6–8.4)
RBC # BLD AUTO: 4.4 M/UL (ref 4–5.4)
SODIUM SERPL-SCNC: 141 MMOL/L (ref 136–145)
WBC # BLD AUTO: 7.53 K/UL (ref 3.9–12.7)

## 2025-02-13 PROCEDURE — 36415 COLL VENOUS BLD VENIPUNCTURE: CPT

## 2025-02-13 PROCEDURE — 80053 COMPREHEN METABOLIC PANEL: CPT

## 2025-02-13 PROCEDURE — 85025 COMPLETE CBC W/AUTO DIFF WBC: CPT

## 2025-02-14 ENCOUNTER — OFFICE VISIT (OUTPATIENT)
Dept: HEMATOLOGY/ONCOLOGY | Facility: CLINIC | Age: 55
End: 2025-02-14
Payer: COMMERCIAL

## 2025-02-14 ENCOUNTER — INFUSION (OUTPATIENT)
Dept: INFUSION THERAPY | Facility: HOSPITAL | Age: 55
End: 2025-02-14
Attending: RADIOLOGY
Payer: COMMERCIAL

## 2025-02-14 VITALS
HEART RATE: 68 BPM | HEIGHT: 64 IN | DIASTOLIC BLOOD PRESSURE: 70 MMHG | TEMPERATURE: 98 F | SYSTOLIC BLOOD PRESSURE: 108 MMHG | OXYGEN SATURATION: 98 % | WEIGHT: 293 LBS | BODY MASS INDEX: 50.02 KG/M2

## 2025-02-14 VITALS
RESPIRATION RATE: 16 BRPM | WEIGHT: 293 LBS | HEIGHT: 64 IN | DIASTOLIC BLOOD PRESSURE: 59 MMHG | BODY MASS INDEX: 50.02 KG/M2 | HEART RATE: 58 BPM | OXYGEN SATURATION: 98 % | SYSTOLIC BLOOD PRESSURE: 104 MMHG | TEMPERATURE: 98 F

## 2025-02-14 DIAGNOSIS — C79.51 SECONDARY MALIGNANT NEOPLASM OF BONE: ICD-10-CM

## 2025-02-14 DIAGNOSIS — E66.01 MORBID OBESITY: ICD-10-CM

## 2025-02-14 DIAGNOSIS — C34.32 MALIGNANT NEOPLASM OF LOWER LOBE OF LEFT LUNG: Primary | ICD-10-CM

## 2025-02-14 DIAGNOSIS — D84.821 IMMUNODEFICIENCY DUE TO CHEMOTHERAPY: Primary | ICD-10-CM

## 2025-02-14 DIAGNOSIS — T45.1X5A IMMUNODEFICIENCY DUE TO CHEMOTHERAPY: Primary | ICD-10-CM

## 2025-02-14 DIAGNOSIS — C34.32 MALIGNANT NEOPLASM OF LOWER LOBE OF LEFT LUNG: ICD-10-CM

## 2025-02-14 DIAGNOSIS — Z79.899 IMMUNODEFICIENCY DUE TO CHEMOTHERAPY: Primary | ICD-10-CM

## 2025-02-14 PROCEDURE — 99999 PR PBB SHADOW E&M-EST. PATIENT-LVL V: CPT | Mod: PBBFAC,,, | Performed by: NURSE PRACTITIONER

## 2025-02-14 PROCEDURE — 25000003 PHARM REV CODE 250: Performed by: NURSE PRACTITIONER

## 2025-02-14 PROCEDURE — 96375 TX/PRO/DX INJ NEW DRUG ADDON: CPT

## 2025-02-14 PROCEDURE — 96415 CHEMO IV INFUSION ADDL HR: CPT

## 2025-02-14 PROCEDURE — 96367 TX/PROPH/DG ADDL SEQ IV INF: CPT

## 2025-02-14 PROCEDURE — 96413 CHEMO IV INFUSION 1 HR: CPT

## 2025-02-14 PROCEDURE — 63600175 PHARM REV CODE 636 W HCPCS: Performed by: NURSE PRACTITIONER

## 2025-02-14 RX ORDER — DIPHENHYDRAMINE HYDROCHLORIDE 50 MG/ML
50 INJECTION INTRAMUSCULAR; INTRAVENOUS ONCE AS NEEDED
Status: CANCELLED | OUTPATIENT
Start: 2025-02-14

## 2025-02-14 RX ORDER — SODIUM CHLORIDE 0.9 % (FLUSH) 0.9 %
10 SYRINGE (ML) INJECTION
Status: CANCELLED | OUTPATIENT
Start: 2025-02-14

## 2025-02-14 RX ORDER — DIPHENHYDRAMINE HYDROCHLORIDE 50 MG/ML
25 INJECTION INTRAMUSCULAR; INTRAVENOUS
Status: COMPLETED | OUTPATIENT
Start: 2025-02-14 | End: 2025-02-14

## 2025-02-14 RX ORDER — HEPARIN 100 UNIT/ML
500 SYRINGE INTRAVENOUS
Status: DISCONTINUED | OUTPATIENT
Start: 2025-02-14 | End: 2025-02-14 | Stop reason: HOSPADM

## 2025-02-14 RX ORDER — DIPHENHYDRAMINE HYDROCHLORIDE 50 MG/ML
50 INJECTION INTRAMUSCULAR; INTRAVENOUS ONCE AS NEEDED
Status: DISCONTINUED | OUTPATIENT
Start: 2025-02-14 | End: 2025-02-14 | Stop reason: HOSPADM

## 2025-02-14 RX ORDER — DIPHENHYDRAMINE HYDROCHLORIDE 50 MG/ML
25 INJECTION INTRAMUSCULAR; INTRAVENOUS
Status: CANCELLED | OUTPATIENT
Start: 2025-02-14

## 2025-02-14 RX ORDER — EPINEPHRINE 0.3 MG/.3ML
0.3 INJECTION SUBCUTANEOUS ONCE AS NEEDED
Status: CANCELLED | OUTPATIENT
Start: 2025-02-14

## 2025-02-14 RX ORDER — HEPARIN 100 UNIT/ML
500 SYRINGE INTRAVENOUS
Status: CANCELLED | OUTPATIENT
Start: 2025-02-14

## 2025-02-14 RX ORDER — ACETAMINOPHEN 325 MG/1
650 TABLET ORAL
Status: COMPLETED | OUTPATIENT
Start: 2025-02-14 | End: 2025-02-14

## 2025-02-14 RX ORDER — ACETAMINOPHEN 325 MG/1
650 TABLET ORAL
Status: CANCELLED | OUTPATIENT
Start: 2025-02-14

## 2025-02-14 RX ORDER — EPINEPHRINE 0.3 MG/.3ML
0.3 INJECTION SUBCUTANEOUS ONCE AS NEEDED
Status: DISCONTINUED | OUTPATIENT
Start: 2025-02-14 | End: 2025-02-14 | Stop reason: HOSPADM

## 2025-02-14 RX ADMIN — SODIUM CHLORIDE 1400 MG: 9 INJECTION, SOLUTION INTRAVENOUS at 12:02

## 2025-02-14 RX ADMIN — DIPHENHYDRAMINE HYDROCHLORIDE 25 MG: 50 INJECTION INTRAMUSCULAR; INTRAVENOUS at 11:02

## 2025-02-14 RX ADMIN — HEPARIN SODIUM (PORCINE) LOCK FLUSH IV SOLN 100 UNIT/ML 500 UNITS: 100 SOLUTION at 01:02

## 2025-02-14 RX ADMIN — ACETAMINOPHEN 650 MG: 325 TABLET ORAL at 11:02

## 2025-02-14 RX ADMIN — DEXAMETHASONE SODIUM PHOSPHATE 0.25 MG: 4 INJECTION, SOLUTION INTRA-ARTICULAR; INTRALESIONAL; INTRAMUSCULAR; INTRAVENOUS; SOFT TISSUE at 11:02

## 2025-02-14 NOTE — ASSESSMENT & PLAN NOTE
Cancer Staging   Malignant neoplasm of lower lobe of left lung  Staging form: Lung, AJCC 8th Edition  - Clinical stage from 12/9/2022: Stage IV (cT2, cN2, cM1) - Signed by Reese Mcfarlane MD on 12/9/2022    Patient has continued on Rybrevant Q 2 weeks    Most recent CT c/a/p w/contrast 1/10/2025 with overall stable findings. Did note-- Enlargement of a single left [place] sclerotic metastatic focus with examination otherwise not significantly changed from prior.     Labs reviewed unremarkable. No concerning cytopenias    Proceed with C24D1 Rybrevant. F/u 2 weeks with decoupled labs. Next day Visit and C24D15 Rybrevant planned

## 2025-02-14 NOTE — PLAN OF CARE
Problem: Adult Inpatient Plan of Care  Goal: Plan of Care Review  Outcome: Progressing  Flowsheets (Taken 2/14/2025 1125)  Plan of Care Reviewed With: patient  Goal: Optimal Comfort and Wellbeing  Outcome: Progressing  Intervention: Provide Person-Centered Care  Flowsheets (Taken 2/14/2025 1125)  Trust Relationship/Rapport:   care explained   choices provided   emotional support provided   empathic listening provided   questions answered   questions encouraged   thoughts/feelings acknowledged   reassurance provided     Problem: Chemotherapy Effects  Goal: Absence of Infection  Outcome: Progressing  Intervention: Prevent Infection and Maximize Resistance  Flowsheets (Taken 2/14/2025 1125)  Infection Prevention:   equipment surfaces disinfected   hand hygiene promoted   personal protective equipment utilized   rest/sleep promoted

## 2025-02-14 NOTE — PROGRESS NOTES
Subjective:       Patient ID: Shaneka Ahmadi is a 55 y.o. female.    Chief Complaint: Review labs. Consideration of Rybrevant    HPI: 55 y.o female with metastatic lung cancer to bone s/p Carboplatin/Alimta x 4 cycles. Planned to start Zometa pending dental clearance. Currently being treated with Rybrevant Q 2 weeks.    Presenting today for lab review and consideration of C24D1 Rybrevant. She notes feeling well overall. She endorses intermittent left knee pain with orthopedic visit planned soon. Reports tolerating treatments well without any significant side effects.     Most recent CT c/a/p 1/10/2025 reflect overall stable disease but did note-- Enlargement of a single left [place] sclerotic metastatic focus with examination otherwise not significantly changed from prior.     Social History     Socioeconomic History    Marital status:    Tobacco Use    Smoking status: Never     Passive exposure: Never    Smokeless tobacco: Never   Substance and Sexual Activity    Alcohol use: Never    Drug use: Never    Sexual activity: Yes     Partners: Male     Birth control/protection: None     Comment: tubal     Social Drivers of Health     Financial Resource Strain: Low Risk  (12/9/2024)    Overall Financial Resource Strain (CARDIA)     Difficulty of Paying Living Expenses: Not hard at all   Food Insecurity: No Food Insecurity (12/9/2024)    Hunger Vital Sign     Worried About Running Out of Food in the Last Year: Never true     Ran Out of Food in the Last Year: Never true   Transportation Needs: No Transportation Needs (6/26/2024)    Received from [facility]     Lack of Transportation (Medical): No     Lack of Transportation (Non-Medical): No   Physical Activity: Insufficiently Active (12/9/2024)    Exercise Vital Sign     Days of Exercise per Week: 2 days     Minutes of Exercise per Session: 30 min   Stress: No Stress Concern Present (12/9/2024)    [place] of  Occupational Health - Occupational Stress Questionnaire     Feeling of Stress : Not at all   Housing Stability: Unknown (12/9/2024)    Housing Stability Vital Sign     Unable to Pay for Housing in the Last Year: No       Past Medical History:   Diagnosis Date    Diabetes mellitus     Hypertension     Malignant neoplasm of lower lobe of left lung 12/9/2022    Rash, drug 7/6/2023    OP NSCLC AMIVANTAMAB-VMJW (Rybrevant) Q4W      Secondary malignant neoplasm of bone 12/5/2022       Family History   Problem Relation Name Age of Onset    Heart failure Father         Past Surgical History:   Procedure Laterality Date    CATARACT EXTRACTION W/  INTRAOCULAR LENS IMPLANT Right 06/02/2022    EXTRACTION OF TOOTH      FLUOROSCOPY N/A 01/26/2023    Procedure: FLUOROSCOPY/mediport placement;  Surgeon: Marlon Leone MD;  Location: [facility] CATH LAB;  Service: General;  Laterality: N/A;    TUBAL LIGATION      2007--postpartum tubal ligation       Review of Systems   Constitutional:  Negative for activity change, appetite change, chills, fatigue, fever and unexpected weight change.   HENT:  Negative for congestion, mouth sores, nosebleeds, sore throat, trouble swallowing and voice change.    Eyes:  Negative for visual disturbance.   Respiratory:  Negative for cough, chest tightness, shortness of breath and wheezing.    Cardiovascular:  Negative for chest pain, palpitations and leg swelling.   Gastrointestinal:  Negative for abdominal distention, abdominal pain, blood in stool, constipation, diarrhea, nausea and vomiting.   Genitourinary:  Negative for difficulty urinating, dysuria and hematuria.   Musculoskeletal:  Negative for arthralgias, back pain and myalgias.        Left knee pain. Seeing orthopedic soon   Skin:  Negative for pallor, rash and wound.   Neurological:  Negative for dizziness, syncope, weakness and headaches.   Hematological:  Negative for adenopathy. Does not bruise/bleed easily.   Psychiatric/Behavioral:  The  patient is not nervous/anxious.          Medication List with Changes/Refills   Current Medications    ALBUTEROL (VENTOLIN HFA) 90 MCG/ACTUATION INHALER    Inhale 1-2 puffs into the lungs every 4 to 6 hours as needed for Wheezing. Rescue    AMLODIPINE (NORVASC) 10 MG TABLET    Take 1 tablet (10 mg total) by mouth once daily.    ATENOLOL (TENORMIN) 50 MG TABLET    Take 1 tab by mouth twice a day    BLOOD SUGAR DIAGNOSTIC (CONTOUR NEXT TEST STRIPS) STRP    1 each by Misc.(Non-Drug; Combo Route) route 2 (two) times a day.    BLOOD-GLUCOSE METER KIT    To check BG 2 times daily, to use with insurance preferred meter    CLINDAMYCIN (CLEOCIN T) 1 % SWAB    APPLY TO AFFECTED AREA(S) TWO TIMES A DAY AS DIRECTED    DESONIDE (DESOWEN) 0.05 % CREAM    APPLY TO AFFECTED AREA(S) TWO TIMES A DAY AS DIRECTED    DOXYCYCLINE (VIBRA-TABS) 100 MG TABLET    Take 1 tablet (100 mg total) by mouth once daily.    FOLIC ACID (FOLVITE) 1 MG TABLET    Take 1 tablet (1 mg total) by mouth once daily.    FUROSEMIDE (LASIX) 20 MG TABLET    Take 1 tablet (20 mg total) by mouth daily as needed (swelling).    GLIPIZIDE-METFORMIN (METAGLIP) 5-500 MG PER TABLET    Take 1 tablet by mouth 2 (two) times daily before meals.    HYDROCODONE-HOMATROPINE 5-1.5 MG/5 ML (HYCODAN) 5-1.5 MG/5 ML SYRP    Take 5 mLs by mouth every 4 to 6 hours as needed (cough).    IPRATROPIUM (ATROVENT) 21 MCG (0.03 %) NASAL SPRAY    2 sprays by Each Nostril route 2 (two) times daily.    LANCETS MISC    To check BG 2 times daily, to use with insurance preferred meter    LIDOCAINE (LIDODERM) 5 %    Place 1 patch onto the skin once daily. Remove & Discard patch within 12 hours or as directed by MD    LOSARTAN-HYDROCHLOROTHIAZIDE 100-25 MG (HYZAAR) 100-25 MG PER TABLET    Take 1 tablet by mouth once daily.    MAGNESIUM OXIDE (MAGOX) 400 MG (241.3 MG MAGNESIUM) TABLET    Take 1 tablet (400 mg total) by mouth once daily.    MONTELUKAST (SINGULAIR) 10 MG TABLET    Take 1 tablet (10 mg  total) by mouth every evening. allergies    MUPIROCIN (BACTROBAN) 2 % OINTMENT    Apply topically once daily.    ONDANSETRON (ZOFRAN-ODT) 8 MG TBDL    Take 1 tablet (8 mg total) by mouth every 8 (eight) hours as needed. Starting with cycle 1 of chemotherapy    POTASSIUM CHLORIDE SA (K-DUR,KLOR-CON M) 10 MEQ TABLET    Take 2 tablets (20 mEq total) by mouth once daily.    ROSUVASTATIN (CRESTOR) 5 MG TABLET    Take 1 tablet (5 mg total) by mouth once daily.     Objective:     Vitals:    02/14/25 0913   BP: 108/70   Pulse: 68   Temp: 97.6 °F (36.4 °C)     Lab Results   Component Value Date    WBC 7.53 02/13/2025    HGB 12.2 02/13/2025    HCT 37.4 02/13/2025    MCV 85 02/13/2025     02/13/2025       BMP  Lab Results   Component Value Date     02/13/2025    K 3.8 02/13/2025     02/13/2025    CO2 25 02/13/2025    BUN 15 02/13/2025    CREATININE 0.9 02/13/2025    CALCIUM 8.6 (L) 02/13/2025    ANIONGAP 10 02/13/2025    EGFRNORACEVR >60 02/13/2025     Lab Results   Component Value Date    ALT 16 02/13/2025    AST 12 02/13/2025    ALKPHOS 130 02/13/2025    BILITOT 0.2 02/13/2025         Physical Exam  Vitals reviewed.   Constitutional:       Appearance: She is well-developed.   HENT:      Head: Normocephalic.      Right Ear: External ear normal.      Left Ear: External ear normal.      Nose: Nose normal.   Eyes:      General: Lids are normal. No scleral icterus.        Right eye: No discharge.         Left eye: No discharge.      Conjunctiva/sclera: Conjunctivae normal.   Neck:      Thyroid: No thyroid mass.   Cardiovascular:      Rate and Rhythm: Normal rate and regular rhythm.      Heart sounds: Normal heart sounds.   Pulmonary:      Effort: Pulmonary effort is normal. No respiratory distress.      Breath sounds: Normal breath sounds. No wheezing or rales.   Genitourinary:     Comments: deferred  Musculoskeletal:         General: Normal range of motion.      Cervical back: Normal range of motion.   Skin:      General: Skin is warm and dry.   Neurological:      Mental Status: She is alert and oriented to person, place, and time.   Psychiatric:         Speech: Speech normal.         Behavior: Behavior normal. Behavior is cooperative.         Thought Content: Thought content normal.        Assessment:     Problem List Items Addressed This Visit          Immunology/Multi System    Immunodeficiency due to chemotherapy - Primary     Infection precautions            Oncology    Secondary malignant neoplasm of bone     Awaiting completion of dental work and dental clearance to initiate Zometa Q 4 weeks.          Malignant neoplasm of lower lobe of left lung      Cancer Staging   Malignant neoplasm of lower lobe of left lung  Staging form: Lung, AJCC 8th Edition  - Clinical stage from 12/9/2022: Stage IV (cT2, cN2, cM1) - Signed by Reese Mcfarlane MD on 12/9/2022    Patient has continued on Rybrevant Q 2 weeks    Most recent CT c/a/p w/contrast 1/10/2025 with overall stable findings. Did note-- Enlargement of a single left [place] sclerotic metastatic focus with examination otherwise not significantly changed from prior.     Labs reviewed unremarkable. No concerning cytopenias    Proceed with C24D1 Rybrevant. F/u 2 weeks with decoupled labs. Next day Visit and C24D15 Rybrevant planned            Endocrine    Morbid obesity     Encourage healthy nutrition and portion control. Encourage daily exercise              Plan:     Immunodeficiency due to chemotherapy    Secondary malignant neoplasm of bone    Malignant neoplasm of lower lobe of left lung    Morbid obesity    Other orders  -     palonosetron (ALOXI) 0.25 mg with Dexamethasone (DECADRON) 12 mg in NS 50 mL IVPB  -     acetaminophen tablet 650 mg  -     diphenhydrAMINE injection 25 mg  -     amivantamab-vmjw (RYBREVANT) 1,400 mg in 0.9% NaCl SolP 250 mL chemo infusion  -     EPINEPHrine (EPIPEN) 0.3 mg/0.3 mL pen injection 0.3 mg  -     diphenhydrAMINE injection 50 mg  -      hydrocortisone sodium succinate injection 100 mg  -     0.9% NaCl 250 mL flush bag  -     sodium chloride 0.9% flush 10 mL  -     heparin, porcine (PF) 100 unit/mL injection flush 500 Units  -     alteplase injection 2 mg          Med Onc Chart Routing      Follow up with physician 6 weeks. Dr. Mcfarlane   Follow up with DOLLY 2 weeks. 2 weeks/4weeks   Infusion scheduling note   Rybrevant  2 weeks/4weeks/6weeks   Injection scheduling note    Labs CBC, CMP, magnesium and phosphorus   Scheduling:  Preferred lab:  Lab interval:  2 weeks/4 weeks/6 weeks   Imaging None      Pharmacy appointment No pharmacy appointment needed      Other referrals       No additional referrals needed         MARCIO Toribio-C

## 2025-02-17 ENCOUNTER — DOCUMENTATION ONLY (OUTPATIENT)
Dept: HEMATOLOGY/ONCOLOGY | Facility: CLINIC | Age: 55
End: 2025-02-17
Payer: COMMERCIAL

## 2025-02-17 NOTE — PROGRESS NOTES
SW received an email from pt informing SW that she had been approved for co-pay assistance through the Assistance Fund. Pt sent SW a screenshot of approval packet that is active 1/1/25-12/31/25. SW sent a copy to  to attach to pt's account to use towards co-pays. SW will remain available.

## 2025-02-19 DIAGNOSIS — M25.569 KNEE PAIN, UNSPECIFIED CHRONICITY, UNSPECIFIED LATERALITY: Primary | ICD-10-CM

## 2025-02-21 ENCOUNTER — PATIENT MESSAGE (OUTPATIENT)
Dept: HEMATOLOGY/ONCOLOGY | Facility: CLINIC | Age: 55
End: 2025-02-21
Payer: COMMERCIAL

## 2025-02-27 ENCOUNTER — LAB VISIT (OUTPATIENT)
Dept: LAB | Facility: HOSPITAL | Age: 55
End: 2025-02-27
Payer: COMMERCIAL

## 2025-02-27 ENCOUNTER — OFFICE VISIT (OUTPATIENT)
Dept: HEMATOLOGY/ONCOLOGY | Facility: CLINIC | Age: 55
End: 2025-02-27
Payer: COMMERCIAL

## 2025-02-27 ENCOUNTER — DOCUMENTATION ONLY (OUTPATIENT)
Dept: HEMATOLOGY/ONCOLOGY | Facility: CLINIC | Age: 55
End: 2025-02-27
Payer: COMMERCIAL

## 2025-02-27 DIAGNOSIS — L70.8 ACNEIFORM RASH: ICD-10-CM

## 2025-02-27 DIAGNOSIS — C79.51 SECONDARY MALIGNANT NEOPLASM OF BONE: ICD-10-CM

## 2025-02-27 DIAGNOSIS — D84.821 IMMUNODEFICIENCY DUE TO CHEMOTHERAPY: Primary | ICD-10-CM

## 2025-02-27 DIAGNOSIS — T45.1X5A IMMUNODEFICIENCY DUE TO CHEMOTHERAPY: Primary | ICD-10-CM

## 2025-02-27 DIAGNOSIS — Z79.899 IMMUNODEFICIENCY DUE TO CHEMOTHERAPY: Primary | ICD-10-CM

## 2025-02-27 DIAGNOSIS — C34.32 MALIGNANT NEOPLASM OF LOWER LOBE OF LEFT LUNG: ICD-10-CM

## 2025-02-27 LAB
ALBUMIN SERPL BCP-MCNC: 2.7 G/DL (ref 3.5–5.2)
ALP SERPL-CCNC: 150 U/L (ref 40–150)
ALT SERPL W/O P-5'-P-CCNC: 18 U/L (ref 10–44)
ANION GAP SERPL CALC-SCNC: 11 MMOL/L (ref 8–16)
AST SERPL-CCNC: 14 U/L (ref 10–40)
BASOPHILS # BLD AUTO: 0.03 K/UL (ref 0–0.2)
BASOPHILS NFR BLD: 0.3 % (ref 0–1.9)
BILIRUB SERPL-MCNC: 0.4 MG/DL (ref 0.1–1)
BUN SERPL-MCNC: 15 MG/DL (ref 6–20)
CALCIUM SERPL-MCNC: 8.7 MG/DL (ref 8.7–10.5)
CHLORIDE SERPL-SCNC: 104 MMOL/L (ref 95–110)
CO2 SERPL-SCNC: 27 MMOL/L (ref 23–29)
CREAT SERPL-MCNC: 0.9 MG/DL (ref 0.5–1.4)
DIFFERENTIAL METHOD BLD: NORMAL
EOSINOPHIL # BLD AUTO: 0.2 K/UL (ref 0–0.5)
EOSINOPHIL NFR BLD: 2.1 % (ref 0–8)
ERYTHROCYTE [DISTWIDTH] IN BLOOD BY AUTOMATED COUNT: 13.2 % (ref 11.5–14.5)
EST. GFR  (NO RACE VARIABLE): >60 ML/MIN/1.73 M^2
GLUCOSE SERPL-MCNC: 158 MG/DL (ref 70–110)
HCT VFR BLD AUTO: 38.5 % (ref 37–48.5)
HGB BLD-MCNC: 12.7 G/DL (ref 12–16)
IMM GRANULOCYTES # BLD AUTO: 0.01 K/UL (ref 0–0.04)
IMM GRANULOCYTES NFR BLD AUTO: 0.1 % (ref 0–0.5)
LYMPHOCYTES # BLD AUTO: 2.4 K/UL (ref 1–4.8)
LYMPHOCYTES NFR BLD: 28 % (ref 18–48)
MAGNESIUM SERPL-MCNC: 1.7 MG/DL (ref 1.6–2.6)
MCH RBC QN AUTO: 28.1 PG (ref 27–31)
MCHC RBC AUTO-ENTMCNC: 33 G/DL (ref 32–36)
MCV RBC AUTO: 85 FL (ref 82–98)
MONOCYTES # BLD AUTO: 0.6 K/UL (ref 0.3–1)
MONOCYTES NFR BLD: 7.5 % (ref 4–15)
NEUTROPHILS # BLD AUTO: 5.3 K/UL (ref 1.8–7.7)
NEUTROPHILS NFR BLD: 62 % (ref 38–73)
NRBC BLD-RTO: 0 /100 WBC
PHOSPHATE SERPL-MCNC: 4 MG/DL (ref 2.7–4.5)
PLATELET # BLD AUTO: 167 K/UL (ref 150–450)
PMV BLD AUTO: 11.2 FL (ref 9.2–12.9)
POTASSIUM SERPL-SCNC: 3.7 MMOL/L (ref 3.5–5.1)
PROT SERPL-MCNC: 5.8 G/DL (ref 6–8.4)
RBC # BLD AUTO: 4.52 M/UL (ref 4–5.4)
SODIUM SERPL-SCNC: 142 MMOL/L (ref 136–145)
WBC # BLD AUTO: 8.58 K/UL (ref 3.9–12.7)

## 2025-02-27 PROCEDURE — 36415 COLL VENOUS BLD VENIPUNCTURE: CPT

## 2025-02-27 PROCEDURE — 85025 COMPLETE CBC W/AUTO DIFF WBC: CPT

## 2025-02-27 PROCEDURE — 83735 ASSAY OF MAGNESIUM: CPT

## 2025-02-27 PROCEDURE — 84100 ASSAY OF PHOSPHORUS: CPT

## 2025-02-27 PROCEDURE — 80053 COMPREHEN METABOLIC PANEL: CPT

## 2025-02-27 RX ORDER — EPINEPHRINE 0.3 MG/.3ML
0.3 INJECTION SUBCUTANEOUS ONCE AS NEEDED
Status: CANCELLED | OUTPATIENT
Start: 2025-02-28

## 2025-02-27 RX ORDER — HEPARIN 100 UNIT/ML
500 SYRINGE INTRAVENOUS
Status: CANCELLED | OUTPATIENT
Start: 2025-02-28

## 2025-02-27 RX ORDER — CLINDAMYCIN PHOSPHATE 10 MG/ML
SOLUTION TOPICAL
Qty: 60 EACH | Refills: 1 | Status: SHIPPED | OUTPATIENT
Start: 2025-02-27

## 2025-02-27 RX ORDER — DIPHENHYDRAMINE HYDROCHLORIDE 50 MG/ML
50 INJECTION INTRAMUSCULAR; INTRAVENOUS ONCE AS NEEDED
Status: CANCELLED | OUTPATIENT
Start: 2025-02-28

## 2025-02-27 RX ORDER — SODIUM CHLORIDE 0.9 % (FLUSH) 0.9 %
10 SYRINGE (ML) INJECTION
Status: CANCELLED | OUTPATIENT
Start: 2025-02-28

## 2025-02-27 RX ORDER — DIPHENHYDRAMINE HYDROCHLORIDE 50 MG/ML
25 INJECTION INTRAMUSCULAR; INTRAVENOUS
Status: CANCELLED
Start: 2025-02-28

## 2025-02-27 RX ORDER — ACETAMINOPHEN 325 MG/1
650 TABLET ORAL
Status: CANCELLED
Start: 2025-02-28

## 2025-02-27 NOTE — PROGRESS NOTES
SW received an email from pt who provided official TAF niranjan confirmation form. SW forwarded form to . Pt also requested Boost vanilla samples for her 2/27 infusion appt at [facility]. SW informed SW intern and coworker of pt's request. SW will remain available.

## 2025-02-27 NOTE — PROGRESS NOTES
Subjective:     Patient ID:Mateusz Ahmadi is a 55 y.o. female.?? MR#: [id number]   ?   PRIMARY ONCOLOGIST: Dr. Mcfarlane-->Dr. Cartwright   ?   CHIEF COMPLAINT: Lab review/assessment C1D1 Rybrevant  ?   ONCOLOGIC DIAGNOSIS: Stage IV (cT2, cN2, cM1), Malignant neoplasm of lower lobe of left lung   ?   CURRENT TREATMENT: OP NSCLC AMIVANTAMAB-VMJW (Rybrevant) Q4W     PAST TREATMENT: PEMETREXED + CARBOPLATIN (AUC) Q3W    The patient location is: [place]  The chief complaint leading to consultation is: follow-up    Visit type: audiovisual    Face to Face time with patient: 10 minutes  20 minutes of total time spent on the encounter, which includes face to face time and non-face to face time preparing to see the patient (eg, review of tests), Obtaining and/or reviewing separately obtained history, Documenting clinical information in the electronic or other health record, Independently interpreting results (not separately reported) and communicating results to the patient/family/caregiver, or Care coordination (not separately reported).         Each patient to whom he or she provides medical services by telemedicine is:  (1) informed of the relationship between the physician and patient and the respective role of any other health care provider with respect to management of the patient; and (2) notified that he or she may decline to receive medical services by telemedicine and may withdraw from such care at any time.    Notes:    HPI [facility]. [facility] 54[facility] with metastatic non-small cell lung carcinoma Exon 20 mutation who presents today for lab review and assessment prior to C1D1 Rybrevant. 11/2022 pt seen with PCP with c/o persistent coughing and wheezing. CXR at that time revealed pulmonary nodules, subsequent CT chest found L Lung mass. L lung biopsy positive for adenocarcinoma , EGFR mutated. Pleural fluid also positive for malignancy. PET showed avid STEPHAN mass, mediastinal nodes, bone mets in C1, T1, T11, L3,  sacrum, L ischium, sternum. MRI brain negative for intracranial metastases. Initiated on carbo/ alimta 01/27/23--re imaging after 4 cycles found progressive disease. Pt initiated on Rybrevant 04/27/23.    Interval History: Pt states she is doing well and voices no complaints today.  Denies n/v/d/c, fever, chills, sob, cp,  abnormal bleeding. Denies difficulty with appetite or maintaining hydration.       Oncology History   Malignant neoplasm of lower lobe of left lung   12/9/2022 Initial Diagnosis    Malignant neoplasm of lower lobe of left lung       12/9/2022 Cancer Staged    Staging form: Lung, AJCC 8th Edition  - Clinical stage from 12/9/2022: Stage IV (cT2, cN2, cM1)       12/27/2022 - 12/27/2022 Chemotherapy    Treatment Summary   Plan Name: OP PEMBROLIZUMAB 400MG Q6W  Treatment Goal: Control  Status: Inactive  Start Date:   End Date:   Provider: Reese Mcfarlane MD  Chemotherapy: [No matching medication found in this treatment plan]        Genetic Testing    Patient has genetic testing done for  TEmpus peripheral blood.                                              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     1/18/2023 - 1/18/2023 Chemotherapy    Treatment Summary   Plan Name: OP NSCLC AMIVANTAMAB-VMJW (Rybrevant) Q4W  Treatment Goal: Palliative  Status: Inactive  Start Date:   End Date:   Provider: Reese Mcfarlane MD  Chemotherapy: amivantamab-vmjw (RYBREVANT) 350 mg in sodium chloride 0.9% SolP 250 mL chemo infusion, 350 mg (100 % of original dose 350 mg), Intravenous, Clinic/HOD 1 time, 0 of 13 cycles  Dose modification: 350 mg (original dose 350 mg, Cycle 1), 1,050 mg (original dose 1,050 mg, Cycle 1), 1,400 mg (original dose 1,400 mg, Cycle 1)       1/27/2023 - 3/31/2023 Chemotherapy    Treatment Summary   Plan Name: OP NSCLC PEMETREXED + CARBOPLATIN (AUC) Q3W  Treatment Goal: Control  Status: Inactive  Start Date: 1/27/2023  End Date:  3/31/2023  Provider: Reese Mcfarlane MD  Chemotherapy: CARBOplatin (PARAPLATIN) 750 mg in sodium chloride 0.9% 335 mL chemo infusion, 750 mg (100 % of original dose 750 mg), Intravenous, Clinic/HOD 1 time, 4 of 4 cycles  Dose modification:   (original dose 750 mg, Cycle 1, Reason: MD Discretion)  Administration: 750 mg (1/27/2023), 750 mg (2/17/2023), 750 mg (3/31/2023), 750 mg (3/10/2023)  PEMEtrexed disodium (ALIMTA) 1,200 mg in sodium chloride 0.9% SolP 100 mL chemo infusion, 1,250 mg, Intravenous, Clinic/HOD 1 time, 4 of 4 cycles  Administration: 1,200 mg (1/27/2023), 1,200 mg (2/17/2023), 1,200 mg (3/31/2023), 1,200 mg (3/10/2023)       4/27/2023 -  Chemotherapy    Treatment Summary   Plan Name: OP NSCLC AMIVANTAMAB-VMJW (Rybrevant) Q4W  Treatment Goal: Palliative  Status: Active  Start Date: 4/27/2023 (Planned)  End Date: 6/6/2024 (Planned)  Provider: Reese Mcfarlane MD  Chemotherapy: amivantamab-vmjw (RYBREVANT) 350 mg in sodium chloride 0.9% SolP 250 mL chemo infusion, 350 mg (original dose ), Intravenous, Clinic/HOD 1 time, 0 of 15 cycles  Dose modification: 350 mg (Cycle 1), 1,050 mg (Cycle 1), 1,400 mg (Cycle 1)          Social History     Socioeconomic History    Marital status:    Tobacco Use    Smoking status: Never     Passive exposure: Never    Smokeless tobacco: Never   Substance and Sexual Activity    Alcohol use: Never    Drug use: Never    Sexual activity: Yes     Partners: Male     Birth control/protection: None     Comment: tubal     Social Drivers of Health     Financial Resource Strain: Low Risk  (12/9/2024)    Overall Financial Resource Strain (CARDIA)     Difficulty of Paying Living Expenses: Not hard at all   Food Insecurity: No Food Insecurity (12/9/2024)    Hunger Vital Sign     Worried About Running Out of Food in the Last Year: Never true     Ran Out of Food in the Last Year: Never true   Transportation Needs: No Transportation Needs (6/26/2024)    [facility]    PRAPARE - Transportation     Lack of Transportation (Medical): No     Lack of Transportation (Non-Medical): No   Physical Activity: Insufficiently Active (12/9/2024)    Exercise Vital Sign     Days of Exercise per Week: 2 days     Minutes of Exercise per Session: 30 min   Stress: No Stress Concern Present (12/9/2024)    [employer] - Occupational Stress Questionnaire     Feeling of Stress : Not at all   Housing Stability: Unknown (12/9/2024)    Housing Stability Vital Sign     Unable to Pay for Housing in the Last Year: No      Family History   Problem Relation Name Age of Onset    Heart failure Father        Past Surgical History:   Procedure Laterality Date    CATARACT EXTRACTION W/  INTRAOCULAR LENS IMPLANT Right 06/02/2022    EXTRACTION OF TOOTH      FLUOROSCOPY N/A 01/26/2023    Procedure: FLUOROSCOPY/mediport placement;  Surgeon: Marlon Leone MD;  [facility] CATH LAB;  Service: General;  Laterality: N/A;    TUBAL LIGATION      2007--postpartum tubal ligation        Review of Systems   Constitutional:  Negative for activity change, chills, fatigue and fever.   HENT: Negative.     Eyes: Negative.    Respiratory: Negative.     Cardiovascular: Negative.    Gastrointestinal: Negative.    Endocrine: Positive for cold intolerance.   Musculoskeletal:  Negative for arthralgias and myalgias.   Skin:  Negative for rash.   Neurological:  Negative for dizziness, weakness and light-headedness.   Psychiatric/Behavioral:  The patient is not nervous/anxious.        ?   A comprehensive 14-point review of systems was reviewed with patient and was negative other than as specified above.   ?     Objective:      Physical Exam  Vitals reviewed: virtual visit.   Constitutional:       Appearance: Normal appearance.   Neurological:      Mental Status: She is alert.   Psychiatric:         Mood and Affect: Mood normal.         Behavior: Behavior normal.          ?   There were no vitals filed for this visit.         ?       ?   Laboratory:  ?   Lab Visit on 02/27/2025   Component Date Value Ref Range Status    WBC 02/27/2025 8.58  3.90 - 12.70 K/uL Final    RBC 02/27/2025 4.52  4.00 - 5.40 M/uL Final    Hemoglobin 02/27/2025 12.7  12.0 - 16.0 g/dL Final    Hematocrit 02/27/2025 38.5  37.0 - 48.5 % Final    MCV 02/27/2025 85  82 - 98 fL Final    MCH 02/27/2025 28.1  27.0 - 31.0 pg Final    MCHC 02/27/2025 33.0  32.0 - 36.0 g/dL Final    RDW 02/27/2025 13.2  11.5 - 14.5 % Final    Platelets 02/27/2025 167  150 - 450 K/uL Final    MPV 02/27/2025 11.2  9.2 - 12.9 fL Final    Immature Granulocytes 02/27/2025 0.1  0.0 - 0.5 % Final    Gran # (ANC) 02/27/2025 5.3  1.8 - 7.7 K/uL Final    Immature Grans (Abs) 02/27/2025 0.01  0.00 - 0.04 K/uL Final    Lymph # 02/27/2025 2.4  1.0 - 4.8 K/uL Final    Mono # 02/27/2025 0.6  0.3 - 1.0 K/uL Final    Eos # 02/27/2025 0.2  0.0 - 0.5 K/uL Final    Baso # 02/27/2025 0.03  0.00 - 0.20 K/uL Final    nRBC 02/27/2025 0  0 /100 WBC Final    Gran % 02/27/2025 62.0  38.0 - 73.0 % Final    Lymph % 02/27/2025 28.0  18.0 - 48.0 % Final    Mono % 02/27/2025 7.5  4.0 - 15.0 % Final    Eosinophil % 02/27/2025 2.1  0.0 - 8.0 % Final    Basophil % 02/27/2025 0.3  0.0 - 1.9 % Final    Differential Method 02/27/2025 Automated   Final    Sodium 02/27/2025 142  136 - 145 mmol/L Final    Potassium 02/27/2025 3.7  3.5 - 5.1 mmol/L Final    Chloride 02/27/2025 104  95 - 110 mmol/L Final    CO2 02/27/2025 27  23 - 29 mmol/L Final    Glucose 02/27/2025 158 (H)  70 - 110 mg/dL Final    BUN 02/27/2025 15  6 - 20 mg/dL Final    Creatinine 02/27/2025 0.9  0.5 - 1.4 mg/dL Final    Calcium 02/27/2025 8.7  8.7 - 10.5 mg/dL Final    Total Protein 02/27/2025 5.8 (L)  6.0 - 8.4 g/dL Final    Albumin 02/27/2025 2.7 (L)  3.5 - 5.2 g/dL Final    Total Bilirubin 02/27/2025 0.4  0.1 - 1.0 mg/dL Final    Alkaline Phosphatase 02/27/2025 150   40 - 150 U/L Final    AST 02/27/2025 14  10 - 40 U/L Final    ALT 02/27/2025 18  10 - 44 U/L Final    eGFR 02/27/2025 >60  >60 mL/min/1.73 m^2 Final    Anion Gap 02/27/2025 11  8 - 16 mmol/L Final    Magnesium 02/27/2025 1.7  1.6 - 2.6 mg/dL Final    Phosphorus 02/27/2025 4.0  2.7 - 4.5 mg/dL Final      ?   Imaging:    No results found. However, due to the size of the patient record, not all encounters were searched. Please check Results Review for a complete set of results.     Results for orders placed or performed during the hospital encounter of 01/10/25 (from the past 2160 hours)   CT Chest Abdomen Pelvis With IV Contrast (XPD) NO Oral Contrast    Narrative    EXAMINATION:  CT CHEST ABDOMEN PELVIS WITH IV CONTRAST (XPD)    CLINICAL HISTORY:  Metastatic disease evaluation;Non-small cell lung cancer (NSCLC), metastatic, assess treatment response; Malignant neoplasm of lower lobe, left bronchus or lung    TECHNIQUE:  Low dose axial images, sagittal and coronal reformations were obtained from the thoracic inlet to the pubic symphysis following the IV administration of 100 mL of Omnipaque 350 .  Oral contrast administered    COMPARISON:  Multiple prior CT exams, most recent 08/22/2024    FINDINGS:  Chest:    Heart and great vessels: Unchanged.  No cardiac chamber enlargement.    Adenopathy: No pathologically enlarged axillary, mediastinal or hilar lymph nodes.    Lungs: No acute opacity.  Left basilar scarring/atelectatic changes noted.  Granulomatous findings present.    Multinodular thyroid changes again noted.    Abdomen:    Liver: No acute abnormality or detrimental change.  Small nodular focus extending from the posterior right hepatic lobe unchanged.    Gallbladder and biliary: Unremarkable.    Spleen: Unremarkable.    Pancreas: Unremarkable.    Adrenals: Right adrenal gland unremarkable where left adrenal gland adenoma unchanged.    Kidneys: No acute abnormality or detrimental change    Stomach/Bowel:  Stomach is unremarkable.  Small bowel nonobstructive.  No concerning colonic abnormality.    Peritoneum: No ascites or pneumoperitoneum.    Abdominal Adenopathy: None.    Vasculature: Atherosclerotic plaquing present.    Pelvis:    Urinary bladder: Unremarkable.    Uterine fibroid changes suspected.    Pelvis adenopathy: None.    Bones: Multiple sclerotic metastatic foci again noted all appearing stable except for a left sacral focus which has enlarged, currently measuring 12 mm..    Miscellaneous: None.      Impression    Enlargement of a single left [place] sclerotic metastatic focus with examination otherwise not significantly changed from prior.      Electronically signed by: Rahul Samaniego MD  Date:    01/10/2025  Time:    10:26     *Note: Due to a large number of results and/or encounters for the requested time period, some results have not been displayed. A complete set of results can be found in Results Review.          ?   Assessment/Plan:     Problem List Items Addressed This Visit       Secondary malignant neoplasm of bone    Relevant Orders    CBC Auto Differential    Comprehensive Metabolic Panel    Magnesium    Phosphorus    Malignant neoplasm of lower lobe of left lung     Cancer Staging   Malignant neoplasm of lower lobe of left lung  Staging form: Lung, AJCC 8th Edition  - Clinical stage from 12/9/2022: Stage IV (cT2, cN2, cM1) - Signed by Reese Mcfarlane MD on 12/9/2022    Completed 4 Cycles of Carbo/Alimta 03/31/2023    Repeat CT CAP 04/11/2023:   Detrimental change.  Increase in number and size of numerous sclerotic lesions throughout the cervical and thoracic spine, left humerus and sternum.  There are also multiple large sclerotic lesions throughout the lumbar spine, pelvis and proximal femurs measuring up to 7.1 cm in size.  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  2.   The lateral left upper lobe mass is decreased in size in the interim, currently measuring 2.2 x 1.0 cm.  Negative  for new pulmonary masses.  3.  Exophytic nodule along the posterior right hepatic lobe measuring 1.1 cm.  This was not imaged previously.  Etiology uncertain.  Metastasis is not excluded.    --Rybrevant C1D1 04/27/23--    Most recent CT CAP 01/10/25, next due approx 04/2025  --Enlargement of a single left [place] sclerotic metastatic focus with examination otherwise not significantly changed from prior.     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 Scheduled   Follow up with DOLLY . Scheduled   Infusion scheduling note    Injection scheduling note    Labs    Imaging    Pharmacy appointment    Other referrals                 MARCIO Castillo-AMADOU  Hematology/Oncology

## 2025-02-28 ENCOUNTER — INFUSION (OUTPATIENT)
Dept: INFUSION THERAPY | Facility: HOSPITAL | Age: 55
End: 2025-02-28
Attending: RADIOLOGY
Payer: COMMERCIAL

## 2025-02-28 VITALS
HEART RATE: 61 BPM | HEIGHT: 64 IN | BODY MASS INDEX: 50.02 KG/M2 | RESPIRATION RATE: 16 BRPM | OXYGEN SATURATION: 96 % | WEIGHT: 293 LBS | SYSTOLIC BLOOD PRESSURE: 107 MMHG | DIASTOLIC BLOOD PRESSURE: 56 MMHG | TEMPERATURE: 97 F

## 2025-02-28 DIAGNOSIS — C34.32 MALIGNANT NEOPLASM OF LOWER LOBE OF LEFT LUNG: Primary | ICD-10-CM

## 2025-02-28 PROCEDURE — 96375 TX/PRO/DX INJ NEW DRUG ADDON: CPT

## 2025-02-28 PROCEDURE — A4216 STERILE WATER/SALINE, 10 ML: HCPCS

## 2025-02-28 PROCEDURE — 96415 CHEMO IV INFUSION ADDL HR: CPT

## 2025-02-28 PROCEDURE — 96413 CHEMO IV INFUSION 1 HR: CPT

## 2025-02-28 PROCEDURE — 63600175 PHARM REV CODE 636 W HCPCS: Mod: JZ,TB

## 2025-02-28 PROCEDURE — 96367 TX/PROPH/DG ADDL SEQ IV INF: CPT

## 2025-02-28 PROCEDURE — 25000003 PHARM REV CODE 250

## 2025-02-28 RX ORDER — ACETAMINOPHEN 325 MG/1
650 TABLET ORAL
Status: COMPLETED | OUTPATIENT
Start: 2025-02-28 | End: 2025-02-28

## 2025-02-28 RX ORDER — HEPARIN 100 UNIT/ML
500 SYRINGE INTRAVENOUS
Status: DISCONTINUED | OUTPATIENT
Start: 2025-02-28 | End: 2025-02-28 | Stop reason: HOSPADM

## 2025-02-28 RX ORDER — SODIUM CHLORIDE 0.9 % (FLUSH) 0.9 %
10 SYRINGE (ML) INJECTION
Status: DISCONTINUED | OUTPATIENT
Start: 2025-02-28 | End: 2025-02-28 | Stop reason: HOSPADM

## 2025-02-28 RX ORDER — DIPHENHYDRAMINE HYDROCHLORIDE 50 MG/ML
25 INJECTION INTRAMUSCULAR; INTRAVENOUS
Status: COMPLETED | OUTPATIENT
Start: 2025-02-28 | End: 2025-02-28

## 2025-02-28 RX ADMIN — Medication 10 ML: at 02:02

## 2025-02-28 RX ADMIN — AMIVANTAMAB 1400 MG: 350 INJECTION INTRAVENOUS at 12:02

## 2025-02-28 RX ADMIN — HEPARIN SODIUM (PORCINE) LOCK FLUSH IV SOLN 100 UNIT/ML 500 UNITS: 100 SOLUTION at 02:02

## 2025-02-28 RX ADMIN — ACETAMINOPHEN 650 MG: 325 TABLET ORAL at 12:02

## 2025-02-28 RX ADMIN — DIPHENHYDRAMINE HYDROCHLORIDE 25 MG: 50 INJECTION INTRAMUSCULAR; INTRAVENOUS at 12:02

## 2025-02-28 RX ADMIN — SODIUM CHLORIDE 0.25 MG: 9 INJECTION, SOLUTION INTRAVENOUS at 12:02

## 2025-02-28 NOTE — PLAN OF CARE
Problem: Adult Inpatient Plan of Care  Goal: Plan of Care Review  Outcome: Progressing  Flowsheets (Taken 2/28/2025 1512)  Plan of Care Reviewed With:   patient   spouse  Goal: Patient-Specific Goal (Individualized)  Outcome: Progressing  Flowsheets (Taken 2/28/2025 1512)  Individualized Care Needs: Reclined, blanket and pillow provided, [place] given as well  Anxieties, Fears or Concerns: No concerns expressed  Patient/Family-Specific Goals (Include Timeframe): Tolerated treatment today  Goal: Optimal Comfort and Wellbeing  Outcome: Progressing  Intervention: Monitor Pain and Promote Comfort  Flowsheets (Taken 2/28/2025 1512)  Pain Management Interventions:   warm blanket provided   quiet environment facilitated   pillow support provided  Intervention: Provide Person-Centered Care  Flowsheets (Taken 2/28/2025 1512)  Trust Relationship/Rapport:   care explained   reassurance provided   choices provided   thoughts/feelings acknowledged   emotional support provided   empathic listening provided   questions answered   questions encouraged     Problem: Chemotherapy Effects  Goal: Nausea and Vomiting Relief  Outcome: Progressing  Intervention: Prevent or Manage Nausea and Vomiting  Flowsheets (Taken 2/28/2025 1512)  Environmental Support: calm environment promoted     Problem: Fall Injury Risk  Goal: Absence of Fall and Fall-Related Injury  Outcome: Progressing  Intervention: Identify and Manage Contributors  Flowsheets (Taken 2/28/2025 1512)  Self-Care Promotion: BADL personal objects within reach  Medication Review/Management:   medications reviewed   infusion initiated  Intervention: Promote Injury-Free Environment  Flowsheets (Taken 2/28/2025 1512)  Safety Promotion/Fall Prevention:   in recliner, wheels locked   instructed to call staff for mobility   lighting adjusted   medications reviewed

## 2025-02-28 NOTE — DISCHARGE INSTRUCTIONS
.[facility]  [address] [facility]  [address]  [phone] phone     [phone] fax  Hours of Operation: Monday- Friday 8:00am- 5:00pm  After hours phone  [phone]  Hematology / Oncology Physicians on call    Dr. Denys Mendez           Nurse Practitioners:     Nanci Hawley, BRISA Caicedo, TATIANNA Islas, BRISA Kwon, BRISA Blackmon, NP    Please don't hesitate to call if you have any concerns.      FALL PREVENTION   Falls often occur due to slipping, tripping or losing your balance. Here are ways to reduce your risk of falling again.   Was there anything that caused your fall that can be fixed, removed or replaced?   Make your home safe by keeping walkways clear of objects you may trip over.   Use non-slip pads under rugs.   Do not walk in poorly lit areas.   Do not stand on chairs or wobbly ladders.   Use caution when reaching overhead or looking upward. This position can cause a loss of balance.   Be sure your shoes fit properly, have non-slip bottoms and are in good condition.   Be cautious when going up and down stairs, curbs, and when walking on uneven sidewalks.   If your balance is poor, consider using a cane or walker.   If your fall was related to alcohol use, stop or limit alcohol intake.   If your fall was related to use of sleeping medicines, talk to your doctor about this. You may need to reduce your dosage at bedtime if you awaken during the night to go to the bathroom.   To reduce the need for nighttime bathroom trips:   Avoid drinking fluids for several hours before going to bed   Empty your bladder before going to bed   Men can keep a urinal at the bedside   © [id number] [employer], [address] [place] [address], [place] [address]. All rights reserved. This information is not intended as a substitute for professional medical care. Always follow your healthcare  professional's instructions.    WAYS TO HELP PREVENT INFECTION        WASH YOUR HANDS OFTEN DURING THE DAY, ESPECIALLY BEFORE YOU EAT, AFTER USING THE BATHROOM, AND AFTER TOUCHING ANIMALS    STAY AWAY FROM PEOPLE WHO HAVE ILLNESSES YOU CAN CATCH; SUCH AS COLDS, FLU, CHICKEN POX    TRY TO AVOID CROWDS    STAY AWAY FROM CHILDREN WHO RECENTLY HAVE RECEIVED LIVE VIRUS VACCINES    MAINTAIN GOOD MOUTH CARE    DO NOT SQUEEZE OR SCRATCH PIMPLES    CLEAN CUTS & SCRAPES RIGHT AWAY AND DAILY UNTIL HEALED WITH WARM WATER, SOAP & AN ANTISEPTIC    AVOID CONTACT WITH LITTER BOXES, BIRD CAGES, & FISH TANKS    AVOID STANDING WATER, IE., BIRD BATHS, FLOWER POTS/VASES, OR HUMIDIFIERS    WEAR GLOVES WHEN GARDENING OR CLEANING UP AFTER OTHERS, ESPECIALLY BABIES & SMALL CHILDREN    DO NOT EAT RAW FISH, SEAFOOD, MEAT, OR EGGS

## 2025-03-04 NOTE — ASSESSMENT & PLAN NOTE
Cancer Staging   Malignant neoplasm of lower lobe of left lung  Staging form: Lung, AJCC 8th Edition  - Clinical stage from 12/9/2022: Stage IV (cT2, cN2, cM1) - Signed by Reese Mcfarlane MD on 12/9/2022    Completed 4 Cycles of Carbo/Alimta 03/31/2023    Repeat CT CAP 04/11/2023:   Detrimental change.  Increase in number and size of numerous sclerotic lesions throughout the cervical and thoracic spine, left humerus and sternum.  There are also multiple large sclerotic lesions throughout the lumbar spine, pelvis and proximal femurs measuring up to 7.1 cm in size.  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  2.   The lateral left upper lobe mass is decreased in size in the interim, currently measuring 2.2 x 1.0 cm.  Negative for new pulmonary masses.  3.  Exophytic nodule along the posterior right hepatic lobe measuring 1.1 cm.  This was not imaged previously.  Etiology uncertain.  Metastasis is not excluded.    --Rybrevant C1D1 04/27/23--    Most recent CT CAP 01/10/25, next due approx 04/2025  --Enlargement of a single left [place] sclerotic metastatic focus with examination otherwise not significantly changed from prior.     Labs reviewed, no concerning cytopenias  Okay to proceed with rybrevant tomorrow    F/u as previously scheduled with labs prior

## 2025-03-13 ENCOUNTER — LAB VISIT (OUTPATIENT)
Dept: LAB | Facility: HOSPITAL | Age: 55
End: 2025-03-13
Payer: COMMERCIAL

## 2025-03-13 ENCOUNTER — OFFICE VISIT (OUTPATIENT)
Dept: HEMATOLOGY/ONCOLOGY | Facility: CLINIC | Age: 55
End: 2025-03-13
Payer: COMMERCIAL

## 2025-03-13 DIAGNOSIS — T45.1X5A IMMUNODEFICIENCY DUE TO CHEMOTHERAPY: ICD-10-CM

## 2025-03-13 DIAGNOSIS — C34.32 MALIGNANT NEOPLASM OF LOWER LOBE OF LEFT LUNG: ICD-10-CM

## 2025-03-13 DIAGNOSIS — Z79.899 IMMUNODEFICIENCY DUE TO CHEMOTHERAPY: ICD-10-CM

## 2025-03-13 DIAGNOSIS — D84.821 IMMUNODEFICIENCY DUE TO CHEMOTHERAPY: ICD-10-CM

## 2025-03-13 DIAGNOSIS — C79.51 SECONDARY MALIGNANT NEOPLASM OF BONE: Primary | ICD-10-CM

## 2025-03-13 DIAGNOSIS — C79.51 SECONDARY MALIGNANT NEOPLASM OF BONE: ICD-10-CM

## 2025-03-13 LAB
ALBUMIN SERPL BCP-MCNC: 2.6 G/DL (ref 3.5–5.2)
ALP SERPL-CCNC: 145 U/L (ref 40–150)
ALT SERPL W/O P-5'-P-CCNC: 18 U/L (ref 10–44)
ANION GAP SERPL CALC-SCNC: 11 MMOL/L (ref 8–16)
AST SERPL-CCNC: 12 U/L (ref 10–40)
BASOPHILS # BLD AUTO: 0.03 K/UL (ref 0–0.2)
BASOPHILS NFR BLD: 0.3 % (ref 0–1.9)
BILIRUB SERPL-MCNC: 0.4 MG/DL (ref 0.1–1)
BUN SERPL-MCNC: 17 MG/DL (ref 6–20)
CALCIUM SERPL-MCNC: 8.3 MG/DL (ref 8.7–10.5)
CHLORIDE SERPL-SCNC: 106 MMOL/L (ref 95–110)
CO2 SERPL-SCNC: 25 MMOL/L (ref 23–29)
CREAT SERPL-MCNC: 0.9 MG/DL (ref 0.5–1.4)
DIFFERENTIAL METHOD BLD: NORMAL
EOSINOPHIL # BLD AUTO: 0.3 K/UL (ref 0–0.5)
EOSINOPHIL NFR BLD: 3.1 % (ref 0–8)
ERYTHROCYTE [DISTWIDTH] IN BLOOD BY AUTOMATED COUNT: 13.2 % (ref 11.5–14.5)
EST. GFR  (NO RACE VARIABLE): >60 ML/MIN/1.73 M^2
GLUCOSE SERPL-MCNC: 161 MG/DL (ref 70–110)
HCT VFR BLD AUTO: 38.1 % (ref 37–48.5)
HGB BLD-MCNC: 12.6 G/DL (ref 12–16)
IMM GRANULOCYTES # BLD AUTO: 0.01 K/UL (ref 0–0.04)
IMM GRANULOCYTES NFR BLD AUTO: 0.1 % (ref 0–0.5)
LYMPHOCYTES # BLD AUTO: 2.4 K/UL (ref 1–4.8)
LYMPHOCYTES NFR BLD: 26.8 % (ref 18–48)
MAGNESIUM SERPL-MCNC: 1.6 MG/DL (ref 1.6–2.6)
MCH RBC QN AUTO: 28.1 PG (ref 27–31)
MCHC RBC AUTO-ENTMCNC: 33.1 G/DL (ref 32–36)
MCV RBC AUTO: 85 FL (ref 82–98)
MONOCYTES # BLD AUTO: 0.6 K/UL (ref 0.3–1)
MONOCYTES NFR BLD: 6.7 % (ref 4–15)
NEUTROPHILS # BLD AUTO: 5.7 K/UL (ref 1.8–7.7)
NEUTROPHILS NFR BLD: 63 % (ref 38–73)
NRBC BLD-RTO: 0 /100 WBC
PHOSPHATE SERPL-MCNC: 3.7 MG/DL (ref 2.7–4.5)
PLATELET # BLD AUTO: 181 K/UL (ref 150–450)
PMV BLD AUTO: 11.1 FL (ref 9.2–12.9)
POTASSIUM SERPL-SCNC: 3.6 MMOL/L (ref 3.5–5.1)
PROT SERPL-MCNC: 5.6 G/DL (ref 6–8.4)
RBC # BLD AUTO: 4.49 M/UL (ref 4–5.4)
SODIUM SERPL-SCNC: 142 MMOL/L (ref 136–145)
WBC # BLD AUTO: 9.1 K/UL (ref 3.9–12.7)

## 2025-03-13 PROCEDURE — 84100 ASSAY OF PHOSPHORUS: CPT

## 2025-03-13 PROCEDURE — 83735 ASSAY OF MAGNESIUM: CPT

## 2025-03-13 PROCEDURE — 85025 COMPLETE CBC W/AUTO DIFF WBC: CPT

## 2025-03-13 PROCEDURE — 36415 COLL VENOUS BLD VENIPUNCTURE: CPT

## 2025-03-13 PROCEDURE — 80053 COMPREHEN METABOLIC PANEL: CPT

## 2025-03-13 RX ORDER — ACETAMINOPHEN 325 MG/1
650 TABLET ORAL
Status: CANCELLED | OUTPATIENT
Start: 2025-03-14

## 2025-03-13 RX ORDER — HEPARIN 100 UNIT/ML
500 SYRINGE INTRAVENOUS
Status: CANCELLED | OUTPATIENT
Start: 2025-03-14

## 2025-03-13 RX ORDER — EPINEPHRINE 0.3 MG/.3ML
0.3 INJECTION SUBCUTANEOUS ONCE AS NEEDED
Status: CANCELLED | OUTPATIENT
Start: 2025-03-14

## 2025-03-13 RX ORDER — FOLIC ACID 1 MG/1
1 TABLET ORAL DAILY
Qty: 30 TABLET | Refills: 11 | Status: SHIPPED | OUTPATIENT
Start: 2025-03-13

## 2025-03-13 RX ORDER — SODIUM CHLORIDE 0.9 % (FLUSH) 0.9 %
10 SYRINGE (ML) INJECTION
Status: CANCELLED | OUTPATIENT
Start: 2025-03-14

## 2025-03-13 RX ORDER — DIPHENHYDRAMINE HYDROCHLORIDE 50 MG/ML
25 INJECTION, SOLUTION INTRAMUSCULAR; INTRAVENOUS
Status: CANCELLED | OUTPATIENT
Start: 2025-03-14

## 2025-03-13 RX ORDER — DIPHENHYDRAMINE HYDROCHLORIDE 50 MG/ML
50 INJECTION, SOLUTION INTRAMUSCULAR; INTRAVENOUS ONCE AS NEEDED
Status: CANCELLED | OUTPATIENT
Start: 2025-03-14

## 2025-03-13 NOTE — PROGRESS NOTES
Subjective:     Patient ID:Mateusz Ahmadi is a 55 y.o. female.?? MR#: [id number]   ?   PRIMARY ONCOLOGIST: Dr. Mcfarlane-->Dr. Cartwright   ?   CHIEF COMPLAINT: Lab review/assessment C1D1 Rybrevant  ?   ONCOLOGIC DIAGNOSIS: Stage IV (cT2, cN2, cM1), Malignant neoplasm of lower lobe of left lung   ?   CURRENT TREATMENT: OP NSCLC AMIVANTAMAB-VMJW (Rybrevant) Q4W     PAST TREATMENT: PEMETREXED + CARBOPLATIN (AUC) Q3W    The patient location is: [place]  The chief complaint leading to consultation is: follow-up    Visit type: audiovisual    Face to Face time with patient: 10 minutes  20 minutes of total time spent on the encounter, which includes face to face time and non-face to face time preparing to see the patient (eg, review of tests), Obtaining and/or reviewing separately obtained history, Documenting clinical information in the electronic or other health record, Independently interpreting results (not separately reported) and communicating results to the patient/family/caregiver, or Care coordination (not separately reported).         Each patient to whom he or she provides medical services by telemedicine is:  (1) informed of the relationship between the physician and patient and the respective role of any other health care provider with respect to management of the patient; and (2) notified that he or she may decline to receive medical services by telemedicine and may withdraw from such care at any time.    Notes:    HPI [facility]. [facility] 54[facility] with metastatic non-small cell lung carcinoma Exon 20 mutation who presents today for lab review and assessment prior to C1D1 Rybrevant. 11/2022 pt seen with PCP with c/o persistent coughing and wheezing. CXR at that time revealed pulmonary nodules, subsequent CT chest found L Lung mass. L lung biopsy positive for adenocarcinoma , EGFR mutated. Pleural fluid also positive for malignancy. PET showed avid STEPHAN mass, mediastinal nodes, bone mets in C1, T1, T11, L3,  sacrum, L ischium, sternum. MRI brain negative for intracranial metastases. Initiated on carbo/ alimta 01/27/23--re imaging after 4 cycles found progressive disease. Pt initiated on Rybrevant 04/27/23.    Interval History: Pt states she is doing well and voices no complaints today.  Denies n/v/d/c, fever, chills, sob, cp,  abnormal bleeding. Denies difficulty with appetite or maintaining hydration.       Oncology History   Malignant neoplasm of lower lobe of left lung   12/9/2022 Initial Diagnosis    Malignant neoplasm of lower lobe of left lung       12/9/2022 Cancer Staged    Staging form: Lung, AJCC 8th Edition  - Clinical stage from 12/9/2022: Stage IV (cT2, cN2, cM1)       12/27/2022 - 12/27/2022 Chemotherapy    Treatment Summary   Plan Name: OP PEMBROLIZUMAB 400MG Q6W  Treatment Goal: Control  Status: Inactive  Start Date:   End Date:   Provider: Reese Mcfarlane MD  Chemotherapy: [No matching medication found in this treatment plan]        Genetic Testing    Patient has genetic testing done for  TEmpus peripheral blood.                                              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     1/18/2023 - 1/18/2023 Chemotherapy    Treatment Summary   Plan Name: OP NSCLC AMIVANTAMAB-VMJW (Rybrevant) Q4W  Treatment Goal: Palliative  Status: Inactive  Start Date:   End Date:   Provider: Reese Mcfarlane MD  Chemotherapy: amivantamab-vmjw (RYBREVANT) 350 mg in sodium chloride 0.9% SolP 250 mL chemo infusion, 350 mg (100 % of original dose 350 mg), Intravenous, Clinic/HOD 1 time, 0 of 13 cycles  Dose modification: 350 mg (original dose 350 mg, Cycle 1), 1,050 mg (original dose 1,050 mg, Cycle 1), 1,400 mg (original dose 1,400 mg, Cycle 1)       1/27/2023 - 3/31/2023 Chemotherapy    Treatment Summary   Plan Name: OP NSCLC PEMETREXED + CARBOPLATIN (AUC) Q3W  Treatment Goal: Control  Status: Inactive  Start Date: 1/27/2023  End Date:  3/31/2023  Provider: Reese Mcfarlane MD  Chemotherapy: CARBOplatin (PARAPLATIN) 750 mg in sodium chloride 0.9% 335 mL chemo infusion, 750 mg (100 % of original dose 750 mg), Intravenous, Clinic/HOD 1 time, 4 of 4 cycles  Dose modification:   (original dose 750 mg, Cycle 1, Reason: MD Discretion)  Administration: 750 mg (1/27/2023), 750 mg (2/17/2023), 750 mg (3/31/2023), 750 mg (3/10/2023)  PEMEtrexed disodium (ALIMTA) 1,200 mg in sodium chloride 0.9% SolP 100 mL chemo infusion, 1,250 mg, Intravenous, Clinic/HOD 1 time, 4 of 4 cycles  Administration: 1,200 mg (1/27/2023), 1,200 mg (2/17/2023), 1,200 mg (3/31/2023), 1,200 mg (3/10/2023)       4/27/2023 -  Chemotherapy    Treatment Summary   Plan Name: OP NSCLC AMIVANTAMAB-VMJW (Rybrevant) Q4W  Treatment Goal: Palliative  Status: Active  Start Date: 4/27/2023 (Planned)  End Date: 6/6/2024 (Planned)  Provider: Reese Mcfarlane MD  Chemotherapy: amivantamab-vmjw (RYBREVANT) 350 mg in sodium chloride 0.9% SolP 250 mL chemo infusion, 350 mg (original dose ), Intravenous, Clinic/HOD 1 time, 0 of 15 cycles  Dose modification: 350 mg (Cycle 1), 1,050 mg (Cycle 1), 1,400 mg (Cycle 1)          Social History     Socioeconomic History    Marital status:    Tobacco Use    Smoking status: Never     Passive exposure: Never    Smokeless tobacco: Never   Substance and Sexual Activity    Alcohol use: Never    Drug use: Never    Sexual activity: Yes     Partners: Male     Birth control/protection: None     Comment: tubal     Social Drivers of Health     Financial Resource Strain: Low Risk  (12/9/2024)    Overall Financial Resource Strain (CARDIA)     Difficulty of Paying Living Expenses: Not hard at all   Food Insecurity: No Food Insecurity (12/9/2024)    Hunger Vital Sign     Worried About Running Out of Food in the Last Year: Never true     Ran Out of Food in the Last Year: Never true   Transportation Needs: No Transportation Needs (6/26/2024)    [facility] from [facility]    PRAPARE - Transportation     Lack of Transportation (Medical): No     Lack of Transportation (Non-Medical): No   Physical Activity: Insufficiently Active (12/9/2024)    Exercise Vital Sign     Days of Exercise per Week: 2 days     Minutes of Exercise per Session: 30 min   Stress: No Stress Concern Present (12/9/2024)    [employer] - Occupational Stress Questionnaire     Feeling of Stress : Not at all   Housing Stability: Unknown (12/9/2024)    Housing Stability Vital Sign     Unable to Pay for Housing in the Last Year: No      Family History   Problem Relation Name Age of Onset    Heart failure Father        Past Surgical History:   Procedure Laterality Date    CATARACT EXTRACTION W/  INTRAOCULAR LENS IMPLANT Right 06/02/2022    EXTRACTION OF TOOTH      FLUOROSCOPY N/A 01/26/2023    Procedure: FLUOROSCOPY/mediport placement;  Surgeon: Marlon Leone MD;  Location[facility] CATH LAB;  Service: General;  Laterality: N/A;    TUBAL LIGATION      2007--postpartum tubal ligation        Review of Systems   Constitutional:  Negative for activity change, chills, fatigue and fever.   HENT: Negative.     Eyes: Negative.    Respiratory: Negative.     Cardiovascular: Negative.    Gastrointestinal: Negative.    Endocrine: Positive for cold intolerance.   Musculoskeletal:  Negative for arthralgias and myalgias.   Skin:  Negative for rash.   Neurological:  Negative for dizziness, weakness and light-headedness.   Psychiatric/Behavioral:  The patient is not nervous/anxious.        ?   A comprehensive 14-point review of systems was reviewed with patient and was negative other than as specified above.   ?     Objective:      Physical Exam  Vitals reviewed: virtual visit.   Constitutional:       Appearance: Normal appearance.   Neurological:      Mental Status: She is alert.   Psychiatric:         Mood and Affect: Mood normal.         Behavior: Behavior normal.          ?   There were no vitals filed for this visit.         ?       ?   Laboratory:  ?   Lab Visit on 03/13/2025   Component Date Value Ref Range Status    WBC 03/13/2025 9.10  3.90 - 12.70 K/uL Final    RBC 03/13/2025 4.49  4.00 - 5.40 M/uL Final    Hemoglobin 03/13/2025 12.6  12.0 - 16.0 g/dL Final    Hematocrit 03/13/2025 38.1  37.0 - 48.5 % Final    MCV 03/13/2025 85  82 - 98 fL Final    MCH 03/13/2025 28.1  27.0 - 31.0 pg Final    MCHC 03/13/2025 33.1  32.0 - 36.0 g/dL Final    RDW 03/13/2025 13.2  11.5 - 14.5 % Final    Platelets 03/13/2025 181  150 - 450 K/uL Final    MPV 03/13/2025 11.1  9.2 - 12.9 fL Final    Immature Granulocytes 03/13/2025 0.1  0.0 - 0.5 % Final    Gran # (ANC) 03/13/2025 5.7  1.8 - 7.7 K/uL Final    Immature Grans (Abs) 03/13/2025 0.01  0.00 - 0.04 K/uL Final    Lymph # 03/13/2025 2.4  1.0 - 4.8 K/uL Final    Mono # 03/13/2025 0.6  0.3 - 1.0 K/uL Final    Eos # 03/13/2025 0.3  0.0 - 0.5 K/uL Final    Baso # 03/13/2025 0.03  0.00 - 0.20 K/uL Final    nRBC 03/13/2025 0  0 /100 WBC Final    Gran % 03/13/2025 63.0  38.0 - 73.0 % Final    Lymph % 03/13/2025 26.8  18.0 - 48.0 % Final    Mono % 03/13/2025 6.7  4.0 - 15.0 % Final    Eosinophil % 03/13/2025 3.1  0.0 - 8.0 % Final    Basophil % 03/13/2025 0.3  0.0 - 1.9 % Final    Differential Method 03/13/2025 Automated   Final    Sodium 03/13/2025 142  136 - 145 mmol/L Final    Potassium 03/13/2025 3.6  3.5 - 5.1 mmol/L Final    Chloride 03/13/2025 106  95 - 110 mmol/L Final    CO2 03/13/2025 25  23 - 29 mmol/L Final    Glucose 03/13/2025 161 (H)  70 - 110 mg/dL Final    BUN 03/13/2025 17  6 - 20 mg/dL Final    Creatinine 03/13/2025 0.9  0.5 - 1.4 mg/dL Final    Calcium 03/13/2025 8.3 (L)  8.7 - 10.5 mg/dL Final    Total Protein 03/13/2025 5.6 (L)  6.0 - 8.4 g/dL Final    Albumin 03/13/2025 2.6 (L)  3.5 - 5.2 g/dL Final    Total Bilirubin 03/13/2025 0.4  0.1 - 1.0 mg/dL Final    Alkaline Phosphatase 03/13/2025  145  40 - 150 U/L Final    AST 03/13/2025 12  10 - 40 U/L Final    ALT 03/13/2025 18  10 - 44 U/L Final    eGFR 03/13/2025 >60  >60 mL/min/1.73 m^2 Final    Anion Gap 03/13/2025 11  8 - 16 mmol/L Final    Magnesium 03/13/2025 1.6  1.6 - 2.6 mg/dL Final    Phosphorus 03/13/2025 3.7  2.7 - 4.5 mg/dL Final      ?   Imaging:    No results found. However, due to the size of the patient record, not all encounters were searched. Please check Results Review for a complete set of results.     Results for orders placed or performed during the hospital encounter of 01/10/25 (from the past 2160 hours)   CT Chest Abdomen Pelvis With IV Contrast (XPD) NO Oral Contrast    Narrative    EXAMINATION:  CT CHEST ABDOMEN PELVIS WITH IV CONTRAST (XPD)    CLINICAL HISTORY:  Metastatic disease evaluation;Non-small cell lung cancer (NSCLC), metastatic, assess treatment response; Malignant neoplasm of lower lobe, left bronchus or lung    TECHNIQUE:  Low dose axial images, sagittal and coronal reformations were obtained from the thoracic inlet to the pubic symphysis following the IV administration of 100 mL of Omnipaque 350 .  Oral contrast administered    COMPARISON:  Multiple prior CT exams, most recent 08/22/2024    FINDINGS:  Chest:    Heart and great vessels: Unchanged.  No cardiac chamber enlargement.    Adenopathy: No pathologically enlarged axillary, mediastinal or hilar lymph nodes.    Lungs: No acute opacity.  Left basilar scarring/atelectatic changes noted.  Granulomatous findings present.    Multinodular thyroid changes again noted.    Abdomen:    Liver: No acute abnormality or detrimental change.  Small nodular focus extending from the posterior right hepatic lobe unchanged.    Gallbladder and biliary: Unremarkable.    Spleen: Unremarkable.    Pancreas: Unremarkable.    Adrenals: Right adrenal gland unremarkable where left adrenal gland adenoma unchanged.    Kidneys: No acute abnormality or detrimental  change    Stomach/Bowel: Stomach is unremarkable.  Small bowel nonobstructive.  No concerning colonic abnormality.    Peritoneum: No ascites or pneumoperitoneum.    Abdominal Adenopathy: None.    Vasculature: Atherosclerotic plaquing present.    Pelvis:    Urinary bladder: Unremarkable.    Uterine fibroid changes suspected.    Pelvis adenopathy: None.    Bones: Multiple sclerotic metastatic foci again noted all appearing stable except for a left sacral focus which has enlarged, currently measuring 12 mm..    Miscellaneous: None.      Impression    Enlargement of a single left [place] sclerotic metastatic focus with examination otherwise not significantly changed from prior.      Electronically signed by: Rahul Samaniego MD  Date:    01/10/2025  Time:    10:26     *Note: Due to a large number of results and/or encounters for the requested time period, some results have not been displayed. A complete set of results can be found in Results Review.          ?   Assessment/Plan:     Problem List Items Addressed This Visit       Secondary malignant neoplasm of bone - Primary    Plan to initiate Zometa pending dental clearance  She notes currently with plan complete dental work  Two deep cleanings scheduled for 03/22 and 04/05/24 to be followed by extractions and fillings         Malignant neoplasm of lower lobe of left lung     Cancer Staging   Malignant neoplasm of lower lobe of left lung  Staging form: Lung, AJCC 8th Edition  - Clinical stage from 12/9/2022: Stage IV (cT2, cN2, cM1) - Signed by Reese Mcfarlane MD on 12/9/2022    Completed 4 Cycles of Carbo/Alimta 03/31/2023    Repeat CT CAP 04/11/2023:   Detrimental change.  Increase in number and size of numerous sclerotic lesions throughout the cervical and thoracic spine, left humerus and sternum.  There are also multiple large sclerotic lesions throughout the lumbar spine, pelvis and proximal femurs measuring up to 7.1 cm in size.  Lungs are consistent with  osseous metastasis.  2.   The lateral left upper lobe mass is decreased in size in the interim, currently measuring 2.2 x 1.0 cm.  Negative for new pulmonary masses.  3.  Exophytic nodule along the posterior right hepatic lobe measuring 1.1 cm.  This was not imaged previously.  Etiology uncertain.  Metastasis is not excluded.    --Rybrevant C1D1 04/27/23--    Most recent CT CAP 01/10/25, next due approx 04/2025  --Enlargement of a single left [place] sclerotic metastatic focus with examination otherwise not significantly changed from prior.     Labs reviewed, no concerning cytopenias  Okay to proceed with rybrevant tomorrow    F/u as previously scheduled with labs prior            Relevant Medications    folic acid (FOLVITE) 1 MG tablet                    Med Onc Chart Routing      Follow up with physician . Scheduled   Follow up with DOLLY    Infusion scheduling note    Injection scheduling note    Labs    Imaging    Pharmacy appointment    Other referrals                 JU Monteiro, FNP-C  Hematology/Oncology

## 2025-03-13 NOTE — ASSESSMENT & PLAN NOTE
Cancer Staging   Malignant neoplasm of lower lobe of left lung  Staging form: Lung, AJCC 8th Edition  - Clinical stage from 12/9/2022: Stage IV (cT2, cN2, cM1) - Signed by Reese Mcfarlane MD on 12/9/2022    Completed 4 Cycles of Carbo/Alimta 03/31/2023    Repeat CT CAP 04/11/2023:   Detrimental change.  Increase in number and size of numerous sclerotic lesions throughout the cervical and thoracic spine, left humerus and sternum.  There are also multiple large sclerotic lesions throughout the lumbar spine, pelvis and proximal femurs measuring up to 7.1 cm in size.  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  2.   The lateral left upper lobe mass is decreased in size in the interim, currently measuring 2.2 x 1.0 cm.  Negative for new pulmonary masses.  3.  Exophytic nodule along the posterior right hepatic lobe measuring 1.1 cm.  This was not imaged previously.  Etiology uncertain.  Metastasis is not excluded.    --Rybrevant C1D1 04/27/23--    Most recent CT CAP 01/10/25, next due approx 04/2025  --Enlargement of a single left [place] sclerotic metastatic focus with examination otherwise not significantly changed from prior.     Labs reviewed, no concerning cytopenias  Okay to proceed with rybrevant tomorrow    F/u as previously scheduled with labs prior

## 2025-03-14 ENCOUNTER — INFUSION (OUTPATIENT)
Dept: INFUSION THERAPY | Facility: HOSPITAL | Age: 55
End: 2025-03-14
Attending: RADIOLOGY
Payer: COMMERCIAL

## 2025-03-14 ENCOUNTER — DOCUMENTATION ONLY (OUTPATIENT)
Dept: HEMATOLOGY/ONCOLOGY | Facility: CLINIC | Age: 55
End: 2025-03-14
Payer: COMMERCIAL

## 2025-03-14 VITALS
DIASTOLIC BLOOD PRESSURE: 63 MMHG | RESPIRATION RATE: 17 BRPM | HEART RATE: 59 BPM | BODY MASS INDEX: 50.02 KG/M2 | TEMPERATURE: 97 F | SYSTOLIC BLOOD PRESSURE: 103 MMHG | OXYGEN SATURATION: 98 % | WEIGHT: 293 LBS | HEIGHT: 64 IN

## 2025-03-14 DIAGNOSIS — C34.32 MALIGNANT NEOPLASM OF LOWER LOBE OF LEFT LUNG: Primary | ICD-10-CM

## 2025-03-14 PROCEDURE — 96375 TX/PRO/DX INJ NEW DRUG ADDON: CPT

## 2025-03-14 PROCEDURE — 63600175 PHARM REV CODE 636 W HCPCS

## 2025-03-14 PROCEDURE — 96415 CHEMO IV INFUSION ADDL HR: CPT

## 2025-03-14 PROCEDURE — 25000003 PHARM REV CODE 250

## 2025-03-14 PROCEDURE — 96367 TX/PROPH/DG ADDL SEQ IV INF: CPT

## 2025-03-14 PROCEDURE — 96413 CHEMO IV INFUSION 1 HR: CPT

## 2025-03-14 RX ORDER — DIPHENHYDRAMINE HYDROCHLORIDE 50 MG/ML
25 INJECTION, SOLUTION INTRAMUSCULAR; INTRAVENOUS
Status: COMPLETED | OUTPATIENT
Start: 2025-03-14 | End: 2025-03-14

## 2025-03-14 RX ORDER — ACETAMINOPHEN 325 MG/1
650 TABLET ORAL
Status: COMPLETED | OUTPATIENT
Start: 2025-03-14 | End: 2025-03-14

## 2025-03-14 RX ORDER — HEPARIN 100 UNIT/ML
500 SYRINGE INTRAVENOUS
Status: DISCONTINUED | OUTPATIENT
Start: 2025-03-14 | End: 2025-03-14 | Stop reason: HOSPADM

## 2025-03-14 RX ADMIN — HEPARIN SODIUM (PORCINE) LOCK FLUSH IV SOLN 100 UNIT/ML 500 UNITS: 100 SOLUTION at 12:03

## 2025-03-14 RX ADMIN — SODIUM CHLORIDE 0.25 MG: 9 INJECTION, SOLUTION INTRAVENOUS at 10:03

## 2025-03-14 RX ADMIN — SODIUM CHLORIDE 1400 MG: 0.9 INJECTION, SOLUTION INTRAVENOUS at 11:03

## 2025-03-14 RX ADMIN — DIPHENHYDRAMINE HYDROCHLORIDE 25 MG: 50 INJECTION INTRAMUSCULAR; INTRAVENOUS at 10:03

## 2025-03-14 RX ADMIN — ACETAMINOPHEN 650 MG: 325 TABLET ORAL at 10:03

## 2025-03-14 NOTE — PROGRESS NOTES
SW provided 1 case of Chocolate glucose control Boost. No other needs expressed. SW will remain available.

## 2025-03-14 NOTE — PLAN OF CARE
Problem: Adult Inpatient Plan of Care  Goal: Plan of Care Review  Outcome: Progressing  Flowsheets (Taken 3/14/2025 1020)  Plan of Care Reviewed With: patient  Goal: Optimal Comfort and Wellbeing  Outcome: Progressing  Intervention: Provide Person-Centered Care  Flowsheets (Taken 3/14/2025 1020)  Trust Relationship/Rapport:   care explained   choices provided   emotional support provided   empathic listening provided   questions answered   questions encouraged   reassurance provided   thoughts/feelings acknowledged     Problem: Chemotherapy Effects  Goal: Absence of Infection  Outcome: Progressing  Intervention: Prevent Infection and Maximize Resistance  Flowsheets (Taken 3/14/2025 1020)  Infection Prevention:   equipment surfaces disinfected   hand hygiene promoted   personal protective equipment utilized   rest/sleep promoted

## 2025-03-17 ENCOUNTER — HOSPITAL ENCOUNTER (OUTPATIENT)
Dept: RADIOLOGY | Facility: HOSPITAL | Age: 55
Discharge: HOME | End: 2025-03-17
Payer: COMMERCIAL

## 2025-03-17 ENCOUNTER — OFFICE VISIT (OUTPATIENT)
Dept: ORTHOPEDICS | Facility: CLINIC | Age: 55
End: 2025-03-17
Payer: COMMERCIAL

## 2025-03-17 VITALS — WEIGHT: 293 LBS | HEIGHT: 64 IN | BODY MASS INDEX: 50.02 KG/M2

## 2025-03-17 DIAGNOSIS — M25.569 KNEE PAIN, UNSPECIFIED CHRONICITY, UNSPECIFIED LATERALITY: ICD-10-CM

## 2025-03-17 DIAGNOSIS — G89.29 CHRONIC PAIN OF LEFT KNEE: Primary | ICD-10-CM

## 2025-03-17 DIAGNOSIS — M25.562 CHRONIC PAIN OF LEFT KNEE: Primary | ICD-10-CM

## 2025-03-17 DIAGNOSIS — M25.562 LEFT KNEE PAIN, UNSPECIFIED CHRONICITY: ICD-10-CM

## 2025-03-17 PROCEDURE — 73564 X-RAY EXAM KNEE 4 OR MORE: CPT | Mod: 26,LT,, | Performed by: RADIOLOGY

## 2025-03-17 PROCEDURE — 73564 X-RAY EXAM KNEE 4 OR MORE: CPT | Mod: TC,LT

## 2025-03-17 PROCEDURE — 99999 PR PBB SHADOW E&M-EST. PATIENT-LVL IV: CPT | Mod: PBBFAC,,,

## 2025-03-17 PROCEDURE — 1159F MED LIST DOCD IN RCRD: CPT | Mod: CPTII,S$GLB,,

## 2025-03-17 PROCEDURE — 99204 OFFICE O/P NEW MOD 45 MIN: CPT | Mod: S$GLB,,,

## 2025-03-17 PROCEDURE — 3008F BODY MASS INDEX DOCD: CPT | Mod: CPTII,S$GLB,,

## 2025-03-17 NOTE — PROGRESS NOTES
"            [address] [place] [address]   Phone [phone]  Fax [phone]           CHIEF COMPLAINT:   Chief Complaint   Patient presents with    Right Knee - Pain, Swelling     Pain:2/10        HISTORY OF PRESENT ILLNESS (BN) (03/17/2025):    Shaneka presents with left knee pain ongoing for 2-3 months.  She reports intermittent left knee pain with no history of trauma or injury to the knee.  She reports that she has knee pain which occurs and resolves spontaneously.  Two days ago on Saturday she developed knee pain while lying in bed which woke her from sleep, the pain has subsequently improved.  She reports 2/10 pain today.    She reports occasional feelings of her knee getting "stuck in a position."     Shaneka is currently undergoing chemotherapy for stage 4 lung cancer with metastasis to bone. PET scan: 2022, showed a lesion on the left femur.         Disclaimer: The history above was obtained through ambient listening technology thus may contain errors.     PAST MEDICAL HISTORY:    Past Medical History:   Diagnosis Date    Diabetes mellitus     Hypertension     Malignant neoplasm of lower lobe of left lung 12/9/2022    Rash, drug 7/6/2023    OP NSCLC AMIVANTAMAB-VMJW ([facility]) Q4W      Secondary malignant neoplasm of bone 12/5/2022       Hemoglobin A1C   Date Value Ref Range Status   11/22/2024 6.4 (H) 4.0 - 5.6 % Final     Comment:     ADA Screening Guidelines:  5.7-6.4%  Consistent with prediabetes  >or=6.5%  Consistent with diabetes    High levels of fetal hemoglobin interfere with the HbA1C  assay. Heterozygous hemoglobin variants (HbS, HgC, etc)do  not significantly interfere with this assay.   However, presence of multiple variants may affect accuracy.     08/06/2024 6.7 (H) 4.0 - 5.6 % Final     Comment:     ADA Screening Guidelines:  5.7-6.4%  Consistent with prediabetes  >or=6.5%  Consistent with diabetes    High levels of fetal hemoglobin interfere with the HbA1C  assay. " Heterozygous hemoglobin variants (HbS, HgC, etc)do  not significantly interfere with this assay.   However, presence of multiple variants may affect accuracy.     04/18/2024 8.2 (H) 4.0 - 5.6 % Final     Comment:     ADA Screening Guidelines:  5.7-6.4%  Consistent with prediabetes  >or=6.5%  Consistent with diabetes    High levels of fetal hemoglobin interfere with the HbA1C  assay. Heterozygous hemoglobin variants (HbS, HgC, etc)do  not significantly interfere with this assay.   However, presence of multiple variants may affect accuracy.          PAST SURGICAL HISTORY:    Past Surgical History:   Procedure Laterality Date    CATARACT EXTRACTION W/  INTRAOCULAR LENS IMPLANT Right 06/02/2022    EXTRACTION OF TOOTH      FLUOROSCOPY N/A 01/26/2023    Procedure: FLUOROSCOPY/mediport placement;  Surgeon: Marlon Leone MD;  Location: [facility] CATH LAB;  Service: General;  Laterality: N/A;    TUBAL LIGATION      2007--postpartum tubal ligation        MEDICATIONS:  Current Medications[1]     There are no discontinued medications.      ALLERGIES:     Review of patient's allergies indicates:   Allergen Reactions    Lisinopril Other (See Comments)     coughing    Amoxicillin             FAMILY HISTORY:   Family History   Problem Relation Name Age of Onset    Heart failure Father             SOCIAL HISTORY:    Social History     Socioeconomic History    Marital status:    Tobacco Use    Smoking status: Never     Passive exposure: Never    Smokeless tobacco: Never   Substance and Sexual Activity    Alcohol use: Never    Drug use: Never    Sexual activity: Yes     Partners: Male     Birth control/protection: None     Comment: tubal     Social Drivers of Health     Financial Resource Strain: Low Risk  (12/9/2024)    Overall Financial Resource Strain (CARDIA)     Difficulty of Paying Living Expenses: Not hard at all   Food Insecurity: No Food Insecurity (12/9/2024)    Hunger Vital Sign     Worried About Running Out of Food in  "the Last Year: Never true     Ran Out of Food in the Last Year: Never true   Transportation Needs: No Transportation Needs (6/26/2024)    Received from [facility]     Lack of Transportation (Medical): No     Lack of Transportation (Non-Medical): No   Physical Activity: Insufficiently Active (12/9/2024)    Exercise Vital Sign     Days of Exercise per Week: 2 days     Minutes of Exercise per Session: 30 min   Stress: No Stress Concern Present (12/9/2024)    [employer] - Occupational Stress Questionnaire     Feeling of Stress : Not at all   Housing Stability: Unknown (12/9/2024)    Housing Stability Vital Sign     Unable to Pay for Housing in the Last Year: No         PHYSICAL EXAMINATION:     Estimated body mass index is 56.47 kg/m² as calculated from the following:    Height as of this encounter: 5' 4" (1.626 m).    Weight as of this encounter: 149.2 kg (329 lb).   ASSISTIVE DEVICE: None    MUSCULOSKELETAL:    Knee Exam (Left extremity):   Inspection:    Skin:    Discoloration   (-)   Gross deformity   (-)    Open wounds   (-)   Surgical scars   (-)      Soft tissue:     Swelling/Knee Effusion  (-)   Muscle atrophy   (-)   Palpation tenderness:    Bony:     Tibial tubercle   (-)   Patella    (-)   Tibial plateau   (-)   Femoral condyle  (-)   Soft tissue:    Pes anserine bursae  (-)   Patellar tendon   (+) mild   Quadriceps tendon  (-)   MCL    (-)   LCL    (-)  ITB    (-)   ROM:   Affected extremity:        Extension:   0°            Flexion    120°            Pain    (-)           Crepitance   (-)           Sensory:     Normal sensation to light touch in Sa/Irene/DP/SP/T nerve distributions      Motor:                  Fires EHL/FHL/Tibialis anterior/Gastrocsoleus   Vascular:                 Foot WWP with brisk capillary refill      DP/PT pulses palpable              IMAGING:      X-Ray Knee Complete 4 or More Views Left  Narrative: " EXAMINATION:  XR KNEE COMP 4 OR MORE VIEWS LEFT    CLINICAL HISTORY:  Pain in unspecified knee    TECHNIQUE:  AP, Lateral, Sunrise and Tunnel views of the left knee were preformed    COMPARISON:  11/22/2024    FINDINGS:  There is mild joint space narrowing and osteophyte formation seen involving the medial compartment of the right knee.  The medial and lateral compartment joint spaces of the left knee appear to be well maintained.  Minimal marginal osteophyte formation seen associated with the patella on the left.  Enthesophytes seen projecting off the inferior and superior poles of the left patella.  No joint effusion.  Impression: As above    Electronically signed by: Christiano Yepez DO  Date:    03/17/2025  Time:    08:22           ASSESSMENT: 55 y.o. female  with:   Left knee pain  Lung cancer, stage IV with metastasis to bone    PLAN:  Data:   I reviewed available old/outside records.   PT/OT:   Deferred for now.   Medications:    No new medications prescribed today.    DME and weight bearing status:    Activities as tolerated.   Advanced Imaging:   Due to the patient's history of stage IV lung cancer with metastasis to bone, I would like to order MRI of the left knee without contrast to evaluate her knee pain.    Education:    I had a long discussion with the patient about the causes, treatments, and prognosis/natural history for knee pain.  We discussed effective ways to improve symptoms as well as what types of activities may make the symptoms/prognosis worse. We discussed signs and symptoms and other reasons to return to clinic sooner.    Return to clinic:    After MRI with Dr. Cox   Imaging needed at next follow-up: None                 Official Website  Schedule An Appointment    This note was generated with the assistance of ambient listening technology thus some errors may exist.  Please contact the author of this note for any clarification.          [1]   Current Outpatient Medications:      albuterol (VENTOLIN HFA) 90 mcg/actuation inhaler, Inhale 1-2 puffs into the lungs every 4 to 6 hours as needed for Wheezing. Rescue, Disp: 18 g, Rfl: 11    amLODIPine (NORVASC) 10 MG tablet, Take 1 tablet (10 mg total) by mouth once daily., Disp: 90 tablet, Rfl: 3    atenoloL (TENORMIN) 50 MG tablet, Take 1 tab by mouth twice a day, Disp: 180 tablet, Rfl: 3    blood sugar diagnostic (CONTOUR NEXT TEST STRIPS) Strp, 1 each by Misc.(Non-Drug; Combo Route) route 2 (two) times a day., Disp: 100 each, Rfl: 5    blood-glucose meter kit, To check BG 2 times daily, to use with insurance preferred meter, Disp: 1 each, Rfl: 0    clindamycin (CLEOCIN T) 1 % Swab, APPLY TO AFFECTED AREA(S) TWO TIMES A DAY AS DIRECTED, Disp: 60 each, Rfl: 1    desonide (DESOWEN) 0.05 % cream, APPLY TO AFFECTED AREA(S) TWO TIMES A DAY AS DIRECTED, Disp: 60 g, Rfl: 3    doxycycline (VIBRA-TABS) 100 MG tablet, Take 1 tablet (100 mg total) by mouth once daily., Disp: 30 tablet, Rfl: 1    folic acid (FOLVITE) 1 MG tablet, Take 1 tablet (1 mg total) by mouth once daily., Disp: 30 tablet, Rfl: 11    furosemide (LASIX) 20 MG tablet, Take 1 tablet (20 mg total) by mouth daily as needed (swelling)., Disp: 30 tablet, Rfl: 5    glipizide-metformin (METAGLIP) 5-500 mg per tablet, Take 1 tablet by mouth 2 (two) times daily before meals., Disp: 180 tablet, Rfl: 3    hydrocodone-homatropine 5-1.5 mg/5 ml (HYCODAN) 5-1.5 mg/5 mL Syrp, Take 5 mLs by mouth every 4 to 6 hours as needed (cough)., Disp: 180 mL, Rfl: 0    ipratropium (ATROVENT) 21 mcg (0.03 %) nasal spray, 2 sprays by Each Nostril route 2 (two) times daily., Disp: 30 mL, Rfl: 1    lancets Misc, To check BG 2 times daily, to use with insurance preferred meter, Disp: 100 each, Rfl: 5    losartan-hydrochlorothiazide 100-25 mg (HYZAAR) 100-25 mg per tablet, Take 1 tablet by mouth once daily., Disp: 90 tablet, Rfl: 3    magnesium oxide (MAGOX) 400 mg (241.3 mg magnesium) tablet, Take 1 tablet (400 mg  total) by mouth once daily., Disp: 200 tablet, Rfl: 1    montelukast (SINGULAIR) 10 mg tablet, Take 1 tablet (10 mg total) by mouth every evening. allergies, Disp: 30 tablet, Rfl: 11    mupirocin (BACTROBAN) 2 % ointment, Apply topically once daily., Disp: 22 g, Rfl: 0    ondansetron (ZOFRAN-ODT) 8 MG TbDL, Take 1 tablet (8 mg total) by mouth every 8 (eight) hours as needed. Starting with cycle 1 of chemotherapy, Disp: 60 tablet, Rfl: 11    rosuvastatin (CRESTOR) 5 MG tablet, Take 1 tablet (5 mg total) by mouth once daily., Disp: 90 tablet, Rfl: 3    LIDOcaine (LIDODERM) 5 %, Place 1 patch onto the skin once daily. Remove & Discard patch within 12 hours or as directed by MD (Patient not taking: Reported on 3/17/2025), Disp: 3 patch, Rfl: 0

## 2025-03-24 ENCOUNTER — OFFICE VISIT (OUTPATIENT)
Dept: INTERNAL MEDICINE | Facility: CLINIC | Age: 55
End: 2025-03-24
Payer: COMMERCIAL

## 2025-03-24 DIAGNOSIS — J01.90 ACUTE SINUSITIS, RECURRENCE NOT SPECIFIED, UNSPECIFIED LOCATION: Primary | ICD-10-CM

## 2025-03-24 PROCEDURE — 1159F MED LIST DOCD IN RCRD: CPT | Mod: CPTII,95,, | Performed by: NURSE PRACTITIONER

## 2025-03-24 PROCEDURE — 98004 SYNCH AUDIO-VIDEO EST SF 10: CPT | Mod: 95,,, | Performed by: NURSE PRACTITIONER

## 2025-03-24 PROCEDURE — 1160F RVW MEDS BY RX/DR IN RCRD: CPT | Mod: CPTII,95,, | Performed by: NURSE PRACTITIONER

## 2025-03-24 RX ORDER — AZITHROMYCIN 250 MG/1
250 TABLET, FILM COATED ORAL DAILY
Qty: 6 TABLET | Refills: 0 | Status: SHIPPED | OUTPATIENT
Start: 2025-03-24

## 2025-03-24 NOTE — PROGRESS NOTES
Subjective     Patient ID: Shaneka Ahmadi is a 55 y.o. female.    Chief Complaint: No chief complaint on file.    The patient location is: [place]  The chief complaint leading to consultation is: sinuses     Visit type: audiovisual    Face to Face time with patient: 11 minutes of total time spent on the encounter, which includes face to face time and non-face to face time preparing to see the patient (eg, review of tests), Obtaining and/or reviewing separately obtained history, Documenting clinical information in the electronic or other health record, Independently interpreting results (not separately reported) and communicating results to the patient/family/caregiver, or Care coordination (not separately reported).         Each patient to whom he or she provides medical services by telemedicine is:  (1) informed of the relationship between the physician and patient and the respective role of any other health care provider with respect to management of the patient; and (2) notified that he or she may decline to receive medical services by telemedicine and may withdraw from such care at any time.    Notes:        Cough  This is a recurrent problem. The current episode started in the past 7 days. The problem has been unchanged. The problem occurs every few minutes. The cough is Productive of sputum and productive of purulent sputum. Associated symptoms include nasal congestion, postnasal drip and sweats. Pertinent negatives include no chest pain, chills, ear congestion, ear pain, fever, headaches, heartburn, hemoptysis, myalgias, rash, rhinorrhea, sore throat, shortness of breath, weight loss or wheezing. The symptoms are aggravated by cold air, dust and pollens. She has tried body position changes, leukotriene antagonists, prescription cough suppressant and rest for the symptoms. Her past medical history is significant for asthma, bronchitis, environmental allergies and pneumonia. There is no history of bronchiectasis, COPD  or emphysema.     Review of Systems   Constitutional:  Negative for chills, fever and weight loss.   HENT:  Positive for postnasal drip. Negative for ear pain, rhinorrhea and sore throat.    Respiratory:  Positive for cough. Negative for hemoptysis, shortness of breath and wheezing.    Cardiovascular:  Negative for chest pain.   Gastrointestinal:  Negative for heartburn.   Musculoskeletal:  Negative for myalgias.   Integumentary:  Negative for rash.   Allergic/Immunologic: Positive for environmental allergies.   Neurological:  Negative for headaches.          Objective     Physical Exam  Pulmonary:      Effort: Pulmonary effort is normal. No respiratory distress.   Neurological:      General: No focal deficit present.      Mental Status: She is alert.            Assessment and Plan     1. Acute sinusitis, recurrence not specified, unspecified location  -     azithromycin (Z-GLADYS) 250 MG tablet; Take 1 tablet (250 mg total) by mouth once daily. Take 2 tablets by mouth on day 1, then one tablet daily on days 2-5.  Dispense: 6 tablet; Refill: 0      Start Zpack.  Continue Atrovent nasal spray.  Follow up if no improvement.          No follow-ups on file.

## 2025-03-25 ENCOUNTER — PATIENT MESSAGE (OUTPATIENT)
Dept: HEMATOLOGY/ONCOLOGY | Facility: CLINIC | Age: 55
End: 2025-03-25
Payer: COMMERCIAL

## 2025-03-27 ENCOUNTER — LAB VISIT (OUTPATIENT)
Dept: LAB | Facility: HOSPITAL | Age: 55
End: 2025-03-27
Payer: COMMERCIAL

## 2025-03-27 ENCOUNTER — OFFICE VISIT (OUTPATIENT)
Dept: HEMATOLOGY/ONCOLOGY | Facility: CLINIC | Age: 55
End: 2025-03-27
Payer: COMMERCIAL

## 2025-03-27 DIAGNOSIS — C34.32 MALIGNANT NEOPLASM OF LOWER LOBE OF LEFT LUNG: ICD-10-CM

## 2025-03-27 DIAGNOSIS — C79.51 SECONDARY MALIGNANT NEOPLASM OF BONE: ICD-10-CM

## 2025-03-27 DIAGNOSIS — Z79.69 IMMUNODEFICIENCY DUE TO CHEMOTHERAPY: ICD-10-CM

## 2025-03-27 DIAGNOSIS — D84.821 IMMUNODEFICIENCY DUE TO CHEMOTHERAPY: ICD-10-CM

## 2025-03-27 DIAGNOSIS — T45.1X5A IMMUNODEFICIENCY DUE TO CHEMOTHERAPY: ICD-10-CM

## 2025-03-27 LAB
ABSOLUTE EOSINOPHIL (OHS): 0.41 K/UL
ABSOLUTE MONOCYTE (OHS): 0.8 K/UL (ref 0.3–1)
ABSOLUTE NEUTROPHIL COUNT (OHS): 7.7 K/UL (ref 1.8–7.7)
ALBUMIN SERPL BCP-MCNC: 2.4 G/DL (ref 3.5–5.2)
ALP SERPL-CCNC: 146 UNIT/L (ref 40–150)
ALT SERPL W/O P-5'-P-CCNC: 20 UNIT/L (ref 10–44)
ANION GAP (OHS): 13 MMOL/L (ref 8–16)
AST SERPL-CCNC: 13 UNIT/L (ref 11–45)
BASOPHILS # BLD AUTO: 0.06 K/UL
BASOPHILS NFR BLD AUTO: 0.5 %
BILIRUB SERPL-MCNC: 0.3 MG/DL (ref 0.1–1)
BUN SERPL-MCNC: 21 MG/DL (ref 6–20)
CALCIUM SERPL-MCNC: 8.2 MG/DL (ref 8.7–10.5)
CHLORIDE SERPL-SCNC: 105 MMOL/L (ref 95–110)
CO2 SERPL-SCNC: 22 MMOL/L (ref 23–29)
CREAT SERPL-MCNC: 0.9 MG/DL (ref 0.5–1.4)
ERYTHROCYTE [DISTWIDTH] IN BLOOD BY AUTOMATED COUNT: 13.1 % (ref 11.5–14.5)
GFR SERPLBLD CREATININE-BSD FMLA CKD-EPI: >60 ML/MIN/1.73/M2
GLUCOSE SERPL-MCNC: 180 MG/DL (ref 70–110)
HCT VFR BLD AUTO: 38.7 % (ref 37–48.5)
HGB BLD-MCNC: 12.8 GM/DL (ref 12–16)
IMM GRANULOCYTES # BLD AUTO: 0.06 K/UL (ref 0–0.04)
IMM GRANULOCYTES NFR BLD AUTO: 0.5 % (ref 0–0.5)
LYMPHOCYTES # BLD AUTO: 2.81 K/UL (ref 1–4.8)
MAGNESIUM SERPL-MCNC: 1.5 MG/DL (ref 1.6–2.6)
MCH RBC QN AUTO: 27.8 PG (ref 27–50)
MCHC RBC AUTO-ENTMCNC: 33.1 G/DL (ref 32–36)
MCV RBC AUTO: 84 FL (ref 82–98)
NUCLEATED RBC (/100WBC) (OHS): 0 /100 WBC
PHOSPHATE SERPL-MCNC: 3.9 MG/DL (ref 2.7–4.5)
PLATELET # BLD AUTO: 196 K/UL (ref 150–450)
PMV BLD AUTO: 11.2 FL (ref 9.2–12.9)
POTASSIUM SERPL-SCNC: 3.7 MMOL/L (ref 3.5–5.1)
PROT SERPL-MCNC: 5.5 GM/DL (ref 6–8.4)
RBC # BLD AUTO: 4.6 M/UL (ref 4–5.4)
RELATIVE EOSINOPHIL (OHS): 3.5 %
RELATIVE LYMPHOCYTE (OHS): 23.7 % (ref 18–48)
RELATIVE MONOCYTE (OHS): 6.8 % (ref 4–15)
RELATIVE NEUTROPHIL (OHS): 65 % (ref 38–73)
SODIUM SERPL-SCNC: 140 MMOL/L (ref 136–145)
WBC # BLD AUTO: 11.84 K/UL (ref 3.9–12.7)

## 2025-03-27 PROCEDURE — 82565 ASSAY OF CREATININE: CPT

## 2025-03-27 PROCEDURE — 85025 COMPLETE CBC W/AUTO DIFF WBC: CPT

## 2025-03-27 PROCEDURE — 84100 ASSAY OF PHOSPHORUS: CPT

## 2025-03-27 PROCEDURE — 83735 ASSAY OF MAGNESIUM: CPT

## 2025-03-27 PROCEDURE — 36415 COLL VENOUS BLD VENIPUNCTURE: CPT

## 2025-03-27 RX ORDER — SODIUM CHLORIDE 0.9 % (FLUSH) 0.9 %
10 SYRINGE (ML) INJECTION
OUTPATIENT
Start: 2025-04-11

## 2025-03-27 RX ORDER — HYDROCODONE BITARTRATE AND HOMATROPINE METHYLBROMIDE ORAL SOLUTION 5; 1.5 MG/5ML; MG/5ML
5 LIQUID ORAL
Qty: 180 ML | Refills: 0 | Status: SHIPPED | OUTPATIENT
Start: 2025-03-27

## 2025-03-27 RX ORDER — DIPHENHYDRAMINE HYDROCHLORIDE 50 MG/ML
50 INJECTION, SOLUTION INTRAMUSCULAR; INTRAVENOUS ONCE AS NEEDED
Status: CANCELLED | OUTPATIENT
Start: 2025-03-28

## 2025-03-27 RX ORDER — ACETAMINOPHEN 325 MG/1
650 TABLET ORAL
Status: CANCELLED
Start: 2025-03-28

## 2025-03-27 RX ORDER — EPINEPHRINE 0.3 MG/.3ML
0.3 INJECTION SUBCUTANEOUS ONCE AS NEEDED
Status: CANCELLED | OUTPATIENT
Start: 2025-03-28

## 2025-03-27 RX ORDER — DIPHENHYDRAMINE HYDROCHLORIDE 50 MG/ML
25 INJECTION, SOLUTION INTRAMUSCULAR; INTRAVENOUS
OUTPATIENT
Start: 2025-04-11

## 2025-03-27 RX ORDER — DIPHENHYDRAMINE HYDROCHLORIDE 50 MG/ML
25 INJECTION, SOLUTION INTRAMUSCULAR; INTRAVENOUS
Status: CANCELLED
Start: 2025-03-28

## 2025-03-27 RX ORDER — HEPARIN 100 UNIT/ML
500 SYRINGE INTRAVENOUS
Status: CANCELLED | OUTPATIENT
Start: 2025-03-28

## 2025-03-27 RX ORDER — SODIUM CHLORIDE 0.9 % (FLUSH) 0.9 %
10 SYRINGE (ML) INJECTION
Status: CANCELLED | OUTPATIENT
Start: 2025-03-28

## 2025-03-27 RX ORDER — HEPARIN 100 UNIT/ML
500 SYRINGE INTRAVENOUS
OUTPATIENT
Start: 2025-04-11

## 2025-03-27 RX ORDER — ACETAMINOPHEN 325 MG/1
650 TABLET ORAL
OUTPATIENT
Start: 2025-04-11

## 2025-03-27 RX ORDER — EPINEPHRINE 0.3 MG/.3ML
0.3 INJECTION SUBCUTANEOUS ONCE AS NEEDED
OUTPATIENT
Start: 2025-04-11

## 2025-03-27 RX ORDER — DIPHENHYDRAMINE HYDROCHLORIDE 50 MG/ML
50 INJECTION, SOLUTION INTRAMUSCULAR; INTRAVENOUS ONCE AS NEEDED
OUTPATIENT
Start: 2025-04-11

## 2025-03-27 NOTE — PROGRESS NOTES
Patient ID: Shaneka Ahmadi   Reason for Visit: Follow-up (Lab results)  MRN:  [id number]     Oncologic Diagnosis:  Stage IV (cT2, cN2, cM1) NSCLC, metastatic to bone  Previous Treatment:    Carbo/Alimta started in 1/2023; stopped due to progression after 4 cycles     Current Treatment:   OP NSCLC AMIVANTAMAB-VMJW ([facility]) Q2W   OP ZOLEDRONIC ACID (ZOMETA) Q4W   THERAPY SHELL - B12     The patient location is: [place]  The chief complaint leading to consultation is: follow up lab results    Visit type: audiovisual    Face to Face time with patient: 15  30 minutes of total time spent on the encounter, which includes face to face time and non-face to face time preparing to see the patient (eg, review of tests), Obtaining and/or reviewing separately obtained history, Documenting clinical information in the electronic or other health record, Independently interpreting results (not separately reported) and communicating results to the patient/family/caregiver, or Care coordination (not separately reported).     Each patient to whom he or she provides medical services by telemedicine is:  (1) informed of the relationship between the physician and patient and the respective role of any other health care provider with respect to management of the patient; and (2) notified that he or she may decline to receive medical services by telemedicine and may withdraw from such care at any time.    Notes:   Ruth Ahmadi is a 55 y.o. female who presents to clinic for follow-up.    She feels well; reports good energy, appetite; rash resolved. She is recovering from a URI and completes antibiotics tomorrow.  I reviewed with her the most recent imaging and the L sacral met increased in size. She has no new symptoms that she can think of; some minimal back discomfort when standing for long periods but no pain.  I counseled her to follow up with her dentist so that we can start bisphosphate therapy and she will.      Review of Systems   Constitutional:  Negative for activity change, appetite change, chills, diaphoresis, fatigue, fever and unexpected weight change.   HENT:  Negative for nosebleeds.    Respiratory:  Negative for shortness of breath.    Cardiovascular:  Negative for chest pain.   Gastrointestinal:  Negative for abdominal distention, abdominal pain, anal bleeding, blood in stool, constipation, diarrhea, nausea and vomiting.   Genitourinary:  Negative for difficulty urinating and hematuria.   Musculoskeletal:  Negative for arthralgias, back pain and myalgias.   Skin:  Negative for rash.   Neurological:  Negative for dizziness, weakness, light-headedness and headaches.   Hematological:  Does not bruise/bleed easily.   Psychiatric/Behavioral:  The patient is not nervous/anxious.      History     Oncology History   Malignant neoplasm of lower lobe of left lung   12/9/2022 Initial Diagnosis    Malignant neoplasm of lower lobe of left lung     12/9/2022 Cancer Staged    Staging form: Lung, AJCC 8th Edition  - Clinical stage from 12/9/2022: Stage IV (cT2, cN2, cM1)     12/27/2022 - 12/27/2022 Chemotherapy    Treatment Summary   Plan Name: OP PEMBROLIZUMAB 400MG Q6W  Treatment Goal: Control  Status: Inactive  Start Date:   End Date:   Provider: Reese Mcfarlane MD  Chemotherapy: [No matching medication found in this treatment plan]      Genetic Testing    Patient has genetic testing done for  TEmpus peripheral blood.                                              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     1/18/2023 - 1/18/2023 Chemotherapy    Treatment Summary   Plan Name: OP NSCLC AMIVANTAMAB-VMJW ([facility]) Q4W  Treatment Goal: Palliative  Status: Inactive  Start Date:   End Date:   Provider: Reese Mcfarlane MD  Chemotherapy: amivantamab-vmjw ([facility]) 350 mg in sodium chloride 0.9% SolP 250 mL chemo infusion, 350 mg (100 % of original dose 350 mg),  Intravenous, Clinic/HOD 1 time, 0 of 13 cycles  Dose modification: 350 mg (original dose 350 mg, Cycle 1), 1,050 mg (original dose 1,050 mg, Cycle 1), 1,400 mg (original dose 1,400 mg, Cycle 1)     1/27/2023 - 3/31/2023 Chemotherapy    Treatment Summary   Plan Name: OP NSCLC PEMETREXED + CARBOPLATIN (AUC) Q3W  Treatment Goal: Control  Status: Inactive  Start Date: 1/27/2023  End Date: 3/31/2023  Provider: Reese Mcfarlane MD  Chemotherapy: CARBOplatin (PARAPLATIN) 750 mg in sodium chloride 0.9% 335 mL chemo infusion, 750 mg (100 % of original dose 750 mg), Intravenous, Clinic/HOD 1 time, 4 of 4 cycles  Dose modification:   (original dose 750 mg, Cycle 1, Reason: MD Discretion)  Administration: 750 mg (1/27/2023), 750 mg (2/17/2023), 750 mg (3/31/2023), 750 mg (3/10/2023)  PEMEtrexed disodium (ALIMTA) 1,200 mg in sodium chloride 0.9% SolP 100 mL chemo infusion, 1,250 mg, Intravenous, Clinic/HOD 1 time, 4 of 4 cycles  Administration: 1,200 mg (1/27/2023), 1,200 mg (2/17/2023), 1,200 mg (3/31/2023), 1,200 mg (3/10/2023)     4/27/2023 -  Chemotherapy    Treatment Summary   Plan Name: OP NSCLC AMIVANTAMAB-VMJW ([facility]) Q4W  Treatment Goal: Palliative  Status: Active  Start Date: 4/27/2023  End Date: 7/18/2025 (Planned)  Provider: Reese Mcfarlane MD  Chemotherapy: amivantamab-vmjw ([facility]) 350 mg in sodium chloride 0.9% SolP 250 mL chemo infusion, 350 mg (100 % of original dose 350 mg), Intravenous, Clinic/HOD 1 time, 25 of 29 cycles  Dose modification: 350 mg (original dose 350 mg, Cycle 1), 1,050 mg (original dose 1,050 mg, Cycle 1), 1,400 mg (original dose 1,400 mg, Cycle 1)  Administration: 350 mg (4/27/2023), 1,050 mg (4/28/2023), 1,400 mg (5/4/2023), 1,400 mg (5/11/2023), 1,400 mg (5/18/2023), 1,400 mg (5/25/2023), 1,400 mg (6/8/2023), 1,400 mg (6/22/2023), 1,400 mg (7/21/2023), 1,400 mg (8/4/2023), 1,400 mg (8/18/2023), 1,400 mg (9/1/2023), 1,400 mg (9/15/2023), 1,400 mg (9/29/2023), 1,400 mg (10/13/2023),  1,400 mg (10/27/2023), 1,400 mg (11/10/2023), 1,400 mg (11/24/2023), 1,400 mg (12/8/2023), 1,400 mg (12/22/2023), 1,400 mg (1/5/2024), 1,400 mg (1/19/2024), 1,400 mg (2/2/2024), 1,400 mg (2/16/2024), 1,400 mg (3/1/2024), 1,400 mg (3/15/2024), 1,400 mg (4/5/2024), 1,400 mg (4/19/2024), 1,400 mg (5/3/2024), 1,400 mg (5/17/2024), 1,400 mg (5/31/2024), 1,400 mg (6/14/2024), 1,400 mg (6/28/2024), 1,400 mg (7/12/2024), 1,400 mg (7/26/2024), 1,400 mg (8/9/2024), 1,400 mg (8/23/2024), 1,400 mg (9/6/2024), 1,400 mg (9/20/2024), 1,400 mg (10/4/2024), 1,400 mg (10/18/2024), 1,400 mg (11/1/2024), 1,400 mg (11/15/2024), 1,400 mg (11/29/2024), 1,400 mg (12/13/2024), 1,400 mg (1/3/2025), 1,400 mg (1/17/2025), 1,400 mg (1/31/2025), 1,400 mg (2/14/2025), 1,400 mg (2/28/2025), 1,400 mg (3/14/2025)           Past Medical History:   Diagnosis Date    Diabetes mellitus     Hypertension     Malignant neoplasm of lower lobe of left lung 12/9/2022    Rash, drug 7/6/2023    OP NSCLC AMIVANTAMAB-VMJW ([facility]) Q4W      Secondary malignant neoplasm of bone 12/5/2022       Past Surgical History:   Procedure Laterality Date    CATARACT EXTRACTION W/  INTRAOCULAR LENS IMPLANT Right 06/02/2022    EXTRACTION OF TOOTH      FLUOROSCOPY N/A 01/26/2023    Procedure: FLUOROSCOPY/mediport placement;  Surgeon: Marlon Leone MD;  Location: [facility] CATH LAB;  Service: General;  Laterality: N/A;    TUBAL LIGATION      2007--postpartum tubal ligation       Family History   Problem Relation Name Age of Onset    Heart failure Father         Review of patient's allergies indicates:   Allergen Reactions    Lisinopril Other (See Comments)     coughing    Amoxicillin        Social History     Tobacco Use    Smoking status: Never     Passive exposure: Never    Smokeless tobacco: Never   Substance Use Topics    Alcohol use: Never    Drug use: Never       Labs   Labs:  Lab Visit on 03/27/2025   Component Date Value Ref Range Status    Sodium 03/27/2025 140  136 -  145 mmol/L Final    Potassium 03/27/2025 3.7  3.5 - 5.1 mmol/L Final    Chloride 03/27/2025 105  95 - 110 mmol/L Final    CO2 03/27/2025 22 (L)  23 - 29 mmol/L Final    Glucose 03/27/2025 180 (H)  70 - 110 mg/dL Final    BUN 03/27/2025 21 (H)  6 - 20 mg/dL Final    Creatinine 03/27/2025 0.9  0.5 - 1.4 mg/dL Final    Calcium 03/27/2025 8.2 (L)  8.7 - 10.5 mg/dL Final    Protein Total 03/27/2025 5.5 (L)  6.0 - 8.4 gm/dL Final    Albumin 03/27/2025 2.4 (L)  3.5 - 5.2 g/dL Final    Bilirubin Total 03/27/2025 0.3  0.1 - 1.0 mg/dL Final    ALP 03/27/2025 146  40 - 150 unit/L Final    AST 03/27/2025 13  11 - 45 unit/L Final    ALT 03/27/2025 20  10 - 44 unit/L Final    Anion Gap 03/27/2025 13  8 - 16 mmol/L Final    eGFR 03/27/2025 >60  >60 mL/min/1.73/m2 Final    Estimated GFR calculated using the CKD-EPI creatinine (2021) equation.  Estimated GFR calculated using the CKD-EPI creatinine (2021) equation.    Magnesium  03/27/2025 1.5 (L)  1.6 - 2.6 mg/dL Final    Phosphorus Level 03/27/2025 3.9  2.7 - 4.5 mg/dL Final    WBC 03/27/2025 11.84  3.90 - 12.70 K/uL Final    RBC 03/27/2025 4.60  4.00 - 5.40 M/uL Final    HGB 03/27/2025 12.8  12.0 - 16.0 gm/dL Final    HCT 03/27/2025 38.7  37.0 - 48.5 % Final    MCV 03/27/2025 84  82 - 98 fL Final    MCH 03/27/2025 27.8  27.0 - 50.0 pg Final    MCHC 03/27/2025 33.1  32.0 - 36.0 g/dL Final    RDW 03/27/2025 13.1  11.5 - 14.5 % Final    Platelet Count 03/27/2025 196  150 - 450 K/uL Final    MPV 03/27/2025 11.2  9.2 - 12.9 fL Final    Nucleated RBC 03/27/2025 0  <=0 /100 WBC Final    Neut % 03/27/2025 65.0  38 - 73 % Final    Lymph % 03/27/2025 23.7  18 - 48 % Final    Mono % 03/27/2025 6.8  4 - 15 % Final    Eos % 03/27/2025 3.5  <=8 % Final    Basophil % 03/27/2025 0.5  <=1.9 % Final    Imm Grans % 03/27/2025 0.5  0.0 - 0.5 % Final    Neut # 03/27/2025 7.70  1.8 - 7.7 K/uL Final    Lymph # 03/27/2025 2.81  1 - 4.8 K/uL Final    Mono # 03/27/2025 0.80  0.3 - 1 K/uL Final    Eos #  03/27/2025 0.41  <=0.5 K/uL Final    Baso # 03/27/2025 0.06  <=0.2 K/uL Final    Imm Grans # 03/27/2025 0.06 (H)  0.00 - 0.04 K/uL Final    Mild elevation in immature granulocytes is non specific and can be seen in a variety of conditions including stress response, acute inflammation, trauma and pregnancy. Correlation with other laboratory and clinical findings is essential.        Imaging   CT CHEST ABDOMEN PELVIS WITH CONTRAST (XPD) - 10/19/23     CLINICAL HISTORY:  Metastatic disease evaluation;Malignant neoplasm of lower lobe, left bronchus or lung     TECHNIQUE:  Low dose axial images, sagittal and coronal reformations were obtained from the thoracic inlet to the pubic synthesis following the IV administration of 100 mL of Omnipaque 350.  Oral contrast also administered.  All CT scans at this location are performed using dose modulation techniques as appropriate to a performed exam including the following: Automated exposure control; adjustment of the mA and/or kV  according to patient size.     COMPARISON:  07/11/2023.  12/02/2022     FINDINGS:  Chest:     Thoracic soft tissues: No significant abnormality.     Aorta: Normal in course and caliber, without significant atherosclerotic plaque. There are three branching vessels at the arch.     Heart: Normal in size. No pericardial effusion.  Mild aortic and coronary artery atherosclerotic calcification.     Brooke/Mediastinum: No significant lymphadenopathy .  No measurable hilar lesion.     Lungs: Unchanged left lung base volume loss and bronchovascular crowding secondary to elevation left hemidiaphragm.  No measurable mass lesion.  Right lung base benign calcified granuloma.     Abdomen Pelvis:     Liver: Unchanged 9 mm nodule posterior to the inferior right liver.     Gallbladder: No calcified gallstones.     Bile Ducts: No evidence of dilated ducts.     Pancreas: No mass or peripancreatic fat stranding.     Spleen: Unremarkable.     Adrenals: Unchanged  benign-appearing 2.1 cm left adrenal fluid density nodule.     Kidneys/ Ureters: Punctate right upper pole nonobstructing renal stone.  Normal in size and location. Normal concentration and excretion of contrast. No hydronephrosis or nephrolithiasis. No ureteral dilatation. Left upper pole 12 mm benign angiomyolipoma.     Bladder: No evidence of wall thickening.     Reproductive organs: Fibroid uterus.     GI Tract/Mesentery: No evidence of bowel obstruction or inflammation. Large volume stool throughout the colon     Peritoneal Space: No ascites. No free air.     Retroperitoneum: No significant adenopathy.     Abdominal wall: Unremarkable.     Vasculature: No significant atherosclerosis or aneurysm.     Bones: No acute fracture. Unchanged widely disseminated osteoblastic metastatic disease.  No new lesions.     Impression:  Stable exam compared to 07/11/2023.         CT CHEST ABDOMEN PELVIS WITH IV CONTRAST (XPD) - 01/30/2024  FINDINGS:  CT CHEST:     1.2 x 1.2 cm calcified granuloma right lung base, unchanged.  Otherwise the right lung is clear     minimal linear focal increased density subpleural lateral left upper lobe could represent residual lung mass or residual scarring, measuring 1.9 x 0.5 cm as compared to 2.2 x 1.0 cm on the prior examination.  Moderate consolidation left lower lobe.  No new or enlarging pulmonary nodules or lung masses bilaterally.     Heart normal in size.  Normal caliber thoracic aorta.  Negative for adenopathy.  Calcified right hilar right paratracheal lymph nodes, unchanged since prior examination.     Soft tissues inferior neck are normal.  Trachea and esophagus are midline and are normal.  Chest wall soft tissues are normal.  Degenerative changes the spine.  Blastic metastatic lesions of the thoracic spine and decreased in number.     CT ABDOMEN PELVIS     Incidental small cysts lateral segment left lobe liver, unchanged since prior examination.  Exophytic nodule posterior aspect  of the posterior segment right lobe measures 10 mm and is unchanged.  No suspicious hepatic lesion.     Gallbladder, portal vein, spleen, pancreas are normal.     Low-density left adrenal gland compatible with adenomas unchanged.  Right adrenal gland is normal.  Normal caliber aorta and IVC.  No retroperitoneal adenopathy.  No suspicious renal lesion bilaterally.  No hydronephrosis.  Ureters are nondilated and normal.     Stomach and small intestine normal.  Appendix normal.  Otherwise the bowel is normal.     Mildly distended normal-appearing bladder.  Anteverted uterus.  Negative for adnexal mass.     Abdominal pelvic wall soft tissues are normal.  Degenerative changes of the spine again noted.  Blastic metastatic lesions of the lumbar spine remain unchanged.  Largest is within the L3 vertebral body measuring  2.1 cm in maximal dimension.  Blastic metastatic lesions of the right ilium and proximal bilateral femurs.  Overall osseous metastatic disease within the pelvis and bilateral proximal femurs has significantly improved.        Impression:        1.    Minimal residual linear increased density periphery of the left upper lobe could represent residual left upper lobe lung nodule or residual scarring.  No new or enlarging pulmonary nodules or masses bilaterally.     2.    Nonspecific exophytic soft tissue density nodule projects posteriorly off of the posterior segment right lobe liver.  No change.  No definite evidence for metastatic disease throughout the abdomen or pelvis.     3.    Osseous metastatic disease significantly improved.       Assessment and Plan   Stage IV (cT2, cN2, cM1) NSCLC, Metastatic to Bone  Exon 20 EGFR mutation positive   Previously on Carbo/Alimta started in 1/2023; stopped due to progression after 4 cycles and started on Amivantamab  CT CAP scheduled 01/30/24: stable  Tolerating Amivantamab well; has mild intermittent rash on right side of face but no other notable side effects  CT CAP  01/10/25 showed enlargement of single L Sacral bone met; no new lesions  Repeat imaging ordered and to be done prior to next visit  Amivatimab today and q2w (pt to get infusion and lab q2w and provider visit q4w)     Metastatic Bone Disease  Patient has yet to start bisphosphonate therapy as she has some ongoing dental issues pending completion of dental work and dental clearance  Counseled to follow up with dentistry so that we can start bisphosphonate  Continue Calcium and Vitamin D/Weight Bearing Exercise      Amivantamab Induced Rash  Continue topical clindamycin, and tretinoin   Started on doxycycline per derm  Continue follow up with derm      Chronic Medical Conditions  DM II  Hx of COVID-19        Med Onc Chart Routing      Follow up with physician 4 weeks. review imaging   Follow up with DOLLY    Infusion scheduling note   Amivatimab tomorrow and q2w (pt to get infusion and lab q2w and provider visit q4w)   Injection scheduling note    Labs CMP, CBC, magnesium and phosphorus   Scheduling:  Preferred lab:  Lab interval:     Imaging CT chest abdomen pelvis      Pharmacy appointment    Other referrals                 The patient was seen, interviewed and examined. Pertinent lab and radiologic studies were reviewed. Pt instructed to call should they develop concerning signs/symptoms or have further questions.        Portions of the record may have been created with voice recognition software. Occasional wrong-word or sound-a-like substitutions may have occurred due to the inherent limitations of voice recognition software. Read the chart carefully and recognize, using context, where substitutions have occurred.      Mirtha Wilhelm MD    Hematology/Oncology

## 2025-03-28 ENCOUNTER — INFUSION (OUTPATIENT)
Dept: INFUSION THERAPY | Facility: HOSPITAL | Age: 55
End: 2025-03-28
Attending: RADIOLOGY
Payer: COMMERCIAL

## 2025-03-28 VITALS
SYSTOLIC BLOOD PRESSURE: 114 MMHG | HEART RATE: 67 BPM | WEIGHT: 293 LBS | TEMPERATURE: 97 F | BODY MASS INDEX: 50.02 KG/M2 | OXYGEN SATURATION: 96 % | RESPIRATION RATE: 18 BRPM | DIASTOLIC BLOOD PRESSURE: 64 MMHG | HEIGHT: 64 IN

## 2025-03-28 DIAGNOSIS — C34.32 MALIGNANT NEOPLASM OF LOWER LOBE OF LEFT LUNG: Primary | ICD-10-CM

## 2025-03-28 PROCEDURE — 96415 CHEMO IV INFUSION ADDL HR: CPT

## 2025-03-28 PROCEDURE — 96367 TX/PROPH/DG ADDL SEQ IV INF: CPT

## 2025-03-28 PROCEDURE — 96375 TX/PRO/DX INJ NEW DRUG ADDON: CPT

## 2025-03-28 PROCEDURE — 25000003 PHARM REV CODE 250: Performed by: INTERNAL MEDICINE

## 2025-03-28 PROCEDURE — 63600175 PHARM REV CODE 636 W HCPCS: Performed by: INTERNAL MEDICINE

## 2025-03-28 PROCEDURE — 96413 CHEMO IV INFUSION 1 HR: CPT

## 2025-03-28 RX ORDER — HEPARIN 100 UNIT/ML
500 SYRINGE INTRAVENOUS
Status: DISCONTINUED | OUTPATIENT
Start: 2025-03-28 | End: 2025-03-28 | Stop reason: HOSPADM

## 2025-03-28 RX ORDER — DIPHENHYDRAMINE HYDROCHLORIDE 50 MG/ML
50 INJECTION, SOLUTION INTRAMUSCULAR; INTRAVENOUS ONCE AS NEEDED
Status: DISCONTINUED | OUTPATIENT
Start: 2025-03-28 | End: 2025-03-28 | Stop reason: HOSPADM

## 2025-03-28 RX ORDER — ACETAMINOPHEN 325 MG/1
650 TABLET ORAL
Status: COMPLETED | OUTPATIENT
Start: 2025-03-28 | End: 2025-03-28

## 2025-03-28 RX ORDER — EPINEPHRINE 0.3 MG/.3ML
0.3 INJECTION SUBCUTANEOUS ONCE AS NEEDED
Status: DISCONTINUED | OUTPATIENT
Start: 2025-03-28 | End: 2025-03-28 | Stop reason: HOSPADM

## 2025-03-28 RX ORDER — DIPHENHYDRAMINE HYDROCHLORIDE 50 MG/ML
25 INJECTION, SOLUTION INTRAMUSCULAR; INTRAVENOUS
Status: COMPLETED | OUTPATIENT
Start: 2025-03-28 | End: 2025-03-28

## 2025-03-28 RX ORDER — SODIUM CHLORIDE 0.9 % (FLUSH) 0.9 %
10 SYRINGE (ML) INJECTION
Status: DISCONTINUED | OUTPATIENT
Start: 2025-03-28 | End: 2025-03-28 | Stop reason: HOSPADM

## 2025-03-28 RX ADMIN — SODIUM CHLORIDE: 9 INJECTION, SOLUTION INTRAVENOUS at 10:03

## 2025-03-28 RX ADMIN — ACETAMINOPHEN 650 MG: 325 TABLET ORAL at 10:03

## 2025-03-28 RX ADMIN — HEPARIN SODIUM (PORCINE) LOCK FLUSH IV SOLN 100 UNIT/ML 500 UNITS: 100 SOLUTION at 12:03

## 2025-03-28 RX ADMIN — SODIUM CHLORIDE 1400 MG: 0.9 INJECTION, SOLUTION INTRAVENOUS at 10:03

## 2025-03-28 RX ADMIN — DIPHENHYDRAMINE HYDROCHLORIDE 25 MG: 50 INJECTION INTRAMUSCULAR; INTRAVENOUS at 10:03

## 2025-03-28 RX ADMIN — SODIUM CHLORIDE 0.25 MG: 9 INJECTION, SOLUTION INTRAVENOUS at 10:03

## 2025-03-28 NOTE — PLAN OF CARE
Problem: Adult Inpatient Plan of Care  Goal: Plan of Care Review  Outcome: Progressing  Flowsheets (Taken 3/28/2025 1034)  Plan of Care Reviewed With:   patient   spouse  Goal: Optimal Comfort and Wellbeing  Outcome: Progressing  Intervention: Provide Person-Centered Care  Flowsheets (Taken 3/28/2025 1034)  Trust Relationship/Rapport:   care explained   reassurance provided   choices provided   thoughts/feelings acknowledged   emotional support provided   empathic listening provided   questions answered   questions encouraged     Problem: Chemotherapy Effects  Goal: Absence of Infection  Outcome: Progressing  Intervention: Prevent Infection and Maximize Resistance  Flowsheets (Taken 3/28/2025 1034)  Infection Prevention:   environmental surveillance performed   equipment surfaces disinfected   hand hygiene promoted   personal protective equipment utilized

## 2025-04-02 ENCOUNTER — HOSPITAL ENCOUNTER (OUTPATIENT)
Dept: RADIOLOGY | Facility: HOSPITAL | Age: 55
Discharge: HOME | End: 2025-04-02
Payer: COMMERCIAL

## 2025-04-02 DIAGNOSIS — M25.562 CHRONIC PAIN OF LEFT KNEE: ICD-10-CM

## 2025-04-02 DIAGNOSIS — G89.29 CHRONIC PAIN OF LEFT KNEE: ICD-10-CM

## 2025-04-02 PROCEDURE — 73721 MRI JNT OF LWR EXTRE W/O DYE: CPT | Mod: 26,LT,, | Performed by: RADIOLOGY

## 2025-04-02 PROCEDURE — 73721 MRI JNT OF LWR EXTRE W/O DYE: CPT | Mod: TC,LT

## 2025-04-02 NOTE — PROGRESS NOTES
"            [address] [facility] [place] [address]   Phone [phone]  Fax [phone]           CHIEF COMPLAINT:   Chief Complaint   Patient presents with    Left Knee - Pain     HISTORY OF PRESENT ILLNESS JN (04/04/2025):  History of Present Illness    HPI:  Shaneka presents to review her left knee MRI and with with knee pain in two specific areas, one of which becomes swollen and tender. Pain was particularly severe on Saturday after cooking while wearing only socks, causing significant discomfort. She experiences pain while walking and moving around in the kitchen. Her knee requires time to "warm up" after sitting, and she feels popping sensations. Her oncology practitioner advised she can take Advil for pain, but not frequently.    She has a history of lung cancer and receives immunotherapy infusions every two weeks. 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           Disclaimer: The history above was obtained through ambient listening technology thus may contain errors.      HISTORY OF PRESENT ILLNESS (BN) (03/17/2025):    Shaneka presents with left knee pain ongoing for 2-3 months.  She reports intermittent left knee pain with no history of trauma or injury to the knee.  She reports that she has knee pain which occurs and resolves spontaneously.  Two days ago on Saturday she developed knee pain while lying in bed which woke her from sleep, the pain has subsequently improved.  She reports 2/10 pain today.    She reports occasional feelings of her knee getting "stuck in a position."     Shaneka is currently undergoing chemotherapy for stage 4 lung cancer with metastasis to bone. PET scan: 2022, showed a lesion on the left femur.         Disclaimer: The history above was obtained through ambient listening technology thus may contain errors.     PAST MEDICAL HISTORY:    Past Medical History:   Diagnosis Date    Diabetes mellitus     Hypertension     " Malignant neoplasm of lower lobe of left lung 12/9/2022    Rash, drug 7/6/2023    OP NSCLC AMIVANTAMAB-VMJW ([facility]) Q4W      Secondary malignant neoplasm of bone 12/5/2022       Hemoglobin A1C   Date Value Ref Range Status   11/22/2024 6.4 (H) 4.0 - 5.6 % Final     Comment:     ADA Screening Guidelines:  5.7-6.4%  Consistent with prediabetes  >or=6.5%  Consistent with diabetes    High levels of fetal hemoglobin interfere with the HbA1C  assay. Heterozygous hemoglobin variants (HbS, HgC, etc)do  not significantly interfere with this assay.   However, presence of multiple variants may affect accuracy.     08/06/2024 6.7 (H) 4.0 - 5.6 % Final     Comment:     ADA Screening Guidelines:  5.7-6.4%  Consistent with prediabetes  >or=6.5%  Consistent with diabetes    High levels of fetal hemoglobin interfere with the HbA1C  assay. Heterozygous hemoglobin variants (HbS, HgC, etc)do  not significantly interfere with this assay.   However, presence of multiple variants may affect accuracy.     04/18/2024 8.2 (H) 4.0 - 5.6 % Final     Comment:     ADA Screening Guidelines:  5.7-6.4%  Consistent with prediabetes  >or=6.5%  Consistent with diabetes    High levels of fetal hemoglobin interfere with the HbA1C  assay. Heterozygous hemoglobin variants (HbS, HgC, etc)do  not significantly interfere with this assay.   However, presence of multiple variants may affect accuracy.          PAST SURGICAL HISTORY:    Past Surgical History:   Procedure Laterality Date    CATARACT EXTRACTION W/  INTRAOCULAR LENS IMPLANT Right 06/02/2022    EXTRACTION OF TOOTH      FLUOROSCOPY N/A 01/26/2023    Procedure: FLUOROSCOPY/mediport placement;  Surgeon: Marlon Leone MD;  Location: [facility] CATH LAB;  Service: General;  Laterality: N/A;    TUBAL LIGATION      2007--postpartum tubal ligation        MEDICATIONS:  Current Medications[1]     There are no discontinued medications.      ALLERGIES:     Review of patient's allergies indicates:   Allergen  "Reactions    Lisinopril Other (See Comments)     coughing    Amoxicillin             FAMILY HISTORY:   Family History   Problem Relation Name Age of Onset    Heart failure Father             SOCIAL HISTORY:    Social History     Socioeconomic History    Marital status:    Tobacco Use    Smoking status: Never     Passive exposure: Never    Smokeless tobacco: Never   Substance and Sexual Activity    Alcohol use: Never    Drug use: Never    Sexual activity: Yes     Partners: Male     Birth control/protection: None     Comment: tubal     Social Drivers of Health     Financial Resource Strain: Low Risk  (12/9/2024)    Overall Financial Resource Strain (CARDIA)     Difficulty of Paying Living Expenses: Not hard at all   Food Insecurity: No Food Insecurity (12/9/2024)    Hunger Vital Sign     Worried About Running Out of Food in the Last Year: Never true     Ran Out of Food in the Last Year: Never true   Transportation Needs: No Transportation Needs (6/26/2024)    Received from [facility]     Lack of Transportation (Medical): No     Lack of Transportation (Non-Medical): No   Physical Activity: Insufficiently Active (12/9/2024)    Exercise Vital Sign     Days of Exercise per Week: 2 days     Minutes of Exercise per Session: 30 min   Stress: No Stress Concern Present (12/9/2024)    [employer] - Occupational Stress Questionnaire     Feeling of Stress : Not at all   Housing Stability: Unknown (12/9/2024)    Housing Stability Vital Sign     Unable to Pay for Housing in the Last Year: No         PHYSICAL EXAMINATION:     Estimated body mass index is 57.9 kg/m² as calculated from the following:    Height as of 3/28/25: 5' 4" (1.626 m).    Weight as of this encounter: 153 kg (337 lb 4.9 oz).   ASSISTIVE DEVICE: None    MUSCULOSKELETAL:    Knee Exam (Left extremity):   Inspection:    Skin:    Discoloration   (-)   Gross deformity   (-)    Open " wounds   (-)   Surgical scars   (-)      Soft tissue:     Swelling/Knee Effusion  (+) mild although limited by body habitus   Muscle atrophy   (-)   Palpation tenderness:    Bony:     Tibial tubercle   (-)   Patella    (-)   Tibial plateau   (-)   Femoral condyle  (-)   Soft tissue:    Pes anserine bursae  (-)   Patellar tendon   (+) mild   Quadriceps tendon  (-)   MCL    (-)   LCL    (-)  ITB    (-)   ROM:   Affected extremity:        Extension:   0°            Flexion    120°            Pain    (+)           Crepitance   (+)           Sensory:     Normal sensation to light touch in Sa/Irene/DP/SP/T nerve distributions      Motor:                  Fires EHL/FHL/Tibialis anterior/Gastrocsoleus   Vascular:                 Foot WWP with brisk capillary refill      DP/PT pulses palpable   No calf pain           IMAGING:      MRI Knee Without Contrast Left  Narrative: EXAMINATION:  MRI KNEE WITHOUT CONTRAST LEFT    CLINICAL HISTORY:  Knee pain, chronic, negative xray (Age >= 5y);Pain in left knee    TECHNIQUE:  Multiplanar, multisequence images were performed about the knee.    COMPARISON:  Recent radiograph    FINDINGS:  Menisci:    --Medial: Degenerative free edge thinning/blunting with mild body segment degenerative extrusion.  No discrete tear.    --Lateral: Intact    Ligaments:  ACL, PCL, MCL, and LCL complex are intact.    Tendons:  Extensor mechanism intact.    Cartilage:    Patellofemoral: Low-grade cartilage fissuring and mild heterogenicity most prevalent at the lateral patellar facet.  Small focus of subcortical cystic change involving the lateral facet/lateral aspect of the median eminence.    Medial tibiofemoral: Articular cartilage is maintained.    Lateral tibiofemoral: Articular cartilage is maintained.    Bone: No fracture or marrow replacing process.    Miscellaneous: No significant effusion  Impression: No internal derangement with mild patellofemoral and medial compartment degenerative changes as  above.    Electronically signed by: Rahul Samaniego MD  Date:    04/02/2025  Time:    16:00           ASSESSMENT: 55 y.o. female  with:   Left knee mild arthritis with MRI on 04/02/2025 demonstrating No internal derangement with mild patellofemoral and medial compartment degenerative changes as above  Lung cancer, stage IV with metastasis to bone. receives immunotherapy infusions every two weeks.    PLAN:  Data:   I reviewed available old/outside records.   PT/OT:   Schedule: 1-2 times a week for 6 weeks. Ordered with focus on reducing pain/inflammation and improving strength, ROM, and function.  Home exercise program will also be implemented to maintain and improve upon the progress made during therapies.  Particularly aqua therapy  Medications:    Her oncology doctor let her take Advil as needed for pain.  She will continue to do this  Ice instead of heat    DME and weight bearing status:    Activities as tolerated.   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  We discussed effective ways to improve symptoms as well as what types of activities may make the symptoms/prognosis worse. We discussed signs and symptoms and other reasons to return to clinic sooner.    Return to clinic:    After injections as is improved by oncologist elle Browne  Imaging needed at next follow-up: None          This note was generated with the assistance of ambient listening technology thus some errors may exist.  Please contact the author of this note for any clarification.           Physician Signature: Oriana MAGANA  BRANDON Cox       Official Website  Schedule An Appointment                [1]   Current Outpatient Medications:     albuterol (VENTOLIN HFA) 90 mcg/actuation inhaler, Inhale 1-2 puffs into the lungs every 4 to 6 hours as needed for Wheezing. Rescue, Disp: 18 g, Rfl: 11    amLODIPine (NORVASC) 10 MG tablet, Take 1 tablet (10 mg total) by mouth once daily., Disp: 90 tablet, Rfl: 3    atenoloL (TENORMIN) 50 MG tablet, Take 1 tab by mouth twice a day, Disp: 180 tablet, Rfl: 3    azithromycin (Z-GLADYS) 250 MG tablet, Take 1 tablet (250 mg total) by mouth once daily. Take 2 tablets by mouth on day 1, then one tablet daily on days 2-5., Disp: 6 tablet, Rfl: 0    blood sugar diagnostic (CONTOUR NEXT TEST STRIPS) Strp, 1 each by Misc.(Non-Drug; Combo Route) route 2 (two) times a day., Disp: 100 each, Rfl: 5    blood-glucose meter kit, To check BG 2 times daily, to use with insurance preferred meter, Disp: 1 each, Rfl: 0    clindamycin (CLEOCIN T) 1 % Swab, APPLY TO AFFECTED AREA(S) TWO TIMES A DAY AS DIRECTED, Disp: 60 each, Rfl: 1    desonide (DESOWEN) 0.05 % cream, APPLY TO AFFECTED AREA(S) TWO TIMES A DAY AS DIRECTED, Disp: 60 g, Rfl: 3    doxycycline (VIBRA-TABS) 100 MG tablet, Take 1 tablet (100 mg total) by mouth once daily., Disp: 30 tablet, Rfl: 1    folic acid (FOLVITE) 1 MG tablet, Take 1 tablet (1 mg total) by mouth once daily., Disp: 30 tablet, Rfl: 11    furosemide (LASIX) 20 MG tablet, Take 1 tablet (20 mg total) by mouth daily as needed (swelling)., Disp: 30 tablet, Rfl: 5    glipizide-metformin (METAGLIP) 5-500 mg per tablet, Take 1 tablet by mouth 2 (two) times daily before meals., Disp: 180 tablet, Rfl: 3    hydrocodone-homatropine 5-1.5 mg/5 ml (HYCODAN) 5-1.5 mg/5 mL Syrp, Take 5 mLs by mouth every 4 to 6 hours as needed (cough)., Disp: 180 mL, Rfl: 0    ipratropium (ATROVENT) 21 mcg (0.03 %) nasal spray, 2 sprays by Each Nostril route 2 (two) times daily., Disp: 30 mL, Rfl: 1    lancets Misc, To check  BG 2 times daily, to use with insurance preferred meter, Disp: 100 each, Rfl: 5    losartan-hydrochlorothiazide 100-25 mg (HYZAAR) 100-25 mg per tablet, Take 1 tablet by mouth once daily., Disp: 90 tablet, Rfl: 3    magnesium oxide (MAGOX) 400 mg (241.3 mg magnesium) tablet, Take 1 tablet (400 mg total) by mouth once daily., Disp: 200 tablet, Rfl: 1    montelukast (SINGULAIR) 10 mg tablet, Take 1 tablet (10 mg total) by mouth every evening. allergies, Disp: 30 tablet, Rfl: 11    mupirocin (BACTROBAN) 2 % ointment, Apply topically once daily., Disp: 22 g, Rfl: 0    ondansetron (ZOFRAN-ODT) 8 MG TbDL, Take 1 tablet (8 mg total) by mouth every 8 (eight) hours as needed. Starting with cycle 1 of chemotherapy, Disp: 60 tablet, Rfl: 11    rosuvastatin (CRESTOR) 5 MG tablet, Take 1 tablet (5 mg total) by mouth once daily., Disp: 90 tablet, Rfl: 3

## 2025-04-04 ENCOUNTER — OFFICE VISIT (OUTPATIENT)
Dept: ORTHOPEDICS | Facility: CLINIC | Age: 55
End: 2025-04-04
Payer: COMMERCIAL

## 2025-04-04 ENCOUNTER — PATIENT MESSAGE (OUTPATIENT)
Dept: ORTHOPEDICS | Facility: CLINIC | Age: 55
End: 2025-04-04

## 2025-04-04 ENCOUNTER — PATIENT MESSAGE (OUTPATIENT)
Dept: HEMATOLOGY/ONCOLOGY | Facility: CLINIC | Age: 55
End: 2025-04-04
Payer: COMMERCIAL

## 2025-04-04 VITALS — WEIGHT: 293 LBS | BODY MASS INDEX: 57.9 KG/M2

## 2025-04-04 DIAGNOSIS — M17.12 ARTHRITIS OF LEFT KNEE: Primary | ICD-10-CM

## 2025-04-04 PROCEDURE — 99999 PR PBB SHADOW E&M-EST. PATIENT-LVL IV: CPT | Mod: PBBFAC,,, | Performed by: ORTHOPAEDIC SURGERY

## 2025-04-04 NOTE — PATIENT INSTRUCTIONS
Your feedback means a lot to Dr. Cox and her office staff! If you have a moment, please feel free to leave a review specifically about about your experience with Dr. Cox and her office staff!

## 2025-04-10 ENCOUNTER — LAB VISIT (OUTPATIENT)
Dept: LAB | Facility: HOSPITAL | Age: 55
End: 2025-04-10
Payer: COMMERCIAL

## 2025-04-10 DIAGNOSIS — C34.32 MALIGNANT NEOPLASM OF LOWER LOBE OF LEFT LUNG: ICD-10-CM

## 2025-04-10 DIAGNOSIS — T45.1X5A IMMUNODEFICIENCY DUE TO CHEMOTHERAPY: ICD-10-CM

## 2025-04-10 DIAGNOSIS — Z79.69 IMMUNODEFICIENCY DUE TO CHEMOTHERAPY: ICD-10-CM

## 2025-04-10 DIAGNOSIS — D84.821 IMMUNODEFICIENCY DUE TO CHEMOTHERAPY: ICD-10-CM

## 2025-04-10 DIAGNOSIS — C79.51 SECONDARY MALIGNANT NEOPLASM OF BONE: ICD-10-CM

## 2025-04-10 LAB
ABSOLUTE EOSINOPHIL (OHS): 0.27 K/UL
ABSOLUTE MONOCYTE (OHS): 0.61 K/UL (ref 0.3–1)
ABSOLUTE NEUTROPHIL COUNT (OHS): 5.57 K/UL (ref 1.8–7.7)
ALBUMIN SERPL BCP-MCNC: 2.5 G/DL (ref 3.5–5.2)
ALP SERPL-CCNC: 177 UNIT/L (ref 40–150)
ALT SERPL W/O P-5'-P-CCNC: 22 UNIT/L (ref 10–44)
ANION GAP (OHS): 8 MMOL/L (ref 8–16)
AST SERPL-CCNC: 12 UNIT/L (ref 11–45)
BASOPHILS # BLD AUTO: 0.05 K/UL
BASOPHILS NFR BLD AUTO: 0.6 %
BILIRUB SERPL-MCNC: 0.3 MG/DL (ref 0.1–1)
BUN SERPL-MCNC: 14 MG/DL (ref 6–20)
CALCIUM SERPL-MCNC: 8 MG/DL (ref 8.7–10.5)
CHLORIDE SERPL-SCNC: 106 MMOL/L (ref 95–110)
CO2 SERPL-SCNC: 28 MMOL/L (ref 23–29)
CREAT SERPL-MCNC: 0.8 MG/DL (ref 0.5–1.4)
ERYTHROCYTE [DISTWIDTH] IN BLOOD BY AUTOMATED COUNT: 13.2 % (ref 11.5–14.5)
GFR SERPLBLD CREATININE-BSD FMLA CKD-EPI: >60 ML/MIN/1.73/M2
GLUCOSE SERPL-MCNC: 192 MG/DL (ref 70–110)
HCT VFR BLD AUTO: 38.2 % (ref 37–48.5)
HGB BLD-MCNC: 12.6 GM/DL (ref 12–16)
IMM GRANULOCYTES # BLD AUTO: 0.02 K/UL (ref 0–0.04)
IMM GRANULOCYTES NFR BLD AUTO: 0.2 % (ref 0–0.5)
LYMPHOCYTES # BLD AUTO: 1.97 K/UL (ref 1–4.8)
MAGNESIUM SERPL-MCNC: 1.6 MG/DL (ref 1.6–2.6)
MCH RBC QN AUTO: 27.5 PG (ref 27–31)
MCHC RBC AUTO-ENTMCNC: 33 G/DL (ref 32–36)
MCV RBC AUTO: 83 FL (ref 82–98)
NUCLEATED RBC (/100WBC) (OHS): 0 /100 WBC
PHOSPHATE SERPL-MCNC: 3.2 MG/DL (ref 2.7–4.5)
PLATELET # BLD AUTO: 224 K/UL (ref 150–450)
PMV BLD AUTO: 10.7 FL (ref 9.2–12.9)
POTASSIUM SERPL-SCNC: 3.6 MMOL/L (ref 3.5–5.1)
PROT SERPL-MCNC: 5.8 GM/DL (ref 6–8.4)
RBC # BLD AUTO: 4.58 M/UL (ref 4–5.4)
RELATIVE EOSINOPHIL (OHS): 3.2 %
RELATIVE LYMPHOCYTE (OHS): 23.2 % (ref 18–48)
RELATIVE MONOCYTE (OHS): 7.2 % (ref 4–15)
RELATIVE NEUTROPHIL (OHS): 65.6 % (ref 38–73)
SODIUM SERPL-SCNC: 142 MMOL/L (ref 136–145)
WBC # BLD AUTO: 8.49 K/UL (ref 3.9–12.7)

## 2025-04-10 PROCEDURE — 83735 ASSAY OF MAGNESIUM: CPT

## 2025-04-10 PROCEDURE — 36415 COLL VENOUS BLD VENIPUNCTURE: CPT

## 2025-04-10 PROCEDURE — 85025 COMPLETE CBC W/AUTO DIFF WBC: CPT

## 2025-04-10 PROCEDURE — 80053 COMPREHEN METABOLIC PANEL: CPT

## 2025-04-10 PROCEDURE — 84100 ASSAY OF PHOSPHORUS: CPT

## 2025-04-11 ENCOUNTER — INFUSION (OUTPATIENT)
Dept: INFUSION THERAPY | Facility: HOSPITAL | Age: 55
End: 2025-04-11
Attending: RADIOLOGY
Payer: COMMERCIAL

## 2025-04-11 VITALS
DIASTOLIC BLOOD PRESSURE: 69 MMHG | HEIGHT: 64 IN | RESPIRATION RATE: 16 BRPM | BODY MASS INDEX: 50.02 KG/M2 | TEMPERATURE: 98 F | WEIGHT: 293 LBS | HEART RATE: 68 BPM | SYSTOLIC BLOOD PRESSURE: 118 MMHG | OXYGEN SATURATION: 96 %

## 2025-04-11 DIAGNOSIS — C34.32 MALIGNANT NEOPLASM OF LOWER LOBE OF LEFT LUNG: Primary | ICD-10-CM

## 2025-04-11 PROCEDURE — 63600175 PHARM REV CODE 636 W HCPCS: Performed by: INTERNAL MEDICINE

## 2025-04-11 PROCEDURE — 96367 TX/PROPH/DG ADDL SEQ IV INF: CPT

## 2025-04-11 PROCEDURE — 25000003 PHARM REV CODE 250: Performed by: INTERNAL MEDICINE

## 2025-04-11 PROCEDURE — 96375 TX/PRO/DX INJ NEW DRUG ADDON: CPT

## 2025-04-11 PROCEDURE — 96415 CHEMO IV INFUSION ADDL HR: CPT

## 2025-04-11 PROCEDURE — 96413 CHEMO IV INFUSION 1 HR: CPT

## 2025-04-11 RX ORDER — HEPARIN 100 UNIT/ML
500 SYRINGE INTRAVENOUS
Status: DISCONTINUED | OUTPATIENT
Start: 2025-04-11 | End: 2025-04-11 | Stop reason: HOSPADM

## 2025-04-11 RX ORDER — ACETAMINOPHEN 325 MG/1
650 TABLET ORAL
Status: COMPLETED | OUTPATIENT
Start: 2025-04-11 | End: 2025-04-11

## 2025-04-11 RX ORDER — EPINEPHRINE 0.3 MG/.3ML
0.3 INJECTION SUBCUTANEOUS ONCE AS NEEDED
Status: DISCONTINUED | OUTPATIENT
Start: 2025-04-11 | End: 2025-04-11 | Stop reason: HOSPADM

## 2025-04-11 RX ORDER — DIPHENHYDRAMINE HYDROCHLORIDE 50 MG/ML
25 INJECTION, SOLUTION INTRAMUSCULAR; INTRAVENOUS
Status: COMPLETED | OUTPATIENT
Start: 2025-04-11 | End: 2025-04-11

## 2025-04-11 RX ORDER — DIPHENHYDRAMINE HYDROCHLORIDE 50 MG/ML
50 INJECTION, SOLUTION INTRAMUSCULAR; INTRAVENOUS ONCE AS NEEDED
Status: DISCONTINUED | OUTPATIENT
Start: 2025-04-11 | End: 2025-04-11 | Stop reason: HOSPADM

## 2025-04-11 RX ADMIN — DIPHENHYDRAMINE HYDROCHLORIDE 25 MG: 50 INJECTION, SOLUTION INTRAMUSCULAR; INTRAVENOUS at 10:04

## 2025-04-11 RX ADMIN — SODIUM CHLORIDE 0.25 MG: 9 INJECTION, SOLUTION INTRAVENOUS at 10:04

## 2025-04-11 RX ADMIN — HEPARIN SODIUM (PORCINE) LOCK FLUSH IV SOLN 100 UNIT/ML 500 UNITS: 100 SOLUTION at 12:04

## 2025-04-11 RX ADMIN — ACETAMINOPHEN 650 MG: 325 TABLET ORAL at 10:04

## 2025-04-11 RX ADMIN — SODIUM CHLORIDE 1400 MG: 9 INJECTION, SOLUTION INTRAVENOUS at 10:04

## 2025-04-11 NOTE — PLAN OF CARE
Problem: Adult Inpatient Plan of Care  Goal: Plan of Care Review  Outcome: Progressing  Flowsheets (Taken 4/11/2025 1036)  Plan of Care Reviewed With:   patient   spouse  Goal: Optimal Comfort and Wellbeing  Outcome: Progressing  Intervention: Provide Person-Centered Care  Flowsheets (Taken 4/11/2025 1036)  Trust Relationship/Rapport:   care explained   choices provided   emotional support provided   empathic listening provided   questions encouraged   reassurance provided   questions answered   thoughts/feelings acknowledged     Problem: Chemotherapy Effects  Goal: Absence of Infection  Outcome: Progressing  Intervention: Prevent Infection and Maximize Resistance  Flowsheets (Taken 4/11/2025 1036)  Infection Prevention:   equipment surfaces disinfected   hand hygiene promoted   personal protective equipment utilized   rest/sleep promoted     
98

## 2025-04-16 NOTE — PROGRESS NOTES
"            [id number] [facility] [place] [address]   Phone [phone]  Fax [phone]           CHIEF COMPLAINT:   Chief Complaint   Patient presents with    Left Knee - Pain     Pt to clinic for Left knee follow-up  Pain:1/10      HISTORY OF PRESENT ILLNESS (BN) (04/17/2025):  Shaneka presents to clinic to discuss a left knee steroid injection.  She received approval from her oncologist Dr. Wilhelm to receive a corticosteroid injection in the left knee. She reports intermittent left knee pain. Her symptoms are exacerbated by activity. She reports 0/10 pain today. She has been taking Tylenol and Advil as needed for pain.     HISTORY OF PRESENT ILLNESS (JN) (04/04/2025):  HPI:  Shaneka presents to review her left knee MRI and with with knee pain in two specific areas, one of which becomes swollen and tender. Pain was particularly severe on Saturday after cooking while wearing only socks, causing significant discomfort. She experiences pain while walking and moving around in the kitchen. Her knee requires time to "warm up" after sitting, and she feels popping sensations. Her oncology practitioner advised she can take Advil for pain, but not frequently.    She has a history of lung cancer and receives immunotherapy infusions every two weeks. 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           Disclaimer: The history above was obtained through ambient listening technology thus may contain errors.      HISTORY OF PRESENT ILLNESS (BN) (03/17/2025):    Shaneka presents with left knee pain ongoing for 2-3 months.  She reports intermittent left knee pain with no history of trauma or injury to the knee.  She reports that she has knee pain which occurs and resolves spontaneously.  Two days ago on Saturday she developed knee pain while lying in bed which woke her from sleep, the pain has subsequently improved.  She reports 2/10 pain today.    She reports " "occasional feelings of her knee getting "stuck in a position."     Shaneka is currently undergoing chemotherapy for stage 4 lung cancer with metastasis to bone. PET scan: 2022, showed a lesion on the left femur.         Disclaimer: The history above was obtained through ambient listening technology thus may contain errors.     PAST MEDICAL HISTORY:    Past Medical History:   Diagnosis Date    Diabetes mellitus     Hypertension     Malignant neoplasm of lower lobe of left lung 12/9/2022    Rash, drug 7/6/2023    OP NSCLC AMIVANTAMAB-VMJW ([facility]) Q4W      Secondary malignant neoplasm of bone 12/5/2022       Hemoglobin A1C   Date Value Ref Range Status   11/22/2024 6.4 (H) 4.0 - 5.6 % Final     Comment:     ADA Screening Guidelines:  5.7-6.4%  Consistent with prediabetes  >or=6.5%  Consistent with diabetes    High levels of fetal hemoglobin interfere with the HbA1C  assay. Heterozygous hemoglobin variants (HbS, HgC, etc)do  not significantly interfere with this assay.   However, presence of multiple variants may affect accuracy.     08/06/2024 6.7 (H) 4.0 - 5.6 % Final     Comment:     ADA Screening Guidelines:  5.7-6.4%  Consistent with prediabetes  >or=6.5%  Consistent with diabetes    High levels of fetal hemoglobin interfere with the HbA1C  assay. Heterozygous hemoglobin variants (HbS, HgC, etc)do  not significantly interfere with this assay.   However, presence of multiple variants may affect accuracy.     04/18/2024 8.2 (H) 4.0 - 5.6 % Final     Comment:     ADA Screening Guidelines:  5.7-6.4%  Consistent with prediabetes  >or=6.5%  Consistent with diabetes    High levels of fetal hemoglobin interfere with the HbA1C  assay. Heterozygous hemoglobin variants (HbS, HgC, etc)do  not significantly interfere with this assay.   However, presence of multiple variants may affect accuracy.          PAST SURGICAL HISTORY:    Past Surgical History:   Procedure Laterality Date    CATARACT EXTRACTION W/  INTRAOCULAR LENS " IMPLANT Right 06/02/2022    EXTRACTION OF TOOTH      FLUOROSCOPY N/A 01/26/2023    Procedure: FLUOROSCOPY/mediport placement;  Surgeon: Marlon Leone MD;  Location: [facility] CATH LAB;  Service: General;  Laterality: N/A;    TUBAL LIGATION      2007--postpartum tubal ligation        MEDICATIONS:  Current Medications[1]     There are no discontinued medications.      ALLERGIES:     Review of patient's allergies indicates:   Allergen Reactions    Lisinopril Other (See Comments)     coughing    Amoxicillin             FAMILY HISTORY:   Family History   Problem Relation Name Age of Onset    Heart failure Father             SOCIAL HISTORY:    Social History     Socioeconomic History    Marital status:    Tobacco Use    Smoking status: Never     Passive exposure: Never    Smokeless tobacco: Never   Substance and Sexual Activity    Alcohol use: Never    Drug use: Never    Sexual activity: Yes     Partners: Male     Birth control/protection: None     Comment: tubal     Social Drivers of Health     Financial Resource Strain: Low Risk  (12/9/2024)    Overall Financial Resource Strain (CARDIA)     Difficulty of Paying Living Expenses: Not hard at all   Food Insecurity: No Food Insecurity (12/9/2024)    Hunger Vital Sign     Worried About Running Out of Food in the Last Year: Never true     Ran Out of Food in the Last Year: Never true   Transportation Needs: No Transportation Needs (6/26/2024)    Received from [facility] - Transportation     Lack of Transportation (Medical): No     Lack of Transportation (Non-Medical): No   Physical Activity: Insufficiently Active (12/9/2024)    Exercise Vital Sign     Days of Exercise per Week: 2 days     Minutes of Exercise per Session: 30 min   Stress: No Stress Concern Present (12/9/2024)    [employer] - Occupational Stress Questionnaire     Feeling of Stress : Not at all   Housing Stability: Unknown (12/9/2024)     "Housing Stability Vital Sign     Unable to Pay for Housing in the Last Year: No         PHYSICAL EXAMINATION:     Estimated body mass index is 57.85 kg/m² as calculated from the following:    Height as of this encounter: 5' 4" (1.626 m).    Weight as of this encounter: 152.9 kg (337 lb).   ASSISTIVE DEVICE: None    MUSCULOSKELETAL:    Knee Exam (Left extremity):   Inspection:    Skin:    Discoloration   (-)   Gross deformity   (-)    Open wounds   (-)   Surgical scars   (-)      Soft tissue:     Swelling/Knee Effusion  (+) mild although limited by body habitus   Muscle atrophy   (-)   Palpation tenderness:    Bony:     Tibial tubercle   (-)   Patella    (-)   Tibial plateau   (-)   Femoral condyle  (-)   Soft tissue:    Pes anserine bursae  (-)   Patellar tendon   (+) mild   Quadriceps tendon  (-)   MCL    (-)   LCL    (-)  ITB    (-)   ROM:   Affected extremity:        Extension:   0°            Flexion    120°            Pain    (+)           Crepitance   (+)           Sensory:     Normal sensation to light touch in Sa/Irene/DP/SP/T nerve distributions      Motor:                  Fires EHL/FHL/Tibialis anterior/Gastrocsoleus   Vascular:                 Foot WWP with brisk capillary refill      DP/PT pulses palpable   No calf pain           IMAGING:      MRI Knee Without Contrast Left  Narrative: EXAMINATION:  MRI KNEE WITHOUT CONTRAST LEFT    CLINICAL HISTORY:  Knee pain, chronic, negative xray (Age >= 5y);Pain in left knee    TECHNIQUE:  Multiplanar, multisequence images were performed about the knee.    COMPARISON:  Recent radiograph    FINDINGS:  Menisci:    --Medial: Degenerative free edge thinning/blunting with mild body segment degenerative extrusion.  No discrete tear.    --Lateral: Intact    Ligaments:  ACL, PCL, MCL, and LCL complex are intact.    Tendons:  Extensor mechanism intact.    Cartilage:    Patellofemoral: Low-grade cartilage fissuring and mild heterogenicity most prevalent at the lateral patellar " facet.  Small focus of subcortical cystic change involving the lateral facet/lateral aspect of the median eminence.    Medial tibiofemoral: Articular cartilage is maintained.    Lateral tibiofemoral: Articular cartilage is maintained.    Bone: No fracture or marrow replacing process.    Miscellaneous: No significant effusion  Impression: No internal derangement with mild patellofemoral and medial compartment degenerative changes as above.    Electronically signed by: Rahul Samaniego MD  Date:    04/02/2025  Time:    16:00           ASSESSMENT: 55 y.o. female  with:   Left knee mild arthritis with MRI on 04/02/2025 demonstrating no internal derangement with mild patellofemoral and medial compartment degenerative changes. Status post corticosteroid injection 4/17/2025.   Lung cancer, stage IV with metastasis to bone. receives immunotherapy infusions every two weeks.    PLAN:  Data:   I reviewed available old/outside records.   PT/OT:   Physical therapy was ordered at her last visit, however she is not currently enrolled in physical therapy.   Medications:    She is taking OTC pain medication PRN  DME and weight bearing status:    Activities as tolerated.   Body habitus does not allow for bracing  Advanced Imaging:   MRI of the left knee shows no metastatic lesions to the knee nor any meniscal pathology  Injection:  Corticosteroid injection administered to the left knee today. Patient tolerated the procedure well. 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   Medication side effects: Possible side effects of medications prescribed/continued today discussed. If the patient experiences any unwanted side effects of medications prescribed, patient knows to notify provider immediately.     I had a long discussion with the patient  about the causes, treatments, and prognosis/natural history for mild knee arthritis.  We discussed effective ways to improve symptoms as well as what types of activities may make the symptoms/prognosis worse. We discussed signs and symptoms and other reasons to return to clinic sooner.    Return to clinic:    As needed.  Imaging needed at next follow-up: None                    This is a patient with a chronic orthopedic diagnosis whose overall, ongoing care is being managed and monitored by me and our Orthopedic clinic.  As such, since 2024,  is the appropriate add-on code to accompany the other E/M billing for this visit.           [1]   Current Outpatient Medications:     albuterol (VENTOLIN HFA) 90 mcg/actuation inhaler, Inhale 1-2 puffs into the lungs every 4 to 6 hours as needed for Wheezing. Rescue, Disp: 18 g, Rfl: 11    amLODIPine (NORVASC) 10 MG tablet, Take 1 tablet (10 mg total) by mouth once daily., Disp: 90 tablet, Rfl: 3    atenoloL (TENORMIN) 50 MG tablet, Take 1 tab by mouth twice a day, Disp: 180 tablet, Rfl: 3    blood sugar diagnostic (CONTOUR NEXT TEST STRIPS) Strp, 1 each by Misc.(Non-Drug; Combo Route) route 2 (two) times a day., Disp: 100 each, Rfl: 5    blood-glucose meter kit, To check BG 2 times daily, to use with insurance preferred meter, Disp: 1 each, Rfl: 0    clindamycin (CLEOCIN T) 1 % Swab, APPLY TO AFFECTED AREA(S) TWO TIMES A DAY AS DIRECTED, Disp: 60 each, Rfl: 1    desonide (DESOWEN) 0.05 % cream, APPLY TO AFFECTED AREA(S) TWO TIMES A DAY AS DIRECTED, Disp: 60 g, Rfl: 3    folic acid (FOLVITE) 1 MG tablet, Take 1 tablet (1 mg total) by mouth once daily., Disp: 30 tablet, Rfl: 11    furosemide (LASIX) 20 MG tablet, Take 1 tablet (20 mg total) by mouth daily as needed (swelling)., Disp: 30 tablet, Rfl: 5    glipizide-metformin (METAGLIP) 5-500 mg per tablet, Take 1 tablet by mouth 2 (two) times daily before meals., Disp: 180 tablet, Rfl: 3    ipratropium (ATROVENT) 21 mcg  (0.03 %) nasal spray, 2 sprays by Each Nostril route 2 (two) times daily., Disp: 30 mL, Rfl: 1    lancets Misc, To check BG 2 times daily, to use with insurance preferred meter, Disp: 100 each, Rfl: 5    losartan-hydrochlorothiazide 100-25 mg (HYZAAR) 100-25 mg per tablet, Take 1 tablet by mouth once daily., Disp: 90 tablet, Rfl: 3    magnesium oxide (MAGOX) 400 mg (241.3 mg magnesium) tablet, Take 1 tablet (400 mg total) by mouth once daily., Disp: 200 tablet, Rfl: 1    montelukast (SINGULAIR) 10 mg tablet, Take 1 tablet (10 mg total) by mouth every evening. allergies, Disp: 30 tablet, Rfl: 11    mupirocin (BACTROBAN) 2 % ointment, Apply topically once daily., Disp: 22 g, Rfl: 0    ondansetron (ZOFRAN-ODT) 8 MG TbDL, Take 1 tablet (8 mg total) by mouth every 8 (eight) hours as needed. Starting with cycle 1 of chemotherapy, Disp: 60 tablet, Rfl: 11    rosuvastatin (CRESTOR) 5 MG tablet, Take 1 tablet (5 mg total) by mouth once daily., Disp: 90 tablet, Rfl: 3    azithromycin (Z-GLADYS) 250 MG tablet, Take 1 tablet (250 mg total) by mouth once daily. Take 2 tablets by mouth on day 1, then one tablet daily on days 2-5. (Patient not taking: Reported on 4/17/2025), Disp: 6 tablet, Rfl: 0    doxycycline (VIBRA-TABS) 100 MG tablet, Take 1 tablet (100 mg total) by mouth once daily., Disp: 30 tablet, Rfl: 1    hydrocodone-homatropine 5-1.5 mg/5 ml (HYCODAN) 5-1.5 mg/5 mL Syrp, Take 5 mLs by mouth every 4 to 6 hours as needed (cough). (Patient not taking: Reported on 4/17/2025), Disp: 180 mL, Rfl: 0

## 2025-04-17 ENCOUNTER — PROCEDURE VISIT (OUTPATIENT)
Dept: ORTHOPEDICS | Facility: CLINIC | Age: 55
End: 2025-04-17
Payer: COMMERCIAL

## 2025-04-17 VITALS — BODY MASS INDEX: 50.02 KG/M2 | WEIGHT: 293 LBS | HEIGHT: 64 IN

## 2025-04-17 DIAGNOSIS — M17.12 ARTHRITIS OF LEFT KNEE: Primary | ICD-10-CM

## 2025-04-17 RX ORDER — BETAMETHASONE SODIUM PHOSPHATE AND BETAMETHASONE ACETATE 3; 3 MG/ML; MG/ML
6 INJECTION, SUSPENSION INTRA-ARTICULAR; INTRALESIONAL; INTRAMUSCULAR; SOFT TISSUE
Status: DISCONTINUED | OUTPATIENT
Start: 2025-04-17 | End: 2025-04-17 | Stop reason: HOSPADM

## 2025-04-17 RX ADMIN — BETAMETHASONE SODIUM PHOSPHATE AND BETAMETHASONE ACETATE 6 MG: 3; 3 INJECTION, SUSPENSION INTRA-ARTICULAR; INTRALESIONAL; INTRAMUSCULAR; SOFT TISSUE at 11:04

## 2025-04-17 NOTE — PROCEDURES
Large Joint Aspiration/Injection: L knee    Date/Time: 4/17/2025 11:45 AM    Performed by: Hollie Tolbert PA-C  Authorized by: Hollie Tolbert PA-C    Consent Done?:  Yes (Verbal)  Indications:  Arthritis  Site marked: the procedure site was marked    Timeout: prior to procedure the correct patient, procedure, and site was verified    Local anesthesia used?: No      Details:  Needle Size:  22 G  Ultrasonic Guidance for needle placement?: No    Approach:  Anterolateral  Location:  Knee  Site:  L knee  Medications:  6 mg betamethasone acetate-betamethasone sodium phosphate 6 mg/mL  Patient tolerance:  Patient tolerated the procedure well with no immediate complications

## 2025-04-22 ENCOUNTER — HOSPITAL ENCOUNTER (OUTPATIENT)
Dept: RADIOLOGY | Facility: HOSPITAL | Age: 55
Discharge: HOME | End: 2025-04-22
Attending: INTERNAL MEDICINE
Payer: COMMERCIAL

## 2025-04-22 ENCOUNTER — TELEPHONE (OUTPATIENT)
Dept: HEMATOLOGY/ONCOLOGY | Facility: CLINIC | Age: 55
End: 2025-04-22
Payer: COMMERCIAL

## 2025-04-22 DIAGNOSIS — I26.99 PULMONARY EMBOLISM, UNSPECIFIED CHRONICITY, UNSPECIFIED PULMONARY EMBOLISM TYPE, UNSPECIFIED WHETHER ACUTE COR PULMONALE PRESENT: Primary | ICD-10-CM

## 2025-04-22 DIAGNOSIS — C34.32 MALIGNANT NEOPLASM OF LOWER LOBE OF LEFT LUNG: ICD-10-CM

## 2025-04-22 PROCEDURE — 71260 CT THORAX DX C+: CPT | Mod: 26,,, | Performed by: STUDENT IN AN ORGANIZED HEALTH CARE EDUCATION/TRAINING PROGRAM

## 2025-04-22 PROCEDURE — 74177 CT ABD & PELVIS W/CONTRAST: CPT | Mod: 26,,, | Performed by: STUDENT IN AN ORGANIZED HEALTH CARE EDUCATION/TRAINING PROGRAM

## 2025-04-22 PROCEDURE — A9698 NON-RAD CONTRAST MATERIALNOC: HCPCS | Performed by: INTERNAL MEDICINE

## 2025-04-22 PROCEDURE — 25500020 PHARM REV CODE 255: Performed by: INTERNAL MEDICINE

## 2025-04-22 PROCEDURE — 74177 CT ABD & PELVIS W/CONTRAST: CPT | Mod: TC

## 2025-04-22 RX ORDER — APIXABAN 5 MG (74)
KIT ORAL
Qty: 74 TABLET | Refills: 0 | Status: SHIPPED | OUTPATIENT
Start: 2025-04-22

## 2025-04-22 RX ADMIN — IOHEXOL 100 ML: 350 INJECTION, SOLUTION INTRAVENOUS at 10:04

## 2025-04-22 RX ADMIN — IOHEXOL 1000 ML: 12 SOLUTION ORAL at 10:04

## 2025-04-22 NOTE — TELEPHONE ENCOUNTER
I called Mrs. Ahmadi and conveyed that her CT CAP 04/22/2025 showed right segmental/subsegmental PE.  I have sent Eliquis to her pharmacy on file and confirmed the pharmacy with her.  She states that she will  the medication to day and start today.  We reviewed how to take Eliquis (10 mg BID x 7 days and there after 5 mg BID).  She has no symptoms and no acute complaints.    Maria Elena Wilhelm MD

## 2025-04-24 ENCOUNTER — LAB VISIT (OUTPATIENT)
Dept: LAB | Facility: HOSPITAL | Age: 55
End: 2025-04-24
Payer: COMMERCIAL

## 2025-04-24 ENCOUNTER — OFFICE VISIT (OUTPATIENT)
Dept: HEMATOLOGY/ONCOLOGY | Facility: CLINIC | Age: 55
End: 2025-04-24
Payer: COMMERCIAL

## 2025-04-24 VITALS
WEIGHT: 293 LBS | OXYGEN SATURATION: 97 % | HEIGHT: 64 IN | BODY MASS INDEX: 50.02 KG/M2 | DIASTOLIC BLOOD PRESSURE: 71 MMHG | TEMPERATURE: 98 F | HEART RATE: 67 BPM | SYSTOLIC BLOOD PRESSURE: 129 MMHG

## 2025-04-24 DIAGNOSIS — Z79.69 IMMUNODEFICIENCY DUE TO CHEMOTHERAPY: ICD-10-CM

## 2025-04-24 DIAGNOSIS — C34.32 MALIGNANT NEOPLASM OF LOWER LOBE OF LEFT LUNG: ICD-10-CM

## 2025-04-24 DIAGNOSIS — I26.99 OTHER ACUTE PULMONARY EMBOLISM WITHOUT ACUTE COR PULMONALE: ICD-10-CM

## 2025-04-24 DIAGNOSIS — T45.1X5A IMMUNODEFICIENCY DUE TO CHEMOTHERAPY: ICD-10-CM

## 2025-04-24 DIAGNOSIS — D84.821 IMMUNODEFICIENCY DUE TO CHEMOTHERAPY: ICD-10-CM

## 2025-04-24 DIAGNOSIS — C79.51 SECONDARY MALIGNANT NEOPLASM OF BONE: Primary | ICD-10-CM

## 2025-04-24 DIAGNOSIS — C79.51 SECONDARY MALIGNANT NEOPLASM OF BONE: ICD-10-CM

## 2025-04-24 LAB
ABSOLUTE EOSINOPHIL (OHS): 0.3 K/UL
ABSOLUTE MONOCYTE (OHS): 0.84 K/UL (ref 0.3–1)
ABSOLUTE NEUTROPHIL COUNT (OHS): 7.58 K/UL (ref 1.8–7.7)
ALBUMIN SERPL BCP-MCNC: 2.6 G/DL (ref 3.5–5.2)
ALP SERPL-CCNC: 180 UNIT/L (ref 40–150)
ALT SERPL W/O P-5'-P-CCNC: 23 UNIT/L (ref 10–44)
ANION GAP (OHS): 9 MMOL/L (ref 8–16)
AST SERPL-CCNC: 14 UNIT/L (ref 11–45)
BASOPHILS # BLD AUTO: 0.05 K/UL
BASOPHILS NFR BLD AUTO: 0.4 %
BILIRUB SERPL-MCNC: 0.5 MG/DL (ref 0.1–1)
BUN SERPL-MCNC: 15 MG/DL (ref 6–20)
CALCIUM SERPL-MCNC: 8.4 MG/DL (ref 8.7–10.5)
CHLORIDE SERPL-SCNC: 105 MMOL/L (ref 95–110)
CO2 SERPL-SCNC: 28 MMOL/L (ref 23–29)
CREAT SERPL-MCNC: 0.9 MG/DL (ref 0.5–1.4)
ERYTHROCYTE [DISTWIDTH] IN BLOOD BY AUTOMATED COUNT: 13.6 % (ref 11.5–14.5)
GFR SERPLBLD CREATININE-BSD FMLA CKD-EPI: >60 ML/MIN/1.73/M2
GLUCOSE SERPL-MCNC: 187 MG/DL (ref 70–110)
HCT VFR BLD AUTO: 39 % (ref 37–48.5)
HGB BLD-MCNC: 12.6 GM/DL (ref 12–16)
IMM GRANULOCYTES # BLD AUTO: 0.04 K/UL (ref 0–0.04)
IMM GRANULOCYTES NFR BLD AUTO: 0.3 % (ref 0–0.5)
LYMPHOCYTES # BLD AUTO: 2.99 K/UL (ref 1–4.8)
MAGNESIUM SERPL-MCNC: 1.7 MG/DL (ref 1.6–2.6)
MCH RBC QN AUTO: 27.6 PG (ref 27–31)
MCHC RBC AUTO-ENTMCNC: 32.3 G/DL (ref 32–36)
MCV RBC AUTO: 86 FL (ref 82–98)
NUCLEATED RBC (/100WBC) (OHS): 0 /100 WBC
PHOSPHATE SERPL-MCNC: 3.7 MG/DL (ref 2.7–4.5)
PLATELET # BLD AUTO: 195 K/UL (ref 150–450)
PMV BLD AUTO: 11.5 FL (ref 9.2–12.9)
POTASSIUM SERPL-SCNC: 4.1 MMOL/L (ref 3.5–5.1)
PROT SERPL-MCNC: 5.9 GM/DL (ref 6–8.4)
RBC # BLD AUTO: 4.56 M/UL (ref 4–5.4)
RELATIVE EOSINOPHIL (OHS): 2.5 %
RELATIVE LYMPHOCYTE (OHS): 25.3 % (ref 18–48)
RELATIVE MONOCYTE (OHS): 7.1 % (ref 4–15)
RELATIVE NEUTROPHIL (OHS): 64.4 % (ref 38–73)
SODIUM SERPL-SCNC: 142 MMOL/L (ref 136–145)
WBC # BLD AUTO: 11.8 K/UL (ref 3.9–12.7)

## 2025-04-24 PROCEDURE — 85025 COMPLETE CBC W/AUTO DIFF WBC: CPT

## 2025-04-24 PROCEDURE — 80053 COMPREHEN METABOLIC PANEL: CPT

## 2025-04-24 PROCEDURE — 36415 COLL VENOUS BLD VENIPUNCTURE: CPT

## 2025-04-24 PROCEDURE — 99999 PR PBB SHADOW E&M-EST. PATIENT-LVL V: CPT | Mod: PBBFAC,,, | Performed by: INTERNAL MEDICINE

## 2025-04-24 PROCEDURE — 84100 ASSAY OF PHOSPHORUS: CPT

## 2025-04-24 PROCEDURE — 83735 ASSAY OF MAGNESIUM: CPT

## 2025-04-24 RX ORDER — SODIUM CHLORIDE 0.9 % (FLUSH) 0.9 %
10 SYRINGE (ML) INJECTION
Status: CANCELLED | OUTPATIENT
Start: 2025-04-25

## 2025-04-24 RX ORDER — EPINEPHRINE 0.3 MG/.3ML
0.3 INJECTION SUBCUTANEOUS ONCE AS NEEDED
Status: CANCELLED | OUTPATIENT
Start: 2025-04-25

## 2025-04-24 RX ORDER — DIPHENHYDRAMINE HYDROCHLORIDE 50 MG/ML
25 INJECTION, SOLUTION INTRAMUSCULAR; INTRAVENOUS
Status: CANCELLED
Start: 2025-04-25

## 2025-04-24 RX ORDER — DIPHENHYDRAMINE HYDROCHLORIDE 50 MG/ML
50 INJECTION, SOLUTION INTRAMUSCULAR; INTRAVENOUS ONCE AS NEEDED
Status: CANCELLED | OUTPATIENT
Start: 2025-04-25

## 2025-04-24 RX ORDER — HEPARIN 100 UNIT/ML
500 SYRINGE INTRAVENOUS
Status: CANCELLED | OUTPATIENT
Start: 2025-04-25

## 2025-04-24 RX ORDER — ACETAMINOPHEN 325 MG/1
650 TABLET ORAL
Status: CANCELLED
Start: 2025-04-25

## 2025-04-24 NOTE — PROGRESS NOTES
Patient ID: Shaneka Ahmadi   Reason for Visit: Follow-up  MRN:  [id number]     Oncologic Diagnosis:  Stage IV (cT2, cN2, cM1) NSCLC, metastatic to bone  Previous Treatment:    Carbo/Alimta started in 1/2023; stopped due to progression after 4 cycles     Current Treatment:   OP NSCLC AMIVANTAMAB-VMJW ([facility]) Q2W   OP ZOLEDRONIC ACID (ZOMETA) Q4W - not yet started  THERAPY SHELL - B12     Subjective   Shaneka Ahmadi is a 55 y.o. female who presents to clinic for follow-up.    I reviewed her imaging with her in detail.  We reviewed the preliminary report on the phone regarding the PE and she started Eliquis that day.      She has had some achy feeling in her right side likely related to the bone rib lesion and I offered to discuss her case with rad/onc however she does not feel her symptoms are significant enough for any local therapy.  She needs to start bisphosphonate and reports that she is going to have her dental work done next week.  I recommend that I present her case to the tumor board for recommendations regarding treatment of the large rib lesion as well as the soft tissue lesion.  She is in agreement.    Otherwise, she states that she feels well and has no acute complaints.       Review of Systems   Constitutional:  Negative for activity change, appetite change, chills, diaphoresis, fatigue, fever and unexpected weight change.   HENT:  Negative for nosebleeds.    Respiratory:  Negative for shortness of breath.    Cardiovascular:  Negative for chest pain.   Gastrointestinal:  Negative for abdominal distention, abdominal pain, anal bleeding, blood in stool, constipation, diarrhea, nausea and vomiting.   Genitourinary:  Negative for difficulty urinating and hematuria.   Musculoskeletal:  Negative for arthralgias, back pain and myalgias.   Skin:  Negative for rash.   Neurological:  Negative for dizziness, weakness, light-headedness and headaches.   Hematological:  Does not bruise/bleed easily.    Psychiatric/Behavioral:  The patient is not nervous/anxious.      History     Oncology History   Malignant neoplasm of lower lobe of left lung   12/9/2022 Initial Diagnosis    Malignant neoplasm of lower lobe of left lung     12/9/2022 Cancer Staged    Staging form: Lung, AJCC 8th Edition  - Clinical stage from 12/9/2022: Stage IV (cT2, cN2, cM1)     12/27/2022 - 12/27/2022 Chemotherapy    Treatment Summary   Plan Name: OP PEMBROLIZUMAB 400MG Q6W  Treatment Goal: Control  Status: Inactive  Start Date:   End Date:   Provider: Reese Mcfarlane MD  Chemotherapy: [No matching medication found in this treatment plan]      Genetic Testing    Patient has genetic testing done for  TEmpus peripheral blood.                                              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     1/18/2023 - 1/18/2023 Chemotherapy    Treatment Summary   Plan Name: OP NSCLC AMIVANTAMAB-VMJW ([facility]) Q4W  Treatment Goal: Palliative  Status: Inactive  Start Date:   End Date:   Provider: Reese Mcfarlane MD  Chemotherapy: amivantamab-vmjw ([facility]) 350 mg in sodium chloride 0.9% SolP 250 mL chemo infusion, 350 mg (100 % of original dose 350 mg), Intravenous, Clinic/HOD 1 time, 0 of 13 cycles  Dose modification: 350 mg (original dose 350 mg, Cycle 1), 1,050 mg (original dose 1,050 mg, Cycle 1), 1,400 mg (original dose 1,400 mg, Cycle 1)     1/27/2023 - 3/31/2023 Chemotherapy    Treatment Summary   Plan Name: OP NSCLC PEMETREXED + CARBOPLATIN (AUC) Q3W  Treatment Goal: Control  Status: Inactive  Start Date: 1/27/2023  End Date: 3/31/2023  Provider: Reese Mcfarlane MD  Chemotherapy: CARBOplatin (PARAPLATIN) 750 mg in sodium chloride 0.9% 335 mL chemo infusion, 750 mg (100 % of original dose 750 mg), Intravenous, Clinic/HOD 1 time, 4 of 4 cycles  Dose modification:   (original dose 750 mg, Cycle 1, Reason: MD Discretion)  Administration: 750 mg (1/27/2023), 750 mg  (2/17/2023), 750 mg (3/31/2023), 750 mg (3/10/2023)  PEMEtrexed disodium (ALIMTA) 1,200 mg in sodium chloride 0.9% SolP 100 mL chemo infusion, 1,250 mg, Intravenous, Clinic/HOD 1 time, 4 of 4 cycles  Administration: 1,200 mg (1/27/2023), 1,200 mg (2/17/2023), 1,200 mg (3/31/2023), 1,200 mg (3/10/2023)     4/27/2023 -  Chemotherapy    Treatment Summary   Plan Name: OP NSCLC AMIVANTAMAB-VMJW ([facility]) Q4W  Treatment Goal: Palliative  Status: Active  Start Date: 4/27/2023  End Date: 7/18/2025 (Planned)  Provider: Reese Mcfarlane MD  Chemotherapy: amivantamab-vmjw ([facility]) 350 mg in sodium chloride 0.9% SolP 250 mL chemo infusion, 350 mg (100 % of original dose 350 mg), Intravenous, Clinic/HOD 1 time, 26 of 29 cycles  Dose modification: 350 mg (original dose 350 mg, Cycle 1), 1,050 mg (original dose 1,050 mg, Cycle 1), 1,400 mg (original dose 1,400 mg, Cycle 1)  Administration: 350 mg (4/27/2023), 1,050 mg (4/28/2023), 1,400 mg (5/4/2023), 1,400 mg (5/11/2023), 1,400 mg (5/18/2023), 1,400 mg (5/25/2023), 1,400 mg (6/8/2023), 1,400 mg (6/22/2023), 1,400 mg (7/21/2023), 1,400 mg (8/4/2023), 1,400 mg (8/18/2023), 1,400 mg (9/1/2023), 1,400 mg (9/15/2023), 1,400 mg (9/29/2023), 1,400 mg (10/13/2023), 1,400 mg (10/27/2023), 1,400 mg (11/10/2023), 1,400 mg (11/24/2023), 1,400 mg (12/8/2023), 1,400 mg (12/22/2023), 1,400 mg (1/5/2024), 1,400 mg (1/19/2024), 1,400 mg (2/2/2024), 1,400 mg (2/16/2024), 1,400 mg (3/1/2024), 1,400 mg (3/15/2024), 1,400 mg (4/5/2024), 1,400 mg (4/19/2024), 1,400 mg (5/3/2024), 1,400 mg (5/17/2024), 1,400 mg (5/31/2024), 1,400 mg (6/14/2024), 1,400 mg (6/28/2024), 1,400 mg (7/12/2024), 1,400 mg (7/26/2024), 1,400 mg (8/9/2024), 1,400 mg (8/23/2024), 1,400 mg (9/6/2024), 1,400 mg (9/20/2024), 1,400 mg (10/4/2024), 1,400 mg (10/18/2024), 1,400 mg (11/1/2024), 1,400 mg (11/15/2024), 1,400 mg (11/29/2024), 1,400 mg (12/13/2024), 1,400 mg (1/3/2025), 1,400 mg (1/17/2025), 1,400 mg (1/31/2025), 1,400  mg (2/14/2025), 1,400 mg (2/28/2025), 1,400 mg (3/14/2025), 1,400 mg (3/28/2025), 1,400 mg (4/11/2025)           Past Medical History:   Diagnosis Date    Diabetes mellitus     Hypertension     Malignant neoplasm of lower lobe of left lung 12/9/2022    Rash, drug 7/6/2023    OP NSCLC AMIVANTAMAB-VMJW ([facility]) Q4W      Secondary malignant neoplasm of bone 12/5/2022       Past Surgical History:   Procedure Laterality Date    CATARACT EXTRACTION W/  INTRAOCULAR LENS IMPLANT Right 06/02/2022    EXTRACTION OF TOOTH      FLUOROSCOPY N/A 01/26/2023    Procedure: FLUOROSCOPY/mediport placement;  Surgeon: Marlon Leone MD;  Location[facility] CATH LAB;  Service: General;  Laterality: N/A;    TUBAL LIGATION      2007--postpartum tubal ligation       Family History   Problem Relation Name Age of Onset    Heart failure Father         Review of patient's allergies indicates:   Allergen Reactions    Lisinopril Other (See Comments)     coughing    Amoxicillin        Social History     Tobacco Use    Smoking status: Never     Passive exposure: Never    Smokeless tobacco: Never   Substance Use Topics    Alcohol use: Never    Drug use: Never       Labs   Labs:  Lab Visit on 04/24/2025   Component Date Value Ref Range Status    Sodium 04/24/2025 142  136 - 145 mmol/L Final    Potassium 04/24/2025 4.1  3.5 - 5.1 mmol/L Final    Chloride 04/24/2025 105  95 - 110 mmol/L Final    CO2 04/24/2025 28  23 - 29 mmol/L Final    Glucose 04/24/2025 187 (H)  70 - 110 mg/dL Final    BUN 04/24/2025 15  6 - 20 mg/dL Final    Creatinine 04/24/2025 0.9  0.5 - 1.4 mg/dL Final    Calcium 04/24/2025 8.4 (L)  8.7 - 10.5 mg/dL Final    Protein Total 04/24/2025 5.9 (L)  6.0 - 8.4 gm/dL Final    Albumin 04/24/2025 2.6 (L)  3.5 - 5.2 g/dL Final    Bilirubin Total 04/24/2025 0.5  0.1 - 1.0 mg/dL Final    For infants and newborns, interpretation of results should be based   on gestational age, weight and in agreement with clinical   observations.    Premature  Infant recommended reference ranges:   0-24 hours:  <8.0 mg/dL   24-48 hours: <12.0 mg/dL   3-5 days:    <15.0 mg/dL   6-29 days:   <15.0 mg/dL    ALP 04/24/2025 180 (H)  40 - 150 unit/L Final    AST 04/24/2025 14  11 - 45 unit/L Final    ALT 04/24/2025 23  10 - 44 unit/L Final    Anion Gap 04/24/2025 9  8 - 16 mmol/L Final    eGFR 04/24/2025 >60  >60 mL/min/1.73/m2 Final    Estimated GFR calculated using the CKD-EPI creatinine (2021) equation.    Magnesium  04/24/2025 1.7  1.6 - 2.6 mg/dL Final    Phosphorus Level 04/24/2025 3.7  2.7 - 4.5 mg/dL Final    WBC 04/24/2025 11.80  3.90 - 12.70 K/uL Final    RBC 04/24/2025 4.56  4.00 - 5.40 M/uL Final    HGB 04/24/2025 12.6  12.0 - 16.0 gm/dL Final    HCT 04/24/2025 39.0  37.0 - 48.5 % Final    MCV 04/24/2025 86  82 - 98 fL Final    MCH 04/24/2025 27.6  27.0 - 31.0 pg Final    MCHC 04/24/2025 32.3  32.0 - 36.0 g/dL Final    RDW 04/24/2025 13.6  11.5 - 14.5 % Final    Platelet Count 04/24/2025 195  150 - 450 K/uL Final    MPV 04/24/2025 11.5  9.2 - 12.9 fL Final    Nucleated RBC 04/24/2025 0  <=0 /100 WBC Final    Neut % 04/24/2025 64.4  38 - 73 % Final    Lymph % 04/24/2025 25.3  18 - 48 % Final    Mono % 04/24/2025 7.1  4 - 15 % Final    Eos % 04/24/2025 2.5  <=8 % Final    Basophil % 04/24/2025 0.4  <=1.9 % Final    Imm Grans % 04/24/2025 0.3  0.0 - 0.5 % Final    Neut # 04/24/2025 7.58  1.8 - 7.7 K/uL Final    Lymph # 04/24/2025 2.99  1 - 4.8 K/uL Final    Mono # 04/24/2025 0.84  0.3 - 1 K/uL Final    Eos # 04/24/2025 0.30  <=0.5 K/uL Final    Baso # 04/24/2025 0.05  <=0.2 K/uL Final    Imm Grans # 04/24/2025 0.04  0.00 - 0.04 K/uL Final    Mild elevation in immature granulocytes is non specific and can be seen in a variety of conditions including stress response, acute inflammation, trauma and pregnancy. Correlation with other laboratory and clinical findings is essential.        Imaging   CT CHEST ABDOMEN PELVIS WITH CONTRAST (XPD) - 10/19/23  Impression:  Stable  exam compared to 07/11/2023.         CT CHEST ABDOMEN PELVIS WITH IV CONTRAST (XPD) - 01/30/2024  Impression:  1.    Minimal residual linear increased density periphery of the left upper lobe could represent residual left upper lobe lung nodule or residual scarring.  No new or enlarging pulmonary nodules or masses bilaterally.    2.    Nonspecific exophytic soft tissue density nodule projects posteriorly off of the posterior segment right lobe liver.  No change.  No definite evidence for metastatic disease throughout the abdomen or pelvis.     3.    Osseous metastatic disease significantly improved.        CT CHEST ABDOMEN PELVIS WITH IV CONTRAST (XPD)     CLINICAL HISTORY:  restaging for Stage IV  NSCLC, metastatic to bone on therapy;Malignant neoplasm of lower lobe, left bronchus or lung     TECHNIQUE:  Images from the lung apices to the ischial tuberosities were acquired after administration of 100cc of Omnipaque 350 IV contrast material.  Sagittal and Coronal multiplanar reconstructions (MPR) were performed. Positive oral contrast (1000ml of Omnipaque 12 oral solution) was used for a better assessment of the gastrointestinal tract.     COMPARISON:  01/10/2025; 08/22/2024     FINDINGS:  CT Chest:     Lower neck and chest wall: Right-sided port.Multinodular thyroid.     Lungs and pleura: Redemonstration of the bilateral granulomas.  Left Cecy fissural and basilar scarring/atelectasis.  Stable elevation of the left hemidiaphragm.     Mediastinum and isabelle: Redemonstration of the calcified mediastinal lymph nodes.     Heart and pericardium: Heart size is normal. No pericardial effusion.     Vessels: Incidentally noted is a filling defect in the segmental/subsegmental branches supplying the posterior right lower lung series 6, image 320.     CT abdomen and pelvis:     Liver: Mass effect along the right hepatic contour from the known right rib lesion.     Gallbladder and biliary tree: No high-density gallstones. Normal  gallbladder wall thickness.No intra- or extrahepatic biliary ductal dilation.     Spleen: Normal.     Pancreas: Normal.     Adrenals: Redemonstration of the left adrenal nodule unchanged compared to prior study.     Kidneys and ureters: Low-density fat containing lesion involving the from upper pole of the left kidney unchanged since prior.  Findings compatible with angiomyolipoma.  Punctate right renal calculus without evidence of hydronephrosis.  Unchanged cystic lesion in the lower pole of the left kidney.     Bowel: No mechanical bowel obstruction is seen.     Vessels: No atheromatous disease is seen in the aorta and its major branches.     Lymph nodes: Stable lymph node adjacent to the right liver.     Bladder: Normal.     Reproductive organs: Fibroids in the uterus.     Abdominal wall: Normal.     Bones: Redemonstration of the sclerotic bone metastasis involving the spine, pelvis, left femoral head,  and left humeral head.  Interval enlargement of the left sacral lesion now measuring 1.9 x 1.8 cm previously measuring 1.2 by 0.9 mm.  Also noted are worsening destructive changes and soft tissue mass around the posterior aspect of the right 9th rib.     Impression:     1. Redemonstration of the findings compatible with osseous metastatic disease.  There is worsening destructive lesions and soft tissue density around the right 9th rib lesion and in the left sacrum.  2. Incidentally noted is a filling defect in the right segmental/subsegmental branches concerning for pulmonary embolism.  Dr. Nguyen notified Dr. Wilhelm at 11:10 04/22/2025 via secure chat.  Finding was made approximately 2 minutes prior notification     This report was flagged in Epic as abnormal.        Electronically signed by:Antonio Nguyen  Date:                                            04/22/2025  Time:                                           11:31        Assessment and Plan   Stage IV (cT2, cN2, cM1) NSCLC, Metastatic to Bone  Exon 20 EGFR  mutation positive   Previously on Carbo/Alimta started in 1/2023; stopped due to progression after 4 cycles and started on Amivantamab  CT CAP scheduled 01/30/24: stable  Tolerating Amivantamab well; has mild intermittent rash on right side of face but no other notable side effects  Repeat CT CAP 04/22/2025 showed redemonstration of the findings compatible with osseous metastatic disease. There is worsening destructive lesions and soft tissue density around the right 9th rib lesion and in the left sacrum.   Will present in TB regarding indication for radiation to 9th rib lesion and soft tissue density  Patient not yet started Zometa due to required dental work but will have this done next week  Amivatimab today and q2w (pt to get infusion and lab q2w and provider visit q4w)       PE  Noted on routine imaging 04/22/2025  Patient started on Eliquis - Continue       Metastatic Bone Disease  Patient has yet to start bisphosphonate therapy as she has some ongoing dental issues pending completion of dental work and dental clearance  Counseled to follow up with dentistry so that we can start bisphosphonate; she is to have dental work 05/02/25 and obtain clearance  Continue Calcium and Vitamin D/Weight Bearing Exercise      Amivantamab Induced Rash  Continue topical clindamycin, and tretinoin   Started on doxycycline per derm  Continue follow up with derm      Chronic Medical Conditions  DM II  Hx of COVID-19        Med Onc Chart Routing      Follow up with physician 4 weeks.   Follow up with DOLLY    Infusion scheduling note   Amivantimab tomorrow and [id number]   Injection scheduling note    Labs CBC, CMP, phosphorus and magnesium   Scheduling:  Preferred lab:  Lab interval:     Imaging    Pharmacy appointment    Other referrals                 The patient was seen, interviewed and examined. Pertinent lab and radiologic studies were reviewed. Pt instructed to call should they develop concerning signs/symptoms or have further  questions.        Portions of the record may have been created with voice recognition software. Occasional wrong-word or sound-a-like substitutions may have occurred due to the inherent limitations of voice recognition software. Read the chart carefully and recognize, using context, where substitutions have occurred.      Mirtha Wilhelm MD    Hematology/Oncology

## 2025-04-25 ENCOUNTER — INFUSION (OUTPATIENT)
Dept: INFUSION THERAPY | Facility: HOSPITAL | Age: 55
End: 2025-04-25
Attending: RADIOLOGY
Payer: COMMERCIAL

## 2025-04-25 ENCOUNTER — PATIENT MESSAGE (OUTPATIENT)
Dept: HEMATOLOGY/ONCOLOGY | Facility: CLINIC | Age: 55
End: 2025-04-25
Payer: COMMERCIAL

## 2025-04-25 VITALS
WEIGHT: 293 LBS | OXYGEN SATURATION: 97 % | RESPIRATION RATE: 16 BRPM | TEMPERATURE: 98 F | HEART RATE: 61 BPM | BODY MASS INDEX: 56.2 KG/M2 | DIASTOLIC BLOOD PRESSURE: 59 MMHG | SYSTOLIC BLOOD PRESSURE: 101 MMHG

## 2025-04-25 DIAGNOSIS — C34.32 MALIGNANT NEOPLASM OF LOWER LOBE OF LEFT LUNG: Primary | ICD-10-CM

## 2025-04-25 PROCEDURE — 96413 CHEMO IV INFUSION 1 HR: CPT

## 2025-04-25 PROCEDURE — 96375 TX/PRO/DX INJ NEW DRUG ADDON: CPT

## 2025-04-25 PROCEDURE — 63600175 PHARM REV CODE 636 W HCPCS: Performed by: INTERNAL MEDICINE

## 2025-04-25 PROCEDURE — 25000003 PHARM REV CODE 250: Performed by: INTERNAL MEDICINE

## 2025-04-25 PROCEDURE — 96367 TX/PROPH/DG ADDL SEQ IV INF: CPT

## 2025-04-25 PROCEDURE — 96415 CHEMO IV INFUSION ADDL HR: CPT

## 2025-04-25 RX ORDER — DIPHENHYDRAMINE HYDROCHLORIDE 50 MG/ML
25 INJECTION, SOLUTION INTRAMUSCULAR; INTRAVENOUS
Status: COMPLETED | OUTPATIENT
Start: 2025-04-25 | End: 2025-04-25

## 2025-04-25 RX ORDER — ACETAMINOPHEN 325 MG/1
650 TABLET ORAL
Status: COMPLETED | OUTPATIENT
Start: 2025-04-25 | End: 2025-04-25

## 2025-04-25 RX ORDER — SODIUM CHLORIDE 0.9 % (FLUSH) 0.9 %
10 SYRINGE (ML) INJECTION
Status: DISCONTINUED | OUTPATIENT
Start: 2025-04-25 | End: 2025-04-25 | Stop reason: HOSPADM

## 2025-04-25 RX ORDER — HEPARIN 100 UNIT/ML
500 SYRINGE INTRAVENOUS
Status: DISCONTINUED | OUTPATIENT
Start: 2025-04-25 | End: 2025-04-25 | Stop reason: HOSPADM

## 2025-04-25 RX ADMIN — DIPHENHYDRAMINE HYDROCHLORIDE 25 MG: 50 INJECTION INTRAMUSCULAR; INTRAVENOUS at 10:04

## 2025-04-25 RX ADMIN — DEXAMETHASONE SODIUM PHOSPHATE 0.25 MG: 4 INJECTION, SOLUTION INTRA-ARTICULAR; INTRALESIONAL; INTRAMUSCULAR; INTRAVENOUS; SOFT TISSUE at 10:04

## 2025-04-25 RX ADMIN — SODIUM CHLORIDE 1400 MG: 9 INJECTION, SOLUTION INTRAVENOUS at 10:04

## 2025-04-25 RX ADMIN — ACETAMINOPHEN 650 MG: 325 TABLET ORAL at 10:04

## 2025-04-25 RX ADMIN — HEPARIN SODIUM (PORCINE) LOCK FLUSH IV SOLN 100 UNIT/ML 500 UNITS: 100 SOLUTION at 12:04

## 2025-04-25 NOTE — PLAN OF CARE
01/21/22 1426   Engagement   Intervention Group   Topic Detail OT Wellness strategies group-Bananagrams for cognitive wellness, social engagement, focus, following directions, symptom management and health leisure exploration   Attendance Attended  (in lounge for duration of group)   Patient Response Accepted feedback;Needs reinforcement/repetition to learn materials;Asked questions and/or took notes   Concentrated on Task 15 - 30 min   Cognition Restless;Preoccupied;Distractible   Mood/Affect Congruent   Social/Behavioral Cooperative;Redirectable   Goals addressed in session today pt initially declined group activity. pt did participate in bananagrams with therapist.        Problem: Adult Inpatient Plan of Care  Goal: Plan of Care Review  Outcome: Progressing  Goal: Patient-Specific Goal (Individualized)  Outcome: Progressing  Flowsheets (Taken 4/25/2025 1258)  Individualized Care Needs: Reclined position pillow and blanket provided  Anxieties, Fears or Concerns: Recently started Eliquis for a blood clot  Patient/Family-Specific Goals (Include Timeframe): Will tolerate treatment with no adverse affects  Goal: Absence of Hospital-Acquired Illness or Injury  Outcome: Progressing  Intervention: Identify and Manage Fall Risk  Flowsheets (Taken 4/25/2025 1300)  Safety Promotion/Fall Prevention: in recliner, wheels locked  Intervention: Prevent Infection  Flowsheets (Taken 4/25/2025 1300)  Infection Prevention:   equipment surfaces disinfected   hand hygiene promoted   personal protective equipment utilized  Goal: Optimal Comfort and Wellbeing  Outcome: Progressing  Intervention: Provide Person-Centered Care  Flowsheets (Taken 4/25/2025 1300)  Trust Relationship/Rapport:   care explained   questions encouraged   choices provided   reassurance provided   emotional support provided   thoughts/feelings acknowledged   empathic listening provided   questions answered

## 2025-05-01 DIAGNOSIS — C34.32 MALIGNANT NEOPLASM OF LOWER LOBE OF LEFT LUNG: ICD-10-CM

## 2025-05-01 DIAGNOSIS — E83.42 HYPOMAGNESEMIA: ICD-10-CM

## 2025-05-01 DIAGNOSIS — R60.9 SWELLING: ICD-10-CM

## 2025-05-01 DIAGNOSIS — I10 ESSENTIAL HYPERTENSION: ICD-10-CM

## 2025-05-01 RX ORDER — LOSARTAN POTASSIUM AND HYDROCHLOROTHIAZIDE 25; 100 MG/1; MG/1
1 TABLET ORAL DAILY
Qty: 90 TABLET | Refills: 3 | Status: SHIPPED | OUTPATIENT
Start: 2025-05-01

## 2025-05-01 RX ORDER — FUROSEMIDE 20 MG/1
20 TABLET ORAL DAILY PRN
Qty: 30 TABLET | Refills: 5 | Status: SHIPPED | OUTPATIENT
Start: 2025-05-01

## 2025-05-01 RX ORDER — AMLODIPINE BESYLATE 10 MG/1
10 TABLET ORAL DAILY
Qty: 90 TABLET | Refills: 3 | Status: SHIPPED | OUTPATIENT
Start: 2025-05-01

## 2025-05-01 RX ORDER — FOLIC ACID 1 MG/1
1 TABLET ORAL DAILY
Qty: 30 TABLET | Refills: 11 | Status: SHIPPED | OUTPATIENT
Start: 2025-05-01

## 2025-05-01 RX ORDER — ATENOLOL 50 MG/1
TABLET ORAL
Qty: 180 TABLET | Refills: 3 | Status: SHIPPED | OUTPATIENT
Start: 2025-05-01

## 2025-05-02 ENCOUNTER — TUMOR BOARD CONFERENCE (OUTPATIENT)
Dept: HEMATOLOGY/ONCOLOGY | Facility: CLINIC | Age: 55
End: 2025-05-02
Payer: COMMERCIAL

## 2025-05-02 ENCOUNTER — PATIENT MESSAGE (OUTPATIENT)
Dept: HEMATOLOGY/ONCOLOGY | Facility: CLINIC | Age: 55
End: 2025-05-02
Payer: COMMERCIAL

## 2025-05-02 DIAGNOSIS — C34.32 MALIGNANT NEOPLASM OF LOWER LOBE OF LEFT LUNG: Primary | ICD-10-CM

## 2025-05-02 DIAGNOSIS — C79.51 SECONDARY MALIGNANT NEOPLASM OF BONE: ICD-10-CM

## 2025-05-02 RX ORDER — LANOLIN ALCOHOL/MO/W.PET/CERES
400 CREAM (GRAM) TOPICAL DAILY
Qty: 200 TABLET | Refills: 1 | Status: SHIPPED | OUTPATIENT
Start: 2025-05-02 | End: 2026-06-06

## 2025-05-02 NOTE — PROGRESS NOTES
Tumor Board Documentation      Shaneka Ahmadi was presented by Mirtha Cartwright MD at our Tumor Board on 5/2/2025, which included representatives from Medical Oncology, Hematology, Radiation Oncology, Surgical Oncology, Pathology, Navigation, Genetics, Pulmonology.    Shaneka currently presents as a current patient with Lung cancer, with history of the following treatments: Neoadjuvant Chemotherapy, Genetic Counseling.    Additionally, we reviewed previous medical and familial history, history of present illness, and recent lab results along with all available histopathologic and imaging studies. The tumor board considered available treatment options and made the following recommendations:  Radiation     refer to radiation oncology for treatment of 9th rib lesion and soft tissue density     The following procedures/referrals were also placed: No orders of the defined types were placed in this encounter.      Clinical Trial Status: Not discussed     National site-specific guidelines were discussed with respect to the case.    Tumor board is a meeting of clinicians from various specialty areas who evaluate and discuss patients for whom a multidisciplinary approach is being considered. Final determinations in the plan of care are those of the provider(s). The responsibility for follow up of recommendations given during tumor board is that of the provider.     Mirtha Cartwright MD

## 2025-05-08 ENCOUNTER — LAB VISIT (OUTPATIENT)
Dept: LAB | Facility: HOSPITAL | Age: 55
End: 2025-05-08
Payer: COMMERCIAL

## 2025-05-08 DIAGNOSIS — C34.32 MALIGNANT NEOPLASM OF LOWER LOBE OF LEFT LUNG: ICD-10-CM

## 2025-05-08 DIAGNOSIS — Z79.69 IMMUNODEFICIENCY DUE TO CHEMOTHERAPY: ICD-10-CM

## 2025-05-08 DIAGNOSIS — D84.821 IMMUNODEFICIENCY DUE TO CHEMOTHERAPY: ICD-10-CM

## 2025-05-08 DIAGNOSIS — C79.51 SECONDARY MALIGNANT NEOPLASM OF BONE: ICD-10-CM

## 2025-05-08 DIAGNOSIS — T45.1X5A IMMUNODEFICIENCY DUE TO CHEMOTHERAPY: ICD-10-CM

## 2025-05-08 LAB
ABSOLUTE EOSINOPHIL (OHS): 0.3 K/UL
ABSOLUTE MONOCYTE (OHS): 0.68 K/UL (ref 0.3–1)
ABSOLUTE NEUTROPHIL COUNT (OHS): 4.87 K/UL (ref 1.8–7.7)
ALBUMIN SERPL BCP-MCNC: 2.7 G/DL (ref 3.5–5.2)
ALP SERPL-CCNC: 154 UNIT/L (ref 40–150)
ALT SERPL W/O P-5'-P-CCNC: 25 UNIT/L (ref 10–44)
ANION GAP (OHS): 12 MMOL/L (ref 8–16)
AST SERPL-CCNC: 18 UNIT/L (ref 11–45)
BASOPHILS # BLD AUTO: 0.03 K/UL
BASOPHILS NFR BLD AUTO: 0.4 %
BILIRUB SERPL-MCNC: 0.4 MG/DL (ref 0.1–1)
BUN SERPL-MCNC: 17 MG/DL (ref 6–20)
CALCIUM SERPL-MCNC: 8.3 MG/DL (ref 8.7–10.5)
CHLORIDE SERPL-SCNC: 102 MMOL/L (ref 95–110)
CO2 SERPL-SCNC: 26 MMOL/L (ref 23–29)
CREAT SERPL-MCNC: 0.8 MG/DL (ref 0.5–1.4)
ERYTHROCYTE [DISTWIDTH] IN BLOOD BY AUTOMATED COUNT: 13.2 % (ref 11.5–14.5)
GFR SERPLBLD CREATININE-BSD FMLA CKD-EPI: >60 ML/MIN/1.73/M2
GLUCOSE SERPL-MCNC: 153 MG/DL (ref 70–110)
HCT VFR BLD AUTO: 37.2 % (ref 37–48.5)
HGB BLD-MCNC: 12.4 GM/DL (ref 12–16)
IMM GRANULOCYTES # BLD AUTO: 0.01 K/UL (ref 0–0.04)
IMM GRANULOCYTES NFR BLD AUTO: 0.1 % (ref 0–0.5)
LYMPHOCYTES # BLD AUTO: 2.22 K/UL (ref 1–4.8)
MAGNESIUM SERPL-MCNC: 1.6 MG/DL (ref 1.6–2.6)
MCH RBC QN AUTO: 27.7 PG (ref 27–31)
MCHC RBC AUTO-ENTMCNC: 33.3 G/DL (ref 32–36)
MCV RBC AUTO: 83 FL (ref 82–98)
NUCLEATED RBC (/100WBC) (OHS): 0 /100 WBC
PHOSPHATE SERPL-MCNC: 3.9 MG/DL (ref 2.7–4.5)
PLATELET # BLD AUTO: 175 K/UL (ref 150–450)
PMV BLD AUTO: 12.3 FL (ref 9.2–12.9)
POTASSIUM SERPL-SCNC: 3.5 MMOL/L (ref 3.5–5.1)
PROT SERPL-MCNC: 6 GM/DL (ref 6–8.4)
RBC # BLD AUTO: 4.48 M/UL (ref 4–5.4)
RELATIVE EOSINOPHIL (OHS): 3.7 %
RELATIVE LYMPHOCYTE (OHS): 27.4 % (ref 18–48)
RELATIVE MONOCYTE (OHS): 8.4 % (ref 4–15)
RELATIVE NEUTROPHIL (OHS): 60 % (ref 38–73)
SODIUM SERPL-SCNC: 140 MMOL/L (ref 136–145)
WBC # BLD AUTO: 8.11 K/UL (ref 3.9–12.7)

## 2025-05-08 PROCEDURE — 80053 COMPREHEN METABOLIC PANEL: CPT

## 2025-05-08 PROCEDURE — 36415 COLL VENOUS BLD VENIPUNCTURE: CPT

## 2025-05-08 PROCEDURE — 83735 ASSAY OF MAGNESIUM: CPT

## 2025-05-08 PROCEDURE — 84100 ASSAY OF PHOSPHORUS: CPT

## 2025-05-08 PROCEDURE — 85025 COMPLETE CBC W/AUTO DIFF WBC: CPT

## 2025-05-09 ENCOUNTER — INFUSION (OUTPATIENT)
Dept: INFUSION THERAPY | Facility: HOSPITAL | Age: 55
End: 2025-05-09
Attending: RADIOLOGY
Payer: COMMERCIAL

## 2025-05-09 VITALS
SYSTOLIC BLOOD PRESSURE: 96 MMHG | OXYGEN SATURATION: 96 % | TEMPERATURE: 97 F | HEIGHT: 64 IN | WEIGHT: 293 LBS | RESPIRATION RATE: 16 BRPM | DIASTOLIC BLOOD PRESSURE: 57 MMHG | HEART RATE: 60 BPM | BODY MASS INDEX: 50.02 KG/M2

## 2025-05-09 DIAGNOSIS — C34.32 MALIGNANT NEOPLASM OF LOWER LOBE OF LEFT LUNG: Primary | ICD-10-CM

## 2025-05-09 PROCEDURE — 63600175 PHARM REV CODE 636 W HCPCS: Performed by: INTERNAL MEDICINE

## 2025-05-09 PROCEDURE — 25000003 PHARM REV CODE 250: Performed by: INTERNAL MEDICINE

## 2025-05-09 PROCEDURE — 96413 CHEMO IV INFUSION 1 HR: CPT

## 2025-05-09 PROCEDURE — A4216 STERILE WATER/SALINE, 10 ML: HCPCS | Performed by: INTERNAL MEDICINE

## 2025-05-09 PROCEDURE — 96367 TX/PROPH/DG ADDL SEQ IV INF: CPT

## 2025-05-09 PROCEDURE — 96415 CHEMO IV INFUSION ADDL HR: CPT

## 2025-05-09 PROCEDURE — 96375 TX/PRO/DX INJ NEW DRUG ADDON: CPT

## 2025-05-09 RX ORDER — ACETAMINOPHEN 325 MG/1
650 TABLET ORAL
Start: 2025-05-23

## 2025-05-09 RX ORDER — DIPHENHYDRAMINE HYDROCHLORIDE 50 MG/ML
25 INJECTION, SOLUTION INTRAMUSCULAR; INTRAVENOUS
Status: COMPLETED | OUTPATIENT
Start: 2025-05-09 | End: 2025-05-09

## 2025-05-09 RX ORDER — HEPARIN 100 UNIT/ML
500 SYRINGE INTRAVENOUS
Status: DISCONTINUED | OUTPATIENT
Start: 2025-05-09 | End: 2025-05-09 | Stop reason: HOSPADM

## 2025-05-09 RX ORDER — SODIUM CHLORIDE 0.9 % (FLUSH) 0.9 %
10 SYRINGE (ML) INJECTION
OUTPATIENT
Start: 2025-05-23

## 2025-05-09 RX ORDER — HEPARIN 100 UNIT/ML
500 SYRINGE INTRAVENOUS
OUTPATIENT
Start: 2025-05-23

## 2025-05-09 RX ORDER — DIPHENHYDRAMINE HYDROCHLORIDE 50 MG/ML
25 INJECTION, SOLUTION INTRAMUSCULAR; INTRAVENOUS
Start: 2025-05-23

## 2025-05-09 RX ORDER — ACETAMINOPHEN 325 MG/1
650 TABLET ORAL
Status: COMPLETED | OUTPATIENT
Start: 2025-05-09 | End: 2025-05-09

## 2025-05-09 RX ORDER — DIPHENHYDRAMINE HYDROCHLORIDE 50 MG/ML
50 INJECTION, SOLUTION INTRAMUSCULAR; INTRAVENOUS ONCE AS NEEDED
Status: DISCONTINUED | OUTPATIENT
Start: 2025-05-09 | End: 2025-05-09 | Stop reason: HOSPADM

## 2025-05-09 RX ORDER — EPINEPHRINE 0.3 MG/.3ML
0.3 INJECTION SUBCUTANEOUS ONCE AS NEEDED
OUTPATIENT
Start: 2025-05-23

## 2025-05-09 RX ORDER — SODIUM CHLORIDE 0.9 % (FLUSH) 0.9 %
10 SYRINGE (ML) INJECTION
Status: DISCONTINUED | OUTPATIENT
Start: 2025-05-09 | End: 2025-05-09 | Stop reason: HOSPADM

## 2025-05-09 RX ORDER — EPINEPHRINE 0.3 MG/.3ML
0.3 INJECTION SUBCUTANEOUS ONCE AS NEEDED
Status: CANCELLED | OUTPATIENT
Start: 2025-05-09

## 2025-05-09 RX ORDER — DIPHENHYDRAMINE HYDROCHLORIDE 50 MG/ML
50 INJECTION, SOLUTION INTRAMUSCULAR; INTRAVENOUS ONCE AS NEEDED
OUTPATIENT
Start: 2025-05-23

## 2025-05-09 RX ORDER — HEPARIN 100 UNIT/ML
500 SYRINGE INTRAVENOUS
Status: CANCELLED | OUTPATIENT
Start: 2025-05-09

## 2025-05-09 RX ORDER — ACETAMINOPHEN 325 MG/1
650 TABLET ORAL
Status: CANCELLED | OUTPATIENT
Start: 2025-05-09

## 2025-05-09 RX ORDER — DIPHENHYDRAMINE HYDROCHLORIDE 50 MG/ML
50 INJECTION, SOLUTION INTRAMUSCULAR; INTRAVENOUS ONCE AS NEEDED
Status: CANCELLED | OUTPATIENT
Start: 2025-05-09

## 2025-05-09 RX ORDER — EPINEPHRINE 0.3 MG/.3ML
0.3 INJECTION SUBCUTANEOUS ONCE AS NEEDED
Status: DISCONTINUED | OUTPATIENT
Start: 2025-05-09 | End: 2025-05-09 | Stop reason: HOSPADM

## 2025-05-09 RX ORDER — SODIUM CHLORIDE 0.9 % (FLUSH) 0.9 %
10 SYRINGE (ML) INJECTION
Status: CANCELLED | OUTPATIENT
Start: 2025-05-09

## 2025-05-09 RX ORDER — DIPHENHYDRAMINE HYDROCHLORIDE 50 MG/ML
25 INJECTION, SOLUTION INTRAMUSCULAR; INTRAVENOUS
Status: CANCELLED | OUTPATIENT
Start: 2025-05-09

## 2025-05-09 RX ADMIN — HEPARIN 500 UNITS: 100 SYRINGE at 01:05

## 2025-05-09 RX ADMIN — SODIUM CHLORIDE 0.25 MG: 9 INJECTION, SOLUTION INTRAVENOUS at 10:05

## 2025-05-09 RX ADMIN — SODIUM CHLORIDE: 9 INJECTION, SOLUTION INTRAVENOUS at 10:05

## 2025-05-09 RX ADMIN — DIPHENHYDRAMINE HYDROCHLORIDE 25 MG: 50 INJECTION, SOLUTION INTRAMUSCULAR; INTRAVENOUS at 10:05

## 2025-05-09 RX ADMIN — ACETAMINOPHEN 650 MG: 325 TABLET ORAL at 10:05

## 2025-05-09 RX ADMIN — SODIUM CHLORIDE 1400 MG: 9 INJECTION, SOLUTION INTRAVENOUS at 11:05

## 2025-05-09 RX ADMIN — Medication 10 ML: at 01:05

## 2025-05-09 NOTE — DISCHARGE INSTRUCTIONS
.[facility]  [address] [address]  [address]  [phone] phone     [phone] fax  Hours of Operation: Monday- Friday 8:00am- 5:00pm  After hours phone  [phone]  Hematology / Oncology Physicians on call    Dr. Denys Sinclair        Nurse Practitioners:    Lorie Blackmon, BRISA Caicedo, BRISA Monteiro, BRISA Kwon, BRISA Trujillo, PA      Please don't hesitate to call if you have any concerns.

## 2025-05-09 NOTE — PLAN OF CARE
Problem: Adult Inpatient Plan of Care  Goal: Plan of Care Review  Outcome: Progressing  Goal: Optimal Comfort and Wellbeing  Outcome: Progressing  Goal: Patient-Specific Goal (Individualized)  5/9/2025 1246 by Wayne Escalante, RN  Flowsheets (Taken 5/9/2025 1246)  Individualized Care Needs: patient likes feet elevated, warm blanket, snack, and drink  Anxieties, Fears or Concerns: patient reports no complaints or concerns at this time  Patient/Family-Specific Goals (Include Timeframe): patient tolerated treatment well with no adverse reactions.  5/9/2025 1246 by Wayne Escalante, RN  Outcome: Progressing     Problem: Chemotherapy Effects  Goal: Absence of Infection  Outcome: Progressing

## 2025-05-22 ENCOUNTER — OFFICE VISIT (OUTPATIENT)
Dept: HEMATOLOGY/ONCOLOGY | Facility: CLINIC | Age: 55
End: 2025-05-22
Payer: COMMERCIAL

## 2025-05-22 ENCOUNTER — PATIENT MESSAGE (OUTPATIENT)
Dept: HEMATOLOGY/ONCOLOGY | Facility: CLINIC | Age: 55
End: 2025-05-22

## 2025-05-22 ENCOUNTER — LAB VISIT (OUTPATIENT)
Dept: LAB | Facility: HOSPITAL | Age: 55
End: 2025-05-22
Payer: COMMERCIAL

## 2025-05-22 DIAGNOSIS — C79.51 SECONDARY MALIGNANT NEOPLASM OF BONE: ICD-10-CM

## 2025-05-22 DIAGNOSIS — C34.32 MALIGNANT NEOPLASM OF LOWER LOBE OF LEFT LUNG: ICD-10-CM

## 2025-05-22 DIAGNOSIS — M25.562 LEFT KNEE PAIN, UNSPECIFIED CHRONICITY: ICD-10-CM

## 2025-05-22 DIAGNOSIS — T45.1X5A IMMUNODEFICIENCY DUE TO CHEMOTHERAPY: ICD-10-CM

## 2025-05-22 DIAGNOSIS — C79.51 SECONDARY MALIGNANT NEOPLASM OF BONE: Primary | ICD-10-CM

## 2025-05-22 DIAGNOSIS — D84.821 IMMUNODEFICIENCY DUE TO CHEMOTHERAPY: ICD-10-CM

## 2025-05-22 DIAGNOSIS — Z79.69 IMMUNODEFICIENCY DUE TO CHEMOTHERAPY: ICD-10-CM

## 2025-05-22 LAB
ABSOLUTE EOSINOPHIL (OHS): 0.26 K/UL
ABSOLUTE MONOCYTE (OHS): 0.59 K/UL (ref 0.3–1)
ABSOLUTE NEUTROPHIL COUNT (OHS): 4.86 K/UL (ref 1.8–7.7)
ALBUMIN SERPL BCP-MCNC: 2.7 G/DL (ref 3.5–5.2)
ALP SERPL-CCNC: 169 UNIT/L (ref 40–150)
ALT SERPL W/O P-5'-P-CCNC: 22 UNIT/L (ref 10–44)
ANION GAP (OHS): 10 MMOL/L (ref 8–16)
AST SERPL-CCNC: 17 UNIT/L (ref 11–45)
BASOPHILS # BLD AUTO: 0.05 K/UL
BASOPHILS NFR BLD AUTO: 0.6 %
BILIRUB SERPL-MCNC: 0.2 MG/DL (ref 0.1–1)
BUN SERPL-MCNC: 17 MG/DL (ref 6–20)
CALCIUM SERPL-MCNC: 8.6 MG/DL (ref 8.7–10.5)
CHLORIDE SERPL-SCNC: 105 MMOL/L (ref 95–110)
CO2 SERPL-SCNC: 28 MMOL/L (ref 23–29)
CREAT SERPL-MCNC: 0.8 MG/DL (ref 0.5–1.4)
ERYTHROCYTE [DISTWIDTH] IN BLOOD BY AUTOMATED COUNT: 13.4 % (ref 11.5–14.5)
GFR SERPLBLD CREATININE-BSD FMLA CKD-EPI: >60 ML/MIN/1.73/M2
GLUCOSE SERPL-MCNC: 163 MG/DL (ref 70–110)
HCT VFR BLD AUTO: 38.6 % (ref 37–48.5)
HGB BLD-MCNC: 12.8 GM/DL (ref 12–16)
IMM GRANULOCYTES # BLD AUTO: 0.02 K/UL (ref 0–0.04)
IMM GRANULOCYTES NFR BLD AUTO: 0.2 % (ref 0–0.5)
LYMPHOCYTES # BLD AUTO: 2.35 K/UL (ref 1–4.8)
MAGNESIUM SERPL-MCNC: 1.6 MG/DL (ref 1.6–2.6)
MCH RBC QN AUTO: 28 PG (ref 27–31)
MCHC RBC AUTO-ENTMCNC: 33.2 G/DL (ref 32–36)
MCV RBC AUTO: 85 FL (ref 82–98)
NUCLEATED RBC (/100WBC) (OHS): 0 /100 WBC
PHOSPHATE SERPL-MCNC: 4 MG/DL (ref 2.7–4.5)
PLATELET # BLD AUTO: 182 K/UL (ref 150–450)
PMV BLD AUTO: 11.1 FL (ref 9.2–12.9)
POTASSIUM SERPL-SCNC: 3.9 MMOL/L (ref 3.5–5.1)
PROT SERPL-MCNC: 5.9 GM/DL (ref 6–8.4)
RBC # BLD AUTO: 4.57 M/UL (ref 4–5.4)
RELATIVE EOSINOPHIL (OHS): 3.2 %
RELATIVE LYMPHOCYTE (OHS): 28.9 % (ref 18–48)
RELATIVE MONOCYTE (OHS): 7.3 % (ref 4–15)
RELATIVE NEUTROPHIL (OHS): 59.8 % (ref 38–73)
SODIUM SERPL-SCNC: 143 MMOL/L (ref 136–145)
WBC # BLD AUTO: 8.13 K/UL (ref 3.9–12.7)

## 2025-05-22 PROCEDURE — 1160F RVW MEDS BY RX/DR IN RCRD: CPT | Mod: CPTII,95,,

## 2025-05-22 PROCEDURE — 3061F NEG MICROALBUMINURIA REV: CPT | Mod: CPTII,95,,

## 2025-05-22 PROCEDURE — 98007 SYNCH AUDIO-VIDEO EST HI 40: CPT | Mod: 95,,,

## 2025-05-22 PROCEDURE — 83735 ASSAY OF MAGNESIUM: CPT

## 2025-05-22 PROCEDURE — 1159F MED LIST DOCD IN RCRD: CPT | Mod: CPTII,95,,

## 2025-05-22 PROCEDURE — 3051F HG A1C>EQUAL 7.0%<8.0%: CPT | Mod: CPTII,95,,

## 2025-05-22 PROCEDURE — 85025 COMPLETE CBC W/AUTO DIFF WBC: CPT

## 2025-05-22 PROCEDURE — 3066F NEPHROPATHY DOC TX: CPT | Mod: CPTII,95,,

## 2025-05-22 PROCEDURE — 84100 ASSAY OF PHOSPHORUS: CPT

## 2025-05-22 PROCEDURE — G2211 COMPLEX E/M VISIT ADD ON: HCPCS | Mod: 95,,,

## 2025-05-22 PROCEDURE — 82374 ASSAY BLOOD CARBON DIOXIDE: CPT

## 2025-05-22 PROCEDURE — 36415 COLL VENOUS BLD VENIPUNCTURE: CPT

## 2025-05-23 ENCOUNTER — OFFICE VISIT (OUTPATIENT)
Dept: INTERNAL MEDICINE | Facility: CLINIC | Age: 55
End: 2025-05-23
Payer: COMMERCIAL

## 2025-05-23 ENCOUNTER — INFUSION (OUTPATIENT)
Dept: INFUSION THERAPY | Facility: HOSPITAL | Age: 55
End: 2025-05-23
Attending: RADIOLOGY
Payer: COMMERCIAL

## 2025-05-23 ENCOUNTER — LAB VISIT (OUTPATIENT)
Dept: LAB | Facility: HOSPITAL | Age: 55
End: 2025-05-23
Attending: NURSE PRACTITIONER
Payer: COMMERCIAL

## 2025-05-23 ENCOUNTER — PATIENT MESSAGE (OUTPATIENT)
Dept: HEMATOLOGY/ONCOLOGY | Facility: CLINIC | Age: 55
End: 2025-05-23
Payer: COMMERCIAL

## 2025-05-23 VITALS
BODY MASS INDEX: 50.02 KG/M2 | OXYGEN SATURATION: 98 % | TEMPERATURE: 96 F | RESPIRATION RATE: 18 BRPM | SYSTOLIC BLOOD PRESSURE: 110 MMHG | WEIGHT: 293 LBS | HEIGHT: 64 IN | HEART RATE: 74 BPM | DIASTOLIC BLOOD PRESSURE: 80 MMHG

## 2025-05-23 VITALS
OXYGEN SATURATION: 98 % | SYSTOLIC BLOOD PRESSURE: 109 MMHG | HEIGHT: 64 IN | DIASTOLIC BLOOD PRESSURE: 68 MMHG | TEMPERATURE: 97 F | WEIGHT: 293 LBS | RESPIRATION RATE: 16 BRPM | HEART RATE: 60 BPM | BODY MASS INDEX: 50.02 KG/M2

## 2025-05-23 DIAGNOSIS — I26.99 PULMONARY EMBOLISM, UNSPECIFIED CHRONICITY, UNSPECIFIED PULMONARY EMBOLISM TYPE, UNSPECIFIED WHETHER ACUTE COR PULMONALE PRESENT: ICD-10-CM

## 2025-05-23 DIAGNOSIS — Z12.31 BREAST CANCER SCREENING BY MAMMOGRAM: ICD-10-CM

## 2025-05-23 DIAGNOSIS — C34.32 MALIGNANT NEOPLASM OF LOWER LOBE OF LEFT LUNG: ICD-10-CM

## 2025-05-23 DIAGNOSIS — C34.32 MALIGNANT NEOPLASM OF LOWER LOBE OF LEFT LUNG: Primary | ICD-10-CM

## 2025-05-23 DIAGNOSIS — C79.51 SECONDARY MALIGNANT NEOPLASM OF BONE: ICD-10-CM

## 2025-05-23 DIAGNOSIS — E11.9 TYPE 2 DIABETES MELLITUS WITHOUT COMPLICATION, WITHOUT LONG-TERM CURRENT USE OF INSULIN: ICD-10-CM

## 2025-05-23 DIAGNOSIS — I10 ESSENTIAL HYPERTENSION: ICD-10-CM

## 2025-05-23 DIAGNOSIS — Z00.00 ANNUAL PHYSICAL EXAM: Primary | ICD-10-CM

## 2025-05-23 LAB
ALBUMIN/CREAT UR: NORMAL
CREAT UR-MCNC: 117 MG/DL (ref 15–325)
MICROALBUMIN UR-MCNC: <5 UG/ML (ref ?–5000)

## 2025-05-23 PROCEDURE — 96375 TX/PRO/DX INJ NEW DRUG ADDON: CPT

## 2025-05-23 PROCEDURE — 82570 ASSAY OF URINE CREATININE: CPT

## 2025-05-23 PROCEDURE — 63600175 PHARM REV CODE 636 W HCPCS: Performed by: INTERNAL MEDICINE

## 2025-05-23 PROCEDURE — 99999 PR PBB SHADOW E&M-EST. PATIENT-LVL V: CPT | Mod: PBBFAC,,, | Performed by: NURSE PRACTITIONER

## 2025-05-23 PROCEDURE — 96413 CHEMO IV INFUSION 1 HR: CPT

## 2025-05-23 PROCEDURE — 96415 CHEMO IV INFUSION ADDL HR: CPT

## 2025-05-23 PROCEDURE — 25000003 PHARM REV CODE 250: Performed by: INTERNAL MEDICINE

## 2025-05-23 PROCEDURE — A4216 STERILE WATER/SALINE, 10 ML: HCPCS | Performed by: INTERNAL MEDICINE

## 2025-05-23 PROCEDURE — 96367 TX/PROPH/DG ADDL SEQ IV INF: CPT

## 2025-05-23 RX ORDER — SODIUM CHLORIDE 0.9 % (FLUSH) 0.9 %
10 SYRINGE (ML) INJECTION
Status: DISCONTINUED | OUTPATIENT
Start: 2025-05-23 | End: 2025-05-23 | Stop reason: HOSPADM

## 2025-05-23 RX ORDER — HYDROCODONE BITARTRATE AND ACETAMINOPHEN 10; 325 MG/1; MG/1
1 TABLET ORAL EVERY 6 HOURS PRN
Qty: 28 TABLET | Refills: 0 | Status: SHIPPED | OUTPATIENT
Start: 2025-05-23

## 2025-05-23 RX ORDER — EPINEPHRINE 0.3 MG/.3ML
0.3 INJECTION SUBCUTANEOUS ONCE AS NEEDED
Status: DISCONTINUED | OUTPATIENT
Start: 2025-05-23 | End: 2025-05-23 | Stop reason: HOSPADM

## 2025-05-23 RX ORDER — DIPHENHYDRAMINE HYDROCHLORIDE 50 MG/ML
25 INJECTION, SOLUTION INTRAMUSCULAR; INTRAVENOUS
Status: COMPLETED | OUTPATIENT
Start: 2025-05-23 | End: 2025-05-23

## 2025-05-23 RX ORDER — HEPARIN 100 UNIT/ML
500 SYRINGE INTRAVENOUS
Status: DISCONTINUED | OUTPATIENT
Start: 2025-05-23 | End: 2025-05-23 | Stop reason: HOSPADM

## 2025-05-23 RX ORDER — DIPHENHYDRAMINE HYDROCHLORIDE 50 MG/ML
50 INJECTION, SOLUTION INTRAMUSCULAR; INTRAVENOUS ONCE AS NEEDED
Status: DISCONTINUED | OUTPATIENT
Start: 2025-05-23 | End: 2025-05-23 | Stop reason: HOSPADM

## 2025-05-23 RX ORDER — ACETAMINOPHEN 325 MG/1
650 TABLET ORAL
Status: COMPLETED | OUTPATIENT
Start: 2025-05-23 | End: 2025-05-23

## 2025-05-23 RX ADMIN — Medication 10 ML: at 12:05

## 2025-05-23 RX ADMIN — ACETAMINOPHEN 650 MG: 325 TABLET ORAL at 10:05

## 2025-05-23 RX ADMIN — HEPARIN SODIUM (PORCINE) LOCK FLUSH IV SOLN 100 UNIT/ML 500 UNITS: 100 SOLUTION at 12:05

## 2025-05-23 RX ADMIN — SODIUM CHLORIDE: 9 INJECTION, SOLUTION INTRAVENOUS at 10:05

## 2025-05-23 RX ADMIN — DIPHENHYDRAMINE HYDROCHLORIDE 25 MG: 50 INJECTION, SOLUTION INTRAMUSCULAR; INTRAVENOUS at 10:05

## 2025-05-23 RX ADMIN — AMIVANTAMAB 1400 MG: 350 INJECTION INTRAVENOUS at 11:05

## 2025-05-23 RX ADMIN — SODIUM CHLORIDE 0.25 MG: 9 INJECTION, SOLUTION INTRAVENOUS at 10:05

## 2025-05-23 NOTE — PLAN OF CARE
Problem: Adult Inpatient Plan of Care  Goal: Plan of Care Review  Outcome: Progressing  Flowsheets (Taken 5/23/2025 1114)  Plan of Care Reviewed With: patient  Goal: Patient-Specific Goal (Individualized)  Outcome: Progressing  Flowsheets (Taken 5/23/2025 1114)  Individualized Care Needs: patient likes feet elevated, warm blanket, snack, and drink  Anxieties, Fears or Concerns: Patient reports no complaints or concerns at this time  Patient/Family-Specific Goals (Include Timeframe): patient tolerated treatment well with no adverse reactions.     Problem: Chemotherapy Effects  Goal: Safety Maintained  Outcome: Progressing  Goal: Absence of Infection  Outcome: Progressing

## 2025-05-23 NOTE — PROGRESS NOTES
Patient ID: Shaneka Ahmadi is a 55 y.o. female.    Chief Complaint: No chief complaint on file.    History of Present Illness    CHIEF COMPLAINT:  Ms. Ahmadi presents today for follow up of lung cancer    CANCER TREATMENT:  She receives infusions every two weeks and has completed required dental work to initiate Zometa therapy.    PULMONARY EMBOLISM:  She experienced mild shortness of breath prior to pulmonary embolism diagnosis but notes improvement in symptoms since starting anticoagulation.    IMAGING:  CT showed mass effect along the right hepatic contour from known right rib lesion, left sacrum with destruction lesion and soft tissue density, and filling defect in right submental and subsegmental branches concerning for pulmonary embolism.      ROS:  Constitutional: negative diminished activity, negative appetite change, negative fatigue, negative fever  HENT: negative ear discharge, negative ear pain, negative mouth sores, negative nosebleeds, negative sore throat, negative tinnitus  Respiratory: negative apnea, negative cough, POSITIVE SHORTNESS OF BREATH (improved)  Cardiovascular: negative chest pain, negative leg swelling  Gastrointestinal: negative abdominal distention, negative abdominal pain, negative blood in stool, negative constipation, negative diarrhea, negative nausea, negative vomiting  Genitourinary: negative difficulty urinating, negative flank pain, negative frequency, negative hematuria  Musculoskeletal: negative back pain, negative abnormal gait, negative neck pain, negative neck stiffness, negative joint pain, negative muscle pain  Skin: negative rash, negative wound, negative abnormal moles  Neurological: negative dizziness, negative seizures, negative numbness, negative tingling, negative headaches, negative balance issues  Psychiatric: negative agitation, negative confusion, negative hallucinations, negative sleep difficulty, negative suicidal ideation         Physical Exam    Vital Signs:  Reviewed.  Constitutional: Well-appearing. Well-developed. No acute distress.  HENT: Normocephalic. Tympanic membranes normal bilaterally. Nares patent. Oropharynx clear. No erythema. No exudate.  Cardiovascular: Normal rate and regular rhythm. Normal heart sounds.  Pulmonary: Pulmonary effort is normal. Normal breath sounds.  Abdominal: Bowel sounds are normal. Abdomen is soft. No tenderness.  Musculoskeletal: No muscle tenderness. No joint tenderness. Normal range of motion.  Skin: Warm. Dry.  Neurological: No focal deficit is present. Alert and oriented to person, place, and time.  Psychiatric: Mood is normal. Behavior is normal. Behavior is cooperative.  Vitals: BLOOD PRESSURE /80.         Assessment & Plan    ANNUAL EXAM:     MALIGNANT NEOPLASM OF LUNG:  - Monitored patient who is having routine hematology oncology visits for left lower lobe lung malignancy.  - Reviewed recent CT chest and abdomen from oncology.  - Continued treatment plan with infusions every 2 weeks.    SECONDARY MALIGNANT NEOPLASM OF BONE:  - Evaluated CT showing mass effect along the right hepatic contour from the known right rib lesion, as well as left sacrum with destruction lesion and soft tissue density.  - Ms. Ahmadi has completed CT biopsy of the bone to follow up on these lesions.  - Confirmed patient has completed required dental work to start Zometa.    PULMONARY EMBOLISM:  - Monitored filling defect in right submental and subsegmental branches concerning for pulmonary embolism.  - Ms. Ahmadi reports improvement with no more shortness of breath.  - Prescribed Eliquis 5 mg for management.    ESSENTIAL HYPERTENSION:  - Monitored essential hypertension.  - Blood pressure is stable at 110/80.    TYPE 2 DIABETES MELLITUS:  - Monitored type 2 diabetes.  - Ordered Hemoglobin A1C, lipid panel, and microalbumin creatinine urine test to be completed today.    LONG TERM USE OF ANTICOAGULANTS:  - Ms. Ahmadi has been taking anticoagulants  faithfully.  - Eliquis 5 mg was prescribed by Dr. Wilhelm for pulmonary embolism.    FOLLOW-UP:  - Scheduled follow-up visit in 6 months.            Follow up in about 6 months (around 11/23/2025).    This note was generated with the assistance of ambient listening technology. Verbal consent was obtained by the patient and accompanying visitor(s) for the recording of patient appointment to facilitate this note. I attest to having reviewed and edited the generated note for accuracy, though some syntax or spelling errors may persist. Please contact the author of this note for any clarification.      Answers submitted by the patient for this visit:  Review of Systems Questionnaire (Submitted on 5/23/2025)  activity change: No  unexpected weight change: No  neck pain: No  hearing loss: No  trouble swallowing: No  eye discharge: No  visual disturbance: No  chest tightness: No  wheezing: No  chest pain: No  palpitations: No  blood in stool: No  constipation: No  vomiting: No  diarrhea: No  polydipsia: No  polyuria: No  difficulty urinating: No  hematuria: No  menstrual problem: No  dysuria: No  joint swelling: No  arthralgias: Yes  headaches: No  weakness: No  confusion: No  dysphoric mood: No

## 2025-05-23 NOTE — DISCHARGE INSTRUCTIONS
.[facility]  [address] [facility] [address] [facility] [address]  [address]  [phone] phone     [phone] fax  Hours of Operation: Monday- Friday 8:00am- 5:00pm  After hours phone  [phone]  Hematology / Oncology Physicians on call    Dr. Denys Sinclair        Nurse Practitioners:    Lorie Blackmon, BRISA Caicedo, BRISA Monteiro, BRISA Kwon, BRISA Trujillo, PA      Please don't hesitate to call if you have any concerns.

## 2025-05-27 DIAGNOSIS — C79.51 SECONDARY MALIGNANT NEOPLASM OF BONE: ICD-10-CM

## 2025-05-27 DIAGNOSIS — C34.32 MALIGNANT NEOPLASM OF LOWER LOBE OF LEFT LUNG: Primary | ICD-10-CM

## 2025-05-28 ENCOUNTER — LAB VISIT (OUTPATIENT)
Dept: LAB | Facility: HOSPITAL | Age: 55
End: 2025-05-28
Payer: COMMERCIAL

## 2025-05-28 ENCOUNTER — RESULTS FOLLOW-UP (OUTPATIENT)
Dept: HEMATOLOGY/ONCOLOGY | Facility: HOSPITAL | Age: 55
End: 2025-05-28

## 2025-05-28 DIAGNOSIS — Z79.69 IMMUNODEFICIENCY DUE TO CHEMOTHERAPY: ICD-10-CM

## 2025-05-28 DIAGNOSIS — C79.51 SECONDARY MALIGNANT NEOPLASM OF BONE: ICD-10-CM

## 2025-05-28 DIAGNOSIS — C34.32 MALIGNANT NEOPLASM OF LOWER LOBE OF LEFT LUNG: ICD-10-CM

## 2025-05-28 DIAGNOSIS — T45.1X5A IMMUNODEFICIENCY DUE TO CHEMOTHERAPY: ICD-10-CM

## 2025-05-28 DIAGNOSIS — D84.821 IMMUNODEFICIENCY DUE TO CHEMOTHERAPY: ICD-10-CM

## 2025-05-28 LAB
ABSOLUTE EOSINOPHIL (OHS): 0.27 K/UL
ABSOLUTE MONOCYTE (OHS): 0.58 K/UL (ref 0.3–1)
ABSOLUTE NEUTROPHIL COUNT (OHS): 4.68 K/UL (ref 1.8–7.7)
ALBUMIN SERPL BCP-MCNC: 2.6 G/DL (ref 3.5–5.2)
ALP SERPL-CCNC: 183 UNIT/L (ref 40–150)
ALT SERPL W/O P-5'-P-CCNC: 23 UNIT/L (ref 10–44)
ANION GAP (OHS): 10 MMOL/L (ref 8–16)
AST SERPL-CCNC: 17 UNIT/L (ref 11–45)
BASOPHILS # BLD AUTO: 0.04 K/UL
BASOPHILS NFR BLD AUTO: 0.5 %
BILIRUB SERPL-MCNC: 0.5 MG/DL (ref 0.1–1)
BUN SERPL-MCNC: 14 MG/DL (ref 6–20)
CALCIUM SERPL-MCNC: 8.6 MG/DL (ref 8.7–10.5)
CHLORIDE SERPL-SCNC: 103 MMOL/L (ref 95–110)
CO2 SERPL-SCNC: 29 MMOL/L (ref 23–29)
CREAT SERPL-MCNC: 0.8 MG/DL (ref 0.5–1.4)
ERYTHROCYTE [DISTWIDTH] IN BLOOD BY AUTOMATED COUNT: 13.2 % (ref 11.5–14.5)
GFR SERPLBLD CREATININE-BSD FMLA CKD-EPI: >60 ML/MIN/1.73/M2
GLUCOSE SERPL-MCNC: 171 MG/DL (ref 70–110)
HCT VFR BLD AUTO: 36.9 % (ref 37–48.5)
HGB BLD-MCNC: 12.2 GM/DL (ref 12–16)
IMM GRANULOCYTES # BLD AUTO: 0.02 K/UL (ref 0–0.04)
IMM GRANULOCYTES NFR BLD AUTO: 0.2 % (ref 0–0.5)
INR PPP: 1.1 (ref 0.8–1.2)
LYMPHOCYTES # BLD AUTO: 2.53 K/UL (ref 1–4.8)
MCH RBC QN AUTO: 28 PG (ref 27–31)
MCHC RBC AUTO-ENTMCNC: 33.1 G/DL (ref 32–36)
MCV RBC AUTO: 85 FL (ref 82–98)
NUCLEATED RBC (/100WBC) (OHS): 0 /100 WBC
PLATELET # BLD AUTO: 168 K/UL (ref 150–450)
PMV BLD AUTO: 11.9 FL (ref 9.2–12.9)
POTASSIUM SERPL-SCNC: 3.8 MMOL/L (ref 3.5–5.1)
PROT SERPL-MCNC: 5.7 GM/DL (ref 6–8.4)
PROTHROMBIN TIME: 12 SECONDS (ref 9–12.5)
RBC # BLD AUTO: 4.36 M/UL (ref 4–5.4)
RELATIVE EOSINOPHIL (OHS): 3.3 %
RELATIVE LYMPHOCYTE (OHS): 31.2 % (ref 18–48)
RELATIVE MONOCYTE (OHS): 7.1 % (ref 4–15)
RELATIVE NEUTROPHIL (OHS): 57.7 % (ref 38–73)
SODIUM SERPL-SCNC: 142 MMOL/L (ref 136–145)
WBC # BLD AUTO: 8.12 K/UL (ref 3.9–12.7)

## 2025-05-28 PROCEDURE — 85025 COMPLETE CBC W/AUTO DIFF WBC: CPT

## 2025-05-28 PROCEDURE — 85610 PROTHROMBIN TIME: CPT

## 2025-05-28 PROCEDURE — 36415 COLL VENOUS BLD VENIPUNCTURE: CPT

## 2025-05-28 PROCEDURE — 80053 COMPREHEN METABOLIC PANEL: CPT

## 2025-05-30 ENCOUNTER — RESULTS FOLLOW-UP (OUTPATIENT)
Dept: INTERNAL MEDICINE | Facility: CLINIC | Age: 55
End: 2025-05-30

## 2025-05-30 DIAGNOSIS — E11.9 TYPE 2 DIABETES MELLITUS WITHOUT COMPLICATION, WITHOUT LONG-TERM CURRENT USE OF INSULIN: ICD-10-CM

## 2025-05-30 RX ORDER — GLIPIZIDE AND METFORMIN HCL 5; 500 MG/1; MG/1
TABLET, FILM COATED ORAL
Qty: 270 TABLET | Refills: 3 | Status: SHIPPED | OUTPATIENT
Start: 2025-05-30

## 2025-06-02 ENCOUNTER — TELEPHONE (OUTPATIENT)
Dept: RADIOLOGY | Facility: HOSPITAL | Age: 55
End: 2025-06-02
Payer: COMMERCIAL

## 2025-06-02 ENCOUNTER — HOSPITAL ENCOUNTER (OUTPATIENT)
Dept: RADIATION THERAPY | Facility: HOSPITAL | Age: 55
Discharge: HOME | End: 2025-06-02
Attending: SPECIALIST
Payer: COMMERCIAL

## 2025-06-03 ENCOUNTER — TELEPHONE (OUTPATIENT)
Dept: RADIOLOGY | Facility: HOSPITAL | Age: 55
End: 2025-06-03
Payer: COMMERCIAL

## 2025-06-04 ENCOUNTER — HOSPITAL ENCOUNTER (OUTPATIENT)
Dept: RADIOLOGY | Facility: HOSPITAL | Age: 55
Discharge: HOME | End: 2025-06-04
Payer: COMMERCIAL

## 2025-06-04 VITALS
DIASTOLIC BLOOD PRESSURE: 56 MMHG | BODY MASS INDEX: 50.02 KG/M2 | HEIGHT: 64 IN | RESPIRATION RATE: 17 BRPM | OXYGEN SATURATION: 100 % | SYSTOLIC BLOOD PRESSURE: 105 MMHG | HEART RATE: 63 BPM | WEIGHT: 293 LBS

## 2025-06-04 DIAGNOSIS — C34.32 MALIGNANT NEOPLASM OF LOWER LOBE OF LEFT LUNG: ICD-10-CM

## 2025-06-04 DIAGNOSIS — C79.51 SECONDARY MALIGNANT NEOPLASM OF BONE: ICD-10-CM

## 2025-06-04 PROCEDURE — C1830 POWER BONE MARROW BX NEEDLE: HCPCS

## 2025-06-04 PROCEDURE — 63600175 PHARM REV CODE 636 W HCPCS: Performed by: RADIOLOGY

## 2025-06-04 RX ORDER — FENTANYL CITRATE 50 UG/ML
INJECTION, SOLUTION INTRAMUSCULAR; INTRAVENOUS CODE/TRAUMA/SEDATION MEDICATION
Status: COMPLETED | OUTPATIENT
Start: 2025-06-04 | End: 2025-06-04

## 2025-06-04 RX ORDER — MIDAZOLAM HYDROCHLORIDE 1 MG/ML
INJECTION, SOLUTION INTRAMUSCULAR; INTRAVENOUS CODE/TRAUMA/SEDATION MEDICATION
Status: COMPLETED | OUTPATIENT
Start: 2025-06-04 | End: 2025-06-04

## 2025-06-04 RX ORDER — LIDOCAINE HYDROCHLORIDE 10 MG/ML
INJECTION, SOLUTION INFILTRATION; PERINEURAL CODE/TRAUMA/SEDATION MEDICATION
Status: COMPLETED | OUTPATIENT
Start: 2025-06-04 | End: 2025-06-04

## 2025-06-04 RX ADMIN — MIDAZOLAM HYDROCHLORIDE 1 MG: 1 INJECTION, SOLUTION INTRAMUSCULAR; INTRAVENOUS at 10:06

## 2025-06-04 RX ADMIN — FENTANYL CITRATE 50 MCG: 50 INJECTION, SOLUTION INTRAMUSCULAR; INTRAVENOUS at 10:06

## 2025-06-04 RX ADMIN — LIDOCAINE HYDROCHLORIDE 5 ML: 10 INJECTION, SOLUTION INFILTRATION; PERINEURAL at 10:06

## 2025-06-05 ENCOUNTER — LAB VISIT (OUTPATIENT)
Dept: LAB | Facility: HOSPITAL | Age: 55
End: 2025-06-05
Payer: COMMERCIAL

## 2025-06-05 DIAGNOSIS — C79.51 SECONDARY MALIGNANT NEOPLASM OF BONE: ICD-10-CM

## 2025-06-05 DIAGNOSIS — T45.1X5A IMMUNODEFICIENCY DUE TO CHEMOTHERAPY: ICD-10-CM

## 2025-06-05 DIAGNOSIS — D84.821 IMMUNODEFICIENCY DUE TO CHEMOTHERAPY: ICD-10-CM

## 2025-06-05 DIAGNOSIS — Z79.69 IMMUNODEFICIENCY DUE TO CHEMOTHERAPY: ICD-10-CM

## 2025-06-05 DIAGNOSIS — C34.32 MALIGNANT NEOPLASM OF LOWER LOBE OF LEFT LUNG: ICD-10-CM

## 2025-06-05 LAB
ABSOLUTE EOSINOPHIL (OHS): 0.29 K/UL
ABSOLUTE MONOCYTE (OHS): 0.67 K/UL (ref 0.3–1)
ABSOLUTE NEUTROPHIL COUNT (OHS): 5.12 K/UL (ref 1.8–7.7)
ALBUMIN SERPL BCP-MCNC: 2.6 G/DL (ref 3.5–5.2)
ALP SERPL-CCNC: 184 UNIT/L (ref 40–150)
ALT SERPL W/O P-5'-P-CCNC: 22 UNIT/L (ref 10–44)
ANION GAP (OHS): 7 MMOL/L (ref 8–16)
AST SERPL-CCNC: 17 UNIT/L (ref 11–45)
BASOPHILS # BLD AUTO: 0.05 K/UL
BASOPHILS NFR BLD AUTO: 0.6 %
BILIRUB SERPL-MCNC: 0.4 MG/DL (ref 0.1–1)
BUN SERPL-MCNC: 14 MG/DL (ref 6–20)
CALCIUM SERPL-MCNC: 8.4 MG/DL (ref 8.7–10.5)
CHLORIDE SERPL-SCNC: 104 MMOL/L (ref 95–110)
CO2 SERPL-SCNC: 29 MMOL/L (ref 23–29)
CREAT SERPL-MCNC: 0.9 MG/DL (ref 0.5–1.4)
ERYTHROCYTE [DISTWIDTH] IN BLOOD BY AUTOMATED COUNT: 12.9 % (ref 11.5–14.5)
GFR SERPLBLD CREATININE-BSD FMLA CKD-EPI: >60 ML/MIN/1.73/M2
GLUCOSE SERPL-MCNC: 158 MG/DL (ref 70–110)
HCT VFR BLD AUTO: 38 % (ref 37–48.5)
HGB BLD-MCNC: 12.3 GM/DL (ref 12–16)
IMM GRANULOCYTES # BLD AUTO: 0.02 K/UL (ref 0–0.04)
IMM GRANULOCYTES NFR BLD AUTO: 0.2 % (ref 0–0.5)
LYMPHOCYTES # BLD AUTO: 2.27 K/UL (ref 1–4.8)
MAGNESIUM SERPL-MCNC: 1.5 MG/DL (ref 1.6–2.6)
MCH RBC QN AUTO: 27.1 PG (ref 27–31)
MCHC RBC AUTO-ENTMCNC: 32.4 G/DL (ref 32–36)
MCV RBC AUTO: 84 FL (ref 82–98)
NUCLEATED RBC (/100WBC) (OHS): 0 /100 WBC
PHOSPHATE SERPL-MCNC: 3.6 MG/DL (ref 2.7–4.5)
PLATELET # BLD AUTO: 189 K/UL (ref 150–450)
PMV BLD AUTO: 11.2 FL (ref 9.2–12.9)
POTASSIUM SERPL-SCNC: 3.6 MMOL/L (ref 3.5–5.1)
PROT SERPL-MCNC: 5.9 GM/DL (ref 6–8.4)
RBC # BLD AUTO: 4.54 M/UL (ref 4–5.4)
RELATIVE EOSINOPHIL (OHS): 3.4 %
RELATIVE LYMPHOCYTE (OHS): 27 % (ref 18–48)
RELATIVE MONOCYTE (OHS): 8 % (ref 4–15)
RELATIVE NEUTROPHIL (OHS): 60.8 % (ref 38–73)
SODIUM SERPL-SCNC: 140 MMOL/L (ref 136–145)
WBC # BLD AUTO: 8.42 K/UL (ref 3.9–12.7)

## 2025-06-05 PROCEDURE — 36415 COLL VENOUS BLD VENIPUNCTURE: CPT | Mod: PN

## 2025-06-05 PROCEDURE — 83735 ASSAY OF MAGNESIUM: CPT

## 2025-06-05 PROCEDURE — 84075 ASSAY ALKALINE PHOSPHATASE: CPT

## 2025-06-05 PROCEDURE — 84100 ASSAY OF PHOSPHORUS: CPT

## 2025-06-05 PROCEDURE — 85025 COMPLETE CBC W/AUTO DIFF WBC: CPT

## 2025-06-06 ENCOUNTER — OFFICE VISIT (OUTPATIENT)
Dept: HEMATOLOGY/ONCOLOGY | Facility: CLINIC | Age: 55
End: 2025-06-06
Payer: COMMERCIAL

## 2025-06-06 ENCOUNTER — INFUSION (OUTPATIENT)
Dept: INFUSION THERAPY | Facility: HOSPITAL | Age: 55
End: 2025-06-06
Attending: RADIOLOGY
Payer: COMMERCIAL

## 2025-06-06 VITALS
SYSTOLIC BLOOD PRESSURE: 107 MMHG | HEART RATE: 63 BPM | OXYGEN SATURATION: 98 % | HEIGHT: 64 IN | DIASTOLIC BLOOD PRESSURE: 68 MMHG | TEMPERATURE: 97 F | BODY MASS INDEX: 50.02 KG/M2 | WEIGHT: 293 LBS

## 2025-06-06 VITALS
RESPIRATION RATE: 16 BRPM | WEIGHT: 293 LBS | BODY MASS INDEX: 50.02 KG/M2 | DIASTOLIC BLOOD PRESSURE: 52 MMHG | OXYGEN SATURATION: 98 % | HEART RATE: 57 BPM | SYSTOLIC BLOOD PRESSURE: 96 MMHG | TEMPERATURE: 98 F | HEIGHT: 64 IN

## 2025-06-06 DIAGNOSIS — C34.32 MALIGNANT NEOPLASM OF LOWER LOBE OF LEFT LUNG: ICD-10-CM

## 2025-06-06 DIAGNOSIS — D84.821 IMMUNODEFICIENCY DUE TO CHEMOTHERAPY: Primary | ICD-10-CM

## 2025-06-06 DIAGNOSIS — E66.01 MORBID OBESITY: ICD-10-CM

## 2025-06-06 DIAGNOSIS — E83.42 HYPOMAGNESEMIA: ICD-10-CM

## 2025-06-06 DIAGNOSIS — Z79.69 IMMUNODEFICIENCY DUE TO CHEMOTHERAPY: Primary | ICD-10-CM

## 2025-06-06 DIAGNOSIS — I26.99 OTHER ACUTE PULMONARY EMBOLISM WITHOUT ACUTE COR PULMONALE: ICD-10-CM

## 2025-06-06 DIAGNOSIS — T45.1X5A IMMUNODEFICIENCY DUE TO CHEMOTHERAPY: Primary | ICD-10-CM

## 2025-06-06 DIAGNOSIS — C34.32 MALIGNANT NEOPLASM OF LOWER LOBE OF LEFT LUNG: Primary | ICD-10-CM

## 2025-06-06 DIAGNOSIS — C79.51 SECONDARY MALIGNANT NEOPLASM OF BONE: ICD-10-CM

## 2025-06-06 LAB
DNA RANGE(S) EXAMINED NAR: NORMAL
GENE DIS ANL INTERP-IMP: POSITIVE
GENE DIS ASSESSED: NORMAL
REASON FOR STUDY: NORMAL
TEMPUS BLOOD TUMOR MUTATIONAL BURDEN: 6.1 M/MB
TEMPUS LCA: NORMAL
TEMPUS MSI NOTE: NORMAL
TEMPUS PORTAL: NORMAL
TEMPUS THERAPY1: NORMAL
TEMPUS THERAPY2: NORMAL
TEMPUS THERAPY3: NORMAL
TEMPUS THERAPY4: NORMAL
TEMPUS THERAPY5: NORMAL
TEMPUS THERAPYCOUNT: 5
TEMPUS TRIAL1: NORMAL
TEMPUS TRIAL2: NORMAL
TEMPUS TRIAL3: NORMAL
TEMPUS TRIALCOUNT: 3

## 2025-06-06 PROCEDURE — 63600175 PHARM REV CODE 636 W HCPCS: Performed by: NURSE PRACTITIONER

## 2025-06-06 PROCEDURE — 99999 PR PBB SHADOW E&M-EST. PATIENT-LVL V: CPT | Mod: PBBFAC,,, | Performed by: NURSE PRACTITIONER

## 2025-06-06 PROCEDURE — 96415 CHEMO IV INFUSION ADDL HR: CPT

## 2025-06-06 PROCEDURE — 25000003 PHARM REV CODE 250: Performed by: NURSE PRACTITIONER

## 2025-06-06 PROCEDURE — 96367 TX/PROPH/DG ADDL SEQ IV INF: CPT

## 2025-06-06 PROCEDURE — 96375 TX/PRO/DX INJ NEW DRUG ADDON: CPT

## 2025-06-06 PROCEDURE — 96413 CHEMO IV INFUSION 1 HR: CPT

## 2025-06-06 RX ORDER — DIPHENHYDRAMINE HYDROCHLORIDE 50 MG/ML
25 INJECTION, SOLUTION INTRAMUSCULAR; INTRAVENOUS
Status: COMPLETED | OUTPATIENT
Start: 2025-06-06 | End: 2025-06-06

## 2025-06-06 RX ORDER — LANOLIN ALCOHOL/MO/W.PET/CERES
400 CREAM (GRAM) TOPICAL DAILY
Qty: 200 TABLET | Refills: 1 | Status: SHIPPED | OUTPATIENT
Start: 2025-06-06 | End: 2026-07-11

## 2025-06-06 RX ORDER — SODIUM CHLORIDE 0.9 % (FLUSH) 0.9 %
10 SYRINGE (ML) INJECTION
Status: CANCELLED | OUTPATIENT
Start: 2025-06-06

## 2025-06-06 RX ORDER — EPINEPHRINE 0.3 MG/.3ML
0.3 INJECTION SUBCUTANEOUS ONCE AS NEEDED
Status: DISCONTINUED | OUTPATIENT
Start: 2025-06-06 | End: 2025-06-06 | Stop reason: HOSPADM

## 2025-06-06 RX ORDER — SODIUM CHLORIDE 0.9 % (FLUSH) 0.9 %
10 SYRINGE (ML) INJECTION
Status: DISCONTINUED | OUTPATIENT
Start: 2025-06-06 | End: 2025-06-06 | Stop reason: HOSPADM

## 2025-06-06 RX ORDER — ACETAMINOPHEN 325 MG/1
650 TABLET ORAL
Status: COMPLETED | OUTPATIENT
Start: 2025-06-06 | End: 2025-06-06

## 2025-06-06 RX ORDER — HEPARIN 100 UNIT/ML
500 SYRINGE INTRAVENOUS
Status: DISCONTINUED | OUTPATIENT
Start: 2025-06-06 | End: 2025-06-06 | Stop reason: HOSPADM

## 2025-06-06 RX ORDER — DIPHENHYDRAMINE HYDROCHLORIDE 50 MG/ML
50 INJECTION, SOLUTION INTRAMUSCULAR; INTRAVENOUS ONCE AS NEEDED
Status: CANCELLED | OUTPATIENT
Start: 2025-06-06

## 2025-06-06 RX ORDER — HEPARIN 100 UNIT/ML
500 SYRINGE INTRAVENOUS
Status: CANCELLED | OUTPATIENT
Start: 2025-06-06

## 2025-06-06 RX ORDER — DIPHENHYDRAMINE HYDROCHLORIDE 50 MG/ML
50 INJECTION, SOLUTION INTRAMUSCULAR; INTRAVENOUS ONCE AS NEEDED
Status: DISCONTINUED | OUTPATIENT
Start: 2025-06-06 | End: 2025-06-06 | Stop reason: HOSPADM

## 2025-06-06 RX ORDER — DIPHENHYDRAMINE HYDROCHLORIDE 50 MG/ML
25 INJECTION, SOLUTION INTRAMUSCULAR; INTRAVENOUS
Status: CANCELLED | OUTPATIENT
Start: 2025-06-06

## 2025-06-06 RX ORDER — ACETAMINOPHEN 325 MG/1
650 TABLET ORAL
Status: CANCELLED | OUTPATIENT
Start: 2025-06-06

## 2025-06-06 RX ORDER — EPINEPHRINE 0.3 MG/.3ML
0.3 INJECTION SUBCUTANEOUS ONCE AS NEEDED
Status: CANCELLED | OUTPATIENT
Start: 2025-06-06

## 2025-06-06 RX ADMIN — ACETAMINOPHEN 650 MG: 325 TABLET ORAL at 11:06

## 2025-06-06 RX ADMIN — DEXAMETHASONE SODIUM PHOSPHATE 0.25 MG: 4 INJECTION, SOLUTION INTRA-ARTICULAR; INTRALESIONAL; INTRAMUSCULAR; INTRAVENOUS; SOFT TISSUE at 11:06

## 2025-06-06 RX ADMIN — HEPARIN 500 UNITS: 100 SYRINGE at 01:06

## 2025-06-06 RX ADMIN — SODIUM CHLORIDE 1400 MG: 9 INJECTION, SOLUTION INTRAVENOUS at 11:06

## 2025-06-06 RX ADMIN — DIPHENHYDRAMINE HYDROCHLORIDE 25 MG: 50 INJECTION, SOLUTION INTRAMUSCULAR; INTRAVENOUS at 11:06

## 2025-06-09 ENCOUNTER — PATIENT OUTREACH (OUTPATIENT)
Dept: ADMINISTRATIVE | Facility: HOSPITAL | Age: 55
End: 2025-06-09
Payer: COMMERCIAL

## 2025-06-09 NOTE — LETTER
AUTHORIZATION FOR RELEASE OF   CONFIDENTIAL INFORMATION        We are seeing Shaneka Ahmadi, date of birth 1970, in the clinic at [facility]. Corina Wang NP is the patient's PCP. Shaneka Ahmadi has an outstanding lab/procedure at the time we reviewed her chart. In order to help keep her health information updated, she has authorized us to request the following medical record(s):                                                (  x)  EYE EXAM                Please fax records to [facility], Thomas, Alisha N., NP,  at [phone] or email to [email or web address].              Patient Name: Shaneka Ahmadi  : 1970  Patient Phone #: [phone]

## 2025-06-12 DIAGNOSIS — M25.562 LEFT KNEE PAIN, UNSPECIFIED CHRONICITY: ICD-10-CM

## 2025-06-12 RX ORDER — HYDROCODONE BITARTRATE AND ACETAMINOPHEN 10; 325 MG/1; MG/1
1 TABLET ORAL EVERY 6 HOURS PRN
Qty: 28 TABLET | Refills: 0 | Status: SHIPPED | OUTPATIENT
Start: 2025-06-12

## 2025-06-13 ENCOUNTER — OFFICE VISIT (OUTPATIENT)
Dept: RADIATION ONCOLOGY | Facility: CLINIC | Age: 55
End: 2025-06-13
Payer: COMMERCIAL

## 2025-06-13 VITALS
DIASTOLIC BLOOD PRESSURE: 73 MMHG | HEIGHT: 64 IN | OXYGEN SATURATION: 98 % | TEMPERATURE: 99 F | SYSTOLIC BLOOD PRESSURE: 138 MMHG | RESPIRATION RATE: 19 BRPM | BODY MASS INDEX: 50.02 KG/M2 | WEIGHT: 293 LBS | HEART RATE: 73 BPM

## 2025-06-13 DIAGNOSIS — C34.32 MALIGNANT NEOPLASM OF LOWER LOBE OF LEFT LUNG: ICD-10-CM

## 2025-06-13 DIAGNOSIS — C79.51 SECONDARY MALIGNANT NEOPLASM OF BONE: ICD-10-CM

## 2025-06-13 PROCEDURE — 77014 HC CT GUIDANCE RADIATION THERAPY FLDS PLACEMENT: CPT | Mod: TC | Performed by: RADIOLOGY

## 2025-06-13 PROCEDURE — 99999 PR PBB SHADOW E&M-EST. PATIENT-LVL V: CPT | Mod: PBBFAC,,, | Performed by: SPECIALIST

## 2025-06-13 PROCEDURE — 77334 RADIATION TREATMENT AID(S): CPT | Mod: TC | Performed by: RADIOLOGY

## 2025-06-13 PROCEDURE — 77290 THER RAD SIMULAJ FIELD CPLX: CPT | Mod: TC | Performed by: RADIOLOGY

## 2025-06-13 NOTE — PROGRESS NOTES
I have been asked to see this delightful 55-year-old with metastatic EGFR positive lung cancer to discuss palliative radiotherapy for pain in her 9th right rib and left sacrum.  History of present illness:  Mrs. Ahmadi developed a chronic cough and bronchitis ultimately not responding to antibiotics so a chest d x-ray was performed 11/11/2022 showing both left upper lobe and right lower lobe nodules.  CT scan performed 11/23/2022 showed the right lower lobe lesion and multiple lymph nodes to be old granulomatous disease.  She was noted to have a 2.9 cm left upper lobe lesion and multiple osseous metastases.  PET scanning performed 12/02/2022 confirmed multiple metastatic disease.  Left lung biopsy was performed 12/06/2022 showing EGFR positive adenocarcinoma.  She was started on carboplatin/Alimta for 4 cycles and had progressive disease.  She was subsequently changed to every 2 week right brief event and has done fairly well with this regimen other than some occasional rashes.  She has had known metastatic bone disease with CT scanning 04/22/2025 showing an increase in size of the right rib lesion and the left sacral lesion.  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  She is obese and has known degenerative joint disease of her knees but states that steroid injections in her knee on that side did not help.  She has also had fairly significant pain in her right rib over the last week which she would characterizes 6/10.  She takes 2 Norco a day and states his pain gets down to about 3/10.  She has no history of smoking or drinking.  Past medical history: Adult onset diabetes mellitus 2 years without complications  Hypertension longstanding without stroke or heart disease  Past surgical history: Extracapsular cataract extraction with intra-ocular lens OD  Bilateral tubal ligation  Allergies: Amoxicillin and lisinopril both cause mouth swelling  Habits: Per  history of present illness  Family history: Her maternal grandfather had lung cancer (smoker).  A maternal aunt had breast cancer.  Social: She lives in [place] with her .  He is present and supportive.  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  He is on disability due to cervical spine issues  Review of systems: Noncontributory  Her ECOG performance status is 1  Physical exam: She is a well-developed well-nourished woman in no apparent distress.  She is alert and oriented answering questions appropriately.  She has no palpable supraclavicular adenopathy  On cardiovascular exam she has a regular rhythm and rate without  Murmur rub or gallop  Her lungs are clear to auscultation  She has discomfort with percussion of her right rib as well as her sacrum  Impression: I believe her quality of life will be improved with palliative radiotherapy.  She currently has stage IV (T2 N2 M1c) adenocarcinoma of the lung and is enjoying a durable response to treatment.  Plan: She will be simulated today with plans to treat both these sites to a dose of 20 Gy in 5 fractions.  I have reviewed her previous palliative radiotherapy delivered to her left ischium and lumbar spine and there will be no overlap.  I certainly appreciate participating in this delightful woman's care.    45 minutes was spent reviewing records and face-to-face with the patient.

## 2025-06-18 ENCOUNTER — OFFICE VISIT (OUTPATIENT)
Dept: HEMATOLOGY/ONCOLOGY | Facility: CLINIC | Age: 55
End: 2025-06-18
Payer: COMMERCIAL

## 2025-06-18 ENCOUNTER — LAB VISIT (OUTPATIENT)
Dept: LAB | Facility: HOSPITAL | Age: 55
End: 2025-06-18
Payer: COMMERCIAL

## 2025-06-18 VITALS
WEIGHT: 293 LBS | OXYGEN SATURATION: 97 % | BODY MASS INDEX: 50.02 KG/M2 | TEMPERATURE: 98 F | DIASTOLIC BLOOD PRESSURE: 56 MMHG | HEIGHT: 64 IN | HEART RATE: 64 BPM | SYSTOLIC BLOOD PRESSURE: 100 MMHG

## 2025-06-18 DIAGNOSIS — Z79.69 IMMUNODEFICIENCY DUE TO CHEMOTHERAPY: ICD-10-CM

## 2025-06-18 DIAGNOSIS — C34.32 MALIGNANT NEOPLASM OF LOWER LOBE OF LEFT LUNG: ICD-10-CM

## 2025-06-18 DIAGNOSIS — C79.51 SECONDARY MALIGNANT NEOPLASM OF BONE: ICD-10-CM

## 2025-06-18 DIAGNOSIS — C34.32 MALIGNANT NEOPLASM OF LOWER LOBE OF LEFT LUNG: Primary | ICD-10-CM

## 2025-06-18 DIAGNOSIS — I26.99 OTHER ACUTE PULMONARY EMBOLISM WITHOUT ACUTE COR PULMONALE: ICD-10-CM

## 2025-06-18 DIAGNOSIS — D84.821 IMMUNODEFICIENCY DUE TO CHEMOTHERAPY: ICD-10-CM

## 2025-06-18 DIAGNOSIS — T45.1X5A IMMUNODEFICIENCY DUE TO CHEMOTHERAPY: ICD-10-CM

## 2025-06-18 DIAGNOSIS — C79.51 SECONDARY MALIGNANT NEOPLASM OF BONE: Primary | ICD-10-CM

## 2025-06-18 LAB
ABSOLUTE EOSINOPHIL (OHS): 0.29 K/UL
ABSOLUTE MONOCYTE (OHS): 0.64 K/UL (ref 0.3–1)
ABSOLUTE NEUTROPHIL COUNT (OHS): 6.74 K/UL (ref 1.8–7.7)
ALBUMIN SERPL BCP-MCNC: 2.6 G/DL (ref 3.5–5.2)
ALP SERPL-CCNC: 184 UNIT/L (ref 40–150)
ALT SERPL W/O P-5'-P-CCNC: 25 UNIT/L (ref 10–44)
ANION GAP (OHS): 9 MMOL/L (ref 8–16)
AST SERPL-CCNC: 18 UNIT/L (ref 11–45)
BASOPHILS # BLD AUTO: 0.04 K/UL
BASOPHILS NFR BLD AUTO: 0.4 %
BILIRUB SERPL-MCNC: 0.3 MG/DL (ref 0.1–1)
BUN SERPL-MCNC: 14 MG/DL (ref 6–20)
CALCIUM SERPL-MCNC: 8.6 MG/DL (ref 8.7–10.5)
CHLORIDE SERPL-SCNC: 106 MMOL/L (ref 95–110)
CO2 SERPL-SCNC: 27 MMOL/L (ref 23–29)
CREAT SERPL-MCNC: 0.8 MG/DL (ref 0.5–1.4)
ERYTHROCYTE [DISTWIDTH] IN BLOOD BY AUTOMATED COUNT: 12.9 % (ref 11.5–14.5)
GFR SERPLBLD CREATININE-BSD FMLA CKD-EPI: >60 ML/MIN/1.73/M2
GLUCOSE SERPL-MCNC: 166 MG/DL (ref 70–110)
HCT VFR BLD AUTO: 37.2 % (ref 37–48.5)
HGB BLD-MCNC: 12.2 GM/DL (ref 12–16)
IMM GRANULOCYTES # BLD AUTO: 0.04 K/UL (ref 0–0.04)
IMM GRANULOCYTES NFR BLD AUTO: 0.4 % (ref 0–0.5)
LYMPHOCYTES # BLD AUTO: 2.02 K/UL (ref 1–4.8)
MAGNESIUM SERPL-MCNC: 1.5 MG/DL (ref 1.6–2.6)
MCH RBC QN AUTO: 27.7 PG (ref 27–31)
MCHC RBC AUTO-ENTMCNC: 32.8 G/DL (ref 32–36)
MCV RBC AUTO: 84 FL (ref 82–98)
NUCLEATED RBC (/100WBC) (OHS): 0 /100 WBC
PHOSPHATE SERPL-MCNC: 3.9 MG/DL (ref 2.7–4.5)
PLATELET # BLD AUTO: 245 K/UL (ref 150–450)
PMV BLD AUTO: 10.3 FL (ref 9.2–12.9)
POTASSIUM SERPL-SCNC: 3.9 MMOL/L (ref 3.5–5.1)
PROT SERPL-MCNC: 5.9 GM/DL (ref 6–8.4)
RBC # BLD AUTO: 4.41 M/UL (ref 4–5.4)
RELATIVE EOSINOPHIL (OHS): 3 %
RELATIVE LYMPHOCYTE (OHS): 20.7 % (ref 18–48)
RELATIVE MONOCYTE (OHS): 6.6 % (ref 4–15)
RELATIVE NEUTROPHIL (OHS): 68.9 % (ref 38–73)
SODIUM SERPL-SCNC: 142 MMOL/L (ref 136–145)
WBC # BLD AUTO: 9.77 K/UL (ref 3.9–12.7)

## 2025-06-18 PROCEDURE — 1159F MED LIST DOCD IN RCRD: CPT | Mod: CPTII,S$GLB,, | Performed by: INTERNAL MEDICINE

## 2025-06-18 PROCEDURE — 99999 PR PBB SHADOW E&M-EST. PATIENT-LVL IV: CPT | Mod: PBBFAC,,, | Performed by: INTERNAL MEDICINE

## 2025-06-18 PROCEDURE — 82040 ASSAY OF SERUM ALBUMIN: CPT

## 2025-06-18 PROCEDURE — 99215 OFFICE O/P EST HI 40 MIN: CPT | Mod: S$GLB,,, | Performed by: INTERNAL MEDICINE

## 2025-06-18 PROCEDURE — 3074F SYST BP LT 130 MM HG: CPT | Mod: CPTII,S$GLB,, | Performed by: INTERNAL MEDICINE

## 2025-06-18 PROCEDURE — 83735 ASSAY OF MAGNESIUM: CPT

## 2025-06-18 PROCEDURE — 3078F DIAST BP <80 MM HG: CPT | Mod: CPTII,S$GLB,, | Performed by: INTERNAL MEDICINE

## 2025-06-18 PROCEDURE — 85025 COMPLETE CBC W/AUTO DIFF WBC: CPT

## 2025-06-18 PROCEDURE — 3051F HG A1C>EQUAL 7.0%<8.0%: CPT | Mod: CPTII,S$GLB,, | Performed by: INTERNAL MEDICINE

## 2025-06-18 PROCEDURE — 3008F BODY MASS INDEX DOCD: CPT | Mod: CPTII,S$GLB,, | Performed by: INTERNAL MEDICINE

## 2025-06-18 PROCEDURE — 3066F NEPHROPATHY DOC TX: CPT | Mod: CPTII,S$GLB,, | Performed by: INTERNAL MEDICINE

## 2025-06-18 PROCEDURE — 84100 ASSAY OF PHOSPHORUS: CPT

## 2025-06-18 PROCEDURE — 3061F NEG MICROALBUMINURIA REV: CPT | Mod: CPTII,S$GLB,, | Performed by: INTERNAL MEDICINE

## 2025-06-18 PROCEDURE — 36415 COLL VENOUS BLD VENIPUNCTURE: CPT

## 2025-06-18 RX ORDER — DIPHENHYDRAMINE HYDROCHLORIDE 50 MG/ML
25 INJECTION, SOLUTION INTRAMUSCULAR; INTRAVENOUS
Status: CANCELLED
Start: 2025-06-20

## 2025-06-18 RX ORDER — HEPARIN 100 UNIT/ML
500 SYRINGE INTRAVENOUS
Status: CANCELLED | OUTPATIENT
Start: 2025-06-20

## 2025-06-18 RX ORDER — EPINEPHRINE 0.3 MG/.3ML
0.3 INJECTION SUBCUTANEOUS ONCE AS NEEDED
Status: CANCELLED | OUTPATIENT
Start: 2025-06-20

## 2025-06-18 RX ORDER — DIPHENHYDRAMINE HYDROCHLORIDE 50 MG/ML
50 INJECTION, SOLUTION INTRAMUSCULAR; INTRAVENOUS ONCE AS NEEDED
Status: CANCELLED | OUTPATIENT
Start: 2025-06-20

## 2025-06-18 RX ORDER — SODIUM CHLORIDE 0.9 % (FLUSH) 0.9 %
10 SYRINGE (ML) INJECTION
Status: CANCELLED | OUTPATIENT
Start: 2025-06-20

## 2025-06-18 RX ORDER — ACETAMINOPHEN 325 MG/1
650 TABLET ORAL
Status: CANCELLED
Start: 2025-06-20

## 2025-06-18 NOTE — PROGRESS NOTES
Patient ID: Shaneka Ahmadi   Reason for Visit: Follow-up  MRN:  [id number]     Oncologic Diagnosis:  Stage IV (cT2, cN2, cM1) NSCLC, metastatic to bone  Previous Treatment:    Carbo/Alimta started in 1/2023; stopped due to progression after 4 cycles     Current Treatment:   OP NSCLC AMIVANTAMAB-VMJW ([facility]) Q2W   Pending radiation to R 9th rib and L sacral lesions  THERAPY SHELL - B12     Subjective   Shaneka Ahmadi is a 55 y.o. female who presents to clinic for follow-up.    I reviewed her imaging with her in detail and the pathology results from the bone lesion.  We reviewed that technically this represents progression of disease and consideration to changing her systemic therapy can be given.  She is going to have palliative radiation to the larger lesions.  I recommend she see our thoracic oncologist and she is in agreement with this.    Otherwise, she states that she feels well and has no acute complaints.       Review of Systems   Constitutional:  Negative for activity change, appetite change, chills, diaphoresis, fatigue, fever and unexpected weight change.   HENT:  Negative for nosebleeds.    Respiratory:  Negative for shortness of breath.    Cardiovascular:  Negative for chest pain.   Gastrointestinal:  Negative for abdominal distention, abdominal pain, anal bleeding, blood in stool, constipation, diarrhea, nausea and vomiting.   Genitourinary:  Negative for difficulty urinating and hematuria.   Musculoskeletal:  Negative for arthralgias, back pain and myalgias.   Skin:  Negative for rash.   Neurological:  Negative for dizziness, weakness, light-headedness and headaches.   Hematological:  Does not bruise/bleed easily.   Psychiatric/Behavioral:  The patient is not nervous/anxious.      History     Oncology History   Malignant neoplasm of lower lobe of left lung   12/9/2022 Initial Diagnosis    Malignant neoplasm of lower lobe of left lung     12/9/2022 Cancer Staged    Staging form: Lung, AJCC 8th  Edition  - Clinical stage from 12/9/2022: Stage IV (cT2, cN2, cM1)     12/27/2022 - 12/27/2022 Chemotherapy    Treatment Summary   Plan Name: OP PEMBROLIZUMAB 400MG Q6W  Treatment Goal: Control  Status: Inactive  Start Date:   End Date:   Provider: Reese Mcfarlane MD  Chemotherapy: [No matching medication found in this treatment plan]      Genetic Testing    Patient has genetic testing done for  TEmpus peripheral blood.                                              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     1/18/2023 - 1/18/2023 Chemotherapy    Treatment Summary   Plan Name: OP NSCLC AMIVANTAMAB-VMJW ([facility]) Q4W  Treatment Goal: Palliative  Status: Inactive  Start Date:   End Date:   Provider: Reese Mcfarlane MD  Chemotherapy: amivantamab-vmjw ([facility]) 350 mg in sodium chloride 0.9% SolP 250 mL chemo infusion, 350 mg (100 % of original dose 350 mg), Intravenous, Clinic/[facility] 1 time, 0 of 13 cycles  Dose modification: 350 mg (original dose 350 mg, Cycle 1), 1,050 mg (original dose 1,050 mg, Cycle 1), 1,400 mg (original dose 1,400 mg, Cycle 1)     1/27/2023 - 3/31/2023 Chemotherapy    Treatment Summary   Plan Name: OP NSCLC PEMETREXED + CARBOPLATIN (AUC) Q3W  Treatment Goal: Control  Status: Inactive  Start Date: 1/27/2023  End Date: 3/31/2023  Provider: Reese Mcfarlane MD  Chemotherapy: CARBOplatin (PARAPLATIN) 750 mg in sodium chloride 0.9% 335 mL chemo infusion, 750 mg (100 % of original dose 750 mg), Intravenous, Clinic/[facility] 1 time, 4 of 4 cycles  Dose modification:   (original dose 750 mg, Cycle 1, Reason: MD Discretion)  Administration: 750 mg (1/27/2023), 750 mg (2/17/2023), 750 mg (3/31/2023), 750 mg (3/10/2023)  PEMEtrexed disodium (ALIMTA) 1,200 mg in sodium chloride 0.9% SolP 100 mL chemo infusion, 1,250 mg, Intravenous, Clinic/H[facility] 1 time, 4 of 4 cycles  Administration: 1,200 mg (1/27/2023), 1,200 mg (2/17/2023), 1,200 mg (3/31/2023), 1,200 mg  (3/10/2023)     4/27/2023 -  Chemotherapy    Treatment Summary   Plan Name: OP NSCLC AMIVANTAMAB-VMJW ([facility]) Q4W  Treatment Goal: Palliative  Status: Active  Start Date: 4/27/2023  End Date: 7/18/2025 (Planned)  Provider: Resee Mcfarlane MD  Chemotherapy: amivantamab-vmjw ([facility]) 350 mg in sodium chloride 0.9% SolP 250 mL chemo infusion, 350 mg (100 % of original dose 350 mg), Intravenous, Clinic/[facility] 1 time, 28 of 29 cycles  Dose modification: 350 mg (original dose 350 mg, Cycle 1), 1,050 mg (original dose 1,050 mg, Cycle 1), 1,400 mg (original dose 1,400 mg, Cycle 1)  Administration: 350 mg (4/27/2023), 1,050 mg (4/28/2023), 1,400 mg (5/4/2023), 1,400 mg (5/11/2023), 1,400 mg (5/18/2023), 1,400 mg (5/25/2023), 1,400 mg (6/8/2023), 1,400 mg (6/22/2023), 1,400 mg (7/21/2023), 1,400 mg (8/4/2023), 1,400 mg (8/18/2023), 1,400 mg (9/1/2023), 1,400 mg (9/15/2023), 1,400 mg (9/29/2023), 1,400 mg (10/13/2023), 1,400 mg (10/27/2023), 1,400 mg (11/10/2023), 1,400 mg (11/24/2023), 1,400 mg (12/8/2023), 1,400 mg (12/22/2023), 1,400 mg (1/5/2024), 1,400 mg (1/19/2024), 1,400 mg (2/2/2024), 1,400 mg (2/16/2024), 1,400 mg (3/1/2024), 1,400 mg (3/15/2024), 1,400 mg (4/5/2024), 1,400 mg (4/19/2024), 1,400 mg (5/3/2024), 1,400 mg (5/17/2024), 1,400 mg (5/31/2024), 1,400 mg (6/14/2024), 1,400 mg (6/28/2024), 1,400 mg (7/12/2024), 1,400 mg (7/26/2024), 1,400 mg (8/9/2024), 1,400 mg (8/23/2024), 1,400 mg (9/6/2024), 1,400 mg (9/20/2024), 1,400 mg (10/4/2024), 1,400 mg (10/18/2024), 1,400 mg (11/1/2024), 1,400 mg (11/15/2024), 1,400 mg (11/29/2024), 1,400 mg (12/13/2024), 1,400 mg (1/3/2025), 1,400 mg (1/17/2025), 1,400 mg (1/31/2025), 1,400 mg (2/14/2025), 1,400 mg (2/28/2025), 1,400 mg (3/14/2025), 1,400 mg (3/28/2025), 1,400 mg (4/11/2025), 1,400 mg (4/25/2025), 1,400 mg (5/9/2025), 1,400 mg (5/23/2025), 1,400 mg (6/6/2025)           Past Medical History:   Diagnosis Date    Diabetes mellitus     Hypertension     Malignant  neoplasm of lower lobe of left lung 12/9/2022    Rash, drug 7/6/2023    OP NSCLC AMIVANTAMAB-VMJW ([facility]) Q4W      Secondary malignant neoplasm of bone 12/5/2022       Past Surgical History:   Procedure Laterality Date    CATARACT EXTRACTION W/  INTRAOCULAR LENS IMPLANT Right 06/02/2022    EXTRACTION OF TOOTH      FLUOROSCOPY N/A 01/26/2023    Procedure: FLUOROSCOPY/mediport placement;  Surgeon: Marlon Leone MD;  Location: [facility] CATH LAB;  Service: General;  Laterality: N/A;    TUBAL LIGATION      2007--postpartum tubal ligation       Family History   Problem Relation Name Age of Onset    Heart failure Father         Review of patient's allergies indicates:   Allergen Reactions    Lisinopril Other (See Comments)     coughing    Amoxicillin        Social History     Tobacco Use    Smoking status: Never     Passive exposure: Never    Smokeless tobacco: Never   Substance Use Topics    Alcohol use: Never    Drug use: Never       Labs   Labs:  Lab Visit on 06/18/2025   Component Date Value Ref Range Status    Sodium 06/18/2025 142  136 - 145 mmol/L Final    Potassium 06/18/2025 3.9  3.5 - 5.1 mmol/L Final    Chloride 06/18/2025 106  95 - 110 mmol/L Final    CO2 06/18/2025 27  23 - 29 mmol/L Final    Glucose 06/18/2025 166 (H)  70 - 110 mg/dL Final    BUN 06/18/2025 14  6 - 20 mg/dL Final    Creatinine 06/18/2025 0.8  0.5 - 1.4 mg/dL Final    Calcium 06/18/2025 8.6 (L)  8.7 - 10.5 mg/dL Final    Protein Total 06/18/2025 5.9 (L)  6.0 - 8.4 gm/dL Final    Albumin 06/18/2025 2.6 (L)  3.5 - 5.2 g/dL Final    Bilirubin Total 06/18/2025 0.3  0.1 - 1.0 mg/dL Final    For infants and newborns, interpretation of results should be based   on gestational age, weight and in agreement with clinical   observations.    Premature Infant recommended reference ranges:   0-24 hours:  <8.0 mg/dL   24-48 hours: <12.0 mg/dL   3-5 days:    <15.0 mg/dL   6-29 days:   <15.0 mg/dL    ALP 06/18/2025 184 (H)  40 - 150 unit/L Final    AST  06/18/2025 18  11 - 45 unit/L Final    ALT 06/18/2025 25  10 - 44 unit/L Final    Anion Gap 06/18/2025 9  8 - 16 mmol/L Final    eGFR 06/18/2025 >60  >60 mL/min/1.73/m2 Final    Estimated GFR calculated using the CKD-EPI creatinine (2021) equation.    Magnesium  06/18/2025 1.5 (L)  1.6 - 2.6 mg/dL Final    Phosphorus Level 06/18/2025 3.9  2.7 - 4.5 mg/dL Final    WBC 06/18/2025 9.77  3.90 - 12.70 K/uL Final    RBC 06/18/2025 4.41  4.00 - 5.40 M/uL Final    HGB 06/18/2025 12.2  12.0 - 16.0 gm/dL Final    HCT 06/18/2025 37.2  37.0 - 48.5 % Final    MCV 06/18/2025 84  82 - 98 fL Final    MCH 06/18/2025 27.7  27.0 - 31.0 pg Final    MCHC 06/18/2025 32.8  32.0 - 36.0 g/dL Final    RDW 06/18/2025 12.9  11.5 - 14.5 % Final    Platelet Count 06/18/2025 245  150 - 450 K/uL Final    MPV 06/18/2025 10.3  9.2 - 12.9 fL Final    Nucleated RBC 06/18/2025 0  <=0 /100 WBC Final    Neut % 06/18/2025 68.9  38 - 73 % Final    Lymph % 06/18/2025 20.7  18 - 48 % Final    Mono % 06/18/2025 6.6  4 - 15 % Final    Eos % 06/18/2025 3.0  <=8 % Final    Basophil % 06/18/2025 0.4  <=1.9 % Final    Imm Grans % 06/18/2025 0.4  0.0 - 0.5 % Final    Neut # 06/18/2025 6.74  1.8 - 7.7 K/uL Final    Lymph # 06/18/2025 2.02  1 - 4.8 K/uL Final    Mono # 06/18/2025 0.64  0.3 - 1 K/uL Final    Eos # 06/18/2025 0.29  <=0.5 K/uL Final    Baso # 06/18/2025 0.04  <=0.2 K/uL Final    Imm Grans # 06/18/2025 0.04  0.00 - 0.04 K/uL Final    Mild elevation in immature granulocytes is non specific and can be seen in a variety of conditions including stress response, acute inflammation, trauma and pregnancy. Correlation with other laboratory and clinical findings is essential.      Physical Exam     ECOG SCORE    0 - Fully active-able to carry on all pre-disease performance without restriction         Vitals:    06/18/25 1317   BP: (!) 100/56   Pulse: 64   Temp: 97.6 °F (36.4 °C)       Physical Exam  Constitutional:       General: She is not in acute distress.      Appearance: Normal appearance. She is normal weight. She is not ill-appearing, toxic-appearing or diaphoretic.   HENT:      Head: Normocephalic and atraumatic.   Eyes:      Extraocular Movements: Extraocular movements intact.      Conjunctiva/sclera: Conjunctivae normal.   Cardiovascular:      Rate and Rhythm: Normal rate.   Skin:     General: Skin is warm.   Neurological:      General: No focal deficit present.      Mental Status: She is alert and oriented to person, place, and time. Mental status is at baseline.   Psychiatric:         Mood and Affect: Mood normal.         Behavior: Behavior normal.         Thought Content: Thought content normal.       Imaging   CT CHEST ABDOMEN PELVIS WITH CONTRAST (XPD) - 10/19/23  Impression:  Stable exam compared to 07/11/2023.         CT CHEST ABDOMEN PELVIS WITH IV CONTRAST (XPD) - 01/30/2024  Impression:  1.    Minimal residual linear increased density periphery of the left upper lobe could represent residual left upper lobe lung nodule or residual scarring.  No new or enlarging pulmonary nodules or masses bilaterally.    2.    Nonspecific exophytic soft tissue density nodule projects posteriorly off of the posterior segment right lobe liver.  No change.  No definite evidence for metastatic disease throughout the abdomen or pelvis.     3.    Osseous metastatic disease significantly improved.        CT CHEST ABDOMEN PELVIS WITH IV CONTRAST (XPD) - 04/22/2025  CLINICAL HISTORY:  restaging for Stage IV  NSCLC, metastatic to bone on therapy;Malignant neoplasm of lower lobe, left bronchus or lung     TECHNIQUE:  Images from the lung apices to the ischial tuberosities were acquired after administration of 100cc of Omnipaque 350 IV contrast material.  Sagittal and Coronal multiplanar reconstructions (MPR) were performed. Positive oral contrast (1000ml of Omnipaque 12 oral solution) was used for a better assessment of the gastrointestinal tract.     COMPARISON:  01/10/2025;  08/22/2024     FINDINGS:  CT Chest:     Lower neck and chest wall: Right-sided port.Multinodular thyroid.     Lungs and pleura: Redemonstration of the bilateral granulomas.  Left Cecy fissural and basilar scarring/atelectasis.  Stable elevation of the left hemidiaphragm.     Mediastinum and isabelle: Redemonstration of the calcified mediastinal lymph nodes.     Heart and pericardium: Heart size is normal. No pericardial effusion.     Vessels: Incidentally noted is a filling defect in the segmental/subsegmental branches supplying the posterior right lower lung series 6, image 320.     CT abdomen and pelvis:     Liver: Mass effect along the right hepatic contour from the known right rib lesion.     Gallbladder and biliary tree: No high-density gallstones. Normal gallbladder wall thickness.No intra- or extrahepatic biliary ductal dilation.     Spleen: Normal.     Pancreas: Normal.     Adrenals: Redemonstration of the left adrenal nodule unchanged compared to prior study.     Kidneys and ureters: Low-density fat containing lesion involving the from upper pole of the left kidney unchanged since prior.  Findings compatible with angiomyolipoma.  Punctate right renal calculus without evidence of hydronephrosis.  Unchanged cystic lesion in the lower pole of the left kidney.     Bowel: No mechanical bowel obstruction is seen.     Vessels: No atheromatous disease is seen in the aorta and its major branches.     Lymph nodes: Stable lymph node adjacent to the right liver.     Bladder: Normal.     Reproductive organs: Fibroids in the uterus.     Abdominal wall: Normal.     Bones: Redemonstration of the sclerotic bone metastasis involving the spine, pelvis, left femoral head,  and left humeral head.  Interval enlargement of the left sacral lesion now measuring 1.9 x 1.8 cm previously measuring 1.2 by 0.9 mm.  Also noted are worsening destructive changes and soft tissue mass around the posterior aspect of the right 9th rib.      Impression:     1. Redemonstration of the findings compatible with osseous metastatic disease.  There is worsening destructive lesions and soft tissue density around the right 9th rib lesion and in the left sacrum.  2. Incidentally noted is a filling defect in the right segmental/subsegmental branches concerning for pulmonary embolism.  Dr. Nguyen notified Dr. Wilhelm at 11:10 04/22/2025 via secure chat.  Finding was made approximately 2 minutes prior notification     This report was flagged in Epic as abnormal.        Electronically signed by:Antonio Nguyen  Date:                                            04/22/2025  Time:                                           11:31        Assessment and Plan   Stage IV (cT2, cN2, cM1) NSCLC, Metastatic to Bone  Exon 20 EGFR mutation positive   Previously on Carbo/Alimta started in 1/2023; stopped due to progression after 4 cycles and started on Amivantamab  CT CAP scheduled 01/30/24: stable  Tolerating Amivantamab well; has mild intermittent rash on right side of face but no other notable side effects  Repeat CT CAP 04/22/2025 showed redemonstration of the findings compatible with osseous metastatic disease. There is worsening destructive lesions and soft tissue density around the right 9th rib lesion and in the left sacrum.   Plan  Patient with repeat bone biopsy; I do not see where the NGS was repeated so have requested this be done  Patient to transition care to our thoracic oncologist pending his review      Metastatic Bone Disease  Progressed R 9th rib and L sacral lesions noted on most recent restaging imaging  TB consensus 05/02/2025: palliative radiation to bone lesions  Patient to receive palliative radiation to these lesions - 20 Gy in 5 fractions to each lesion  Continue Calcium and Vitamin D      PE  Noted on routine imaging 04/22/2025  Patient started on Eliquis - Continue       Amivantamab Induced Rash  Continue topical clindamycin, and tretinoin   Continue  doxycycline per derm  Continue follow up with derm      Chronic Medical Conditions  DM II  Hx of COVID-19        Med Onc Chart Routing      Follow up with physician 2 weeks. Dr. Ambrose   Follow up with DOLLY    Infusion scheduling note   Amivantamab 06/20/25   Injection scheduling note    Labs    Imaging    Pharmacy appointment    Other referrals               The patient was seen, interviewed and examined. Pertinent lab and radiologic studies were reviewed. Pt instructed to call should they develop concerning signs/symptoms or have further questions.        Portions of the record may have been created with voice recognition software. Occasional wrong-word or sound-a-like substitutions may have occurred due to the inherent limitations of voice recognition software. Read the chart carefully and recognize, using context, where substitutions have occurred.      Mirtha Wilhelm MD    Hematology/Oncology

## 2025-06-20 ENCOUNTER — INFUSION (OUTPATIENT)
Dept: INFUSION THERAPY | Facility: HOSPITAL | Age: 55
End: 2025-06-20
Attending: RADIOLOGY
Payer: COMMERCIAL

## 2025-06-20 VITALS
RESPIRATION RATE: 16 BRPM | DIASTOLIC BLOOD PRESSURE: 65 MMHG | SYSTOLIC BLOOD PRESSURE: 116 MMHG | TEMPERATURE: 97 F | WEIGHT: 293 LBS | HEIGHT: 64 IN | HEART RATE: 61 BPM | OXYGEN SATURATION: 98 % | BODY MASS INDEX: 50.02 KG/M2

## 2025-06-20 DIAGNOSIS — C34.32 MALIGNANT NEOPLASM OF LOWER LOBE OF LEFT LUNG: Primary | ICD-10-CM

## 2025-06-20 PROCEDURE — 96415 CHEMO IV INFUSION ADDL HR: CPT

## 2025-06-20 PROCEDURE — A4216 STERILE WATER/SALINE, 10 ML: HCPCS | Performed by: INTERNAL MEDICINE

## 2025-06-20 PROCEDURE — 63600175 PHARM REV CODE 636 W HCPCS: Performed by: INTERNAL MEDICINE

## 2025-06-20 PROCEDURE — 25000003 PHARM REV CODE 250: Performed by: INTERNAL MEDICINE

## 2025-06-20 PROCEDURE — 96367 TX/PROPH/DG ADDL SEQ IV INF: CPT

## 2025-06-20 PROCEDURE — 96375 TX/PRO/DX INJ NEW DRUG ADDON: CPT

## 2025-06-20 PROCEDURE — 96413 CHEMO IV INFUSION 1 HR: CPT

## 2025-06-20 RX ORDER — EPINEPHRINE 0.3 MG/.3ML
0.3 INJECTION SUBCUTANEOUS ONCE AS NEEDED
Status: DISCONTINUED | OUTPATIENT
Start: 2025-06-20 | End: 2025-06-20 | Stop reason: HOSPADM

## 2025-06-20 RX ORDER — HEPARIN 100 UNIT/ML
500 SYRINGE INTRAVENOUS
Status: DISCONTINUED | OUTPATIENT
Start: 2025-06-20 | End: 2025-06-20 | Stop reason: HOSPADM

## 2025-06-20 RX ORDER — ACETAMINOPHEN 325 MG/1
650 TABLET ORAL
Status: COMPLETED | OUTPATIENT
Start: 2025-06-20 | End: 2025-06-20

## 2025-06-20 RX ORDER — SODIUM CHLORIDE 0.9 % (FLUSH) 0.9 %
10 SYRINGE (ML) INJECTION
Status: DISCONTINUED | OUTPATIENT
Start: 2025-06-20 | End: 2025-06-20 | Stop reason: HOSPADM

## 2025-06-20 RX ORDER — DIPHENHYDRAMINE HYDROCHLORIDE 50 MG/ML
50 INJECTION, SOLUTION INTRAMUSCULAR; INTRAVENOUS ONCE AS NEEDED
Status: DISCONTINUED | OUTPATIENT
Start: 2025-06-20 | End: 2025-06-20 | Stop reason: HOSPADM

## 2025-06-20 RX ORDER — DIPHENHYDRAMINE HYDROCHLORIDE 50 MG/ML
25 INJECTION, SOLUTION INTRAMUSCULAR; INTRAVENOUS
Status: COMPLETED | OUTPATIENT
Start: 2025-06-20 | End: 2025-06-20

## 2025-06-20 RX ADMIN — DIPHENHYDRAMINE HYDROCHLORIDE 25 MG: 50 INJECTION, SOLUTION INTRAMUSCULAR; INTRAVENOUS at 09:06

## 2025-06-20 RX ADMIN — ACETAMINOPHEN 650 MG: 325 TABLET ORAL at 09:06

## 2025-06-20 RX ADMIN — Medication 10 ML: at 11:06

## 2025-06-20 RX ADMIN — SODIUM CHLORIDE 1400 MG: 9 INJECTION, SOLUTION INTRAVENOUS at 09:06

## 2025-06-20 RX ADMIN — SODIUM CHLORIDE: 9 INJECTION, SOLUTION INTRAVENOUS at 09:06

## 2025-06-20 RX ADMIN — HEPARIN 500 UNITS: 100 SYRINGE at 11:06

## 2025-06-20 RX ADMIN — SODIUM CHLORIDE 0.25 MG: 9 INJECTION, SOLUTION INTRAVENOUS at 09:06

## 2025-06-20 NOTE — PLAN OF CARE
Problem: Adult Inpatient Plan of Care  Goal: Plan of Care Review  Outcome: Progressing  Flowsheets (Taken 6/20/2025 0916)  Plan of Care Reviewed With: patient  Goal: Patient-Specific Goal (Individualized)  Outcome: Progressing  Flowsheets (Taken 6/20/2025 0916)  Individualized Care Needs: pt likes feet up, blanket and gingerale  Anxieties, Fears or Concerns: pt denies  Patient/Family-Specific Goals (Include Timeframe): pt to tolerate chemo infusion without reaction     Problem: Chemotherapy Effects  Goal: Anemia Symptom Improvement  Outcome: Progressing  Goal: Safety Maintained  Outcome: Progressing  Goal: Absence of Hematuria  Outcome: Progressing  Goal: Nausea and Vomiting Relief  Outcome: Progressing  Goal: Neurotoxicity Symptom Control  Outcome: Progressing  Goal: Absence of Infection  Outcome: Progressing  Goal: Absence of Bleeding  Outcome: Progressing

## 2025-06-20 NOTE — DISCHARGE INSTRUCTIONS
[facility]  [address] [facility]  [address]  [phone] phone     [phone] fax  Hours of Operation: Monday- Friday 8:00am- 5:00pm  After hours phone  [phone]  Hematology / Oncology Physicians on call      SHAINA Nation Dr., NP Phaon Dunbar, NP Khelsea Conley, FNP    Please call with any concerns regarding your appointment today.

## 2025-07-01 ENCOUNTER — LAB VISIT (OUTPATIENT)
Dept: LAB | Facility: HOSPITAL | Age: 55
End: 2025-07-01
Attending: INTERNAL MEDICINE
Payer: COMMERCIAL

## 2025-07-01 ENCOUNTER — OFFICE VISIT (OUTPATIENT)
Dept: HEMATOLOGY/ONCOLOGY | Facility: CLINIC | Age: 55
End: 2025-07-01
Payer: COMMERCIAL

## 2025-07-01 ENCOUNTER — PATIENT MESSAGE (OUTPATIENT)
Dept: HEMATOLOGY/ONCOLOGY | Facility: CLINIC | Age: 55
End: 2025-07-01

## 2025-07-01 VITALS
HEIGHT: 64 IN | OXYGEN SATURATION: 95 % | WEIGHT: 293 LBS | HEART RATE: 66 BPM | BODY MASS INDEX: 50.02 KG/M2 | TEMPERATURE: 97 F | SYSTOLIC BLOOD PRESSURE: 98 MMHG | DIASTOLIC BLOOD PRESSURE: 50 MMHG

## 2025-07-01 DIAGNOSIS — J91.0 MALIGNANT PLEURAL EFFUSION: ICD-10-CM

## 2025-07-01 DIAGNOSIS — D84.821 IMMUNODEFICIENCY DUE TO CHEMOTHERAPY: ICD-10-CM

## 2025-07-01 DIAGNOSIS — C34.32 MALIGNANT NEOPLASM OF LOWER LOBE OF LEFT LUNG: Primary | ICD-10-CM

## 2025-07-01 DIAGNOSIS — C79.51 SECONDARY MALIGNANT NEOPLASM OF BONE: ICD-10-CM

## 2025-07-01 DIAGNOSIS — C34.32 MALIGNANT NEOPLASM OF LOWER LOBE OF LEFT LUNG: ICD-10-CM

## 2025-07-01 DIAGNOSIS — T45.1X5A IMMUNODEFICIENCY DUE TO CHEMOTHERAPY: ICD-10-CM

## 2025-07-01 DIAGNOSIS — Z79.69 IMMUNODEFICIENCY DUE TO CHEMOTHERAPY: ICD-10-CM

## 2025-07-01 LAB
ABSOLUTE EOSINOPHIL (OHS): 0.25 K/UL
ABSOLUTE MONOCYTE (OHS): 0.6 K/UL (ref 0.3–1)
ABSOLUTE NEUTROPHIL COUNT (OHS): 4.17 K/UL (ref 1.8–7.7)
ALBUMIN SERPL BCP-MCNC: 2.5 G/DL (ref 3.5–5.2)
ALP SERPL-CCNC: 185 UNIT/L (ref 40–150)
ALT SERPL W/O P-5'-P-CCNC: 22 UNIT/L (ref 10–44)
ANION GAP (OHS): 9 MMOL/L (ref 8–16)
AST SERPL-CCNC: 21 UNIT/L (ref 11–45)
BASOPHILS # BLD AUTO: 0.03 K/UL
BASOPHILS NFR BLD AUTO: 0.4 %
BILIRUB SERPL-MCNC: 0.4 MG/DL (ref 0.1–1)
BUN SERPL-MCNC: 15 MG/DL (ref 6–20)
CALCIUM SERPL-MCNC: 8.4 MG/DL (ref 8.7–10.5)
CHLORIDE SERPL-SCNC: 104 MMOL/L (ref 95–110)
CO2 SERPL-SCNC: 28 MMOL/L (ref 23–29)
CREAT SERPL-MCNC: 0.8 MG/DL (ref 0.5–1.4)
ERYTHROCYTE [DISTWIDTH] IN BLOOD BY AUTOMATED COUNT: 12.9 % (ref 11.5–14.5)
GFR SERPLBLD CREATININE-BSD FMLA CKD-EPI: >60 ML/MIN/1.73/M2
GLUCOSE SERPL-MCNC: 168 MG/DL (ref 70–110)
HCT VFR BLD AUTO: 36.6 % (ref 37–48.5)
HGB BLD-MCNC: 12.1 GM/DL (ref 12–16)
IMM GRANULOCYTES # BLD AUTO: 0.03 K/UL (ref 0–0.04)
IMM GRANULOCYTES NFR BLD AUTO: 0.4 % (ref 0–0.5)
LYMPHOCYTES # BLD AUTO: 1.85 K/UL (ref 1–4.8)
MAGNESIUM SERPL-MCNC: 1.6 MG/DL (ref 1.6–2.6)
MCH RBC QN AUTO: 27.9 PG (ref 27–31)
MCHC RBC AUTO-ENTMCNC: 33.1 G/DL (ref 32–36)
MCV RBC AUTO: 85 FL (ref 82–98)
NUCLEATED RBC (/100WBC) (OHS): 0 /100 WBC
PHOSPHATE SERPL-MCNC: 3.7 MG/DL (ref 2.7–4.5)
PLATELET # BLD AUTO: 169 K/UL (ref 150–450)
PMV BLD AUTO: 11.3 FL (ref 9.2–12.9)
POTASSIUM SERPL-SCNC: 3.8 MMOL/L (ref 3.5–5.1)
PROT SERPL-MCNC: 6 GM/DL (ref 6–8.4)
RBC # BLD AUTO: 4.33 M/UL (ref 4–5.4)
RELATIVE EOSINOPHIL (OHS): 3.6 %
RELATIVE LYMPHOCYTE (OHS): 26.7 % (ref 18–48)
RELATIVE MONOCYTE (OHS): 8.7 % (ref 4–15)
RELATIVE NEUTROPHIL (OHS): 60.2 % (ref 38–73)
SODIUM SERPL-SCNC: 141 MMOL/L (ref 136–145)
WBC # BLD AUTO: 6.93 K/UL (ref 3.9–12.7)

## 2025-07-01 PROCEDURE — 83735 ASSAY OF MAGNESIUM: CPT

## 2025-07-01 PROCEDURE — 99999 PR PBB SHADOW E&M-EST. PATIENT-LVL III: CPT | Mod: PBBFAC,,, | Performed by: HOSPITALIST

## 2025-07-01 PROCEDURE — 3074F SYST BP LT 130 MM HG: CPT | Mod: CPTII,S$GLB,, | Performed by: HOSPITALIST

## 2025-07-01 PROCEDURE — 3051F HG A1C>EQUAL 7.0%<8.0%: CPT | Mod: CPTII,S$GLB,, | Performed by: HOSPITALIST

## 2025-07-01 PROCEDURE — 3008F BODY MASS INDEX DOCD: CPT | Mod: CPTII,S$GLB,, | Performed by: HOSPITALIST

## 2025-07-01 PROCEDURE — 3078F DIAST BP <80 MM HG: CPT | Mod: CPTII,S$GLB,, | Performed by: HOSPITALIST

## 2025-07-01 PROCEDURE — 36415 COLL VENOUS BLD VENIPUNCTURE: CPT

## 2025-07-01 PROCEDURE — 3066F NEPHROPATHY DOC TX: CPT | Mod: CPTII,S$GLB,, | Performed by: HOSPITALIST

## 2025-07-01 PROCEDURE — 85025 COMPLETE CBC W/AUTO DIFF WBC: CPT

## 2025-07-01 PROCEDURE — 80053 COMPREHEN METABOLIC PANEL: CPT

## 2025-07-01 PROCEDURE — 3061F NEG MICROALBUMINURIA REV: CPT | Mod: CPTII,S$GLB,, | Performed by: HOSPITALIST

## 2025-07-01 PROCEDURE — G2211 COMPLEX E/M VISIT ADD ON: HCPCS | Mod: S$GLB,,, | Performed by: HOSPITALIST

## 2025-07-01 PROCEDURE — 84100 ASSAY OF PHOSPHORUS: CPT

## 2025-07-01 PROCEDURE — 99215 OFFICE O/P EST HI 40 MIN: CPT | Mod: S$GLB,,, | Performed by: HOSPITALIST

## 2025-07-01 NOTE — PROGRESS NOTES
[facility] MEDICAL ONCOLOGY - NEW PATIENT VISIT    Reason for visit: Adenocarcinoma of the L Lung      Oncology History   Malignant neoplasm of lower lobe of left lung   12/9/2022 Initial Diagnosis    12/6/22 -- L Lung Bx and Thoracentesis  Pulmonary Adenocarcinoma  No IHC reported on solid tumor  IHC on thoracentesis: Positive for CK7 and TTF1, Negative for CK20, KRYSTIN 3, CDX2, calretinin  [place] NGS / PD-L1  PD-L1 0%  Insufficient sample for NGS      12/27/22 -- [facility] Liquid Biopsy        1/27/23-3/31/23 -- Carboplatin/Pemetrexed      4/27/23-Present -- Amivantamab      4/22/25 -- CT C/A/P  Stable intrathoracic findings  Increasing 1.9 x 1.8 cm left sacral lesion, previously 1.2 x 0.9 cm  Worsening destructive changes in soft tissue mass around posterior aspect of right ninth rib  Redemonstration of sclerotic bone metastases involving spine, pelvis, left femoral head, and left humeral head  Incidentally found filling defect in right segmental/subsegmental branches        5/28/25 -- [facility] Liquid Biopsy         6/4/25 -- Sacral Bx  Metastatic Carcinoma  Positive:  CK7, AE1/AE3  Negative: TTF-1, KRYSTIN 3, CDX2, PAX8, ER, IA     12/9/2022 Cancer Staged    Staging form: Lung, AJCC 8th Edition  - Clinical stage from 12/9/2022: Stage IV (cT2, cN2, cM1)          Oncology History      HPI:     Shaneka Ahmadi is a 55 y.o. female with pmh significant for PE on eliquis, HTN, T2DM who presents to establish care for the below lung cancer. Her previous oncologists include Dr. Mcfarlane and Dr. Wilhelm.     1) Stage IVB ([id number]) adenocarcinoma of the left lung (EGFR exon 20 insertion, insufficient sample for PD-L1) w/ malignant pleural effusion and mets to the bones, initially diagnosed 12/6/22, palliative radiation therapy to left ischium in left spine (8 Gy/11 Fx, 1/10/23),  carboplatin/pemetrexed x4 cycles (1/27/23 - 3/31/23), currently on amivantamab (4/27/23 - present), radiographic  "evidence of progression in the L sacrum and R 9th rib (4/22/25) and bx proven progression in sacrum (6/4/25), currently receiving radiation therapy to the sites of progression (6/30/25-7/7/25)    Last radiation oncology visit (Dr. Tenorio) on 6/13/25, patient with durable response to treatment, plan to provide palliative radiation to both sites of progressive disease    The pt confirms that she started radiation therapy yesterday (6/30/25), and says that this will complete on 7/7/25.     Physically, the pt says that she has been doing well. She notes some pain over her R side, which overlies the R rib lesion, but says this resolved even before starting the radiation therapy.     She describes hoarseness, which has been since dental work since 5/2/25. She says that she has been given clearance for zometa. She says that she has never previously received zometa, stating that this was as she had not received her dental clearance.     She says that her breathing is "good".     She denies any issues with her fingers. She describes bumps over her face which is not particularly bothersome; she is using desonide per her dermatologist.     Pt denies N/V, diarrhea, constipation, new/worsening fatigue, or new/worsening cough.    Living Situation: She lives in [place] with her  and her two sons. She does not have stairs in her house.     Tobacco Use: She is a never smoker.     EtOH Use: She does not drink.     Employment / Exposure History: She works in a vitamin shop at a doctor's office.     Family Cancer History: Her maternal grandfather had lung cancer, her maternal aunt had breast cancer, and she otherwise denies a known family history of cancer.     History has been obtained by chart review and discussion with the patient.    ROS:   As per HPI.       Physical Exam:       BP (!) 98/50 (BP Location: Left arm, Patient Position: Sitting)   Pulse 66   Temp 97.2 °F (36.2 °C) (Temporal)   Ht 5' 4" (1.626 m)   Wt (!) " 151.6 kg (334 lb 3.5 oz)   LMP 05/01/2021 (Approximate)   SpO2 95%   BMI 57.37 kg/m²                Physical Exam  Constitutional:       Appearance: Normal appearance.   HENT:      Head: Normocephalic and atraumatic.   Eyes:      Extraocular Movements: Extraocular movements intact.      Conjunctiva/sclera: Conjunctivae normal.      Pupils: Pupils are equal, round, and reactive to light.   Cardiovascular:      Rate and Rhythm: Normal rate and regular rhythm.      Heart sounds: No murmur heard.     No friction rub. No gallop.   Pulmonary:      Effort: Pulmonary effort is normal.      Breath sounds: No wheezing, rhonchi or rales.   Musculoskeletal:         General: Normal range of motion.      Right lower leg: No edema.      Left lower leg: No edema.   Skin:     General: Skin is warm and dry.   Neurological:      Mental Status: She is alert and oriented to person, place, and time.   Psychiatric:         Mood and Affect: Mood normal.         Thought Content: Thought content normal.         Judgment: Judgment normal.           Labs:   No results found for this or any previous visit (from the past 48 hours).     Imaging:    See oncologic history above.     Path:  See oncologic history above.      Assessment and Plan:     Shaneka Ahmadi is a 55 y.o. female with pmh significant for PE on eliquis, HTN, T2DM who presents to establish care for the below lung cancer. Her previous oncologists include Dr. Mcfarlane and Dr. Wilhelm.     1) Stage IVB ([id number]) adenocarcinoma of the left lung (EGFR exon 20 insertion, insufficient sample for PD-L1) w/ malignant pleural effusion and mets to the bones, initially diagnosed 12/6/22, palliative radiation therapy to left ischium in left spine (8 Gy/11 Fx, 1/10/23),  carboplatin/pemetrexed x4 cycles (1/27/23 - 3/31/23), currently on amivantamab (4/27/23 - present), radiographic evidence of progression in the L sacrum and R 9th rib (4/22/25) and bx proven progression in sacrum (6/4/25),  currently receiving radiation therapy to the sites of progression (6/30/25-7/7/25)      Stage IVB Adenocarcinoma of the L Lung, EGFR Exon 20 Insertion  ECOG PS 1.  Patient presents to establish care.  Oncologic history as above, however briefly, she was initially diagnosed with stage IV pulmonary adenocarcinoma on 12/6/22 by thoracentesis.  [facility] liquid biopsy was consistent with an EGFR Exon 20 insertion mutation. She was also found to have diffuse osseous metastatic disease at that time. She received 4 cycles of carboplatin/pemetrexed (1/27/23-3/31/23) and then was transitioned to amivantamab on 4/27/23, which she has been on since.  A CT C/A/P from 4/22/25 demonstrated stable intrathoracic findings but an increasing left sacral lesion (1.9 x 1.8 cm, from 1.2 x 0.9 cm) as well as worsening destructive changes in the soft tissue mass around the posterior aspect of the right ninth rib; also found incidentally was a right segmental/subsegmental PE.  Repeat tempus liquid biopsy on 5/28/25 again confirmed the same EGFR Exon 20 insertion mutation ([id number]).  A biopsy of the sacral mass was consistent with metastatic pulmonary adenocarcinoma, solid NGS from which is pending.  Patient has established care with radiation oncology with plans to proceed with palliative radiation to both of the progressing osseous lesions, and she is to continue on amivantamab in the meantime.    Today (7/1/25), we discussed her diagnosis and treatment history.  I agree with the plan to continue current systemic therapy and treat the oligoprogressive disease with locally directed therapies, but discussed with the patient the increasing chance of other sites of distant failure.  Of note, the patient has been receiving amivantamab every 2 weeks, but for first-line EGFR Exon 20 insertion mutated adenocarcinoma, she may receive this every 3 weeks instead.  Discussed with the patient who is amenable to transitioning, and will confirm with  pharmacy no indication for staggered dose increase as with initial start of amivantamab.    PLAN  Continue amivantamab, will transition to q3w dosing given indication  Message sent regarding NGS on the recent bone biopsy (5/28/25)  Agree with radiation therapy to oligoprogressive osseous disease  MRI brain now given progression  Last imaging on 4/22/25, plan for PET CT (for better evaluation of the skeleton) now (~12 weeks from last) recognizing that this will not well evaluate the two treated sites of oligoprogression  Will discuss with pharmacy and make RTC plans accordingly  Pt prefers f/u at [facility]      Bone Mets  Patient with diffuse osseous metastatic disease.  Has not previously been on osteoclast inhibition.  Received dental clearance on 5/2/25.  Start Zometa q12w with next infusion  Continue calcium and Vit D3, will send refill      The above information has been reviewed with the patient and all questions have been answered to their apparent satisfaction.  They understand that they can call the clinic with any questions.    Darío Ambrose MD  Hematology/Oncology  [facility]      Route Chart for Scheduling    Med Onc Chart Routing      Follow up with physician . Sent in separate message.   Follow up with DOLLY    Infusion scheduling note    Injection scheduling note    Labs    Imaging    Pharmacy appointment    Other referrals                    Disclaimer: This note was prepared using voice recognition system and is likely to have sound alike errors.  Please call me with any questions.

## 2025-07-01 NOTE — Clinical Note
Tiffanie - I believe this patient should be receiving q3w amivantamab given this is first line EGFR exon 20 insertion treatment, but she has been getting q2w treatment at a different dose. Wanted to make sure I'm not missing something - if not, are you able to help transition her? And do we need to worry about staggered dosing with the new doses if so? I would anticipate not, but haven't transitioned between doses of amivantamab before.   Niko and Abril - can we get her follow up and labs (CBC, CMP) once we have the new dosing frequency?   Also hoping to get an MRI brain and PET CT now.   Thanks!

## 2025-07-03 ENCOUNTER — PATIENT MESSAGE (OUTPATIENT)
Dept: HEMATOLOGY/ONCOLOGY | Facility: CLINIC | Age: 55
End: 2025-07-03
Payer: COMMERCIAL

## 2025-07-03 ENCOUNTER — INFUSION (OUTPATIENT)
Dept: INFUSION THERAPY | Facility: HOSPITAL | Age: 55
End: 2025-07-03
Attending: RADIOLOGY
Payer: COMMERCIAL

## 2025-07-03 VITALS
SYSTOLIC BLOOD PRESSURE: 102 MMHG | HEART RATE: 61 BPM | OXYGEN SATURATION: 97 % | RESPIRATION RATE: 16 BRPM | HEIGHT: 64 IN | BODY MASS INDEX: 50.02 KG/M2 | TEMPERATURE: 97 F | WEIGHT: 293 LBS | DIASTOLIC BLOOD PRESSURE: 69 MMHG

## 2025-07-03 DIAGNOSIS — C34.32 MALIGNANT NEOPLASM OF LOWER LOBE OF LEFT LUNG: Primary | ICD-10-CM

## 2025-07-03 DIAGNOSIS — C79.51 SECONDARY MALIGNANT NEOPLASM OF BONE: ICD-10-CM

## 2025-07-03 PROCEDURE — 25000003 PHARM REV CODE 250: Performed by: HOSPITALIST

## 2025-07-03 PROCEDURE — 96413 CHEMO IV INFUSION 1 HR: CPT

## 2025-07-03 PROCEDURE — 63600175 PHARM REV CODE 636 W HCPCS: Performed by: HOSPITALIST

## 2025-07-03 PROCEDURE — A4216 STERILE WATER/SALINE, 10 ML: HCPCS | Performed by: HOSPITALIST

## 2025-07-03 PROCEDURE — 96415 CHEMO IV INFUSION ADDL HR: CPT

## 2025-07-03 PROCEDURE — 96375 TX/PRO/DX INJ NEW DRUG ADDON: CPT

## 2025-07-03 PROCEDURE — 96367 TX/PROPH/DG ADDL SEQ IV INF: CPT

## 2025-07-03 RX ORDER — SODIUM CHLORIDE 0.9 % (FLUSH) 0.9 %
10 SYRINGE (ML) INJECTION
Status: DISCONTINUED | OUTPATIENT
Start: 2025-07-03 | End: 2025-07-03 | Stop reason: HOSPADM

## 2025-07-03 RX ORDER — EPINEPHRINE 0.3 MG/.3ML
0.3 INJECTION SUBCUTANEOUS ONCE AS NEEDED
OUTPATIENT
Start: 2025-07-17

## 2025-07-03 RX ORDER — ZOLEDRONIC ACID 0.04 MG/ML
4 INJECTION, SOLUTION INTRAVENOUS
Status: COMPLETED | OUTPATIENT
Start: 2025-07-03 | End: 2025-07-03

## 2025-07-03 RX ORDER — DIPHENHYDRAMINE HYDROCHLORIDE 50 MG/ML
25 INJECTION, SOLUTION INTRAMUSCULAR; INTRAVENOUS
Start: 2025-07-17

## 2025-07-03 RX ORDER — ACETAMINOPHEN 325 MG/1
650 TABLET ORAL
Start: 2025-07-17

## 2025-07-03 RX ORDER — HEPARIN 100 UNIT/ML
500 SYRINGE INTRAVENOUS
Status: DISCONTINUED | OUTPATIENT
Start: 2025-07-03 | End: 2025-07-03 | Stop reason: HOSPADM

## 2025-07-03 RX ORDER — DIPHENHYDRAMINE HYDROCHLORIDE 50 MG/ML
50 INJECTION, SOLUTION INTRAMUSCULAR; INTRAVENOUS ONCE AS NEEDED
Status: CANCELLED | OUTPATIENT
Start: 2025-07-03

## 2025-07-03 RX ORDER — HEPARIN 100 UNIT/ML
500 SYRINGE INTRAVENOUS
OUTPATIENT
Start: 2025-07-17

## 2025-07-03 RX ORDER — SODIUM CHLORIDE 0.9 % (FLUSH) 0.9 %
10 SYRINGE (ML) INJECTION
Status: CANCELLED | OUTPATIENT
Start: 2025-07-03

## 2025-07-03 RX ORDER — DIPHENHYDRAMINE HYDROCHLORIDE 50 MG/ML
50 INJECTION, SOLUTION INTRAMUSCULAR; INTRAVENOUS ONCE AS NEEDED
OUTPATIENT
Start: 2025-07-17

## 2025-07-03 RX ORDER — SODIUM CHLORIDE 0.9 % (FLUSH) 0.9 %
10 SYRINGE (ML) INJECTION
OUTPATIENT
Start: 2025-07-17

## 2025-07-03 RX ORDER — HEPARIN 100 UNIT/ML
500 SYRINGE INTRAVENOUS
OUTPATIENT
Start: 2025-07-03

## 2025-07-03 RX ORDER — EPINEPHRINE 0.3 MG/.3ML
0.3 INJECTION SUBCUTANEOUS ONCE AS NEEDED
Status: CANCELLED | OUTPATIENT
Start: 2025-07-03

## 2025-07-03 RX ORDER — DIPHENHYDRAMINE HYDROCHLORIDE 50 MG/ML
25 INJECTION, SOLUTION INTRAMUSCULAR; INTRAVENOUS
Status: CANCELLED | OUTPATIENT
Start: 2025-07-03

## 2025-07-03 RX ORDER — ACETAMINOPHEN 325 MG/1
650 TABLET ORAL
Status: COMPLETED | OUTPATIENT
Start: 2025-07-03 | End: 2025-07-03

## 2025-07-03 RX ORDER — DIPHENHYDRAMINE HYDROCHLORIDE 50 MG/ML
25 INJECTION, SOLUTION INTRAMUSCULAR; INTRAVENOUS
Status: COMPLETED | OUTPATIENT
Start: 2025-07-03 | End: 2025-07-03

## 2025-07-03 RX ORDER — ACETAMINOPHEN 325 MG/1
650 TABLET ORAL
Status: CANCELLED | OUTPATIENT
Start: 2025-07-03

## 2025-07-03 RX ORDER — HEPARIN 100 UNIT/ML
500 SYRINGE INTRAVENOUS
Status: CANCELLED | OUTPATIENT
Start: 2025-07-03

## 2025-07-03 RX ORDER — SODIUM CHLORIDE 0.9 % (FLUSH) 0.9 %
10 SYRINGE (ML) INJECTION
OUTPATIENT
Start: 2025-07-03

## 2025-07-03 RX ADMIN — ACETAMINOPHEN 650 MG: 325 TABLET ORAL at 09:07

## 2025-07-03 RX ADMIN — ZOLEDRONIC ACID 4 MG: 0.04 INJECTION, SOLUTION INTRAVENOUS at 09:07

## 2025-07-03 RX ADMIN — SODIUM CHLORIDE 0.25 MG: 9 INJECTION, SOLUTION INTRAVENOUS at 10:07

## 2025-07-03 RX ADMIN — SODIUM CHLORIDE 1400 MG: 9 INJECTION, SOLUTION INTRAVENOUS at 10:07

## 2025-07-03 RX ADMIN — DIPHENHYDRAMINE HYDROCHLORIDE 25 MG: 50 INJECTION, SOLUTION INTRAMUSCULAR; INTRAVENOUS at 10:07

## 2025-07-03 RX ADMIN — Medication 10 ML: at 12:07

## 2025-07-03 RX ADMIN — SODIUM CHLORIDE: 9 INJECTION, SOLUTION INTRAVENOUS at 10:07

## 2025-07-03 RX ADMIN — HEPARIN 500 UNITS: 100 SYRINGE at 12:07

## 2025-07-03 NOTE — DISCHARGE INSTRUCTIONS
.[facility]  [address] [address]  [address]  [phone] phone     [phone] fax  Hours of Operation: Monday- Friday 8:00am- 5:00pm  After hours phone  [phone]  Hematology / Oncology Physicians on call    Dr. Denys Mendez           Nurse Practitioners:     Nanci Hawley, BRISA Caicedo, TATIANNA Islas, BRISA Kwon, BRISA Blackmon, NP    Please don't hesitate to call if you have any concerns.      FALL PREVENTION   Falls often occur due to slipping, tripping or losing your balance. Here are ways to reduce your risk of falling again.   Was there anything that caused your fall that can be fixed, removed or replaced?   Make your home safe by keeping walkways clear of objects you may trip over.   Use non-slip pads under rugs.   Do not walk in poorly lit areas.   Do not stand on chairs or wobbly ladders.   Use caution when reaching overhead or looking upward. This position can cause a loss of balance.   Be sure your shoes fit properly, have non-slip bottoms and are in good condition.   Be cautious when going up and down stairs, curbs, and when walking on uneven sidewalks.   If your balance is poor, consider using a cane or walker.   If your fall was related to alcohol use, stop or limit alcohol intake.   If your fall was related to use of sleeping medicines, talk to your doctor about this. You may need to reduce your dosage at bedtime if you awaken during the night to go to the bathroom.   To reduce the need for nighttime bathroom trips:   Avoid drinking fluids for several hours before going to bed   Empty your bladder before going to bed   Men can keep a urinal at the bedside   © 1111-5416 [employer], [address] [place] [address], [place] [address]. All rights reserved. This information is not intended as a substitute for professional medical care. Always follow your healthcare  professional's instructions.    WAYS TO HELP PREVENT INFECTION        WASH YOUR HANDS OFTEN DURING THE DAY, ESPECIALLY BEFORE YOU EAT, AFTER USING THE BATHROOM, AND AFTER TOUCHING ANIMALS    STAY AWAY FROM PEOPLE WHO HAVE ILLNESSES YOU CAN CATCH; SUCH AS COLDS, FLU, CHICKEN POX    TRY TO AVOID CROWDS    STAY AWAY FROM CHILDREN WHO RECENTLY HAVE RECEIVED LIVE VIRUS VACCINES    MAINTAIN GOOD MOUTH CARE    DO NOT SQUEEZE OR SCRATCH PIMPLES    CLEAN CUTS & SCRAPES RIGHT AWAY AND DAILY UNTIL HEALED WITH WARM WATER, SOAP & AN ANTISEPTIC    AVOID CONTACT WITH LITTER BOXES, BIRD CAGES, & FISH TANKS    AVOID STANDING WATER, IE., BIRD BATHS, FLOWER POTS/VASES, OR HUMIDIFIERS    WEAR GLOVES WHEN GARDENING OR CLEANING UP AFTER OTHERS, ESPECIALLY BABIES & SMALL CHILDREN    DO NOT EAT RAW FISH, SEAFOOD, MEAT, OR EGGS

## 2025-07-03 NOTE — PLAN OF CARE
Problem: Adult Inpatient Plan of Care  Goal: Plan of Care Review  Outcome: Progressing  Flowsheets (Taken 7/3/2025 1102)  Plan of Care Reviewed With: patient  Goal: Patient-Specific Goal (Individualized)  Outcome: Progressing  Flowsheets (Taken 7/3/2025 1102)  Individualized Care Needs: Reclined, warm blanket, gingerale and trail mix provided  Anxieties, Fears or Concerns: No concerns expressed  Patient/Family-Specific Goals (Include Timeframe): To tolerate infusion today  Goal: Optimal Comfort and Wellbeing  Outcome: Progressing  Intervention: Monitor Pain and Promote Comfort  Flowsheets (Taken 7/3/2025 1102)  Pain Management Interventions:   warm blanket provided   quiet environment facilitated   pillow support provided  Intervention: Provide Person-Centered Care  Flowsheets (Taken 7/3/2025 1102)  Trust Relationship/Rapport:   care explained   questions encouraged   choices provided   reassurance provided   emotional support provided   thoughts/feelings acknowledged   empathic listening provided   questions answered     Problem: Chemotherapy Effects  Goal: Nausea and Vomiting Relief  Outcome: Progressing  Intervention: Prevent or Manage Nausea and Vomiting  Flowsheets (Taken 7/3/2025 1102)  Environmental Support:   calm environment promoted   rest periods encouraged     Problem: Fall Injury Risk  Goal: Absence of Fall and Fall-Related Injury  Outcome: Progressing  Intervention: Identify and Manage Contributors  Flowsheets (Taken 7/3/2025 1102)  Self-Care Promotion: BADL personal objects within reach  Medication Review/Management:   medications reviewed   infusion initiated  Intervention: Promote Injury-Free Environment  Flowsheets (Taken 7/3/2025 1102)  Safety Promotion/Fall Prevention:   in recliner, wheels locked   instructed to call staff for mobility   high risk medications identified   lighting adjusted   medications reviewed

## 2025-07-07 ENCOUNTER — PATIENT MESSAGE (OUTPATIENT)
Dept: HEMATOLOGY/ONCOLOGY | Facility: CLINIC | Age: 55
End: 2025-07-07
Payer: COMMERCIAL

## 2025-07-09 DIAGNOSIS — L70.8 ACNEIFORM RASH: ICD-10-CM

## 2025-07-09 DIAGNOSIS — C79.51 SECONDARY MALIGNANT NEOPLASM OF BONE: Primary | ICD-10-CM

## 2025-07-09 RX ORDER — CLINDAMYCIN PHOSPHATE 10 MG/ML
SOLUTION TOPICAL
Qty: 60 EACH | Refills: 1 | Status: SHIPPED | OUTPATIENT
Start: 2025-07-09

## 2025-07-09 RX ORDER — PSYLLIUM HUSK 0.4 G
1 CAPSULE ORAL 2 TIMES DAILY
Qty: 180 TABLET | Refills: 3 | Status: SHIPPED | OUTPATIENT
Start: 2025-07-09 | End: 2026-07-09

## 2025-07-10 ENCOUNTER — PATIENT MESSAGE (OUTPATIENT)
Dept: HEMATOLOGY/ONCOLOGY | Facility: CLINIC | Age: 55
End: 2025-07-10
Payer: COMMERCIAL

## 2025-07-14 ENCOUNTER — HOSPITAL ENCOUNTER (OUTPATIENT)
Dept: RADIOLOGY | Facility: HOSPITAL | Age: 55
Discharge: HOME | End: 2025-07-14
Attending: HOSPITALIST
Payer: COMMERCIAL

## 2025-07-14 DIAGNOSIS — C34.32 MALIGNANT NEOPLASM OF LOWER LOBE OF LEFT LUNG: ICD-10-CM

## 2025-07-14 PROCEDURE — 78815 PET IMAGE W/CT SKULL-THIGH: CPT | Mod: TC

## 2025-07-14 PROCEDURE — 78815 PET IMAGE W/CT SKULL-THIGH: CPT | Mod: 26,PS,, | Performed by: RADIOLOGY

## 2025-07-14 PROCEDURE — A9552 F18 FDG: HCPCS | Performed by: HOSPITALIST

## 2025-07-14 RX ORDER — FLUDEOXYGLUCOSE F18 500 MCI/ML
11.96 INJECTION INTRAVENOUS
Status: COMPLETED | OUTPATIENT
Start: 2025-07-14 | End: 2025-07-14

## 2025-07-14 RX ADMIN — FLUDEOXYGLUCOSE F-18 11.96 MILLICURIE: 500 INJECTION INTRAVENOUS at 09:07

## 2025-07-17 ENCOUNTER — OFFICE VISIT (OUTPATIENT)
Dept: HEMATOLOGY/ONCOLOGY | Facility: CLINIC | Age: 55
End: 2025-07-17
Payer: COMMERCIAL

## 2025-07-17 ENCOUNTER — HOSPITAL ENCOUNTER (OUTPATIENT)
Dept: RADIOLOGY | Facility: HOSPITAL | Age: 55
Discharge: HOME | End: 2025-07-17
Attending: HOSPITALIST
Payer: COMMERCIAL

## 2025-07-17 DIAGNOSIS — C79.51 SECONDARY MALIGNANT NEOPLASM OF BONE: ICD-10-CM

## 2025-07-17 DIAGNOSIS — C34.32 MALIGNANT NEOPLASM OF LOWER LOBE OF LEFT LUNG: Primary | ICD-10-CM

## 2025-07-17 DIAGNOSIS — J91.0 MALIGNANT PLEURAL EFFUSION: ICD-10-CM

## 2025-07-17 DIAGNOSIS — C34.32 MALIGNANT NEOPLASM OF LOWER LOBE OF LEFT LUNG: ICD-10-CM

## 2025-07-17 PROCEDURE — 25500020 PHARM REV CODE 255: Mod: PN | Performed by: HOSPITALIST

## 2025-07-17 PROCEDURE — 70553 MRI BRAIN STEM W/O & W/DYE: CPT | Mod: TC,PN

## 2025-07-17 PROCEDURE — A9585 GADOBUTROL INJECTION: HCPCS | Mod: PN | Performed by: HOSPITALIST

## 2025-07-17 PROCEDURE — 70553 MRI BRAIN STEM W/O & W/DYE: CPT | Mod: 26,,, | Performed by: STUDENT IN AN ORGANIZED HEALTH CARE EDUCATION/TRAINING PROGRAM

## 2025-07-17 RX ORDER — GADOBUTROL 604.72 MG/ML
10 INJECTION INTRAVENOUS
Status: COMPLETED | OUTPATIENT
Start: 2025-07-17 | End: 2025-07-17

## 2025-07-17 RX ADMIN — GADOBUTROL 10 ML: 604.72 INJECTION INTRAVENOUS at 09:07

## 2025-07-17 NOTE — PROGRESS NOTES
[facility] MEDICAL ONCOLOGY - FOLLOW UP VISIT    Reason for visit: Adenocarcinoma of the L Lung    The patient location is: Patient's Vehicle (parked, not driving) - [place]    Visit type: Audiovisual    Each patient to who is provided medical services by telemedicine has been: (1) informed of the relationship between the physician and patient and the respective role of any other health care provider with respect to management of the patient; and (2) notified that he or she may decline to receive medical services by telemedicine and may withdraw from such care at any time.      Oncology History   Malignant neoplasm of lower lobe of left lung   12/9/2022 Initial Diagnosis    12/2/22 -- PET CT  FDG avid mass within STEPHAN with the associated mass/adenopathy in the AP window extending of the suprahilar region  FDG avid prevascular lymph node  Extensive osseous metastatic disease      12/6/22 -- L Lung Bx and Thoracentesis  Pulmonary Adenocarcinoma  No IHC reported on solid tumor  IHC on thoracentesis: Positive for CK7 and TTF1, Negative for CK20, KRYSTIN 3, CDX2, calretinin  Simon NGS / PD-L1  PD-L1 0%  Insufficient sample for NGS      12/27/22 -- [facility]opsy        1/27/23-3/31/23 -- Carboplatin/Pemetrexed      4/11/23 -- CT C/A/P  Decreasing 2.2 x 1.0 cm STEPHAN pulmonary nodule, previously 3.0 x 1.4 cm  Interval resolution of left pleural effusion  Stable 2.4 cm left adrenal nodule  Interval increase in number and size of numerous sclerotic osseous lesions throughout the lower cervical and thoracic spine, left humerus, inferior sternum, sacrum, pelvis, and femurs.      4/27/23-Present -- Amivantamab      4/22/25 -- CT C/A/P  Stable intrathoracic findings  Increasing 1.9 x 1.8 cm left sacral lesion, previously 1.2 x 0.9 cm  Worsening destructive changes in soft tissue mass around posterior aspect of right ninth rib  Redemonstration of sclerotic bone metastases involving  spine, pelvis, left femoral head, and left humeral head  Incidentally found filling defect in right segmental/subsegmental branches        5/28/25 -- [facility]         6/4/25 -- Sacral Bx  Metastatic Carcinoma  Positive:  CK7, AE1/AE3  Negative: TTF-1, KRYSTIN 3, CDX2, PAX8, ER, OH     12/9/2022 Cancer Staged    Staging form: Lung, AJCC 8th Edition  - Clinical stage from 12/9/2022: Stage IV (cT2, cN2, cM1)     7/14/2025 Imaging Significant Findings    PET CT  No pulmonary nodules or pleural effusions  No mediastinal, hilar, or axillary lymphadenopathy  Markedly avid skeletal lesions corresponding with sclerosis on CT scans, including left sacrum, right iliac bone, left humerus, sternum, cervical spine, thoracic spine, lumbar spine, bilateral femurs, and pelvis          Oncology History      HPI:     Shaneka Ahmadi is a 55 y.o. female with pmh significant for PE on eliquis, HTN, T2DM who presents to establish care for the below lung cancer. Her previous oncologists include Dr. Mcfarlane and Dr. Wilhelm.     1) Stage IVB ([id number]) adenocarcinoma of the left lung (EGFR exon 20 insertion, insufficient sample for PD-L1) w/ malignant pleural effusion and mets to the bones, initially diagnosed 12/6/22, palliative radiation therapy to left ischium in left spine (8 Gy/11 Fx, 1/10/23),  carboplatin/pemetrexed x4 cycles (1/27/23 - 3/31/23), reported progression, 2L amivantamab (4/27/23 - present), radiographic evidence of progression in the L sacrum and R 9th rib (4/22/25) and bx proven progression in sacrum (6/4/25), s/p radiation therapy to the sites of progression (6/30/25-7/7/25)    Last clinic 7/1/25, most recent imaging was increasing left sacral lesion and right ninth rib lesion.  Plan for radiation therapy with a progressing osseous lesions and continuance of amivantamab in the interim.  MRI brain now given progression elsewhere.  PET-CT now (12 weeks from last) for better skeletal evaluation.  Start Zometa with  "next infusions.    Interval History:  7/7/25: Completion of radiation therapy to the R 9th rib and sacrum.     The pt says that she has been feeling well today.     She says that she has been feeling fatigued since our last visit, and relates this both to the radiation therapy as well as to the zometa.     She says that with the zometa, she developed swelling and redness in her legs, but this has resolved over the past week (see patient messages).     She describes a nocturnal nosebleed, which she says was her first in over ten years. She says that her left eye was sore afterwards; she says this has been slowly improving.     She describes 3 days of diarrhea after her infusion, 5-6 episodes daily.     She says that she had a "bad break out" on her face, which has been typical of her course of amivantamab.     Pt denies N/V, constipation, rash, or new/worsening cough.    ROS:   As per HPI.       Physical Exam:       [facility] 05/01/2021 (Approximate)                Physical Exam  Constitutional:       Appearance: Normal appearance.   HENT:      Head: Normocephalic and atraumatic.   Eyes:      Extraocular Movements: Extraocular movements intact.      Conjunctiva/sclera: Conjunctivae normal.      Pupils: Pupils are equal, round, and reactive to light.   Cardiovascular:      Rate and Rhythm: Normal rate and regular rhythm.      Heart sounds: No murmur heard.     No friction rub. No gallop.   Pulmonary:      Effort: Pulmonary effort is normal.      Breath sounds: No wheezing, rhonchi or rales.   Musculoskeletal:         General: Normal range of motion.      Right lower leg: No edema.      Left lower leg: No edema.   Skin:     General: Skin is warm and dry.   Neurological:      Mental Status: She is alert and oriented to person, place, and time.   Psychiatric:         Mood and Affect: Mood normal.         Thought Content: Thought content normal.         Judgment: Judgment normal.           Labs:   Recent Results (from the past " 48 hours)   Comprehensive Metabolic Panel    Collection Time: 07/17/25  9:05 AM   Result Value Ref Range    Sodium 140 136 - 145 mmol/L    Potassium 3.6 3.5 - 5.1 mmol/L    Chloride 103 95 - 110 mmol/L    CO2 25 23 - 29 mmol/L    Glucose 181 (H) 70 - 110 mg/dL    BUN 11 6 - 20 mg/dL    Creatinine 0.8 0.5 - 1.4 mg/dL    Calcium 7.9 (L) 8.7 - 10.5 mg/dL    Protein Total 6.5 6.0 - 8.4 gm/dL    Albumin 2.5 (L) 3.5 - 5.2 g/dL    Bilirubin Total 0.5 0.1 - 1.0 mg/dL     (H) 40 - 150 unit/L    AST 21 11 - 45 unit/L    ALT 27 10 - 44 unit/L    Anion Gap 12 8 - 16 mmol/L    eGFR >60 >60 mL/min/1.73/m2   Magnesium    Collection Time: 07/17/25  9:05 AM   Result Value Ref Range    Magnesium  1.4 (L) 1.6 - 2.6 mg/dL   Phosphorus    Collection Time: 07/17/25  9:05 AM   Result Value Ref Range    Phosphorus Level 2.8 2.7 - 4.5 mg/dL   CBC with Differential    Collection Time: 07/17/25  9:05 AM   Result Value Ref Range    WBC 5.87 3.90 - 12.70 K/uL    RBC 4.47 4.00 - 5.40 M/uL    HGB 12.2 12.0 - 16.0 gm/dL    HCT 37.3 37.0 - 48.5 %    MCV 83 82 - 98 fL    MCH 27.3 27.0 - 31.0 pg    MCHC 32.7 32.0 - 36.0 g/dL    RDW 13.0 11.5 - 14.5 %    Platelet Count 227 150 - 450 K/uL    MPV 11.6 9.2 - 12.9 fL    Nucleated RBC 0 <=0 /100 WBC    Neut % 66.2 38 - 73 %    Lymph % 20.8 18 - 48 %    Mono % 8.0 4 - 15 %    Eos % 3.4 <=8 %    Basophil % 0.9 <=1.9 %    Imm Grans % 0.7 (H) 0.0 - 0.5 %    Neut # 3.89 1.8 - 7.7 K/uL    Lymph # 1.22 1 - 4.8 K/uL    Mono # 0.47 0.3 - 1 K/uL    Eos # 0.20 <=0.5 K/uL    Baso # 0.05 <=0.2 K/uL    Imm Grans # 0.04 0.00 - 0.04 K/uL        Imaging:    See oncologic history above.     Path:  See oncologic history above.      Assessment and Plan:     Shaneka Ahmadi is a 55 y.o. female with pmh significant for PE on eliquis, HTN, T2DM who presents to establish care for the below lung cancer. Her previous oncologists include Dr. Mcfarlane and Dr. Wilhelm.     1) Stage IVB ([id number]) adenocarcinoma of the left lung (EGFR  exon 20 insertion, insufficient sample for PD-L1) w/ malignant pleural effusion and mets to the bones, initially diagnosed 12/6/22, palliative radiation therapy to left ischium in left spine (8 Gy/11 Fx, 1/10/23),  carboplatin/pemetrexed x4 cycles (1/27/23 - 3/31/23), currently on amivantamab (4/27/23 - present), radiographic evidence of progression in the L sacrum and R 9th rib (4/22/25) and bx proven progression in sacrum (6/4/25), currently receiving radiation therapy to the sites of progression (6/30/25-7/7/25)      Stage IVB Adenocarcinoma of the L Lung, EGFR Exon 20 Insertion  ECOG PS 1. Pt is currently on amivantamab, tolerating with intermittent acneiform facial rash.  Most recent imaging (PET-CT, 7/14/25, obtained for better skeletal evaluation) is without evidence of intrathoracic disease, however demonstrates markedly avid osseous lesions through much of the skeleton.  On review of the prior PET-CT (12/2/22), many of these areas were previously evident and appear less FDG avid on most recent imaging, however there do appear to be some new areas (even outside of the known progression in the sacrum and right ninth rib).  It is difficult to ascertain whether this represents true progression or not, recognizing that controlled bone disease generally scleroses and becomes quiescent on FDG imaging.  Discussed with radiation oncology who was in agreement that it is unclear whether this represents true progression or not.  Plan to obtain short interval CT imaging in 6-8 weeks and will compare that against both the PET-CT from 7/14/25 as well as the CT C/A/P from 4/22/25.  Will continue amivantamab in the interim.  This was discussed at length with the patient who was in agreement with the plan.    Of note, the patient was initially transitioned from carboplatin/pemetrexed after 4 cycles to amivantamab due to reported progression on CT imaging.  On review of that CT scan (4/11/23), there was actual improvement in  the visceral disease (decreasing size of the STEPHAN pulmonary nodule as well as resolution of the left pleural effusion) but increased size and number of sclerotic osseous lesions.  This may in fact represent sclerotic treatment effect rather than neil progression.  At time of progression, may consider rechallenging with carboplatin/pemetrexed, recognizing relatively poor next-line options otherwise.    PLAN  Proceed with C 29 D15 amivantamab tomorrow (7/18/25), labs acceptable and treatment signed  Labs (CBC, CMP), RTC, and C30D1 amivantamab on 8/5/25 (slight delay to realign schedule)  CT C/A/P 6-8 weeks from last imaging (~9/8/25)  MRI brain (7/17/25) with nonspecific increased T2 FLAIR hyperintense signal in inferior medial right parietal lobe without corresponding postcontrast enhancement, short-term follow up recommended; repeat MRI brain with repeat imaging  Pt prefers f/u at [facility]      Bone Mets  Patient with diffuse osseous metastatic disease.  Has not previously been on osteoclast inhibition.  Received dental clearance on 5/2/25.  Reports difficulty with tolerating Zometa, including lower extremity rash and swelling, fatigue, and diarrhea.  Message sent regarding transition to denosumab  Continue calcium and Vit D3      The above information has been reviewed with the patient and all questions have been answered to their apparent satisfaction.  They understand that they can call the clinic with any questions.    Darío Ambrose MD  Hematology/Oncology  [facility]      Route Chart for Scheduling    Med Onc Chart Routing      Follow up with physician .  Labs (CBC, CMP), RTC, and C30D1 amivantamab on 8/5/25 (slight delay to realign schedule)   Follow up with DOLLY    Infusion scheduling note    Injection scheduling note    Labs    Imaging    Pharmacy appointment    Other referrals                    Disclaimer: This note was prepared using voice recognition system and is likely to have  sound alike errors.  Please call me with any questions.

## 2025-07-17 NOTE — Clinical Note
Sounds like she had difficulty with the zometa (swelling, redness in legs, fatigue, diarrhea) which isn't necessarily typical, but was new since the addition of the zometa. Feasibility of switching to denosumab, from an insurance standpoint?

## 2025-07-18 ENCOUNTER — INFUSION (OUTPATIENT)
Dept: INFUSION THERAPY | Facility: HOSPITAL | Age: 55
End: 2025-07-18
Attending: RADIOLOGY
Payer: COMMERCIAL

## 2025-07-18 VITALS
DIASTOLIC BLOOD PRESSURE: 51 MMHG | WEIGHT: 293 LBS | BODY MASS INDEX: 50.02 KG/M2 | TEMPERATURE: 98 F | SYSTOLIC BLOOD PRESSURE: 106 MMHG | HEIGHT: 64 IN | OXYGEN SATURATION: 98 % | RESPIRATION RATE: 16 BRPM | HEART RATE: 65 BPM

## 2025-07-18 DIAGNOSIS — C34.32 MALIGNANT NEOPLASM OF LOWER LOBE OF LEFT LUNG: Primary | ICD-10-CM

## 2025-07-18 PROCEDURE — 63600175 PHARM REV CODE 636 W HCPCS: Performed by: HOSPITALIST

## 2025-07-18 PROCEDURE — 96367 TX/PROPH/DG ADDL SEQ IV INF: CPT

## 2025-07-18 PROCEDURE — 96413 CHEMO IV INFUSION 1 HR: CPT

## 2025-07-18 PROCEDURE — 25000003 PHARM REV CODE 250: Performed by: HOSPITALIST

## 2025-07-18 PROCEDURE — 96375 TX/PRO/DX INJ NEW DRUG ADDON: CPT

## 2025-07-18 PROCEDURE — 96415 CHEMO IV INFUSION ADDL HR: CPT

## 2025-07-18 RX ORDER — ACETAMINOPHEN 325 MG/1
650 TABLET ORAL
Status: COMPLETED | OUTPATIENT
Start: 2025-07-18 | End: 2025-07-18

## 2025-07-18 RX ORDER — DIPHENHYDRAMINE HYDROCHLORIDE 50 MG/ML
25 INJECTION, SOLUTION INTRAMUSCULAR; INTRAVENOUS
Status: COMPLETED | OUTPATIENT
Start: 2025-07-18 | End: 2025-07-18

## 2025-07-18 RX ORDER — HEPARIN 100 UNIT/ML
500 SYRINGE INTRAVENOUS
Status: DISCONTINUED | OUTPATIENT
Start: 2025-07-18 | End: 2025-07-18 | Stop reason: HOSPADM

## 2025-07-18 RX ADMIN — SODIUM CHLORIDE 1400 MG: 9 INJECTION, SOLUTION INTRAVENOUS at 09:07

## 2025-07-18 RX ADMIN — ACETAMINOPHEN 650 MG: 325 TABLET ORAL at 09:07

## 2025-07-18 RX ADMIN — HEPARIN 500 UNITS: 100 SYRINGE at 11:07

## 2025-07-18 RX ADMIN — SODIUM CHLORIDE 0.25 MG: 9 INJECTION, SOLUTION INTRAVENOUS at 09:07

## 2025-07-18 RX ADMIN — DIPHENHYDRAMINE HYDROCHLORIDE 25 MG: 50 INJECTION, SOLUTION INTRAMUSCULAR; INTRAVENOUS at 09:07

## 2025-07-18 RX ADMIN — SODIUM CHLORIDE: 9 INJECTION, SOLUTION INTRAVENOUS at 09:07

## 2025-07-18 NOTE — PLAN OF CARE
Problem: Adult Inpatient Plan of Care  Goal: Plan of Care Review  Outcome: Progressing  Flowsheets (Taken 7/18/2025 0958)  Plan of Care Reviewed With: patient  Goal: Patient-Specific Goal (Individualized)  Outcome: Progressing  Flowsheets (Taken 7/18/2025 0958)  Individualized Care Needs: 2 warm blankets, pillow, snack  Anxieties, Fears or Concerns: none verbalized     Problem: Infection  Goal: Absence of Infection Signs and Symptoms  Outcome: Progressing  Intervention: Prevent or Manage Infection  Flowsheets (Taken 7/18/2025 0958)  Infection Management: aseptic technique maintained

## 2025-07-22 ENCOUNTER — PATIENT MESSAGE (OUTPATIENT)
Dept: HEMATOLOGY/ONCOLOGY | Facility: CLINIC | Age: 55
End: 2025-07-22
Payer: COMMERCIAL

## 2025-07-31 ENCOUNTER — LAB VISIT (OUTPATIENT)
Dept: LAB | Facility: HOSPITAL | Age: 55
End: 2025-07-31
Payer: COMMERCIAL

## 2025-07-31 DIAGNOSIS — D84.821 IMMUNODEFICIENCY DUE TO CHEMOTHERAPY: ICD-10-CM

## 2025-07-31 DIAGNOSIS — T45.1X5A IMMUNODEFICIENCY DUE TO CHEMOTHERAPY: ICD-10-CM

## 2025-07-31 DIAGNOSIS — C34.32 MALIGNANT NEOPLASM OF LOWER LOBE OF LEFT LUNG: ICD-10-CM

## 2025-07-31 DIAGNOSIS — C79.51 SECONDARY MALIGNANT NEOPLASM OF BONE: ICD-10-CM

## 2025-07-31 DIAGNOSIS — Z79.69 IMMUNODEFICIENCY DUE TO CHEMOTHERAPY: ICD-10-CM

## 2025-07-31 LAB
ABSOLUTE EOSINOPHIL (OHS): 0.32 K/UL
ABSOLUTE MONOCYTE (OHS): 0.79 K/UL (ref 0.3–1)
ABSOLUTE NEUTROPHIL COUNT (OHS): 4.76 K/UL (ref 1.8–7.7)
ALBUMIN SERPL BCP-MCNC: 2.6 G/DL (ref 3.5–5.2)
ALP SERPL-CCNC: 179 UNIT/L (ref 40–150)
ALT SERPL W/O P-5'-P-CCNC: 35 UNIT/L (ref 10–44)
ANION GAP (OHS): 10 MMOL/L (ref 8–16)
AST SERPL-CCNC: 31 UNIT/L (ref 11–45)
BASOPHILS # BLD AUTO: 0.07 K/UL
BASOPHILS NFR BLD AUTO: 1 %
BILIRUB SERPL-MCNC: 0.4 MG/DL (ref 0.1–1)
BUN SERPL-MCNC: 13 MG/DL (ref 6–20)
CALCIUM SERPL-MCNC: 8 MG/DL (ref 8.7–10.5)
CHLORIDE SERPL-SCNC: 105 MMOL/L (ref 95–110)
CO2 SERPL-SCNC: 25 MMOL/L (ref 23–29)
CREAT SERPL-MCNC: 0.7 MG/DL (ref 0.5–1.4)
ERYTHROCYTE [DISTWIDTH] IN BLOOD BY AUTOMATED COUNT: 13.8 % (ref 11.5–14.5)
GFR SERPLBLD CREATININE-BSD FMLA CKD-EPI: >60 ML/MIN/1.73/M2
GLUCOSE SERPL-MCNC: 176 MG/DL (ref 70–110)
HCT VFR BLD AUTO: 37.6 % (ref 37–48.5)
HGB BLD-MCNC: 12.6 GM/DL (ref 12–16)
HOLD SPECIMEN: NORMAL
IMM GRANULOCYTES # BLD AUTO: 0.03 K/UL (ref 0–0.04)
IMM GRANULOCYTES NFR BLD AUTO: 0.4 % (ref 0–0.5)
LYMPHOCYTES # BLD AUTO: 1.26 K/UL (ref 1–4.8)
MAGNESIUM SERPL-MCNC: 1.9 MG/DL (ref 1.6–2.6)
MCH RBC QN AUTO: 28.3 PG (ref 27–31)
MCHC RBC AUTO-ENTMCNC: 33.5 G/DL (ref 32–36)
MCV RBC AUTO: 84 FL (ref 82–98)
NUCLEATED RBC (/100WBC) (OHS): 0 /100 WBC
PHOSPHATE SERPL-MCNC: 2.8 MG/DL (ref 2.7–4.5)
PLATELET # BLD AUTO: 171 K/UL (ref 150–450)
PMV BLD AUTO: 10.9 FL (ref 9.2–12.9)
POTASSIUM SERPL-SCNC: 3.3 MMOL/L (ref 3.5–5.1)
PROT SERPL-MCNC: 6 GM/DL (ref 6–8.4)
RBC # BLD AUTO: 4.46 M/UL (ref 4–5.4)
RELATIVE EOSINOPHIL (OHS): 4.4 %
RELATIVE LYMPHOCYTE (OHS): 17.4 % (ref 18–48)
RELATIVE MONOCYTE (OHS): 10.9 % (ref 4–15)
RELATIVE NEUTROPHIL (OHS): 65.9 % (ref 38–73)
SODIUM SERPL-SCNC: 140 MMOL/L (ref 136–145)
WBC # BLD AUTO: 7.23 K/UL (ref 3.9–12.7)

## 2025-07-31 PROCEDURE — 84450 TRANSFERASE (AST) (SGOT): CPT

## 2025-07-31 PROCEDURE — 84100 ASSAY OF PHOSPHORUS: CPT

## 2025-07-31 PROCEDURE — 83735 ASSAY OF MAGNESIUM: CPT

## 2025-07-31 PROCEDURE — 36415 COLL VENOUS BLD VENIPUNCTURE: CPT

## 2025-07-31 PROCEDURE — 85025 COMPLETE CBC W/AUTO DIFF WBC: CPT

## 2025-08-01 ENCOUNTER — INFUSION (OUTPATIENT)
Dept: INFUSION THERAPY | Facility: HOSPITAL | Age: 55
End: 2025-08-01
Attending: RADIOLOGY
Payer: COMMERCIAL

## 2025-08-01 ENCOUNTER — OFFICE VISIT (OUTPATIENT)
Dept: HEMATOLOGY/ONCOLOGY | Facility: CLINIC | Age: 55
End: 2025-08-01
Payer: COMMERCIAL

## 2025-08-01 VITALS
BODY MASS INDEX: 50.02 KG/M2 | HEART RATE: 64 BPM | SYSTOLIC BLOOD PRESSURE: 104 MMHG | RESPIRATION RATE: 18 BRPM | TEMPERATURE: 97 F | WEIGHT: 293 LBS | DIASTOLIC BLOOD PRESSURE: 58 MMHG | OXYGEN SATURATION: 97 % | HEIGHT: 64 IN

## 2025-08-01 DIAGNOSIS — C34.32 MALIGNANT NEOPLASM OF LOWER LOBE OF LEFT LUNG: Primary | ICD-10-CM

## 2025-08-01 DIAGNOSIS — J91.0 MALIGNANT PLEURAL EFFUSION: ICD-10-CM

## 2025-08-01 DIAGNOSIS — C79.51 SECONDARY MALIGNANT NEOPLASM OF BONE: ICD-10-CM

## 2025-08-01 DIAGNOSIS — E87.6 HYPOKALEMIA: ICD-10-CM

## 2025-08-01 PROCEDURE — 96367 TX/PROPH/DG ADDL SEQ IV INF: CPT

## 2025-08-01 PROCEDURE — 25000003 PHARM REV CODE 250: Performed by: HOSPITALIST

## 2025-08-01 PROCEDURE — 96415 CHEMO IV INFUSION ADDL HR: CPT

## 2025-08-01 PROCEDURE — A4216 STERILE WATER/SALINE, 10 ML: HCPCS | Performed by: HOSPITALIST

## 2025-08-01 PROCEDURE — 96413 CHEMO IV INFUSION 1 HR: CPT

## 2025-08-01 PROCEDURE — 96375 TX/PRO/DX INJ NEW DRUG ADDON: CPT

## 2025-08-01 PROCEDURE — 63600175 PHARM REV CODE 636 W HCPCS: Performed by: HOSPITALIST

## 2025-08-01 PROCEDURE — 96372 THER/PROPH/DIAG INJ SC/IM: CPT | Mod: 59

## 2025-08-01 RX ORDER — DIPHENHYDRAMINE HYDROCHLORIDE 50 MG/ML
50 INJECTION, SOLUTION INTRAMUSCULAR; INTRAVENOUS ONCE AS NEEDED
Status: CANCELLED | OUTPATIENT
Start: 2025-08-01

## 2025-08-01 RX ORDER — ACETAMINOPHEN 325 MG/1
650 TABLET ORAL
Status: CANCELLED | OUTPATIENT
Start: 2025-08-01

## 2025-08-01 RX ORDER — DIPHENHYDRAMINE HYDROCHLORIDE 50 MG/ML
25 INJECTION, SOLUTION INTRAMUSCULAR; INTRAVENOUS
Status: CANCELLED | OUTPATIENT
Start: 2025-08-01

## 2025-08-01 RX ORDER — POTASSIUM CHLORIDE 20 MEQ/1
40 TABLET, EXTENDED RELEASE ORAL DAILY
Qty: 6 TABLET | Refills: 0 | Status: SHIPPED | OUTPATIENT
Start: 2025-08-01 | End: 2025-08-04

## 2025-08-01 RX ORDER — EPINEPHRINE 0.3 MG/.3ML
0.3 INJECTION SUBCUTANEOUS ONCE AS NEEDED
Status: CANCELLED | OUTPATIENT
Start: 2025-08-01

## 2025-08-01 RX ORDER — ACETAMINOPHEN 325 MG/1
650 TABLET ORAL
Status: COMPLETED | OUTPATIENT
Start: 2025-08-01 | End: 2025-08-01

## 2025-08-01 RX ORDER — HEPARIN 100 UNIT/ML
500 SYRINGE INTRAVENOUS
Status: CANCELLED | OUTPATIENT
Start: 2025-08-01

## 2025-08-01 RX ORDER — EPINEPHRINE 0.3 MG/.3ML
0.3 INJECTION SUBCUTANEOUS ONCE AS NEEDED
Status: DISCONTINUED | OUTPATIENT
Start: 2025-08-01 | End: 2025-08-01 | Stop reason: HOSPADM

## 2025-08-01 RX ORDER — DIPHENHYDRAMINE HYDROCHLORIDE 50 MG/ML
50 INJECTION, SOLUTION INTRAMUSCULAR; INTRAVENOUS ONCE AS NEEDED
Status: DISCONTINUED | OUTPATIENT
Start: 2025-08-01 | End: 2025-08-01 | Stop reason: HOSPADM

## 2025-08-01 RX ORDER — DIPHENHYDRAMINE HYDROCHLORIDE 50 MG/ML
25 INJECTION, SOLUTION INTRAMUSCULAR; INTRAVENOUS
Status: COMPLETED | OUTPATIENT
Start: 2025-08-01 | End: 2025-08-01

## 2025-08-01 RX ORDER — SODIUM CHLORIDE 0.9 % (FLUSH) 0.9 %
10 SYRINGE (ML) INJECTION
Status: DISCONTINUED | OUTPATIENT
Start: 2025-08-01 | End: 2025-08-01 | Stop reason: HOSPADM

## 2025-08-01 RX ORDER — PSYLLIUM HUSK 0.4 G
1 CAPSULE ORAL 2 TIMES DAILY
Qty: 180 TABLET | Refills: 3 | Status: SHIPPED | OUTPATIENT
Start: 2025-08-01 | End: 2026-08-01

## 2025-08-01 RX ORDER — HEPARIN 100 UNIT/ML
500 SYRINGE INTRAVENOUS
Status: DISCONTINUED | OUTPATIENT
Start: 2025-08-01 | End: 2025-08-01 | Stop reason: HOSPADM

## 2025-08-01 RX ORDER — SODIUM CHLORIDE 0.9 % (FLUSH) 0.9 %
10 SYRINGE (ML) INJECTION
Status: CANCELLED | OUTPATIENT
Start: 2025-08-01

## 2025-08-01 RX ADMIN — HEPARIN 500 UNITS: 100 SYRINGE at 11:08

## 2025-08-01 RX ADMIN — ACETAMINOPHEN 650 MG: 325 TABLET ORAL at 08:08

## 2025-08-01 RX ADMIN — DENOSUMAB 120 MG: 120 INJECTION SUBCUTANEOUS at 10:08

## 2025-08-01 RX ADMIN — SODIUM CHLORIDE 0.25 MG: 9 INJECTION, SOLUTION INTRAVENOUS at 08:08

## 2025-08-01 RX ADMIN — DIPHENHYDRAMINE HYDROCHLORIDE 25 MG: 50 INJECTION, SOLUTION INTRAMUSCULAR; INTRAVENOUS at 08:08

## 2025-08-01 RX ADMIN — Medication 10 ML: at 11:08

## 2025-08-01 RX ADMIN — SODIUM CHLORIDE 1400 MG: 9 INJECTION, SOLUTION INTRAVENOUS at 09:08

## 2025-08-01 NOTE — DISCHARGE INSTRUCTIONS
[facility]  [address] [address]  [address] [address]  [phone] phone     [phone] fax  Hours of Operation: Monday- Friday 8:00am- 5:00pm  After hours phone  [phone]  Hematology / Oncology Physicians on call      SHAINA Nation Dr., NP Phaon Dunbar, NP Khelsea Conley, FNP    Please call with any concerns regarding your appointment today.

## 2025-08-01 NOTE — PLAN OF CARE
Problem: Adult Inpatient Plan of Care  Goal: Plan of Care Review  Outcome: Progressing  Flowsheets (Taken 8/1/2025 0913)  Plan of Care Reviewed With: patient  Goal: Patient-Specific Goal (Individualized)  Outcome: Progressing  Flowsheets (Taken 8/1/2025 0913)  Individualized Care Needs: pt likes feet up, blanket and snacks provided  Anxieties, Fears or Concerns: pt denies  Patient/Family-Specific Goals (Include Timeframe): pt to tolerate infusion and fisrt injection of xgeva today without reaction     Problem: Chemotherapy Effects  Goal: Anemia Symptom Improvement  Outcome: Progressing  Goal: Safety Maintained  Outcome: Progressing  Goal: Absence of Hematuria  Outcome: Progressing  Goal: Nausea and Vomiting Relief  Outcome: Progressing  Goal: Neurotoxicity Symptom Control  Outcome: Progressing  Goal: Absence of Infection  Outcome: Progressing  Goal: Absence of Bleeding  Outcome: Progressing

## 2025-08-01 NOTE — PROGRESS NOTES
[facility] MEDICAL ONCOLOGY - FOLLOW UP VISIT    Reason for visit: Adenocarcinoma of the L Lung    The patient location is: Patient's Vehicle (parked, not driving) - [place]    Visit type: Audiovisual    Each patient to who is provided medical services by telemedicine has been: (1) informed of the relationship between the physician and patient and the respective role of any other health care provider with respect to management of the patient; and (2) notified that he or she may decline to receive medical services by telemedicine and may withdraw from such care at any time.      Oncology History   Malignant neoplasm of lower lobe of left lung   12/9/2022 Initial Diagnosis    12/2/22 -- PET CT  FDG avid mass within STEPHAN with the associated mass/adenopathy in the AP window extending of the suprahilar region  FDG avid prevascular lymph node  Extensive osseous metastatic disease      12/6/22 -- L Lung Bx and Thoracentesis  Pulmonary Adenocarcinoma  No IHC reported on solid tumor  IHC on thoracentesis: Positive for CK7 and TTF1, Negative for CK20, KRYSTIN 3, CDX2, calretinin  [place] NGS / PD-L1  PD-L1 0%  Insufficient sample for NGS      12/27/22 -- [facility]        1/27/23-3/31/23 -- Carboplatin/Pemetrexed      4/11/23 -- CT C/A/P  Decreasing 2.2 x 1.0 cm SETPHAN pulmonary nodule, previously 3.0 x 1.4 cm  Interval resolution of left pleural effusion  Stable 2.4 cm left adrenal nodule  Interval increase in number and size of numerous sclerotic osseous lesions throughout the lower cervical and thoracic spine, left humerus, inferior sternum, sacrum, pelvis, and femurs.      4/27/23-Present -- Amivantamab      4/22/25 -- CT C/A/P  Stable intrathoracic findings  Increasing 1.9 x 1.8 cm left sacral lesion, previously 1.2 x 0.9 cm  Worsening destructive changes in soft tissue mass around posterior aspect of right ninth rib  Redemonstration of sclerotic bone metastases involving  "spine, pelvis, left femoral head, and left humeral head  Incidentally found filling defect in right segmental/subsegmental branches        5/28/25 -- [facility]         6/4/25 -- Sacral Bx  Metastatic Carcinoma  Positive:  CK7, AE1/AE3  Negative: TTF-1, KRYSTIN 3, CDX2, PAX8, ER, GA     12/9/2022 Cancer Staged    Staging form: Lung, AJCC 8th Edition  - Clinical stage from 12/9/2022: Stage IV (cT2, cN2, cM1)     7/14/2025 Imaging Significant Findings    PET CT  No pulmonary nodules or pleural effusions  No mediastinal, hilar, or axillary lymphadenopathy  Markedly avid skeletal lesions corresponding with sclerosis on CT scans, including left sacrum, right iliac bone, left humerus, sternum, cervical spine, thoracic spine, lumbar spine, bilateral femurs, and pelvis          Oncology History      HPI:     Shaneka hAmadi is a 55 y.o. female with pmh significant for PE on eliquis, HTN, T2DM who presents to establish care for the below lung cancer. Her previous oncologists include Dr. Mcfarlane and Dr. Wilhelm.     1) Stage IVB (P1C9A2p) adenocarcinoma of the left lung (EGFR exon 20 insertion, insufficient sample for PD-L1) w/ malignant pleural effusion and mets to the bones, initially diagnosed 12/6/22, palliative radiation therapy to left ischium in left spine (8 Gy/11 Fx, 1/10/23),  carboplatin/pemetrexed x4 cycles (1/27/23 - 3/31/23), reported progression, 2L amivantamab (4/27/23 - present), radiographic evidence of progression in the L sacrum and R 9th rib (4/22/25) and bx proven progression in sacrum (6/4/25), s/p radiation therapy to the sites of progression (6/30/25-7/7/25)    Last clinic 7/17/25, proceed with C2-9 D15 amivantamab.  C3 on 8/5/25.  CT C/A/P around 9/8/25.  Repeat MRI brain with repeat imaging.    Interval History:  7/18/25:  C 29 D15 amivantamab    The pt states that she is feeling "much better" than after our last visit.     The long standing acneiform rash on her face transiently worsened since " our last visit, but she says this has been improving over the past few days. She denies a rash anywhere besides her face.     Pt denies N/V, diarrhea, constipation, new/worsening fatigue (stable), new/worsening SOB, or new/worsening cough.    ROS:   As per HPI.       Physical Exam:       [facility] 05/01/2021 (Approximate)                Physical Exam  Constitutional:       Appearance: Normal appearance.   HENT:      Head: Normocephalic and atraumatic.   Eyes:      Extraocular Movements: Extraocular movements intact.      Conjunctiva/sclera: Conjunctivae normal.      Pupils: Pupils are equal, round, and reactive to light.   Cardiovascular:      Rate and Rhythm: Normal rate and regular rhythm.      Heart sounds: No murmur heard.     No friction rub. No gallop.   Pulmonary:      Effort: Pulmonary effort is normal.      Breath sounds: No wheezing, rhonchi or rales.   Musculoskeletal:         General: Normal range of motion.      Right lower leg: No edema.      Left lower leg: No edema.   Skin:     General: Skin is warm and dry.   Neurological:      Mental Status: She is alert and oriented to person, place, and time.   Psychiatric:         Mood and Affect: Mood normal.         Thought Content: Thought content normal.         Judgment: Judgment normal.           Labs:   Recent Results (from the past 48 hours)   Comprehensive Metabolic Panel    Collection Time: 07/31/25  7:26 AM   Result Value Ref Range    Sodium 140 136 - 145 mmol/L    Potassium 3.3 (L) 3.5 - 5.1 mmol/L    Chloride 105 95 - 110 mmol/L    CO2 25 23 - 29 mmol/L    Glucose 176 (H) 70 - 110 mg/dL    BUN 13 6 - 20 mg/dL    Creatinine 0.7 0.5 - 1.4 mg/dL    Calcium 8.0 (L) 8.7 - 10.5 mg/dL    Protein Total 6.0 6.0 - 8.4 gm/dL    Albumin 2.6 (L) 3.5 - 5.2 g/dL    Bilirubin Total 0.4 0.1 - 1.0 mg/dL     (H) 40 - 150 unit/L    AST 31 11 - 45 unit/L    ALT 35 10 - 44 unit/L    Anion Gap 10 8 - 16 mmol/L    eGFR >60 >60 mL/min/1.73/m2   Magnesium    Collection  Time: 07/31/25  7:26 AM   Result Value Ref Range    Magnesium  1.9 1.6 - 2.6 mg/dL   Phosphorus    Collection Time: 07/31/25  7:26 AM   Result Value Ref Range    Phosphorus Level 2.8 2.7 - 4.5 mg/dL   CBC with Differential    Collection Time: 07/31/25  7:26 AM   Result Value Ref Range    WBC 7.23 3.90 - 12.70 K/uL    RBC 4.46 4.00 - 5.40 M/uL    HGB 12.6 12.0 - 16.0 gm/dL    HCT 37.6 37.0 - 48.5 %    MCV 84 82 - 98 fL    MCH 28.3 27.0 - 31.0 pg    MCHC 33.5 32.0 - 36.0 g/dL    RDW 13.8 11.5 - 14.5 %    Platelet Count 171 150 - 450 K/uL    MPV 10.9 9.2 - 12.9 fL    Nucleated RBC 0 <=0 /100 WBC    Neut % 65.9 38 - 73 %    Lymph % 17.4 (L) 18 - 48 %    Mono % 10.9 4 - 15 %    Eos % 4.4 <=8 %    Basophil % 1.0 <=1.9 %    Imm Grans % 0.4 0.0 - 0.5 %    Neut # 4.76 1.8 - 7.7 K/uL    Lymph # 1.26 1 - 4.8 K/uL    Mono # 0.79 0.3 - 1 K/uL    Eos # 0.32 <=0.5 K/uL    Baso # 0.07 <=0.2 K/uL    Imm Grans # 0.03 0.00 - 0.04 K/uL   Light Green Top Hold    Collection Time: 07/31/25  7:26 AM   Result Value Ref Range    Extra Tube Hold for add-ons.         Imaging:    See oncologic history above.     Path:  See oncologic history above.      Assessment and Plan:     Shaneka Ahmadi is a 55 y.o. female with pmh significant for PE on eliquis, HTN, T2DM who presents to establish care for the below lung cancer. Her previous oncologists include Dr. Mcfarlane and Dr. Wilhelm.     1) Stage IVB ([id number]) adenocarcinoma of the left lung (EGFR exon 20 insertion, insufficient sample for PD-L1) w/ malignant pleural effusion and mets to the bones, initially diagnosed 12/6/22, palliative radiation therapy to left ischium in left spine (8 Gy/11 Fx, 1/10/23),  carboplatin/pemetrexed x4 cycles (1/27/23 - 3/31/23), currently on amivantamab (4/27/23 - present), radiographic evidence of progression in the L sacrum and R 9th rib (4/22/25) and bx proven progression in sacrum (6/4/25), currently receiving radiation therapy to the sites of progression  (6/30/25-7/7/25)      Stage IVB Adenocarcinoma of the L Lung, EGFR Exon 20 Insertion  ECOG PS 1. Pt is currently on amivantamab, tolerating with intermittent acneiform facial rash.  Most recent imaging (PET-CT, 7/14/25, obtained for better skeletal evaluation) is without evidence of intrathoracic disease, however demonstrates markedly avid osseous lesions through much of the skeleton.  On review of the prior PET-CT (12/2/22), many of these areas were previously evident and appear less FDG avid on most recent imaging, however there do appear to be some new areas (even outside of the known progression in the sacrum and right ninth rib).  It is difficult to ascertain whether this represents true progression or not, recognizing that controlled bone disease generally scleroses and becomes quiescent on FDG imaging.  Discussed with radiation oncology who was in agreement that it is unclear whether this represents true progression or not.  Plan to obtain short interval CT imaging in 6-8 weeks and will compare that against both the PET-CT from 7/14/25 as well as the CT C/A/P from 4/22/25.  Will continue amivantamab in the interim.  This was discussed at length with the patient who was in agreement with the plan.    Of note, the patient was initially transitioned from carboplatin/pemetrexed after 4 cycles to amivantamab due to reported progression on CT imaging.  On review of that CT scan (4/11/23), there was actual improvement in the visceral disease (decreasing size of the STEPHAN pulmonary nodule as well as resolution of the left pleural effusion) but increased size and number of sclerotic osseous lesions.  This may in fact represent sclerotic treatment effect rather than neil progression.  At time of progression, may consider rechallenging with carboplatin/pemetrexed, recognizing relatively poor next-line options otherwise.    PLAN  Proceed with C30D1 amivantamab today (08/01/2025), labs acceptable (potassium 3.3, see below)  and treatment signed  Labs (CBC, CMP), RTC w/ DOLLY for in-person visit, and C30D15 amivantamab on 8/15/25   CT C/A/P 6-8 weeks from last imaging (~9/8/25)  MRI brain (7/17/25) with nonspecific increased T2 FLAIR hyperintense signal in inferior medial right parietal lobe without corresponding postcontrast enhancement, short-term follow up recommended; repeat MRI brain with repeat imaging  Pt prefers appointments at [facility] on Fridays      Bone Mets  Patient with diffuse osseous metastatic disease.  Has not previously been on osteoclast inhibition.  Received dental clearance on 5/2/25.  Reports difficulty with tolerating Zometa, including lower extremity rash and swelling, fatigue, and diarrhea. Transitioned to denosumab.  Denosumab q4w, first dose today (8/1/25)  Continue calcium and Vit D3      Hypokalemia  Potassium 3.3 today (8/1/25).   KCl 40 mEq x3 days    The above information has been reviewed with the patient and all questions have been answered to their apparent satisfaction.  They understand that they can call the clinic with any questions.    Darío Ambrose MD  Hematology/Oncology  [facility]      Route Chart for Scheduling    Med Onc Chart Routing      Follow up with physician .  Labs (CBC, CMP), RTC w/ DOLLY for in-person visit, and C30D15 amivantamab on 8/15/25   Follow up with DOLLY    Infusion scheduling note    Injection scheduling note    Labs    Imaging    Pharmacy appointment    Other referrals                    Disclaimer: This note was prepared using voice recognition system and is likely to have sound alike errors.  Please call me with any questions.

## 2025-08-08 ENCOUNTER — OFFICE VISIT (OUTPATIENT)
Dept: RADIATION ONCOLOGY | Facility: CLINIC | Age: 55
End: 2025-08-08
Payer: COMMERCIAL

## 2025-08-08 VITALS
SYSTOLIC BLOOD PRESSURE: 122 MMHG | OXYGEN SATURATION: 96 % | TEMPERATURE: 98 F | HEART RATE: 66 BPM | BODY MASS INDEX: 50.02 KG/M2 | HEIGHT: 64 IN | DIASTOLIC BLOOD PRESSURE: 73 MMHG | RESPIRATION RATE: 18 BRPM | WEIGHT: 293 LBS

## 2025-08-08 DIAGNOSIS — C79.51 SECONDARY MALIGNANT NEOPLASM OF BONE: Primary | ICD-10-CM

## 2025-08-08 PROCEDURE — 99999 PR PBB SHADOW E&M-EST. PATIENT-LVL V: CPT | Mod: PBBFAC,,, | Performed by: SPECIALIST

## 2025-08-08 NOTE — PROGRESS NOTES
Mrs. Ahmadi returns for follow-up after receiving palliative radiotherapy to her sacrum and ribs.  She has no further pain.  She is doing well.  She had no untoward side effects from radiotherapy.  Impression: She has done well with treatment.  Plan: I have asked her to call me if she has any further issues.  I will not see her on a regular basis.

## 2025-08-14 ENCOUNTER — LAB VISIT (OUTPATIENT)
Dept: LAB | Facility: HOSPITAL | Age: 55
End: 2025-08-14
Payer: COMMERCIAL

## 2025-08-14 DIAGNOSIS — C34.32 MALIGNANT NEOPLASM OF LOWER LOBE OF LEFT LUNG: ICD-10-CM

## 2025-08-14 LAB
ABSOLUTE EOSINOPHIL (OHS): 0.34 K/UL
ABSOLUTE MONOCYTE (OHS): 0.56 K/UL (ref 0.3–1)
ABSOLUTE NEUTROPHIL COUNT (OHS): 3.94 K/UL (ref 1.8–7.7)
ALBUMIN SERPL BCP-MCNC: 2.5 G/DL (ref 3.5–5.2)
ALP SERPL-CCNC: 156 UNIT/L (ref 40–150)
ALT SERPL W/O P-5'-P-CCNC: 40 UNIT/L (ref 10–44)
ANION GAP (OHS): 12 MMOL/L (ref 8–16)
AST SERPL-CCNC: 29 UNIT/L (ref 11–45)
BASOPHILS # BLD AUTO: 0.04 K/UL
BASOPHILS NFR BLD AUTO: 0.6 %
BILIRUB SERPL-MCNC: 0.5 MG/DL (ref 0.1–1)
BUN SERPL-MCNC: 16 MG/DL (ref 6–20)
CALCIUM SERPL-MCNC: 7.7 MG/DL (ref 8.7–10.5)
CHLORIDE SERPL-SCNC: 103 MMOL/L (ref 95–110)
CO2 SERPL-SCNC: 27 MMOL/L (ref 23–29)
CREAT SERPL-MCNC: 0.9 MG/DL (ref 0.5–1.4)
ERYTHROCYTE [DISTWIDTH] IN BLOOD BY AUTOMATED COUNT: 14.1 % (ref 11.5–14.5)
GFR SERPLBLD CREATININE-BSD FMLA CKD-EPI: >60 ML/MIN/1.73/M2
GLUCOSE SERPL-MCNC: 168 MG/DL (ref 70–110)
HCT VFR BLD AUTO: 36.8 % (ref 37–48.5)
HGB BLD-MCNC: 12.2 GM/DL (ref 12–16)
IMM GRANULOCYTES # BLD AUTO: 0.01 K/UL (ref 0–0.04)
IMM GRANULOCYTES NFR BLD AUTO: 0.2 % (ref 0–0.5)
LYMPHOCYTES # BLD AUTO: 1.47 K/UL (ref 1–4.8)
MCH RBC QN AUTO: 28.1 PG (ref 27–31)
MCHC RBC AUTO-ENTMCNC: 33.2 G/DL (ref 32–36)
MCV RBC AUTO: 85 FL (ref 82–98)
NUCLEATED RBC (/100WBC) (OHS): 0 /100 WBC
PLATELET # BLD AUTO: 168 K/UL (ref 150–450)
PMV BLD AUTO: 10.6 FL (ref 9.2–12.9)
POTASSIUM SERPL-SCNC: 3.7 MMOL/L (ref 3.5–5.1)
PROT SERPL-MCNC: 5.6 GM/DL (ref 6–8.4)
RBC # BLD AUTO: 4.34 M/UL (ref 4–5.4)
RELATIVE EOSINOPHIL (OHS): 5.3 %
RELATIVE LYMPHOCYTE (OHS): 23.1 % (ref 18–48)
RELATIVE MONOCYTE (OHS): 8.8 % (ref 4–15)
RELATIVE NEUTROPHIL (OHS): 62 % (ref 38–73)
SODIUM SERPL-SCNC: 142 MMOL/L (ref 136–145)
WBC # BLD AUTO: 6.36 K/UL (ref 3.9–12.7)

## 2025-08-14 PROCEDURE — 82040 ASSAY OF SERUM ALBUMIN: CPT

## 2025-08-14 PROCEDURE — 36415 COLL VENOUS BLD VENIPUNCTURE: CPT

## 2025-08-14 PROCEDURE — 85025 COMPLETE CBC W/AUTO DIFF WBC: CPT

## 2025-08-15 ENCOUNTER — OFFICE VISIT (OUTPATIENT)
Dept: HEMATOLOGY/ONCOLOGY | Facility: CLINIC | Age: 55
End: 2025-08-15
Payer: COMMERCIAL

## 2025-08-15 ENCOUNTER — INFUSION (OUTPATIENT)
Dept: INFUSION THERAPY | Facility: HOSPITAL | Age: 55
End: 2025-08-15
Attending: RADIOLOGY
Payer: COMMERCIAL

## 2025-08-15 VITALS
SYSTOLIC BLOOD PRESSURE: 101 MMHG | TEMPERATURE: 97 F | WEIGHT: 293 LBS | BODY MASS INDEX: 50.02 KG/M2 | RESPIRATION RATE: 16 BRPM | DIASTOLIC BLOOD PRESSURE: 56 MMHG | HEIGHT: 64 IN | OXYGEN SATURATION: 98 % | HEART RATE: 62 BPM

## 2025-08-15 VITALS
TEMPERATURE: 97 F | SYSTOLIC BLOOD PRESSURE: 116 MMHG | WEIGHT: 293 LBS | BODY MASS INDEX: 50.02 KG/M2 | DIASTOLIC BLOOD PRESSURE: 71 MMHG | HEIGHT: 64 IN | HEART RATE: 66 BPM | OXYGEN SATURATION: 97 %

## 2025-08-15 DIAGNOSIS — T45.1X5A IMMUNODEFICIENCY DUE TO CHEMOTHERAPY: ICD-10-CM

## 2025-08-15 DIAGNOSIS — E66.01 MORBID OBESITY: ICD-10-CM

## 2025-08-15 DIAGNOSIS — M25.562 LEFT KNEE PAIN, UNSPECIFIED CHRONICITY: ICD-10-CM

## 2025-08-15 DIAGNOSIS — C34.32 MALIGNANT NEOPLASM OF LOWER LOBE OF LEFT LUNG: Primary | ICD-10-CM

## 2025-08-15 DIAGNOSIS — I26.99 OTHER ACUTE PULMONARY EMBOLISM WITHOUT ACUTE COR PULMONALE: ICD-10-CM

## 2025-08-15 DIAGNOSIS — Z79.69 IMMUNODEFICIENCY DUE TO CHEMOTHERAPY: ICD-10-CM

## 2025-08-15 DIAGNOSIS — C79.51 SECONDARY MALIGNANT NEOPLASM OF BONE: ICD-10-CM

## 2025-08-15 DIAGNOSIS — D84.821 IMMUNODEFICIENCY DUE TO CHEMOTHERAPY: ICD-10-CM

## 2025-08-15 DIAGNOSIS — R68.89 SENSATION OF FEELING COLD: ICD-10-CM

## 2025-08-15 PROCEDURE — 25000003 PHARM REV CODE 250: Performed by: NURSE PRACTITIONER

## 2025-08-15 PROCEDURE — 96413 CHEMO IV INFUSION 1 HR: CPT

## 2025-08-15 PROCEDURE — 63600175 PHARM REV CODE 636 W HCPCS: Performed by: NURSE PRACTITIONER

## 2025-08-15 PROCEDURE — 96367 TX/PROPH/DG ADDL SEQ IV INF: CPT

## 2025-08-15 PROCEDURE — 96375 TX/PRO/DX INJ NEW DRUG ADDON: CPT

## 2025-08-15 PROCEDURE — 99999 PR PBB SHADOW E&M-EST. PATIENT-LVL V: CPT | Mod: PBBFAC,,, | Performed by: NURSE PRACTITIONER

## 2025-08-15 PROCEDURE — 96415 CHEMO IV INFUSION ADDL HR: CPT

## 2025-08-15 PROCEDURE — A4216 STERILE WATER/SALINE, 10 ML: HCPCS | Performed by: NURSE PRACTITIONER

## 2025-08-15 RX ORDER — EPINEPHRINE 0.3 MG/.3ML
0.3 INJECTION SUBCUTANEOUS ONCE AS NEEDED
Status: DISCONTINUED | OUTPATIENT
Start: 2025-08-15 | End: 2025-08-15 | Stop reason: HOSPADM

## 2025-08-15 RX ORDER — DIPHENHYDRAMINE HYDROCHLORIDE 50 MG/ML
50 INJECTION, SOLUTION INTRAMUSCULAR; INTRAVENOUS ONCE AS NEEDED
Status: DISCONTINUED | OUTPATIENT
Start: 2025-08-15 | End: 2025-08-15 | Stop reason: HOSPADM

## 2025-08-15 RX ORDER — HYDROCODONE BITARTRATE AND ACETAMINOPHEN 10; 325 MG/1; MG/1
1 TABLET ORAL EVERY 6 HOURS PRN
Qty: 28 TABLET | Refills: 0 | Status: SHIPPED | OUTPATIENT
Start: 2025-08-15

## 2025-08-15 RX ORDER — HEPARIN 100 UNIT/ML
500 SYRINGE INTRAVENOUS
Status: CANCELLED | OUTPATIENT
Start: 2025-08-15

## 2025-08-15 RX ORDER — DIPHENHYDRAMINE HYDROCHLORIDE 50 MG/ML
50 INJECTION, SOLUTION INTRAMUSCULAR; INTRAVENOUS ONCE AS NEEDED
Status: CANCELLED | OUTPATIENT
Start: 2025-08-15

## 2025-08-15 RX ORDER — SODIUM CHLORIDE 0.9 % (FLUSH) 0.9 %
10 SYRINGE (ML) INJECTION
Status: DISCONTINUED | OUTPATIENT
Start: 2025-08-15 | End: 2025-08-15 | Stop reason: HOSPADM

## 2025-08-15 RX ORDER — HEPARIN 100 UNIT/ML
500 SYRINGE INTRAVENOUS
Status: DISCONTINUED | OUTPATIENT
Start: 2025-08-15 | End: 2025-08-15 | Stop reason: HOSPADM

## 2025-08-15 RX ORDER — ACETAMINOPHEN 325 MG/1
650 TABLET ORAL
Status: COMPLETED | OUTPATIENT
Start: 2025-08-15 | End: 2025-08-15

## 2025-08-15 RX ORDER — DIPHENHYDRAMINE HYDROCHLORIDE 50 MG/ML
25 INJECTION, SOLUTION INTRAMUSCULAR; INTRAVENOUS
Status: COMPLETED | OUTPATIENT
Start: 2025-08-15 | End: 2025-08-15

## 2025-08-15 RX ORDER — ACETAMINOPHEN 325 MG/1
650 TABLET ORAL
Status: CANCELLED | OUTPATIENT
Start: 2025-08-15

## 2025-08-15 RX ORDER — DIPHENHYDRAMINE HYDROCHLORIDE 50 MG/ML
25 INJECTION, SOLUTION INTRAMUSCULAR; INTRAVENOUS
Status: CANCELLED | OUTPATIENT
Start: 2025-08-15

## 2025-08-15 RX ORDER — EPINEPHRINE 0.3 MG/.3ML
0.3 INJECTION SUBCUTANEOUS ONCE AS NEEDED
Status: CANCELLED | OUTPATIENT
Start: 2025-08-15

## 2025-08-15 RX ORDER — SODIUM CHLORIDE 0.9 % (FLUSH) 0.9 %
10 SYRINGE (ML) INJECTION
Status: CANCELLED | OUTPATIENT
Start: 2025-08-15

## 2025-08-15 RX ADMIN — Medication 10 ML: at 11:08

## 2025-08-15 RX ADMIN — SODIUM CHLORIDE: 9 INJECTION, SOLUTION INTRAVENOUS at 08:08

## 2025-08-15 RX ADMIN — DIPHENHYDRAMINE HYDROCHLORIDE 25 MG: 50 INJECTION, SOLUTION INTRAMUSCULAR; INTRAVENOUS at 08:08

## 2025-08-15 RX ADMIN — HEPARIN 500 UNITS: 100 SYRINGE at 11:08

## 2025-08-15 RX ADMIN — DEXAMETHASONE SODIUM PHOSPHATE 0.25 MG: 4 INJECTION, SOLUTION INTRA-ARTICULAR; INTRALESIONAL; INTRAMUSCULAR; INTRAVENOUS; SOFT TISSUE at 09:08

## 2025-08-15 RX ADMIN — SODIUM CHLORIDE 1400 MG: 9 INJECTION, SOLUTION INTRAVENOUS at 09:08

## 2025-08-15 RX ADMIN — ACETAMINOPHEN 650 MG: 325 TABLET ORAL at 08:08

## 2025-08-20 ENCOUNTER — TELEPHONE (OUTPATIENT)
Dept: PHARMACY | Facility: CLINIC | Age: 55
End: 2025-08-20
Payer: COMMERCIAL

## 2025-08-28 ENCOUNTER — LAB VISIT (OUTPATIENT)
Dept: LAB | Facility: HOSPITAL | Age: 55
End: 2025-08-28
Attending: NURSE PRACTITIONER
Payer: COMMERCIAL

## 2025-08-28 DIAGNOSIS — M25.562 LEFT KNEE PAIN, UNSPECIFIED CHRONICITY: ICD-10-CM

## 2025-08-28 DIAGNOSIS — C34.32 MALIGNANT NEOPLASM OF LOWER LOBE OF LEFT LUNG: ICD-10-CM

## 2025-08-28 DIAGNOSIS — R68.89 SENSATION OF FEELING COLD: ICD-10-CM

## 2025-08-28 LAB
ABSOLUTE EOSINOPHIL (OHS): 0.27 K/UL
ABSOLUTE MONOCYTE (OHS): 0.62 K/UL (ref 0.3–1)
ABSOLUTE NEUTROPHIL COUNT (OHS): 3.65 K/UL (ref 1.8–7.7)
ALBUMIN SERPL BCP-MCNC: 2.6 G/DL (ref 3.5–5.2)
ALP SERPL-CCNC: 155 UNIT/L (ref 40–150)
ALT SERPL W/O P-5'-P-CCNC: 49 UNIT/L (ref 10–44)
ANION GAP (OHS): 10 MMOL/L (ref 8–16)
AST SERPL-CCNC: 34 UNIT/L (ref 11–45)
BASOPHILS # BLD AUTO: 0.03 K/UL
BASOPHILS NFR BLD AUTO: 0.5 %
BILIRUB SERPL-MCNC: 0.3 MG/DL (ref 0.1–1)
BUN SERPL-MCNC: 15 MG/DL (ref 6–20)
CALCIUM SERPL-MCNC: 8.2 MG/DL (ref 8.7–10.5)
CHLORIDE SERPL-SCNC: 105 MMOL/L (ref 95–110)
CO2 SERPL-SCNC: 26 MMOL/L (ref 23–29)
CREAT SERPL-MCNC: 0.9 MG/DL (ref 0.5–1.4)
ERYTHROCYTE [DISTWIDTH] IN BLOOD BY AUTOMATED COUNT: 14.2 % (ref 11.5–14.5)
FERRITIN SERPL-MCNC: 225 NG/ML (ref 20–300)
GFR SERPLBLD CREATININE-BSD FMLA CKD-EPI: >60 ML/MIN/1.73/M2
GLUCOSE SERPL-MCNC: 175 MG/DL (ref 70–110)
HCT VFR BLD AUTO: 38 % (ref 37–48.5)
HGB BLD-MCNC: 12.6 GM/DL (ref 12–16)
IMM GRANULOCYTES # BLD AUTO: 0.02 K/UL (ref 0–0.04)
IMM GRANULOCYTES NFR BLD AUTO: 0.3 % (ref 0–0.5)
IRON SATN MFR SERPL: 24 % (ref 20–50)
IRON SERPL-MCNC: 61 UG/DL (ref 30–160)
LYMPHOCYTES # BLD AUTO: 1.79 K/UL (ref 1–4.8)
MAGNESIUM SERPL-MCNC: 1.7 MG/DL (ref 1.6–2.6)
MCH RBC QN AUTO: 28 PG (ref 27–31)
MCHC RBC AUTO-ENTMCNC: 33.2 G/DL (ref 32–36)
MCV RBC AUTO: 84 FL (ref 82–98)
NUCLEATED RBC (/100WBC) (OHS): 0 /100 WBC
PHOSPHATE SERPL-MCNC: 3.4 MG/DL (ref 2.7–4.5)
PLATELET # BLD AUTO: 165 K/UL (ref 150–450)
PMV BLD AUTO: 11.7 FL (ref 9.2–12.9)
POTASSIUM SERPL-SCNC: 3.8 MMOL/L (ref 3.5–5.1)
PROT SERPL-MCNC: 5.9 GM/DL (ref 6–8.4)
RBC # BLD AUTO: 4.5 M/UL (ref 4–5.4)
RELATIVE EOSINOPHIL (OHS): 4.2 %
RELATIVE LYMPHOCYTE (OHS): 28.1 % (ref 18–48)
RELATIVE MONOCYTE (OHS): 9.7 % (ref 4–15)
RELATIVE NEUTROPHIL (OHS): 57.2 % (ref 38–73)
SODIUM SERPL-SCNC: 141 MMOL/L (ref 136–145)
TIBC SERPL-MCNC: 253 UG/DL (ref 250–450)
TRANSFERRIN SERPL-MCNC: 171 MG/DL (ref 200–375)
WBC # BLD AUTO: 6.38 K/UL (ref 3.9–12.7)

## 2025-08-28 PROCEDURE — 82728 ASSAY OF FERRITIN: CPT

## 2025-08-28 PROCEDURE — 83735 ASSAY OF MAGNESIUM: CPT

## 2025-08-28 PROCEDURE — 85025 COMPLETE CBC W/AUTO DIFF WBC: CPT

## 2025-08-28 PROCEDURE — 84466 ASSAY OF TRANSFERRIN: CPT

## 2025-08-28 PROCEDURE — 82040 ASSAY OF SERUM ALBUMIN: CPT

## 2025-08-28 PROCEDURE — 84100 ASSAY OF PHOSPHORUS: CPT

## 2025-08-28 PROCEDURE — 36415 COLL VENOUS BLD VENIPUNCTURE: CPT

## 2025-08-29 ENCOUNTER — OFFICE VISIT (OUTPATIENT)
Dept: HEMATOLOGY/ONCOLOGY | Facility: CLINIC | Age: 55
End: 2025-08-29
Payer: COMMERCIAL

## 2025-08-29 ENCOUNTER — INFUSION (OUTPATIENT)
Dept: INFUSION THERAPY | Facility: HOSPITAL | Age: 55
End: 2025-08-29
Attending: RADIOLOGY
Payer: COMMERCIAL

## 2025-08-29 VITALS
HEART RATE: 65 BPM | SYSTOLIC BLOOD PRESSURE: 122 MMHG | TEMPERATURE: 98 F | WEIGHT: 293 LBS | OXYGEN SATURATION: 99 % | HEIGHT: 64 IN | BODY MASS INDEX: 50.02 KG/M2 | DIASTOLIC BLOOD PRESSURE: 71 MMHG

## 2025-08-29 VITALS
HEART RATE: 65 BPM | DIASTOLIC BLOOD PRESSURE: 51 MMHG | RESPIRATION RATE: 16 BRPM | TEMPERATURE: 98 F | SYSTOLIC BLOOD PRESSURE: 105 MMHG | OXYGEN SATURATION: 97 %

## 2025-08-29 DIAGNOSIS — D84.821 IMMUNODEFICIENCY DUE TO CHEMOTHERAPY: ICD-10-CM

## 2025-08-29 DIAGNOSIS — E66.01 MORBID OBESITY: ICD-10-CM

## 2025-08-29 DIAGNOSIS — Z79.69 IMMUNODEFICIENCY DUE TO CHEMOTHERAPY: ICD-10-CM

## 2025-08-29 DIAGNOSIS — C34.32 MALIGNANT NEOPLASM OF LOWER LOBE OF LEFT LUNG: Primary | ICD-10-CM

## 2025-08-29 DIAGNOSIS — C79.51 SECONDARY MALIGNANT NEOPLASM OF BONE: ICD-10-CM

## 2025-08-29 DIAGNOSIS — T45.1X5A IMMUNODEFICIENCY DUE TO CHEMOTHERAPY: ICD-10-CM

## 2025-08-29 PROCEDURE — 96375 TX/PRO/DX INJ NEW DRUG ADDON: CPT

## 2025-08-29 PROCEDURE — 96367 TX/PROPH/DG ADDL SEQ IV INF: CPT

## 2025-08-29 PROCEDURE — 96413 CHEMO IV INFUSION 1 HR: CPT

## 2025-08-29 PROCEDURE — 96372 THER/PROPH/DIAG INJ SC/IM: CPT | Mod: 59

## 2025-08-29 PROCEDURE — 96415 CHEMO IV INFUSION ADDL HR: CPT

## 2025-08-29 PROCEDURE — 63600175 PHARM REV CODE 636 W HCPCS: Performed by: NURSE PRACTITIONER

## 2025-08-29 PROCEDURE — 25000003 PHARM REV CODE 250: Performed by: NURSE PRACTITIONER

## 2025-08-29 PROCEDURE — 99999 PR PBB SHADOW E&M-EST. PATIENT-LVL V: CPT | Mod: PBBFAC,,, | Performed by: NURSE PRACTITIONER

## 2025-08-29 RX ORDER — SODIUM CHLORIDE 0.9 % (FLUSH) 0.9 %
10 SYRINGE (ML) INJECTION
OUTPATIENT
Start: 2025-08-29

## 2025-08-29 RX ORDER — ACETAMINOPHEN 325 MG/1
650 TABLET ORAL
Status: CANCELLED | OUTPATIENT
Start: 2025-08-29 | End: 2025-08-29

## 2025-08-29 RX ORDER — ACETAMINOPHEN 325 MG/1
650 TABLET ORAL
Status: COMPLETED | OUTPATIENT
Start: 2025-08-29 | End: 2025-08-29

## 2025-08-29 RX ORDER — EPINEPHRINE 0.3 MG/.3ML
0.3 INJECTION SUBCUTANEOUS ONCE AS NEEDED
OUTPATIENT
Start: 2025-08-29 | End: 2037-01-25

## 2025-08-29 RX ORDER — DIPHENHYDRAMINE HYDROCHLORIDE 50 MG/ML
25 INJECTION, SOLUTION INTRAMUSCULAR; INTRAVENOUS
Status: CANCELLED | OUTPATIENT
Start: 2025-08-29 | End: 2025-08-29

## 2025-08-29 RX ORDER — DIPHENHYDRAMINE HYDROCHLORIDE 50 MG/ML
25 INJECTION, SOLUTION INTRAMUSCULAR; INTRAVENOUS
Status: COMPLETED | OUTPATIENT
Start: 2025-08-29 | End: 2025-08-29

## 2025-08-29 RX ORDER — DIPHENHYDRAMINE HYDROCHLORIDE 50 MG/ML
50 INJECTION, SOLUTION INTRAMUSCULAR; INTRAVENOUS ONCE AS NEEDED
OUTPATIENT
Start: 2025-08-29 | End: 2037-01-25

## 2025-08-29 RX ORDER — HEPARIN 100 UNIT/ML
500 SYRINGE INTRAVENOUS
Status: DISCONTINUED | OUTPATIENT
Start: 2025-08-29 | End: 2025-08-29 | Stop reason: HOSPADM

## 2025-08-29 RX ORDER — HEPARIN 100 UNIT/ML
500 SYRINGE INTRAVENOUS
Status: CANCELLED | OUTPATIENT
Start: 2025-08-29

## 2025-08-29 RX ADMIN — ACETAMINOPHEN 650 MG: 325 TABLET ORAL at 10:08

## 2025-08-29 RX ADMIN — SODIUM CHLORIDE: 9 INJECTION, SOLUTION INTRAVENOUS at 10:08

## 2025-08-29 RX ADMIN — SODIUM CHLORIDE 1400 MG: 9 INJECTION, SOLUTION INTRAVENOUS at 11:08

## 2025-08-29 RX ADMIN — HEPARIN 500 UNITS: 100 SYRINGE at 10:08

## 2025-08-29 RX ADMIN — DENOSUMAB 120 MG: 120 INJECTION SUBCUTANEOUS at 10:08

## 2025-08-29 RX ADMIN — DIPHENHYDRAMINE HYDROCHLORIDE 25 MG: 50 INJECTION, SOLUTION INTRAMUSCULAR; INTRAVENOUS at 10:08

## 2025-08-29 RX ADMIN — SODIUM CHLORIDE 0.25 MG: 9 INJECTION, SOLUTION INTRAVENOUS at 10:08

## (undated) DEVICE — KIT INTRODUCER STIFFEN MICRO

## (undated) DEVICE — SUT 3-0 VICRYL / SH (J416)

## (undated) DEVICE — KIT SITE-RITE NDL GUIDE 21G

## (undated) DEVICE — ADHESIVE DERMABOND ADVANCED

## (undated) DEVICE — CLOSURE SKIN STERI STRIP 1/2X4

## (undated) DEVICE — PACK HEART CATH BR

## (undated) DEVICE — SHEET THYROID W/ISO-BAC

## (undated) DEVICE — DRESSING ADH COVADERM PLUS 4X4